# Patient Record
Sex: FEMALE | Race: WHITE | Employment: OTHER | URBAN - METROPOLITAN AREA
[De-identification: names, ages, dates, MRNs, and addresses within clinical notes are randomized per-mention and may not be internally consistent; named-entity substitution may affect disease eponyms.]

---

## 2017-05-10 ENCOUNTER — OFFICE VISIT (OUTPATIENT)
Dept: ORTHOPEDIC SURGERY | Age: 47
End: 2017-05-10

## 2017-05-10 VITALS
HEART RATE: 105 BPM | SYSTOLIC BLOOD PRESSURE: 136 MMHG | BODY MASS INDEX: 32.45 KG/M2 | WEIGHT: 201.94 LBS | HEIGHT: 66 IN | DIASTOLIC BLOOD PRESSURE: 107 MMHG

## 2017-05-10 DIAGNOSIS — M19.079 ARTHRITIS OF MIDFOOT: ICD-10-CM

## 2017-05-10 DIAGNOSIS — M79.672 FOOT PAIN, LEFT: Primary | ICD-10-CM

## 2017-05-10 PROCEDURE — 99243 OFF/OP CNSLTJ NEW/EST LOW 30: CPT | Performed by: ORTHOPAEDIC SURGERY

## 2017-05-10 PROCEDURE — G8417 CALC BMI ABV UP PARAM F/U: HCPCS | Performed by: ORTHOPAEDIC SURGERY

## 2017-05-10 PROCEDURE — G8427 DOCREV CUR MEDS BY ELIG CLIN: HCPCS | Performed by: ORTHOPAEDIC SURGERY

## 2017-05-10 PROCEDURE — 1036F TOBACCO NON-USER: CPT | Performed by: ORTHOPAEDIC SURGERY

## 2017-05-10 RX ORDER — DIPHENOXYLATE HYDROCHLORIDE AND ATROPINE SULFATE 2.5; .025 MG/1; MG/1
1 TABLET ORAL 2 TIMES DAILY PRN
COMMUNITY
Start: 2017-03-30 | End: 2019-06-03 | Stop reason: SDUPTHER

## 2017-05-10 RX ORDER — FENTANYL CITRATE 600 UG/1
800 LOZENGE ORAL; TRANSMUCOSAL 3 TIMES DAILY PRN
COMMUNITY
Start: 2017-05-08 | End: 2019-04-18 | Stop reason: ALTCHOICE

## 2017-05-10 RX ORDER — RIVAROXABAN 20 MG/1
1 TABLET, FILM COATED ORAL DAILY
COMMUNITY
Start: 2017-05-08 | End: 2019-02-25

## 2017-05-10 RX ORDER — LEVOTHYROXINE SODIUM 0.2 MG/1
1 TABLET ORAL DAILY
COMMUNITY
Start: 2017-05-04 | End: 2019-07-17

## 2017-06-08 ENCOUNTER — OFFICE VISIT (OUTPATIENT)
Dept: ORTHOPEDIC SURGERY | Age: 47
End: 2017-06-08

## 2017-06-08 VITALS
HEIGHT: 66 IN | DIASTOLIC BLOOD PRESSURE: 110 MMHG | BODY MASS INDEX: 32.45 KG/M2 | WEIGHT: 201.94 LBS | HEART RATE: 99 BPM | SYSTOLIC BLOOD PRESSURE: 164 MMHG

## 2017-06-08 DIAGNOSIS — M19.079 ARTHRITIS OF MIDFOOT: Primary | ICD-10-CM

## 2017-06-08 PROCEDURE — G8427 DOCREV CUR MEDS BY ELIG CLIN: HCPCS | Performed by: ORTHOPAEDIC SURGERY

## 2017-06-08 PROCEDURE — 73630 X-RAY EXAM OF FOOT: CPT | Performed by: ORTHOPAEDIC SURGERY

## 2017-06-08 PROCEDURE — G8417 CALC BMI ABV UP PARAM F/U: HCPCS | Performed by: ORTHOPAEDIC SURGERY

## 2017-06-08 PROCEDURE — 99212 OFFICE O/P EST SF 10 MIN: CPT | Performed by: ORTHOPAEDIC SURGERY

## 2017-06-08 PROCEDURE — 1036F TOBACCO NON-USER: CPT | Performed by: ORTHOPAEDIC SURGERY

## 2017-06-15 ENCOUNTER — HOSPITAL ENCOUNTER (OUTPATIENT)
Dept: CT IMAGING | Age: 47
Discharge: HOME | End: 2017-06-15
Attending: ORTHOPAEDIC SURGERY | Admitting: ORTHOPAEDIC SURGERY

## 2017-06-15 DIAGNOSIS — M19.90 OSTEOARTHRITIS: ICD-10-CM

## 2017-06-15 DIAGNOSIS — M19.079 ARTHRITIS OF MIDFOOT: ICD-10-CM

## 2017-06-21 ENCOUNTER — OFFICE VISIT (OUTPATIENT)
Dept: ORTHOPEDIC SURGERY | Age: 47
End: 2017-06-21

## 2017-06-21 VITALS
WEIGHT: 201.94 LBS | SYSTOLIC BLOOD PRESSURE: 158 MMHG | HEART RATE: 105 BPM | DIASTOLIC BLOOD PRESSURE: 111 MMHG | HEIGHT: 66 IN | BODY MASS INDEX: 32.45 KG/M2

## 2017-06-21 DIAGNOSIS — M19.079 ARTHRITIS OF MIDFOOT: Primary | ICD-10-CM

## 2017-06-21 PROCEDURE — G8417 CALC BMI ABV UP PARAM F/U: HCPCS | Performed by: ORTHOPAEDIC SURGERY

## 2017-06-21 PROCEDURE — 99213 OFFICE O/P EST LOW 20 MIN: CPT | Performed by: ORTHOPAEDIC SURGERY

## 2017-06-21 PROCEDURE — 1036F TOBACCO NON-USER: CPT | Performed by: ORTHOPAEDIC SURGERY

## 2017-06-21 PROCEDURE — G8427 DOCREV CUR MEDS BY ELIG CLIN: HCPCS | Performed by: ORTHOPAEDIC SURGERY

## 2017-06-23 ENCOUNTER — TELEPHONE (OUTPATIENT)
Dept: ORTHOPEDIC SURGERY | Age: 47
End: 2017-06-23

## 2017-07-03 DIAGNOSIS — Z45.2 ENCOUNTER FOR CENTRAL LINE PLACEMENT: Primary | ICD-10-CM

## 2017-07-10 ENCOUNTER — HOSPITAL ENCOUNTER (OUTPATIENT)
Dept: SURGERY | Age: 47
Discharge: HOME | End: 2017-07-10
Attending: ORTHOPAEDIC SURGERY | Admitting: ORTHOPAEDIC SURGERY

## 2017-07-10 ENCOUNTER — HOSPITAL ENCOUNTER (OUTPATIENT)
Dept: INTERVENTIONAL RADIOLOGY/VASCULAR | Age: 47
Discharge: HOME | End: 2017-07-10
Attending: ORTHOPAEDIC SURGERY | Admitting: ORTHOPAEDIC SURGERY

## 2017-07-10 ENCOUNTER — CLINICAL DOCUMENTATION (OUTPATIENT)
Dept: SPIRITUAL SERVICES | Age: 47
End: 2017-07-10

## 2017-07-10 VITALS
SYSTOLIC BLOOD PRESSURE: 113 MMHG | RESPIRATION RATE: 15 BRPM | OXYGEN SATURATION: 99 % | HEIGHT: 66 IN | TEMPERATURE: 97.2 F | WEIGHT: 187.6 LBS | BODY MASS INDEX: 30.15 KG/M2 | DIASTOLIC BLOOD PRESSURE: 83 MMHG | HEART RATE: 98 BPM

## 2017-07-10 DIAGNOSIS — M19.90 OSTEOARTHRITIS: ICD-10-CM

## 2017-07-10 DIAGNOSIS — M19.90 SENILE ARTHRITIS: ICD-10-CM

## 2017-07-10 RX ORDER — MEPERIDINE HYDROCHLORIDE 50 MG/ML
12.5 INJECTION INTRAMUSCULAR; INTRAVENOUS; SUBCUTANEOUS EVERY 5 MIN PRN
Status: DISCONTINUED | OUTPATIENT
Start: 2017-07-10 | End: 2017-07-11 | Stop reason: HOSPADM

## 2017-07-10 RX ORDER — CEPHALEXIN 500 MG/1
500 CAPSULE ORAL 4 TIMES DAILY
Qty: 8 CAPSULE | Refills: 0 | Status: SHIPPED | OUTPATIENT
Start: 2017-07-10 | End: 2019-02-25

## 2017-07-10 RX ORDER — SODIUM CHLORIDE 0.9 % (FLUSH) 0.9 %
SYRINGE (ML) INJECTION
Status: COMPLETED
Start: 2017-07-10 | End: 2017-07-10

## 2017-07-10 RX ORDER — OXYCODONE HYDROCHLORIDE AND ACETAMINOPHEN 5; 325 MG/1; MG/1
1 TABLET ORAL PRN
Status: ACTIVE | OUTPATIENT
Start: 2017-07-10 | End: 2017-07-10

## 2017-07-10 RX ORDER — HYDROCODONE BITARTRATE AND ACETAMINOPHEN 5; 325 MG/1; MG/1
2 TABLET ORAL ONCE
Status: COMPLETED | OUTPATIENT
Start: 2017-07-10 | End: 2017-07-10

## 2017-07-10 RX ORDER — HYDROCODONE BITARTRATE AND ACETAMINOPHEN 5; 325 MG/1; MG/1
1-2 TABLET ORAL EVERY 6 HOURS PRN
Qty: 50 TABLET | Refills: 0 | Status: SHIPPED | OUTPATIENT
Start: 2017-07-10 | End: 2017-07-17

## 2017-07-10 RX ORDER — KETAMINE HYDROCHLORIDE 100 MG/ML
INJECTION, SOLUTION INTRAMUSCULAR; INTRAVENOUS
Status: DISCONTINUED
Start: 2017-07-10 | End: 2017-07-11 | Stop reason: HOSPADM

## 2017-07-10 RX ORDER — OXYCODONE HYDROCHLORIDE AND ACETAMINOPHEN 5; 325 MG/1; MG/1
2 TABLET ORAL PRN
Status: ACTIVE | OUTPATIENT
Start: 2017-07-10 | End: 2017-07-10

## 2017-07-10 RX ORDER — HYDRALAZINE HYDROCHLORIDE 20 MG/ML
5 INJECTION INTRAMUSCULAR; INTRAVENOUS EVERY 10 MIN PRN
Status: DISCONTINUED | OUTPATIENT
Start: 2017-07-10 | End: 2017-07-11 | Stop reason: HOSPADM

## 2017-07-10 RX ORDER — ONDANSETRON 2 MG/ML
4 INJECTION INTRAMUSCULAR; INTRAVENOUS
Status: COMPLETED | OUTPATIENT
Start: 2017-07-10 | End: 2017-07-10

## 2017-07-10 RX ORDER — DIPHENHYDRAMINE HYDROCHLORIDE 50 MG/ML
12.5 INJECTION INTRAMUSCULAR; INTRAVENOUS
Status: ACTIVE | OUTPATIENT
Start: 2017-07-10 | End: 2017-07-10

## 2017-07-10 RX ORDER — PROMETHAZINE HYDROCHLORIDE 25 MG/ML
6.25 INJECTION, SOLUTION INTRAMUSCULAR; INTRAVENOUS
Status: ACTIVE | OUTPATIENT
Start: 2017-07-10 | End: 2017-07-10

## 2017-07-10 RX ORDER — SODIUM CHLORIDE, SODIUM LACTATE, POTASSIUM CHLORIDE, CALCIUM CHLORIDE 600; 310; 30; 20 MG/100ML; MG/100ML; MG/100ML; MG/100ML
INJECTION, SOLUTION INTRAVENOUS CONTINUOUS
Status: DISCONTINUED | OUTPATIENT
Start: 2017-07-10 | End: 2017-07-11 | Stop reason: HOSPADM

## 2017-07-10 RX ORDER — HYDROCODONE BITARTRATE AND ACETAMINOPHEN 5; 325 MG/1; MG/1
TABLET ORAL
Status: DISCONTINUED
Start: 2017-07-10 | End: 2017-07-11 | Stop reason: HOSPADM

## 2017-07-10 RX ADMIN — HYDROCODONE BITARTRATE AND ACETAMINOPHEN 2 TABLET: 5; 325 TABLET ORAL at 14:26

## 2017-07-10 RX ADMIN — SODIUM CHLORIDE, SODIUM LACTATE, POTASSIUM CHLORIDE, CALCIUM CHLORIDE: 600; 310; 30; 20 INJECTION, SOLUTION INTRAVENOUS at 10:20

## 2017-07-10 RX ADMIN — Medication 10 ML: at 10:20

## 2017-07-10 RX ADMIN — ONDANSETRON 4 MG: 2 INJECTION INTRAMUSCULAR; INTRAVENOUS at 13:46

## 2017-07-10 ASSESSMENT — PAIN SCALES - GENERAL: PAINLEVEL_OUTOF10: 7

## 2017-07-10 ASSESSMENT — PAIN DESCRIPTION - LOCATION: LOCATION: HEAD

## 2017-07-10 ASSESSMENT — PAIN DESCRIPTION - DESCRIPTORS: DESCRIPTORS: CONSTANT

## 2017-07-10 ASSESSMENT — PAIN - FUNCTIONAL ASSESSMENT: PAIN_FUNCTIONAL_ASSESSMENT: 0-10

## 2017-07-10 ASSESSMENT — PAIN DESCRIPTION - PAIN TYPE: TYPE: CHRONIC PAIN

## 2017-07-26 ENCOUNTER — OFFICE VISIT (OUTPATIENT)
Dept: ORTHOPEDIC SURGERY | Age: 47
End: 2017-07-26

## 2017-07-26 DIAGNOSIS — M79.672 FOOT PAIN, LEFT: Primary | ICD-10-CM

## 2017-07-26 PROCEDURE — 73630 X-RAY EXAM OF FOOT: CPT | Performed by: ORTHOPAEDIC SURGERY

## 2017-07-26 PROCEDURE — 99024 POSTOP FOLLOW-UP VISIT: CPT | Performed by: ORTHOPAEDIC SURGERY

## 2017-07-26 PROCEDURE — 29405 APPL SHORT LEG CAST: CPT | Performed by: ORTHOPAEDIC SURGERY

## 2017-08-10 ENCOUNTER — OFFICE VISIT (OUTPATIENT)
Dept: ORTHOPEDIC SURGERY | Age: 47
End: 2017-08-10

## 2017-08-10 VITALS
BODY MASS INDEX: 33.49 KG/M2 | DIASTOLIC BLOOD PRESSURE: 73 MMHG | HEIGHT: 65 IN | WEIGHT: 201 LBS | SYSTOLIC BLOOD PRESSURE: 127 MMHG | HEART RATE: 81 BPM

## 2017-08-10 DIAGNOSIS — M79.672 FOOT PAIN, LEFT: Primary | ICD-10-CM

## 2017-08-10 DIAGNOSIS — M19.079 ARTHRITIS OF MIDFOOT: ICD-10-CM

## 2017-08-10 PROCEDURE — 99024 POSTOP FOLLOW-UP VISIT: CPT | Performed by: ORTHOPAEDIC SURGERY

## 2017-08-10 PROCEDURE — 73630 X-RAY EXAM OF FOOT: CPT | Performed by: ORTHOPAEDIC SURGERY

## 2017-08-31 ENCOUNTER — OFFICE VISIT (OUTPATIENT)
Dept: ORTHOPEDIC SURGERY | Age: 47
End: 2017-08-31

## 2017-08-31 VITALS
HEART RATE: 88 BPM | WEIGHT: 176 LBS | SYSTOLIC BLOOD PRESSURE: 160 MMHG | HEIGHT: 65 IN | DIASTOLIC BLOOD PRESSURE: 103 MMHG | BODY MASS INDEX: 29.32 KG/M2

## 2017-08-31 DIAGNOSIS — M79.672 FOOT PAIN, LEFT: Primary | ICD-10-CM

## 2017-08-31 PROCEDURE — 99024 POSTOP FOLLOW-UP VISIT: CPT | Performed by: ORTHOPAEDIC SURGERY

## 2017-08-31 PROCEDURE — 73630 X-RAY EXAM OF FOOT: CPT | Performed by: ORTHOPAEDIC SURGERY

## 2017-09-06 ENCOUNTER — HOSPITAL ENCOUNTER (OUTPATIENT)
Dept: PHYSICAL THERAPY | Age: 47
Discharge: HOME | End: 2017-09-06
Admitting: ORTHOPAEDIC SURGERY

## 2017-09-06 ENCOUNTER — HOSPITAL ENCOUNTER (OUTPATIENT)
Dept: PHYSICAL THERAPY | Age: 47
Discharge: HOME | End: 2017-08-31
Admitting: ORTHOPAEDIC SURGERY

## 2017-09-15 ENCOUNTER — HOSPITAL ENCOUNTER (OUTPATIENT)
Dept: PHYSICAL THERAPY | Age: 47
Discharge: HOME | End: 2017-09-15
Admitting: ORTHOPAEDIC SURGERY

## 2017-09-20 ENCOUNTER — HOSPITAL ENCOUNTER (OUTPATIENT)
Dept: PHYSICAL THERAPY | Age: 47
Discharge: HOME | End: 2017-09-20
Admitting: ORTHOPAEDIC SURGERY

## 2017-09-29 ENCOUNTER — HOSPITAL ENCOUNTER (OUTPATIENT)
Dept: PHYSICAL THERAPY | Age: 47
Discharge: HOME | End: 2017-09-29
Admitting: ORTHOPAEDIC SURGERY

## 2017-09-29 NOTE — FLOWSHEET NOTE
improvements in LE, proximal hip, and core control with self care, mobility, lifting, ambulation.  [] (74240) Provided verbal/tactile cueing for activities related to improving balance, coordination, kinesthetic sense, posture, motor skill, proprioception  to assist with LE, proximal hip, and core control in self care, mobility, lifting, ambulation and eccentric single leg control. NMR and Therapeutic Activities:    [x] (38671 or 10407) Provided verbal/tactile cueing for activities related to improving balance, coordination, kinesthetic sense, posture, motor skill, proprioception and motor activation to allow for proper function of core, proximal hip and LE with self care and ADLs  [] (41680) Gait Re-education- Provided training and instruction to the patient for proper LE, core and proximal hip recruitment and positioning and eccentric body weight control with ambulation re-education including up and down stairs     Home Exercise Program:    [x] (18139) Reviewed/Progressed HEP activities related to strengthening, flexibility, endurance, ROM of core, proximal hip and LE for functional self-care, mobility, lifting and ambulation/stair navigation   [] (64633)Reviewed/Progressed HEP activities related to improving balance, coordination, kinesthetic sense, posture, motor skill, proprioception of core, proximal hip and LE for self care, mobility, lifting, and ambulation/stair navigation      Manual Treatments:  PROM / STM / Oscillations-Mobs:  G-I, II, III, IV (PA's, Inf., Post.)  [x] (62204) Provided manual therapy to mobilize LE, proximal hip and/or LS spine soft tissue/joints for the purpose of modulating pain, promoting relaxation,  increasing ROM, reducing/eliminating soft tissue swelling/inflammation/restriction, improving soft tissue extensibility and allowing for proper ROM for normal function with self care, mobility, lifting and ambulation.      Modalities:   10 min cryotherapy     Charges:  Timed Code Treatment Minutes: 38   Total Treatment Minutes: 48     [] EVAL (LOW) 16250 (typically 20 minutes face-to-face)    [] EVAL  [x] JE(98272) x  2   [] IONTO  [x] NMR (67220) x  1   [] VASO  [] Manual (59827) x       [] Other:  [] TA x       [] Mech Traction (81056)  [] ES(attended) (07776)      [] ES (un) (19370):     GOALS: Patient stated goal: Pt would like to return to pain free ambulation without AD.      Therapist goals for Patient:   Short Term Goals: To be achieved in: 2 weeks  1. Independent in HEP and progression per patient tolerance, in order to prevent re-injury. 2. Patient will have a decrease in pain to facilitate improvement in movement, function, and ADLs as indicated by Functional Deficits.     Long Term Goals: To be achieved in: 12 weeks  1. Disability index score of 35% or less for the LEFS to assist with reaching prior level of function. 2. Patient will demonstrate increased AROM to L ankle = to R ankle to allow for proper joint functioning as indicated by patients Functional Deficits. 3. Patient will demonstrate an increase in Strength to good proximal hip strength and control, within 5lb HHD in LE to allow for proper functional mobility as indicated by patients Functional Deficits. 4. Patient will return to all functional activities without increased symptoms or restriction. 5. Pt will demonstrate a normalized gait pattern without AD and 0/10 pain. (patient specific functional goal)     Progression Towards Functional goals:  [x] Patient is progressing as expected towards functional goals listed. [] Progression is slowed due to complexities listed. [] Progression has been slowed due to co-morbidities. [] Plan just implemented, too soon to assess goals progression  [] Other:     ASSESSMENT:  Patient has improved ankle range of motion and strength today as noted above.      Treatment/Activity Tolerance:  [x] Patient tolerated treatment well [] Patient limited by werner  [] Patient

## 2017-09-29 NOTE — OP NOTE
[facility]                        Date: 9/29/2017  Physician: Dr. Mayco Naranjo  Patient: Asif Caban Diagnosis: L 1st and 2nd TMT joint fusion DOS 7/10/17  Patient has received 4 sessions of Physical Therapy over a 4 week period. Pain reported has decreased from 3/10 to 0/10    ROM Initial (R) Initial (L) Current (R) Current (L)   DF    19   PF    50   Inv    45   Ev    35          Strength       DF  3+/5  4/5   PF  3+/5  3/5   Inv  3+/5  4-/5   Ev  3+/5  4-/5            Functional Activity Checklist: The patient continues to have moderate difficulty with the following:   [] Personal care  [] Reaching / overhead  [x] Standing    [x] Housework chores  [] Climbing  [x] Driving / riding in a vehicle    [x] Work  [x] Squatting  [] Bed / vehicle mobility    [x] Lifting  [x] Walking  [x] Sleeping    [] Pushing / pulling  [] Sitting  [] Concentrating / reading     Specific Functional Improvement and Impression:  Pt demonstrates improved L ankle AROM to within 5 deg of unaffected LE. She has improved ankle strength. She is able to tolerate weight shifts onto L LE for up to 15 seconds. Summary:   [x] Patient is progressing as expected for this condition   [] Patient is progressing, but slower than expected for this condition   [] Patient is not progressing  Clinician Recommendations:   [x] Continue rehabilitation due to objective improvement and continued functional deficits. [] Follow up periodically to advance home exercise program to match level of function. [] Continue rehabitation due to objective improvement and continued functional deficits with progression to work conditioning. [] Discharge to post-rehab program secondary to maximizing \"medical necessity\" of physical / occupational therapy.    [] Discharge independent in a home program as:  [] All goals achieved  [] Maximized \"medical necessity\" of physical / occupational therapy  [] No subjective or objective

## 2017-10-04 ENCOUNTER — OFFICE VISIT (OUTPATIENT)
Dept: ORTHOPEDIC SURGERY | Age: 47
End: 2017-10-04

## 2017-10-04 VITALS
HEIGHT: 65 IN | WEIGHT: 175.93 LBS | SYSTOLIC BLOOD PRESSURE: 135 MMHG | HEART RATE: 101 BPM | BODY MASS INDEX: 29.31 KG/M2 | DIASTOLIC BLOOD PRESSURE: 74 MMHG

## 2017-10-04 DIAGNOSIS — M79.672 FOOT PAIN, LEFT: Primary | ICD-10-CM

## 2017-10-04 PROCEDURE — 99024 POSTOP FOLLOW-UP VISIT: CPT | Performed by: ORTHOPAEDIC SURGERY

## 2017-10-04 PROCEDURE — 73630 X-RAY EXAM OF FOOT: CPT | Performed by: ORTHOPAEDIC SURGERY

## 2017-10-04 NOTE — PROGRESS NOTES
Subjective: Patient states that he is here for follow-up of her [id number] left 2nd and 3rd TMT fusions. States her pain continues to go down and she has a little sore after therapy. If she is walking on it for long period of time she also gets some discomfort. She does take pain medicine but that's for chronic headaches. Objective: Physical exam shows incisions are well-healed no evidence of infection sensations intact. She has minimal swelling mild deviation laterally of the TMT joints.   No pain with mobilization through the midfoot 30° of dorsiflexion and 30° of plantarflexion ambulates with a boot  Imaging: 3 views of the left foot show the fusion sites unchanged appears to be healing in  Assessment and plan: Overall she's doing well I gave her prescription for custom inserts she'll follow-up with me in 4 weeks repeat x-ray should be obtained she can wean into the inserts as tolerated

## 2017-10-16 ENCOUNTER — TELEPHONE (OUTPATIENT)
Dept: ORTHOPEDIC SURGERY | Age: 47
End: 2017-10-16

## 2017-10-16 NOTE — TELEPHONE ENCOUNTER
Tried on 2 separate times to call. Have to press a 2 digit number to prove not a robocall. Each time, got patched through and would ring, someone would  and hang back up. Orthotics not covered unless diabetic.

## 2017-10-31 ENCOUNTER — ORTHOTIC/BRACE ENCOUNTER (OUTPATIENT)
Dept: ORTHOPEDIC SURGERY | Age: 47
End: 2017-10-31

## 2017-11-14 ENCOUNTER — ORTHOTIC/BRACE ENCOUNTER (OUTPATIENT)
Dept: ORTHOPEDIC SURGERY | Age: 47
End: 2017-11-14

## 2017-11-14 DIAGNOSIS — M19.079 ARTHRITIS OF MIDFOOT: Primary | ICD-10-CM

## 2017-11-14 PROCEDURE — L3020 FOOT LONGITUD/METATARSAL SUP: HCPCS | Performed by: PEDORTHIST

## 2017-12-07 ENCOUNTER — OFFICE VISIT (OUTPATIENT)
Dept: ORTHOPEDIC SURGERY | Age: 47
End: 2017-12-07

## 2017-12-07 VITALS
HEART RATE: 113 BPM | HEIGHT: 65 IN | SYSTOLIC BLOOD PRESSURE: 145 MMHG | WEIGHT: 175.93 LBS | BODY MASS INDEX: 29.31 KG/M2 | DIASTOLIC BLOOD PRESSURE: 103 MMHG

## 2017-12-07 DIAGNOSIS — M19.079 ARTHRITIS OF MIDFOOT: ICD-10-CM

## 2017-12-07 DIAGNOSIS — M79.672 FOOT PAIN, LEFT: Primary | ICD-10-CM

## 2017-12-07 PROCEDURE — 99212 OFFICE O/P EST SF 10 MIN: CPT | Performed by: ORTHOPAEDIC SURGERY

## 2017-12-07 PROCEDURE — 1036F TOBACCO NON-USER: CPT | Performed by: ORTHOPAEDIC SURGERY

## 2017-12-07 PROCEDURE — G8427 DOCREV CUR MEDS BY ELIG CLIN: HCPCS | Performed by: ORTHOPAEDIC SURGERY

## 2017-12-07 PROCEDURE — G8484 FLU IMMUNIZE NO ADMIN: HCPCS | Performed by: ORTHOPAEDIC SURGERY

## 2017-12-07 PROCEDURE — G8417 CALC BMI ABV UP PARAM F/U: HCPCS | Performed by: ORTHOPAEDIC SURGERY

## 2017-12-07 PROCEDURE — 73630 X-RAY EXAM OF FOOT: CPT | Performed by: ORTHOPAEDIC SURGERY

## 2017-12-12 ENCOUNTER — TELEPHONE (OUTPATIENT)
Dept: ORTHOPEDIC SURGERY | Age: 47
End: 2017-12-12

## 2017-12-12 NOTE — TELEPHONE ENCOUNTER
Patient has a question for Emmett Bower about the orthotics she received. It seems they may be a little short and is wondering if she should return them and go to another place to get the right ones for her new balance shoes that she has gotten.

## 2018-04-11 ENCOUNTER — OFFICE VISIT (OUTPATIENT)
Dept: ORTHOPEDIC SURGERY | Age: 48
End: 2018-04-11

## 2018-04-11 VITALS — BODY MASS INDEX: 29.31 KG/M2 | WEIGHT: 175.93 LBS | HEIGHT: 65 IN

## 2018-04-11 DIAGNOSIS — M79.672 FOOT PAIN, LEFT: Primary | ICD-10-CM

## 2018-04-11 DIAGNOSIS — M19.079 ARTHRITIS OF MIDFOOT: ICD-10-CM

## 2018-04-11 PROCEDURE — G8427 DOCREV CUR MEDS BY ELIG CLIN: HCPCS | Performed by: ORTHOPAEDIC SURGERY

## 2018-04-11 PROCEDURE — 1036F TOBACCO NON-USER: CPT | Performed by: ORTHOPAEDIC SURGERY

## 2018-04-11 PROCEDURE — G8417 CALC BMI ABV UP PARAM F/U: HCPCS | Performed by: ORTHOPAEDIC SURGERY

## 2018-04-11 PROCEDURE — 99212 OFFICE O/P EST SF 10 MIN: CPT | Performed by: ORTHOPAEDIC SURGERY

## 2018-07-19 ENCOUNTER — OFFICE VISIT (OUTPATIENT)
Dept: ORTHOPEDIC SURGERY | Age: 48
End: 2018-07-19

## 2018-07-19 VITALS — HEIGHT: 64 IN | WEIGHT: 175 LBS | BODY MASS INDEX: 29.88 KG/M2

## 2018-07-19 DIAGNOSIS — M79.672 FOOT PAIN, LEFT: Primary | ICD-10-CM

## 2018-07-19 PROCEDURE — 1036F TOBACCO NON-USER: CPT | Performed by: ORTHOPAEDIC SURGERY

## 2018-07-19 PROCEDURE — G8428 CUR MEDS NOT DOCUMENT: HCPCS | Performed by: ORTHOPAEDIC SURGERY

## 2018-07-19 PROCEDURE — G8417 CALC BMI ABV UP PARAM F/U: HCPCS | Performed by: ORTHOPAEDIC SURGERY

## 2018-07-19 PROCEDURE — 99212 OFFICE O/P EST SF 10 MIN: CPT | Performed by: ORTHOPAEDIC SURGERY

## 2018-10-17 ENCOUNTER — OFFICE VISIT (OUTPATIENT)
Dept: ORTHOPEDIC SURGERY | Age: 48
End: 2018-10-17
Payer: COMMERCIAL

## 2018-10-17 VITALS — HEIGHT: 64 IN | BODY MASS INDEX: 29.88 KG/M2 | WEIGHT: 175.04 LBS

## 2018-10-17 DIAGNOSIS — M19.079 ARTHRITIS OF MIDFOOT: ICD-10-CM

## 2018-10-17 DIAGNOSIS — M79.672 FOOT PAIN, LEFT: Primary | ICD-10-CM

## 2018-10-17 PROCEDURE — 99212 OFFICE O/P EST SF 10 MIN: CPT | Performed by: ORTHOPAEDIC SURGERY

## 2018-10-17 PROCEDURE — G8427 DOCREV CUR MEDS BY ELIG CLIN: HCPCS | Performed by: ORTHOPAEDIC SURGERY

## 2018-10-17 PROCEDURE — G8417 CALC BMI ABV UP PARAM F/U: HCPCS | Performed by: ORTHOPAEDIC SURGERY

## 2018-10-17 PROCEDURE — G8484 FLU IMMUNIZE NO ADMIN: HCPCS | Performed by: ORTHOPAEDIC SURGERY

## 2018-10-17 PROCEDURE — 1036F TOBACCO NON-USER: CPT | Performed by: ORTHOPAEDIC SURGERY

## 2018-10-17 NOTE — PROGRESS NOTES
Subjective: Patient states that she is here for follow-up of her 7/10/17 left midfoot fusion 1st and 2nd TMT joints. Last time I saw her in June I put her on Bactrim and she's had problems with multiple areas of rash it's been treated by both general surgery and internal medicine. She has an appointment to see a dermatologist in November. She denies fever or chills. States that her left foot is mild to moderately tender but it's not in the surgical area it's distal through the forefoot and lateral through the midfoot. She has not been able to wear regular shoe with arch support because it rubs on the area of rash and her previous biopsy sites. He is currently not on antibiotics. She did have her shoulder surgery to remove the lipoma. Objective: Physical exam shows she has a purple petechial area on the anterior ankle. Her surgical site at the left midfoot shows mild swelling but no erythema no pain with mobilization through this area. She ambulates without assistive device using a shoe that has no support. Strength is 4/5 dorsiflexion plantarflexion. 20° of dorsiflexion and 40° of plantarflexion  Imaging: 3 views of the left foot show the 1st 2nd TMT fusion sites well-healed  Assessment and plan: This patient's fusion sites look good I think she is ambulating without support which is going to irritate her somewhat. Her main complaint is the rash. I talked her about other ways of treating the achiness through her midfoot like using a fit flop.   She'll follow up with me in 3 months repeat x-rays

## 2018-10-23 ENCOUNTER — TELEPHONE (OUTPATIENT)
Dept: ORTHOPEDIC SURGERY | Age: 48
End: 2018-10-23

## 2018-11-26 ENCOUNTER — HOSPITAL ENCOUNTER (OUTPATIENT)
Age: 48
Discharge: HOME | End: 2018-11-26
Payer: COMMERCIAL

## 2018-11-26 ENCOUNTER — HOSPITAL ENCOUNTER (OUTPATIENT)
Dept: GENERAL RADIOLOGY | Age: 48
Discharge: HOME | End: 2018-11-26
Payer: COMMERCIAL

## 2018-11-26 DIAGNOSIS — R07.9 CHEST PAIN, UNSPECIFIED TYPE: ICD-10-CM

## 2018-11-26 PROCEDURE — 71046 X-RAY EXAM CHEST 2 VIEWS: CPT

## 2018-12-14 ENCOUNTER — HOSPITAL ENCOUNTER (EMERGENCY)
Age: 48
Discharge: HOME | End: 2018-12-14
Attending: EMERGENCY MEDICINE
Payer: COMMERCIAL

## 2018-12-14 VITALS
RESPIRATION RATE: 12 BRPM | SYSTOLIC BLOOD PRESSURE: 147 MMHG | WEIGHT: 140 LBS | HEIGHT: 65 IN | OXYGEN SATURATION: 99 % | DIASTOLIC BLOOD PRESSURE: 99 MMHG | BODY MASS INDEX: 23.32 KG/M2 | TEMPERATURE: 98 F | HEART RATE: 114 BPM

## 2018-12-14 DIAGNOSIS — R21 RASH AND OTHER NONSPECIFIC SKIN ERUPTION: Primary | ICD-10-CM

## 2018-12-14 PROCEDURE — 99282 EMERGENCY DEPT VISIT SF MDM: CPT

## 2018-12-14 RX ORDER — CLOTRIMAZOLE AND BETAMETHASONE DIPROPIONATE 10; .64 MG/G; MG/G
CREAM TOPICAL
Qty: 1 TUBE | Refills: 0 | Status: SHIPPED | OUTPATIENT
Start: 2018-12-14 | End: 2019-02-25

## 2018-12-14 ASSESSMENT — PAIN DESCRIPTION - DESCRIPTORS: DESCRIPTORS: SORE

## 2018-12-14 ASSESSMENT — PAIN DESCRIPTION - PAIN TYPE: TYPE: ACUTE PAIN

## 2018-12-14 ASSESSMENT — PAIN DESCRIPTION - ORIENTATION: ORIENTATION: LEFT

## 2018-12-14 ASSESSMENT — PAIN SCALES - GENERAL: PAINLEVEL_OUTOF10: 10

## 2018-12-14 NOTE — ED PROVIDER NOTES
Multiple denuded lesions with raised red border and some granulation tissue in the center. No cellulitis noted. No induration or fluctuance to suggest abscess. Sparing the palms and soles and mucous membranes. Lesions are primarily in the upper torso and upper extremities. NEUROLOGIC: Normal mental status. Moving all extremities to command. MEDICAL DECISION MAKING       Lesions have an appearance of possible fungal infection and for this reason I gave that betamethasone with Lotrimin. I advised the patient to follow-up with her dermatologist and return to the ER for new or worsening symptoms. Patient states she will call her dermatologist today. Patient is currently prescribed fentanyl lozenges and I'm not prescribing any further pain medication. No results found for this visit on 12/14/18. I estimate there is LOW risk for CELLULITIS, COMPARTMENT SYNDROME, NECROTIZING FASCIITIS, TENDON OR NEUROVASCULAR INJURY, or FOREIGN BODY, thus I consider the discharge disposition reasonable. Also, there is no evidence or peritonitis, sepsis, or toxicity. Massiel Service and I have discussed the diagnosis and risks, and we agree with discharging home to follow-up with their primary doctor. We also discussed returning to the Emergency Department immediately if new or worsening symptoms occur. We have discussed the symptoms which are most concerning (e.g., changing or worsening pain, fever, numbness, weakness, cool or painful digits) that necessitate immediate return. Final Impression    1. Rash and other nonspecific skin eruption        Discharge Vital Signs:  Blood pressure (!) 147/99, pulse 114, temperature 98 °F (36.7 °C), temperature source Oral, resp. rate 12, height 5' 5\" (1.651 m), weight 140 lb (63.5 kg), last menstrual period 06/30/2015, SpO2 99 %, not currently breastfeeding. Patient was given scripts for the following medications.  I counseled patient how to take these

## 2019-02-05 ENCOUNTER — TELEPHONE (OUTPATIENT)
Dept: FAMILY MEDICINE CLINIC | Age: 49
End: 2019-02-05

## 2019-02-25 ENCOUNTER — OFFICE VISIT (OUTPATIENT)
Dept: FAMILY MEDICINE CLINIC | Age: 49
End: 2019-02-25
Payer: COMMERCIAL

## 2019-02-25 ENCOUNTER — TELEPHONE (OUTPATIENT)
Dept: FAMILY MEDICINE CLINIC | Age: 49
End: 2019-02-25

## 2019-02-25 ENCOUNTER — PATIENT MESSAGE (OUTPATIENT)
Dept: FAMILY MEDICINE CLINIC | Age: 49
End: 2019-02-25

## 2019-02-25 VITALS
WEIGHT: 140.8 LBS | DIASTOLIC BLOOD PRESSURE: 86 MMHG | BODY MASS INDEX: 23.46 KG/M2 | HEIGHT: 65 IN | HEART RATE: 99 BPM | SYSTOLIC BLOOD PRESSURE: 120 MMHG | OXYGEN SATURATION: 99 %

## 2019-02-25 DIAGNOSIS — F41.9 ANXIETY: Primary | ICD-10-CM

## 2019-02-25 DIAGNOSIS — E03.9 ACQUIRED HYPOTHYROIDISM: ICD-10-CM

## 2019-02-25 DIAGNOSIS — D68.59 HYPERCOAGULABLE STATE (HCC): ICD-10-CM

## 2019-02-25 DIAGNOSIS — Z85.850 HISTORY OF THYROID CANCER: ICD-10-CM

## 2019-02-25 DIAGNOSIS — Z76.89 ENCOUNTER TO ESTABLISH CARE: ICD-10-CM

## 2019-02-25 DIAGNOSIS — F41.9 ANXIETY: ICD-10-CM

## 2019-02-25 DIAGNOSIS — H53.9 VISUAL CHANGES: ICD-10-CM

## 2019-02-25 DIAGNOSIS — Z00.00 HEALTHCARE MAINTENANCE: ICD-10-CM

## 2019-02-25 DIAGNOSIS — G44.321 INTRACTABLE CHRONIC POST-TRAUMATIC HEADACHE: Primary | ICD-10-CM

## 2019-02-25 DIAGNOSIS — D68.59 PROTEIN S DEFICIENCY (HCC): ICD-10-CM

## 2019-02-25 DIAGNOSIS — Z86.73 HISTORY OF STROKE: ICD-10-CM

## 2019-02-25 PROBLEM — G40.909 EPILEPSY (HCC): Status: ACTIVE | Noted: 2019-02-25

## 2019-02-25 PROBLEM — L98.9 SKIN LESIONS, GENERALIZED: Status: ACTIVE | Noted: 2018-08-18

## 2019-02-25 PROBLEM — I63.9 STROKE (HCC): Status: ACTIVE | Noted: 2019-02-25

## 2019-02-25 PROBLEM — I26.99 PE (PULMONARY EMBOLISM): Status: ACTIVE | Noted: 2019-02-25

## 2019-02-25 PROBLEM — E78.2 MIXED HYPERLIPIDEMIA: Status: ACTIVE | Noted: 2017-09-18

## 2019-02-25 PROBLEM — R19.7 DIARRHEA: Status: ACTIVE | Noted: 2019-02-25

## 2019-02-25 PROBLEM — D17.22 LIPOMA OF LEFT SHOULDER: Status: ACTIVE | Noted: 2018-07-17

## 2019-02-25 PROBLEM — I82.409 DVT (DEEP VENOUS THROMBOSIS) (HCC): Status: ACTIVE | Noted: 2019-02-25

## 2019-02-25 PROBLEM — R51.9 HEADACHE: Status: ACTIVE | Noted: 2019-02-25

## 2019-02-25 PROCEDURE — G8484 FLU IMMUNIZE NO ADMIN: HCPCS | Performed by: FAMILY MEDICINE

## 2019-02-25 PROCEDURE — 1036F TOBACCO NON-USER: CPT | Performed by: FAMILY MEDICINE

## 2019-02-25 PROCEDURE — G8420 CALC BMI NORM PARAMETERS: HCPCS | Performed by: FAMILY MEDICINE

## 2019-02-25 PROCEDURE — G8428 CUR MEDS NOT DOCUMENT: HCPCS | Performed by: FAMILY MEDICINE

## 2019-02-25 PROCEDURE — 99204 OFFICE O/P NEW MOD 45 MIN: CPT | Performed by: FAMILY MEDICINE

## 2019-02-25 PROCEDURE — G8598 ASA/ANTIPLAT THER USED: HCPCS | Performed by: FAMILY MEDICINE

## 2019-02-25 RX ORDER — VENLAFAXINE HYDROCHLORIDE 37.5 MG/1
1 CAPSULE, EXTENDED RELEASE ORAL DAILY
COMMUNITY
Start: 2019-01-21 | End: 2019-02-25 | Stop reason: SDUPTHER

## 2019-02-25 RX ORDER — VENLAFAXINE HYDROCHLORIDE 75 MG/1
75 CAPSULE, EXTENDED RELEASE ORAL DAILY
Qty: 30 CAPSULE | Refills: 1 | Status: SHIPPED | OUTPATIENT
Start: 2019-02-25 | End: 2019-04-22 | Stop reason: SDUPTHER

## 2019-02-25 RX ORDER — PRIMIDONE 250 MG/1
250 TABLET ORAL EVERY 6 HOURS
COMMUNITY
End: 2019-02-25

## 2019-02-25 RX ORDER — RIVAROXABAN 20 MG/1
20 TABLET, FILM COATED ORAL DAILY
Qty: 30 TABLET | Refills: 1 | Status: SHIPPED | OUTPATIENT
Start: 2019-02-25 | End: 2019-04-23 | Stop reason: SDUPTHER

## 2019-02-25 NOTE — TELEPHONE ENCOUNTER
Pt. Wanted to make you aware that she did not start the Effexor until Feb. 7th. States she got the prescription on 1- but did not start it right away.

## 2019-02-25 NOTE — PATIENT INSTRUCTIONS
Patient Agreement for Treatment with a Controlled Substance     I, (insert patient's name), have discussed and understand this agreement for the controlled substance I will be using. This medication can help me a lot in achieving the goals I discussed with my physician. It can also be very dangerous. Because there are laws about how this kind of medicine (s) can be used. It is called a \"controlled medicine\". I know about these laws and rules and agree to follow them before I am given a prescription for this medication. I will follow this agreement for subsequent prescriptions for \"controlled medicines\" in the future. Agreement About How I Will Take My Medicine:     1. I agree to keep my medicine in a safe place. Children and others can die from taking too much of this kind of medicine. I will keep my medicine where children and others cannot get into it. Other people might want to steal this medicine. I will keep this medicine in a safe place so it will not be lost or stolen. It is up to me to keep my medicine safe and not to lose it. 2. I agree that my physician or his/her staff may contact my previous pharmacy and/or previous prescribing physician. 3. I will be the only person to use this medicine. I will not share it or give it to anyone else. I will not sell it. I know that it is against the law for anyone but me to use this medicine. 4. I will take the medication at the dose and frequency prescribed by my physician. I agree not to increase the dose of my own or at the advice of others. 5. I will not change the prescription in any way. I know that it is against the law to change a prescription. 6. I agree and understand that the use of any mood-modifying substance, such as alcohol, someone else's medication or illicit drugs (such as marijuana, cocaine, heroin, party drugs, hallucinogens, or other illicit drugs), is not permitted.  I agree not to use these while on a treatment program that includes controlled medicines. 7. I agree that if my prescription or medications (s) is lost or stolen, it will not be replaced or refilled early. 8. I agree not to request early refills of the medication (s). 9. I agree to get this medicine from only my physician or his/her partners. 10. I agree not to go to the emergency room or to another clinic or office to get this medication (s). 11. I agree that I cannot call the office/after hours phone line or on the weekends to get this medication. 12. If I need a refill of my medication before my next appointment with my doctor, I will call the office during the regular clinic hours at least 5 days before my medication runs out. 15. If my doctor is not around when I need a refill of my medicine, one of the other doctor's in my [de-identified] office may be able to refill my prescription. This doctor might ask that I come in to the clinic for an appointment to discuss my prescription. This doctor might only prescribe enough medicine to last until I have my next appointment with my regular doctor. 14. I will always buy my \"controlled\" medication at the same drug store or pharmacy. I agree to let my doctor talk to the pharmacist about how I am taking my medicine. 15. I agree to let my physician discuss and give a copy of this agreement to any other doctors or nurses that I see, including the emergency room doctors that is deemed medically necessary. 16. I agree and understand that my physician may need to contact my significant other or family members while I am under this agreement and treatment plan to assess how I am responding to the medication (s). 16. I agree that my physician and his/her staff will ask periodically for random urine toxicology screens and random pill counts as part of this responsible treatment plan. I agree to comply with the request at the time the request is made without delay.      18. If I am on regular Current research shows that a pesco-vegetarian diet (eating a plant based diet with fish) is the best for improving longevity and reducing cardiovascular disease. Limit saturated fats, sugar and red meat. Avoid trans-fats and processed meat (e.g. Salami). Avoid fried foods, potato dishes and white (or enriched, processed) flour. Foods to avoid! Trans-fats - increases risk of heart disease, stroke, and development of diabetes     Processed meats (lunch meat, goetta, sausage, pepperoni, etc.. ) - increases risk of cancers     High fructose corn syrup (fructose, corn syrup) - increases risk of high blood pressure, obesity and diabetes     More information on healthful diets and recipes is available at www. choosemyplate.gov, or [employer]mypyramid.gov     Enhance your foods with spices. They are full of nutritional benefit and antioxidants. New research shows that fasting helps lower risk of breast cancer, diabetes, inflammation, cardiovascular disease, Alzheimers, and increases longevity. We don't have a lot of information on how much to fast, but even overnight fasts of 13 hours makes a difference. Consider skipping snacks after dinner. Exercise 1 hour a day at least 5 days a week. If you do not currently exercise, start slow by maybe walking 5 minutes out, 5 minutes back. Increase the amount of time you exercise every day by 2 - 5 minutes, as tolerated. Your goal should be to get to 1/2 - 1 hour a day. Exercise will help you control metabolic diseases, maintain independence, and reduce your risk for dementia. Weight training and resistance exercises have been shown to help preserve muscle mass and strength. It is recommended that these be done twice a week. Balance is important to prevent falls. An easy way to improve this is to stand on one leg at a time while you brush your teeth. Gently stretch your joints to maintain flexibilty. And maintain good posture to protect your spine.

## 2019-02-25 NOTE — TELEPHONE ENCOUNTER
Pt. wanted to give you her Case number for PA for Gretchen. It is for [facility] and it expires 3-.

## 2019-02-25 NOTE — PROGRESS NOTES
Ugo Luo  YOB: 1970    Date of Service:  2/25/2019    Chief Complaint:   Ugo Luo is a 47 y.o. female who presents for complete physical examination.     HPI:    Here to establish care    Saw Dr. Carolina Santamaria  Retired,   Saw Ed[facility] for PCP care   Needs recs     Klonopin 0.5 mg x1 day as needed    Neuro said just to take effexor   No xyprexa   Wants to dec benzos     Levoxyl 175   Dr. Marlinda Simmonds at [facility]  0.1-0.4 TSH goals   Off of medication for a while, then Previous Endo reduced her dose   Scans after a dose     Coumadin for years  Inc scar tissue   Dehydrated today    Blood work next visit     Constant RAYMUNDO since stroke in 2000  Blocked 80%  SSS thrombosis  Needs MRA  Had MRI 2/4/19   Fall on guillermo day, hit head on coffee table  Black eye on R side  HINTs +  Balance problems when walking, dizziness  Gotten better since fall  Still with constant HA     Xarelto PA needed next month     Needs punch biopsy in active state  Will need to complete at next visit   S/p surgeries at [address] fresh punch biopsy     Dr. Faizan Steel, can't rule out celiac herpetiformis    Dx with Celiac from blood panel 2011, 2012       Wt Readings from Last 3 Encounters:   04/04/19 136 lb 9.6 oz (62 kg)   02/25/19 140 lb 12.8 oz (63.9 kg)   12/14/18 140 lb (63.5 kg)     BP Readings from Last 3 Encounters:   04/04/19 120/86   02/25/19 120/86   12/14/18 (!) 147/99       Patient Active Problem List   Diagnosis    Hereditary protein S deficiency ([address]    Chronic headache disorder    Arthritis of midfoot    Calculus of kidney    CD (celiac disease)    Chronic pain disorder    Diarrhea    DVT (deep venous thrombosis) ([address]    Epilepsy ([address]    Gastroesophageal reflux disease with esophagitis    Headache    Hypothyroid    Irritable bowel syndrome with constipation and diarrhea    Lipoma of left shoulder    Malignant neoplasm of thyroid gland ([address]    Mixed hyperlipidemia    PE (pulmonary embolism)  Post-surgical hypothyroidism    [facility]co[facility]able state [facility]    Skin lesions, generalized    Stroke [facility])       Health Maintenance   Topic Date Due    Cervical cancer screen  03/16/1990    Shingles Vaccine (1 of 2) 03/16/2019    Colon cancer screen colonoscopy  03/16/2019    Flu vaccine (Season Ended) 09/01/2019    TSH testing  04/04/2020    Breast cancer screen  04/05/2021    Lipid screen  04/04/2024    DTaP/Tdap/Td vaccine (2 - Td) 04/04/2029    HIV screen  Completed    Pneumococcal 0-64 years Vaccine  Aged [employer] History   Administered Date(s) Administered    Tdap (Boostrix, Adacel) 04/04/2019       Allergies   Allergen Reactions    Dilaudid [Hydromorphone Hcl] Shortness Of Breath and Itching    Duloxetine Hcl Other (See Comments)     Personality changes and depression    Gabapentin     Gluten Meal      Celiac disease    Hydromorphone Itching and Other (See Comments)    Morphine Itching    Pregabalin      Lyrica (personality changes)     Propranolol Hives    Topiramate Other (See Comments)     Current Outpatient Medications   Medication Sig Dispense Refill    venlafaxine (EFFEXOR XR) 75 MG extended release capsule Take 1 capsule by mouth daily 30 capsule 1    XARELTO 20 MG TABS tablet Take 1 tablet by mouth daily 30 tablet 1    Doxylamine Succinate, Sleep, (UNISOM PO) Take by mouth      carBAMazepine (TEGRETOL XR) 200 MG extended release tablet Take 400 mg by mouth every 8 hours       primidone (MYSOLINE) 250 MG tablet Take 250 mg by mouth 4 times daily       Loratadine (CLARITIN PO) Take by mouth as needed      Bismuth Subsalicylate (PEPTO-BISMOL PO) Take by mouth as needed      fentaNYL (ACTIQ) 600 MCG lollipop Take 800 mcg by mouth 3 times daily as needed.        levothyroxine (SYNTHROID) 200 MCG tablet Take 1 tablet by mouth daily      atorvastatin (LIPITOR) 40 MG tablet Take 1 tablet by mouth daily 30 tablet 5    clonazePAM (KLONOPIN) 0.5 MG tablet Take 1 tablet by mouth daily as needed for Anxiety for up to 30 days. 30 tablet 0    diphenhydrAMINE (BENADRYL) 25 MG tablet Take 25 mg by mouth as needed for Itching      diphenoxylate-atropine (LOMOTIL) 2.5-0.025 MG per tablet Take 1 tablet by mouth 2 times daily as needed       Multiple Vitamin (MULTI-VITAMIN PO) Take 1 tablet by mouth daily. No current facility-administered medications for this visit. Past Medical History:   Diagnosis Date    Anxiety and depression     Arthritis     Benign essential tremor     Cancer ([address] 2011    thyroid    Celiac disease     Degenerative disc disease     D[facility]deep [facility] thrombosis) [facility]     1989-leg    Fibromyalgia     GERD (gastroesophageal reflux disease)     Hx of blood clots     Irritable bowel disease     with diarrhea    Kidney stones     Migraines, neuralgic     since stroke 1999 Chronic    PONV (postoperative nausea and vomiting)     after colonoscopy    Poor venous access     Protein S deficiency ([address]     Pulmonary embolism [facility]     1989    Seizure disorder ([facility]     grand mal in 2005 ( childhood febrile seizure also-from a baby to age 14) and also X 1 ~2005 with headache treatment    Thrombosis, superior sagittal sinus 1999    Thyroid disease 2011    goiter, cancer, nodule, hypo    Trigeminal neuralgia     Unspecified cerebral artery occlusion with cerebral infarction 1999    Vertebral artery occlusion     1999 with stroke    Vertebral artery occlusion 1999    White coat syndrome with high blood pressure but without hypertension      Past Surgical History:   Procedure Laterality Date    BREAST SURGERY      benign lump X 3    CYSTOSCOPY  1/9/12    cystoscopy, left ureteroscopy with holmium laser.  stone manipulation and left stent insertion    EYE SURGERY      lasik    FOOT SURGERY Left 07/10/2017    FRACTURE SURGERY  2000    Left foot pins then removal    LITHOTRIPSY  12/11    OTHER SURGICAL HISTORY      vocal cord nodule    THYROIDECTOMY  5-18-11 TOTAL THYROIDECTOMY    due to cancer     Family History   Problem Relation Age of Onset    Cancer Mother         breast x2, ovarian x1, brain     High Cholesterol Mother     High Cholesterol Father     Other Father    [facility] Migraines Sister     Other Sister     Thyroid Disease Maternal Grandmother      Social History     Socioeconomic History    Marital status: Single     Spouse name: Not on file    Number of children: Not on file    Years of education: Not on file    Highest education level: Not on file   Occupational History    Not on file   Social Needs    Financial resource strain: Not on file    Food insecurity:     Worry: Not on file     Inability: Not on file    Transportation needs:     Medical: Not on file     Non-medical: Not on file   Tobacco Use    Smoking status: Never Smoker    Smokeless tobacco: Never Used   Substance and Sexual Activity    Alcohol use: No    Drug use: No    Sexual activity: Not on file   Lifestyle    Physical activity:     Days per week: Not on file     Minutes per session: Not on file    Stress: Not on file   Relationships    Social connections:     Talks on phone: Not on file     Gets together: Not on file     Attends Orthodoxy service: Not on file     Active member of club or organization: Not on file     Attends meetings of clubs or organizations: Not on file     Relationship status: Not on file    Intimate partner violence:     Fear of current or ex partner: Not on file     Emotionally abused: Not on file     Physically abused: Not on file     Forced sexual activity: Not on file   Other Topics Concern    Not on file   Social History Narrative    Not on file       Review of Systems:  Review of Systems   Constitutional: Negative for activity change, appetite change, chills, diaphoresis, fatigue, fever and unexpected weight change. HENT: Negative for ear pain, hearing loss and trouble swallowing.     Eyes: Negative for visual Musculoskeletal: Normal range of motion. She exhibits no edema. Lymphadenopathy:     She has no cervical adenopathy. Neurological: She is alert and oriented to person, place, and time. She displays normal reflexes. No cranial nerve deficit or sensory deficit. She exhibits normal muscle tone. Coordination normal.   Abnormal HINTS exam.    Skin: Skin is warm and dry. No rash noted. She is not diaphoretic. No erythema. No pallor. Psychiatric: She has a normal mood and affect. Her behavior is normal. Judgment and thought content normal.   Nursing note and vitals reviewed. Assessment/Plan:  1. Intractable chronic post-traumatic headache  - MRA HEAD W WO CONTRAST; Future  - MRA NECK W WO CONTRAST; Future  - MRA HEAD WO CONTRAST; Future    2. History of stroke  - MRA HEAD W WO CONTRAST; Future  - MRA NECK W WO CONTRAST; Future  - XARELTO 20 MG TABS tablet; Take 1 tablet by mouth daily  Dispense: 30 tablet; Refill: 1  - MRA HEAD WO CONTRAST; Future    3. Hypercoagulable state ([facility]  - [address]; Future  - MRA NECK W WO CONTRAST; Future  - XARELTO 20 MG TABS tablet; Take 1 tablet by mouth daily  Dispense: 30 tablet; Refill: 1  - MRA HEAD WO CONTRAST; Future    4. Protein S deficiency ([facility]  - MRA HEAD W WO CONTRAST; Future  - MRA NECK W WO CONTRAST; Future  - XARELTO 20 MG TABS tablet; Take 1 tablet by mouth daily  Dispense: 30 tablet; Refill: 1    5. History of thyroid cancer    6. Acquired hypothyroidism    7. Anxiety  - venlafaxine (EFFEXOR XR) 75 MG extended release capsule; Take 1 capsule by mouth daily  Dispense: 30 capsule; Refill: 1    8. Visual changes  - MRA HEAD W WO CONTRAST; Future  - MRA NECK W WO CONTRAST; Future  - MRA HEAD WO CONTRAST; Future    9. Encounter to establish care    10. Healthcare maintenance      Return in about 4 weeks (around 3/25/2019) for annual physical, blood work. Return for 30 min biopsy of new skin lesion in the interim .

## 2019-02-28 ENCOUNTER — TELEPHONE (OUTPATIENT)
Dept: FAMILY MEDICINE CLINIC | Age: 49
End: 2019-02-28

## 2019-02-28 DIAGNOSIS — Z00.00 HEALTHCARE MAINTENANCE: Primary | ICD-10-CM

## 2019-02-28 RX ORDER — CLONAZEPAM 0.5 MG/1
1 TABLET ORAL DAILY PRN
Qty: 30 TABLET | Refills: 0 | Status: SHIPPED | OUTPATIENT
Start: 2019-03-11 | End: 2019-04-04 | Stop reason: SDUPTHER

## 2019-02-28 NOTE — TELEPHONE ENCOUNTER
Please re-do PA for Xarelto as patient informed that it was denied. Patient has been on coumadin, she was not well controlled. Patient was also on Prardaxa for awhile.    Please use the DX code: D68.59 Protein S Deficiency   She also has had a history of DVT and PE's

## 2019-02-28 NOTE — TELEPHONE ENCOUNTER
[facility] told her PA that was done for xarelto was denied. Has been taking it for years because of blood clotting disorder. Has genetic disordered. Did not do well on the coumadin, said it destroyed her veins and was a nightmare to take. The xarelto works well for her. [facility] told her that acute coronary syndrome was used as the reason and they do not accept that as a reason for the medication. She has Protein S Deficiency, a genetic clotting disorder. She would like to speak with someone regarding this and find out what we can do and if there is anything she can do to get it approved. She can be reached at [phone].

## 2019-03-04 NOTE — TELEPHONE ENCOUNTER
It was submitted using the diagnosis codes that were listed on the prescription. So unless that information is incorrect those are what I used. Thank you.

## 2019-03-04 NOTE — TELEPHONE ENCOUNTER
IRVIN. Dr. Justina Nelson. [facility] allowed me to submit an appeal online. I submitted every detail that was given in this previous message as to why the patient is needing this medication. Hopefully based on that information they will be able to approve this and not require an actual appeal letter. I will keep you posted on the status. Thank you.

## 2019-03-04 NOTE — TELEPHONE ENCOUNTER
It looks like the Appeal Request via CM was DENIED. I put together an Appeal letter (in letter tab) if you would look over it and are ok with it and sign it. I can go ahead and fax over this appeal letter to hopefully get an answer sooner than later. Please advise. Thank you.

## 2019-03-04 NOTE — TELEPHONE ENCOUNTER
So It was originally submitted under an INCORRECT diagnosis code (ACS) that was NOT documented during my visit with the patient, and now I have to file additional paperwork/ an appeal letter in order to get a medication covered that she's been on for quite some time? ? Why was it submitted under that diagnosis code to begin with???    Please send me to completed appeal letter and I would be happy to sign. She obviously needs this medication to avoid another stroke. Thank you.

## 2019-03-04 NOTE — TELEPHONE ENCOUNTER
I tried resubmitting PA for Xarelto 20MG OR TABS, [place]: Lane Ass[address]. PA was already submitted for this patient and drug which was denied. ;GLUOHN:[id number];GRDYMO:QJYOUN; Appeal Information: Attention:ATTN: [address] [id number]. [place],[id number] KDN:[phone] [facility]:[phone]. Some plans will let us redo a PA before submitting an Appeal unfortunately not this one. Once Dr. Irma Vidal has completed an appeal letter I can go ahead and submit for the appeal hopefully via fax. Please advise. Thank you.

## 2019-03-05 ENCOUNTER — TELEPHONE (OUTPATIENT)
Dept: FAMILY MEDICINE CLINIC | Age: 49
End: 2019-03-05

## 2019-03-05 NOTE — TELEPHONE ENCOUNTER
Pt. States MRA of brain has been denied. [facility] is requesting that you contact [facility] Authorization team would like you to call them. [phone] #1 then #2    Pt. Is scheduled for MRA on 3-.

## 2019-03-26 ENCOUNTER — HOSPITAL ENCOUNTER (OUTPATIENT)
Dept: MRI IMAGING | Age: 49
Discharge: HOME | End: 2019-03-26
Payer: COMMERCIAL

## 2019-03-26 DIAGNOSIS — Z86.73 HISTORY OF STROKE: ICD-10-CM

## 2019-03-26 DIAGNOSIS — D68.59 PROTEIN S DEFICIENCY (HCC): ICD-10-CM

## 2019-03-26 DIAGNOSIS — H53.9 VISUAL CHANGES: ICD-10-CM

## 2019-03-26 DIAGNOSIS — G44.321 INTRACTABLE CHRONIC POST-TRAUMATIC HEADACHE: ICD-10-CM

## 2019-03-26 DIAGNOSIS — D68.59 HYPERCOAGULABLE STATE (HCC): ICD-10-CM

## 2019-03-26 PROCEDURE — A9579 GAD-BASE MR CONTRAST NOS,1ML: HCPCS | Performed by: FAMILY MEDICINE

## 2019-03-26 PROCEDURE — 70546 MR ANGIOGRAPH HEAD W/O&W/DYE: CPT

## 2019-03-26 PROCEDURE — 70549 MR ANGIOGRAPH NECK W/O&W/DYE: CPT

## 2019-03-26 PROCEDURE — 6360000004 HC RX CONTRAST MEDICATION: Performed by: FAMILY MEDICINE

## 2019-03-26 RX ADMIN — GADOTERIDOL 13 ML: 279.3 INJECTION, SOLUTION INTRAVENOUS at 08:53

## 2019-03-28 ENCOUNTER — TELEPHONE (OUTPATIENT)
Dept: FAMILY MEDICINE CLINIC | Age: 49
End: 2019-03-28

## 2019-03-28 DIAGNOSIS — I65.01 VERTEBRAL ARTERY NARROWING, RIGHT: Primary | ICD-10-CM

## 2019-04-01 ENCOUNTER — TELEPHONE (OUTPATIENT)
Dept: FAMILY MEDICINE CLINIC | Age: 49
End: 2019-04-01

## 2019-04-01 NOTE — TELEPHONE ENCOUNTER
CTA of neck was denied by [employer]. They sent paperwork stating why. It's in your in basket. Placed call to patient to see if she still wanted to proceed. Left her a message to return my call.

## 2019-04-01 NOTE — TELEPHONE ENCOUNTER
You can do a peer to peer by calling [phone] before filing an appeal.  Should she reschedule or keep appt for Wednesday?

## 2019-04-04 ENCOUNTER — OFFICE VISIT (OUTPATIENT)
Dept: FAMILY MEDICINE CLINIC | Age: 49
End: 2019-04-04
Payer: COMMERCIAL

## 2019-04-04 VITALS
WEIGHT: 136.6 LBS | HEART RATE: 107 BPM | TEMPERATURE: 98.2 F | OXYGEN SATURATION: 99 % | SYSTOLIC BLOOD PRESSURE: 120 MMHG | DIASTOLIC BLOOD PRESSURE: 86 MMHG | HEIGHT: 65 IN | BODY MASS INDEX: 22.76 KG/M2

## 2019-04-04 DIAGNOSIS — Z00.00 ANNUAL PHYSICAL EXAM: Primary | ICD-10-CM

## 2019-04-04 DIAGNOSIS — Z00.00 ANNUAL PHYSICAL EXAM: ICD-10-CM

## 2019-04-04 DIAGNOSIS — F41.9 ANXIETY: ICD-10-CM

## 2019-04-04 LAB
A/G RATIO: 1.8 (ref 1.1–2.2)
ALBUMIN SERPL-MCNC: 4.6 G/DL (ref 3.4–5)
ALP BLD-CCNC: 105 U/L (ref 40–129)
ALT SERPL-CCNC: 19 U/L (ref 10–40)
ANION GAP SERPL CALCULATED.3IONS-SCNC: 15 MMOL/L (ref 3–16)
AST SERPL-CCNC: 26 U/L (ref 15–37)
BASOPHILS ABSOLUTE: 0 K/UL (ref 0–0.2)
BASOPHILS RELATIVE PERCENT: 0.8 %
BILIRUB SERPL-MCNC: 0.3 MG/DL (ref 0–1)
BUN BLDV-MCNC: 12 MG/DL (ref 7–20)
CALCIUM SERPL-MCNC: 9.3 MG/DL (ref 8.3–10.6)
CHLORIDE BLD-SCNC: 101 MMOL/L (ref 99–110)
CHOLESTEROL, TOTAL: 230 MG/DL (ref 0–199)
CO2: 26 MMOL/L (ref 21–32)
CREAT SERPL-MCNC: 0.5 MG/DL (ref 0.6–1.1)
EOSINOPHILS ABSOLUTE: 0.1 K/UL (ref 0–0.6)
EOSINOPHILS RELATIVE PERCENT: 1.4 %
GFR AFRICAN AMERICAN: >60
GFR NON-AFRICAN AMERICAN: >60
GLOBULIN: 2.6 G/DL
GLUCOSE BLD-MCNC: 88 MG/DL (ref 70–99)
HCT VFR BLD CALC: 43.6 % (ref 36–48)
HDLC SERPL-MCNC: 87 MG/DL (ref 40–60)
HEMOGLOBIN: 14.7 G/DL (ref 12–16)
LDL CHOLESTEROL CALCULATED: 114 MG/DL
LYMPHOCYTES ABSOLUTE: 1 K/UL (ref 1–5.1)
LYMPHOCYTES RELATIVE PERCENT: 23.6 %
MCH RBC QN AUTO: 30.7 PG (ref 26–34)
MCHC RBC AUTO-ENTMCNC: 33.6 G/DL (ref 31–36)
MCV RBC AUTO: 91.3 FL (ref 80–100)
MONOCYTES ABSOLUTE: 0.3 K/UL (ref 0–1.3)
MONOCYTES RELATIVE PERCENT: 7.1 %
NEUTROPHILS ABSOLUTE: 2.7 K/UL (ref 1.7–7.7)
NEUTROPHILS RELATIVE PERCENT: 67.1 %
PDW BLD-RTO: 13.2 % (ref 12.4–15.4)
PLATELET # BLD: 235 K/UL (ref 135–450)
PMV BLD AUTO: 9.5 FL (ref 5–10.5)
POTASSIUM SERPL-SCNC: 4.2 MMOL/L (ref 3.5–5.1)
RBC # BLD: 4.78 M/UL (ref 4–5.2)
SODIUM BLD-SCNC: 142 MMOL/L (ref 136–145)
T3 TOTAL: 0.9 NG/ML (ref 0.8–2)
T4 FREE: 1.5 NG/DL (ref 0.9–1.8)
TOTAL PROTEIN: 7.2 G/DL (ref 6.4–8.2)
TRIGL SERPL-MCNC: 146 MG/DL (ref 0–150)
TSH SERPL DL<=0.05 MIU/L-ACNC: 0.03 UIU/ML (ref 0.27–4.2)
VITAMIN D 25-HYDROXY: 23.1 NG/ML
VLDLC SERPL CALC-MCNC: 29 MG/DL
WBC # BLD: 4.1 K/UL (ref 4–11)

## 2019-04-04 PROCEDURE — 90715 TDAP VACCINE 7 YRS/> IM: CPT | Performed by: FAMILY MEDICINE

## 2019-04-04 PROCEDURE — 99396 PREV VISIT EST AGE 40-64: CPT | Performed by: FAMILY MEDICINE

## 2019-04-04 PROCEDURE — 90471 IMMUNIZATION ADMIN: CPT | Performed by: FAMILY MEDICINE

## 2019-04-04 RX ORDER — CLONAZEPAM 0.5 MG/1
0.5 TABLET ORAL DAILY PRN
Qty: 30 TABLET | Refills: 0 | Status: SHIPPED | OUTPATIENT
Start: 2019-04-04 | End: 2019-05-09 | Stop reason: SDUPTHER

## 2019-04-04 ASSESSMENT — ENCOUNTER SYMPTOMS
BLOOD IN STOOL: 0
COUGH: 0
ABDOMINAL PAIN: 0
SHORTNESS OF BREATH: 0
COLOR CHANGE: 0
DIARRHEA: 0
TROUBLE SWALLOWING: 0
BACK PAIN: 0
CONSTIPATION: 0
NAUSEA: 0
VOMITING: 0

## 2019-04-04 NOTE — PATIENT INSTRUCTIONS
Eat 5 - 6 servings of fruit per day. Locally grown fresh fruit has the most antioxidants. If they are not available, frozen fruit is the next best.     Eat more fresh vegetables, olive oil, and a handful of nuts (unsalted) every day. Make all your grains (breads, pasta, rice) 'whole grain' only to increase natural fiber in your diet     Fish has healthy omega-3 oils. Eating fish twice a week has been shown to improve health. If you take fish oil, take at least 1,000mg EPA + DHA a day (you must look at the food label on the back of the bottle and add up these omega-3s to know how much of this beneficial nutrient is contained in the product). There is more evidence that eating fish improves health more than taking fish oi supplements. Eating eggs has benefit if you eat the high omega-3 eggs. In these eggs, the yolks are good for you. At [employer], go to the [employer] food section and get the 660mg omega-3 eggs. These are really good for you. The chickens are fed fish, and the benefits of the fish are imparted into the egg yolk. If you have diabetes, you should probably avoid eggs. Current research shows that a pesco-vegetarian diet (eating a plant based diet with fish) is the best for improving longevity and reducing cardiovascular disease. Limit saturated fats, sugar and red meat. Avoid trans-fats and processed meat (e.g. Salami). Avoid fried foods, potato dishes and white (or enriched, processed) flour. Foods to avoid! Trans-fats - increases risk of heart disease, stroke, and development of diabetes     Processed meats (lunch meat, goetta, sausage, pepperoni, etc.. ) - increases risk of cancers     High fructose corn syrup (fructose, corn syrup) - increases risk of high blood pressure, obesity and diabetes     More information on healthful diets and recipes is available at www. choosemyplate.gov, or ww.mypyramid.gov     Enhance your foods with spices.  They are full of nutritional benefit and antioxidants. New research shows that fasting helps lower risk of breast cancer, diabetes, inflammation, cardiovascular disease, Alzheimers, and increases longevity. We don't have a lot of information on how much to fast, but even overnight fasts of 13 hours makes a difference. Consider skipping snacks after dinner. Exercise 1 hour a day at least 5 days a week. If you do not currently exercise, start slow by maybe walking 5 minutes out, 5 minutes back. Increase the amount of time you exercise every day by 2 - 5 minutes, as tolerated. Your goal should be to get to 1/2 - 1 hour a day. Exercise will help you control metabolic diseases, maintain independence, and reduce your risk for dementia. Weight training and resistance exercises have been shown to help preserve muscle mass and strength. It is recommended that these be done twice a week. Balance is important to prevent falls. An easy way to improve this is to stand on one leg at a time while you brush your teeth. Gently stretch your joints to maintain flexibilty. And maintain good posture to protect your spine.

## 2019-04-04 NOTE — PROGRESS NOTES
Ugo Luo  YOB: 1970    Date of Service:  4/4/2019    Chief Complaint:   Ugo Luo is a 47 y.o. female who presents for complete physical examination. HPI:    Here for a physical     Working on CTA of the neck     Patient's last menstrual period was 06/30/2015. Sexual activity: none   AbnormalSxs: no  More sweaty     Last PAP: overdue    Hx abnormal PAP: no    Last Mammogram: yes - 2013  Family Hx of ovarian, breast, or uterine cancer: yes - updated.  Mom breast x2, ovarian x1, brain   First Dx at age 54   Self-breast exams: yes, no concerns   Had genetic testing, neg for BRCA gene   x6 years    Previous Colonoscopy yes - 3.5- years ago  1 polyp, removed, benign   Repeat in 1 year, was told this   BRBR, unexplained WL, bloating, abd pain Yes  IBS and Celiac so + bloating   Family Hx of Colon Ca  no    Taking effexor  Not taking zyprexa     Doesn't want to just add prescriptions      Wt Readings from Last 3 Encounters:   04/04/19 136 lb 9.6 oz (62 kg)   02/25/19 140 lb 12.8 oz (63.9 kg)   12/14/18 140 lb (63.5 kg)     BP Readings from Last 3 Encounters:   04/04/19 120/86   02/25/19 120/86   12/14/18 (!) 147/99       Patient Active Problem List   Diagnosis    Hereditary protein S deficiency ([address]    Chronic headache disorder    Arthritis of midfoot    Calculus of kidney    CD (celiac disease)    Chronic pain disorder    Diarrhea    DVT (deep venous thrombosis) (HCC)    Epilepsy ([address]    Gastroesophageal reflux disease with esophagitis    Headache    Hypothyroid    Irritable bowel syndrome with constipation and diarrhea    Lipoma of left shoulder    Malignant neoplasm of thyroid gland ([address]    Mixed hyperlipidemia    PE (pulmonary embolism)    Post-surgical hypothyroidism    Primary hypercoagulable state ([address]    Skin lesions, generalized    Stroke [facility])       Health Maintenance   Topic Date Due    HIV screen  03/16/1984    Cervical cancer screen  03/16/1990    daily      Multiple Vitamin (MULTI-VITAMIN PO) Take 1 tablet by mouth daily. No current facility-administered medications for this visit. Past Medical History:   Diagnosis Date    Anxiety and depression     Arthritis     Benign essential tremor     Cancer ([address] 2011    thyroid    Celiac disease     Degenerative disc disease     DVT (deep venous thrombosis) (HCC)     1989-leg    Fibromyalgia     GERD (gastroesophageal reflux disease)     Hx of blood clots     Irritable bowel disease     with diarrhea    Kidney stones     Migraines, neuralgic     since stroke 1999 Chronic    PONV (postoperative nausea and vomiting)     after colonoscopy    Poor venous access     Protein S deficiency ([address]     Pulmonary embolism ([address]     1989    [facility]     grand mal in 2005 ( childhood febrile seizure also-from a baby to age 14) and also X 1 ~2005 with headache treatment    Thrombosis, superior sagittal sinus 1999    Thyroid disease 2011    goiter, cancer, nodule, hypo    Trigeminal neuralgia     Unspecified cerebral artery occlusion with cerebral infarction 1999    Vertebral artery occlusion     1999 with stroke    Vertebral artery occlusion 1999    White coat syndrome with high blood pressure but without hypertension      Past Surgical History:   Procedure Laterality Date    BREAST SURGERY      benign lump X 3    CYSTOSCOPY  1/9/12    cystoscopy, left ureteroscopy with holmium laser.  stone manipulation and left stent insertion    EYE SURGERY      lasik    FOOT SURGERY Left 07/10/2017    FRACTURE SURGERY  2000    Left foot pins then removal    LITHOTRIPSY  12/11    OTHER SURGICAL HISTORY      vocal cord nodule    THYROIDECTOMY  5-18-11 TOTAL THYROIDECTOMY    due to cancer     Family History   Problem Relation Age of Onset    Cancer Mother         breast x2, ovarian x1, brain     High Cholesterol Mother     High Cholesterol Father     Other Father     Migraines Sister     Other Sister     Thyroid Disease Maternal Grandmother      Social History     Socioeconomic History    Marital status: Single     Spouse name: Not on file    Number of children: Not on file    Years of education: Not on file    Highest education level: Not on file   Occupational History    Not on file   Social Needs    Financial resource strain: Not on file    Food insecurity:     Worry: Not on file     Inability: Not on file    Transportation needs:     Medical: Not on file     Non-medical: Not on file   Tobacco Use    Smoking status: Never Smoker    Smokeless tobacco: Never Used   Substance and Sexual Activity    Alcohol use: No    Drug use: No    Sexual activity: Not on file   Lifestyle    Physical activity:     Days per week: Not on file     Minutes per session: Not on file    Stress: Not on file   Relationships    Social connections:     Talks on phone: Not on file     Gets together: Not on file     Attends Anglican service: Not on file     Active member of club or organization: Not on file     Attends meetings of clubs or organizations: Not on file     Relationship status: Not on file    Intimate partner violence:     Fear of current or ex partner: Not on file     Emotionally abused: Not on file     Physically abused: Not on file     Forced sexual activity: Not on file   Other Topics Concern    Not on file   Social History Narrative    Not on file       Review of Systems:  Review of Systems   Constitutional: Negative for activity change, appetite change, chills, diaphoresis, fatigue, fever and unexpected weight change. HENT: Negative for ear pain, hearing loss and trouble swallowing. Eyes: Negative for visual disturbance. Respiratory: Negative for cough and shortness of breath. Cardiovascular: Negative for chest pain, palpitations and leg swelling. Gastrointestinal: Negative for abdominal pain, blood in stool, constipation, diarrhea, nausea and vomiting. Genitourinary: Negative for decreased urine volume, difficulty urinating, dysuria, flank pain, hematuria and urgency. Musculoskeletal: Negative for arthralgias and back pain. Skin: Negative for color change and rash. Neurological: Negative for dizziness, weakness, light-headedness, numbness and headaches. Psychiatric/Behavioral: Negative for dysphoric mood and sleep disturbance. The patient is not nervous/anxious. Physical Exam:   Vitals:    04/04/19 0753   BP: 120/86   Site: Left Upper Arm   Position: Sitting   Cuff Size: Medium Adult   Pulse: 107   Temp: 98.2 °F (36.8 °C)   TempSrc: Oral   SpO2: 99%   Weight: 136 lb 9.6 oz (62 kg)   Height: 5' 4.75\" (1.645 m)     Body mass index is 22.91 kg/m². Physical Exam   Constitutional: She appears well-developed and well-nourished. No distress. HENT:   Head: Normocephalic and atraumatic. Right Ear: External ear normal.   Left Ear: External ear normal.   Mouth/Throat: Oropharynx is clear and moist. No oropharyngeal exudate. Eyes: Pupils are equal, round, and reactive to light. Conjunctivae and EOM are normal. Right eye exhibits no discharge. Left eye exhibits no discharge. Neck: Normal range of motion. Neck supple. No thyromegaly present. Cardiovascular: Normal rate, regular rhythm, normal heart sounds and intact distal pulses. Exam reveals no gallop and no friction rub. No murmur heard. Pulmonary/Chest: Effort normal and breath sounds normal. No respiratory distress. She has no wheezes. She has no rales. Abdominal: Soft. Bowel sounds are normal. She exhibits no distension. There is no tenderness. There is no rebound and no guarding. Musculoskeletal: Normal range of motion. She exhibits no edema. Lymphadenopathy:     She has no cervical adenopathy. Neurological: She is alert. No cranial nerve deficit. Coordination normal.   Skin: Skin is warm and dry. No rash noted. She is not diaphoretic. No erythema. No pallor.    Psychiatric: She has

## 2019-04-05 ENCOUNTER — HOSPITAL ENCOUNTER (OUTPATIENT)
Dept: WOMENS IMAGING | Age: 49
Discharge: HOME | End: 2019-04-05
Payer: COMMERCIAL

## 2019-04-05 ENCOUNTER — PATIENT MESSAGE (OUTPATIENT)
Dept: FAMILY MEDICINE CLINIC | Age: 49
End: 2019-04-05

## 2019-04-05 DIAGNOSIS — Z00.00 ANNUAL PHYSICAL EXAM: ICD-10-CM

## 2019-04-05 LAB
ESTIMATED AVERAGE GLUCOSE: 88.2 MG/DL
HBA1C MFR BLD: 4.7 %
HIV AG/AB: NORMAL
HIV ANTIGEN: NORMAL
HIV-1 ANTIBODY: NORMAL
HIV-2 AB: NORMAL

## 2019-04-05 PROCEDURE — 77067 SCR MAMMO BI INCL CAD: CPT

## 2019-04-08 RX ORDER — ATORVASTATIN CALCIUM 40 MG/1
40 TABLET, FILM COATED ORAL DAILY
Qty: 30 TABLET | Refills: 5 | Status: SHIPPED | OUTPATIENT
Start: 2019-04-08 | End: 2019-08-15 | Stop reason: SDUPTHER

## 2019-04-08 NOTE — TELEPHONE ENCOUNTER
know If you are agreeable with starting the statin and what pharmacy you would like the medication called into.        Have a great day,     Vlad Cates

## 2019-04-15 ASSESSMENT — ENCOUNTER SYMPTOMS
SHORTNESS OF BREATH: 0
DIARRHEA: 0
COLOR CHANGE: 0
BLOOD IN STOOL: 0
NAUSEA: 1
VOMITING: 0
CONSTIPATION: 0
BACK PAIN: 0
COUGH: 0
ABDOMINAL PAIN: 0
TROUBLE SWALLOWING: 0

## 2019-04-18 ENCOUNTER — PATIENT MESSAGE (OUTPATIENT)
Dept: FAMILY MEDICINE CLINIC | Age: 49
End: 2019-04-18

## 2019-04-18 ENCOUNTER — PROCEDURE VISIT (OUTPATIENT)
Dept: FAMILY MEDICINE CLINIC | Age: 49
End: 2019-04-18
Payer: COMMERCIAL

## 2019-04-18 VITALS
SYSTOLIC BLOOD PRESSURE: 150 MMHG | BODY MASS INDEX: 23.61 KG/M2 | DIASTOLIC BLOOD PRESSURE: 100 MMHG | HEART RATE: 101 BPM | WEIGHT: 140.8 LBS | OXYGEN SATURATION: 92 %

## 2019-04-18 DIAGNOSIS — G89.29 CHRONIC INTRACTABLE HEADACHE, UNSPECIFIED HEADACHE TYPE: ICD-10-CM

## 2019-04-18 DIAGNOSIS — Z12.4 ENCOUNTER FOR PAPANICOLAOU SMEAR OF CERVIX: Primary | ICD-10-CM

## 2019-04-18 DIAGNOSIS — L98.9 SKIN LESION OF BACK: ICD-10-CM

## 2019-04-18 DIAGNOSIS — R51.9 CHRONIC INTRACTABLE HEADACHE, UNSPECIFIED HEADACHE TYPE: ICD-10-CM

## 2019-04-18 DIAGNOSIS — I65.09 VERTEBRAL ARTERY STENOSIS, UNSPECIFIED LATERALITY: ICD-10-CM

## 2019-04-18 DIAGNOSIS — Z86.73 HISTORY OF CVA (CEREBROVASCULAR ACCIDENT): ICD-10-CM

## 2019-04-18 PROCEDURE — 11104 PUNCH BX SKIN SINGLE LESION: CPT | Performed by: FAMILY MEDICINE

## 2019-04-18 PROCEDURE — 99999 PR OFFICE/OUTPT VISIT,PROCEDURE ONLY: CPT | Performed by: FAMILY MEDICINE

## 2019-04-18 ASSESSMENT — ENCOUNTER SYMPTOMS
CONSTIPATION: 0
VOMITING: 0
SHORTNESS OF BREATH: 0
BACK PAIN: 0
DIARRHEA: 0
NAUSEA: 0
ABDOMINAL DISTENTION: 0
ABDOMINAL PAIN: 0

## 2019-04-18 NOTE — PATIENT INSTRUCTIONS
Patient Education        Pap Test: Care Instructions  Your Care Instructions    The Pap test (also called a Pap smear) is a screening test for cancer of the cervix, which is the lower part of the uterus that opens into the vagina. The test can help your doctor find early changes in the cells that could lead to cancer. The sample of cells taken during your test has been sent to a lab so that an expert can look at the cells. It usually takes a week or two to get the results back. Follow-up care is a key part of your treatment and safety. Be sure to make and go to all appointments, and call your doctor if you are having problems. It's also a good idea to know your test results and keep a list of the medicines you take. What do the results mean? · A normal result means that the test did not find any abnormal cells in the sample. · An abnormal result can mean many things. Most of these are not cancer. The results of your test may be abnormal because:  ? You have an infection of the vagina or cervix, such as a yeast infection. ? You have an IUD (intrauterine device for birth control). ? You have low estrogen levels after menopause that are causing the cells to change. ? You have cell changes that may be a sign of precancer or cancer. The results are ranked based on how serious the changes might be. There are many other reasons why you might not get a normal result. If the results were abnormal, you may need to get another test within a few weeks or months. If the results show changes that could be a sign of cancer, you may need a test called a colposcopy, which provides a more complete view of the cervix. Sometimes the lab cannot use the sample because it does not contain enough cells or was not preserved well. If so, you may need to have the test again. This is not common, but it does happen from time to time. When should you call for help?   Watch closely for changes in your health, and be sure to contact test.  When should you have a screening test?  These guidelines apply to women who are at average risk of cervical cancer. If you don't know your risk, talk with your doctor. Women 21 to 34  · You can start having a Pap test at age 24. It is done every 3 years. · You can start having the primary HPV test at age 22. It is done every 3 years. Women 30 to 59  · If you had both a Pap test and an HPV test last time and both were normal, you can have a Pap plus an HPV test every 5 years. · If you had only a Pap test last time, as long as your results are normal, you can have a Pap test every 3 years. · If you had only an HPV test last time, as long as your results are normal, you can have an HPV test every 3 years. Women 72 and older  · If you are age 72 or older, talk with your doctor about what's right for you. Women ages 72 and older may no longer need to be screened for cervical cancer. When to stop having screening tests depends on your medical history, your overall health, and your risk of cervical cell changes or cervical cancer. What happens after the test?  The sample of cells taken during your test will be sent to a lab so that an expert can look at the cells. It usually takes a week or two to get the results back. Pap tests  · A normal result means that the test didn't find any abnormal cells in the sample. · An abnormal result can mean many things. Most of these aren't cancer. The results of your test may be abnormal because:  ? You have an infection of the vagina or cervix, such as a yeast infection. ? You have an IUD (intrauterine device for birth control). ? You have low estrogen levels after menopause that are causing the cells to change. ? You have cell changes that may be a sign of precancer or cancer. The results are ranked based on how serious the changes might be. HPV tests  · A normal result means that the test didn't find any high-risk HPV in the sample.   · An abnormal HPV test doesn't mean that you have cervical cancer. It may mean that you are infected with one or more high-risk types of HPV. This increases your chance of having cell changes that may be a sign of precancer or cancer. Follow-up care is a key part of your treatment and safety. Be sure to make and go to all appointments, and call your doctor if you are having problems. It's also a good idea to know your test results and keep a list of the medicines you take. Where can you learn more? Go to https://[employer]. org and sign in to your [employer] account. Enter P919 in the Ezuza box to learn more about \"Learning About Cervical Cancer Screening. \"     If you do not have an account, please click on the \"Sign Up Now\" link. Current as of: March 27, 2018  Content Version: 11.9  © 8575-6144 [employer]. Care instructions adapted under license by [facility] ([facility]). If you have questions about a medical condition or this instruction, always ask your healthcare professional. [address] any warranty or liability for your use of this information.

## 2019-04-18 NOTE — PROGRESS NOTES
4/18/2019    This is a 47 y.o. female who presents for  Chief Complaint   Patient presents with    Gynecologic Exam    Procedure       HPI:     Due for pap  Years since last  No abnormal in the past  No discharge, bleeding, or pain    Had recent mammo    Skin lesions:  Growth  New ones on feet and butt  Did not use lotrison  Multiple biopsies in the past with no answers  Wants me to cc previous derm      Past Medical History:   Diagnosis Date    Anxiety and depression     Arthritis     Benign essential tremor     Cancer ([address] 2011    thyroid    Celiac disease     Degenerative disc disease     DVT (deep venous thrombosis) ([facility])     1989-leg    Fibromyalgia     GERD (gastroesophageal reflux disease)     Hx of blood clots     Irritable bowel disease     with diarrhea    Kidney stones     Migraines, neuralgic     since stroke 1999 Chronic    PONV (postoperative nausea and vomiting)     after colonoscopy    Poor venous access     Protein S deficiency ([address]     Pulmonary embolism ([address]     1989    Seizure disorder ([address]     grand mal in 2005 ( childhood febrile seizure also-from a baby to age 14) and also X 1 ~2005 with headache treatment    Thrombosis, superior sagittal sinus 1999    Thyroid disease 2011    goiter, cancer, nodule, hypo    Trigeminal neuralgia     Unspecified cerebral artery occlusion with cerebral infarction 1999    Vertebral artery occlusion     1999 with stroke    Vertebral artery occlusion 1999    White coat syndrome with high blood pressure but without hypertension        Past Surgical History:   Procedure Laterality Date    BREAST SURGERY      benign lump X 3    CYSTOSCOPY  1/9/12    cystoscopy, left ureteroscopy with holmium laser.  stone manipulation and left stent insertion    EYE SURGERY      lasik    FOOT SURGERY Left 07/10/2017    FRACTURE SURGERY  2000    Left foot pins then removal    LITHOTRIPSY  12/11    OTHER SURGICAL HISTORY      vocal cord nodule    THYROIDECTOMY  5-18-11 TOTAL THYROIDECTOMY    due to cancer       Social History     Socioeconomic History    Marital status: Single     Spouse name: Not on file    Number of children: Not on file    Years of education: Not on file    Highest education level: Not on file   Occupational History    Not on file   Social Needs    Financial resource strain: Not on file    Food insecurity:     Worry: Not on file     Inability: Not on file    Transportation needs:     Medical: Not on file     Non-medical: Not on file   Tobacco Use    Smoking status: Never Smoker    Smokeless tobacco: Never Used   Substance and Sexual Activity    Alcohol use: No    Drug use: No    Sexual activity: Not on file   Lifestyle    Physical activity:     Days per week: Not on file     Minutes per session: Not on file    Stress: Not on file   Relationships    Social connections:     Talks on phone: Not on file     Gets together: Not on file     Attends Jewish service: Not on file     Active member of club or organization: Not on file     Attends meetings of clubs or organizations: Not on file     Relationship status: Not on file    Intimate partner violence:     Fear of current or ex partner: Not on file     Emotionally abused: Not on file     Physically abused: Not on file     Forced sexual activity: Not on file   Other Topics Concern    Not on file   Social History Narrative    Not on file       Family History   Problem Relation Age of Onset    Cancer Mother         breast x2, ovarian x1, brain     High Cholesterol Mother     High Cholesterol Father     Other Father     Migraines Sister     Other Sister     Thyroid Disease Maternal Grandmother        Current Outpatient Medications   Medication Sig Dispense Refill    fentaNYL (ACTIQ) 800 MCG lollipop Place 800 mcg inside cheek 3 times daily.       atorvastatin (LIPITOR) 40 MG tablet Take 1 tablet by mouth daily 30 tablet 5    clonazePAM (KLONOPIN) 0.5 MG tablet Take 1 tablet by mouth daily as needed for Anxiety for up to 30 days. 30 tablet 0    venlafaxine (EFFEXOR XR) 75 MG extended release capsule Take 1 capsule by mouth daily 30 capsule 1    XARELTO 20 MG TABS tablet Take 1 tablet by mouth daily 30 tablet 1    Doxylamine Succinate, Sleep, (UNISOM PO) Take by mouth      carBAMazepine (TEGRETOL XR) 200 MG extended release tablet Take 400 mg by mouth every 8 hours       primidone (MYSOLINE) 250 MG tablet Take 250 mg by mouth 4 times daily       Loratadine (CLARITIN PO) Take by mouth as needed      diphenhydrAMINE (BENADRYL) 25 MG tablet Take 25 mg by mouth as needed for Itching      Bismuth Subsalicylate (PEPTO-BISMOL PO) Take by mouth as needed      diphenoxylate-atropine (LOMOTIL) 2.5-0.025 MG per tablet Take 1 tablet by mouth 2 times daily as needed       levothyroxine (SYNTHROID) 200 MCG tablet Take 1 tablet by mouth daily      Multiple Vitamin (MULTI-VITAMIN PO) Take 1 tablet by mouth daily. No current facility-administered medications for this visit. Immunization History   Administered Date(s) Administered    Tdap (Boostrix, Adacel) 04/04/2019       Allergies   Allergen Reactions    Dilaudid [Hydromorphone Hcl] Shortness Of Breath and Itching    Duloxetine Hcl Other (See Comments)     Personality changes and depression    Gabapentin     Gluten Meal      Celiac disease    Hydromorphone Itching and Other (See Comments)    Morphine Itching    Pregabalin      Lyrica (personality changes)     Propranolol Hives    Topiramate Other (See Comments)       Review of Systems   Constitutional: Negative for activity change, chills, fever and unexpected weight change. Respiratory: Negative for shortness of breath. Cardiovascular: Negative for chest pain. Gastrointestinal: Negative for abdominal distention, abdominal pain, constipation, diarrhea, nausea and vomiting.    Genitourinary: Negative for decreased urine volume, difficulty urinating, dyspareunia, dysuria, flank pain, frequency, genital sores, hematuria, menstrual problem, pelvic pain, urgency, vaginal bleeding, vaginal discharge and vaginal pain. Musculoskeletal: Negative for back pain. Skin: Positive for color change, rash and wound. Negative for pallor. Allergic/Immunologic: Negative for immunocompromised state. Hematological: Negative for adenopathy. BP (!) 150/100 (Site: Left Upper Arm, Position: Sitting, Cuff Size: Medium Adult)   Pulse 101   Wt 140 lb 12.8 oz (63.9 kg)   LMP 06/30/2015   SpO2 92%   BMI 23.61 kg/m²     Physical Exam   Constitutional: She is oriented to person, place, and time. She appears well-developed and well-nourished. No distress. HENT:   Head: Normocephalic and atraumatic. Eyes: Pupils are equal, round, and reactive to light. EOM are normal.   Neck: Normal range of motion. Neck supple. Cardiovascular: Normal rate and regular rhythm. Pulmonary/Chest: Effort normal and breath sounds normal. No respiratory distress. No breast tenderness, discharge or bleeding. Abdominal: Soft. She exhibits no distension and no mass. There is no tenderness. There is no rebound and no guarding. Genitourinary: Vagina normal and uterus normal. Rectal exam shows no external hemorrhoid. No breast tenderness, discharge or bleeding. There is no rash, tenderness, lesion or injury on the right labia. There is no rash, tenderness, lesion or injury on the left labia. Uterus is not deviated, not enlarged, not fixed and not tender. Cervix exhibits no motion tenderness, no discharge and no friability. Right adnexum displays no mass, no tenderness and no fullness. Left adnexum displays no mass, no tenderness and no fullness. No erythema, tenderness or bleeding in the vagina. No foreign body in the vagina. No signs of injury around the vagina. No vaginal discharge found. Musculoskeletal: Normal range of motion.    Lymphadenopathy:        Right: No inguinal adenopathy present. Left: No inguinal adenopathy present. Neurological: She is alert and oriented to person, place, and time. Skin: Skin is warm and dry. Capillary refill takes 2 to 3 seconds. Lesion and rash noted. She is not diaphoretic. There is erythema. No pallor. Psychiatric: She has a normal mood and affect. Her behavior is normal. Judgment and thought content normal.   Nursing note and vitals reviewed. Punch Biopsy Procedure Note    Pre-operative Diagnosis: Suspicious lesion, dermatitis herpetiformis? Post-operative Diagnosis: same    Locations:upper back    Indications: growth, change, bleeding, irritation    Anesthesia: Lidocaine 1% with epinephrine without added sodium bicarbonate    Procedure Details   History of allergy to iodine: no  Patient informed of the risks (including bleeding and infection) and benefits of the   procedure and Verbal informed consent obtained. The lesion and surrounding area was given a sterile prep using alcohol and draped in the usual sterile fashion. The skin was then stretched perpendicular to the skin tension lines and the lesion removed using the 5mm punch. The resulting ellipse was then closed. The wound was closed with 3-0 ethilon using simple interrupted stitches. Antibiotic ointment and a sterile dressing applied. The specimen was sent for pathologic examination. The patient tolerated the procedure well. EBL: 0.5 ml    Findings:  Per above    Condition:  Stable    Complications:  none. Plan:  1. Instructed to keep the wound dry and covered for 24-48h and clean thereafter. 2. Warning signs of infection were reviewed. 3. Recommended that the patient use OTC analgesics as needed for pain. 4. Return for suture removal in 10 days. Plan  1. Encounter for Papanicolaou smear of cervix2  - PAP SMEAR  - HUMAN PAPILLOMAVIRUS (HPV) DNA PROBE THIN PREP HIGH RISK    2.  Skin lesion of back  Has had indeterminate biopsies in the past by

## 2019-04-18 NOTE — TELEPHONE ENCOUNTER
From: Trey Peterson  To: Marcellus Holter, MD  Sent: 4/18/2019 6:26 PM EDT  Subject: Visit Follow-Up Question    Dr. Gold Situ-  On 4/15 I got a message here from you that I might receive a survey on my experiences with you & your office. I thought I'd get 1 after my 1st appt. with you, but haven't yet. I will keep a lookout & would love to fill it out & pass on some excellent feedback! Radha Osborn & other staff have been great!   Sincerely-  Collette Sanfilippo

## 2019-04-19 ASSESSMENT — ENCOUNTER SYMPTOMS: COLOR CHANGE: 1

## 2019-04-22 ENCOUNTER — TELEPHONE (OUTPATIENT)
Dept: FAMILY MEDICINE CLINIC | Age: 49
End: 2019-04-22

## 2019-04-22 DIAGNOSIS — R51.9 CHRONIC INTRACTABLE HEADACHE, UNSPECIFIED HEADACHE TYPE: Primary | ICD-10-CM

## 2019-04-22 DIAGNOSIS — Z86.73 HISTORY OF CVA (CEREBROVASCULAR ACCIDENT): ICD-10-CM

## 2019-04-22 DIAGNOSIS — I65.09 VERTEBRAL ARTERY STENOSIS, UNSPECIFIED LATERALITY: ICD-10-CM

## 2019-04-22 DIAGNOSIS — G89.29 CHRONIC INTRACTABLE HEADACHE, UNSPECIFIED HEADACHE TYPE: Primary | ICD-10-CM

## 2019-04-22 LAB
HPV COMMENT: NORMAL
HPV TYPE 16: NOT DETECTED
HPV TYPE 18: NOT DETECTED
HPVOH (OTHER TYPES): NOT DETECTED

## 2019-04-22 NOTE — TELEPHONE ENCOUNTER
Patient want to discuss if the appointment is enough time? States Doctor wanted to take them out in 8-10 days and think the appointment scheduled may be to see.  Best call back number [phone]

## 2019-04-22 NOTE — TELEPHONE ENCOUNTER
I put it in the comments section of the order. 5 mm punch upper right back half of raised scarred/affected tissue and half normal tissue in two pieces  5 mm punch upper left back with whole lesion in 1 piece. Please let them know.

## 2019-04-22 NOTE — TELEPHONE ENCOUNTER
I called patient and spoke with her we move her appointment out until 4/26/19 to give the wound more time to heal with the sutures.

## 2019-04-22 NOTE — TELEPHONE ENCOUNTER
[facility] informed, but all 3 pieces are in one container so they do not know which piece is which.   They are going to process

## 2019-04-22 NOTE — TELEPHONE ENCOUNTER
Patient states that the Neck CTA w/ contrast will have to be label as STAT instead of ASAP so that she could get it done this week and have results to doctor by Friday.  Best call back number [phone]

## 2019-04-23 ENCOUNTER — TELEPHONE (OUTPATIENT)
Dept: FAMILY MEDICINE CLINIC | Age: 49
End: 2019-04-23

## 2019-04-23 NOTE — TELEPHONE ENCOUNTER
Mehul Kelly, can you help this patient. She would like to fill out a survey on her experience with our office.

## 2019-04-25 ENCOUNTER — OFFICE VISIT (OUTPATIENT)
Dept: ORTHOPEDIC SURGERY | Age: 49
End: 2019-04-25
Payer: COMMERCIAL

## 2019-04-25 VITALS — WEIGHT: 140.87 LBS | BODY MASS INDEX: 23.47 KG/M2 | HEIGHT: 65 IN

## 2019-04-25 DIAGNOSIS — M79.672 FOOT PAIN, LEFT: Primary | ICD-10-CM

## 2019-04-25 PROCEDURE — G8420 CALC BMI NORM PARAMETERS: HCPCS | Performed by: ORTHOPAEDIC SURGERY

## 2019-04-25 PROCEDURE — 1036F TOBACCO NON-USER: CPT | Performed by: ORTHOPAEDIC SURGERY

## 2019-04-25 PROCEDURE — G8598 ASA/ANTIPLAT THER USED: HCPCS | Performed by: ORTHOPAEDIC SURGERY

## 2019-04-25 PROCEDURE — 99212 OFFICE O/P EST SF 10 MIN: CPT | Performed by: ORTHOPAEDIC SURGERY

## 2019-04-25 PROCEDURE — G8427 DOCREV CUR MEDS BY ELIG CLIN: HCPCS | Performed by: ORTHOPAEDIC SURGERY

## 2019-04-25 PROCEDURE — 3017F COLORECTAL CA SCREEN DOC REV: CPT | Performed by: ORTHOPAEDIC SURGERY

## 2019-04-25 NOTE — PROGRESS NOTES
Subjective: Patient is here for follow-up of her left midfoot fusion 7/10/17. She states her foot at times hurts her but other times it feels fine she is using custom inserts. They hopefully will have an idea what her dermatologic condition is after a biopsy which she should find out tomorrow. Objective: Physical exam shows incision is well-healed left foot. She has some prominence over the medial cuneiform but otherwise she has no pain with mobilization through the midfoot and is nice and stable at the 1st 2nd TMT joints has 20° of dorsiflexion 30° of plantarflexion anterior drawer and talar tilt showed no gross laxity she ambulates without assistive device  Imaging: 3 views of her left foot show the fusion sites well healed hardware is unchanged in position  Assessment and plan:  This patient is doing well she can follow-up with me as needed

## 2019-04-26 ENCOUNTER — OFFICE VISIT (OUTPATIENT)
Dept: FAMILY MEDICINE CLINIC | Age: 49
End: 2019-04-26
Payer: COMMERCIAL

## 2019-04-26 ENCOUNTER — PATIENT MESSAGE (OUTPATIENT)
Dept: ORTHOPEDIC SURGERY | Age: 49
End: 2019-04-26

## 2019-04-26 ENCOUNTER — TELEPHONE (OUTPATIENT)
Dept: FAMILY MEDICINE CLINIC | Age: 49
End: 2019-04-26

## 2019-04-26 VITALS
SYSTOLIC BLOOD PRESSURE: 140 MMHG | TEMPERATURE: 98.6 F | HEART RATE: 95 BPM | DIASTOLIC BLOOD PRESSURE: 100 MMHG | WEIGHT: 141 LBS | BODY MASS INDEX: 23.63 KG/M2 | OXYGEN SATURATION: 99 %

## 2019-04-26 DIAGNOSIS — L28.1 PRURIGO NODULARIS: ICD-10-CM

## 2019-04-26 DIAGNOSIS — L08.9 INFECTED WOUND: Primary | ICD-10-CM

## 2019-04-26 DIAGNOSIS — T14.8XXA INFECTED WOUND: Primary | ICD-10-CM

## 2019-04-26 PROCEDURE — 3017F COLORECTAL CA SCREEN DOC REV: CPT | Performed by: FAMILY MEDICINE

## 2019-04-26 PROCEDURE — G8427 DOCREV CUR MEDS BY ELIG CLIN: HCPCS | Performed by: FAMILY MEDICINE

## 2019-04-26 PROCEDURE — 99214 OFFICE O/P EST MOD 30 MIN: CPT | Performed by: FAMILY MEDICINE

## 2019-04-26 PROCEDURE — G8420 CALC BMI NORM PARAMETERS: HCPCS | Performed by: FAMILY MEDICINE

## 2019-04-26 PROCEDURE — G8598 ASA/ANTIPLAT THER USED: HCPCS | Performed by: FAMILY MEDICINE

## 2019-04-26 PROCEDURE — 1036F TOBACCO NON-USER: CPT | Performed by: FAMILY MEDICINE

## 2019-04-26 RX ORDER — HYDROXYZINE 50 MG/1
50 TABLET, FILM COATED ORAL NIGHTLY
Qty: 30 TABLET | Refills: 0 | Status: SHIPPED | OUTPATIENT
Start: 2019-04-26 | End: 2019-11-11 | Stop reason: SDUPTHER

## 2019-04-26 RX ORDER — CLOBETASOL PROPIONATE 0.5 MG/ML
1 LOTION TOPICAL 2 TIMES DAILY
Qty: 118 ML | Refills: 1 | Status: SHIPPED | OUTPATIENT
Start: 2019-04-26 | End: 2019-05-09 | Stop reason: ALTCHOICE

## 2019-04-26 RX ORDER — MUPIROCIN CALCIUM 20 MG/G
CREAM TOPICAL
Qty: 30 G | Refills: 1 | Status: SHIPPED | OUTPATIENT
Start: 2019-04-26 | End: 2019-06-12 | Stop reason: SDUPTHER

## 2019-04-26 RX ORDER — CLOBETASOL PROPIONATE 0.05 MG/G
1 GEL TOPICAL 2 TIMES DAILY
Qty: 60 G | Refills: 1 | Status: SHIPPED | OUTPATIENT
Start: 2019-04-26 | End: 2019-05-09 | Stop reason: ALTCHOICE

## 2019-04-26 RX ORDER — MONTELUKAST SODIUM 10 MG/1
10 TABLET ORAL NIGHTLY
Qty: 30 TABLET | Refills: 0 | Status: SHIPPED | OUTPATIENT
Start: 2019-04-26 | End: 2019-06-28 | Stop reason: ALTCHOICE

## 2019-04-26 ASSESSMENT — ENCOUNTER SYMPTOMS
SHORTNESS OF BREATH: 0
NAUSEA: 0
COLOR CHANGE: 1

## 2019-04-26 NOTE — PATIENT INSTRUCTIONS
Singulair in the morning with Clobetasol   Atarax in the evening with Clobetasol  Take pictures before starting the cream, and then on Day #7 and Day# 14       For the infected wound: Bactroban twice a day with cleanings (warm water and unfragranced soap)      Pruritis Nodularis         Patient Education        methotrexate (oral)  Pronunciation:  meth oh TREX ate  Brand:  Rheumatrex Dose Pack, Jordy Franklin  What is the most important information I should know about methotrexate? Methotrexate is usually not taken every day. You must use the correct dose of methotrexate for your condition. Some people have  after taking methotrexate every day by accident. Do not use methotrexate to treat psoriasis or rheumatoid arthritis if you have low blood cell counts, a bone marrow disorder, liver disease (especially if caused by alcoholism), or if you are pregnant or breast-feeding. Methotrexate can cause serious or life-threatening side effects. Tell your doctor if you have diarrhea, mouth sores, cough, shortness of breath, upper stomach pain, dark urine, numbness or tingling, muscle weakness, confusion, seizure, or skin rash that spreads and causes blistering and peeling. What is methotrexate? Methotrexate interferes with the growth of certain cells of the body, especially cells that reproduce quickly, such as cancer cells, bone marrow cells, and skin cells. Methotrexate is used to treat certain types of cancer of the breast, skin, head and neck, or lung. Methotrexate is also used to treat severe psoriasis and rheumatoid arthritis. Methotrexate is usually given after other medications have been tried without successful treatment of symptoms. Methotrexate may also be used for purposes not listed in this medication guide. What should I discuss with my healthcare provider before taking methotrexate? You should not use methotrexate if you are allergic to it.  Methotrexate should not be used to treat psoriasis or rheumatoid arthritis if you have:  · alcoholism, cirrhosis, or chronic liver disease;  · low blood cell counts;  · a weak immune system or bone marrow disorder; or  · if you are pregnant or breast-feeding. Methotrexate is sometimes used to treat cancer even when patients do have one of the conditions listed above. Your doctor will decide if this treatment is right for you. Tell your doctor if you have:  · kidney disease;  · lung disease;  · any type of infection; or  · radiation treatments. Methotrexate can harm an unborn baby or cause birth defects, whether the mother or father is taking this medicine. · If you are a woman, do not use methotrexate to treat psoriasis or rheumatoid arthritis if you are pregnant. You may need to have a negative pregnancy test before starting this treatment. Use an effective form of birth control while you are taking methotrexate, and for 6 months after your last dose. · If you are a man, use a condom to keep from causing a pregnancy while you are using methotrexate. Continue using condoms for at least 3 months after your last dose. · Tell your doctor right away if a pregnancy occurs while either the mother or the father is taking methotrexate. This medicine may affect fertility (ability to have children) in both men and women. However, it is important to use birth control to prevent pregnancy because methotrexate may harm the baby if a pregnancy does occur. You should not breast-feed while using this medicine. Do not give this medicine to a child without the advice of a doctor. How should I take methotrexate? Follow all directions on your prescription label and read all medication guides or instruction sheets. Use the medicine exactly as directed. Methotrexate is sometimes taken once or twice per week and not every day. You must use the correct dose. Some people have  after taking methotrexate every day by accident.  Ask your doctor or pharmacist if you have questions and Aleve). Many drugs can affect methotrexate. This includes prescription and over-the-counter medicines, vitamins, and herbal products. Not all possible interactions are listed here. Tell your doctor about all your current medicines and any medicine you start or stop using. Where can I get more information? Your pharmacist can provide more information about methotrexate. Remember, keep this and all other medicines out of the reach of children, never share your medicines with others, and use this medication only for the indication prescribed. Every effort has been made to ensure that the information provided by Katrina Burrows [address] is accurate, up-to-date, and complete, but no guarantee is made to that effect. Drug information contained herein may be time sensitive. [facility] information has been compiled for use by healthcare practitioners and consumers in the [place] and therefore [facility] does not warrant that uses outside of the [place] are appropriate, unless specifically indicated otherwise. [facility]'s drug information does not endorse drugs, diagnose patients or recommend therapy. [facility] drug information is an informational resource designed to assist licensed healthcare practitioners in caring for their patients and/or to serve consumers viewing this service as a supplement to, and not a substitute for, the expertise, skill, knowledge and judgment of healthcare practitioners. The absence of a warning for a given drug or drug combination in no way should be construed to indicate that the drug or drug combination is safe, effective or appropriate for any given patient. [facility] does not assume any responsibility for any aspect of healthcare administered with the aid of information [facility] provides. The information contained herein is not intended to cover all possible uses, directions, precautions, warnings, drug interactions, allergic reactions, or adverse effects.  If you have questions

## 2019-04-26 NOTE — PROGRESS NOTES
Social History     Socioeconomic History    Marital status: Single     Spouse name: Not on file    Number of children: Not on file    Years of education: Not on file    Highest education level: Not on file   Occupational History    Not on file   Social Needs    Financial resource strain: Not on file    Food insecurity:     Worry: Not on file     Inability: Not on file    Transportation needs:     Medical: Not on file     Non-medical: Not on file   Tobacco Use    Smoking status: Never Smoker    Smokeless tobacco: Never Used   Substance and Sexual Activity    Alcohol use: No    Drug use: No    Sexual activity: Not on file   Lifestyle    Physical activity:     Days per week: Not on file     Minutes per session: Not on file    Stress: Not on file   Relationships    Social connections:     Talks on phone: Not on file     Gets together: Not on file     Attends Restorationism service: Not on file     Active member of club or organization: Not on file     Attends meetings of clubs or organizations: Not on file     Relationship status: Not on file    Intimate partner violence:     Fear of current or ex partner: Not on file     Emotionally abused: Not on file     Physically abused: Not on file     Forced sexual activity: Not on file   Other Topics Concern    Not on file   Social History Narrative    Not on file       Family History   Problem Relation Age of Onset    Cancer Mother         breast x2, ovarian x1, brain     High Cholesterol Mother     High Cholesterol Father     Other Father     Migraines Sister     Other Sister     Thyroid Disease Maternal Grandmother        Current Outpatient Medications   Medication Sig Dispense Refill    clobetasol propionate 0.05 % LOTN lotion Apply 1 Applicatorful topically 2 times daily 118 mL 1    hydrOXYzine (ATARAX) 50 MG tablet Take 1 tablet by mouth nightly 30 tablet 0    montelukast (SINGULAIR) 10 MG tablet Take 1 tablet by mouth nightly 30 tablet 0    for shortness of breath. Cardiovascular: Negative for chest pain. Gastrointestinal: Negative for nausea. Skin: Positive for color change and wound. Negative for pallor and rash. Allergic/Immunologic: Negative for immunocompromised state. Hematological: Negative for adenopathy. BP (!) 140/100 (Site: Left Upper Arm, Position: Sitting, Cuff Size: Medium Adult)   Pulse 95   Temp 98.6 °F (37 °C) (Oral)   Wt 141 lb (64 kg)   LMP 06/30/2015   SpO2 99%   BMI 23.63 kg/m²     Physical Exam   Constitutional: She is oriented to person, place, and time. She appears well-developed and well-nourished. No distress. HENT:   Head: Normocephalic and atraumatic. Eyes: Pupils are equal, round, and reactive to light. EOM are normal.   Neck: Normal range of motion. Neck supple. Pulmonary/Chest: No respiratory distress. Neurological: She is alert and oriented to person, place, and time. Skin: Skin is warm and dry. Capillary refill takes 2 to 3 seconds. Lesion noted. No rash noted. She is not diaphoretic. There is erythema. No pallor. Upper left back biopsy site healing well, no s/s infection. Upper R biopsy site: see photo. Psychiatric: She has a normal mood and affect. Her behavior is normal. Judgment normal.   Nursing note and vitals reviewed. Plan  1. Infected wound  Wound care discussed. Wound cleaned well and dressed with polysporin and gauze today. Monitor for worsening Sxs. If worsens would give short course Doxycycline to cover for potential MRSA. - mupirocin (BACTROBAN) 2 % cream; Apply 3 times daily. Dispense: 30 g; Refill: 1    2. Prurigo nodularis  Discussed Dx from biopsy results, etiology, influencing factors Will trial hihg potent steroid cream twice daily for 2 weeks. Pt to takes pictures on Day#1,7, and 14. Add Singulair in AM and Atarax in PM.  If not helpful, given her extensive Hx of topical and oral treatments, would consider oral medications.  MXT discussed in detail, including risks, monitoring parameters, and inc risk for infection. Will discuss further in 2 weeks. - clobetasol propionate 0.05 % LOTN lotion; Apply 1 Applicatorful topically 2 times daily  Dispense: 118 mL; Refill: 1  - hydrOXYzine (ATARAX) 50 MG tablet; Take 1 tablet by mouth nightly  Dispense: 30 tablet; Refill: 0  - montelukast (SINGULAIR) 10 MG tablet; Take 1 tablet by mouth nightly  Dispense: 30 tablet; Refill: 0        While assessing care for this patient, I have reviewed all pertinent lab work/imaging/ specialist notes and care in reference to those problems addressed above in detail. Appropriate medical decision making was based on this. Please note that portions of this note may have been completed with a voice recognition program. Efforts were made to edit the dictations but occasionally words are mis-transcribed. Return in about 2 weeks (around 5/10/2019) for follow up skin treatment .

## 2019-04-26 NOTE — TELEPHONE ENCOUNTER
Patient called and stated that [facility] doesn't have the clobetasol propionate 0.05 % LOTN lotion in stock and would have to order it. Not avail until Monday afternoon. I verified with Pharmacist that all other forms in stock, Can New RX for Cream, Ointment or Solution be sent over so patient can get started with treatment tonight.

## 2019-04-29 ENCOUNTER — TELEPHONE (OUTPATIENT)
Dept: FAMILY MEDICINE CLINIC | Age: 49
End: 2019-04-29

## 2019-05-05 ENCOUNTER — PATIENT MESSAGE (OUTPATIENT)
Dept: FAMILY MEDICINE CLINIC | Age: 49
End: 2019-05-05

## 2019-05-06 ENCOUNTER — OFFICE VISIT (OUTPATIENT)
Dept: GASTROENTEROLOGY | Age: 49
End: 2019-05-06
Payer: COMMERCIAL

## 2019-05-06 VITALS
BODY MASS INDEX: 23.69 KG/M2 | WEIGHT: 142.2 LBS | SYSTOLIC BLOOD PRESSURE: 140 MMHG | DIASTOLIC BLOOD PRESSURE: 84 MMHG | HEIGHT: 65 IN

## 2019-05-06 DIAGNOSIS — K58.0 IRRITABLE BOWEL SYNDROME WITH DIARRHEA: ICD-10-CM

## 2019-05-06 DIAGNOSIS — Z86.010 HISTORY OF COLON POLYPS: ICD-10-CM

## 2019-05-06 DIAGNOSIS — K90.41 GLUTEN INTOLERANCE: ICD-10-CM

## 2019-05-06 DIAGNOSIS — R63.4 WEIGHT LOSS: ICD-10-CM

## 2019-05-06 DIAGNOSIS — K52.9 CHRONIC DIARRHEA: Primary | ICD-10-CM

## 2019-05-06 PROBLEM — Z86.0100 HISTORY OF COLON POLYPS: Status: ACTIVE | Noted: 2019-05-06

## 2019-05-06 PROCEDURE — G8427 DOCREV CUR MEDS BY ELIG CLIN: HCPCS | Performed by: INTERNAL MEDICINE

## 2019-05-06 PROCEDURE — 1036F TOBACCO NON-USER: CPT | Performed by: INTERNAL MEDICINE

## 2019-05-06 PROCEDURE — 3017F COLORECTAL CA SCREEN DOC REV: CPT | Performed by: INTERNAL MEDICINE

## 2019-05-06 PROCEDURE — G8420 CALC BMI NORM PARAMETERS: HCPCS | Performed by: INTERNAL MEDICINE

## 2019-05-06 PROCEDURE — G8598 ASA/ANTIPLAT THER USED: HCPCS | Performed by: INTERNAL MEDICINE

## 2019-05-06 PROCEDURE — 99204 OFFICE O/P NEW MOD 45 MIN: CPT | Performed by: INTERNAL MEDICINE

## 2019-05-06 RX ORDER — POLYETHYLENE GLYCOL 3350 17 G/17G
238 POWDER ORAL ONCE
Qty: 238 G | Refills: 0 | Status: SHIPPED | OUTPATIENT
Start: 2019-05-06 | End: 2019-05-06

## 2019-05-06 NOTE — LETTER
will reach the physician on call. - Hydration (body fluid) is the most important aspect of an effective and safe prep. If you are not drinking enough fluid you may experience cramping, nausea and dizziness. - Common side effects of the prep are nausea, bloating and shaking chills. If any of these occur, take a break from the prep (30 minutes to 1 hour) and then restart. If unable to complete after that notify the Physician as your procedure may need to be cancelled. Nausea medication or an alternative prep is sometimes called in.  - Do not leave home after starting the prep. Frequent, liquid stools may begin as soon as 15 minutes after the first bottle, although it could take up to 4 hours or longer for your first bowel movement. - Even if your stools are clear and watery in appearance, TAKE ALL OF THE PREP.  - Using baby wipes and nonprescription ointments, such as Desitin, will help with the irritation caused by frequent loose stools. - Due to unforeseen schedule changes, you may be asked to move your appointment time to an earlier/later time slot. To insure that your prep gives you the best results for your Colonoscopy, Please follow all directions closely. 3-5 days prior to your procedure:  1. Begin a low-fiber diet (no corn, dried beans, tomatoes, nuts, seeds, lettuce). 2. No bran or bulking agents. 3. Stop taking your multivitamin or iron supplements.   4. If you currently take Aggrenox, Dipyridamole, Persantine, Fondaparinux sodium (Arixtra), Dalteparin sodium Koleen Pilon), Warfarin (Coumadin), Clopidogrel (Plavix), Prasugrel (Effient), Enoxaparin, Lovenox, or Heparin, Cilostazol (Pletal), Rivoroxaban (Xarelto), Desirudin (Iprivask), Cyclopentyltrazolopyrimide (Ticagrelor or Brilinta), Cangrelor (Kenzie Dauer), Dabigatran (Pradaxa), Apixaban (Eliquis), Edoxaban (Savaysa)you should have received instructions regarding if and when to discontinue the medication. If you have not, or do not clearly understand the instructions, please call the office for clarification (number listed above). 5. Drink plenty of fluid. 1 day prior to your procedure:  1. Do not eat any SOLID FOOD, beginning with breakfast drink clear liquids only, which includes: Chicken or Beef Broth, Coffee/tea (without milk or creamer) Gatorade/PowerAde (no red or purple), JeIl-O (no red or purple), All Soda (even dark cola), Sorbet/Popsicles (no red or purple), Water If you take Diabetic medications (insulin/oral medication)-reduce the amount by one half on the morning of prep. You may resume the meds once you begin eating again. You must drink 8oz of liquids every hour to avoid dehydration. 2. If you take Diabetic medications (insulin/oral medication) - reduce the amount by one half on morning of prep. You may resume the meds once you begin eating again. 3. Bowel Cleansing Prep  ** 3:00pm Take 4 dulcolax (bisacodyl) tablets with a full glass of water  ** 5:00pm Mix one bottle of Miralax (polyethlene glycol) (238/255gm) in one bottle of Gatorade (64oz). Shake until dissolved. Drink 8 oz every 15 minutes until you have finished half of the solution. MORNING OF PROCEDURE  1. ____ (5 hours prior to the procedure) Drink the remaining portion of the solution 8 oz. every 15 minutes until completely finished, followed by 8 oz of any of the approved liquids. 2. Take your Blood pressure, Heart and Seizure medication the morning of the procedure with sips of water. 3. Bring inhalers with you. 4. Do not take your Diabetic medication the morning of procedure. 5. You must have a  stay with you during the entire procedure. Dear Patient,      Abbey Zaragoza will receive a call from the [facility] pre-registration department prior to your GI procedure. This will help streamline your check-in process on the day of service.  During this call a skilled associate will review your demographic and insurance benefits information. The associate will answer any question pertaining to your insurance contract, such as deductible/co-insurance/ co-pay or any other financial concerns. They may offer an amount that you could pay on the day of surgery but this is not a requirement. Thank you for allowing [facility] to be part of your [address], [address] [facility] Is located at the intersection of [address] [place] [address] and [facility], next to [facility]. From [address]: Exit at [address]. Travel on [address] past [address] [facility] and turn right onto [facility].  Then turn left into the main driveway facing the [facility], bare to the right of the driveway and look for the building to the right of the hospital.    If you need further directions, pleae call our main number at [phone]

## 2019-05-06 NOTE — PROGRESS NOTES
Reggie Presley    [address] [place] [address] ,  55 [facility]  Phone: [phone]    CHIEF COMPLAINT     Chief Complaint   Patient presents with   [facility]     NP - IBS - Ref Dr Halley MAI     Harris Dias is a 47 y.o. female who presents for irritable bowel syndrome. She has a complex medical history including prior stroke,DVT, hypercoagulability and is on Xarelto. She states she has celiac disease, chronic diarrhea and has had weight loss in 2017. She had an elevated tTG per Dr Samina Alan notes. Patient has seen Libia Nielson in the past - she had a EGD and a colonoscopy with him 3/25/15 and EGD was normal, biopsies were done from the small intestine and these did NOT show any clear evidence of celiac disease. She feels this was because she was already on a gluten free diet. In any case the duodenal biopsies were negative for celiac. She had a colonoscopy done 3/25/15 and this was normal to the terminal ileum, random colon biopsies were done for diarrhea and were normal, she had a diminutive ascending colon polyp that was removed and was a tubular adenoma. Patient states most days has 2-3 loose BMs. States she has been eating gluten free for sometime. Has tried fiber and probiotics in the past and these did not help the diarrhea. She reports that she intermittently has 'bad days' in which she may have 6-7 loose stools but most days has 2-3 stools. Denies GI bleeding. Mid and lower intermittent abdominal discomfort. She is here with her father who was present throughout the visit. Patient states she is a bad stick and no one can obtain IV lines easily and needs a PICC line placed before procedures. She states she lost 100 lbs back in ?2017 but feels some of this was due to her mothers death and her loss of appetite. She has been undergoing a Dermatology workup for skin lesions with PCP and Dermatology. Recent CBC, CMP were normal 4/4/19. TSH was low but T3 and T4 normal per PCP notes.       PAST MEDICAL HISTORY     Past Medical History:   Diagnosis Date    Anxiety and depression     Arthritis     Benign essential tremor     Cancer ([address] 2011    thyroid    Celiac disease     Degenerative disc disease     DVT (deep venous thrombosis) ([facility])     1989-leg    Fibromyalgia     GERD (gastroesophageal reflux disease)     Hx of blood clots     Irritable bowel disease     with diarrhea    Kidney stones     Migraines, neuralgic     since stroke 1999 Chronic    PONV (postoperative nausea and vomiting)     after colonoscopy    Poor venous access     Protein S deficiency ([address]     Pulmonary embolism ([address]     1989    Seizure disorder [facility]     grand mal in 2005 ( childhood febrile seizure also-from a baby to age 14) and also X 1 ~2005 with headache treatment    Thrombosis, superior sagittal sinus 1999    Thyroid disease 2011    goiter, cancer, nodule, hypo    Trigeminal neuralgia     Unspecified cerebral artery occlusion with cerebral infarction 1999    Vertebral artery occlusion     1999 with stroke    Vertebral artery occlusion 1999    White coat syndrome with high blood pressure but without hypertension      FAMILY HISTORY     Family History   Problem Relation Age of Onset    Cancer Mother         breast x2, ovarian x1, brain     High Cholesterol Mother     High Cholesterol Father     Other Father    Margaret Root Migraines Sister     Other Sister     Thyroid Disease Maternal Grandmother      SOCIAL HISTORY     Social History     Socioeconomic History    Marital status: Single     Spouse name: Not on file    Number of children: Not on file    Years of education: Not on file    Highest education level: Not on file   Occupational History    Not on file   Social Needs    Financial resource strain: Not on file    Food insecurity:     Worry: Not on file     Inability: Not on file    Transportation needs:     Medical: Not on file Non-medical: Not on file   Tobacco Use    Smoking status: Never Smoker    Smokeless tobacco: Never Used   Substance and Sexual Activity    Alcohol use: No    Drug use: No    Sexual activity: Not on file   Lifestyle    Physical activity:     Days per week: Not on file     Minutes per session: Not on file    Stress: Not on file   Relationships    Social connections:     Talks on phone: Not on file     Gets together: Not on file     Attends [employer] service: Not on file     Active member of club or organization: Not on file     Attends meetings of clubs or organizations: Not on file     Relationship status: Not on file    Intimate partner violence:     Fear of current or ex partner: Not on file     Emotionally abused: Not on file     Physically abused: Not on file     Forced sexual activity: Not on file   Other Topics Concern    Not on file   Social History Narrative    Not on file     SURGICAL HISTORY     Past Surgical History:   Procedure Laterality Date    BREAST SURGERY      benign lump X 3    CYSTOSCOPY  1/9/12    cystoscopy, left ureteroscopy with holmium laser. stone manipulation and left stent insertion    EYE SURGERY      lasik    FOOT SURGERY Left 07/10/2017    FRACTURE SURGERY  2000    Left foot pins then removal    LITHOTRIPSY  12/11    OTHER SURGICAL HISTORY      vocal cord nodule    THYROIDECTOMY  5-18-11 TOTAL THYROIDECTOMY    due to cancer     CURRENT MEDICATIONS   (This list may include medications prescribed during this encounter as epic can not insert only the list prior to this encounter.)  Current Outpatient Rx   Medication Sig Dispense Refill    clobetasol propionate 0.05 % LOTN lotion Apply 1 Applicatorful topically 2 times daily 118 mL 1    hydrOXYzine (ATARAX) 50 MG tablet Take 1 tablet by mouth nightly 30 tablet 0    montelukast (SINGULAIR) 10 MG tablet Take 1 tablet by mouth nightly 30 tablet 0    mupirocin (BACTROBAN) 2 % cream Apply 3 times daily.  30 g 1    nosebleeds. Eyes: Negative for pain and visual disturbance. Respiratory: Negative for cough, shortness of breath and wheezing. Cardiovascular: Negative for chest pain, palpitations and leg swelling. Gastrointestinal: see HPI for details. Endocrine: Negative for polydipsia, polyphagia and polyuria. Genitourinary: Negative for difficulty urinating, dysuria, hematuria and urgency. Musculoskeletal: Positive for arthralgias and back pain. Skin: Negative for pallor and rash. Allergic/Immunologic: Negative for environmental allergies and immunocompromised state. Neurological: Negative for seizures, syncope. Hematological: Negative for adenopathy. Does not bruise/bleed easily. Psychiatric/Behavioral: Negative for agitation, confusion, hallucinations. PHYSICAL EXAM   BP (!) 164/100   Ht 5' 4.76\" (1.645 m)   Wt 142 lb 3.2 oz (64.5 kg)   LMP 06/30/2015   BMI 23.84 kg/m²   Wt Readings from Last 3 Encounters:   05/06/19 142 lb 3.2 oz (64.5 kg)   04/26/19 141 lb (64 kg)   04/25/19 140 lb 14 oz (63.9 kg)     Constitutional: AAO3, No acute distress  HEENT: no pallor or icterus. Neck: supple, no adenopathy  Cardiovascular: Normal heart rate, Normal rhythm, No murmurs,. RS: Normal breath sounds, No wheezing,   Abdomen: soft, non tender, not distended, BS+. No hepatosplenomegaly. Extremities:  No edema. Neuro: AAO3, non focal.      FINAL IMPRESSION     Complex medical history with hypercoagulability on Xarelto. Patient says she is a very hard stick with getting peripheral IV lines and will need Radiology to place a peripheral IV with US guidance before any planned procedure (same day). Would prefer to avoid a PICC if a peripheral IV is possible. Chronic diarrhea likely irritable bowel syndrome. Discussed trial of a low FODMAP diet. Detailed handout given. Her duodenal biopsies were negative for celiac in 2015 so there is no confirmation of celiac disease.  She may have gluten intolerance (not celiac) however sometimes the disease can go into remission with a strict gluten free diet giving false negative duodenal biopsies. We discussed these possibilities. She can ingest small amounts of gluten for a few weeks before the EGD to see if there are any small intestine changes suggestive of celiac. She says she lost a lot of weight in the past and based on her prior colonoscopy with one small tubular adenoma 3/2015 she is not due at this time but due to the weight loss will plan a EGD to assess with repeat duodenal biopsies to assess for celiac and a colonoscopy to rule out colonic neoplasia. Procedures discussed with her in detail. Should hold Xarelto 3 days prior to any procedure, has been asked to discuss with PCP if she needs a Lovenox bridging and if she does get this last Lovenox injection should be 24 hours before the procedure.

## 2019-05-06 NOTE — TELEPHONE ENCOUNTER
From: Olivia Morris  To: Frank Stanley MD  Sent: 5/5/2019 4:25 AM EDT  Subject: Non-Urgent Medical Question    Hi Dr. Shirlene Hernandez I click on new Test Result for Pathology Tissue from 4/18, it's blank & says to contact ordering provider. Since you said you could do so, could you please do so when you get a spare moment? Thanks!   Sincerely-Jennifrt

## 2019-05-09 ENCOUNTER — OFFICE VISIT (OUTPATIENT)
Dept: FAMILY MEDICINE CLINIC | Age: 49
End: 2019-05-09
Payer: COMMERCIAL

## 2019-05-09 VITALS
HEART RATE: 105 BPM | SYSTOLIC BLOOD PRESSURE: 110 MMHG | WEIGHT: 139 LBS | DIASTOLIC BLOOD PRESSURE: 80 MMHG | OXYGEN SATURATION: 99 % | BODY MASS INDEX: 23.3 KG/M2

## 2019-05-09 DIAGNOSIS — F41.9 ANXIETY: ICD-10-CM

## 2019-05-09 DIAGNOSIS — L28.1 PRURIGO NODULARIS: Primary | ICD-10-CM

## 2019-05-09 PROCEDURE — G8420 CALC BMI NORM PARAMETERS: HCPCS | Performed by: FAMILY MEDICINE

## 2019-05-09 PROCEDURE — 1036F TOBACCO NON-USER: CPT | Performed by: FAMILY MEDICINE

## 2019-05-09 PROCEDURE — 3017F COLORECTAL CA SCREEN DOC REV: CPT | Performed by: FAMILY MEDICINE

## 2019-05-09 PROCEDURE — G8598 ASA/ANTIPLAT THER USED: HCPCS | Performed by: FAMILY MEDICINE

## 2019-05-09 PROCEDURE — 99213 OFFICE O/P EST LOW 20 MIN: CPT | Performed by: FAMILY MEDICINE

## 2019-05-09 PROCEDURE — G8427 DOCREV CUR MEDS BY ELIG CLIN: HCPCS | Performed by: FAMILY MEDICINE

## 2019-05-09 RX ORDER — CLOBETASOL PROPIONATE 0.5 MG/G
CREAM TOPICAL 2 TIMES DAILY
COMMUNITY
End: 2019-07-25 | Stop reason: SDUPTHER

## 2019-05-09 RX ORDER — CLONAZEPAM 0.5 MG/1
0.5 TABLET ORAL DAILY PRN
Qty: 30 TABLET | Refills: 0 | Status: SHIPPED | OUTPATIENT
Start: 2019-05-09 | End: 2019-06-05 | Stop reason: SDUPTHER

## 2019-05-09 RX ORDER — POLYETHYLENE GLYCOL 3350 17 G/17G
POWDER, FOR SOLUTION ORAL
COMMUNITY
Start: 2019-05-06 | End: 2019-05-09 | Stop reason: ALTCHOICE

## 2019-05-09 ASSESSMENT — ENCOUNTER SYMPTOMS
COLOR CHANGE: 1
NAUSEA: 0
SHORTNESS OF BREATH: 0

## 2019-05-09 NOTE — PATIENT INSTRUCTIONS
Patient Education        Dermatitis: Care Instructions  Your Care Instructions  Dermatitis is the general name used for any rash or inflammation of the skin. Different kinds of dermatitis cause different kinds of rashes. Common causes of a rash include new medicines, plants (such as poison oak or poison ivy), heat, and stress. Certain illnesses can also cause a rash. An allergic reaction to something that touches your skin, such as latex, nickel, or poison ivy, is called contact dermatitis. Contact dermatitis may also be caused by something that irritates the skin, such as bleach, a chemical, or soap. These types of rashes cannot be spread from person to person. How long your rash will last depends on what caused it. Rashes may last a few days or months. Follow-up care is a key part of your treatment and safety. Be sure to make and go to all appointments, and call your doctor if you are having problems. It's also a good idea to know your test results and keep a list of the medicines you take. How can you care for yourself at home? · Do not scratch the rash. Cut your nails short, and file them smooth. Or wear gloves if this helps keep you from scratching. · Wash the area with water only. Pat dry. · Put cold, wet cloths on the rash to reduce itching. · Keep cool, and stay out of the sun. · Leave the rash open to the air as much as possible. · If the rash itches, use hydrocortisone cream. Follow the directions on the label. Calamine lotion may help for plant rashes. · Take an over-the-counter antihistamine, such as diphenhydramine (Benadryl) or loratadine (Claritin), to help calm the itching. Read and follow all instructions on the label. · If your doctor prescribed a cream, use it as directed. If your doctor prescribed medicine, take it exactly as directed. When should you call for help?   Call your doctor now or seek immediate medical care if:    · You have symptoms of infection, such as:  ? Increased pain, swelling, warmth, or redness. ? Red streaks leading from the area. ? Pus draining from the area. ? A fever.     · You have joint pain along with the rash.    Watch closely for changes in your health, and be sure to contact your doctor if:    · Your rash is changing or getting worse.     · You are not getting better as expected. Where can you learn more? Go to https://Brandicted.[employer]. org and sign in to your [employer] account. Enter [phone] in the [facility] box to learn more about \"Dermatitis: Care Instructions. \"     If you do not have an account, please click on the \"Sign Up Now\" link. Current as of: April 17, 2018  Content Version: 12.0  © 5449-4342 [employer]lin[employer]. Care instructions adapted under license by [facility] ([facility]). If you have questions about a medical condition or this instruction, always ask your healthcare professional. [place] [address] any warranty or liability for your use of this information. Patient Education        Keloids: Care Instructions  Your Care Instructions  Keloids are the excess growth of scar tissue where the skin has healed. Keloids can form where the skin is damaged due to a surgery cut, burn, chickenpox, or acne. For some people, even a scratch can lead to keloids. Keloids are most commonly found on the upper chest and back. They are most likely to form in dark-skinned people, but anyone can get them. Keloids can rub against your clothes and become irritated, itchy, or painful. Keloids exposed to the sun may turn darker than the rest of your skin. The dark color may stay. Keloids do not become cancer. They do not need treatment unless they bother you. Your doctor may treat small keloids by freezing them or injecting them with medicine. Large keloids may need other treatments, such as surgery. Treatment for keloids can also cause keloids to form. Follow-up care is a key part of your treatment and safety.  Be sure to make and go to all appointments, and call your doctor if you are having problems. It's also a good idea to know your test results and keep a list of the medicines you take. How can you care for yourself at home? · Keep wounds clean and dry to prevent infection. · If you tend to get keloids, cover cuts and other damage to the skin with a silicone gel bandage. Cut the silicone gel slightly bigger than the skin wound. Cover the silicone gel with a bandage or wrap to keep pressure on the cut or other injury. · If you get keloids, you may want to avoid body piercings, tattoos, or any surgery you do not need. Keloid scarring can happen after these procedures. When should you call for help? Call your doctor now or seek immediate medical care if:    · You have signs of infection, such as:  ? Increased pain, swelling, warmth, or redness. ? Red streaks leading from the wound. ? Pus draining from the wound. ? A fever.    Watch closely for changes in your health, and be sure to contact your doctor if:    · You do not get better as expected. Where can you learn more? Go to https://WheresTheBuspeRocket Lawyer.[employer]. org and sign in to your [employer] account. Enter E474 in the [facility] box to learn more about \"Keloids: Care Instructions. \"     If you do not have an account, please click on the \"Sign Up Now\" link. Current as of: April 17, 2018  Content Version: 12.0  © 2144-9558 Healthwise, [facility]. Care instructions adapted under license by [facility] ([facility]). If you have questions about a medical condition or this instruction, always ask your healthcare professional. [place] [address] any warranty or liability for your use of this information.

## 2019-05-09 NOTE — PROGRESS NOTES
5/9/2019    This is a 47 y.o. female who presents for  Chief Complaint   Patient presents with    2 Week Follow-Up    Rash       HPI:     Skin lesions are worse  Not itchy at all  New spots have popped up  The area on her L anterior distal shin that she retreated for 2 weeks with BID Clobetasol looks worse   New spots everywhere  Chin: plucks hair there  Read about MXT and does not like the frequent lab draws and side effects    Doesn't like driving places      Past Medical History:   Diagnosis Date    Anxiety and depression     Arthritis     Benign essential tremor     Cancer ([address] 2011    thyroid    Celiac disease     Degenerative disc disease     [facility]) ([facility]     1989-leg    Fibromyalgia     GERD (gastroesophageal reflux disease)     Hx of blood clots     Irritable bowel disease     with diarrhea    Kidney stones     Migraines, neuralgic     since stroke 1999 Chronic    PONV (postoperative nausea and vomiting)     after colonoscopy    Poor venous access     Protein S deficiency ([address]     Pulmonary embolism ([address]     1989    Seizure disorder ([address]     grand mal in 2005 ( childhood febrile seizure also-from a baby to age 14) and also X 1 ~2005 with headache treatment    Thrombosis, superior sagittal sinus 1999    Thyroid disease 2011    goiter, cancer, nodule, hypo    Trigeminal neuralgia     Unspecified cerebral artery occlusion with cerebral infarction 1999    Vertebral artery occlusion     1999 with stroke    Vertebral artery occlusion 1999    White coat syndrome with high blood pressure but without hypertension        Past Surgical History:   Procedure Laterality Date    BREAST SURGERY      benign lump X 3    CYSTOSCOPY  1/9/12    cystoscopy, left ureteroscopy with holmium laser.  stone manipulation and left stent insertion    EYE SURGERY      lasik    FOOT SURGERY Left 07/10/2017    FRACTURE SURGERY  2000    Left foot pins then removal    LITHOTRIPSY  12/11  OTHER SURGICAL HISTORY      vocal cord nodule    THYROIDECTOMY  5-18-11 TOTAL THYROIDECTOMY    due to cancer       Social History     Socioeconomic History    Marital status: Single     Spouse name: Not on file    Number of children: Not on file    Years of education: Not on file    Highest education level: Not on file   Occupational History    Not on file   Social Needs    Financial resource strain: Not on file    Food insecurity:     Worry: Not on file     Inability: Not on file    Transportation needs:     Medical: Not on file     Non-medical: Not on file   Tobacco Use    Smoking status: Never Smoker    Smokeless tobacco: Never Used   Substance and Sexual Activity    Alcohol use: No    Drug use: No    Sexual activity: Not on file   Lifestyle    Physical activity:     Days per week: Not on file     Minutes per session: Not on file    Stress: Not on file   Relationships    Social connections:     Talks on phone: Not on file     Gets together: Not on file     Attends [employer] service: Not on file     Active member of club or organization: Not on file     Attends meetings of clubs or organizations: Not on file     Relationship status: Not on file    Intimate partner violence:     Fear of current or ex partner: Not on file     Emotionally abused: Not on file     Physically abused: Not on file     Forced sexual activity: Not on file   Other Topics Concern    Not on file   Social History Narrative    Not on file       Family History   Problem Relation Age of Onset    Cancer Mother         breast x2, ovarian x1, brain     High Cholesterol Mother     High Cholesterol Father     Other Father     Migraines Sister     Other Sister     Thyroid Disease Maternal Grandmother        Current Outpatient Medications   Medication Sig Dispense Refill    clobetasol (TEMOVATE) 0.05 % cream Apply topically 2 times daily Apply topically 2 times daily.       hydrOXYzine (ATARAX) 50 MG tablet Take 1 tablet by mouth nightly 30 tablet 0    montelukast (SINGULAIR) 10 MG tablet Take 1 tablet by mouth nightly (Patient taking differently: Take 10 mg by mouth every morning ) 30 tablet 0    mupirocin (BACTROBAN) 2 % cream Apply 3 times daily. 30 g 1    venlafaxine (EFFEXOR XR) 75 MG extended release capsule TAKE ONE CAPSULE BY MOUTH DAILY 90 capsule 1    XARELTO 20 MG TABS tablet TAKE ONE TABLET BY MOUTH DAILY 30 tablet 5    fentaNYL (ACTIQ) 800 MCG lollipop Place 800 mcg inside cheek 3 times daily.  atorvastatin (LIPITOR) 40 MG tablet Take 1 tablet by mouth daily 30 tablet 5    clonazePAM (KLONOPIN) 0.5 MG tablet Take 1 tablet by mouth daily as needed for Anxiety for up to 30 days. 30 tablet 0    Doxylamine Succinate, Sleep, (UNISOM PO) Take by mouth      carBAMazepine (TEGRETOL XR) 200 MG extended release tablet Take 400 mg by mouth every 8 hours       primidone (MYSOLINE) 250 MG tablet Take 250 mg by mouth 4 times daily       Loratadine (CLARITIN PO) Take by mouth as needed      diphenhydrAMINE (BENADRYL) 25 MG tablet Take 25 mg by mouth as needed for Itching      Bismuth Subsalicylate (PEPTO-BISMOL PO) Take by mouth as needed      diphenoxylate-atropine (LOMOTIL) 2.5-0.025 MG per tablet Take 1 tablet by mouth 2 times daily as needed       levothyroxine (SYNTHROID) 200 MCG tablet Take 1 tablet by mouth daily      Multiple Vitamin (MULTI-VITAMIN PO) Take 1 tablet by mouth daily. No current facility-administered medications for this visit.         Immunization History   Administered Date(s) Administered    Tdap (Boostrix, Adacel) 04/04/2019       Allergies   Allergen Reactions    Dilaudid [Hydromorphone Hcl] Shortness Of Breath and Itching    Duloxetine Hcl Other (See Comments)     Personality changes and depression    Gabapentin     Gluten Meal      Celiac disease    Hydromorphone Itching and Other (See Comments)    Morphine Itching    Pregabalin      Lyrica (personality changes)     Propranolol Hives    Topiramate Other (See Comments)       Review of Systems   Constitutional: Negative for activity change, fever and unexpected weight change. Respiratory: Negative for shortness of breath. Cardiovascular: Negative for chest pain. Gastrointestinal: Negative for nausea. Skin: Positive for color change, rash and wound. Negative for pallor. Allergic/Immunologic: Negative for immunocompromised state. Hematological: Negative for adenopathy. /80 (Site: Left Upper Arm, Position: Sitting, Cuff Size: Medium Adult)   Pulse 105   Wt 139 lb (63 kg)   LMP 06/30/2015   SpO2 99%   BMI 23.30 kg/m²     Physical Exam   Constitutional: She is oriented to person, place, and time. She appears well-developed and well-nourished. No distress. HENT:   Head: Normocephalic and atraumatic. Eyes: Pupils are equal, round, and reactive to light. EOM are normal.   Neck: Normal range of motion. Neck supple. Pulmonary/Chest: No respiratory distress. Neurological: She is alert and oriented to person, place, and time. Skin: Skin is warm and dry. Capillary refill takes 2 to 3 seconds. Lesion noted. No rash noted. She is not diaphoretic. There is erythema. No pallor. Psychiatric: She has a normal mood and affect. Her behavior is normal. Judgment normal.   Nursing note and vitals reviewed. Plan  1. Prurigo nodularis  S/p multiple biopsies by primary care and dermatology in the past.  Last showed likely this DxEstelita Burrell just finished her (at minimum) full second course of Clobetasol BID x 2 weeks on a lesion on her L anterior distal shin and there was no improvement (photos above). We have discussed trialing MXT in the past, but will defer for now given frequency of lab draws and AEs. We discussed phototherapy and pt would like to try this. Will look into options and distance, as Indra does not like to drive long distances.  Will send to Derm again/ Rheum for phototherapy trial.         While assessing care for this patient, I have reviewed all pertinent lab work/imaging/ specialist notes and care in reference to those problems addressed above in detail. Appropriate medical decision making was based on this. Please note that portions of this note may have been completed with a voice recognition program. Efforts were made to edit the dictations but occasionally words are mis-transcribed. Return if symptoms worsen or fail to improve.

## 2019-05-22 ENCOUNTER — TELEPHONE (OUTPATIENT)
Dept: FAMILY MEDICINE CLINIC | Age: 49
End: 2019-05-22

## 2019-05-22 DIAGNOSIS — G89.4 CHRONIC PAIN SYNDROME: Primary | ICD-10-CM

## 2019-05-22 NOTE — TELEPHONE ENCOUNTER
I called patient and she states that she is on chronic pain medication. DR. Alex Verdugo (The pain management doctor) that she is seeing is not working out. He comes in two hours or more late. He started making his own appointments and he gives the patients cards, then has them take it to the  to put it in the computer. He had only two staff members and the last time she was there one of the staff member walked out. She called to reschedule her appointment and rescheduled it for the 5/29, then he called her and said that she no showed and that she needs to come in on 5/26, which she is not able to do because she he nephews graduation. Dr. Alex Verdugo only works three days a week and he told her that he does not answer the phone at his office because of all the calls he gets about prior authorization and the [place] city is on him to mow his grass out side of his building. It is just a little frustrating to her and she stated that she is not she if he will dismiss when she goes back because of she rescheduled that appointment. She was wondering if you can prescribe her chronic pain medications or if you cannot can you put in a referral to Dr. Nhung Lamas at [address] [place] [address] and Pain Management. She has seen him in the past.   It told her that you don't usually prescribe chronic pain medication.

## 2019-05-22 NOTE — TELEPHONE ENCOUNTER
Pt called and requested to speak wit Dr. Rebekah Bolden or her MA. Did not give any further details.  Please call back at [phone]

## 2019-05-28 ENCOUNTER — TELEPHONE (OUTPATIENT)
Dept: FAMILY MEDICINE CLINIC | Age: 49
End: 2019-05-28

## 2019-05-28 NOTE — TELEPHONE ENCOUNTER
Patient was seen 4/18/19, for biopsies. They were sent to [facility]. The lab coded the testing L905, which is possible cosmetic.  used Z203 for the office procedure. Vicki Covington is wondering if we are able to get the [facility] to change their coding so that it is the same as Dr. Pastor Mari coding so that the insurance will pay for it.    Jad Hendrix  Inquiry # [id number]    Vicki Covington has already talked to the billing department at [facility] to have a 30 day hold put on the bill. Once the code is changed the [facility] will need to correct the claim with the updated diagnosis code.

## 2019-06-01 ENCOUNTER — PATIENT MESSAGE (OUTPATIENT)
Dept: FAMILY MEDICINE CLINIC | Age: 49
End: 2019-06-01

## 2019-06-01 DIAGNOSIS — K58.0 IRRITABLE BOWEL SYNDROME WITH DIARRHEA: Primary | ICD-10-CM

## 2019-06-03 RX ORDER — DIPHENOXYLATE HYDROCHLORIDE AND ATROPINE SULFATE 2.5; .025 MG/1; MG/1
1 TABLET ORAL 2 TIMES DAILY PRN
Qty: 60 TABLET | Refills: 0 | Status: SHIPPED | OUTPATIENT
Start: 2019-06-03 | End: 2019-09-11 | Stop reason: SDUPTHER

## 2019-06-05 DIAGNOSIS — F41.9 ANXIETY: ICD-10-CM

## 2019-06-05 RX ORDER — CLONAZEPAM 0.5 MG/1
TABLET ORAL
Qty: 30 TABLET | Refills: 0 | Status: SHIPPED | OUTPATIENT
Start: 2019-06-05 | End: 2019-07-12 | Stop reason: SDUPTHER

## 2019-06-05 NOTE — TELEPHONE ENCOUNTER
.  Last office visit 5/9/2019     Last written 5-9-19 30 with 0      Next office visit scheduled no future ov     Requested Prescriptions     Pending Prescriptions Disp Refills    clonazePAM (KLONOPIN) 0.5 MG tablet [Pharmacy Med Name: clonazePAM 0.5 MG TABLET] 30 tablet 0     Sig: TAKE ONE TABLET BY MOUTH DAILY AS NEEDED FOR ANXIETY

## 2019-06-06 DIAGNOSIS — F41.9 ANXIETY: ICD-10-CM

## 2019-06-06 RX ORDER — CLONAZEPAM 0.5 MG/1
TABLET ORAL
Qty: 30 TABLET | Refills: 0 | OUTPATIENT
Start: 2019-06-06

## 2019-06-06 NOTE — TELEPHONE ENCOUNTER
Pt states per pharmacy,Dr. Justina Nelson can put do not fill on rx for clonazePAM (KLONOPIN) 0.5 MG tablet, until the 06/08/06/09.  Please advise

## 2019-06-10 DIAGNOSIS — T14.8XXA INFECTED WOUND: ICD-10-CM

## 2019-06-10 DIAGNOSIS — L08.9 INFECTED WOUND: ICD-10-CM

## 2019-06-10 DIAGNOSIS — L98.9 SKIN LESION: Primary | ICD-10-CM

## 2019-06-11 ENCOUNTER — TELEPHONE (OUTPATIENT)
Dept: FAMILY MEDICINE CLINIC | Age: 49
End: 2019-06-11

## 2019-06-11 DIAGNOSIS — F43.21 GRIEF: Primary | ICD-10-CM

## 2019-06-11 NOTE — TELEPHONE ENCOUNTER
Pt called today to relate some concerns and outstanding items related to care:  · Rx for Lomotil was printed when ordered by PCP - there was not paper Rx available - This RN called rx into pharmacy as PCP ordered. · Pt has been denied for PA related to [id number] and third party provider called for resubmission of clinical info to have approval for ordered CTA  - expected response or move to Peer to Peer consult is two days  · Billing issue with pathology - investigated and found that Path provider has different ICD code from one submitted by PCP and this was reason for denial of payment - call and LM to have this corrected. · Outstanding referral for Phototherapy - pt is awaiting referral to phototherapy and instructions on where to pursue    Follow up to wound care by PCP - Right shoulder/back has lesion that was biopsy site. Pt reports it is reddened and has smaller than dime sized yellow drainage when she changes dressing twice a day. We discuss the MyChart message and advised this RN would clarify if PCP meant for OTC antibx cream/ointment v rx strength. Multiple calls to payor, third party provider for PA, path lab provider billing, Pharmacy and patient for resolution. Pt also reports grief from recent loss of her mother. We discussed referral to  with Spiritual Care and she is agreeable for referral - order is placed.      Plan:  Follow up re PA for CTA with insurance  Follow up re outstanding referral for Phototherapy  Follow up re billing with Path provider  Discuss with PCP re wound treatment plan for patient

## 2019-06-12 RX ORDER — MUPIROCIN CALCIUM 20 MG/G
CREAM TOPICAL
Qty: 30 G | Refills: 5 | Status: SHIPPED | OUTPATIENT
Start: 2019-06-12 | End: 2019-07-10

## 2019-06-14 ENCOUNTER — CLINICAL DOCUMENTATION (OUTPATIENT)
Dept: SPIRITUAL SERVICES | Age: 49
End: 2019-06-14

## 2019-06-14 DIAGNOSIS — R51.9 CHRONIC INTRACTABLE HEADACHE, UNSPECIFIED HEADACHE TYPE: ICD-10-CM

## 2019-06-14 DIAGNOSIS — Z86.73 HISTORY OF CVA (CEREBROVASCULAR ACCIDENT): Primary | ICD-10-CM

## 2019-06-14 DIAGNOSIS — G89.29 CHRONIC INTRACTABLE HEADACHE, UNSPECIFIED HEADACHE TYPE: ICD-10-CM

## 2019-06-14 DIAGNOSIS — I65.09 VERTEBRAL ARTERY STENOSIS, UNSPECIFIED LATERALITY: ICD-10-CM

## 2019-06-14 NOTE — FLOWSHEET NOTE
Attempted to contact Pt for Outpatient Spiritual Care support. No answer. Left voicemail for Pt. Will attempt to follow up at a later time.     [address] [facility]       06/14/19 1131   Encounter Summary   Services provided to: Patient not available   Referral/Consult From: Multi-disciplinary team   Continue Visiting Yes  (6/14 attempted call for [facility], left VM)

## 2019-06-14 NOTE — TELEPHONE ENCOUNTER
Informed patient that CTA was approved. Received letter today. Dr. Irma Vidal did an new order and the Dx Codes are the same.

## 2019-06-18 ENCOUNTER — TELEPHONE (OUTPATIENT)
Dept: FAMILY MEDICINE CLINIC | Age: 49
End: 2019-06-18

## 2019-06-18 ENCOUNTER — CLINICAL DOCUMENTATION (OUTPATIENT)
Dept: SPIRITUAL SERVICES | Age: 49
End: 2019-06-18

## 2019-06-18 DIAGNOSIS — L98.9 SKIN LESION OF BACK: ICD-10-CM

## 2019-06-18 DIAGNOSIS — L28.1 PRURIGO NODULARIS: Primary | ICD-10-CM

## 2019-06-18 NOTE — FLOWSHEET NOTE
Attempted to contact Pt via phone for Outpatient Spiritual Care support. No answer. Will attempt to call again at a later time.     [address]       06/18/19 7238   Encounter Summary   Services provided to: Patient not available   Referral/Consult From: Kai   Continue Visiting Yes  (6/18 attempted call [facility], no answer)

## 2019-06-18 NOTE — TELEPHONE ENCOUNTER
Dr. Mortimer Nails office states they do not have the UVA or UVB phototherapy. They only do photodynamic light for precancerous reason. But there is a Dr. Luis Carlos Hylton at [facility] and his number is [phone]. I called Dr. Luis Carlos Hylton office and they do the phototherapy for non precancerous dx. Also [facility] will do the photo Therapy for all Dx. I have pended both referrals for you to sign off on.

## 2019-06-19 ENCOUNTER — HOSPITAL ENCOUNTER (OUTPATIENT)
Dept: CT IMAGING | Age: 49
Discharge: HOME | End: 2019-06-19
Payer: COMMERCIAL

## 2019-06-19 ENCOUNTER — TELEPHONE (OUTPATIENT)
Dept: FAMILY MEDICINE CLINIC | Age: 49
End: 2019-06-19

## 2019-06-19 DIAGNOSIS — R51.9 CHRONIC INTRACTABLE HEADACHE, UNSPECIFIED HEADACHE TYPE: ICD-10-CM

## 2019-06-19 DIAGNOSIS — G89.29 CHRONIC INTRACTABLE HEADACHE, UNSPECIFIED HEADACHE TYPE: ICD-10-CM

## 2019-06-19 DIAGNOSIS — I65.09 VERTEBRAL ARTERY STENOSIS, UNSPECIFIED LATERALITY: ICD-10-CM

## 2019-06-19 DIAGNOSIS — Z86.73 HISTORY OF CVA (CEREBROVASCULAR ACCIDENT): ICD-10-CM

## 2019-06-19 PROCEDURE — 6360000004 HC RX CONTRAST MEDICATION: Performed by: FAMILY MEDICINE

## 2019-06-19 PROCEDURE — 70498 CT ANGIOGRAPHY NECK: CPT

## 2019-06-19 RX ADMIN — IOPAMIDOL 75 ML: 755 INJECTION, SOLUTION INTRAVENOUS at 09:43

## 2019-06-20 ENCOUNTER — TELEPHONE (OUTPATIENT)
Dept: FAMILY MEDICINE CLINIC | Age: 49
End: 2019-06-20

## 2019-06-20 DIAGNOSIS — I66.9: Primary | ICD-10-CM

## 2019-06-20 NOTE — TELEPHONE ENCOUNTER
Called pt and discussed CTA findings. Referral placed for [facility]. Pt has seen a different MD there in the past who is not longer with their practice. 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 Pt agreeable to plan. No new concerning Sxs at this time.

## 2019-06-26 ENCOUNTER — TELEPHONE (OUTPATIENT)
Dept: FAMILY MEDICINE CLINIC | Age: 49
End: 2019-06-26

## 2019-06-27 ENCOUNTER — NURSE TRIAGE (OUTPATIENT)
Dept: OTHER | Facility: CLINIC | Age: 49
End: 2019-06-27

## 2019-06-27 NOTE — TELEPHONE ENCOUNTER
Call transferred from Triage Nurse   LFT finger pain and swelling and top of thumb nail to middle thumb feels like something is disconnected when trying to bend finger ? 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 Please callback today .  Advise

## 2019-06-27 NOTE — TELEPHONE ENCOUNTER
Reason for Disposition   MODERATE pain (e.g., interferes with normal activities) and present > 3 days    Protocols used: FINGER PAIN-ADULT-[place]    Received call from [facility] in [address]. Patient has swelling of left thumb at base and tip. It is very painful. No redness, no hx injury. Denies numbness or tingling. Pain is moderate to severe, unable to even let it bump up against the next finger. Call soft transferred to [facility] in [address] to schedule appointment.

## 2019-06-28 ENCOUNTER — HOSPITAL ENCOUNTER (OUTPATIENT)
Dept: GENERAL RADIOLOGY | Age: 49
Discharge: HOME | End: 2019-06-28
Payer: COMMERCIAL

## 2019-06-28 ENCOUNTER — OFFICE VISIT (OUTPATIENT)
Dept: FAMILY MEDICINE CLINIC | Age: 49
End: 2019-06-28
Payer: COMMERCIAL

## 2019-06-28 ENCOUNTER — HOSPITAL ENCOUNTER (OUTPATIENT)
Age: 49
Discharge: HOME | End: 2019-06-28
Payer: COMMERCIAL

## 2019-06-28 VITALS — SYSTOLIC BLOOD PRESSURE: 133 MMHG | HEART RATE: 95 BPM | OXYGEN SATURATION: 97 % | DIASTOLIC BLOOD PRESSURE: 97 MMHG

## 2019-06-28 DIAGNOSIS — F41.9 ANXIETY: ICD-10-CM

## 2019-06-28 DIAGNOSIS — M79.89 SWELLING OF THUMB, LEFT: ICD-10-CM

## 2019-06-28 DIAGNOSIS — M79.645 PAIN OF LEFT THUMB: ICD-10-CM

## 2019-06-28 DIAGNOSIS — M79.645 PAIN OF LEFT THUMB: Primary | ICD-10-CM

## 2019-06-28 PROCEDURE — 73130 X-RAY EXAM OF HAND: CPT

## 2019-06-28 PROCEDURE — 99214 OFFICE O/P EST MOD 30 MIN: CPT | Performed by: FAMILY MEDICINE

## 2019-06-28 PROCEDURE — G8420 CALC BMI NORM PARAMETERS: HCPCS | Performed by: FAMILY MEDICINE

## 2019-06-28 PROCEDURE — G8598 ASA/ANTIPLAT THER USED: HCPCS | Performed by: FAMILY MEDICINE

## 2019-06-28 PROCEDURE — 3017F COLORECTAL CA SCREEN DOC REV: CPT | Performed by: FAMILY MEDICINE

## 2019-06-28 PROCEDURE — G8427 DOCREV CUR MEDS BY ELIG CLIN: HCPCS | Performed by: FAMILY MEDICINE

## 2019-06-28 PROCEDURE — 1036F TOBACCO NON-USER: CPT | Performed by: FAMILY MEDICINE

## 2019-06-28 RX ORDER — METHYLPREDNISOLONE 4 MG/1
TABLET ORAL
Qty: 1 KIT | Refills: 0 | Status: SHIPPED | OUTPATIENT
Start: 2019-06-28 | End: 2019-07-04

## 2019-06-28 RX ORDER — ACETAMINOPHEN 160 MG
1 TABLET,DISINTEGRATING ORAL DAILY
COMMUNITY

## 2019-06-28 RX ORDER — VENLAFAXINE HYDROCHLORIDE 150 MG/1
150 CAPSULE, EXTENDED RELEASE ORAL DAILY
Qty: 30 CAPSULE | Refills: 2 | Status: SHIPPED | OUTPATIENT
Start: 2019-06-28 | End: 2019-09-10

## 2019-06-28 ASSESSMENT — PATIENT HEALTH QUESTIONNAIRE - PHQ9
2. FEELING DOWN, DEPRESSED OR HOPELESS: 1
5. POOR APPETITE OR OVEREATING: 3
6. FEELING BAD ABOUT YOURSELF - OR THAT YOU ARE A FAILURE OR HAVE LET YOURSELF OR YOUR FAMILY DOWN: 1
8. MOVING OR SPEAKING SO SLOWLY THAT OTHER PEOPLE COULD HAVE NOTICED. OR THE OPPOSITE, BEING SO FIGETY OR RESTLESS THAT YOU HAVE BEEN MOVING AROUND A LOT MORE THAN USUAL: 0
9. THOUGHTS THAT YOU WOULD BE BETTER OFF DEAD, OR OF HURTING YOURSELF: 0
7. TROUBLE CONCENTRATING ON THINGS, SUCH AS READING THE NEWSPAPER OR WATCHING TELEVISION: 1
SUM OF ALL RESPONSES TO PHQ QUESTIONS 1-9: 13
3. TROUBLE FALLING OR STAYING ASLEEP: 3
4. FEELING TIRED OR HAVING LITTLE ENERGY: 3
SUM OF ALL RESPONSES TO PHQ QUESTIONS 1-9: 13
10. IF YOU CHECKED OFF ANY PROBLEMS, HOW DIFFICULT HAVE THESE PROBLEMS MADE IT FOR YOU TO DO YOUR WORK, TAKE CARE OF THINGS AT HOME, OR GET ALONG WITH OTHER PEOPLE: 1
1. LITTLE INTEREST OR PLEASURE IN DOING THINGS: 1
SUM OF ALL RESPONSES TO PHQ9 QUESTIONS 1 & 2: 2

## 2019-06-28 ASSESSMENT — ENCOUNTER SYMPTOMS
SHORTNESS OF BREATH: 0
COLOR CHANGE: 1
VOMITING: 0
NAUSEA: 0
BACK PAIN: 0
ABDOMINAL PAIN: 0

## 2019-06-28 NOTE — PROGRESS NOTES
cream Apply 3 times daily. 30 g 5    clonazePAM (KLONOPIN) 0.5 MG tablet TAKE ONE TABLET BY MOUTH DAILY AS NEEDED FOR ANXIETY 30 tablet 0    diphenoxylate-atropine (LOMOTIL) 2.5-0.025 MG per tablet Take 1 tablet by mouth 2 times daily as needed for Diarrhea for up to 30 days. 60 tablet 0    clobetasol (TEMOVATE) 0.05 % cream Apply topically 2 times daily Apply topically 2 times daily.  XARELTO 20 MG TABS tablet TAKE ONE TABLET BY MOUTH DAILY 30 tablet 5    fentaNYL (ACTIQ) 800 MCG lollipop Place 800 mcg inside cheek 3 times daily.  atorvastatin (LIPITOR) 40 MG tablet Take 1 tablet by mouth daily 30 tablet 5    Doxylamine Succinate, Sleep, (UNISOM PO) Take by mouth      carBAMazepine (TEGRETOL XR) 200 MG extended release tablet Take 400 mg by mouth every 8 hours       primidone (MYSOLINE) 250 MG tablet Take 250 mg by mouth 4 times daily       diphenhydrAMINE (BENADRYL) 25 MG tablet Take 25 mg by mouth as needed for Itching      Bismuth Subsalicylate (PEPTO-BISMOL PO) Take by mouth as needed      levothyroxine (SYNTHROID) 200 MCG tablet Take 1 tablet by mouth daily      clobetasol (TEMOVATE) 0.05 % cream APPLY TOPICALLY TWO TIMES A DAY 60 g 1     No current facility-administered medications for this visit. Immunization History   Administered Date(s) Administered    Tdap (Boostrix, Adacel) 04/04/2019       Allergies   Allergen Reactions    Dilaudid [Hydromorphone Hcl] Shortness Of Breath and Itching    Duloxetine Hcl Other (See Comments)     Personality changes and depression    Gabapentin     Gluten Meal      Celiac disease    Hydromorphone Itching and Other (See Comments)    Morphine Itching    Pregabalin      Lyrica (personality changes)     Propranolol Hives    Topiramate Other (See Comments)       Review of Systems   Constitutional: Negative for activity change, chills, diaphoresis, fatigue, fever and unexpected weight change. Respiratory: Negative for shortness of breath.

## 2019-07-01 ENCOUNTER — TELEPHONE (OUTPATIENT)
Dept: FAMILY MEDICINE CLINIC | Age: 49
End: 2019-07-01

## 2019-07-01 RX ORDER — CLOBETASOL PROPIONATE 0.5 MG/G
CREAM TOPICAL
Qty: 60 G | Refills: 1 | Status: SHIPPED | OUTPATIENT
Start: 2019-07-01 | End: 2019-07-10 | Stop reason: SDUPTHER

## 2019-07-01 NOTE — TELEPHONE ENCOUNTER
.  Last office visit 6/28/2019     Last written     Next office visit scheduled none    Requested Prescriptions     Pending Prescriptions Disp Refills    clobetasol (TEMOVATE) 0.05 % cream [Pharmacy Med Name: CLOBETASOL 0.05% CREAM]  0     Sig: APPLY TOPICALLY TWO TIMES A DAY

## 2019-07-07 ENCOUNTER — PATIENT MESSAGE (OUTPATIENT)
Dept: FAMILY MEDICINE CLINIC | Age: 49
End: 2019-07-07

## 2019-07-10 RX ORDER — CLOBETASOL PROPIONATE 0.5 MG/G
CREAM TOPICAL 2 TIMES DAILY
Qty: 4 TUBE | Refills: 5 | Status: SHIPPED | OUTPATIENT
Start: 2019-07-10 | End: 2019-09-19 | Stop reason: ALTCHOICE

## 2019-07-12 ENCOUNTER — TELEPHONE (OUTPATIENT)
Dept: FAMILY MEDICINE CLINIC | Age: 49
End: 2019-07-12

## 2019-07-12 ENCOUNTER — CLINICAL DOCUMENTATION (OUTPATIENT)
Dept: SPIRITUAL SERVICES | Age: 49
End: 2019-07-12

## 2019-07-12 DIAGNOSIS — F41.9 ANXIETY: ICD-10-CM

## 2019-07-15 ENCOUNTER — TELEPHONE (OUTPATIENT)
Dept: FAMILY MEDICINE CLINIC | Age: 49
End: 2019-07-15

## 2019-07-15 RX ORDER — CLONAZEPAM 0.5 MG/1
TABLET ORAL
Qty: 30 TABLET | Refills: 0 | Status: SHIPPED | OUTPATIENT
Start: 2019-07-15 | End: 2019-08-12 | Stop reason: SDUPTHER

## 2019-07-15 NOTE — TELEPHONE ENCOUNTER
Pt has another sore on her leg that \"slid off. \"  Used the cream and covered. States both creams need a PA. Could only get 30 g of the mupirocin and 60 g of the clobetasol unless we complete PA. Can we start this?

## 2019-07-17 ENCOUNTER — PATIENT MESSAGE (OUTPATIENT)
Dept: FAMILY MEDICINE CLINIC | Age: 49
End: 2019-07-17

## 2019-07-17 ENCOUNTER — TELEPHONE (OUTPATIENT)
Dept: FAMILY MEDICINE CLINIC | Age: 49
End: 2019-07-17

## 2019-07-17 ENCOUNTER — OFFICE VISIT (OUTPATIENT)
Dept: FAMILY MEDICINE CLINIC | Age: 49
End: 2019-07-17
Payer: COMMERCIAL

## 2019-07-17 VITALS
OXYGEN SATURATION: 98 % | HEIGHT: 65 IN | BODY MASS INDEX: 24.36 KG/M2 | SYSTOLIC BLOOD PRESSURE: 122 MMHG | DIASTOLIC BLOOD PRESSURE: 80 MMHG | HEART RATE: 99 BPM | WEIGHT: 146.2 LBS

## 2019-07-17 DIAGNOSIS — L28.1 PRURIGO NODULARIS: ICD-10-CM

## 2019-07-17 DIAGNOSIS — L03.116 CELLULITIS OF LEFT LOWER EXTREMITY: ICD-10-CM

## 2019-07-17 DIAGNOSIS — L98.491 SKIN ULCERATION, LIMITED TO BREAKDOWN OF SKIN (HCC): Primary | ICD-10-CM

## 2019-07-17 PROCEDURE — 11105 PUNCH BX SKIN EA SEP/ADDL: CPT | Performed by: FAMILY MEDICINE

## 2019-07-17 PROCEDURE — 11104 PUNCH BX SKIN SINGLE LESION: CPT | Performed by: FAMILY MEDICINE

## 2019-07-17 RX ORDER — LEVOTHYROXINE SODIUM 0.07 MG/1
175 TABLET ORAL DAILY
Qty: 225 TABLET | Refills: 1 | Status: SHIPPED | OUTPATIENT
Start: 2019-07-17 | End: 2019-07-18

## 2019-07-17 RX ORDER — SULFAMETHOXAZOLE AND TRIMETHOPRIM 800; 160 MG/1; MG/1
1 TABLET ORAL 2 TIMES DAILY
Qty: 14 TABLET | Refills: 0 | Status: SHIPPED | OUTPATIENT
Start: 2019-07-17 | End: 2019-07-24

## 2019-07-17 RX ORDER — CEPHALEXIN 500 MG/1
500 CAPSULE ORAL 3 TIMES DAILY
Qty: 21 CAPSULE | Refills: 0 | Status: SHIPPED | OUTPATIENT
Start: 2019-07-17 | End: 2019-07-24

## 2019-07-17 RX ORDER — MUPIROCIN CALCIUM 20 MG/G
CREAM TOPICAL
Qty: 1 TUBE | Refills: 1 | Status: SHIPPED | OUTPATIENT
Start: 2019-07-17 | End: 2019-08-08 | Stop reason: SDUPTHER

## 2019-07-17 ASSESSMENT — ENCOUNTER SYMPTOMS
NAUSEA: 0
SHORTNESS OF BREATH: 0
COLOR CHANGE: 1

## 2019-07-17 NOTE — PROGRESS NOTES
 cephALEXin (KEFLEX) 500 MG capsule Take 1 capsule by mouth 3 times daily for 7 days 21 capsule 0    mupirocin (BACTROBAN) 2 % cream Apply 3 times daily. 1 Tube 1    clonazePAM (KLONOPIN) 0.5 MG tablet TAKE ONE TABLET BY MOUTH DAILY AS NEEDED FOR ANXIETY 30 tablet 0    clobetasol (TEMOVATE) 0.05 % cream Apply topically 2 times daily Apply topically 2 times daily. 4 Tube 5    Cholecalciferol (VITAMIN D3) 2000 units CAPS Take 1 capsule by mouth daily      venlafaxine (EFFEXOR XR) 150 MG extended release capsule Take 1 capsule by mouth daily 30 capsule 2    clobetasol (TEMOVATE) 0.05 % cream Apply topically 2 times daily Apply topically 2 times daily.  XARELTO 20 MG TABS tablet TAKE ONE TABLET BY MOUTH DAILY 30 tablet 5    fentaNYL (ACTIQ) 800 MCG lollipop Place 800 mcg inside cheek 3 times daily.  atorvastatin (LIPITOR) 40 MG tablet Take 1 tablet by mouth daily 30 tablet 5    Doxylamine Succinate, Sleep, (UNISOM PO) Take by mouth      carBAMazepine (TEGRETOL XR) 200 MG extended release tablet Take 400 mg by mouth every 8 hours       primidone (MYSOLINE) 250 MG tablet Take 250 mg by mouth 4 times daily       diphenhydrAMINE (BENADRYL) 25 MG tablet Take 25 mg by mouth as needed for Itching      Bismuth Subsalicylate (PEPTO-BISMOL PO) Take by mouth as needed      levothyroxine (LEVOXYL) 75 MCG tablet Take 2.5 tablets by mouth daily 225 tablet 1     No current facility-administered medications for this visit.         Immunization History   Administered Date(s) Administered    Tdap (Boostrix, Adacel) 04/04/2019       Allergies   Allergen Reactions    Dilaudid [Hydromorphone Hcl] Shortness Of Breath and Itching    Duloxetine Hcl Other (See Comments)     Personality changes and depression    Gabapentin     Gluten Meal      Celiac disease    Hydromorphone Itching and Other (See Comments)    Morphine Itching    Pregabalin      Lyrica (personality changes)     Propranolol Hives    Topiramate

## 2019-07-18 RX ORDER — LEVOTHYROXINE SODIUM 0.2 MG/1
200 TABLET ORAL DAILY
Qty: 90 TABLET | Refills: 1 | Status: SHIPPED | OUTPATIENT
Start: 2019-07-18 | End: 2019-11-26 | Stop reason: SDUPTHER

## 2019-07-18 NOTE — TELEPHONE ENCOUNTER
Called patient and informed her of the information. She states that her neck hurts today she cannot turn it. She called her pain Doctor and he stated she could take tradolol. He told her she could take that and it would help with the inflammation and the pain.

## 2019-07-18 NOTE — TELEPHONE ENCOUNTER
From: Daniel Olmstead  To: Stas Schroeder MD  Sent: 7/17/2019 10:08 PM EDT  Subject: Prescription Question    Hi, Dr. Elizabeth Robles-  On 4/5 here on MyChart you said you weren't going to change my Thyroid Med., however what [employer] has ready for p/up is slightly different. Just wanted to clarify that you want me to take 75mcg-2 & 1/2 tablets daily instead of one 200mcg tablet daily which I've been taking for many years? Thank you!   See you next week-  Cheo Loco

## 2019-07-19 ENCOUNTER — CLINICAL DOCUMENTATION (OUTPATIENT)
Dept: SPIRITUAL SERVICES | Age: 49
End: 2019-07-19

## 2019-07-19 LAB
GRAM STAIN RESULT: ABNORMAL
ORGANISM: ABNORMAL
WOUND/ABSCESS: ABNORMAL
WOUND/ABSCESS: ABNORMAL

## 2019-07-19 NOTE — FLOWSHEET NOTE
Attempted to follow up with Pt for Outpatient Spiritual Care support. Pt stated she was unable to talk, as she was waiting on phone call from her doctor, but appreciated following up with her and asked that we attempt to follow up again at a later time. Assured her that we would attempt to do so. [facility]       07/19/19 7037   Encounter Summary   Services provided to: Patient   Referral/Consult From: Rounding   Continue Visiting Yes  (7/19 follow up OP, unable to talk, call back)   Complexity of Encounter Low   Length of Encounter 15 minutes   Routine   Type Follow up   Assessment Calm; Approachable   Intervention Active listening   Outcome Expressed gratitude;Receptive

## 2019-07-23 ENCOUNTER — TELEPHONE (OUTPATIENT)
Dept: FAMILY MEDICINE CLINIC | Age: 49
End: 2019-07-23

## 2019-07-24 ENCOUNTER — OFFICE VISIT (OUTPATIENT)
Dept: ORTHOPEDIC SURGERY | Age: 49
End: 2019-07-24
Payer: COMMERCIAL

## 2019-07-24 VITALS — WEIGHT: 146.16 LBS | BODY MASS INDEX: 24.35 KG/M2 | HEIGHT: 65 IN

## 2019-07-24 DIAGNOSIS — M65.312 TRIGGER FINGER OF LEFT THUMB: Primary | ICD-10-CM

## 2019-07-24 PROCEDURE — G8420 CALC BMI NORM PARAMETERS: HCPCS | Performed by: ORTHOPAEDIC SURGERY

## 2019-07-24 PROCEDURE — 99213 OFFICE O/P EST LOW 20 MIN: CPT | Performed by: ORTHOPAEDIC SURGERY

## 2019-07-24 PROCEDURE — G8427 DOCREV CUR MEDS BY ELIG CLIN: HCPCS | Performed by: ORTHOPAEDIC SURGERY

## 2019-07-24 PROCEDURE — G8598 ASA/ANTIPLAT THER USED: HCPCS | Performed by: ORTHOPAEDIC SURGERY

## 2019-07-24 PROCEDURE — 1036F TOBACCO NON-USER: CPT | Performed by: ORTHOPAEDIC SURGERY

## 2019-07-24 PROCEDURE — 3017F COLORECTAL CA SCREEN DOC REV: CPT | Performed by: ORTHOPAEDIC SURGERY

## 2019-07-24 PROCEDURE — 20550 NJX 1 TENDON SHEATH/LIGAMENT: CPT | Performed by: ORTHOPAEDIC SURGERY

## 2019-07-24 NOTE — PROGRESS NOTES
appropriate. Lymph: The lymphatic examination bilaterally reveals all areas to be without enlargement or induration. Skin intact without lymphadenopathy, discoloration, or abnormal temperature. Vascular: There is intact, symmetric circulation in both upper extremities. Musculoskeletal       Cervical spine, shoulders, elbows, wrist:  satisfactory baseline range of motion, strength, and stability bilaterally        left thumb:                               moderate  tenderness at the A1 pulley                            moderate triggering with flexion and     extension. All other fingers/thumbs:   No tenderness at A1 pulley, no triggering with                                                   flexion/extension, and no locking    Neurological:  Sensation is subjectively normal in hands bilaterally    Radiology:     X-rays reviewed in office:  Views 3  Location left hand  Impression :  No fracture or dislocation. 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 I fully outlined the mechanics behind the triggering and locking and appropriate conservative and surgical options. All their questions were answered fully. At this point the patient is symptomatic, and injection was recommended. Patient was in agreement. The risks and benefits of cortisone injection were discussed thoroughly. Risks discussed included infection, adverse drug reaction, increased pain post-injection, skin de-pigmentation, fatty atrophy, and persistent clinical symptoms despite injection. Use of ethyl chloride spray during injection to decrease injection site pain was discussed. The injection was given after cleansing the skin with alcohol.   Left thumb trigger digit and flexor tendon sheath was injected with 1 cc of 1% lidocaine without epinephrine 1 cc of Celestone without difficulty. The patient tolerated the injection well and a Band-aid was placed. Follow-up if symptoms not resolved. She has a thumb brace at home, we discussed appropriate use. All questions and concerns were addressed today. Patient is in agreement with the plan. Adina Childress MD  Hand & Upper Extremity Surgery  [address] [employer]  A partner of [facility] ([facility])        Please note that this transcription was created using voice recognition software. Any errors are unintentional and may be due to voice recognition transcription.

## 2019-07-25 ENCOUNTER — OFFICE VISIT (OUTPATIENT)
Dept: FAMILY MEDICINE CLINIC | Age: 49
End: 2019-07-25
Payer: COMMERCIAL

## 2019-07-25 VITALS
HEIGHT: 65 IN | OXYGEN SATURATION: 97 % | HEART RATE: 105 BPM | BODY MASS INDEX: 24.79 KG/M2 | DIASTOLIC BLOOD PRESSURE: 98 MMHG | SYSTOLIC BLOOD PRESSURE: 130 MMHG | WEIGHT: 148.8 LBS

## 2019-07-25 DIAGNOSIS — L03.116 CELLULITIS OF LEFT LOWER EXTREMITY: Primary | ICD-10-CM

## 2019-07-25 PROCEDURE — G8598 ASA/ANTIPLAT THER USED: HCPCS | Performed by: FAMILY MEDICINE

## 2019-07-25 PROCEDURE — 99213 OFFICE O/P EST LOW 20 MIN: CPT | Performed by: FAMILY MEDICINE

## 2019-07-25 PROCEDURE — 3017F COLORECTAL CA SCREEN DOC REV: CPT | Performed by: FAMILY MEDICINE

## 2019-07-25 PROCEDURE — 1036F TOBACCO NON-USER: CPT | Performed by: FAMILY MEDICINE

## 2019-07-25 PROCEDURE — G8420 CALC BMI NORM PARAMETERS: HCPCS | Performed by: FAMILY MEDICINE

## 2019-07-25 PROCEDURE — G8427 DOCREV CUR MEDS BY ELIG CLIN: HCPCS | Performed by: FAMILY MEDICINE

## 2019-07-25 ASSESSMENT — ENCOUNTER SYMPTOMS
COLOR CHANGE: 1
NAUSEA: 0
SHORTNESS OF BREATH: 0

## 2019-07-25 NOTE — PROGRESS NOTES
7/25/2019    This is a 47 y.o. female who presents for  Chief Complaint   Patient presents with    Lesion(s)       HPI:     Still having drainage from her open wound on her left leg  Finished the course of abx  No new redness or pain  Has been cleaning BID consistently      Past Medical History:   Diagnosis Date    Anxiety and depression     Arthritis     Benign essential tremor     Cancer ([address] 2011    thyroid    Celiac disease     Degenerative disc disease     DVT (deep venous thrombosis) (HCC)     1989-leg    Fibromyalgia     GERD (gastroesophageal reflux disease)     Hx of blood clots     Irritable bowel disease     with diarrhea    Kidney stones     Migraines, neuralgic     since stroke 1999 Chronic    PONV (postoperative nausea and vomiting)     after colonoscopy    Poor venous access     Protein S deficiency ([address]     Pulmonary embolism ([address]     1989    Seizure disorder ([address]     grand mal in 2005 ( childhood febrile seizure also-from a baby to age 14) and also X 1 ~2005 with headache treatment    Thrombosis, superior sagittal sinus 1999    Thyroid disease 2011    goiter, cancer, nodule, hypo    Trigeminal neuralgia     Unspecified cerebral artery occlusion with cerebral infarction 1999    Vertebral artery occlusion     1999 with stroke    Vertebral artery occlusion 1999    White coat syndrome with high blood pressure but without hypertension        Past Surgical History:   Procedure Laterality Date    BREAST SURGERY      benign lump X 3    CYSTOSCOPY  1/9/12    cystoscopy, left ureteroscopy with holmium laser.  stone manipulation and left stent insertion    EYE SURGERY      lasik    FOOT SURGERY Left 07/10/2017    FRACTURE SURGERY  2000    Left foot pins then removal    LITHOTRIPSY  12/11    OTHER SURGICAL HISTORY      vocal cord nodule    THYROIDECTOMY  5-18-11 TOTAL THYROIDECTOMY    due to cancer       Social History     Socioeconomic History    Marital status: Single

## 2019-07-26 ENCOUNTER — CLINICAL DOCUMENTATION (OUTPATIENT)
Dept: SPIRITUAL SERVICES | Age: 49
End: 2019-07-26

## 2019-07-26 NOTE — FLOWSHEET NOTE
Attempted to follow up with Pt for Outpatient Spiritual Care support. No answer on phone. Will attempt to follow up at a later time.     [address] [facility]       07/26/19 8002   Encounter Summary   Services provided to: Patient not available   Referral/Consult From: Kai   Continue Visiting Yes  (7/26 attempted follow up OP, no answer)

## 2019-07-30 ENCOUNTER — TELEPHONE (OUTPATIENT)
Dept: FAMILY MEDICINE CLINIC | Age: 49
End: 2019-07-30

## 2019-07-31 ENCOUNTER — CLINICAL DOCUMENTATION (OUTPATIENT)
Dept: SPIRITUAL SERVICES | Age: 49
End: 2019-07-31

## 2019-07-31 NOTE — TELEPHONE ENCOUNTER
Pt. ci re confusion on left shoulder & leg Biopsy results on 7/17/19, she would like a call today as she is seeing another provider (Dr. Ulloa Mediate

## 2019-08-08 RX ORDER — MUPIROCIN CALCIUM 20 MG/G
CREAM TOPICAL
Qty: 90 G | Refills: 1 | Status: SHIPPED | OUTPATIENT
Start: 2019-08-08 | End: 2019-10-09 | Stop reason: SDUPTHER

## 2019-08-08 NOTE — TELEPHONE ENCOUNTER
Requested Prescriptions     Pending Prescriptions Disp Refills    mupirocin (BACTROBAN) 2 % cream [Pharmacy Med Name: MUPIROCIN 2% CREAM]  0     Sig: APPLY TO AFFECTED AREA(S) THREE TIMES A DAY     Last seen: 7-25-19  Last Filled: 7-17-19  Upcoming Appointment: No future appointment scheduled at this time.

## 2019-08-12 ENCOUNTER — PATIENT MESSAGE (OUTPATIENT)
Dept: FAMILY MEDICINE CLINIC | Age: 49
End: 2019-08-12

## 2019-08-12 DIAGNOSIS — L03.818 CELLULITIS OF OTHER SPECIFIED SITE: Primary | ICD-10-CM

## 2019-08-12 DIAGNOSIS — F41.9 ANXIETY: ICD-10-CM

## 2019-08-12 RX ORDER — CLONAZEPAM 0.5 MG/1
TABLET ORAL
Qty: 30 TABLET | Refills: 0 | Status: SHIPPED | OUTPATIENT
Start: 2019-08-12 | End: 2019-09-09 | Stop reason: SDUPTHER

## 2019-08-13 ENCOUNTER — CLINICAL DOCUMENTATION (OUTPATIENT)
Dept: SPIRITUAL SERVICES | Age: 49
End: 2019-08-13

## 2019-08-13 ENCOUNTER — TELEPHONE (OUTPATIENT)
Dept: FAMILY MEDICINE CLINIC | Age: 49
End: 2019-08-13

## 2019-08-14 ENCOUNTER — TELEPHONE (OUTPATIENT)
Dept: FAMILY MEDICINE CLINIC | Age: 49
End: 2019-08-14

## 2019-08-14 DIAGNOSIS — R20.8 PAIN OF SKIN: Primary | ICD-10-CM

## 2019-08-14 RX ORDER — KETOROLAC TROMETHAMINE 30 MG/ML
30 INJECTION, SOLUTION INTRAMUSCULAR; INTRAVENOUS EVERY 12 HOURS PRN
Qty: 6 VIAL | Refills: 1 | Status: SHIPPED | OUTPATIENT
Start: 2019-08-14 | End: 2019-09-06 | Stop reason: SDUPTHER

## 2019-08-14 RX ORDER — VENLAFAXINE HYDROCHLORIDE 37.5 MG/1
37.5 CAPSULE, EXTENDED RELEASE ORAL DAILY
Qty: 10 CAPSULE | Refills: 0 | Status: SHIPPED | OUTPATIENT
Start: 2019-08-14 | End: 2019-09-10 | Stop reason: DRUGHIGH

## 2019-08-14 RX ORDER — MINOCYCLINE HYDROCHLORIDE 100 MG/1
CAPSULE ORAL
Qty: 28 CAPSULE | Refills: 0 | Status: SHIPPED | OUTPATIENT
Start: 2019-08-14 | End: 2019-09-29 | Stop reason: ALTCHOICE

## 2019-08-14 RX ORDER — SYRINGE W-NEEDLE,DISPOSAB,3 ML 25GX5/8"
1 SYRINGE, EMPTY DISPOSABLE MISCELLANEOUS EVERY 12 HOURS PRN
Qty: 6 EACH | Refills: 1 | Status: SHIPPED | OUTPATIENT
Start: 2019-08-14 | End: 2019-09-06 | Stop reason: SDUPTHER

## 2019-08-14 NOTE — TELEPHONE ENCOUNTER
Pt is requesting a call back from [facility] only.      Pls call pt
another 1 ml on day 4, then on she would skip the 5 th day, then the 6th and 7th day she do another 1 ml. He gave her 1 refill at that time.

## 2019-08-19 ENCOUNTER — TELEPHONE (OUTPATIENT)
Dept: FAMILY MEDICINE CLINIC | Age: 49
End: 2019-08-19

## 2019-08-21 NOTE — TELEPHONE ENCOUNTER
Pt calls to discuss her care with practice mgr.   She is very happy with the care she is receiving from her PCP and PCP MA

## 2019-08-23 ENCOUNTER — TELEPHONE (OUTPATIENT)
Dept: FAMILY MEDICINE CLINIC | Age: 49
End: 2019-08-23

## 2019-08-23 DIAGNOSIS — R20.8 PAIN OF SKIN: ICD-10-CM

## 2019-08-23 NOTE — TELEPHONE ENCOUNTER
Urgent, pt. Appt. On Tuesday, Dr. DEJAH BLOOM [place] requested Derm notes:    I read thru all the notes on patient and her request.    Pt. Asks if a Medical Ass. From [facility] office will call [facility] [address] to get her biopsy results. She tried to get them to send to [facility], but they never answer her. Owen REQUSTED THESE results and said no more biopsies are needed. She has an appt on Tuesday with Peeewe Vallejo at the Derm's office and she wants [facility] to have told the Derm that she doesn't need to do another skin biopsy. So please call for results and please advise Derm not to take more skin biopsies.

## 2019-08-24 ENCOUNTER — CLINICAL DOCUMENTATION (OUTPATIENT)
Dept: SPIRITUAL SERVICES | Age: 49
End: 2019-08-24

## 2019-08-26 RX ORDER — SYRINGE WITH NEEDLE, 1 ML 25GX5/8"
SYRINGE, EMPTY DISPOSABLE MISCELLANEOUS
Refills: 0 | OUTPATIENT
Start: 2019-08-26

## 2019-08-26 RX ORDER — KETOROLAC TROMETHAMINE 30 MG/ML
INJECTION, SOLUTION INTRAMUSCULAR; INTRAVENOUS
Refills: 0 | OUTPATIENT
Start: 2019-08-26

## 2019-08-27 NOTE — TELEPHONE ENCOUNTER
Spoke with Gloriann Heimlich at [facility]. She states that patient was seen yesterday and no other biopsy were done. She will send over office notes and pathology report from her visits.

## 2019-08-29 DIAGNOSIS — D68.59 PROTEIN S DEFICIENCY (HCC): ICD-10-CM

## 2019-08-29 DIAGNOSIS — Z86.73 HISTORY OF STROKE: ICD-10-CM

## 2019-08-29 DIAGNOSIS — D68.59 HYPERCOAGULABLE STATE (HCC): ICD-10-CM

## 2019-08-29 RX ORDER — RIVAROXABAN 20 MG/1
TABLET, FILM COATED ORAL
Qty: 30 TABLET | Refills: 4 | OUTPATIENT
Start: 2019-08-29

## 2019-08-29 RX ORDER — ATORVASTATIN CALCIUM 40 MG/1
TABLET, FILM COATED ORAL
Qty: 30 TABLET | Refills: 4 | OUTPATIENT
Start: 2019-08-29

## 2019-09-03 DIAGNOSIS — R20.8 PAIN OF SKIN: ICD-10-CM

## 2019-09-05 ENCOUNTER — TELEPHONE (OUTPATIENT)
Dept: FAMILY MEDICINE CLINIC | Age: 49
End: 2019-09-05

## 2019-09-05 DIAGNOSIS — R20.8 PAIN OF SKIN: ICD-10-CM

## 2019-09-05 RX ORDER — CLOBETASOL PROPIONATE 0.5 MG/G
CREAM TOPICAL
Qty: 1 TUBE | Refills: 2 | Status: SHIPPED | OUTPATIENT
Start: 2019-09-05 | End: 2019-10-09 | Stop reason: SDUPTHER

## 2019-09-06 DIAGNOSIS — R20.8 PAIN OF SKIN: ICD-10-CM

## 2019-09-06 RX ORDER — KETOROLAC TROMETHAMINE 30 MG/ML
INJECTION, SOLUTION INTRAMUSCULAR; INTRAVENOUS
Refills: 0 | OUTPATIENT
Start: 2019-09-06

## 2019-09-06 RX ORDER — SYRINGE WITH NEEDLE, 1 ML 25GX5/8"
SYRINGE, EMPTY DISPOSABLE MISCELLANEOUS
Refills: 0 | OUTPATIENT
Start: 2019-09-06

## 2019-09-06 RX ORDER — SYRINGE W-NEEDLE,DISPOSAB,3 ML 25GX5/8"
1 SYRINGE, EMPTY DISPOSABLE MISCELLANEOUS EVERY 12 HOURS PRN
Qty: 6 EACH | Refills: 1 | Status: SHIPPED | OUTPATIENT
Start: 2019-09-06 | End: 2019-11-20 | Stop reason: SDUPTHER

## 2019-09-06 RX ORDER — KETOROLAC TROMETHAMINE 30 MG/ML
30 INJECTION, SOLUTION INTRAMUSCULAR; INTRAVENOUS EVERY 12 HOURS PRN
Qty: 6 VIAL | Refills: 1 | Status: SHIPPED | OUTPATIENT
Start: 2019-09-06 | End: 2019-10-08 | Stop reason: SDUPTHER

## 2019-09-06 NOTE — TELEPHONE ENCOUNTER
Pt picked up the refill on 08/16/2019 because she was back at the pharmacy picking up another RX. Still had some left from the  on 08/14/2019, but picked up the refill. She is now out of all the vials. Pt states she is in a lot of pain with these lesions. States it feels like she has a [place]. \"  States she cannot function very well for the pasts 2 days due to being in so much pain. Pt states its okay to leave a detailed message on her VM if she doesn't answer.

## 2019-09-08 ENCOUNTER — PATIENT MESSAGE (OUTPATIENT)
Dept: FAMILY MEDICINE CLINIC | Age: 49
End: 2019-09-08

## 2019-09-08 DIAGNOSIS — F41.9 ANXIETY: ICD-10-CM

## 2019-09-09 ENCOUNTER — TELEPHONE (OUTPATIENT)
Dept: FAMILY MEDICINE CLINIC | Age: 49
End: 2019-09-09

## 2019-09-09 DIAGNOSIS — Z86.73 HISTORY OF STROKE: ICD-10-CM

## 2019-09-09 DIAGNOSIS — D68.59 HYPERCOAGULABLE STATE (HCC): ICD-10-CM

## 2019-09-09 DIAGNOSIS — D68.59 PROTEIN S DEFICIENCY (HCC): ICD-10-CM

## 2019-09-09 RX ORDER — CLONAZEPAM 0.5 MG/1
TABLET ORAL
Qty: 30 TABLET | Refills: 0 | Status: SHIPPED | OUTPATIENT
Start: 2019-09-09 | End: 2019-10-08 | Stop reason: SDUPTHER

## 2019-09-09 NOTE — TELEPHONE ENCOUNTER
Last Seen: 7-25-19  Last Filled: 8-12-19  Last UDS: No UDS on File at this time  OARRS Run On: 8-12-19  Med Agreement Signed On: 2-25-19  Next Appointment: No future appointment scheduled at this time.

## 2019-09-09 NOTE — TELEPHONE ENCOUNTER
Pt would like to receive a call back from pcp to possibly start depression medication or switch. Please advise.

## 2019-09-11 ENCOUNTER — PATIENT MESSAGE (OUTPATIENT)
Dept: FAMILY MEDICINE CLINIC | Age: 49
End: 2019-09-11

## 2019-09-11 DIAGNOSIS — K58.0 IRRITABLE BOWEL SYNDROME WITH DIARRHEA: ICD-10-CM

## 2019-09-11 RX ORDER — RIVAROXABAN 20 MG/1
TABLET, FILM COATED ORAL
Qty: 90 TABLET | Refills: 3 | Status: SHIPPED | OUTPATIENT
Start: 2019-09-11 | End: 2020-04-06 | Stop reason: SDUPTHER

## 2019-09-11 RX ORDER — DIPHENOXYLATE HYDROCHLORIDE AND ATROPINE SULFATE 2.5; .025 MG/1; MG/1
1 TABLET ORAL 2 TIMES DAILY PRN
Qty: 180 TABLET | Refills: 0 | Status: SHIPPED | OUTPATIENT
Start: 2019-09-11 | End: 2019-12-13 | Stop reason: SDUPTHER

## 2019-09-12 ENCOUNTER — TELEPHONE (OUTPATIENT)
Dept: FAMILY MEDICINE CLINIC | Age: 49
End: 2019-09-12

## 2019-09-12 RX ORDER — SERTRALINE HYDROCHLORIDE 25 MG/1
25 TABLET, FILM COATED ORAL DAILY
Qty: 30 TABLET | Refills: 1 | Status: SHIPPED | OUTPATIENT
Start: 2019-09-12 | End: 2019-09-19 | Stop reason: SDUPTHER

## 2019-09-12 NOTE — TELEPHONE ENCOUNTER
[facility] from Corey Epperson CNP ( [facility]  Is asking permission to use either methotrexate or Cellcept for her skin hypersensitivity and Prurido.       [facility] [phone]

## 2019-09-12 NOTE — TELEPHONE ENCOUNTER
I am consolidating all notes into one to avoid confusion. Copied from another note: [facility] from Butch Rao CNP ([facility])  Is asking permission to use either methotrexate or Cellcept for her skin hypersensitivity and Prurido. [facility] [phone]    My response: If a provider wants to start a medication, that is up to their discretion, since they will be monitoring/prescribing it. I've had a conversation with Samantha Kelsea during a visit in the past and she stated she did not want to start MXT at that time. Xarelto was sent into the pharmacy as requested     We can restart Zoloft for her, since this was presumably tolerated in the past. I will start with a low dose of 25 mg per day. I would like to see her 1 mo after starting the medicine to assess response. I will send this to her pharmacy on file    Wound care is fine at this point. My hope was for Dermatology to see her and help first. Barbie Amass repeated biopsies and are requesting permission to start medications that Samantha Enamorado has previously told me she didnt want to start. I will follow their recs as well. The specialists wanted the labs. . did they order these? We just did a basic comprehensive panel in April. Did they want a repeat of these after 5 months? The exceptions were the Tegretol level, B12/folate, and hepatitis panel. I do not need to add anything at this time. If we can fit her in sooner, this would be helpful in coordinating care. Please let her know.  Thanks

## 2019-09-17 ENCOUNTER — TELEPHONE (OUTPATIENT)
Dept: ORTHOPEDIC SURGERY | Age: 49
End: 2019-09-17

## 2019-09-17 ENCOUNTER — CLINICAL DOCUMENTATION (OUTPATIENT)
Dept: SPIRITUAL SERVICES | Age: 49
End: 2019-09-17

## 2019-09-19 ENCOUNTER — OFFICE VISIT (OUTPATIENT)
Dept: FAMILY MEDICINE CLINIC | Age: 49
End: 2019-09-19
Payer: COMMERCIAL

## 2019-09-19 VITALS
WEIGHT: 145 LBS | SYSTOLIC BLOOD PRESSURE: 138 MMHG | DIASTOLIC BLOOD PRESSURE: 90 MMHG | OXYGEN SATURATION: 98 % | HEART RATE: 101 BPM | BODY MASS INDEX: 24.13 KG/M2

## 2019-09-19 DIAGNOSIS — F41.9 ANXIETY: ICD-10-CM

## 2019-09-19 DIAGNOSIS — G89.4 CHRONIC PAIN DISORDER: Primary | ICD-10-CM

## 2019-09-19 DIAGNOSIS — L28.1 PRURIGO NODULARIS: ICD-10-CM

## 2019-09-19 DIAGNOSIS — F51.04 PSYCHOPHYSIOLOGICAL INSOMNIA: ICD-10-CM

## 2019-09-19 DIAGNOSIS — Z23 NEED FOR INFLUENZA VACCINATION: ICD-10-CM

## 2019-09-19 DIAGNOSIS — L98.9 SKIN LESIONS: ICD-10-CM

## 2019-09-19 DIAGNOSIS — F33.1 MODERATE EPISODE OF RECURRENT MAJOR DEPRESSIVE DISORDER (HCC): ICD-10-CM

## 2019-09-19 PROCEDURE — G8420 CALC BMI NORM PARAMETERS: HCPCS | Performed by: FAMILY MEDICINE

## 2019-09-19 PROCEDURE — 90686 IIV4 VACC NO PRSV 0.5 ML IM: CPT | Performed by: FAMILY MEDICINE

## 2019-09-19 PROCEDURE — 99215 OFFICE O/P EST HI 40 MIN: CPT | Performed by: FAMILY MEDICINE

## 2019-09-19 PROCEDURE — 90471 IMMUNIZATION ADMIN: CPT | Performed by: FAMILY MEDICINE

## 2019-09-19 PROCEDURE — G8427 DOCREV CUR MEDS BY ELIG CLIN: HCPCS | Performed by: FAMILY MEDICINE

## 2019-09-19 PROCEDURE — 3017F COLORECTAL CA SCREEN DOC REV: CPT | Performed by: FAMILY MEDICINE

## 2019-09-19 PROCEDURE — G8598 ASA/ANTIPLAT THER USED: HCPCS | Performed by: FAMILY MEDICINE

## 2019-09-19 PROCEDURE — 1036F TOBACCO NON-USER: CPT | Performed by: FAMILY MEDICINE

## 2019-09-19 RX ORDER — HYDROXYZINE HYDROCHLORIDE 25 MG/1
25-50 TABLET, FILM COATED ORAL NIGHTLY
Qty: 60 TABLET | Refills: 0 | Status: SHIPPED | OUTPATIENT
Start: 2019-09-19 | End: 2019-10-17 | Stop reason: SDUPTHER

## 2019-09-19 RX ORDER — OXYCODONE HYDROCHLORIDE 10 MG/1
.5-1 TABLET ORAL
COMMUNITY
Start: 2019-09-09 | End: 2019-10-17 | Stop reason: ALTCHOICE

## 2019-09-19 ASSESSMENT — ENCOUNTER SYMPTOMS
COLOR CHANGE: 1
ABDOMINAL PAIN: 0
SHORTNESS OF BREATH: 0
CHEST TIGHTNESS: 0
NAUSEA: 1
VOMITING: 0
BACK PAIN: 0

## 2019-09-19 NOTE — Clinical Note
Good afternoon, My reyes patient is seeing you tomorrow! I wanted to forward today's note to let you know what we've attempted. I'm honestly out of ideas at this point. Any input would be greatly appreciated! Thanks, [employer]

## 2019-09-19 NOTE — PROGRESS NOTES
activity: Not on file   Other Topics Concern    Not on file   Social History Narrative    Not on file       Family History   Problem Relation Age of Onset    Cancer Mother         breast x2, ovarian x1, brain     High Cholesterol Mother     High Cholesterol Father     Other Father     Migraines Sister     Other Sister     Thyroid Disease Maternal Grandmother        Current Outpatient Medications   Medication Sig Dispense Refill    oxyCODONE HCl (OXY-IR) 10 MG immediate release tablet Take 0.5-1 tablets by mouth every 4-6 hours as needed.  hydrOXYzine (ATARAX) 25 MG tablet Take 1-2 tablets by mouth nightly 60 tablet 0    sertraline (ZOLOFT) 50 MG tablet Take 1 tablet by mouth daily 30 tablet 1    XARELTO 20 MG TABS tablet TAKE ONE TABLET BY MOUTH DAILY 90 tablet 3    diphenoxylate-atropine (LOMOTIL) 2.5-0.025 MG per tablet Take 1 tablet by mouth 2 times daily as needed for Diarrhea for up to 90 days. 180 tablet 0    clonazePAM (KLONOPIN) 0.5 MG tablet TAKE ONE TABLET BY MOUTH DAILY AS NEEDED FOR ANXIETY 30 tablet 0    ketorolac (TORADOL) 30 MG/ML injection Inject 1 mL into the muscle every 12 hours as needed for Pain 6 vial 1    Syringe/Needle, Disp, (SYRINGE 3CC/25GX1\") 25G X 1\" 3 ML MISC 1 Syringe by Does not apply route every 12 hours as needed (pain) 6 each 1    clobetasol (TEMOVATE) 0.05 % cream APPLY TO AFFECTED AREA(S) TWO TIMES A DAY 1 Tube 2    atorvastatin (LIPITOR) 40 MG tablet TAKE ONE TABLET BY MOUTH DAILY 90 tablet 1    minocycline (MINOCIN;DYNACIN) 100 MG capsule Take 200 mg (2 tabs on the first day), then take 1 tab (100 mg) twice daily for 13 days.  28 capsule 0    mupirocin (BACTROBAN) 2 % cream APPLY TO AFFECTED AREA(S) THREE TIMES A DAY 90 g 1    levothyroxine (SYNTHROID) 200 MCG tablet Take 1 tablet by mouth daily 90 tablet 1    diclofenac sodium 1 % GEL Apply 2 g topically 2 times daily      Cholecalciferol (VITAMIN D3) 2000 units CAPS Take 1 capsule by mouth daily for decreased concentration, dysphoric mood and sleep disturbance. Negative for agitation, behavioral problems, confusion, hallucinations, self-injury and suicidal ideas. The patient is nervous/anxious. The patient is not hyperactive. BP (!) 138/90 (Site: Left Upper Arm, Position: Sitting, Cuff Size: Medium Adult)   Pulse 101   Wt 145 lb (65.8 kg)   LMP 06/30/2015   SpO2 98%   BMI 24.13 kg/m²     Physical Exam   Constitutional: She is oriented to person, place, and time. She appears well-developed and well-nourished. No distress. HENT:   Head: Normocephalic and atraumatic. Eyes: Pupils are equal, round, and reactive to light. Conjunctivae and EOM are normal.   Neck: Normal range of motion. Neck supple. Cardiovascular: Normal rate and regular rhythm. Pulmonary/Chest: Effort normal. No respiratory distress. Musculoskeletal: Normal range of motion. Neurological: She is alert and oriented to person, place, and time. Skin: Skin is warm and dry. Capillary refill takes 2 to 3 seconds. Lesion noted. No rash noted. She is not diaphoretic. There is erythema. No pallor. Psychiatric: She has a normal mood and affect. Her speech is normal and behavior is normal. Judgment and thought content normal. She is not actively hallucinating. Cognition and memory are normal.   Tearful   She is attentive. Nursing note and vitals reviewed. Plan  1. Skin lesions  2. Prurigo nodularis  Prior to establishing care with me, Tello Nunn had seen multiple dermatologists without a specific answer for her evolving and poorly healing skin lesions. She's had two different visits for skin biopsies with me in the past. The first biopsies in 4/19 were taken from her right posterior shoulder and upper back just laterally right to midline. Biopsy results returned with likely prurigo nodularis.  A second visit with biopsies occurred later in 7/19, with biopsies from her posterior left shoulder and L lateral shin (which

## 2019-09-19 NOTE — PROGRESS NOTES
Vaccine Information Sheet, \"Influenza - Inactivated\"  given to Chilo Leslie, or parent/legal guardian of  Chilo Leslie and verbalized understanding. Patient responses:    Have you ever had a reaction to a flu vaccine? No  Are you able to eat eggs without adverse effects? Yes   Do you have any current illness? No  Have you ever had Guillian Richmond Syndrome? No    Flu vaccine given per order. Please see immunization tab.

## 2019-09-20 ENCOUNTER — HOSPITAL ENCOUNTER (OUTPATIENT)
Dept: WOUND CARE | Age: 49
Discharge: HOME | End: 2019-09-20
Payer: COMMERCIAL

## 2019-09-20 VITALS
BODY MASS INDEX: 25.16 KG/M2 | RESPIRATION RATE: 18 BRPM | WEIGHT: 151 LBS | DIASTOLIC BLOOD PRESSURE: 78 MMHG | HEART RATE: 86 BPM | SYSTOLIC BLOOD PRESSURE: 146 MMHG | HEIGHT: 65 IN | TEMPERATURE: 98.7 F

## 2019-09-20 DIAGNOSIS — L28.0 LICHEN SIMPLEX CHRONICUS: ICD-10-CM

## 2019-09-20 DIAGNOSIS — L73.8 BACTERIAL FOLLICULITIS: ICD-10-CM

## 2019-09-20 DIAGNOSIS — L91.0 HYPERTROPHIC SCAR: ICD-10-CM

## 2019-09-20 PROCEDURE — 99213 OFFICE O/P EST LOW 20 MIN: CPT

## 2019-09-20 PROCEDURE — 99244 OFF/OP CNSLTJ NEW/EST MOD 40: CPT | Performed by: INTERNAL MEDICINE

## 2019-09-20 NOTE — PLAN OF CARE
Patient here for initial wound care visit. She does not currently have any open wounds. She has another MD who has been seeing her for her skin concerns. Reference photos taken (in media tab). She has an appointment with Psychologist I near future to discuss stress, anxiety, insomnia issues. She began Klonopin and Melatonin yesterday. She will begin the use of Hibiclens daily, use skin moisturizer daily, and apply silicone sheet to hypertrophic scar of right upper arm.  F/u x 3 weeks, MD orders/D/C instructions reviewed with patient, all questions answered; copy of instructions given to patient

## 2019-09-24 ENCOUNTER — TELEPHONE (OUTPATIENT)
Dept: FAMILY MEDICINE CLINIC | Age: 49
End: 2019-09-24

## 2019-09-27 ENCOUNTER — OFFICE VISIT (OUTPATIENT)
Dept: ORTHOPEDIC SURGERY | Age: 49
End: 2019-09-27
Payer: COMMERCIAL

## 2019-09-27 ENCOUNTER — TELEPHONE (OUTPATIENT)
Dept: WOUND CARE | Age: 49
End: 2019-09-27

## 2019-09-27 VITALS — WEIGHT: 151.01 LBS | HEIGHT: 65 IN | BODY MASS INDEX: 25.16 KG/M2

## 2019-09-27 DIAGNOSIS — M65.312 TRIGGER FINGER OF LEFT THUMB: ICD-10-CM

## 2019-09-27 DIAGNOSIS — M79.645 PAIN OF FINGER OF LEFT HAND: Primary | ICD-10-CM

## 2019-09-27 PROCEDURE — 20550 NJX 1 TENDON SHEATH/LIGAMENT: CPT | Performed by: ORTHOPAEDIC SURGERY

## 2019-09-27 PROCEDURE — 1036F TOBACCO NON-USER: CPT | Performed by: ORTHOPAEDIC SURGERY

## 2019-09-27 PROCEDURE — G8427 DOCREV CUR MEDS BY ELIG CLIN: HCPCS | Performed by: ORTHOPAEDIC SURGERY

## 2019-09-27 PROCEDURE — G8598 ASA/ANTIPLAT THER USED: HCPCS | Performed by: ORTHOPAEDIC SURGERY

## 2019-09-27 PROCEDURE — 3017F COLORECTAL CA SCREEN DOC REV: CPT | Performed by: ORTHOPAEDIC SURGERY

## 2019-09-27 PROCEDURE — 99213 OFFICE O/P EST LOW 20 MIN: CPT | Performed by: ORTHOPAEDIC SURGERY

## 2019-09-27 PROCEDURE — G8417 CALC BMI ABV UP PARAM F/U: HCPCS | Performed by: ORTHOPAEDIC SURGERY

## 2019-09-27 RX ORDER — BETAMETHASONE SODIUM PHOSPHATE AND BETAMETHASONE ACETATE 3; 3 MG/ML; MG/ML
12 INJECTION, SUSPENSION INTRA-ARTICULAR; INTRALESIONAL; INTRAMUSCULAR; SOFT TISSUE ONCE
Status: COMPLETED | OUTPATIENT
Start: 2019-09-27 | End: 2019-09-27

## 2019-09-27 RX ADMIN — BETAMETHASONE SODIUM PHOSPHATE AND BETAMETHASONE ACETATE 12 MG: 3; 3 INJECTION, SUSPENSION INTRA-ARTICULAR; INTRALESIONAL; INTRAMUSCULAR; SOFT TISSUE at 08:52

## 2019-09-29 PROBLEM — I63.9 STROKE (HCC): Status: RESOLVED | Noted: 2019-02-25 | Resolved: 2019-09-29

## 2019-09-29 PROBLEM — L28.0 LICHEN SIMPLEX CHRONICUS: Status: ACTIVE | Noted: 2019-09-29

## 2019-09-29 PROBLEM — L73.8 BACTERIAL FOLLICULITIS: Status: ACTIVE | Noted: 2019-09-29

## 2019-09-29 PROBLEM — R19.7 DIARRHEA: Status: RESOLVED | Noted: 2019-02-25 | Resolved: 2019-09-29

## 2019-09-29 PROBLEM — G08 THROMBOSIS OF SUPERIOR SAGITTAL SINUS: Status: RESOLVED | Noted: 2019-09-29 | Resolved: 2019-09-29

## 2019-09-29 PROBLEM — G50.0 LEFT-SIDED TRIGEMINAL NEURALGIA: Status: RESOLVED | Noted: 2019-09-29 | Resolved: 2019-09-29

## 2019-09-29 PROBLEM — I26.99 PULMONARY EMBOLISM (HCC): Status: RESOLVED | Noted: 2019-02-25 | Resolved: 2019-09-29

## 2019-09-29 PROBLEM — G08 THROMBOSIS OF SUPERIOR SAGITTAL SINUS: Status: ACTIVE | Noted: 2019-09-29

## 2019-09-29 PROBLEM — I82.409 DVT (DEEP VENOUS THROMBOSIS) (HCC): Status: RESOLVED | Noted: 2019-02-25 | Resolved: 2019-09-29

## 2019-09-29 PROBLEM — K90.41 GLUTEN INTOLERANCE: Status: RESOLVED | Noted: 2019-05-06 | Resolved: 2019-09-29

## 2019-09-29 PROBLEM — K52.9 CHRONIC DIARRHEA: Status: RESOLVED | Noted: 2019-05-06 | Resolved: 2019-09-29

## 2019-09-29 PROBLEM — Z86.010 HISTORY OF COLON POLYPS: Status: RESOLVED | Noted: 2019-05-06 | Resolved: 2019-09-29

## 2019-09-29 PROBLEM — L91.0 HYPERTROPHIC SCAR: Status: ACTIVE | Noted: 2019-09-29

## 2019-09-29 PROBLEM — G50.0 LEFT-SIDED TRIGEMINAL NEURALGIA: Status: ACTIVE | Noted: 2019-09-29

## 2019-09-29 PROBLEM — Z86.0100 HISTORY OF COLON POLYPS: Status: RESOLVED | Noted: 2019-05-06 | Resolved: 2019-09-29

## 2019-09-29 PROBLEM — R51.9 HEADACHE: Status: RESOLVED | Noted: 2019-02-25 | Resolved: 2019-09-29

## 2019-09-29 NOTE — CONSULTS
residual intra-epidermal pustule. - Both lower extremities with a number of pinpoint lesions, slightly red, flat to slightly raised, not really actively inflamed, not draining, appearance seems most consistent with a very mild, chronic folliculitis; she does have just a few small lesions that looks excoriated there, but nothing too dramatic.   - Upper extremities, shoulders and upper back and posterior neck with several somewhat linear hypertrophic scars; no real keloid formation; no open lesions in these areas, and no associated inflammation; interestingly, her surgical scars healed very nicely, flat, barely noticeable (thyroid and left foot). ______________________________    Lab Results   Component Value Date    LABALBU 4.5 09/19/2019     Lab Results   Component Value Date    CREATININE 0.6 09/19/2019     Lab Results   Component Value Date    HGB 13.0 09/19/2019     Lab Results   Component Value Date    LABA1C 4.7 04/04/2019     Routine labs (CBC, CMP, etc) are all essentially normal.    July left leg WCx only with a coag-negative Staph. April biopsy of back lesion and July biopsy of shoulder lesion both with features most c/w prurigo nodularis and lichen simplex chronicus.     Assessment:     Patient Active Problem List   Diagnosis Code    Hereditary protein S deficiency ([address] D68.59    Chronic headache disorder R51    Arthritis of midfoot M19.079    Calculus of kidney N20.0    CD (celiac disease) K90.0    Chronic pain disorder G89.4    Epilepsy ([facility] [address] G40.909    Gastroesophageal reflux disease with esophagitis K21.0    Hypothyroid E03.9    Lipoma of left shoulder D17.22    Mixed hyperlipidemia E78.2    Post-surgical hypothyroidism E89.0    Prurigo nodularis L28.1    Irritable bowel syndrome with diarrhea K58.0    Weight loss R63.4    Moderate episode of recurrent major depressive disorder (HCC) F33.1    Anxiety F41.9    Bacterial folliculitis Z44.0    Hypertrophic scar K24.3    Lichen

## 2019-10-07 ENCOUNTER — TELEPHONE (OUTPATIENT)
Dept: FAMILY MEDICINE CLINIC | Age: 49
End: 2019-10-07

## 2019-10-07 ENCOUNTER — PATIENT MESSAGE (OUTPATIENT)
Dept: FAMILY MEDICINE CLINIC | Age: 49
End: 2019-10-07

## 2019-10-07 DIAGNOSIS — F41.9 ANXIETY: Primary | ICD-10-CM

## 2019-10-08 ENCOUNTER — CLINICAL DOCUMENTATION (OUTPATIENT)
Dept: SPIRITUAL SERVICES | Age: 49
End: 2019-10-08

## 2019-10-08 DIAGNOSIS — R20.8 PAIN OF SKIN: ICD-10-CM

## 2019-10-08 DIAGNOSIS — F41.9 ANXIETY: ICD-10-CM

## 2019-10-09 DIAGNOSIS — F41.9 ANXIETY: ICD-10-CM

## 2019-10-09 RX ORDER — CLONAZEPAM 0.5 MG/1
TABLET ORAL
Qty: 30 TABLET | Refills: 0 | Status: SHIPPED | OUTPATIENT
Start: 2019-10-09 | End: 2019-10-11 | Stop reason: SDUPTHER

## 2019-10-09 RX ORDER — KETOROLAC TROMETHAMINE 30 MG/ML
30 INJECTION, SOLUTION INTRAMUSCULAR; INTRAVENOUS EVERY 12 HOURS PRN
Qty: 6 VIAL | Refills: 1 | Status: SHIPPED | OUTPATIENT
Start: 2019-10-09 | End: 2019-10-21 | Stop reason: SDUPTHER

## 2019-10-10 RX ORDER — CLOBETASOL PROPIONATE 0.5 MG/G
CREAM TOPICAL
Qty: 30 G | Refills: 2 | Status: SHIPPED | OUTPATIENT
Start: 2019-10-10 | End: 2019-11-30 | Stop reason: SDUPTHER

## 2019-10-10 RX ORDER — MUPIROCIN CALCIUM 20 MG/G
CREAM TOPICAL
Qty: 30 G | Refills: 1 | Status: SHIPPED | OUTPATIENT
Start: 2019-10-10 | End: 2019-11-11 | Stop reason: SDUPTHER

## 2019-10-11 RX ORDER — CLONAZEPAM 0.5 MG/1
0.5 TABLET ORAL DAILY PRN
Qty: 15 TABLET | Refills: 0 | Status: SHIPPED | OUTPATIENT
Start: 2019-10-11 | End: 2019-10-17 | Stop reason: DRUGHIGH

## 2019-10-11 RX ORDER — CLONAZEPAM 0.5 MG/1
TABLET ORAL
Qty: 45 TABLET | Refills: 0 | Status: SHIPPED | OUTPATIENT
Start: 2019-10-11 | End: 2019-11-11 | Stop reason: SDUPTHER

## 2019-10-17 ENCOUNTER — OFFICE VISIT (OUTPATIENT)
Dept: FAMILY MEDICINE CLINIC | Age: 49
End: 2019-10-17
Payer: COMMERCIAL

## 2019-10-17 VITALS
BODY MASS INDEX: 25.63 KG/M2 | OXYGEN SATURATION: 98 % | HEART RATE: 98 BPM | WEIGHT: 154 LBS | SYSTOLIC BLOOD PRESSURE: 150 MMHG | DIASTOLIC BLOOD PRESSURE: 90 MMHG

## 2019-10-17 DIAGNOSIS — Z86.718 HISTORY OF DVT (DEEP VEIN THROMBOSIS): ICD-10-CM

## 2019-10-17 DIAGNOSIS — M65.312 TRIGGER FINGER OF LEFT THUMB: ICD-10-CM

## 2019-10-17 DIAGNOSIS — Z87.898 HISTORY OF SEIZURES: ICD-10-CM

## 2019-10-17 DIAGNOSIS — E89.0 POST-SURGICAL HYPOTHYROIDISM: ICD-10-CM

## 2019-10-17 DIAGNOSIS — F51.04 PSYCHOPHYSIOLOGICAL INSOMNIA: ICD-10-CM

## 2019-10-17 DIAGNOSIS — G08 THROMBOSIS OF SUPERIOR SAGITTAL SINUS: ICD-10-CM

## 2019-10-17 DIAGNOSIS — D68.59 HEREDITARY PROTEIN S DEFICIENCY (HCC): Chronic | ICD-10-CM

## 2019-10-17 DIAGNOSIS — G89.4 CHRONIC PAIN DISORDER: ICD-10-CM

## 2019-10-17 DIAGNOSIS — F33.1 MODERATE EPISODE OF RECURRENT MAJOR DEPRESSIVE DISORDER (HCC): ICD-10-CM

## 2019-10-17 DIAGNOSIS — Z86.73 H/O RECURRENT TRANSIENT ISCHEMIC ATTACKS: ICD-10-CM

## 2019-10-17 DIAGNOSIS — F41.9 ANXIETY: ICD-10-CM

## 2019-10-17 DIAGNOSIS — Z01.818 PREOP EXAMINATION: Primary | ICD-10-CM

## 2019-10-17 DIAGNOSIS — Z86.711 HISTORY OF PULMONARY EMBOLUS (PE): ICD-10-CM

## 2019-10-17 DIAGNOSIS — Z86.73 HISTORY OF CVA (CEREBROVASCULAR ACCIDENT): ICD-10-CM

## 2019-10-17 DIAGNOSIS — Z86.61 H/O VIRAL MENINGITIS: ICD-10-CM

## 2019-10-17 DIAGNOSIS — I65.02 STENOSIS OF LEFT VERTEBRAL ARTERY: ICD-10-CM

## 2019-10-17 PROBLEM — E03.9 HYPOTHYROID: Status: RESOLVED | Noted: 2019-02-25 | Resolved: 2019-10-17

## 2019-10-17 PROCEDURE — G8427 DOCREV CUR MEDS BY ELIG CLIN: HCPCS | Performed by: FAMILY MEDICINE

## 2019-10-17 PROCEDURE — G8417 CALC BMI ABV UP PARAM F/U: HCPCS | Performed by: FAMILY MEDICINE

## 2019-10-17 PROCEDURE — 99243 OFF/OP CNSLTJ NEW/EST LOW 30: CPT | Performed by: FAMILY MEDICINE

## 2019-10-17 PROCEDURE — G8482 FLU IMMUNIZE ORDER/ADMIN: HCPCS | Performed by: FAMILY MEDICINE

## 2019-10-17 RX ORDER — ONDANSETRON 4 MG/1
4 TABLET, FILM COATED ORAL EVERY 8 HOURS PRN
Qty: 30 TABLET | Refills: 1 | Status: SHIPPED | OUTPATIENT
Start: 2019-10-17 | End: 2019-10-18

## 2019-10-17 RX ORDER — NALOXONE HYDROCHLORIDE 4 MG/.1ML
1 SPRAY NASAL PRN
COMMUNITY

## 2019-10-17 RX ORDER — HYDROXYZINE HYDROCHLORIDE 25 MG/1
25-50 TABLET, FILM COATED ORAL NIGHTLY
Qty: 60 TABLET | Refills: 0 | Status: SHIPPED | OUTPATIENT
Start: 2019-10-17 | End: 2019-11-16

## 2019-10-18 ENCOUNTER — TELEPHONE (OUTPATIENT)
Dept: FAMILY MEDICINE CLINIC | Age: 49
End: 2019-10-18

## 2019-10-18 ENCOUNTER — ANESTHESIA EVENT (OUTPATIENT)
Dept: OPERATING ROOM | Age: 49
End: 2019-10-18
Payer: COMMERCIAL

## 2019-10-18 DIAGNOSIS — R20.8 PAIN OF SKIN: ICD-10-CM

## 2019-10-18 RX ORDER — ONDANSETRON 4 MG/1
4 TABLET, FILM COATED ORAL 3 TIMES DAILY PRN
Qty: 45 TABLET | Refills: 0 | Status: SHIPPED | OUTPATIENT
Start: 2019-10-18 | End: 2019-11-02

## 2019-10-18 RX ORDER — PROMETHAZINE HYDROCHLORIDE 12.5 MG/1
12.5 TABLET ORAL 3 TIMES DAILY PRN
Qty: 30 TABLET | Refills: 0 | Status: SHIPPED | OUTPATIENT
Start: 2019-10-18 | End: 2019-10-28

## 2019-10-21 RX ORDER — KETOROLAC TROMETHAMINE 30 MG/ML
30 INJECTION, SOLUTION INTRAMUSCULAR; INTRAVENOUS EVERY 12 HOURS PRN
Qty: 6 VIAL | Refills: 1 | Status: SHIPPED | OUTPATIENT
Start: 2019-10-21 | End: 2019-11-07 | Stop reason: SDUPTHER

## 2019-10-22 ENCOUNTER — TELEPHONE (OUTPATIENT)
Dept: ORTHOPEDIC SURGERY | Age: 49
End: 2019-10-22

## 2019-10-24 ENCOUNTER — OFFICE VISIT (OUTPATIENT)
Dept: PSYCHOLOGY | Age: 49
End: 2019-10-24
Payer: COMMERCIAL

## 2019-10-24 DIAGNOSIS — F41.9 ANXIETY DISORDER, UNSPECIFIED TYPE: ICD-10-CM

## 2019-10-24 DIAGNOSIS — F33.2 SEVERE EPISODE OF RECURRENT MAJOR DEPRESSIVE DISORDER, WITHOUT PSYCHOTIC FEATURES (HCC): Primary | ICD-10-CM

## 2019-10-24 PROCEDURE — 90791 PSYCH DIAGNOSTIC EVALUATION: CPT | Performed by: PSYCHOLOGIST

## 2019-10-24 ASSESSMENT — PATIENT HEALTH QUESTIONNAIRE - PHQ9
7. TROUBLE CONCENTRATING ON THINGS, SUCH AS READING THE NEWSPAPER OR WATCHING TELEVISION: 2
SUM OF ALL RESPONSES TO PHQ9 QUESTIONS 1 & 2: 6
2. FEELING DOWN, DEPRESSED OR HOPELESS: 3
SUM OF ALL RESPONSES TO PHQ QUESTIONS 1-9: 21
SUM OF ALL RESPONSES TO PHQ QUESTIONS 1-9: 21
6. FEELING BAD ABOUT YOURSELF - OR THAT YOU ARE A FAILURE OR HAVE LET YOURSELF OR YOUR FAMILY DOWN: 3
5. POOR APPETITE OR OVEREATING: 3
3. TROUBLE FALLING OR STAYING ASLEEP: 3
10. IF YOU CHECKED OFF ANY PROBLEMS, HOW DIFFICULT HAVE THESE PROBLEMS MADE IT FOR YOU TO DO YOUR WORK, TAKE CARE OF THINGS AT HOME, OR GET ALONG WITH OTHER PEOPLE: 3
9. THOUGHTS THAT YOU WOULD BE BETTER OFF DEAD, OR OF HURTING YOURSELF: 0
8. MOVING OR SPEAKING SO SLOWLY THAT OTHER PEOPLE COULD HAVE NOTICED. OR THE OPPOSITE, BEING SO FIGETY OR RESTLESS THAT YOU HAVE BEEN MOVING AROUND A LOT MORE THAN USUAL: 1
4. FEELING TIRED OR HAVING LITTLE ENERGY: 3
1. LITTLE INTEREST OR PLEASURE IN DOING THINGS: 3

## 2019-10-24 ASSESSMENT — ANXIETY QUESTIONNAIRES
6. BECOMING EASILY ANNOYED OR IRRITABLE: 1-SEVERAL DAYS
GAD7 TOTAL SCORE: 12
5. BEING SO RESTLESS THAT IT IS HARD TO SIT STILL: 1-SEVERAL DAYS
2. NOT BEING ABLE TO STOP OR CONTROL WORRYING: 2-OVER HALF THE DAYS
7. FEELING AFRAID AS IF SOMETHING AWFUL MIGHT HAPPEN: 1-SEVERAL DAYS
4. TROUBLE RELAXING: 3-NEARLY EVERY DAY
3. WORRYING TOO MUCH ABOUT DIFFERENT THINGS: 1-SEVERAL DAYS
1. FEELING NERVOUS, ANXIOUS, OR ON EDGE: 3-NEARLY EVERY DAY

## 2019-10-25 ENCOUNTER — CLINICAL DOCUMENTATION (OUTPATIENT)
Dept: SPIRITUAL SERVICES | Age: 49
End: 2019-10-25

## 2019-10-31 ENCOUNTER — ANESTHESIA (OUTPATIENT)
Dept: OPERATING ROOM | Age: 49
End: 2019-10-31
Payer: COMMERCIAL

## 2019-10-31 ENCOUNTER — HOSPITAL ENCOUNTER (OUTPATIENT)
Age: 49
Setting detail: OUTPATIENT SURGERY
Discharge: HOME | End: 2019-10-31
Attending: ORTHOPAEDIC SURGERY | Admitting: ORTHOPAEDIC SURGERY
Payer: COMMERCIAL

## 2019-10-31 VITALS
OXYGEN SATURATION: 98 % | TEMPERATURE: 97.6 F | SYSTOLIC BLOOD PRESSURE: 168 MMHG | WEIGHT: 154 LBS | DIASTOLIC BLOOD PRESSURE: 98 MMHG | HEIGHT: 66 IN | BODY MASS INDEX: 24.75 KG/M2 | RESPIRATION RATE: 16 BRPM | HEART RATE: 78 BPM

## 2019-10-31 VITALS
SYSTOLIC BLOOD PRESSURE: 107 MMHG | DIASTOLIC BLOOD PRESSURE: 66 MMHG | RESPIRATION RATE: 1 BRPM | OXYGEN SATURATION: 98 %

## 2019-10-31 PROCEDURE — 3700000000 HC ANESTHESIA ATTENDED CARE: Performed by: ORTHOPAEDIC SURGERY

## 2019-10-31 PROCEDURE — 2500000003 HC RX 250 WO HCPCS: Performed by: ANESTHESIOLOGY

## 2019-10-31 PROCEDURE — 2500000003 HC RX 250 WO HCPCS: Performed by: NURSE ANESTHETIST, CERTIFIED REGISTERED

## 2019-10-31 PROCEDURE — 6360000002 HC RX W HCPCS: Performed by: NURSE ANESTHETIST, CERTIFIED REGISTERED

## 2019-10-31 PROCEDURE — 7100000010 HC PHASE II RECOVERY - FIRST 15 MIN: Performed by: ORTHOPAEDIC SURGERY

## 2019-10-31 PROCEDURE — 3700000001 HC ADD 15 MINUTES (ANESTHESIA): Performed by: ORTHOPAEDIC SURGERY

## 2019-10-31 PROCEDURE — 2580000003 HC RX 258: Performed by: ANESTHESIOLOGY

## 2019-10-31 PROCEDURE — 6370000000 HC RX 637 (ALT 250 FOR IP): Performed by: ANESTHESIOLOGY

## 2019-10-31 PROCEDURE — 3600000012 HC SURGERY LEVEL 2 ADDTL 15MIN: Performed by: ORTHOPAEDIC SURGERY

## 2019-10-31 PROCEDURE — 6360000002 HC RX W HCPCS: Performed by: ANESTHESIOLOGY

## 2019-10-31 PROCEDURE — 7100000011 HC PHASE II RECOVERY - ADDTL 15 MIN: Performed by: ORTHOPAEDIC SURGERY

## 2019-10-31 PROCEDURE — 2709999900 HC NON-CHARGEABLE SUPPLY: Performed by: ORTHOPAEDIC SURGERY

## 2019-10-31 PROCEDURE — 6360000002 HC RX W HCPCS: Performed by: ORTHOPAEDIC SURGERY

## 2019-10-31 PROCEDURE — 3600000002 HC SURGERY LEVEL 2 BASE: Performed by: ORTHOPAEDIC SURGERY

## 2019-10-31 PROCEDURE — 2500000003 HC RX 250 WO HCPCS: Performed by: ORTHOPAEDIC SURGERY

## 2019-10-31 RX ORDER — MIDAZOLAM HYDROCHLORIDE 1 MG/ML
1 INJECTION INTRAMUSCULAR; INTRAVENOUS ONCE
Status: COMPLETED | OUTPATIENT
Start: 2019-10-31 | End: 2019-10-31

## 2019-10-31 RX ORDER — DIPHENHYDRAMINE HYDROCHLORIDE 50 MG/ML
6.25 INJECTION INTRAMUSCULAR; INTRAVENOUS
Status: DISCONTINUED | OUTPATIENT
Start: 2019-10-31 | End: 2019-10-31 | Stop reason: HOSPADM

## 2019-10-31 RX ORDER — PROPOFOL 10 MG/ML
INJECTION, EMULSION INTRAVENOUS PRN
Status: DISCONTINUED | OUTPATIENT
Start: 2019-10-31 | End: 2019-10-31 | Stop reason: SDUPTHER

## 2019-10-31 RX ORDER — OXYCODONE HYDROCHLORIDE AND ACETAMINOPHEN 5; 325 MG/1; MG/1
2 TABLET ORAL PRN
Status: COMPLETED | OUTPATIENT
Start: 2019-10-31 | End: 2019-10-31

## 2019-10-31 RX ORDER — IBUPROFEN 800 MG/1
800 TABLET ORAL EVERY 8 HOURS PRN
Qty: 9 TABLET | Refills: 1 | Status: SHIPPED | OUTPATIENT
Start: 2019-10-31 | End: 2020-01-08 | Stop reason: ALTCHOICE

## 2019-10-31 RX ORDER — HYDRALAZINE HYDROCHLORIDE 20 MG/ML
5 INJECTION INTRAMUSCULAR; INTRAVENOUS EVERY 30 MIN PRN
Status: DISCONTINUED | OUTPATIENT
Start: 2019-10-31 | End: 2019-10-31 | Stop reason: HOSPADM

## 2019-10-31 RX ORDER — LIDOCAINE HYDROCHLORIDE 20 MG/ML
INJECTION, SOLUTION INFILTRATION; PERINEURAL PRN
Status: DISCONTINUED | OUTPATIENT
Start: 2019-10-31 | End: 2019-10-31 | Stop reason: SDUPTHER

## 2019-10-31 RX ORDER — OXYCODONE HYDROCHLORIDE AND ACETAMINOPHEN 5; 325 MG/1; MG/1
1 TABLET ORAL PRN
Status: COMPLETED | OUTPATIENT
Start: 2019-10-31 | End: 2019-10-31

## 2019-10-31 RX ORDER — SODIUM CHLORIDE 0.9 % (FLUSH) 0.9 %
10 SYRINGE (ML) INJECTION EVERY 12 HOURS SCHEDULED
Status: DISCONTINUED | OUTPATIENT
Start: 2019-10-31 | End: 2019-10-31 | Stop reason: HOSPADM

## 2019-10-31 RX ORDER — SODIUM CHLORIDE, SODIUM LACTATE, POTASSIUM CHLORIDE, CALCIUM CHLORIDE 600; 310; 30; 20 MG/100ML; MG/100ML; MG/100ML; MG/100ML
INJECTION, SOLUTION INTRAVENOUS CONTINUOUS
Status: DISCONTINUED | OUTPATIENT
Start: 2019-10-31 | End: 2019-10-31 | Stop reason: HOSPADM

## 2019-10-31 RX ORDER — ONDANSETRON 2 MG/ML
4 INJECTION INTRAMUSCULAR; INTRAVENOUS EVERY 30 MIN PRN
Status: DISCONTINUED | OUTPATIENT
Start: 2019-10-31 | End: 2019-10-31 | Stop reason: HOSPADM

## 2019-10-31 RX ORDER — LABETALOL HYDROCHLORIDE 5 MG/ML
5 INJECTION, SOLUTION INTRAVENOUS
Status: DISCONTINUED | OUTPATIENT
Start: 2019-10-31 | End: 2019-10-31 | Stop reason: HOSPADM

## 2019-10-31 RX ORDER — SODIUM CHLORIDE 0.9 % (FLUSH) 0.9 %
10 SYRINGE (ML) INJECTION PRN
Status: DISCONTINUED | OUTPATIENT
Start: 2019-10-31 | End: 2019-10-31 | Stop reason: HOSPADM

## 2019-10-31 RX ADMIN — MIDAZOLAM 1 MG: 1 INJECTION INTRAMUSCULAR; INTRAVENOUS at 10:12

## 2019-10-31 RX ADMIN — LIDOCAINE HYDROCHLORIDE 100 MG: 20 INJECTION, SOLUTION INFILTRATION; PERINEURAL at 11:15

## 2019-10-31 RX ADMIN — PROPOFOL 100 MG: 10 INJECTION, EMULSION INTRAVENOUS at 11:15

## 2019-10-31 RX ADMIN — Medication 2 G: at 11:15

## 2019-10-31 RX ADMIN — MIDAZOLAM 1 MG: 1 INJECTION INTRAMUSCULAR; INTRAVENOUS at 10:06

## 2019-10-31 RX ADMIN — SODIUM CHLORIDE, POTASSIUM CHLORIDE, SODIUM LACTATE AND CALCIUM CHLORIDE: 600; 310; 30; 20 INJECTION, SOLUTION INTRAVENOUS at 11:09

## 2019-10-31 RX ADMIN — SODIUM CHLORIDE, POTASSIUM CHLORIDE, SODIUM LACTATE AND CALCIUM CHLORIDE: 600; 310; 30; 20 INJECTION, SOLUTION INTRAVENOUS at 09:39

## 2019-10-31 RX ADMIN — LIDOCAINE HYDROCHLORIDE 0.1 ML: 10 INJECTION, SOLUTION EPIDURAL; INFILTRATION; INTRACAUDAL; PERINEURAL at 09:39

## 2019-10-31 RX ADMIN — FAMOTIDINE 20 MG: 10 INJECTION, SOLUTION INTRAVENOUS at 09:39

## 2019-10-31 RX ADMIN — OXYCODONE HYDROCHLORIDE AND ACETAMINOPHEN 2 TABLET: 5; 325 TABLET ORAL at 12:10

## 2019-10-31 ASSESSMENT — PAIN DESCRIPTION - PAIN TYPE
TYPE: SURGICAL PAIN
TYPE: SURGICAL PAIN

## 2019-10-31 ASSESSMENT — PULMONARY FUNCTION TESTS
PIF_VALUE: 0
PIF_VALUE: 1
PIF_VALUE: 0
PIF_VALUE: 1
PIF_VALUE: 0

## 2019-10-31 ASSESSMENT — PAIN DESCRIPTION - LOCATION
LOCATION: HAND
LOCATION: HAND

## 2019-10-31 ASSESSMENT — PAIN DESCRIPTION - DESCRIPTORS
DESCRIPTORS: ACHING
DESCRIPTORS: CONSTANT;SHARP;THROBBING
DESCRIPTORS: ACHING

## 2019-10-31 ASSESSMENT — PAIN DESCRIPTION - ORIENTATION
ORIENTATION: LEFT
ORIENTATION: LEFT

## 2019-10-31 ASSESSMENT — PAIN - FUNCTIONAL ASSESSMENT
PAIN_FUNCTIONAL_ASSESSMENT: 0-10
PAIN_FUNCTIONAL_ASSESSMENT: PREVENTS OR INTERFERES SOME ACTIVE ACTIVITIES AND ADLS

## 2019-10-31 ASSESSMENT — PAIN DESCRIPTION - ONSET: ONSET: ON-GOING

## 2019-10-31 ASSESSMENT — PAIN SCALES - GENERAL
PAINLEVEL_OUTOF10: 5
PAINLEVEL_OUTOF10: 8

## 2019-10-31 ASSESSMENT — PAIN DESCRIPTION - FREQUENCY
FREQUENCY: CONTINUOUS
FREQUENCY: CONTINUOUS

## 2019-11-05 ENCOUNTER — CLINICAL DOCUMENTATION (OUTPATIENT)
Dept: SPIRITUAL SERVICES | Age: 49
End: 2019-11-05

## 2019-11-06 DIAGNOSIS — R20.8 PAIN OF SKIN: ICD-10-CM

## 2019-11-07 DIAGNOSIS — R20.8 PAIN OF SKIN: ICD-10-CM

## 2019-11-07 RX ORDER — KETOROLAC TROMETHAMINE 30 MG/ML
30 INJECTION, SOLUTION INTRAMUSCULAR; INTRAVENOUS EVERY 12 HOURS PRN
Qty: 6 VIAL | Refills: 1 | OUTPATIENT
Start: 2019-11-07

## 2019-11-07 RX ORDER — KETOROLAC TROMETHAMINE 30 MG/ML
30 INJECTION, SOLUTION INTRAMUSCULAR; INTRAVENOUS EVERY 12 HOURS PRN
Qty: 6 VIAL | Refills: 1 | Status: SHIPPED | OUTPATIENT
Start: 2019-11-07 | End: 2019-11-20 | Stop reason: SDUPTHER

## 2019-11-11 DIAGNOSIS — L28.1 PRURIGO NODULARIS: ICD-10-CM

## 2019-11-11 DIAGNOSIS — F41.9 ANXIETY: ICD-10-CM

## 2019-11-13 ENCOUNTER — OFFICE VISIT (OUTPATIENT)
Dept: ORTHOPEDIC SURGERY | Age: 49
End: 2019-11-13

## 2019-11-13 VITALS — BODY MASS INDEX: 24.77 KG/M2 | HEIGHT: 66 IN | WEIGHT: 154.1 LBS

## 2019-11-13 DIAGNOSIS — M65.312 TRIGGER FINGER OF LEFT THUMB: Primary | ICD-10-CM

## 2019-11-13 PROCEDURE — 99024 POSTOP FOLLOW-UP VISIT: CPT | Performed by: ORTHOPAEDIC SURGERY

## 2019-11-13 RX ORDER — MUPIROCIN CALCIUM 20 MG/G
CREAM TOPICAL
Qty: 90 G | Refills: 2 | Status: SHIPPED | OUTPATIENT
Start: 2019-11-13 | End: 2020-02-21 | Stop reason: SDUPTHER

## 2019-11-13 RX ORDER — HYDROXYZINE 50 MG/1
TABLET, FILM COATED ORAL
Qty: 30 TABLET | Refills: 0 | Status: SHIPPED | OUTPATIENT
Start: 2019-11-13 | End: 2019-11-20 | Stop reason: SDUPTHER

## 2019-11-13 RX ORDER — CLONAZEPAM 0.5 MG/1
TABLET ORAL
Qty: 45 TABLET | Refills: 0 | Status: SHIPPED | OUTPATIENT
Start: 2019-11-13 | End: 2019-12-10 | Stop reason: SDUPTHER

## 2019-11-14 ENCOUNTER — TELEPHONE (OUTPATIENT)
Dept: FAMILY MEDICINE CLINIC | Age: 49
End: 2019-11-14

## 2019-11-19 ENCOUNTER — CLINICAL DOCUMENTATION (OUTPATIENT)
Dept: SPIRITUAL SERVICES | Age: 49
End: 2019-11-19

## 2019-11-20 ENCOUNTER — PATIENT MESSAGE (OUTPATIENT)
Dept: FAMILY MEDICINE CLINIC | Age: 49
End: 2019-11-20

## 2019-11-20 ENCOUNTER — TELEPHONE (OUTPATIENT)
Dept: FAMILY MEDICINE CLINIC | Age: 49
End: 2019-11-20

## 2019-11-20 DIAGNOSIS — R20.8 PAIN OF SKIN: ICD-10-CM

## 2019-11-20 DIAGNOSIS — L28.1 PRURIGO NODULARIS: ICD-10-CM

## 2019-11-20 RX ORDER — KETOROLAC TROMETHAMINE 30 MG/ML
INJECTION, SOLUTION INTRAMUSCULAR; INTRAVENOUS
Refills: 0 | OUTPATIENT
Start: 2019-11-20

## 2019-11-20 RX ORDER — KETOROLAC TROMETHAMINE 30 MG/ML
30 INJECTION, SOLUTION INTRAMUSCULAR; INTRAVENOUS EVERY 12 HOURS PRN
Qty: 6 VIAL | Refills: 1 | Status: SHIPPED | OUTPATIENT
Start: 2019-11-20 | End: 2019-12-09 | Stop reason: SDUPTHER

## 2019-11-20 RX ORDER — SYRINGE W-NEEDLE,DISPOSAB,3 ML 25GX5/8"
1 SYRINGE, EMPTY DISPOSABLE MISCELLANEOUS EVERY 12 HOURS PRN
Qty: 6 EACH | Refills: 1 | Status: SHIPPED | OUTPATIENT
Start: 2019-11-20 | End: 2019-12-18 | Stop reason: SDUPTHER

## 2019-11-21 RX ORDER — HYDROXYZINE 50 MG/1
25-50 TABLET, FILM COATED ORAL NIGHTLY PRN
Qty: 90 TABLET | Refills: 1 | Status: SHIPPED | OUTPATIENT
Start: 2019-11-21 | End: 2019-12-11 | Stop reason: SDUPTHER

## 2019-11-26 DIAGNOSIS — R20.8 PAIN OF SKIN: ICD-10-CM

## 2019-11-26 RX ORDER — KETOROLAC TROMETHAMINE 30 MG/ML
INJECTION, SOLUTION INTRAMUSCULAR; INTRAVENOUS
Refills: 0 | OUTPATIENT
Start: 2019-11-26

## 2019-11-26 RX ORDER — LEVOTHYROXINE SODIUM 0.2 MG/1
TABLET ORAL
Qty: 56 TABLET | Refills: 0 | Status: SHIPPED | OUTPATIENT
Start: 2019-11-26 | End: 2019-12-02 | Stop reason: SDUPTHER

## 2019-12-02 RX ORDER — LEVOTHYROXINE SODIUM 0.2 MG/1
TABLET ORAL
Qty: 30 TABLET | Refills: 2 | Status: SHIPPED | OUTPATIENT
Start: 2019-12-02 | End: 2020-01-13

## 2019-12-02 RX ORDER — CLOBETASOL PROPIONATE 0.5 MG/G
CREAM TOPICAL
Qty: 30 G | Refills: 1 | Status: SHIPPED | OUTPATIENT
Start: 2019-12-02 | End: 2020-02-21 | Stop reason: SDUPTHER

## 2019-12-09 DIAGNOSIS — R20.8 PAIN OF SKIN: ICD-10-CM

## 2019-12-10 ENCOUNTER — CLINICAL DOCUMENTATION (OUTPATIENT)
Dept: SPIRITUAL SERVICES | Age: 49
End: 2019-12-10

## 2019-12-10 ENCOUNTER — OFFICE VISIT (OUTPATIENT)
Dept: PSYCHOLOGY | Age: 49
End: 2019-12-10
Payer: COMMERCIAL

## 2019-12-10 DIAGNOSIS — F33.2 SEVERE EPISODE OF RECURRENT MAJOR DEPRESSIVE DISORDER, WITHOUT PSYCHOTIC FEATURES (HCC): Primary | ICD-10-CM

## 2019-12-10 DIAGNOSIS — F41.9 ANXIETY DISORDER, UNSPECIFIED TYPE: ICD-10-CM

## 2019-12-10 DIAGNOSIS — F41.9 ANXIETY: ICD-10-CM

## 2019-12-10 PROCEDURE — 90832 PSYTX W PT 30 MINUTES: CPT | Performed by: PSYCHOLOGIST

## 2019-12-10 ASSESSMENT — ANXIETY QUESTIONNAIRES
6. BECOMING EASILY ANNOYED OR IRRITABLE: 1-SEVERAL DAYS
5. BEING SO RESTLESS THAT IT IS HARD TO SIT STILL: 1-SEVERAL DAYS
1. FEELING NERVOUS, ANXIOUS, OR ON EDGE: 3-NEARLY EVERY DAY
7. FEELING AFRAID AS IF SOMETHING AWFUL MIGHT HAPPEN: 2-OVER HALF THE DAYS
GAD7 TOTAL SCORE: 16
4. TROUBLE RELAXING: 3-NEARLY EVERY DAY
2. NOT BEING ABLE TO STOP OR CONTROL WORRYING: 3-NEARLY EVERY DAY
3. WORRYING TOO MUCH ABOUT DIFFERENT THINGS: 3-NEARLY EVERY DAY

## 2019-12-10 ASSESSMENT — PATIENT HEALTH QUESTIONNAIRE - PHQ9
SUM OF ALL RESPONSES TO PHQ QUESTIONS 1-9: 16
SUM OF ALL RESPONSES TO PHQ9 QUESTIONS 1 & 2: 5
1. LITTLE INTEREST OR PLEASURE IN DOING THINGS: 2
9. THOUGHTS THAT YOU WOULD BE BETTER OFF DEAD, OR OF HURTING YOURSELF: 0
8. MOVING OR SPEAKING SO SLOWLY THAT OTHER PEOPLE COULD HAVE NOTICED. OR THE OPPOSITE, BEING SO FIGETY OR RESTLESS THAT YOU HAVE BEEN MOVING AROUND A LOT MORE THAN USUAL: 0
4. FEELING TIRED OR HAVING LITTLE ENERGY: 3
5. POOR APPETITE OR OVEREATING: 3
2. FEELING DOWN, DEPRESSED OR HOPELESS: 3
6. FEELING BAD ABOUT YOURSELF - OR THAT YOU ARE A FAILURE OR HAVE LET YOURSELF OR YOUR FAMILY DOWN: 1
SUM OF ALL RESPONSES TO PHQ QUESTIONS 1-9: 16
3. TROUBLE FALLING OR STAYING ASLEEP: 3
7. TROUBLE CONCENTRATING ON THINGS, SUCH AS READING THE NEWSPAPER OR WATCHING TELEVISION: 1
10. IF YOU CHECKED OFF ANY PROBLEMS, HOW DIFFICULT HAVE THESE PROBLEMS MADE IT FOR YOU TO DO YOUR WORK, TAKE CARE OF THINGS AT HOME, OR GET ALONG WITH OTHER PEOPLE: 2

## 2019-12-11 DIAGNOSIS — L28.1 PRURIGO NODULARIS: ICD-10-CM

## 2019-12-11 RX ORDER — HYDROXYZINE 50 MG/1
25-50 TABLET, FILM COATED ORAL NIGHTLY PRN
Qty: 30 TABLET | Refills: 5 | Status: SHIPPED | OUTPATIENT
Start: 2019-12-11 | End: 2021-07-30

## 2019-12-11 RX ORDER — CLONAZEPAM 0.5 MG/1
TABLET ORAL
Qty: 45 TABLET | Refills: 0 | Status: SHIPPED | OUTPATIENT
Start: 2019-12-11 | End: 2020-01-08

## 2019-12-11 RX ORDER — KETOROLAC TROMETHAMINE 30 MG/ML
30 INJECTION, SOLUTION INTRAMUSCULAR; INTRAVENOUS EVERY 12 HOURS PRN
Qty: 6 VIAL | Refills: 1 | Status: SHIPPED | OUTPATIENT
Start: 2019-12-11 | End: 2019-12-17 | Stop reason: SDUPTHER

## 2019-12-12 ENCOUNTER — TELEPHONE (OUTPATIENT)
Dept: FAMILY MEDICINE CLINIC | Age: 49
End: 2019-12-12

## 2019-12-12 DIAGNOSIS — M65.312 TRIGGER FINGER OF LEFT THUMB: Primary | ICD-10-CM

## 2019-12-12 DIAGNOSIS — F41.9 ANXIETY: ICD-10-CM

## 2019-12-12 DIAGNOSIS — F33.1 MODERATE EPISODE OF RECURRENT MAJOR DEPRESSIVE DISORDER (HCC): ICD-10-CM

## 2019-12-12 RX ORDER — METHYLPREDNISOLONE 4 MG/1
TABLET ORAL
Qty: 21 KIT | Refills: 0 | Status: SHIPPED | OUTPATIENT
Start: 2019-12-12 | End: 2020-01-08 | Stop reason: ALTCHOICE

## 2019-12-12 RX ORDER — SERTRALINE HYDROCHLORIDE 100 MG/1
100 TABLET, FILM COATED ORAL DAILY
Qty: 30 TABLET | Refills: 1 | Status: SHIPPED | OUTPATIENT
Start: 2019-12-12 | End: 2020-02-08 | Stop reason: SDUPTHER

## 2019-12-13 ENCOUNTER — PATIENT MESSAGE (OUTPATIENT)
Dept: FAMILY MEDICINE CLINIC | Age: 49
End: 2019-12-13

## 2019-12-13 DIAGNOSIS — K58.0 IRRITABLE BOWEL SYNDROME WITH DIARRHEA: ICD-10-CM

## 2019-12-13 RX ORDER — DIPHENOXYLATE HYDROCHLORIDE AND ATROPINE SULFATE 2.5; .025 MG/1; MG/1
1 TABLET ORAL 2 TIMES DAILY PRN
Qty: 180 TABLET | Refills: 0 | Status: SHIPPED | OUTPATIENT
Start: 2019-12-13 | End: 2020-03-12

## 2019-12-17 ENCOUNTER — TELEPHONE (OUTPATIENT)
Dept: FAMILY MEDICINE CLINIC | Age: 49
End: 2019-12-17

## 2019-12-17 ENCOUNTER — CLINICAL DOCUMENTATION (OUTPATIENT)
Dept: SPIRITUAL SERVICES | Age: 49
End: 2019-12-17

## 2019-12-17 DIAGNOSIS — R20.8 PAIN OF SKIN: ICD-10-CM

## 2019-12-18 RX ORDER — KETOROLAC TROMETHAMINE 30 MG/ML
30 INJECTION, SOLUTION INTRAMUSCULAR; INTRAVENOUS EVERY 12 HOURS PRN
Qty: 6 VIAL | Refills: 3 | Status: SHIPPED | OUTPATIENT
Start: 2019-12-18 | End: 2020-01-13

## 2019-12-18 RX ORDER — SYRINGE W-NEEDLE,DISPOSAB,3 ML 25GX5/8"
1 SYRINGE, EMPTY DISPOSABLE MISCELLANEOUS EVERY 12 HOURS PRN
Qty: 6 EACH | Refills: 1 | Status: SHIPPED | OUTPATIENT
Start: 2019-12-18 | End: 2020-08-10 | Stop reason: SDUPTHER

## 2020-01-07 ENCOUNTER — TELEPHONE (OUTPATIENT)
Dept: FAMILY MEDICINE CLINIC | Age: 50
End: 2020-01-07

## 2020-01-07 NOTE — TELEPHONE ENCOUNTER
Patient called in the office asking to speak with me. Keaton Cabello has an upcoming appointment with you tomorrow and Dr. Bobo Stoll on 1-23-19. She would like to update you both on what has been going on since her last message and visit. Patient states that her Dad will be undergoing Radiation every single day for 6 weeks. She states one of those days he will be administered a low dose of Chemo. She states that she is may not make it to the appointment with Dr. Bobo Stoll. She informed me that she got the worst or the worst news last week at her dads appointment. She states she is not sure what she is in for with her dads health and if he will even be able to tolerate the radiation or Chemo. She is wondering if Dr. Bobo Stoll did telephone visits? I told her I did not think so but I would ask.     2: Her Pain management doctor has been out of the country and she has been unable to get her pain medication through his office. She states that she had to wean herself off her pain medication and the last dose was on 12-27-19. She received a call from his office stating that her was going to come in on [place] to see her and asked if she could be at his office to see him. She states that when she got there, she was informed that he was not coming into the office. He did end up talking to her on the phone and he sent in a new medication to the pharmacy for her. He informed her to go to the pharmacy,  the medication, come back to the office and take a quarter of it. He wanted to make sure she did not have reaction. She took the quarter and waited around at his office with his staff for a while. She didn't have a reaction, so her told her to go home and take the other quarter. She said she went home and took the quarter. She is got really sick, Vomiting , Fever for 2 and 1/2 days. She said she cannot be sick with taking care of her father.  Keaton Cabello was told that her pain Doctor would be back in the office on 1-3-20, but then was

## 2020-01-08 ENCOUNTER — OFFICE VISIT (OUTPATIENT)
Dept: FAMILY MEDICINE CLINIC | Age: 50
End: 2020-01-08
Payer: COMMERCIAL

## 2020-01-08 VITALS
DIASTOLIC BLOOD PRESSURE: 118 MMHG | SYSTOLIC BLOOD PRESSURE: 170 MMHG | WEIGHT: 161 LBS | BODY MASS INDEX: 26.37 KG/M2 | OXYGEN SATURATION: 97 % | HEART RATE: 95 BPM

## 2020-01-08 DIAGNOSIS — R07.9 CHEST PAIN, UNSPECIFIED TYPE: ICD-10-CM

## 2020-01-08 LAB
A/G RATIO: 2 (ref 1.1–2.2)
ALBUMIN SERPL-MCNC: 5.1 G/DL (ref 3.4–5)
ALP BLD-CCNC: 138 U/L (ref 40–129)
ALT SERPL-CCNC: 45 U/L (ref 10–40)
ANION GAP SERPL CALCULATED.3IONS-SCNC: 19 MMOL/L (ref 3–16)
AST SERPL-CCNC: 45 U/L (ref 15–37)
BILIRUB SERPL-MCNC: 0.4 MG/DL (ref 0–1)
BUN BLDV-MCNC: 18 MG/DL (ref 7–20)
CALCIUM SERPL-MCNC: 10.2 MG/DL (ref 8.3–10.6)
CHLORIDE BLD-SCNC: 99 MMOL/L (ref 99–110)
CO2: 21 MMOL/L (ref 21–32)
CREAT SERPL-MCNC: 0.5 MG/DL (ref 0.6–1.1)
D DIMER: 256 NG/ML DDU (ref 0–229)
GFR AFRICAN AMERICAN: >60
GFR NON-AFRICAN AMERICAN: >60
GLOBULIN: 2.5 G/DL
GLUCOSE BLD-MCNC: 93 MG/DL (ref 70–99)
POTASSIUM SERPL-SCNC: 4 MMOL/L (ref 3.5–5.1)
SODIUM BLD-SCNC: 139 MMOL/L (ref 136–145)
TOTAL PROTEIN: 7.6 G/DL (ref 6.4–8.2)
TROPONIN: <0.01 NG/ML
TSH REFLEX: 0.47 UIU/ML (ref 0.27–4.2)

## 2020-01-08 PROCEDURE — 3017F COLORECTAL CA SCREEN DOC REV: CPT | Performed by: FAMILY MEDICINE

## 2020-01-08 PROCEDURE — 99215 OFFICE O/P EST HI 40 MIN: CPT | Performed by: FAMILY MEDICINE

## 2020-01-08 PROCEDURE — G8427 DOCREV CUR MEDS BY ELIG CLIN: HCPCS | Performed by: FAMILY MEDICINE

## 2020-01-08 PROCEDURE — G8417 CALC BMI ABV UP PARAM F/U: HCPCS | Performed by: FAMILY MEDICINE

## 2020-01-08 PROCEDURE — 93000 ELECTROCARDIOGRAM COMPLETE: CPT | Performed by: FAMILY MEDICINE

## 2020-01-08 PROCEDURE — 1036F TOBACCO NON-USER: CPT | Performed by: FAMILY MEDICINE

## 2020-01-08 PROCEDURE — G8482 FLU IMMUNIZE ORDER/ADMIN: HCPCS | Performed by: FAMILY MEDICINE

## 2020-01-08 NOTE — PROGRESS NOTES
1/8/2020    This is a 47 y.o. female who presents for  Chief Complaint   Patient presents with    Discuss Medications    Skin Lesion       HPI:     She has had a horrible experience with her pain management doctor recently: miscommunication, missed prescriptions, etc.   He is Kind, knowledgeable, figured out other problems  She's been with him for a while, and has tried multiple medications  Oxycodone, MS Contin: had reactions (itching, wheezing)  Dilaudid: (wheezing, SOB)  Called his assistant  Was supposed to have appointments these past few days,   He stated to her: \"I cringe every time I see your number on the phone\"   Doesn't even know when her dr will be back in town.      Pain:  Top of her head, behind her eyes, back of her head  Chronic but persistent  The opiates helped make it manageable  Her Thumb  Low back pain, chronic     Has not seen Dr. Rodri Bustos (neurosurgery)  [facility], can't drive there   She is aware of her last MRI/A findings    + chest pressure  Central  Klonopin will help for a short period of time  Constant for weeks  Heating pad and pushing will help   Same with activity or no activity     Her anxiety is exponentially worse  Dr. Yovana Hartman provided information about guided imagry, progressive relaxation  Her father is undergoing more chemotherapy  She has appointments for him 3/5 days of the week next week so she cannot accommodate her own illnesses       Past Medical History:   Diagnosis Date    Benign essential tremor     Colon polyp 05/06/2019    Degenerative disc disease     DVT, lower extremity ([address] 1989    Fibromyalgia     Kidney stones     Left-sided trigeminal neuralgia     Malignant neoplasm of thyroid gland ([address] 8/22/2013    Migraines, neuralgic     since stroke 1999 Chronic    PONV (postoperative nausea and vomiting)     Poor venous access     Protein S deficiency ([address]     Pulmonary embolism ([address] 1989    Seizure disorder ([address]     grand mal in 2005 ( childhood febrile status: Not on file    Intimate partner violence:     Fear of current or ex partner: Not on file     Emotionally abused: Not on file     Physically abused: Not on file     Forced sexual activity: Not on file   Other Topics Concern    Not on file   Social History Narrative    Not on file       Family History   Problem Relation Age of Onset    Cancer Mother         breast x2, ovarian x1, brain     High Cholesterol Mother     High Cholesterol Father     Other Father     Migraines Sister     Other Sister     Thyroid Disease Maternal Grandmother        Current Outpatient Medications   Medication Sig Dispense Refill    clonazePAM (KLONOPIN) 0.5 MG tablet Take 1 tablet by mouth 2 times daily as needed for Anxiety for up to 30 days. 60 tablet 0    lisinopril (PRINIVIL;ZESTRIL) 10 MG tablet Take 1 tablet by mouth daily 30 tablet 0    zoster recombinant adjuvanted vaccine (SHINGRIX) 50 MCG/0.5ML SUSR injection Inject 0.5 mLs into the muscle See Admin Instructions 1 dose now and repeat in 2-6 months 0.5 mL 0    ketorolac (TORADOL) 30 MG/ML injection Inject 1 mL into the muscle every 12 hours as needed for Pain 6 vial 3    Syringe/Needle, Disp, (SYRINGE 3CC/25GX1\") 25G X 1\" 3 ML MISC 1 Syringe by Does not apply route every 12 hours as needed (pain) 6 each 1    diphenoxylate-atropine (LOMOTIL) 2.5-0.025 MG per tablet Take 1 tablet by mouth 2 times daily as needed for Diarrhea for up to 90 days.  180 tablet 0    sertraline (ZOLOFT) 100 MG tablet Take 1 tablet by mouth daily 30 tablet 1    hydrOXYzine (ATARAX) 50 MG tablet Take 0.5-1 tablets by mouth nightly as needed for Anxiety (insomnia) 30 tablet 5    levothyroxine (SYNTHROID) 200 MCG tablet TAKE ONE TABLET BY MOUTH DAILY 30 tablet 2    clobetasol (TEMOVATE) 0.05 % cream APPLY TO AFFECTED AREA(S) TWO TIMES A DAY 30 g 1    mupirocin (BACTROBAN) 2 % cream APPLY TO AFFECTED AREA(S) THREE TIMES A DAY 90 g 2    naloxone (NARCAN) 4 MG/0.1ML LIQD nasal spray 1 disturbance. Respiratory: Positive for chest tightness. Negative for shortness of breath. Cardiovascular: Positive for chest pain and palpitations. Negative for leg swelling. Gastrointestinal: Negative for abdominal pain, nausea and vomiting. Genitourinary: Negative for decreased urine volume. Musculoskeletal: Positive for arthralgias and back pain. Skin: Negative for color change. Neurological: Positive for tremors and headaches. Negative for dizziness, seizures, syncope, speech difficulty, weakness, light-headedness and numbness. Psychiatric/Behavioral: Positive for dysphoric mood and sleep disturbance. The patient is nervous/anxious. BP (!) 170/118   Pulse 95   Wt 161 lb (73 kg)   LMP 06/30/2015   SpO2 97%   BMI 26.37 kg/m²     Physical Exam  Vitals signs and nursing note reviewed. Constitutional:       General: She is not in acute distress. Appearance: She is well-developed. She is not diaphoretic. HENT:      Head: Normocephalic and atraumatic. Eyes:      Conjunctiva/sclera: Conjunctivae normal.      Pupils: Pupils are equal, round, and reactive to light. Neck:      Musculoskeletal: Normal range of motion and neck supple. Vascular: No JVD. Cardiovascular:      Rate and Rhythm: Normal rate and regular rhythm. Heart sounds: Normal heart sounds. No murmur. No friction rub. No gallop. Pulmonary:      Effort: Pulmonary effort is normal. No respiratory distress. Breath sounds: Normal breath sounds. No stridor. No wheezing, rhonchi or rales. Chest:      Chest wall: No tenderness. Abdominal:      Palpations: Abdomen is soft. Tenderness: There is no tenderness. Musculoskeletal: Normal range of motion. Skin:     General: Skin is warm and dry. Capillary Refill: Capillary refill takes 2 to 3 seconds. Findings: No erythema. Neurological:      General: No focal deficit present.       Mental Status: She is alert and oriented to person, place, occlusion, left  - MRA HEAD W WO CONTRAST; Future  - MRA NECK W WO CONTRAST; Future  - External Referral To Neurosurgery    8. Anxiety  This plays a significant role in her overall health. She care for her elderly father who is undergoing chemotherapy alone. C/w visits with Dr. Brannon Astorga. - clonazePAM (KLONOPIN) 0.5 MG tablet; Take 1 tablet by mouth 2 times daily as needed for Anxiety for up to 30 days. Dispense: 60 tablet; Refill: 0        While assessing care for this patient, I have reviewed all pertinent lab work/imaging/ specialist notes and care in reference to those problems addressed above in detail. Appropriate medical decision making was based on this. Please note that portions of this note may have been completed with a voice recognition program. Efforts were made to edit the dictations but occasionally words are mis-transcribed. Return in about 2 weeks (around 1/22/2020) for 45 min visit, medication follow up.

## 2020-01-09 ENCOUNTER — APPOINTMENT (OUTPATIENT)
Dept: CT IMAGING | Age: 50
End: 2020-01-09
Payer: COMMERCIAL

## 2020-01-09 ENCOUNTER — TELEPHONE (OUTPATIENT)
Dept: FAMILY MEDICINE CLINIC | Age: 50
End: 2020-01-09

## 2020-01-09 ENCOUNTER — HOSPITAL ENCOUNTER (EMERGENCY)
Age: 50
Discharge: HOME | End: 2020-01-09
Attending: EMERGENCY MEDICINE
Payer: COMMERCIAL

## 2020-01-09 ENCOUNTER — APPOINTMENT (OUTPATIENT)
Dept: GENERAL RADIOLOGY | Age: 50
End: 2020-01-09
Payer: COMMERCIAL

## 2020-01-09 VITALS
OXYGEN SATURATION: 98 % | TEMPERATURE: 98 F | HEART RATE: 86 BPM | BODY MASS INDEX: 25.88 KG/M2 | RESPIRATION RATE: 16 BRPM | WEIGHT: 161 LBS | DIASTOLIC BLOOD PRESSURE: 86 MMHG | HEIGHT: 66 IN | SYSTOLIC BLOOD PRESSURE: 129 MMHG

## 2020-01-09 PROBLEM — I65.02 VERTEBRAL ARTERY OCCLUSION, LEFT: Status: ACTIVE | Noted: 2020-01-09

## 2020-01-09 PROBLEM — I51.7 LEFT ATRIAL ENLARGEMENT: Status: ACTIVE | Noted: 2020-01-09

## 2020-01-09 LAB
ANION GAP SERPL CALCULATED.3IONS-SCNC: 13 MMOL/L (ref 3–16)
BASOPHILS ABSOLUTE: 0 K/UL (ref 0–0.2)
BASOPHILS RELATIVE PERCENT: 0.8 %
BUN BLDV-MCNC: 11 MG/DL (ref 7–20)
CALCIUM SERPL-MCNC: 8.6 MG/DL (ref 8.3–10.6)
CHLORIDE BLD-SCNC: 104 MMOL/L (ref 99–110)
CO2: 25 MMOL/L (ref 21–32)
CREAT SERPL-MCNC: <0.5 MG/DL (ref 0.6–1.1)
EKG ATRIAL RATE: 83 BPM
EKG DIAGNOSIS: NORMAL
EKG P AXIS: 56 DEGREES
EKG P-R INTERVAL: 150 MS
EKG Q-T INTERVAL: 400 MS
EKG QRS DURATION: 78 MS
EKG QTC CALCULATION (BAZETT): 470 MS
EKG R AXIS: 15 DEGREES
EKG T AXIS: 53 DEGREES
EKG VENTRICULAR RATE: 83 BPM
EOSINOPHILS ABSOLUTE: 0.2 K/UL (ref 0–0.6)
EOSINOPHILS RELATIVE PERCENT: 3.3 %
GFR AFRICAN AMERICAN: >60
GFR NON-AFRICAN AMERICAN: >60
GLUCOSE BLD-MCNC: 84 MG/DL (ref 70–99)
HCT VFR BLD CALC: 39.6 % (ref 36–48)
HEMOGLOBIN: 13.4 G/DL (ref 12–16)
LYMPHOCYTES ABSOLUTE: 1.7 K/UL (ref 1–5.1)
LYMPHOCYTES RELATIVE PERCENT: 31.3 %
MCH RBC QN AUTO: 30.8 PG (ref 26–34)
MCHC RBC AUTO-ENTMCNC: 33.7 G/DL (ref 31–36)
MCV RBC AUTO: 91.4 FL (ref 80–100)
MONOCYTES ABSOLUTE: 0.5 K/UL (ref 0–1.3)
MONOCYTES RELATIVE PERCENT: 9.3 %
NEUTROPHILS ABSOLUTE: 3 K/UL (ref 1.7–7.7)
NEUTROPHILS RELATIVE PERCENT: 55.3 %
PDW BLD-RTO: 15.7 % (ref 12.4–15.4)
PLATELET # BLD: 204 K/UL (ref 135–450)
PMV BLD AUTO: 8.3 FL (ref 5–10.5)
POTASSIUM SERPL-SCNC: 4.2 MMOL/L (ref 3.5–5.1)
RBC # BLD: 4.33 M/UL (ref 4–5.2)
SODIUM BLD-SCNC: 142 MMOL/L (ref 136–145)
TROPONIN: <0.01 NG/ML
WBC # BLD: 5.4 K/UL (ref 4–11)

## 2020-01-09 PROCEDURE — 71260 CT THORAX DX C+: CPT

## 2020-01-09 PROCEDURE — 85025 COMPLETE CBC W/AUTO DIFF WBC: CPT

## 2020-01-09 PROCEDURE — 84484 ASSAY OF TROPONIN QUANT: CPT

## 2020-01-09 PROCEDURE — 99285 EMERGENCY DEPT VISIT HI MDM: CPT

## 2020-01-09 PROCEDURE — 96374 THER/PROPH/DIAG INJ IV PUSH: CPT

## 2020-01-09 PROCEDURE — 93005 ELECTROCARDIOGRAM TRACING: CPT | Performed by: EMERGENCY MEDICINE

## 2020-01-09 PROCEDURE — 6360000004 HC RX CONTRAST MEDICATION: Performed by: EMERGENCY MEDICINE

## 2020-01-09 PROCEDURE — 96375 TX/PRO/DX INJ NEW DRUG ADDON: CPT

## 2020-01-09 PROCEDURE — 6360000002 HC RX W HCPCS: Performed by: EMERGENCY MEDICINE

## 2020-01-09 PROCEDURE — 80048 BASIC METABOLIC PNL TOTAL CA: CPT

## 2020-01-09 PROCEDURE — 71046 X-RAY EXAM CHEST 2 VIEWS: CPT

## 2020-01-09 RX ORDER — PROMETHAZINE HYDROCHLORIDE 25 MG/ML
12.5 INJECTION, SOLUTION INTRAMUSCULAR; INTRAVENOUS EVERY 30 MIN PRN
Status: DISCONTINUED | OUTPATIENT
Start: 2020-01-09 | End: 2020-01-09

## 2020-01-09 RX ORDER — CLONAZEPAM 0.5 MG/1
0.5 TABLET ORAL 2 TIMES DAILY PRN
Qty: 60 TABLET | Refills: 0 | Status: SHIPPED | OUTPATIENT
Start: 2020-01-09 | End: 2020-02-12

## 2020-01-09 RX ORDER — LISINOPRIL 10 MG/1
10 TABLET ORAL DAILY
Qty: 30 TABLET | Refills: 0 | Status: SHIPPED | OUTPATIENT
Start: 2020-01-09 | End: 2020-02-04 | Stop reason: SDUPTHER

## 2020-01-09 RX ORDER — FENTANYL CITRATE 50 UG/ML
25 INJECTION, SOLUTION INTRAMUSCULAR; INTRAVENOUS ONCE
Status: COMPLETED | OUTPATIENT
Start: 2020-01-09 | End: 2020-01-09

## 2020-01-09 RX ADMIN — PROMETHAZINE HYDROCHLORIDE 12.5 MG: 25 INJECTION INTRAMUSCULAR; INTRAVENOUS at 04:53

## 2020-01-09 RX ADMIN — IOPAMIDOL 75 ML: 755 INJECTION, SOLUTION INTRAVENOUS at 04:13

## 2020-01-09 RX ADMIN — FENTANYL CITRATE 25 MCG: 50 INJECTION, SOLUTION INTRAMUSCULAR; INTRAVENOUS at 03:58

## 2020-01-09 ASSESSMENT — PAIN SCALES - GENERAL
PAINLEVEL_OUTOF10: 10
PAINLEVEL_OUTOF10: 10

## 2020-01-09 ASSESSMENT — PAIN DESCRIPTION - PROGRESSION: CLINICAL_PROGRESSION: NOT CHANGED

## 2020-01-09 ASSESSMENT — ENCOUNTER SYMPTOMS
ABDOMINAL PAIN: 0
SHORTNESS OF BREATH: 0
NAUSEA: 0
COLOR CHANGE: 0
VOMITING: 0
BACK PAIN: 1
CHEST TIGHTNESS: 1

## 2020-01-09 ASSESSMENT — PAIN DESCRIPTION - PAIN TYPE: TYPE: CHRONIC PAIN

## 2020-01-09 ASSESSMENT — PAIN DESCRIPTION - ONSET: ONSET: ON-GOING

## 2020-01-09 ASSESSMENT — PAIN DESCRIPTION - LOCATION: LOCATION: HEAD

## 2020-01-09 ASSESSMENT — PAIN DESCRIPTION - ORIENTATION: ORIENTATION: RIGHT;LEFT

## 2020-01-09 ASSESSMENT — PAIN DESCRIPTION - DESCRIPTORS: DESCRIPTORS: CONSTANT

## 2020-01-09 ASSESSMENT — PAIN DESCRIPTION - FREQUENCY: FREQUENCY: CONTINUOUS

## 2020-01-09 NOTE — TELEPHONE ENCOUNTER
Apparently there was an issue w/ [employer] last pm after talking to [employer]. Only rec'd 1 notification, no f/u notifications, no sounds. Hopefully not an ongoing issue.

## 2020-01-09 NOTE — ED NOTES
Patient alert and oriented. Father is at bedside. Patient is calm and cooperative with out pain or nausea at this time.       Miracle Gant RN  01/09/20 0500

## 2020-01-10 ASSESSMENT — ENCOUNTER SYMPTOMS
SHORTNESS OF BREATH: 0
VOMITING: 0
WHEEZING: 0
PHOTOPHOBIA: 0
COUGH: 0
TROUBLE SWALLOWING: 0
COLOR CHANGE: 0
CHEST TIGHTNESS: 1
ABDOMINAL PAIN: 0

## 2020-01-10 NOTE — ED PROVIDER NOTES
[facility]  ED  eMERGENCY dEPARTMENTeNCOUnter      Pt Name: Jania Saucedo  MRN: [id number]  [place] 1970  Date ofevaluation: 1/9/2020  Provider: Anant Gann MD    CHIEF COMPLAINT       Chief Complaint   Patient presents with    Hypertension     patient reports seen at 55 yesterday for HTN and worked up for testing, paitent has a lot of anxiety and father recently dex with lung cancer         HISTORY OF PRESENT ILLNESS   (Location/Symptom, Timing/Onset,Context/Setting, Quality, Duration, Modifying Factors, Severity)  Note limiting factors. Jania Saucedo is a 47 y.o. female who presents to the emergency department for evaluation of anxiety and chest discomfort. Patient states that she has a history of anxiety has been under a lot of stress recently. Patient's mother recently passed away and her father was recently diagnosed with lung cancer. Patient states that she has felt increasingly stressed. Patient states that she also is in chronic pain management but has been out of her pain medications because her physician is out of town. Patient takes Toradol and fentanyl at home, which she has not been able to receive. Patient states that over the course the day she is had a discomfort in the center of her chest.  She states that she has had this before when she is had anxiety but is concerned because she has a protein S deficiency has had had pulmonary embolisms in the past.  Patient currently is not on anticoagulation. Patient denies shortness of breath fevers changes in vision weakness or paresthesias. Patient was recently seen by her PCP who ordered an outpatient echocardiogram as well as CTAs of the head and neck. Currently the patient denies any neurological deficits or headache. Is also concerned because she was noted to be hypertensive at home. HPI    NursingNotes were reviewed.     REVIEW OF SYSTEMS    (2-9 systems for level 4, 10 or more for level 5)     Review of Systems   Constitutional: Negative for activity change, fatigue and fever. HENT: Negative for congestion, mouth sores and trouble swallowing. Eyes: Negative for photophobia and visual disturbance. Respiratory: Positive for chest tightness. Negative for cough, shortness of breath and wheezing. Cardiovascular: Positive for chest pain. Negative for palpitations and leg swelling. Gastrointestinal: Negative for abdominal pain and vomiting. Genitourinary: Negative for difficulty urinating and frequency. Musculoskeletal: Negative for gait problem and neck pain. Skin: Negative for color change and rash. Neurological: Negative for dizziness, syncope, facial asymmetry, speech difficulty, weakness, light-headedness, numbness and headaches. Psychiatric/Behavioral: Negative for confusion. The patient is not nervous/anxious. All other systems reviewed and are negative. Except as noted above the remainder of the review of systems was reviewed and negative.        PAST MEDICAL HISTORY     Past Medical History:   Diagnosis Date    Benign essential tremor     Colon polyp 05/06/2019    Degenerative disc disease     DVT, lower extremity (HCC) 1989    Fibromyalgia     Kidney stones     Left-sided trigeminal neuralgia     Malignant neoplasm of thyroid gland ([address] 8/22/2013    Migraines, neuralgic     since stroke 1999 Chronic    PONV (postoperative nausea and vomiting)     Poor venous access     Protein S deficiency ([address]     Pulmonary embolism ([address] 1989    Seizure disorder ([address]     grand mal in 2005 ( childhood febrile seizure also-from a baby to age 14) and also X 1 ~2005 with headache treatment    Stroke ([address] 2/25/2019    Thrombosis of superior sagittal sinus 1999    Unspecified cerebral artery occlusion with cerebral infarction 1999    Vertebral artery occlusion 1999    White coat syndrome with high blood pressure but without hypertension          SURGICALHISTORY       Past Surgical History:   Procedure Laterality Date    BREAST LUMPECTOMY      x 3    CYSTOSCOPY  01/09/2012    w/ left ureteroscopy, holmium laser. stone manipulation and left stent insertion    FINGER TRIGGER RELEASE Left 10/31/2019    LEFT THUMB TRIGGER DIGIT RELEASE performed by Jose Juan David MD at [address] Left 2000    pins later removed    FOOT SURGERY Left 07/10/2017    LARYNX SURGERY      vocal cord nodule    LITHOTRIPSY  12/11    REFRACTIVE SURGERY      THYROIDECTOMY  05/18/2011              CURRENT MEDICATIONS       Discharge Medication List as of 1/9/2020  5:15 AM      CONTINUE these medications which have NOT CHANGED    Details   zoster recombinant adjuvanted vaccine (SHINGRIX) 50 MCG/0.5ML SUSR injection Inject 0.5 mLs into the muscle See Admin Instructions 1 dose now and repeat in 2-6 months, Disp-0.5 mL, R-0Normal      ketorolac (TORADOL) 30 MG/ML injection Inject 1 mL into the muscle every 12 hours as needed for Pain, Disp-6 vial, R-3Normal      Syringe/Needle, Disp, (SYRINGE 3CC/25GX1\") 25G X 1\" 3 ML MISC EVERY 12 HOURS PRN Starting Wed 12/18/2019, Disp-6 each, R-1, Normal      diphenoxylate-atropine (LOMOTIL) 2.5-0.025 MG per tablet Take 1 tablet by mouth 2 times daily as needed for Diarrhea for up to 90 days. , Disp-180 tablet, R-0Normal      sertraline (ZOLOFT) 100 MG tablet Take 1 tablet by mouth daily, Disp-30 tablet, R-1Normal      hydrOXYzine (ATARAX) 50 MG tablet Take 0.5-1 tablets by mouth nightly as needed for Anxiety (insomnia), Disp-30 tablet, R-5Normal      clonazePAM (KLONOPIN) 0.5 MG tablet TAKE ONE TABLET BY MOUTH DAILY AS NEEDED AND TAKE ONE TABLET BY MOUTH ONCE NIGHTLY AS NEEDED, Disp-45 tablet, R-0Normal      levothyroxine (SYNTHROID) 200 MCG tablet TAKE ONE TABLET BY MOUTH DAILY, Disp-30 tablet, R-2Normal      clobetasol (TEMOVATE) 0.05 % cream APPLY TO AFFECTED AREA(S) TWO TIMES A DAY, Disp-30 g, R-1, Normal      mupirocin (BACTROBAN) 2 % cream APPLY TO AFFECTED AREA(S) THREE TIMES A DAY, Disp-90 g, R-2, Normal      fentaNYL (ACTIQ) 800 MCG lollipop Take 1 each by mouth 3 times daily as needed. Historical Med      naloxone (NARCAN) 4 MG/0.1ML LIQD nasal spray 1 spray by Nasal route as needed for Opioid ReversalHistorical Med      Cyanocobalamin (VITAMIN B-12) 5000 MCG SUBL Take 1 each by mouth every other dayHistorical Med      XARELTO 20 MG TABS tablet TAKE ONE TABLET BY MOUTH DAILY, Disp-90 tablet, R-3, DAWNormal      atorvastatin (LIPITOR) 40 MG tablet TAKE ONE TABLET BY MOUTH DAILY, Disp-90 tablet, R-1Normal      diclofenac sodium 1 % GEL Apply 2 g topically 2 times daily, Topical, 2 TIMES DAILY, Historical Med      Cholecalciferol (VITAMIN D3) 2000 units CAPS Take 1 capsule by mouth dailyHistorical Med      Doxylamine Succinate, Sleep, (UNISOM PO) Take by mouthHistorical Med      carBAMazepine (TEGRETOL XR) 200 MG extended release tablet Take 400 mg by mouth every 8 hours Historical Med      primidone (MYSOLINE) 250 MG tablet Take 250 mg by mouth 4 times daily Historical Med      diphenhydrAMINE (BENADRYL) 25 MG tablet Take 25 mg by mouth as needed for ItchingHistorical Med      Bismuth Subsalicylate (PEPTO-BISMOL PO) Take by mouth as neededHistorical Med             ALLERGIES     Dilaudid [hydromorphone hcl];  Buprenorphine; Duloxetine hcl; Gabapentin; Gluten meal; Hydromorphone; Morphine; Pregabalin; Propranolol; and Topiramate    FAMILY HISTORY       Family History   Problem Relation Age of Onset    Cancer Mother         breast x2, ovarian x1, brain     High Cholesterol Mother     High Cholesterol Father     Other Father    Dutta Migraines Sister     Other Sister     Thyroid Disease Maternal Grandmother           SOCIAL HISTORY       Social History     Socioeconomic History    Marital status: Single     Spouse name: None    Number of children: None    Years of education: None    Highest education level: None   Occupational History    15.7 (*)     All other components within normal limits    Narrative:     Performed at:  [facility]  [address],  [place], [place]   Phone [phone]   TROPONIN    Narrative:     Performed at:  [facility]) [facility]  [address],  [place], [address] [place]   Phone [phone]       All other labs were within normal range or not returned as of this dictation. EMERGENCY DEPARTMENT COURSE and DIFFERENTIAL DIAGNOSIS/MDM:   Vitals:    Vitals:    01/09/20 0256 01/09/20 0400 01/09/20 0449 01/09/20 0451   BP: (!) 155/108 (!) 129/94 (!) 133/91 129/86   Pulse: 85   86   Resp: 16   16   Temp: 98 °F (36.7 °C)   98 °F (36.7 °C)   TempSrc: Oral   Oral   SpO2: 98% 98% 96% 98%   Weight: 161 lb (73 kg)      Height: 5' 5.5\" (1.664 m)              MDM  Number of Diagnoses or Management Options  Hypertension, unspecified type:   Diagnosis management comments: 50-year-old female presents the ED for evaluation of chest discomfort. Patient does endorse being very anxious and states that she thinks her symptoms could be secondary to the anxiety. Patient also takes pain medications which she has been out of recently. Patient was seen by her PCP the previous day and was noted to have an elevated d-dimer. We will obtain basic laboratory work-up including troponin EKG and will obtain a CT of the patient's chest since he had an outpatient d-dimer that was elevated. Patient denies any pain or swelling to the lower extremities. Antral diagnosis includes ACS, pneumonia , GERD, esophageal spasm, pulmonary embolism, pneumothorax, and aortic dissection. REASSESSMENT      Evaluation patient is resting comfortably. Patient was given a dose of fentanyl in the emergency department states she does feel better. Patient's father did present and was at bedside. There were no emergent laboratory abnormalities and patient's work-up. EKG was within normal limits.   Troponin

## 2020-01-11 ENCOUNTER — PATIENT MESSAGE (OUTPATIENT)
Dept: FAMILY MEDICINE CLINIC | Age: 50
End: 2020-01-11

## 2020-01-12 ENCOUNTER — CLINICAL DOCUMENTATION (OUTPATIENT)
Dept: SPIRITUAL SERVICES | Age: 50
End: 2020-01-12

## 2020-01-12 NOTE — FLOWSHEET NOTE
Followed up with Pt for Outpatient Spiritual Care support. Pt shared about struggles with her dad's health and her own health, feeling overwhelmed. Expressed feeling alone and angry at God.  acknowledged and affirmed her emotions. Prayed with Pt for sense of peace and God's presence. [facility]       01/12/20 1429   Encounter Summary   Services provided to: Patient   Referral/Consult From: Rounding   Continue Visiting   (1/12 follow up OP, sppt and prayer)   Complexity of Encounter High   Length of Encounter 45 minutes   Spiritual/Taoist   Type Spiritual struggle   Assessment Approachable;Tearful; Anxious; Loneliness; Helplessness; Anticipatory grief;Questioning meaning/purpose; Concerns with suffering   Intervention Active listening;Explored feelings, thoughts, concerns;Prayer;Sustaining presence/ Ministry of presence; Discussed relationship with God;Discussed illness/injury and it's impact   Outcome Comfort;Expressed gratitude;Engaged in conversation;Expressed feelings/needs/concerns;Receptive;Venting emotion

## 2020-01-13 ENCOUNTER — TELEPHONE (OUTPATIENT)
Dept: FAMILY MEDICINE CLINIC | Age: 50
End: 2020-01-13

## 2020-01-13 RX ORDER — KETOROLAC TROMETHAMINE 30 MG/ML
60 INJECTION, SOLUTION INTRAMUSCULAR; INTRAVENOUS EVERY 12 HOURS PRN
Qty: 6 VIAL | Refills: 2 | Status: SHIPPED | OUTPATIENT
Start: 2020-01-13 | End: 2020-01-22

## 2020-01-14 ENCOUNTER — PATIENT MESSAGE (OUTPATIENT)
Dept: FAMILY MEDICINE CLINIC | Age: 50
End: 2020-01-14

## 2020-01-14 NOTE — TELEPHONE ENCOUNTER
Please asdvise. There is a synthroid sent in on 1/13/20 from Dr. Laney Johnston for 90 tablets. Is this what the pt is asking for?

## 2020-01-15 ENCOUNTER — TELEPHONE (OUTPATIENT)
Dept: FAMILY MEDICINE CLINIC | Age: 50
End: 2020-01-15

## 2020-01-16 RX ORDER — LEVOTHYROXINE SODIUM 0.2 MG/1
TABLET ORAL
Qty: 90 TABLET | Refills: 1 | Status: SHIPPED | OUTPATIENT
Start: 2020-01-16 | End: 2020-02-21 | Stop reason: SDUPTHER

## 2020-01-21 DIAGNOSIS — R74.8 ELEVATED LIVER ENZYMES: ICD-10-CM

## 2020-01-21 LAB
ALBUMIN SERPL-MCNC: 4.2 G/DL (ref 3.4–5)
ALP BLD-CCNC: 107 U/L (ref 40–129)
ALT SERPL-CCNC: 22 U/L (ref 10–40)
AST SERPL-CCNC: 30 U/L (ref 15–37)
BILIRUB SERPL-MCNC: <0.2 MG/DL (ref 0–1)
BILIRUBIN DIRECT: <0.2 MG/DL (ref 0–0.3)
BILIRUBIN, INDIRECT: NORMAL MG/DL (ref 0–1)
TOTAL PROTEIN: 6.7 G/DL (ref 6.4–8.2)

## 2020-01-22 ENCOUNTER — OFFICE VISIT (OUTPATIENT)
Dept: FAMILY MEDICINE CLINIC | Age: 50
End: 2020-01-22
Payer: COMMERCIAL

## 2020-01-22 VITALS
HEART RATE: 126 BPM | OXYGEN SATURATION: 98 % | WEIGHT: 157.8 LBS | DIASTOLIC BLOOD PRESSURE: 84 MMHG | SYSTOLIC BLOOD PRESSURE: 130 MMHG | HEIGHT: 66 IN | BODY MASS INDEX: 25.36 KG/M2

## 2020-01-22 PROCEDURE — 99214 OFFICE O/P EST MOD 30 MIN: CPT | Performed by: FAMILY MEDICINE

## 2020-01-22 PROCEDURE — G8427 DOCREV CUR MEDS BY ELIG CLIN: HCPCS | Performed by: FAMILY MEDICINE

## 2020-01-22 PROCEDURE — 3017F COLORECTAL CA SCREEN DOC REV: CPT | Performed by: FAMILY MEDICINE

## 2020-01-22 PROCEDURE — G8482 FLU IMMUNIZE ORDER/ADMIN: HCPCS | Performed by: FAMILY MEDICINE

## 2020-01-22 PROCEDURE — G8417 CALC BMI ABV UP PARAM F/U: HCPCS | Performed by: FAMILY MEDICINE

## 2020-01-22 PROCEDURE — 1036F TOBACCO NON-USER: CPT | Performed by: FAMILY MEDICINE

## 2020-01-22 RX ORDER — KETOROLAC TROMETHAMINE 30 MG/ML
30 INJECTION, SOLUTION INTRAMUSCULAR; INTRAVENOUS EVERY 12 HOURS PRN
Qty: 6 VIAL | Refills: 2
Start: 2020-01-22 | End: 2020-02-27 | Stop reason: SDUPTHER

## 2020-01-22 ASSESSMENT — ENCOUNTER SYMPTOMS
CHEST TIGHTNESS: 0
ABDOMINAL PAIN: 0
COLOR CHANGE: 0
NAUSEA: 0
VOMITING: 0
SHORTNESS OF BREATH: 0

## 2020-01-22 NOTE — PATIENT INSTRUCTIONS
Patient Education        Supraventricular Tachycardia: Care Instructions  Your Care Instructions    Having supraventricular tachycardia (SVT) means that from time to time your heart beats abnormally fast. This fast rhythm is caused by changes in the electrical system of your heart. You may feel a fluttering in your chest (palpitations) and have a fast pulse. When your heart is beating fast, you may feel anxious and lightheaded, be short of breath, and feel discomfort in the chest.  Your doctor may prescribe medicines to help slow down your heartbeat. Your doctor may also suggest you try vagal maneuvers when having an episode of SVT. These are things, like bearing down, that might help slow your heart rate. Bearing down means that you try to breathe out with your stomach muscles but you don't let air out of your nose or mouth. Your doctor can show you how to do vagal maneuvers. He or she may suggest you lie down on your back to do them. In some cases, either cardioversion treatment or a procedure called catheter ablation is done to correct SVT. Your doctor may ask you to wear a small electronic device for 1 or 2 days to monitor your heart. It is called a Holter monitor. Follow-up care is a key part of your treatment and safety. Be sure to make and go to all appointments, and call your doctor if you are having problems. It's also a good idea to know your test results and keep a list of the medicines you take. How can you care for yourself at home? · Be safe with medicines. Take your medicines exactly as prescribed. Call your doctor if you think you are having a problem with your medicine. You will get more details on the specific medicines your doctor prescribes. · If your doctor showed you how to do vagal maneuvers, try them when you have an episode. These maneuvers include bearing down or putting an ice-cold, wet towel on your face. · Monitor your condition by keeping a diary of your SVT episodes.  Bring box to learn more about \"Supraventricular Tachycardia: Care Instructions. \"     If you do not have an account, please click on the \"Sign Up Now\" link. Current as of: April 9, 2019  Content Version: 12.3  © 3639-8183 [employer], [employer]. Care instructions adapted under license by [facility] ([facility] [place] If you have questions about a medical condition or this instruction, always ask your healthcare professional. [address] any warranty or liability for your use of this information. Patient Education        Chest Pain: Care Instructions  Your Care Instructions    There are many things that can cause chest pain. Some are not serious and will get better on their own in a few days. But some kinds of chest pain need more testing and treatment. Your doctor may have recommended a follow-up visit in the next 8 to 12 hours. If you are not getting better, you may need more tests or treatment. Even though your doctor has released you, you still need to watch for any problems. The doctor carefully checked you, but sometimes problems can develop later. If you have new symptoms or if your symptoms do not get better, get medical care right away. If you have worse or different chest pain or pressure that lasts more than 5 minutes or you passed out (lost consciousness), call 911 or seek other emergency help right away. A medical visit is only one step in your treatment. Even if you feel better, you still need to do what your doctor recommends, such as going to all suggested follow-up appointments and taking medicines exactly as directed. This will help you recover and help prevent future problems. How can you care for yourself at home? · Rest until you feel better. · Take your medicine exactly as prescribed. Call your doctor if you think you are having a problem with your medicine. · Do not drive after taking a prescription pain medicine. When should you call for help?   Call 911 if:    · You passed

## 2020-01-22 NOTE — PROGRESS NOTES
[facility]    FOOT FRACTURE SURGERY Left 2000    pins later removed    FOOT SURGERY Left 07/10/2017    LARYNX SURGERY      vocal cord nodule    LITHOTRIPSY  12/11    REFRACTIVE SURGERY      THYROIDECTOMY  05/18/2011            Social History     Socioeconomic History    Marital status: Single     Spouse name: Not on file    Number of children: Not on file    Years of education: Not on file    Highest education level: Not on file   Occupational History    Not on file   Social Needs    Financial resource strain: Not on file    Food insecurity:     Worry: Not on file     Inability: Not on file    Transportation needs:     Medical: Not on file     Non-medical: Not on file   Tobacco Use    Smoking status: Never Smoker    Smokeless tobacco: Never Used   Substance and Sexual Activity    Alcohol use: No    Drug use: No    Sexual activity: Not on file   Lifestyle    Physical activity:     Days per week: Not on file     Minutes per session: Not on file    Stress: Not on file   Relationships    Social connections:     Talks on phone: Not on file     Gets together: Not on file     Attends Confucianism service: Not on file     Active member of club or organization: Not on file     Attends meetings of clubs or organizations: Not on file     Relationship status: Not on file    Intimate partner violence:     Fear of current or ex partner: Not on file     Emotionally abused: Not on file     Physically abused: Not on file     Forced sexual activity: Not on file   Other Topics Concern    Not on file   Social History Narrative    Not on file       Family History   Problem Relation Age of Onset    Cancer Mother         breast x2, ovarian x1, brain     High Cholesterol Mother     High Cholesterol Father     Other Father     Migraines Sister     Other Sister     Thyroid Disease Maternal Grandmother        Current Outpatient Medications   Medication Sig Dispense Refill    ketorolac (TORADOL) 30 MG/ML injection Inject 1 mL into the muscle every 12 hours as needed for Pain 6 vial 2    zoster recombinant adjuvanted vaccine (SHINGRIX) 50 MCG/0.5ML SUSR injection Inject 0.5 mLs into the muscle See Admin Instructions 1 dose now and repeat in 2-6 months 0.5 mL 0    levothyroxine (SYNTHROID) 200 MCG tablet TAKE ONE TABLET BY MOUTH DAILY 90 tablet 1    clonazePAM (KLONOPIN) 0.5 MG tablet Take 1 tablet by mouth 2 times daily as needed for Anxiety for up to 30 days. 60 tablet 0    lisinopril (PRINIVIL;ZESTRIL) 10 MG tablet Take 1 tablet by mouth daily 30 tablet 0    Syringe/Needle, Disp, (SYRINGE 3CC/25GX1\") 25G X 1\" 3 ML MISC 1 Syringe by Does not apply route every 12 hours as needed (pain) 6 each 1    diphenoxylate-atropine (LOMOTIL) 2.5-0.025 MG per tablet Take 1 tablet by mouth 2 times daily as needed for Diarrhea for up to 90 days. 180 tablet 0    sertraline (ZOLOFT) 100 MG tablet Take 1 tablet by mouth daily 30 tablet 1    hydrOXYzine (ATARAX) 50 MG tablet Take 0.5-1 tablets by mouth nightly as needed for Anxiety (insomnia) 30 tablet 5    clobetasol (TEMOVATE) 0.05 % cream APPLY TO AFFECTED AREA(S) TWO TIMES A DAY 30 g 1    mupirocin (BACTROBAN) 2 % cream APPLY TO AFFECTED AREA(S) THREE TIMES A DAY 90 g 2    fentaNYL (ACTIQ) 800 MCG lollipop Take 1 each by mouth 3 times daily as needed.       naloxone (NARCAN) 4 MG/0.1ML LIQD nasal spray 1 spray by Nasal route as needed for Opioid Reversal      Cyanocobalamin (VITAMIN B-12) 5000 MCG SUBL Take 1 each by mouth every other day      XARELTO 20 MG TABS tablet TAKE ONE TABLET BY MOUTH DAILY 90 tablet 3    atorvastatin (LIPITOR) 40 MG tablet TAKE ONE TABLET BY MOUTH DAILY 90 tablet 1    diclofenac sodium 1 % GEL Apply 2 g topically 2 times daily      Cholecalciferol (VITAMIN D3) 2000 units CAPS Take 1 capsule by mouth daily      Doxylamine Succinate, Sleep, (UNISOM PO) Take by mouth      carBAMazepine (TEGRETOL XR) 200 MG extended release tablet Take 400 to a different office on a more permanent basis and [place] does not like to drive often and far. While assessing care for this patient, I have reviewed all pertinent lab work/imaging/ specialist notes and care in reference to those problems addressed above in detail. Appropriate medical decision making was based on this. Please note that portions of this note may have been completed with a voice recognition program. Efforts were made to edit the dictations but occasionally words are mis-transcribed. Return in about 4 weeks (around 2/19/2020) for 45 min visit .

## 2020-01-27 ENCOUNTER — TELEPHONE (OUTPATIENT)
Dept: FAMILY MEDICINE CLINIC | Age: 50
End: 2020-01-27

## 2020-01-27 NOTE — PROGRESS NOTES
Still in Clinical review with [facility] Per [facility] [place] [facility] [employer].

## 2020-01-27 NOTE — TELEPHONE ENCOUNTER
I have pended the order for Cardiac event Monitor. Please sign off. MRA Head and Neck have been approved. Patient received letter.

## 2020-01-28 ENCOUNTER — CLINICAL DOCUMENTATION (OUTPATIENT)
Dept: SPIRITUAL SERVICES | Age: 50
End: 2020-01-28

## 2020-01-28 NOTE — FLOWSHEET NOTE
Attempted to follow up with Pt by phone for Outpatient Spiritual Care support. No answer. Left voicemail for Pt and will attempt to follow up again at a later time.     Elijah Musa   Associate       01/28/20 4290   Encounter Summary   Services provided to: Patient not available   Referral/Consult From: [facility]   (1/28 attempted follow up OP, left VM)

## 2020-01-29 ENCOUNTER — TELEPHONE (OUTPATIENT)
Dept: FAMILY MEDICINE CLINIC | Age: 50
End: 2020-01-29

## 2020-01-29 NOTE — TELEPHONE ENCOUNTER
Has spent hours trying to get echo and monitor scheduled. Thought she had it all straightened out. Then she saw on my chart that she is scheduled for labs at 11 and an echo at 3:30. She is in a panic and wants to speak with someone. Sanam López it was not supposed to be scheduled that way. She has difficulty with appts in the morning due to being up with her dad a lot through the night.

## 2020-01-29 NOTE — TELEPHONE ENCOUNTER
Patient is asking for the [facility] to call her back in regards to the ECHO, heart monitor, please call at [phone]

## 2020-01-29 NOTE — TELEPHONE ENCOUNTER
I called patient and discussed the heart Monitor. She states that she was told that only [employer] does the heart monitors and she cannot travel that far. I gave her [id number] [facility] number to call, since they place them.

## 2020-01-30 ENCOUNTER — NURSE ONLY (OUTPATIENT)
Dept: CARDIOLOGY CLINIC | Age: 50
End: 2020-01-30

## 2020-01-30 ENCOUNTER — HOSPITAL ENCOUNTER (OUTPATIENT)
Dept: CARDIOLOGY | Age: 50
Discharge: HOME | End: 2020-01-30
Payer: COMMERCIAL

## 2020-01-30 LAB
LV EF: 55 %
LVEF MODALITY: NORMAL

## 2020-01-30 PROCEDURE — 93306 TTE W/DOPPLER COMPLETE: CPT

## 2020-01-30 PROCEDURE — 0296T PR EXT ECG > 48HR TO 21 DAY RCRD W/CONECT INTL RCRD: CPT | Performed by: INTERNAL MEDICINE

## 2020-02-03 ENCOUNTER — OFFICE VISIT (OUTPATIENT)
Dept: PSYCHOLOGY | Age: 50
End: 2020-02-03
Payer: COMMERCIAL

## 2020-02-03 PROCEDURE — 90791 PSYCH DIAGNOSTIC EVALUATION: CPT | Performed by: PSYCHOLOGIST

## 2020-02-03 ASSESSMENT — PATIENT HEALTH QUESTIONNAIRE - PHQ9
10. IF YOU CHECKED OFF ANY PROBLEMS, HOW DIFFICULT HAVE THESE PROBLEMS MADE IT FOR YOU TO DO YOUR WORK, TAKE CARE OF THINGS AT HOME, OR GET ALONG WITH OTHER PEOPLE: 2
5. POOR APPETITE OR OVEREATING: 3
4. FEELING TIRED OR HAVING LITTLE ENERGY: 3
SUM OF ALL RESPONSES TO PHQ QUESTIONS 1-9: 18
SUM OF ALL RESPONSES TO PHQ QUESTIONS 1-9: 18
2. FEELING DOWN, DEPRESSED OR HOPELESS: 3
SUM OF ALL RESPONSES TO PHQ9 QUESTIONS 1 & 2: 5
1. LITTLE INTEREST OR PLEASURE IN DOING THINGS: 2
9. THOUGHTS THAT YOU WOULD BE BETTER OFF DEAD, OR OF HURTING YOURSELF: 0
6. FEELING BAD ABOUT YOURSELF - OR THAT YOU ARE A FAILURE OR HAVE LET YOURSELF OR YOUR FAMILY DOWN: 2
8. MOVING OR SPEAKING SO SLOWLY THAT OTHER PEOPLE COULD HAVE NOTICED. OR THE OPPOSITE, BEING SO FIGETY OR RESTLESS THAT YOU HAVE BEEN MOVING AROUND A LOT MORE THAN USUAL: 0
3. TROUBLE FALLING OR STAYING ASLEEP: 3
7. TROUBLE CONCENTRATING ON THINGS, SUCH AS READING THE NEWSPAPER OR WATCHING TELEVISION: 2

## 2020-02-03 ASSESSMENT — ANXIETY QUESTIONNAIRES
7. FEELING AFRAID AS IF SOMETHING AWFUL MIGHT HAPPEN: 3-NEARLY EVERY DAY
1. FEELING NERVOUS, ANXIOUS, OR ON EDGE: 3-NEARLY EVERY DAY
5. BEING SO RESTLESS THAT IT IS HARD TO SIT STILL: 1-SEVERAL DAYS
2. NOT BEING ABLE TO STOP OR CONTROL WORRYING: 3-NEARLY EVERY DAY
6. BECOMING EASILY ANNOYED OR IRRITABLE: 1-SEVERAL DAYS
4. TROUBLE RELAXING: 2-OVER HALF THE DAYS
GAD7 TOTAL SCORE: 14
3. WORRYING TOO MUCH ABOUT DIFFERENT THINGS: 1-SEVERAL DAYS

## 2020-02-03 NOTE — PROGRESS NOTES
Behavioral Health Consultation  [address] Bridgett Rey PsyD  Psychologist  2/3/2020   11:05 AM      Time spent with Patient: 35 minutes  [facility]. Pt previously seen by Dr. Juliane Feldman. Reason for Consult: anxiety, depression  Referring Provider: Sebastian Matthews MD     Pt provided informed consent for the behavioral health program. Discussed with patient model of service to include the limits of confidentiality (i.e. abuse reporting, suicide intervention, etc.) and short-term intervention focused approach. Pt indicated understanding. Feedback given to PCP. S:  Pt reports a lot of stress. Mother had brain cancer- passed away June 20, 2016. Moved in to help mom with care. After she passed she stayed and continued to live with dad - dad's health then declined. He now has lung cancer and is in treatment. Feels overwhelmed and frazzled. Wants to not cry in public. Primary caregiver for dad -sister only helps sometimes. A lot of conflict with relationship with sister. Doesn't sleep well. Has never slept well but not helping. Has tried many sleep medications. Medications haven't helped with insomnia. Gets some financial assistance as retiree with [facility] as she used to work for [employer]. Used to be a     Has been in contact with [employer] on [address] - been told dad not eligible for services. Has constant chest tightness. Is taking Klonopin regularly. Doesn't feel it's too helpful. Also taking Zoloft and doesn't feel it is helpful.     O:  MSE:    Appearance: good hygiene   Attitude: cooperative and friendly  Consciousness: alert  Orientation: oriented to person, place, time, general circumstance  Memory: recent and remote memory intact  Attention/Concentration: intact during session  Psychomotor Activity:normal  Eye Contact: normal  Speech: normal rate and volume, well-articulated  Mood: \"frazzled\"  Affect: euthymic and abused: Not on file     Forced sexual activity: Not on file   Other Topics Concern    Not on file   Social History Narrative    Not on file       TOBACCO:   reports that she has never smoked. She has never used smokeless tobacco.  ETOH:   reports no history of alcohol use. Family History:   Family History   Problem Relation Age of Onset    Cancer Mother         breast x2, ovarian x1, brain     High Cholesterol Mother     High Cholesterol Father     Other Father    [facility] Migraines Sister     Other Sister     Thyroid Disease Maternal Grandmother          A:  Ms. Kezia Soria is struggling with anxious and depressed mood secondary to many life stressors including caring for her ill father. She was talkative and engaged. She responded positively to behavioral interventions. MERRY 7 SCORE 2/3/2020 12/10/2019 10/24/2019   MERRY-7 Total Score 14 16 12     Interpretation of MERRY-7 score: 5-9 = mild anxiety, 10-14 = moderate anxiety, 15+ = severe anxiety. Recommend referral to behavioral health for scores 10 or greater. PHQ Scores 2/3/2020 12/10/2019 10/24/2019 6/28/2019   PHQ2 Score 5 5 6 2   PHQ9 Score 18 16 21 13     Interpretation of Total Score Depression Severity: 1-4 = Minimal depression, 5-9 = Mild depression, 10-14 = Moderate depression, 15-19 = Moderately severe depression, 20-27 = Severe depression      Diagnosis:    1. Anxiety    2.  Depression, unspecified depression type           Plan:  Pt interventions:    Established rapport, Discussed [facility] model of care vs specialty mental health, Conducted functional assessment, [place]-setting to identify pt's primary goals for [facility] visit / overall health, Supportive techniques, reinforced pt's skill use, provided info for resources to [address] [place] [address], info for [facility] and treatment planning    Pt Behavioral Change Plan:  Pt set goals to 1) call [facility] to ask about financial assistance 2) call [facility] and [address] about

## 2020-02-03 NOTE — PATIENT INSTRUCTIONS
SURINDER Mooney  - ask for financial assistance with [address] [place] [address] [facility]    O: [phone]    ---------------------    [address] [place] [address]    Phone: [phone]    ---------------    [facility]    Phone: [phone]

## 2020-02-04 ENCOUNTER — COMMUNITY OUTREACH (OUTPATIENT)
Dept: OTHER | Age: 50
End: 2020-02-04

## 2020-02-04 RX ORDER — ATORVASTATIN CALCIUM 40 MG/1
TABLET, FILM COATED ORAL
Qty: 90 TABLET | Refills: 0 | Status: SHIPPED | OUTPATIENT
Start: 2020-02-04 | End: 2020-02-21 | Stop reason: SDUPTHER

## 2020-02-04 RX ORDER — LISINOPRIL 10 MG/1
TABLET ORAL
Qty: 30 TABLET | Refills: 0 | Status: SHIPPED | OUTPATIENT
Start: 2020-02-04 | End: 2020-04-06

## 2020-02-10 ENCOUNTER — OFFICE VISIT (OUTPATIENT)
Dept: PSYCHOLOGY | Age: 50
End: 2020-02-10
Payer: COMMERCIAL

## 2020-02-10 PROCEDURE — 90832 PSYTX W PT 30 MINUTES: CPT | Performed by: PSYCHOLOGIST

## 2020-02-10 RX ORDER — SERTRALINE HYDROCHLORIDE 100 MG/1
100 TABLET, FILM COATED ORAL DAILY
Qty: 30 TABLET | Refills: 5 | Status: SHIPPED | OUTPATIENT
Start: 2020-02-10 | End: 2020-04-06

## 2020-02-10 ASSESSMENT — PATIENT HEALTH QUESTIONNAIRE - PHQ9
SUM OF ALL RESPONSES TO PHQ9 QUESTIONS 1 & 2: 6
10. IF YOU CHECKED OFF ANY PROBLEMS, HOW DIFFICULT HAVE THESE PROBLEMS MADE IT FOR YOU TO DO YOUR WORK, TAKE CARE OF THINGS AT HOME, OR GET ALONG WITH OTHER PEOPLE: 3
5. POOR APPETITE OR OVEREATING: 2
SUM OF ALL RESPONSES TO PHQ QUESTIONS 1-9: 18
7. TROUBLE CONCENTRATING ON THINGS, SUCH AS READING THE NEWSPAPER OR WATCHING TELEVISION: 3
8. MOVING OR SPEAKING SO SLOWLY THAT OTHER PEOPLE COULD HAVE NOTICED. OR THE OPPOSITE, BEING SO FIGETY OR RESTLESS THAT YOU HAVE BEEN MOVING AROUND A LOT MORE THAN USUAL: 0
9. THOUGHTS THAT YOU WOULD BE BETTER OFF DEAD, OR OF HURTING YOURSELF: 0
2. FEELING DOWN, DEPRESSED OR HOPELESS: 3
6. FEELING BAD ABOUT YOURSELF - OR THAT YOU ARE A FAILURE OR HAVE LET YOURSELF OR YOUR FAMILY DOWN: 1
4. FEELING TIRED OR HAVING LITTLE ENERGY: 3
3. TROUBLE FALLING OR STAYING ASLEEP: 3
SUM OF ALL RESPONSES TO PHQ QUESTIONS 1-9: 18
1. LITTLE INTEREST OR PLEASURE IN DOING THINGS: 3

## 2020-02-10 ASSESSMENT — ANXIETY QUESTIONNAIRES
7. FEELING AFRAID AS IF SOMETHING AWFUL MIGHT HAPPEN: 3-NEARLY EVERY DAY
1. FEELING NERVOUS, ANXIOUS, OR ON EDGE: 3-NEARLY EVERY DAY
6. BECOMING EASILY ANNOYED OR IRRITABLE: 2-OVER HALF THE DAYS
5. BEING SO RESTLESS THAT IT IS HARD TO SIT STILL: 2-OVER HALF THE DAYS
2. NOT BEING ABLE TO STOP OR CONTROL WORRYING: 3-NEARLY EVERY DAY
4. TROUBLE RELAXING: 2-OVER HALF THE DAYS
GAD7 TOTAL SCORE: 18
3. WORRYING TOO MUCH ABOUT DIFFERENT THINGS: 3-NEARLY EVERY DAY

## 2020-02-10 NOTE — TELEPHONE ENCOUNTER
Refill Request     Last Seen: 1/22/2020    Last Written: 12/12/2019    Next Appointment:   Future Appointments   Date Time Provider Rula Lien   2/10/2020 10:45 AM [address] [place], [place] TALYA SALVADOR   2/24/2020  1:15 PM Jason Canavan, MD [facility]           Requested Prescriptions     Pending Prescriptions Disp Refills    sertraline (ZOLOFT) 100 MG tablet 30 tablet 1     Sig: Take 1 tablet by mouth daily

## 2020-02-10 NOTE — PROGRESS NOTES
Behavioral Health Consultation  900 Bridgett Rey PsyD  Psychologist  2/10/2020   10:37 AM      Time spent with Patient: 35 minutes  This is patient's second [facility] appointment. Pt previously seen by Dr. Angelica Medina. Reason for Consult: anxiety, depression  Referring Provider: Pura Ackerman MD       S:  During the last visit pt set goals to 1) call [facility] to ask about financial assistance 2) call [address] about resources available     Pt reports she is stressed. Ongoing issues with father's health. Has to have another transfusion this week like last week. Did call [facility] - is unclear about what they can offer her. Will be getting a calendar from [employer]. Is also going to apply for financial assistance with [facility] - feels hopeful. Sleep continues to a problem. Thinks if she could sleep better her mood would improve. Saw a sleep psychiatrist for 9 years. Bed time varies 9pm-12am - depends on dad's condition. Ideally if dad doesn't need help, goes to bed at 10pm. Lays there for an 1.5 hours tossing and turning. Listens to talk radio at night  In bed. Feels it helps distract her from racing thoughts. Wakes about 2 hours later and is wide awake. Gets up to check on dad or use the restroom. Gets up for the day at 8am. Only naps when super desperate - once or twice a week. Prays during wind down. [facility]. Has white noise on at night. Sets radio on 90 min timer.       O:  MSE:    Appearance: good hygiene   Attitude: cooperative and friendly  Consciousness: alert  Orientation: oriented to person, place, time, general circumstance  Memory: recent and remote memory intact  Attention/Concentration: intact during session  Psychomotor Activity:normal  Eye Contact: normal  Speech: normal rate and volume, well-articulated  Mood: \"stressed\"  Affect: euthymic and congruent   Perception: within normal limits  Thought Content: within normal limits  Thought Process: logical, coherent and goal-directed  Insight: good  Judgment: intact  Ability to understand instructions: Yes  Ability to respond meaningfully: Yes  Morbid Ideation: no   Suicide Assessment: no suicidal ideation, plan, or intent  Homicidal Ideation: no      History:    Medications:   Current Outpatient Medications   Medication Sig Dispense Refill    sertraline (ZOLOFT) 100 MG tablet Take 1 tablet by mouth daily 30 tablet 5    lisinopril (PRINIVIL;ZESTRIL) 10 MG tablet TAKE ONE TABLET BY MOUTH DAILY 30 tablet 0    atorvastatin (LIPITOR) 40 MG tablet TAKE ONE TABLET BY MOUTH DAILY 90 tablet 0    ketorolac (TORADOL) 30 MG/ML injection Inject 1 mL into the muscle every 12 hours as needed for Pain 6 vial 2    zoster recombinant adjuvanted vaccine (SHINGRIX) 50 MCG/0.5ML SUSR injection Inject 0.5 mLs into the muscle See Admin Instructions 1 dose now and repeat in 2-6 months 0.5 mL 0    levothyroxine (SYNTHROID) 200 MCG tablet TAKE ONE TABLET BY MOUTH DAILY 90 tablet 1    clonazePAM (KLONOPIN) 0.5 MG tablet Take 1 tablet by mouth 2 times daily as needed for Anxiety for up to 30 days. 60 tablet 0    Syringe/Needle, Disp, (SYRINGE 3CC/25GX1\") 25G X 1\" 3 ML MISC 1 Syringe by Does not apply route every 12 hours as needed (pain) 6 each 1    diphenoxylate-atropine (LOMOTIL) 2.5-0.025 MG per tablet Take 1 tablet by mouth 2 times daily as needed for Diarrhea for up to 90 days. 180 tablet 0    hydrOXYzine (ATARAX) 50 MG tablet Take 0.5-1 tablets by mouth nightly as needed for Anxiety (insomnia) 30 tablet 5    clobetasol (TEMOVATE) 0.05 % cream APPLY TO AFFECTED AREA(S) TWO TIMES A DAY 30 g 1    mupirocin (BACTROBAN) 2 % cream APPLY TO AFFECTED AREA(S) THREE TIMES A DAY 90 g 2    fentaNYL (ACTIQ) 800 MCG lollipop Take 1 each by mouth 3 times daily as needed.       naloxone (NARCAN) 4 MG/0.1ML LIQD nasal spray 1 spray by Nasal route as needed for Opioid Reversal      Cyanocobalamin (VITAMIN B-12) 5000 MCG SUBL Take 1 each by mouth every other day      XARELTO 20 MG TABS tablet TAKE ONE TABLET BY MOUTH DAILY 90 tablet 3    diclofenac sodium 1 % GEL Apply 2 g topically 2 times daily      Cholecalciferol (VITAMIN D3) 2000 units CAPS Take 1 capsule by mouth daily      Doxylamine Succinate, Sleep, (UNISOM PO) Take by mouth      carBAMazepine (TEGRETOL XR) 200 MG extended release tablet Take 400 mg by mouth every 8 hours       primidone (MYSOLINE) 250 MG tablet Take 250 mg by mouth 4 times daily       Bismuth Subsalicylate (PEPTO-BISMOL PO) Take by mouth as needed       No current facility-administered medications for this visit.         Social History:   Social History     Socioeconomic History    Marital status: Single     Spouse name: Not on file    Number of children: Not on file    Years of education: Not on file    Highest education level: Not on file   Occupational History    Not on file   Social Needs    Financial resource strain: Not on file    Food insecurity:     Worry: Not on file     Inability: Not on file    Transportation needs:     Medical: Not on file     Non-medical: Not on file   Tobacco Use    Smoking status: Never Smoker    Smokeless tobacco: Never Used   Substance and Sexual Activity    Alcohol use: No    Drug use: No    Sexual activity: Not on file   Lifestyle    Physical activity:     Days per week: Not on file     Minutes per session: Not on file    Stress: Not on file   Relationships    Social connections:     Talks on phone: Not on file     Gets together: Not on file     Attends Evangelical service: Not on file     Active member of club or organization: Not on file     Attends meetings of clubs or organizations: Not on file     Relationship status: Not on file    Intimate partner violence:     Fear of current or ex partner: Not on file     Emotionally abused: Not on file     Physically abused: Not on file     Forced sexual activity: Not on file   Other Topics Concern    Not on file   Social History Narrative    Not on file       TOBACCO:   reports that she has never smoked. She has never used smokeless tobacco.  ETOH:   reports no history of alcohol use. Family History:   Family History   Problem Relation Age of Onset    Cancer Mother         breast x2, ovarian x1, brain     High Cholesterol Mother     High Cholesterol Father     Other Father    Otvandana Julio Migraines Sister     Other Sister     Thyroid Disease Maternal Grandmother          A:  Ms. Jaime Lepe is struggling with anxious and depressed mood secondary to many life stressors including caring for her ill father. She was talkative and engaged. She responded positively to behavioral interventions. MERRY 7 SCORE 2/10/2020 2/3/2020 12/10/2019 10/24/2019   MERRY-7 Total Score 18 14 16 12     Interpretation of MERRY-7 score: 5-9 = mild anxiety, 10-14 = moderate anxiety, 15+ = severe anxiety. Recommend referral to behavioral health for scores 10 or greater. PHQ Scores 2/10/2020 2/3/2020 12/10/2019 10/24/2019 6/28/2019   PHQ2 Score 6 5 5 6 2   PHQ9 Score 18 18 16 21 13     Interpretation of Total Score Depression Severity: 1-4 = Minimal depression, 5-9 = Mild depression, 10-14 = Moderate depression, 15-19 = Moderately severe depression, 20-27 = Severe depression      Diagnosis:    1. Anxiety    2.  Psychophysiological insomnia           Plan:  Pt interventions:    [place]-setting to identify pt's primary goals for [facility] visit / overall health, Supportive techniques, provided handout and discussed ways to improve sleep through behavior change, discussed the role of stimulus control procedures to improve sleep, CBT-i interventions focusing on sleep education, stimulus control, sleep restriction, reinforced pt's skill use, taught counting one exercise and treatment planning    Pt Behavioral Change Plan:  Pt set goals to 1) implement identified behavior changes to improve sleep like implement wind down, put chair in room to do

## 2020-02-11 PROCEDURE — 0298T PR EXT ECG > 48HR TO 21 DAY REVIEW AND INTERPRETATN: CPT | Performed by: INTERNAL MEDICINE

## 2020-02-12 ENCOUNTER — TELEPHONE (OUTPATIENT)
Dept: FAMILY MEDICINE CLINIC | Age: 50
End: 2020-02-12

## 2020-02-12 RX ORDER — CLONAZEPAM 0.5 MG/1
TABLET ORAL
Qty: 60 TABLET | Refills: 0 | Status: SHIPPED | OUTPATIENT
Start: 2020-02-12 | End: 2020-03-23

## 2020-02-18 ENCOUNTER — TELEPHONE (OUTPATIENT)
Dept: CARDIOLOGY CLINIC | Age: 50
End: 2020-02-18

## 2020-02-18 NOTE — TELEPHONE ENCOUNTER
Rerouted to Angela Ponec    Any changes or new orders per her   monitor results Sinus tachycardia and short runs of PAT noted

## 2020-02-20 ENCOUNTER — PATIENT MESSAGE (OUTPATIENT)
Dept: FAMILY MEDICINE CLINIC | Age: 50
End: 2020-02-20

## 2020-02-21 RX ORDER — LEVOTHYROXINE SODIUM 0.2 MG/1
TABLET ORAL
Qty: 90 TABLET | Refills: 1 | Status: SHIPPED | OUTPATIENT
Start: 2020-02-21 | End: 2020-09-08

## 2020-02-21 RX ORDER — MUPIROCIN CALCIUM 20 MG/G
CREAM TOPICAL
Qty: 90 G | Refills: 2 | Status: SHIPPED | OUTPATIENT
Start: 2020-02-21 | End: 2021-02-09 | Stop reason: SDUPTHER

## 2020-02-21 RX ORDER — CLOBETASOL PROPIONATE 0.5 MG/G
CREAM TOPICAL
Qty: 90 G | Refills: 2 | Status: SHIPPED | OUTPATIENT
Start: 2020-02-21 | End: 2021-02-09 | Stop reason: SDUPTHER

## 2020-02-21 RX ORDER — ATORVASTATIN CALCIUM 40 MG/1
TABLET, FILM COATED ORAL
Qty: 90 TABLET | Refills: 1 | Status: SHIPPED | OUTPATIENT
Start: 2020-02-21 | End: 2021-02-01

## 2020-02-21 NOTE — TELEPHONE ENCOUNTER
From: Lizzy Park  To: Irene Keyes MD  Sent: 2/20/2020 11:22 PM EST  Subject: Prescription Question    Hi, Dr. Sirena Carias had said you'd be willing to write 90 day scripts on certain medications so I can cut down on so many of my [address] [facility] trips. Soon, I'll be needing the Atorvastatin & Levothyroxine. I also would like at least 30 day supplies for the Clobetasol & Mupirocin. Those are \"tricky\" at [facility] because they always tell me if the gram size of the tube is not specified, they're required to give me the smallest tube they have. If you prescribed two-30gram tubes of Clobetasol, at least that would=30 day supply instead of the current 15 day supply. And three 30gram tubes of Mupirocin would=30 day suply. Currently, one 30gram tube of that is only equal to a 10 day supply. I'd appreciate anything you can do to help with these.

## 2020-02-26 ENCOUNTER — COMMUNITY OUTREACH (OUTPATIENT)
Dept: OTHER | Age: 50
End: 2020-02-26

## 2020-02-27 ENCOUNTER — TELEPHONE (OUTPATIENT)
Dept: FAMILY MEDICINE CLINIC | Age: 50
End: 2020-02-27

## 2020-02-27 RX ORDER — KETOROLAC TROMETHAMINE 30 MG/ML
30 INJECTION, SOLUTION INTRAMUSCULAR; INTRAVENOUS EVERY 12 HOURS PRN
Qty: 6 VIAL | Refills: 2 | Status: SHIPPED | OUTPATIENT
Start: 2020-02-27 | End: 2020-04-13

## 2020-02-27 NOTE — TELEPHONE ENCOUNTER
Pt called in requesting a call back from [facility]. Does not want to speak with any one other than her. Regarding instructions on pts blood pressure that she was given by Dr. Jaqueline Toussaint. Please call back pt.

## 2020-03-02 ENCOUNTER — OFFICE VISIT (OUTPATIENT)
Dept: FAMILY MEDICINE CLINIC | Age: 50
End: 2020-03-02
Payer: COMMERCIAL

## 2020-03-02 VITALS
BODY MASS INDEX: 26.22 KG/M2 | SYSTOLIC BLOOD PRESSURE: 152 MMHG | HEART RATE: 86 BPM | WEIGHT: 160 LBS | OXYGEN SATURATION: 96 % | DIASTOLIC BLOOD PRESSURE: 110 MMHG

## 2020-03-02 PROCEDURE — 3017F COLORECTAL CA SCREEN DOC REV: CPT | Performed by: FAMILY MEDICINE

## 2020-03-02 PROCEDURE — 1036F TOBACCO NON-USER: CPT | Performed by: FAMILY MEDICINE

## 2020-03-02 PROCEDURE — G8482 FLU IMMUNIZE ORDER/ADMIN: HCPCS | Performed by: FAMILY MEDICINE

## 2020-03-02 PROCEDURE — G8427 DOCREV CUR MEDS BY ELIG CLIN: HCPCS | Performed by: FAMILY MEDICINE

## 2020-03-02 PROCEDURE — G8417 CALC BMI ABV UP PARAM F/U: HCPCS | Performed by: FAMILY MEDICINE

## 2020-03-02 PROCEDURE — 99215 OFFICE O/P EST HI 40 MIN: CPT | Performed by: FAMILY MEDICINE

## 2020-03-02 RX ORDER — LOSARTAN POTASSIUM 25 MG/1
25 TABLET ORAL DAILY
Qty: 30 TABLET | Refills: 5 | Status: SHIPPED | OUTPATIENT
Start: 2020-03-02 | End: 2020-04-01 | Stop reason: SDUPTHER

## 2020-03-02 NOTE — PATIENT INSTRUCTIONS
the starting point. Now, repeat and turn left. · Walk up and down a slope. Now try stairs. · While holding on to someone's arm, try these exercises with your eyes closed. When should you call for help? Watch closely for changes in your health, and be sure to contact your doctor if:    · You do not get better as expected. Where can you learn more? Go to [email or web address] Dynamicpepiceweb.[email or web address] org and sign in to your [employer] account. Enter O453 in the ERN box to learn more about \"[place] Exercises for Vertigo: Care Instructions. \"     If you do not have an account, please click on the \"Sign Up Now\" link. Current as of: July 28, 2019  Content Version: 12.3  © 4143-1381 Healthwise, Incorporated. Care instructions adapted under license by [facility] ([facility]). If you have questions about a medical condition or this instruction, always ask your healthcare professional. [address] any warranty or liability for your use of this information.

## 2020-03-02 NOTE — PROGRESS NOTES
3/2/2020    This is a 47 y.o. female who presents for  Chief Complaint   Patient presents with    Hypertension       HPI:     HTN:  Taking medication daily  Checking Bps at home, scanned into media  Waiting on a new arm cuff, should be arriving tomorrow  No new AEs to the medication(s)  No acute concerning Sxs: No CP, SOB, palpitations, dizziness, HA, visual changes, diaphoresis, nausea, etc.   Working on eating less salt  Anxiety and stress is a big component      + cough when laying down, sometimes when laying down  No fever, production, sick contacts, URI Sxs    Dr. Toñito Bernal, was supposed to see her a week or two ago   Switch from sleep techniques to stress as a focus     Dad is calling her his mother's name, sometimes realizes it   More confusion  She is at the hospital or chemo with him 3-4/5 days of the week  She argues with her sister about goals of care    + dizziness  No tinnitus, ear pain, discharge, HL  + whooshing sound on the R side   With changes in head position only        Past Medical History:   Diagnosis Date    Benign essential tremor     Colon polyp 05/06/2019    Degenerative disc disease     DVT, lower extremity ([address] 1989    Fibromyalgia     Kidney stones     Left-sided trigeminal neuralgia     Malignant neoplasm of thyroid gland ([address] 8/22/2013    Migraines, neuralgic     since stroke 1999 Chronic    PONV (postoperative nausea and vomiting)     Poor venous access     Protein S deficiency ([address]     Pulmonary embolism ([address] 1989    Seizure disorder ([address]     grand mal in 2005 ( childhood febrile seizure also-from a baby to age 14) and also X 1 ~2005 with headache treatment    Stroke ([address] 2/25/2019    Thrombosis of superior sagittal sinus 1999    Unspecified cerebral artery occlusion with cerebral infarction 1999    Vertebral artery occlusion 1999    White coat syndrome with high blood pressure but without hypertension        Past Surgical History:   Procedure Laterality Date  BREAST LUMPECTOMY      x 3    CYSTOSCOPY  01/09/2012    w/ left ureteroscopy, holmium laser.  stone manipulation and left stent insertion    FINGER TRIGGER RELEASE Left 10/31/2019    LEFT THUMB TRIGGER DIGIT RELEASE performed by Santana Light MD at [address] Left 2000    pins later removed    FOOT SURGERY Left 07/10/2017    LARYNX SURGERY      vocal cord nodule    LITHOTRIPSY  12/11    REFRACTIVE SURGERY      THYROIDECTOMY  05/18/2011            Social History     Socioeconomic History    Marital status: Single     Spouse name: Not on file    Number of children: Not on file    Years of education: Not on file    Highest education level: Not on file   Occupational History    Not on file   Social Needs    Financial resource strain: Not on file    Food insecurity     Worry: Not on file     Inability: Not on file   [employer] needs     Medical: Not on file     Non-medical: Not on file   Tobacco Use    Smoking status: Never Smoker    Smokeless tobacco: Never Used   Substance and Sexual Activity    Alcohol use: No    Drug use: No    Sexual activity: Not on file   Lifestyle    Physical activity     Days per week: Not on file     Minutes per session: Not on file    Stress: Not on file   Relationships    Social connections     Talks on phone: Not on file     Gets together: Not on file     Attends Scientologist service: Not on file     Active member of club or organization: Not on file     Attends meetings of clubs or organizations: Not on file     Relationship status: Not on file    Intimate partner violence     Fear of current or ex partner: Not on file     Emotionally abused: Not on file     Physically abused: Not on file     Forced sexual activity: Not on file   Other Topics Concern    Not on file   Social History Narrative    Not on file       Family History   Problem Relation Age of Onset    Cancer Mother         breast x2, ovarian x1, brain Succinate, Sleep, (UNISOM PO) Take by mouth      carBAMazepine (TEGRETOL XR) 200 MG extended release tablet Take 400 mg by mouth every 8 hours       primidone (MYSOLINE) 250 MG tablet Take 250 mg by mouth 4 times daily       Bismuth Subsalicylate (PEPTO-BISMOL PO) Take by mouth as needed       No current facility-administered medications for this visit. Immunization History   Administered Date(s) Administered    Influenza, Quadv, IM, PF (6 mo and older Fluzone, Flulaval, Fluarix, and 3 yrs and older Afluria) 09/19/2019    Tdap (Boostrix, Adacel) 04/04/2019    Zoster Recombinant (Shingrix) 02/05/2020       Allergies   Allergen Reactions    Dilaudid [Hydromorphone Hcl] Shortness Of Breath, Itching and Other (See Comments)     Wheezing    Buprenorphine Itching, Nausea And Vomiting and Other (See Comments)     Tingling in lips    Duloxetine Hcl Other (See Comments)     Personality changes and depression    Gabapentin     Gluten Meal      Celiac disease    Hydromorphone Itching and Other (See Comments)    Morphine Itching    Pregabalin      Lyrica (personality changes)     Propranolol Hives    Topiramate Other (See Comments)       Review of Systems   Constitutional: Negative for activity change, appetite change, chills, diaphoresis, fatigue, fever and unexpected weight change. HENT: Negative for ear pain, hearing loss and trouble swallowing. Eyes: Negative for visual disturbance. Respiratory: Negative for cough, chest tightness and shortness of breath. Cardiovascular: Negative for chest pain, palpitations and leg swelling. Gastrointestinal: Positive for nausea. Negative for abdominal pain, blood in stool, constipation, diarrhea and vomiting. Genitourinary: Negative for decreased urine volume, difficulty urinating, dysuria, flank pain, hematuria and urgency. Musculoskeletal: Positive for arthralgias. Negative for back pain. Skin: Negative for color change and rash.    Neurological: Positive for dizziness, tremors, light-headedness and headaches. Negative for seizures, syncope, speech difficulty, weakness and numbness. Psychiatric/Behavioral: Positive for dysphoric mood and sleep disturbance. The patient is nervous/anxious. BP (!) 152/110 (Site: Left Upper Arm, Position: Standing, Cuff Size: Medium Adult)   Pulse 86   Wt 160 lb (72.6 kg)   LMP 06/30/2015   SpO2 96%   BMI 26.22 kg/m²     Physical Exam  Vitals signs and nursing note reviewed. Constitutional:       General: She is not in acute distress. Appearance: She is well-developed. She is not diaphoretic. HENT:      Head: Normocephalic and atraumatic. Right Ear: External ear normal.      Left Ear: External ear normal.      Mouth/Throat:      Pharynx: No oropharyngeal exudate. Eyes:      General:         Right eye: No discharge. Left eye: No discharge. Conjunctiva/sclera: Conjunctivae normal.      Pupils: Pupils are equal, round, and reactive to light. Neck:      Musculoskeletal: Normal range of motion and neck supple. Thyroid: No thyromegaly. Vascular: No JVD. Cardiovascular:      Rate and Rhythm: Normal rate and regular rhythm. Heart sounds: Normal heart sounds. No murmur. No friction rub. No gallop. Pulmonary:      Effort: Pulmonary effort is normal. No respiratory distress. Breath sounds: Normal breath sounds. No wheezing or rales. Chest:      Chest wall: No tenderness. Abdominal:      General: Bowel sounds are normal. There is no distension. Palpations: Abdomen is soft. Tenderness: There is no abdominal tenderness. There is no guarding or rebound. Musculoskeletal: Normal range of motion. Lymphadenopathy:      Cervical: No cervical adenopathy. Skin:     General: Skin is warm and dry. Capillary Refill: Capillary refill takes 2 to 3 seconds. Coloration: Skin is not pale. Findings: No erythema or rash.    Neurological:      General: No

## 2020-03-03 ENCOUNTER — TELEPHONE (OUTPATIENT)
Dept: FAMILY MEDICINE CLINIC | Age: 50
End: 2020-03-03

## 2020-03-03 ENCOUNTER — CLINICAL DOCUMENTATION (OUTPATIENT)
Dept: SPIRITUAL SERVICES | Age: 50
End: 2020-03-03

## 2020-03-03 NOTE — FLOWSHEET NOTE
Attempted to follow up with Pt for Outpatient Spiritual Care support. No answer on phone. Left voicemail for Pt and will attempt to follow up again at a later time.     [address] [facility]       03/03/20 [id number]   Encounter Summary   Services provided to: Patient not available   Referral/Consult From: Kai   Continue Visiting   (3/3 attempted follow up OP, left VM)

## 2020-03-04 ENCOUNTER — TELEPHONE (OUTPATIENT)
Dept: FAMILY MEDICINE CLINIC | Age: 50
End: 2020-03-04

## 2020-03-04 LAB
ANION GAP SERPL CALCULATED.3IONS-SCNC: 16 MMOL/L (ref 3–16)
BUN BLDV-MCNC: 15 MG/DL (ref 7–20)
CALCIUM SERPL-MCNC: 9.2 MG/DL (ref 8.3–10.6)
CHLORIDE BLD-SCNC: 102 MMOL/L (ref 99–110)
CO2: 22 MMOL/L (ref 21–32)
CREAT SERPL-MCNC: 0.5 MG/DL (ref 0.6–1.1)
GFR AFRICAN AMERICAN: >60
GFR NON-AFRICAN AMERICAN: >60
GLUCOSE BLD-MCNC: 132 MG/DL (ref 70–99)
POTASSIUM SERPL-SCNC: 4.1 MMOL/L (ref 3.5–5.1)
SODIUM BLD-SCNC: 140 MMOL/L (ref 136–145)

## 2020-03-04 NOTE — TELEPHONE ENCOUNTER
Usually wheezing is not one of the side effects of Lisinopril. Wheezing can be due to many things including infections and inflammation in the airway. If the wheezing returns, she needs to be seen. Please let her know. Thanks.

## 2020-03-04 NOTE — TELEPHONE ENCOUNTER
Patient states when she inhales there is some wheezing, but it is every now and then. She is wonder if it can be from the Lisinopril? She is not taking Lisinopril and she has not had wheezing or whistling.

## 2020-03-05 ENCOUNTER — TELEPHONE (OUTPATIENT)
Dept: FAMILY MEDICINE CLINIC | Age: 50
End: 2020-03-05

## 2020-03-06 ENCOUNTER — HOSPITAL ENCOUNTER (OUTPATIENT)
Dept: MRI IMAGING | Age: 50
Discharge: HOME | End: 2020-03-06
Payer: COMMERCIAL

## 2020-03-06 PROCEDURE — A9579 GAD-BASE MR CONTRAST NOS,1ML: HCPCS | Performed by: FAMILY MEDICINE

## 2020-03-06 PROCEDURE — 6360000004 HC RX CONTRAST MEDICATION: Performed by: FAMILY MEDICINE

## 2020-03-06 PROCEDURE — 70549 MR ANGIOGRAPH NECK W/O&W/DYE: CPT

## 2020-03-06 PROCEDURE — 70544 MR ANGIOGRAPHY HEAD W/O DYE: CPT

## 2020-03-06 RX ADMIN — GADOTERIDOL 15 ML: 279.3 INJECTION, SOLUTION INTRAVENOUS at 12:39

## 2020-03-09 ENCOUNTER — TELEPHONE (OUTPATIENT)
Dept: FAMILY MEDICINE CLINIC | Age: 50
End: 2020-03-09

## 2020-03-12 ASSESSMENT — ENCOUNTER SYMPTOMS
NAUSEA: 1
CONSTIPATION: 0
BACK PAIN: 0
ABDOMINAL PAIN: 0
COLOR CHANGE: 0
COUGH: 0
TROUBLE SWALLOWING: 0
BLOOD IN STOOL: 0
SHORTNESS OF BREATH: 0
VOMITING: 0
DIARRHEA: 0
CHEST TIGHTNESS: 0

## 2020-03-14 ENCOUNTER — TELEPHONE (OUTPATIENT)
Dept: FAMILY MEDICINE CLINIC | Age: 50
End: 2020-03-14

## 2020-03-14 NOTE — TELEPHONE ENCOUNTER
On Call Communication:  Juan Daniel Mccallum has had problems with anxiety and is taking zoloft 100mg and klonopin 0.5mg BID. Father very ill, admitted to the hospital. Juan Daniel Mccallum and her sister have been taking turns staying with him at the hospital. Because of the [facility], visiting hours are not limited and they are no longer allowed to stay. Having increased anxiety. Had appt with Dr. Alfonso Stewart Past Monday but cancelled because of the fathers health. Asking if could take extra klonopin. Advised only ordered to take 2 times daily and should not increase dose. Agrees that she has hydroxyzine available. Advised to take 1/2 tablet and try to sleep if possible. Should contact office Monday to reschedule her appt with Dr. Alfonso Stewart as well.

## 2020-03-23 RX ORDER — CLONAZEPAM 0.5 MG/1
TABLET ORAL
Qty: 60 TABLET | Refills: 0 | Status: SHIPPED | OUTPATIENT
Start: 2020-03-23 | End: 2020-05-01

## 2020-03-24 ENCOUNTER — TELEPHONE (OUTPATIENT)
Dept: FAMILY MEDICINE CLINIC | Age: 50
End: 2020-03-24

## 2020-03-24 NOTE — TELEPHONE ENCOUNTER
Patient scheduled appt with Dr. Agapito Puentes for 3/27 at 2:45 pm. Listed as telephone visit in appt notes. Would like an earlier appt if possible. Pt said she is available this afternoon.

## 2020-03-24 NOTE — TELEPHONE ENCOUNTER
Hello. I can do tomorrow at 3:15 if she prefers? Can you call her please and offer her that time?  Thanks

## 2020-03-26 ENCOUNTER — TELEPHONE (OUTPATIENT)
Dept: FAMILY MEDICINE CLINIC | Age: 50
End: 2020-03-26

## 2020-03-26 ENCOUNTER — TELEPHONE (OUTPATIENT)
Dept: PSYCHOLOGY | Age: 50
End: 2020-03-26

## 2020-03-30 ENCOUNTER — TELEPHONE (OUTPATIENT)
Dept: FAMILY MEDICINE CLINIC | Age: 50
End: 2020-03-30

## 2020-03-30 ENCOUNTER — PATIENT MESSAGE (OUTPATIENT)
Dept: FAMILY MEDICINE CLINIC | Age: 50
End: 2020-03-30

## 2020-03-31 ENCOUNTER — CLINICAL DOCUMENTATION (OUTPATIENT)
Dept: SPIRITUAL SERVICES | Age: 50
End: 2020-03-31

## 2020-03-31 NOTE — FLOWSHEET NOTE
Followed up with Pt for Outpatient Spiritual Care support. Pt very tearful and anxious. Expressed fear of coronavirus, especially with her health issues and those of her dad. Shared about her dad's recent health struggles.  acknowledged and affirmed Pt's emotions. Prayed with Pt for renewed sense of peace and comfort. Will continue to follow. [address] [facility]       03/31/20 1337   Encounter Summary   Services provided to: Patient   Referral/Consult From: Kai   Continue Visiting   (3/31 follow up OP, sppt and prayer)   Complexity of Encounter High   Length of Encounter 30 minutes   Crisis   Type Emotional distress   Assessment Approachable;Tearful; Anxious; Fearful;Loneliness; Angry; Helplessness   Intervention Active listening;Prayer;Explored feelings, thoughts, concerns;Scripture;Sustaining presence/ Ministry of presence   Outcome Comfort;Expressed gratitude;Engaged in conversation; Less anxious, less agitated;Receptive;Venting emotion

## 2020-03-31 NOTE — TELEPHONE ENCOUNTER
From: Anders Villaseñor  To: Ephraim Milton MD  Sent: 3/30/2020 10:30 PM EDT  Subject: Prescription Question    Dr. Jannette Mary! :-) :-)  I have 4 tablets left of the Losartan B/P medicine. I'm still having a dry, unproductive cough & scratchy throat (mainly when I lay down) like I did with the 1st B/P med. Since my appt w/you about it is not til 4/6, should I refill it now or might you change it again? And do you want me to keep that office visit? I'm very anxious & nervous about possibly being exposed to the [address]. Dad has only been home a short time after his 11 day hospital stay & my immune system is not good. I'll do whatever you recommend, though.  Thank Bree Doty

## 2020-04-01 ENCOUNTER — TELEPHONE (OUTPATIENT)
Dept: FAMILY MEDICINE CLINIC | Age: 50
End: 2020-04-01

## 2020-04-01 RX ORDER — LOSARTAN POTASSIUM 25 MG/1
25 TABLET ORAL DAILY
Qty: 30 TABLET | Refills: 5 | Status: SHIPPED | OUTPATIENT
Start: 2020-04-01 | End: 2020-04-06

## 2020-04-01 NOTE — TELEPHONE ENCOUNTER
There is a duplicate message regarding patients anxiety that has been sent to DR. Edel Figueroa today regarding something like this

## 2020-04-01 NOTE — TELEPHONE ENCOUNTER
[employer] message sent. Will change next visit to VV and follow up. Thanks.

## 2020-04-02 ENCOUNTER — VIRTUAL VISIT (OUTPATIENT)
Dept: PSYCHOLOGY | Age: 50
End: 2020-04-02
Payer: COMMERCIAL

## 2020-04-02 PROBLEM — F41.1 GAD (GENERALIZED ANXIETY DISORDER): Status: ACTIVE | Noted: 2020-04-02

## 2020-04-02 PROCEDURE — 98968 PH1 ASSMT&MGMT NQHP 21-30: CPT | Performed by: PSYCHOLOGIST

## 2020-04-02 NOTE — PROGRESS NOTES
TOBACCO:   reports that she has never smoked. She has never used smokeless tobacco.  ETOH:   reports no history of alcohol use. Family History:   Family History   Problem Relation Age of Onset   Dutta Cancer Mother         breast x2, ovarian x1, brain     High Cholesterol Mother     High Cholesterol Father     Other Father    Dutta Migraines Sister     Other Sister     Thyroid Disease Maternal Grandmother          A:  Ms. Levar Masterson continues to struggle with anxious and depressed mood which have been exacerbated recently by Covid crisis. She reports minimal positive impact from current medications due to increase in stress. She was talkative and engaged and continues to respond positively to behavioral interventions. Diagnosis:    1. Moderate episode of recurrent major depressive disorder ([facility] [address]    2. MERRY (generalized anxiety disorder)           Plan:  Pt interventions:    [place]-setting to identify pt's primary goals for [facility] visit / overall health, Supportive techniques, Provided psychoeducation re: assertive communication, boundary setting, taught interpersonal effectiveness skills, reinforced pt's skill use, ACT interventions, assisted pt with problem solving strategies and treatment planning    Pt Behavioral Change Plan:  Pt set goals to 1) set boundaries with sister when she cusses and yells at your on the phone 2) practice assertive communication to make requests for help 3) focus on self-care and think of boundary setting as self-care activities 4)  No follow-ups on file.

## 2020-04-06 ENCOUNTER — VIRTUAL VISIT (OUTPATIENT)
Dept: FAMILY MEDICINE CLINIC | Age: 50
End: 2020-04-06
Payer: COMMERCIAL

## 2020-04-06 PROCEDURE — 99443 PR PHYS/QHP TELEPHONE EVALUATION 21-30 MIN: CPT | Performed by: FAMILY MEDICINE

## 2020-04-06 RX ORDER — SERTRALINE HYDROCHLORIDE 100 MG/1
200 TABLET, FILM COATED ORAL DAILY
Qty: 60 TABLET | Refills: 1 | Status: SHIPPED | OUTPATIENT
Start: 2020-04-06 | End: 2020-10-14

## 2020-04-06 RX ORDER — DIPHENOXYLATE HYDROCHLORIDE AND ATROPINE SULFATE 2.5; .025 MG/1; MG/1
1 TABLET ORAL 2 TIMES DAILY
COMMUNITY
Start: 2020-03-24 | End: 2020-07-09

## 2020-04-06 RX ORDER — LOSARTAN POTASSIUM 50 MG/1
50 TABLET ORAL DAILY
Qty: 30 TABLET | Refills: 1 | Status: SHIPPED | OUTPATIENT
Start: 2020-04-06 | End: 2020-06-03

## 2020-04-06 RX ORDER — BISMUTH SUBSALICYLATE 262 MG/1
TABLET, CHEWABLE ORAL
COMMUNITY
End: 2021-08-30 | Stop reason: ALTCHOICE

## 2020-04-06 RX ORDER — RIVAROXABAN 20 MG/1
TABLET, FILM COATED ORAL
Qty: 90 TABLET | Refills: 3 | Status: SHIPPED | OUTPATIENT
Start: 2020-04-06 | End: 2021-04-27 | Stop reason: SDUPTHER

## 2020-04-08 ENCOUNTER — CLINICAL DOCUMENTATION (OUTPATIENT)
Dept: SPIRITUAL SERVICES | Age: 50
End: 2020-04-08

## 2020-04-09 ENCOUNTER — TELEPHONE (OUTPATIENT)
Dept: ADMINISTRATIVE | Age: 50
End: 2020-04-09

## 2020-04-13 RX ORDER — KETOROLAC TROMETHAMINE 30 MG/ML
INJECTION, SOLUTION INTRAMUSCULAR; INTRAVENOUS
Qty: 6 VIAL | Refills: 1 | Status: SHIPPED | OUTPATIENT
Start: 2020-04-13 | End: 2020-05-20 | Stop reason: SDUPTHER

## 2020-04-14 ENCOUNTER — CLINICAL DOCUMENTATION (OUTPATIENT)
Dept: SPIRITUAL SERVICES | Age: 50
End: 2020-04-14

## 2020-04-14 NOTE — FLOWSHEET NOTE
Attempted to follow up with Pt for Outpatient Spiritual Care support. No answer on phone. Will attempt to follow up at a later time.     [address] [facility]       04/14/20 3593   Encounter Summary   Services provided to: Patient not available   Referral/Consult From: Kai   Continue Visiting   (4/14 attempted OP, no answer)

## 2020-04-15 ENCOUNTER — TELEPHONE (OUTPATIENT)
Dept: PSYCHOLOGY | Age: 50
End: 2020-04-15

## 2020-04-21 ENCOUNTER — CLINICAL DOCUMENTATION (OUTPATIENT)
Dept: SPIRITUAL SERVICES | Age: 50
End: 2020-04-21

## 2020-04-21 NOTE — FLOWSHEET NOTE
Attempted to call Pt for Outpatient Spiritual Care support. No answer on phone. Left voicemail for Pt and will attempt to follow up again at a later time.     [facility]   Associate       04/21/20 5490   Encounter Summary   Services provided to: Patient not available   Continue Visiting Yes  (4/21 attempted follow up OP, left VM)

## 2020-04-28 ENCOUNTER — CLINICAL DOCUMENTATION (OUTPATIENT)
Dept: SPIRITUAL SERVICES | Age: 50
End: 2020-04-28

## 2020-05-01 RX ORDER — CLONAZEPAM 0.5 MG/1
TABLET ORAL
Qty: 60 TABLET | Refills: 0 | Status: SHIPPED | OUTPATIENT
Start: 2020-05-01 | End: 2020-06-03

## 2020-05-01 NOTE — TELEPHONE ENCOUNTER
.  Last office visit 3/2/2020     Last written 3/23/20 #60 no refills    Next office visit scheduled none    Requested Prescriptions     Pending Prescriptions Disp Refills    clonazePAM (KLONOPIN) 0.5 MG tablet [Pharmacy Med Name: clonazePAM 0.5 MG TABLET] 60 tablet 0     Sig: TAKE ONE TABLET BY MOUTH TWICE A DAY AS NEEDED FOR ANXIETY     uds- none  Med contract- 2-26/19

## 2020-05-11 ENCOUNTER — CLINICAL DOCUMENTATION (OUTPATIENT)
Dept: SPIRITUAL SERVICES | Age: 50
End: 2020-05-11

## 2020-05-11 NOTE — FLOWSHEET NOTE
Followed up with Pt for Outpatient Spiritual Care support. Pt shared about having to take her father to the hospital. Pt expressed feeling guilty and helpless. Scared about father's health and losing him.  acknowledged and affirmed Pt's emotions. Prayed with Pt for her father's care and her support. Will continue to follow up for support. [facility]       05/11/20 1401   Encounter Summary   Services provided to: Patient   Referral/Consult From: [facility] Family members   Continue Visiting   (5/11 follow up OP, sppt and prayer)   Complexity of Encounter High   Length of Encounter 30 minutes   Spiritual/Cheondoism   Type Spiritual struggle   Assessment Approachable;Tearful; Anxious; Fearful;Helplessness;Guilt   Intervention Active listening;Explored feelings, thoughts, concerns;Prayer;Sustaining presence/ Ministry of presence   Outcome Comfort;Expressed gratitude;Engaged in conversation;Venting emotion

## 2020-05-19 ENCOUNTER — CLINICAL DOCUMENTATION (OUTPATIENT)
Dept: SPIRITUAL SERVICES | Age: 50
End: 2020-05-19

## 2020-05-19 NOTE — FLOWSHEET NOTE
Attempted to follow up with Pt for Outpatient Spiritual Care support. No answer on phone. Left voicemail for Pt and will attempt to follow up again at a later time.     [address] [facility]       05/19/20 1018   Encounter Summary   Services provided to: Patient not available   Referral/Consult From: Kai   Continue Visiting   (5/19 attempted follow up OP, left VM)

## 2020-05-20 RX ORDER — KETOROLAC TROMETHAMINE 30 MG/ML
30 INJECTION, SOLUTION INTRAMUSCULAR; INTRAVENOUS 2 TIMES DAILY PRN
Qty: 6 VIAL | Refills: 1 | Status: SHIPPED | OUTPATIENT
Start: 2020-05-20 | End: 2020-06-15

## 2020-05-28 ENCOUNTER — CLINICAL DOCUMENTATION (OUTPATIENT)
Dept: SPIRITUAL SERVICES | Age: 50
End: 2020-05-28

## 2020-05-28 NOTE — FLOWSHEET NOTE
Attempted to follow up with Pt for Outpatient Spiritual Care support. No answer on phone. Left voicemail for Pt and will attempt to follow up again at a later time.     [facility]       05/28/20 1012   Encounter Summary   Services provided to: Patient not available   Referral/Consult From: [facility]   (5/28 attempted call OP, no answer)

## 2020-06-03 RX ORDER — CLONAZEPAM 0.5 MG/1
TABLET ORAL
Qty: 60 TABLET | Refills: 0 | Status: SHIPPED | OUTPATIENT
Start: 2020-06-03 | End: 2020-07-20

## 2020-06-03 NOTE — TELEPHONE ENCOUNTER
Refill Request     Last Seen: 3/2/2020    Last Written:  5/1/2020    Next Appointment:   No future appointments.           Requested Prescriptions     Pending Prescriptions Disp Refills    clonazePAM (KLONOPIN) 0.5 MG tablet [Pharmacy Med Name: clonazePAM 0.5 MG TABLET] 60 tablet 0     Sig: TAKE ONE TABLET BY MOUTH TWICE A DAY AS NEEDED FOR ANXIETY

## 2020-06-12 ENCOUNTER — CLINICAL DOCUMENTATION (OUTPATIENT)
Dept: SPIRITUAL SERVICES | Age: 50
End: 2020-06-12

## 2020-06-12 NOTE — FLOWSHEET NOTE
Attempted to follow up with Pt for Outpatient Spiritual Care support. Pt unavailable at this time. Will attempt to follow up at a later time.     [facility]       06/12/20 5905   Encounter Summary   Services provided to: Patient not available   Referral/Consult From: Kai   Continue Visiting Yes  (6/12 attempted call OP, Pt unavailable)

## 2020-06-15 RX ORDER — KETOROLAC TROMETHAMINE 30 MG/ML
INJECTION, SOLUTION INTRAMUSCULAR; INTRAVENOUS
Qty: 6 VIAL | Refills: 3 | Status: SHIPPED | OUTPATIENT
Start: 2020-06-15 | End: 2020-08-12

## 2020-06-27 ENCOUNTER — CLINICAL DOCUMENTATION (OUTPATIENT)
Dept: SPIRITUAL SERVICES | Age: 50
End: 2020-06-27

## 2020-07-09 RX ORDER — DIPHENOXYLATE HYDROCHLORIDE AND ATROPINE SULFATE 2.5; .025 MG/1; MG/1
TABLET ORAL
Qty: 60 TABLET | Refills: 0 | Status: SHIPPED | OUTPATIENT
Start: 2020-07-09 | End: 2020-11-30 | Stop reason: SDUPTHER

## 2020-07-09 NOTE — TELEPHONE ENCOUNTER
.  Last office visit 4/6/2020     Last written don't see     Next office visit scheduled none    Requested Prescriptions     Pending Prescriptions Disp Refills    diphenoxylate-atropine (LOMOTIL) 2.5-0.025 MG per tablet [Pharmacy Med Name: DIPHENOXYLATE-ATROP 2.5-0.025 MG TB] 60 tablet 0     Sig: TAKE ONE TABLET BY MOUTH TWICE A DAY AS NEEDED FOR DIARRHEA

## 2020-07-16 ENCOUNTER — CLINICAL DOCUMENTATION (OUTPATIENT)
Dept: SPIRITUAL SERVICES | Age: 50
End: 2020-07-16

## 2020-07-16 NOTE — FLOWSHEET NOTE
Attempted to follow up with Pt for Outpatient Spiritual Care support. No answer on phone. Left voicemail and will attempt to follow up at a later time.     [facility]       07/16/20 1243   Encounter Summary   Services provided to: Patient not available   Referral/Consult From: Kai   Continue Visiting   (7/16 attempted follow up OP, left VM)

## 2020-07-20 RX ORDER — CLONAZEPAM 0.5 MG/1
TABLET ORAL
Qty: 60 TABLET | Refills: 0 | Status: SHIPPED | OUTPATIENT
Start: 2020-07-20 | End: 2020-08-17 | Stop reason: SDUPTHER

## 2020-07-20 NOTE — TELEPHONE ENCOUNTER
Refill Request - Controlled Substance    Last Seen: 3/2/2020       Last Written: 6/03/2020    Last UDS: No UDS on File at this time.      OAR[place]: 6/03/2020    Med Agreement Signed On: 2/25/2019    Next Appointment: Visit date not found        Requested Prescriptions     Pending Prescriptions Disp Refills    clonazePAM (KLONOPIN) 0.5 MG tablet [Pharmacy Med Name: clonazePAM 0.5 MG TABLET] 60 tablet 0     Sig: TAKE ONE TABLET BY MOUTH TWICE A DAY AS NEEDED FOR ANXIETY

## 2020-07-31 ENCOUNTER — CLINICAL DOCUMENTATION (OUTPATIENT)
Dept: SPIRITUAL SERVICES | Age: 50
End: 2020-07-31

## 2020-08-12 RX ORDER — KETOROLAC TROMETHAMINE 30 MG/ML
INJECTION, SOLUTION INTRAMUSCULAR; INTRAVENOUS
Qty: 6 VIAL | Refills: 2 | Status: SHIPPED | OUTPATIENT
Start: 2020-08-12 | End: 2020-10-06 | Stop reason: SDUPTHER

## 2020-08-16 ENCOUNTER — PATIENT MESSAGE (OUTPATIENT)
Dept: FAMILY MEDICINE CLINIC | Age: 50
End: 2020-08-16

## 2020-08-17 RX ORDER — CLONAZEPAM 0.5 MG/1
TABLET ORAL
Qty: 60 TABLET | Refills: 0 | Status: SHIPPED | OUTPATIENT
Start: 2020-08-17 | End: 2020-09-22 | Stop reason: SDUPTHER

## 2020-08-17 NOTE — TELEPHONE ENCOUNTER
From: Sigrid Arellano  To: Damien Cruz MD  Sent: 8/16/2020 2:52 PM EDT  Subject: Prescription Question    Dear Dr. Ryan Mcnamara-   I wanted to go ahead & request-a new script for Klonopin to be sent to [address] [place] [address] [phone]. I don't need it immediately. I wondered if I could try something besides the Lomotil for my diarrhea & stomach pain? It still works well for me & I'm not having any side effects, but I just hate that it's a Controlled Substance. Many years ago, I tried Bentyl but it didn't help as much as Lomotil. I'd be willing to retry Bentyl; try something else; sparingly take PeptoBismol (or stay with the Lomotil). I hope you, your family, Graciela Ritter, & everyone at the office is well!   Lily Cole

## 2020-08-19 ENCOUNTER — CLINICAL DOCUMENTATION (OUTPATIENT)
Dept: SPIRITUAL SERVICES | Age: 50
End: 2020-08-19

## 2020-08-19 NOTE — FLOWSHEET NOTE
Attempted to follow up with Pt for Outpatient Spiritual Care support. No answer. Left voicemail for Pt and will attempt to follow up again at a later time.     [facility]       08/19/20 1791   Encounter Summary   Services provided to: Patient not available   Referral/Consult From: Kai   Continue Visiting   (8/19 attempted call OP, left VM)

## 2020-09-02 ENCOUNTER — PATIENT MESSAGE (OUTPATIENT)
Dept: FAMILY MEDICINE CLINIC | Age: 50
End: 2020-09-02

## 2020-09-02 NOTE — TELEPHONE ENCOUNTER
From: Hannah Ahmadi  To: Darwin Pollock MD  Sent: 9/2/2020 5:08 AM EDT  Subject: Prescription Question    Dear Dr. Wess Osler-  here are my recent B/P & Pulse readings: 141/101 p 91, 139/79 p79, 131/101 p 102, 152/104 p76, 155/106 p74,152/107 p79, 168/125 p91, 136/83 p 100. Not very good? !I'm sill taking Losartan Pot. 50mg you prescribe. 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 Any changes you want to make in the BP medicine?   Thank you- Elyse Lam

## 2020-09-08 NOTE — TELEPHONE ENCOUNTER
Refill Request     Last Seen: 4/6/2020    Last Written: 2/21/2020    Next Appointment:   No future appointments.           Requested Prescriptions     Pending Prescriptions Disp Refills    levothyroxine (SYNTHROID) 200 MCG tablet [Pharmacy Med Name: LEVOTHYROXINE 200 MCG TABLET] 16 tablet 1     Sig: TAKE ONE TABLET BY MOUTH DAILY

## 2020-09-09 ENCOUNTER — CLINICAL DOCUMENTATION (OUTPATIENT)
Dept: SPIRITUAL SERVICES | Age: 50
End: 2020-09-09

## 2020-09-09 RX ORDER — LEVOTHYROXINE SODIUM 0.2 MG/1
TABLET ORAL
Qty: 90 TABLET | Refills: 1 | Status: SHIPPED | OUTPATIENT
Start: 2020-09-09 | End: 2021-07-12

## 2020-09-09 RX ORDER — LOSARTAN POTASSIUM 100 MG/1
TABLET ORAL
Qty: 30 TABLET | Refills: 1 | Status: SHIPPED | OUTPATIENT
Start: 2020-09-09 | End: 2020-12-06 | Stop reason: SDUPTHER

## 2020-09-22 ENCOUNTER — CLINICAL DOCUMENTATION (OUTPATIENT)
Dept: SPIRITUAL SERVICES | Age: 50
End: 2020-09-22

## 2020-09-22 NOTE — FLOWSHEET NOTE
Attempted to follow up with Pt for Outpatient Spiritual Care support. No answer on phone. Left voicemail for Pt and will attempt to follow up again at a later time.     [facility]       09/22/20 1313   Encounter Summary   Services provided to: Patient not available   Referral/Consult From: [facility]   (9/22 attempted call OP, left VM)

## 2020-09-23 RX ORDER — CLONAZEPAM 0.5 MG/1
TABLET ORAL
Qty: 60 TABLET | Refills: 0 | Status: SHIPPED | OUTPATIENT
Start: 2020-09-23 | End: 2020-10-28

## 2020-09-23 NOTE — TELEPHONE ENCOUNTER
Refill Request - Controlled Substance    Last Seen: 4/6/2020       Last Written: #60  0rf  8/17/2020    Last UDS: no    Med Agreement Signed On: 2/26/2019    Next Appointment: Visit date not found        Requested Prescriptions     Pending Prescriptions Disp Refills    clonazePAM (KLONOPIN) 0.5 MG tablet 60 tablet 0     Sig: TAKE ONE TABLET BY MOUTH TWICE A DAY AS NEEDED FOR ANXIETY

## 2020-09-25 ENCOUNTER — TELEPHONE (OUTPATIENT)
Dept: FAMILY MEDICINE CLINIC | Age: 50
End: 2020-09-25

## 2020-09-25 NOTE — TELEPHONE ENCOUNTER
----- Message from Damian Byron sent at 9/25/2020  3:15 PM EDT -----  Subject: Appointment Request    Reason for Call: Routine Existing Condition Follow Up    QUESTIONS  Type of Appointment? Established Patient  Reason for appointment request? Available appointments did not meet   patient need  Additional Information for Provider? pt is wanting to receive a call if an   earlier time on upcoming appt date 10/19   becomes available or if any slots the previous week of 10/12 become   available; pt also wants to update Dr Shilpa Carrera that she has received her   2nd shingles vaccine to complete the cycle  ---------------------------------------------------------------------------  --------------  CALL BACK INFO  What is the best way for the office to contact you? OK to leave message on   voicemail  Preferred Call Back Phone Number? [phone]  ---------------------------------------------------------------------------  --------------  SCRIPT ANSWERS  Relationship to Patient? Self  Have your symptoms changed? No  Appointment reason? Well Care/Follow Ups  Select a Well Care/Follow Ups appointment reason? Adult Existing Condition   Follow Up [Diabetes   CHF   COPD   Hypertension/Blood Pressure Check]  (Is the patient requesting to be seen urgently for their symptoms?)? No  Is this follow up request related to routine Diabetes Management? No  Are you having any new concerns about your existing condition? No  Have you been diagnosed with   tested for   or told that you are suspected of having COVID-19 (Coronavirus)? No  Have you had a fever or taken medication to treat a fever within the past   3 days? No  Have you had a cough   shortness of breath or flu-like symptoms within the past 3 days? No  Do you currently have flu-like symptoms including fever or chills   cough   shortness of breath   or difficulty breathing   or new loss of taste or smell?  No  (Service Expert  click yes below to proceed with [employer] As Usual Scheduling)?  Yes

## 2020-10-06 NOTE — TELEPHONE ENCOUNTER
Refill Request     Last Seen: 2020    Last Written: 2020    Next Appointment:   Future Appointments   Date Time Provider Rula Emmanuel   10/14/2020  1:15 PM MD TALYA [facility]             Requested Prescriptions     Pending Prescriptions Disp Refills    ketorolac (TORADOL) 30 MG/ML injection 6 vial 2    Syringe/Needle, Disp, (SYRINGE 3CC/25GX1\") 25G X 1\" 3 ML MISC 6 each 1     Si Syringe by Does not apply route every 12 hours as needed (pain)

## 2020-10-07 RX ORDER — KETOROLAC TROMETHAMINE 30 MG/ML
30 INJECTION, SOLUTION INTRAMUSCULAR; INTRAVENOUS 2 TIMES DAILY PRN
Qty: 6 VIAL | Refills: 2 | Status: SHIPPED | OUTPATIENT
Start: 2020-10-07 | End: 2020-11-18 | Stop reason: SDUPTHER

## 2020-10-07 RX ORDER — SYRINGE W-NEEDLE,DISPOSAB,3 ML 25GX5/8"
1 SYRINGE, EMPTY DISPOSABLE MISCELLANEOUS EVERY 12 HOURS PRN
Qty: 6 EACH | Refills: 1 | Status: SHIPPED | OUTPATIENT
Start: 2020-10-07 | End: 2020-11-09

## 2020-10-09 ENCOUNTER — CLINICAL DOCUMENTATION (OUTPATIENT)
Dept: SPIRITUAL SERVICES | Age: 50
End: 2020-10-09

## 2020-10-09 NOTE — FLOWSHEET NOTE
Attempted to call Pt for Outpatient Spiritual Care support. No answer. Left voicemail for Pt and will attempt to follow up again at a later time.     [facility]       10/09/20 6257   Encounter Summary   Services provided to: Patient not available   Referral/Consult From: Kai   Continue Visiting   (10/9 attempted call OP)

## 2020-10-14 ENCOUNTER — OFFICE VISIT (OUTPATIENT)
Dept: FAMILY MEDICINE CLINIC | Age: 50
End: 2020-10-14
Payer: COMMERCIAL

## 2020-10-14 VITALS
SYSTOLIC BLOOD PRESSURE: 130 MMHG | WEIGHT: 187.4 LBS | BODY MASS INDEX: 30.71 KG/M2 | OXYGEN SATURATION: 99 % | TEMPERATURE: 97 F | HEART RATE: 91 BPM | DIASTOLIC BLOOD PRESSURE: 84 MMHG

## 2020-10-14 PROCEDURE — 1036F TOBACCO NON-USER: CPT | Performed by: FAMILY MEDICINE

## 2020-10-14 PROCEDURE — 3017F COLORECTAL CA SCREEN DOC REV: CPT | Performed by: FAMILY MEDICINE

## 2020-10-14 PROCEDURE — 90686 IIV4 VACC NO PRSV 0.5 ML IM: CPT | Performed by: FAMILY MEDICINE

## 2020-10-14 PROCEDURE — G8427 DOCREV CUR MEDS BY ELIG CLIN: HCPCS | Performed by: FAMILY MEDICINE

## 2020-10-14 PROCEDURE — 99214 OFFICE O/P EST MOD 30 MIN: CPT | Performed by: FAMILY MEDICINE

## 2020-10-14 PROCEDURE — G8482 FLU IMMUNIZE ORDER/ADMIN: HCPCS | Performed by: FAMILY MEDICINE

## 2020-10-14 PROCEDURE — 90471 IMMUNIZATION ADMIN: CPT | Performed by: FAMILY MEDICINE

## 2020-10-14 PROCEDURE — G8417 CALC BMI ABV UP PARAM F/U: HCPCS | Performed by: FAMILY MEDICINE

## 2020-10-14 RX ORDER — AMLODIPINE BESYLATE 5 MG/1
5 TABLET ORAL DAILY
Qty: 30 TABLET | Refills: 0 | Status: SHIPPED | OUTPATIENT
Start: 2020-10-14 | End: 2020-11-18

## 2020-10-14 ASSESSMENT — ENCOUNTER SYMPTOMS
BACK PAIN: 0
ABDOMINAL PAIN: 0
NAUSEA: 0
VOMITING: 0
COLOR CHANGE: 0
SHORTNESS OF BREATH: 0
CHEST TIGHTNESS: 0

## 2020-10-14 NOTE — PROGRESS NOTES
Vaccine Information Sheet, \"Influenza - Inactivated\"  given to Karina Galan, or parent/legal guardian of  Karina Galan and verbalized understanding. Patient responses:    Have you ever had a reaction to a flu vaccine? No  Do you have any current illness? No  Have you ever had Guillian Athena Syndrome? No  Do you have a serious allergy to any of the follow: Neomycin, Polymyxin, Thimerosal, eggs or egg products? No    Flu vaccine given per order. Please see immunization tab. Risks and benefits explained. Current VIS given.

## 2020-10-14 NOTE — PROGRESS NOTES
10/14/2020    This is a 45 y.o. female who presents for  Chief Complaint   Patient presents with    Anxiety       HPI:     HTN:  Taking medication daily  Checking Bps at home, usually 108W systolic  No new AEs to the medication(s)  No acute concerning Sxs: No CP, SOB, palpitations, dizziness, HA, visual changes, diaphoresis, nausea, etc.      anxiety is baseline for her   Klonopin helps   No new AEs, falls, drowsiness     Past Medical History:   Diagnosis Date    Benign essential tremor     Colon polyp 05/06/2019    Degenerative disc disease     DVT, lower extremity ([address] 1989    Fibromyalgia     Kidney stones     Left-sided trigeminal neuralgia     Malignant neoplasm of thyroid gland ([address] 8/22/2013    Migraines, neuralgic     since stroke 1999 Chronic    PONV (postoperative nausea and vomiting)     Poor venous access     Protein S deficiency ([address]     Pulmonary embolism ([address] 1989    Seizure disorder ([address]     grand mal in 2005 ( childhood febrile seizure also-from a baby to age 14) and also X 1 ~2005 with headache treatment    Stroke ([address] 2/25/2019    Thrombosis of superior sagittal sinus 1999    Unspecified cerebral artery occlusion with cerebral infarction 1999    Vertebral artery occlusion 1999    White coat syndrome with high blood pressure but without hypertension        Past Surgical History:   Procedure Laterality Date    BREAST LUMPECTOMY      x 3    CYSTOSCOPY  01/09/2012    w/ left ureteroscopy, holmium laser.  stone manipulation and left stent insertion    FINGER TRIGGER RELEASE Left 10/31/2019    LEFT THUMB TRIGGER DIGIT RELEASE performed by Amy Reed MD at [address] Left 2000    pins later removed    FOOT SURGERY Left 07/10/2017    LARYNX SURGERY      vocal cord nodule    LITHOTRIPSY  12/11    REFRACTIVE SURGERY      THYROIDECTOMY  05/18/2011            Social History     Socioeconomic History    Marital status: Single Spouse name: Not on file    Number of children: Not on file    Years of education: Not on file    Highest education level: Not on file   Occupational History    Not on file   Social Needs    Financial resource strain: Not on file    Food insecurity     Worry: Not on file     Inability: Not on file    Transportation needs     Medical: Not on file     Non-medical: Not on file   Tobacco Use    Smoking status: Never Smoker    Smokeless tobacco: Never Used   Substance and Sexual Activity    Alcohol use: No    Drug use: No    Sexual activity: Not on file   Lifestyle    Physical activity     Days per week: Not on file     Minutes per session: Not on file    Stress: Not on file   Relationships    Social connections     Talks on phone: Not on file     Gets together: Not on file     Attends Mandaeism service: Not on file     Active member of club or organization: Not on file     Attends meetings of clubs or organizations: Not on file     Relationship status: Not on file    Intimate partner violence     Fear of current or ex partner: Not on file     Emotionally abused: Not on file     Physically abused: Not on file     Forced sexual activity: Not on file   Other Topics Concern    Not on file   Social History Narrative    Not on file       Family History   Problem Relation Age of Onset    Cancer Mother         breast x2, ovarian x1, brain     High Cholesterol Mother     High Cholesterol Father     Other Father     Migraines Sister     Other Sister     Thyroid Disease Maternal Grandmother        Current Outpatient Medications   Medication Sig Dispense Refill    amLODIPine (NORVASC) 5 MG tablet Take 1 tablet by mouth daily 30 tablet 0    ketorolac (TORADOL) 30 MG/ML injection Inject 1 mL into the muscle 2 times daily as needed for Pain 6 vial 2    Syringe/Needle, Disp, (SYRINGE 3CC/25GX1\") 25G X 1\" 3 ML MISC 1 Syringe by Does not apply route every 12 hours as needed (pain) 6 each 1    clonazePAM (KLONOPIN) 0.5 MG tablet TAKE ONE TABLET BY MOUTH TWICE A DAY AS NEEDED FOR ANXIETY 60 tablet 0    levothyroxine (SYNTHROID) 200 MCG tablet TAKE ONE TABLET BY MOUTH DAILY 90 tablet 1    losartan (COZAAR) 100 MG tablet TAKE ONE TABLET BY MOUTH DAILY 30 tablet 1    sertraline (ZOLOFT) 100 MG tablet Take 2 tablets by mouth daily 180 tablet 1    bismuth subsalicylate (PEPTO BISMOL) 262 MG chewable tablet Take by mouth      XARELTO 20 MG TABS tablet TAKE ONE TABLET BY MOUTH DAILY 90 tablet 3    atorvastatin (LIPITOR) 40 MG tablet TAKE ONE TABLET BY MOUTH DAILY 90 tablet 1    mupirocin (BACTROBAN) 2 % cream Apply topically 3 times daily. 90 g 2    clobetasol (TEMOVATE) 0.05 % cream Apply topically 2 times daily. 90 g 2    hydrOXYzine (ATARAX) 50 MG tablet Take 0.5-1 tablets by mouth nightly as needed for Anxiety (insomnia) 30 tablet 5    fentaNYL (ACTIQ) 800 MCG lollipop Take 1 each by mouth 3 times daily as needed.  naloxone (NARCAN) 4 MG/0.1ML LIQD nasal spray 1 spray by Nasal route as needed for Opioid Reversal      Cyanocobalamin (VITAMIN B-12) 5000 MCG SUBL Take 1 each by mouth every other day      diclofenac sodium 1 % GEL Apply 2 g topically 2 times daily      Cholecalciferol (VITAMIN D3) 2000 units CAPS Take 1 capsule by mouth daily      Doxylamine Succinate, Sleep, (UNISOM PO) Take by mouth      carBAMazepine (TEGRETOL XR) 200 MG extended release tablet Take 400 mg by mouth every 8 hours       primidone (MYSOLINE) 250 MG tablet Take 250 mg by mouth 4 times daily        No current facility-administered medications for this visit.         Immunization History   Administered Date(s) Administered    Influenza, Quadv, IM, PF (6 mo and older Fluzone, Flulaval, Fluarix, and 3 yrs and older Afluria) 09/19/2019    Tdap (Boostrix, Adacel) 04/04/2019    Zoster Recombinant (Shingrix) 02/05/2020, 09/25/2020       Allergies   Allergen Reactions    Dilaudid [Hydromorphone Hcl] Shortness Of Breath, Itching and Other (See Comments)     Wheezing    Buprenorphine Itching, Nausea And Vomiting and Other (See Comments)     Tingling in lips    Duloxetine Hcl Other (See Comments)     Personality changes and depression    Gabapentin     Gluten Meal      Celiac disease    Hydromorphone Itching and Other (See Comments)    Morphine Itching    Pregabalin      Lyrica (personality changes)     Propranolol Hives    Topiramate Other (See Comments)       Review of Systems   Constitutional: Negative for activity change, appetite change, chills, diaphoresis, fatigue, fever and unexpected weight change. Eyes: Negative for visual disturbance. Respiratory: Negative for chest tightness and shortness of breath. Cardiovascular: Negative for chest pain, palpitations and leg swelling. Gastrointestinal: Negative for abdominal pain, nausea and vomiting. Genitourinary: Negative for decreased urine volume. Musculoskeletal: Negative for arthralgias and back pain. Skin: Negative for color change. Neurological: Negative for dizziness, tremors, seizures, weakness, light-headedness, numbness and headaches. Psychiatric/Behavioral: Positive for sleep disturbance. Negative for agitation, behavioral problems, confusion, decreased concentration, dysphoric mood, hallucinations, self-injury and suicidal ideas. The patient is nervous/anxious. The patient is not hyperactive. /84   Pulse 91   Temp 97 °F (36.1 °C) (Temporal)   Wt 187 lb 6.4 oz (85 kg)   LMP 06/30/2015   SpO2 99%   BMI 30.71 kg/m²     Physical Exam  Vitals signs and nursing note reviewed. Constitutional:       General: She is not in acute distress. Appearance: She is well-developed. She is not diaphoretic. HENT:      Head: Normocephalic and atraumatic. Eyes:      Conjunctiva/sclera: Conjunctivae normal.      Pupils: Pupils are equal, round, and reactive to light. Neck:      Musculoskeletal: Normal range of motion and neck supple. Vascular: No JVD. Cardiovascular:      Rate and Rhythm: Normal rate and regular rhythm. Heart sounds: Normal heart sounds. No murmur. No friction rub. No gallop. Pulmonary:      Effort: Pulmonary effort is normal. No respiratory distress. Breath sounds: Normal breath sounds. No wheezing or rales. Chest:      Chest wall: No tenderness. Abdominal:      Palpations: Abdomen is soft. Tenderness: There is no abdominal tenderness. Musculoskeletal: Normal range of motion. Skin:     General: Skin is warm and dry. Capillary Refill: Capillary refill takes 2 to 3 seconds. Findings: No erythema. Neurological:      Mental Status: She is alert and oriented to person, place, and time. Psychiatric:         Mood and Affect: Mood normal.         Behavior: Behavior normal.         Thought Content: Thought content normal.         Judgment: Judgment normal.         Plan  1. Essential hypertension  C/w Losartan 100 mg  Add CCB  See back in 1 mo, monitor Bps daily   - amLODIPine (NORVASC) 5 MG tablet; Take 1 tablet by mouth daily  Dispense: 30 tablet; Refill: 0  - Comprehensive Metabolic Panel; Future  - CBC Auto Differential; Future  - TSH with Reflex; Future    2. MERRY (generalized anxiety disorder)  Stable on Klonopin, Zoloft, Hydroxyzine    3. Health care maintenance  - Comprehensive Metabolic Panel; Future  - CBC Auto Differential; Future  - TSH with Reflex; Future  - Lipid Panel; Future  - Hemoglobin A1C; Future    4. Encounter for screening mammogram for malignant neoplasm of breast  - ROBEL DIGITAL SCREEN W OR WO CAD BILATERAL; Future    5. Need for influenza vaccination  - INFLUENZA, QUADV, 3 YRS AND OLDER, IM PF, PREFILL SYR OR SDV, 0.5ML (AFLURIA QUADV, PF)        While assessing care for this patient, I have reviewed all pertinent lab work/imaging/ specialist notes and care in reference to those problems addressed above in detail. Appropriate medical decision making was based on this.

## 2020-10-28 ENCOUNTER — CLINICAL DOCUMENTATION (OUTPATIENT)
Dept: SPIRITUAL SERVICES | Age: 50
End: 2020-10-28

## 2020-10-28 RX ORDER — CLONAZEPAM 0.5 MG/1
TABLET ORAL
Qty: 60 TABLET | Refills: 0 | Status: SHIPPED | OUTPATIENT
Start: 2020-10-28 | End: 2020-11-30 | Stop reason: SDUPTHER

## 2020-10-28 NOTE — TELEPHONE ENCOUNTER
Refill Request     Last Seen: 10/14/2020    Last Written: #60  0rf  9/23/2020    Next Appointment:   Future Appointments   Date Time Provider Rula Emmanuel   11/18/2020 11:30 AM MD TALYA Nickerson [facility]       Appointment scheduled      Requested Prescriptions     Pending Prescriptions Disp Refills    clonazePAM (KLONOPIN) 0.5 MG tablet [Pharmacy Med Name: clonazePAM 0.5 MG TABLET] 60 tablet 0     Sig: TAKE ONE TABLET BY MOUTH TWICE A DAY AS NEEDED FOR ANXIETY

## 2020-10-28 NOTE — FLOWSHEET NOTE
Attempted to follow up with Pt for Outpatient Spiritual Care support. No answer on phone. Left voicemail for Pt and will attempt to follow up at a later time.     [facility]       10/28/20 2721   Encounter Summary   Services provided to: Patient not available   Referral/Consult From: Kai   Continue Visiting   (10/28 attempted call OP, left VM)

## 2020-11-05 DIAGNOSIS — Z00.00 HEALTH CARE MAINTENANCE: ICD-10-CM

## 2020-11-05 DIAGNOSIS — I10 ESSENTIAL HYPERTENSION: ICD-10-CM

## 2020-11-05 LAB
A/G RATIO: 2.1 (ref 1.1–2.2)
ALBUMIN SERPL-MCNC: 4.8 G/DL (ref 3.4–5)
ALP BLD-CCNC: 139 U/L (ref 40–129)
ALT SERPL-CCNC: 21 U/L (ref 10–40)
ANION GAP SERPL CALCULATED.3IONS-SCNC: 12 MMOL/L (ref 3–16)
AST SERPL-CCNC: 20 U/L (ref 15–37)
BASOPHILS ABSOLUTE: 0 K/UL (ref 0–0.2)
BASOPHILS RELATIVE PERCENT: 0.8 %
BILIRUB SERPL-MCNC: 0.3 MG/DL (ref 0–1)
BUN BLDV-MCNC: 16 MG/DL (ref 7–20)
CALCIUM SERPL-MCNC: 9.4 MG/DL (ref 8.3–10.6)
CHLORIDE BLD-SCNC: 105 MMOL/L (ref 99–110)
CHOLESTEROL, TOTAL: 238 MG/DL (ref 0–199)
CO2: 24 MMOL/L (ref 21–32)
CREAT SERPL-MCNC: 0.6 MG/DL (ref 0.6–1.1)
EOSINOPHILS ABSOLUTE: 0.2 K/UL (ref 0–0.6)
EOSINOPHILS RELATIVE PERCENT: 2.9 %
GFR AFRICAN AMERICAN: >60
GFR NON-AFRICAN AMERICAN: >60
GLOBULIN: 2.3 G/DL
GLUCOSE BLD-MCNC: 99 MG/DL (ref 70–99)
HCT VFR BLD CALC: 44.8 % (ref 36–48)
HDLC SERPL-MCNC: 114 MG/DL (ref 40–60)
HEMOGLOBIN: 15 G/DL (ref 12–16)
LDL CHOLESTEROL CALCULATED: 88 MG/DL
LYMPHOCYTES ABSOLUTE: 1.2 K/UL (ref 1–5.1)
LYMPHOCYTES RELATIVE PERCENT: 23.1 %
MCH RBC QN AUTO: 30.5 PG (ref 26–34)
MCHC RBC AUTO-ENTMCNC: 33.5 G/DL (ref 31–36)
MCV RBC AUTO: 91.2 FL (ref 80–100)
MONOCYTES ABSOLUTE: 0.4 K/UL (ref 0–1.3)
MONOCYTES RELATIVE PERCENT: 7.4 %
NEUTROPHILS ABSOLUTE: 3.4 K/UL (ref 1.7–7.7)
NEUTROPHILS RELATIVE PERCENT: 65.8 %
PDW BLD-RTO: 14 % (ref 12.4–15.4)
PLATELET # BLD: 213 K/UL (ref 135–450)
PMV BLD AUTO: 8.9 FL (ref 5–10.5)
POTASSIUM SERPL-SCNC: 4.5 MMOL/L (ref 3.5–5.1)
RBC # BLD: 4.92 M/UL (ref 4–5.2)
SODIUM BLD-SCNC: 141 MMOL/L (ref 136–145)
TOTAL PROTEIN: 7.1 G/DL (ref 6.4–8.2)
TRIGL SERPL-MCNC: 179 MG/DL (ref 0–150)
TSH REFLEX: 0.85 UIU/ML (ref 0.27–4.2)
VLDLC SERPL CALC-MCNC: 36 MG/DL
WBC # BLD: 5.1 K/UL (ref 4–11)

## 2020-11-06 LAB
ESTIMATED AVERAGE GLUCOSE: 85.3 MG/DL
HBA1C MFR BLD: 4.6 %

## 2020-11-09 RX ORDER — SYRINGE WITH NEEDLE, 1 ML 25GX5/8"
SYRINGE, EMPTY DISPOSABLE MISCELLANEOUS
Qty: 6 EACH | Refills: 0 | Status: SHIPPED | OUTPATIENT
Start: 2020-11-09 | End: 2020-11-18 | Stop reason: SDUPTHER

## 2020-11-18 ENCOUNTER — VIRTUAL VISIT (OUTPATIENT)
Dept: FAMILY MEDICINE CLINIC | Age: 50
End: 2020-11-18
Payer: COMMERCIAL

## 2020-11-18 PROCEDURE — 99443 PR PHYS/QHP TELEPHONE EVALUATION 21-30 MIN: CPT | Performed by: FAMILY MEDICINE

## 2020-11-18 RX ORDER — SYRINGE WITH NEEDLE, 1 ML 25GX5/8"
1 SYRINGE, EMPTY DISPOSABLE MISCELLANEOUS 2 TIMES DAILY PRN
Qty: 6 EACH | Refills: 0 | Status: SHIPPED | OUTPATIENT
Start: 2020-11-18 | End: 2020-11-30

## 2020-11-18 RX ORDER — AMLODIPINE BESYLATE 10 MG/1
10 TABLET ORAL DAILY
Qty: 90 TABLET | Refills: 1 | Status: SHIPPED | OUTPATIENT
Start: 2020-11-18 | End: 2021-06-01

## 2020-11-18 RX ORDER — KETOROLAC TROMETHAMINE 30 MG/ML
30 INJECTION, SOLUTION INTRAMUSCULAR; INTRAVENOUS 2 TIMES DAILY PRN
Qty: 6 VIAL | Refills: 2 | Status: SHIPPED | OUTPATIENT
Start: 2020-11-18 | End: 2020-12-17 | Stop reason: SDUPTHER

## 2020-11-18 RX ORDER — CEPHALEXIN 500 MG/1
500 CAPSULE ORAL 3 TIMES DAILY
Qty: 21 CAPSULE | Refills: 0 | Status: SHIPPED | OUTPATIENT
Start: 2020-11-18 | End: 2020-11-25

## 2020-11-18 RX ORDER — SULFAMETHOXAZOLE AND TRIMETHOPRIM 800; 160 MG/1; MG/1
1 TABLET ORAL 2 TIMES DAILY
Qty: 14 TABLET | Refills: 0 | Status: SHIPPED | OUTPATIENT
Start: 2020-11-18 | End: 2020-11-25

## 2020-11-18 NOTE — PATIENT INSTRUCTIONS
Patient Education        Learning About High Cholesterol  What is high cholesterol? High cholesterol means that you have too much cholesterol in your blood. Cholesterol is a type of fat. It's needed for many body functions, such as making new cells. Cholesterol is made by your body. It also comes from food you eat. Having high cholesterol can lead to the buildup of plaque in artery walls. This can increase your risk of heart disease and stroke. When your doctor talks about high cholesterol levels, he or she is talking about your total cholesterol and LDL cholesterol (the \"bad\" cholesterol) levels. Your doctor may also speak about HDL (the \"good\" cholesterol) levels. High HDL is linked with a lower risk for heart disease, heart attack, and stroke. Your cholesterol levels help your doctor find out your risk for having a heart attack or stroke. How can you prevent high cholesterol? A heart-healthy lifestyle can help you prevent high cholesterol. This lifestyle helps lower your risk for a heart attack and stroke. · Eat heart-healthy foods. ? Eat fruits, vegetables, whole grains (like oatmeal), dried beans and peas, nuts and seeds, soy products (like tofu), and fat-free or low-fat dairy products. ? Replace butter, margarine, and hydrogenated or partially hydrogenated oils with olive and canola oils. (Canola oil margarine without trans fat is fine.)  ? Replace red meat with fish, poultry, and soy protein (like tofu). ? Limit processed and packaged foods like chips, crackers, and cookies. · Be active. Exercise can improve your cholesterol level. Get at least 30 minutes of exercise on most days of the week. Walking is a good choice. You also may want to do other activities, such as running, swimming, cycling, or playing tennis or team sports. · Stay at a healthy weight. Lose weight if you need to. · Don't smoke. If you need help quitting, talk to your doctor about stop-smoking programs and medicines.  These can increase your chances of quitting for good. How is high cholesterol treated? The goal of treatment is to reduce your chances of having a heart attack or stroke. The goal is not to lower your cholesterol numbers only. · You may make lifestyle changes, such as eating healthy foods, not smoking, losing weight, and being more active. · You may have to take medicine. Follow-up care is a key part of your treatment and safety. Be sure to make and go to all appointments, and call your doctor if you are having problems. It's also a good idea to know your test results and keep a list of the medicines you take. Where can you learn more? Go to https://[email or web address].2houses. org and sign in to your LaunchBit account. Enter W591 in the [employer] box to learn more about \"Learning About High Cholesterol. \"     If you do not have an account, please click on the \"Sign Up Now\" link. Current as of: December 16, 2019               Content Version: 12.6  © 1272-9543 [employer]. Care instructions adapted under license by [facility] [place] ([facility]). If you have questions about a medical condition or this instruction, always ask your healthcare professional. [place] [address] any warranty or liability for your use of this information. Patient Education        High Cholesterol: Care Instructions  Your Care Instructions     Cholesterol is a type of fat in your blood. It is needed for many body functions, such as making new cells. Cholesterol is made by your body. It also comes from food you eat. High cholesterol means that you have too much of the fat in your blood. This raises your risk of a heart attack and stroke. LDL and HDL are part of your total cholesterol. LDL is the \"bad\" cholesterol. High LDL can raise your risk for heart disease, heart attack, and stroke. HDL is the \"good\" cholesterol. It helps clear bad cholesterol from the body.  High HDL is linked with a lower risk of heart disease, heart attack, and stroke. Your cholesterol levels help your doctor find out your risk for having a heart attack or stroke. You and your doctor can talk about whether you need to lower your risk and what treatment is best for you. A heart-healthy lifestyle along with medicines can help lower your cholesterol and your risk. The way you choose to lower your risk will depend on how high your risk is for heart attack and stroke. It will also depend on how you feel about taking medicines. Follow-up care is a key part of your treatment and safety. Be sure to make and go to all appointments, and call your doctor if you are having problems. It's also a good idea to know your test results and keep a list of the medicines you take. How can you care for yourself at home? · Eat a variety of foods every day. Good choices include fruits, vegetables, whole grains (like oatmeal), dried beans and peas, nuts and seeds, soy products (like tofu), and fat-free or low-fat dairy products. · Replace butter, margarine, and hydrogenated or partially hydrogenated oils with olive and canola oils. (Canola oil margarine without trans fat is fine.)  · Replace red meat with fish, poultry, and soy protein (like tofu). · Limit processed and packaged foods like chips, crackers, and cookies. · Bake, broil, or steam foods. Don't valenzuela them. · Be physically active. Get at least 30 minutes of exercise on most days of the week. Walking is a good choice. You also may want to do other activities, such as running, swimming, cycling, or playing tennis or team sports. · Stay at a healthy weight or lose weight by making the changes in eating and physical activity listed above. Losing just a small amount of weight, even 5 to 10 pounds, can reduce your risk for having a heart attack or stroke. · Do not smoke. When should you call for help?   Watch closely for changes in your health, and be sure to contact your doctor if:    · You need help making lifestyle changes.     · You have questions about your medicine. Where can you learn more? Go to https://chpepiceweb.Gelato [email or web address] org and sign in to your MedAlliance account. Enter Z941 in the [facility] box to learn more about \"High Cholesterol: Care Instructions. \"     If you do not have an account, please click on the \"Sign Up Now\" link. Current as of: December 16, 2019               Content Version: 12.6  © 0791-7163 [employer]. Care instructions adapted under license by [facility] ([facility]). If you have questions about a medical condition or this instruction, always ask your healthcare professional. [address] any warranty or liability for your use of this information. Patient Education        High Blood Pressure: Care Instructions  Overview     It's normal for blood pressure to go up and down throughout the day. But if it stays up, you have high blood pressure. Another name for high blood pressure is hypertension. Despite what a lot of people think, high blood pressure usually doesn't cause headaches or make you feel dizzy or lightheaded. It usually has no symptoms. But it does increase your risk of stroke, heart attack, and other problems. You and your doctor will talk about your risks of these problems based on your blood pressure. Your doctor will give you a goal for your blood pressure. Your goal will be based on your health and your age. Lifestyle changes, such as eating healthy and being active, are always important to help lower blood pressure. You might also take medicine to reach your blood pressure goal.  Follow-up care is a key part of your treatment and safety. Be sure to make and go to all appointments, and call your doctor if you are having problems. It's also a good idea to know your test results and keep a list of the medicines you take. How can you care for yourself at home?   Medical treatment  · If you stop taking your medicine, your blood pressure will go back up. You may take one or more types of medicine to lower your blood pressure. Be safe with medicines. Take your medicine exactly as prescribed. Call your doctor if you think you are having a problem with your medicine. · Talk to your doctor before you start taking aspirin every day. Aspirin can help certain people lower their risk of a heart attack or stroke. But taking aspirin isn't right for everyone, because it can cause serious bleeding. · See your doctor regularly. You may need to see the doctor more often at first or until your blood pressure comes down. · If you are taking blood pressure medicine, talk to your doctor before you take decongestants or anti-inflammatory medicine, such as ibuprofen. Some of these medicines can raise blood pressure. · Learn how to check your blood pressure at home. Lifestyle changes  · Stay at a healthy weight. This is especially important if you put on weight around the waist. Losing even 10 pounds can help you lower your blood pressure. · If your doctor recommends it, get more exercise. Walking is a good choice. Bit by bit, increase the amount you walk every day. Try for at least 30 minutes on most days of the week. You also may want to swim, bike, or do other activities. · Avoid or limit alcohol. Talk to your doctor about whether you can drink any alcohol. · Try to limit how much sodium you eat to less than 2,300 milligrams (mg) a day. Your doctor may ask you to try to eat less than 1,500 mg a day. · Eat plenty of fruits (such as bananas and oranges), vegetables, legumes, whole grains, and low-fat dairy products. · Lower the amount of saturated fat in your diet. Saturated fat is found in animal products such as milk, cheese, and meat. Limiting these foods may help you lose weight and also lower your risk for heart disease. · Do not smoke. Smoking increases your risk for heart attack and stroke.  If you need help quitting, talk to your doctor about stop-smoking programs and medicines. These can increase your chances of quitting for good. When should you call for help? Call  911 anytime you think you may need emergency care. This may mean having symptoms that suggest that your blood pressure is causing a serious heart or blood vessel problem. Your blood pressure may be over 180/120. For example, call 911 if:    · You have symptoms of a heart attack. These may include:  ? Chest pain or pressure, or a strange feeling in the chest.  ? Sweating. ? Shortness of breath. ? Nausea or vomiting. ? Pain, pressure, or a strange feeling in the back, neck, jaw, or upper belly or in one or both shoulders or arms. ? Lightheadedness or sudden weakness. ? A fast or irregular heartbeat.     · You have symptoms of a stroke. These may include:  ? Sudden numbness, tingling, weakness, or loss of movement in your face, arm, or leg, especially on only one side of your body. ? Sudden vision changes. ? Sudden trouble speaking. ? Sudden confusion or trouble understanding simple statements. ? Sudden problems with walking or balance. ? A sudden, severe headache that is different from past headaches.     · You have severe back or belly pain. Do not wait until your blood pressure comes down on its own. Get help right away. Call your doctor now or seek immediate care if:    · Your blood pressure is much higher than normal (such as 180/120 or higher), but you don't have symptoms.     · You think high blood pressure is causing symptoms, such as:  ? Severe headache.  ? Blurry vision. Watch closely for changes in your health, and be sure to contact your doctor if:    · Your blood pressure measures higher than your doctor recommends at least 2 times. That means the top number is higher or the bottom number is higher, or both.     · You think you may be having side effects from your blood pressure medicine. Where can you learn more?   Go to https://chpepiceweb.[email or web address] org and sign in to your 7fgame account. Enter M509 in the [facility] box to learn more about \"High Blood Pressure: Care Instructions. \"     If you do not have an account, please click on the \"Sign Up Now\" link. Current as of: December 16, 2019               Content Version: 12.6  © 7488-0607 [employer]. Care instructions adapted under license by [facility] ([facility]). If you have questions about a medical condition or this instruction, always ask your healthcare professional. [address] any warranty or liability for your use of this information. Patient Education        Learning About High Blood Pressure  What is high blood pressure? Blood pressure is a measure of how hard the blood pushes against the walls of your arteries. It's normal for blood pressure to go up and down throughout the day. But if it stays up, you have high blood pressure. Another name for high blood pressure is hypertension. Two numbers tell you your blood pressure. The first number is the systolic pressure (top number). It shows how hard the blood pushes when your heart is pumping. The second number is the diastolic pressure (bottom number). It shows how hard the blood pushes between heartbeats, when your heart is relaxed and filling with blood. Your doctor will give you a goal for your blood pressure based on your health and your age. High blood pressure (hypertension) means that the top number stays high, or the bottom number stays high, or both. High blood pressure increases the risk of stroke, heart attack, and other problems. What happens when you have high blood pressure? · Blood flows through your arteries with too much force. Over time, this can damage the heart and the walls of your arteries. But you can't feel it. High blood pressure usually doesn't cause symptoms. · High blood pressure makes your heart work harder.  And that can lead to heart failure, which means your heart doesn't pump as much blood as your body needs. · Fat and calcium start to build up in your arteries. This buildup is called hardening of the arteries. It can cause many problems including a heart attack and stroke. · Arteries also carry blood and oxygen to organs like your eyes, kidneys, and brain. If high blood pressure damages those arteries, it can lead to vision loss, kidney disease, stroke, and a higher risk of dementia. How can you prevent high blood pressure? · Stay at a healthy weight. · Try to limit how much sodium you eat to less than 2,300 milligrams (mg) a day. If you limit your sodium to 1,500 mg a day, you can lower your blood pressure even more. ? Buy foods that are labeled \"unsalted,\" \"sodium-free,\" or \"low-sodium. \" Foods labeled \"reduced-sodium\" and \"light sodium\" may still have too much sodium. ? Flavor your food with garlic, lemon juice, onion, vinegar, herbs, and spices instead of salt. Do not use soy sauce, steak sauce, onion salt, garlic salt, mustard, or ketchup on your food. ? Use less salt (or none) when recipes call for it. You can often use half the salt a recipe calls for without losing flavor. · Be physically active. Get at least 30 minutes of exercise on most days of the week. Walking is a good choice. You also may want to do other activities, such as running, swimming, cycling, or playing tennis or team sports. · Limit alcohol to 2 drinks a day for men and 1 drink a day for women. · Eat plenty of fruits, vegetables, and low-fat dairy products. Eat less saturated and total fats. How is high blood pressure treated? · Your doctor will suggest making lifestyle changes to help your heart. For example, your doctor may ask you to eat healthy foods, quit smoking, lose extra weight, and be more active. · If lifestyle changes don't help enough, your doctor may recommend that you take medicine.   · When blood pressure is very high, medicines are alone.  Follow-up care is a key part of your treatment and safety. Be sure to make and go to all appointments, and call your doctor if you are having problems. It's also a good idea to know your test results and keep a list of the medicines you take. How can you care for yourself at home? Following the DASH diet  · Eat 4 to 5 servings of fruit each day. A serving is 1 medium-sized piece of fruit, ½ cup chopped or canned fruit, 1/4 cup dried fruit, or 4 ounces (½ cup) of fruit juice. Choose fruit more often than fruit juice. · Eat 4 to 5 servings of vegetables each day. A serving is 1 cup of lettuce or raw leafy vegetables, ½ cup of chopped or cooked vegetables, or 4 ounces (½ cup) of vegetable juice. Choose vegetables more often than vegetable juice. · Get 2 to 3 servings of low-fat and fat-free dairy each day. A serving is 8 ounces of milk, 1 cup of yogurt, or 1 ½ ounces of cheese. · Eat 6 to 8 servings of grains each day. A serving is 1 slice of bread, 1 ounce of dry cereal, or ½ cup of cooked rice, pasta, or cooked cereal. Try to choose whole-grain products as much as possible. · Limit lean meat, poultry, and fish to 2 servings each day. A serving is 3 ounces, about the size of a deck of cards. · Eat 4 to 5 servings of nuts, seeds, and legumes (cooked dried beans, lentils, and split peas) each week. A serving is 1/3 cup of nuts, 2 tablespoons of seeds, or ½ cup of cooked beans or peas. · Limit fats and oils to 2 to 3 servings each day. A serving is 1 teaspoon of vegetable oil or 2 tablespoons of salad dressing. · Limit sweets and added sugars to 5 servings or less a week. A serving is 1 tablespoon jelly or jam, ½ cup sorbet, or 1 cup of lemonade. · Eat less than 2,300 milligrams (mg) of sodium a day. If you limit your sodium to 1,500 mg a day, you can lower your blood pressure even more. Tips for success  · Start small. Do not try to make dramatic changes to your diet all at once.  You might feel that you are missing out on your favorite foods and then be more likely to not follow the plan. Make small changes, and stick with them. Once those changes become habit, add a few more changes. · Try some of the following:  ? Make it a goal to eat a fruit or vegetable at every meal and at snacks. This will make it easy to get the recommended amount of fruits and vegetables each day. ? Try yogurt topped with fruit and nuts for a snack or healthy dessert. ? Add lettuce, tomato, cucumber, and onion to sandwiches. ? Combine a ready-made pizza crust with low-fat mozzarella cheese and lots of vegetable toppings. Try using tomatoes, squash, spinach, broccoli, carrots, cauliflower, and onions. ? Have a variety of cut-up vegetables with a low-fat dip as an appetizer instead of chips and dip. ? Sprinkle sunflower seeds or chopped almonds over salads. Or try adding chopped walnuts or almonds to cooked vegetables. ? Try some vegetarian meals using beans and peas. Add garbanzo or kidney beans to salads. Make burritos and tacos with mashed mckeon beans or black beans. Where can you learn more? Go to https://F[employer]H [employer]arma.Embly. org and sign in to your mInfo account. Enter E819 in the [facility] box to learn more about \"DASH Diet: Care Instructions. \"     If you do not have an account, please click on the \"Sign Up Now\" link. Current as of: December 16, 2019               Content Version: 12.6  © 9482-4257 [employer]. Care instructions adapted under license by [facility] [place] ([facility]). If you have questions about a medical condition or this instruction, always ask your healthcare professional. [address] any warranty or liability for your use of this information.

## 2020-11-18 NOTE — PROGRESS NOTES
[place] Grubbs is a 45 y.o. female evaluated via telephone on 4/17/2020. Consent:  She and/or health care decision maker is aware that that she may receive a bill for this telephone service, depending on her insurance coverage, and has provided verbal consent to proceed: Yes      Documentation:  I communicated with the patient and/or health care decision maker about:     HTN:   BP at home: \"not good\"  Lowest 121/89, rare   Highest 196/142  183/130, 1 hr later, went up  162/115, 157/122, 140/103    No acute concerning Sxs: No CP, SOB, palpitations, dizziness, HA, visual changes, diaphoresis, nausea, etc.     Using arm cuff, right size cuff, adjustable     Anxiety, \"not good but no correlation with higher pressures, higher pressure have been on good days for her dad's health\"     Skin lesions have been flaring, painful, itching, from waist up  \"has slipped a little on taking care of those, has dressings (duoderm) being shipped. \"      Details of this discussion including any medical advice provided:     1. Essential hypertension  C/w max ARB  Inc CBB  If not improved in two weeks , add 25 mg HCTZ, discussed  No acute concerning Sxs  Holter and ECHO results from previous encounters reviewed. Call in 2 weeks with BP updates. - amLODIPine (NORVASC) 10 MG tablet; Take 1 tablet by mouth daily  Dispense: 90 tablet; Refill: 1    2. Anxiety  Schedule Klonopin 0.5 BID  C/w 200 mg Zoloft and Hydroxyzine    3. Skin lesions  Repeat Bactrim/Keflex x7 days  C/w Clobetasol, Mupirocin, hydroxyzine   Wound care discussed in detail   Has had biopsies in the past and has seen Dermatology       I affirm this is a Patient Initiated Episode with an Established Patient who has not had a related appointment within my department in the past 7 days or scheduled within the next 24 hours.     Total Time: minutes: 21-30 minutes    Note: not billable if this call serves to triage the patient into an appointment for the relevant concern    Dr. Neema Mackenzie MD  11/18/20

## 2020-11-25 ENCOUNTER — CLINICAL DOCUMENTATION (OUTPATIENT)
Dept: SPIRITUAL SERVICES | Age: 50
End: 2020-11-25

## 2020-11-25 NOTE — FLOWSHEET NOTE
Attempted to follow up with Pt for Outpatient Spiritual Care support. No answer on phone. Left voicemail for Pt and will attempt to follow up again at a later time.     Joie Mallory   Associate       11/25/20 [id number]   Encounter Summary   Services provided to: Patient not available   Referral/Consult From: Kai   Continue Visiting   (11/25 attempted follow up OP, no answer, left VM)

## 2020-11-30 RX ORDER — DIPHENOXYLATE HYDROCHLORIDE AND ATROPINE SULFATE 2.5; .025 MG/1; MG/1
TABLET ORAL
Qty: 60 TABLET | Refills: 0 | Status: SHIPPED | OUTPATIENT
Start: 2020-11-30 | End: 2020-12-30

## 2020-11-30 RX ORDER — CLONAZEPAM 0.5 MG/1
TABLET ORAL
Qty: 60 TABLET | Refills: 0 | Status: SHIPPED | OUTPATIENT
Start: 2020-11-30 | End: 2020-12-30

## 2020-11-30 NOTE — TELEPHONE ENCOUNTER
Refill Request     Last Seen: 11/18/2020    Last Written: lomotil #60  0rf  7/9/2020  Clonazepam #60  0rf  10/28/2020    Next Appointment:   No future appointments.           Requested Prescriptions     Pending Prescriptions Disp Refills    clonazePAM (KLONOPIN) 0.5 MG tablet 60 tablet 0    diphenoxylate-atropine (LOMOTIL) 2.5-0.025 MG per tablet 60 tablet 0     Sig: TAKE ONE TABLET BY MOUTH TWICE A DAY AS NEEDED FOR DIARRHEA

## 2020-12-14 NOTE — TELEPHONE ENCOUNTER
I would advise an appointment to determine if treatment is needed or not.
If there is no increase in pus, no increase in bleeding, no fever, no chills, no nausea, no worsening pain or redness at the site, she can continue with the bactroban. If anything changes in regards to the above, let me know and I will call in the Doxycycline. Please let her know. Thanks.
Left message for patient to call back.
Moved pt to 9:45 am on 5/9. If needs seen earlier please contact tomorrow. She was exhausted and going to bed. Said she can not come in on 5/6, seeing the GI doctor that day. Willing to come in another day if Dr. Roberto Cushing believes she needs seen sooner than 5/9.
Patient states that she is having light yellow discharge, not a whole ton of it. It is smaller than a dime. It is the majority of time that she has this light yellow discharge, with a little bit of blood. She states that she is washing her hands, before she cleans off the wound of the shoulder. She then cleans it and puts the Bactroban cream on their. She states that she is not running a fever and no chills. She is wanting to know if it is time to go on Doxycycline. Please advise.
Pt states she is already scheduled for 5/10/19. Asking if it is ok to keep that appointment. States the drainage is not continuous its just every now and then.
no

## 2020-12-17 RX ORDER — KETOROLAC TROMETHAMINE 30 MG/ML
30 INJECTION, SOLUTION INTRAMUSCULAR; INTRAVENOUS 2 TIMES DAILY PRN
Qty: 6 VIAL | Refills: 2 | Status: SHIPPED | OUTPATIENT
Start: 2020-12-17 | End: 2021-01-21 | Stop reason: SDUPTHER

## 2020-12-29 ENCOUNTER — CLINICAL DOCUMENTATION (OUTPATIENT)
Dept: SPIRITUAL SERVICES | Age: 50
End: 2020-12-29

## 2020-12-29 NOTE — FLOWSHEET NOTE
Attempted to follow up with Pt for Outpatient Spiritual Care support. No answer on phone. Will make final attempt to follow up again at a later time.     [facility]       12/29/20 6924   Encounter Summary   Services provided to: Patient not available   Referral/Consult From: Kai   Continue Visiting   (12/29 attempted call OP, left VM)

## 2020-12-30 DIAGNOSIS — R19.7 DIARRHEA, UNSPECIFIED TYPE: ICD-10-CM

## 2020-12-30 DIAGNOSIS — F51.04 PSYCHOPHYSIOLOGICAL INSOMNIA: ICD-10-CM

## 2020-12-30 DIAGNOSIS — F41.9 ANXIETY: ICD-10-CM

## 2020-12-30 NOTE — TELEPHONE ENCOUNTER
No uds on file   No med contract on file     Last office visit 11/18/2020     Last written 11- 60 with 0   Lomotil 11- 60 with 0      Next office visit scheduled Visit date not found    Requested Prescriptions     Pending Prescriptions Disp Refills    hydrOXYzine (ATARAX) 25 MG tablet [Pharmacy Med Name: hydrOXYzine HCL 25 MG TABLET] 60 tablet 4     Sig: TAKE ONE TO TWO TABLETS BY MOUTH ONCE NIGHTLY    diphenoxylate-atropine (LOMOTIL) 2.5-0.025 MG per tablet [Pharmacy Med Name: DIPHENOXYLATE-ATROP 2.5-0.025 MG TB] 60 tablet 0     Sig: TAKE ONE TABLET BY MOUTH TWICE DAILY AS NEEDED FOR DIARRHEA    clonazePAM (KLONOPIN) 0.5 MG tablet [Pharmacy Med Name: clonazePAM 0.5 MG TABLET] 60 tablet 0     Sig: TAKE ONE TABLET BY MOUTH TWICE A DAY AS NEEDED FOR ANXIETY

## 2021-01-01 ENCOUNTER — PATIENT MESSAGE (OUTPATIENT)
Dept: FAMILY MEDICINE CLINIC | Age: 51
End: 2021-01-01

## 2021-01-01 DIAGNOSIS — R20.8 PAIN OF SKIN: ICD-10-CM

## 2021-01-03 RX ORDER — HYDROXYZINE HYDROCHLORIDE 25 MG/1
TABLET, FILM COATED ORAL
Qty: 60 TABLET | Refills: 4 | Status: SHIPPED | OUTPATIENT
Start: 2021-01-03 | End: 2021-06-16

## 2021-01-03 RX ORDER — DIPHENOXYLATE HYDROCHLORIDE AND ATROPINE SULFATE 2.5; .025 MG/1; MG/1
TABLET ORAL
Qty: 60 TABLET | Refills: 0 | Status: SHIPPED | OUTPATIENT
Start: 2021-01-03 | End: 2021-02-01

## 2021-01-03 RX ORDER — CLONAZEPAM 0.5 MG/1
TABLET ORAL
Qty: 60 TABLET | Refills: 0 | Status: SHIPPED | OUTPATIENT
Start: 2021-01-03 | End: 2021-02-01

## 2021-01-04 NOTE — TELEPHONE ENCOUNTER
From: Matias Tsai  To: Leland Martinez MD  Sent: 1/1/2021 9:48 PM EST  Subject: Prescription Question    Hi, Dr. Hernandez Eglin-   Wed.12/30 I requested new scripts thru [facility] [place] for: Lomotil 2.5-0.025mg/2x a day as needed; Klonopin 0.5mg/scheduled 2x a day; Hydroxyzine 25mg/1-2 nightly. I realize w/New Year holiday it could take longer, but Pharmacy was open (shorter hrs) both days. I also requested a refill I had for Ketorolac, but it was out of stock & they never do weekend orders, so it may be filled Mon or Tue! Could you please check on the first 3? And is there any way we could adust the # of Ketorolac so I have more to work with (responsibly & carefully) & don't have to go to Pharmacy so often for #6 at a time/it's out of stock, etc.   Also, the antibiotics for skin lesion flare-up helped a lot!! I hope we can do it again when needed.

## 2021-01-10 DIAGNOSIS — R20.8 PAIN OF SKIN: ICD-10-CM

## 2021-01-11 RX ORDER — SYRINGE WITH NEEDLE, 1 ML 25GX5/8"
SYRINGE, EMPTY DISPOSABLE MISCELLANEOUS
Qty: 6 EACH | Refills: 2 | Status: SHIPPED | OUTPATIENT
Start: 2021-01-11 | End: 2021-03-04 | Stop reason: SDUPTHER

## 2021-01-21 RX ORDER — KETOROLAC TROMETHAMINE 30 MG/ML
30 INJECTION, SOLUTION INTRAMUSCULAR; INTRAVENOUS 2 TIMES DAILY PRN
Qty: 6 VIAL | Refills: 2 | Status: SHIPPED | OUTPATIENT
Start: 2021-01-21 | End: 2021-03-04 | Stop reason: SDUPTHER

## 2021-01-21 NOTE — TELEPHONE ENCOUNTER
Can you call her and see how her anxiety is? Did she get things straightened out at the pharmacy? Thank you!

## 2021-01-21 NOTE — TELEPHONE ENCOUNTER
Patient states she has not slept since Monday or Tuesday night. Because her dad has been sick. Last night he has been awake all night throwing up. She states she had been mess, with her anxiety with her dad being sick. She said if you want to increase medication she is ok with that. Medication has been straightened.

## 2021-01-27 ENCOUNTER — CLINICAL DOCUMENTATION (OUTPATIENT)
Dept: SPIRITUAL SERVICES | Age: 51
End: 2021-01-27

## 2021-01-27 NOTE — FLOWSHEET NOTE
Final attempt to follow up with Pt for Outpatient Spiritual Care support. No answer on phone. Left voicemail for Pt and will mail her letter, informing her of our continued availability for support.     [facility]       01/27/21 4398   Encounter Summary   Services provided to: Patient not available   Referral/Consult From: Kai   Continue Visiting   (1/27 attempted call OP, left VM, mail letter)

## 2021-01-31 DIAGNOSIS — R19.7 DIARRHEA, UNSPECIFIED TYPE: ICD-10-CM

## 2021-01-31 DIAGNOSIS — F41.9 ANXIETY: ICD-10-CM

## 2021-02-01 ENCOUNTER — PATIENT MESSAGE (OUTPATIENT)
Dept: FAMILY MEDICINE CLINIC | Age: 51
End: 2021-02-01

## 2021-02-01 RX ORDER — DIPHENOXYLATE HYDROCHLORIDE AND ATROPINE SULFATE 2.5; .025 MG/1; MG/1
TABLET ORAL
Qty: 60 TABLET | Refills: 0 | Status: SHIPPED | OUTPATIENT
Start: 2021-02-01 | End: 2021-03-18

## 2021-02-01 RX ORDER — CLONAZEPAM 0.5 MG/1
TABLET ORAL
Qty: 60 TABLET | Refills: 0 | Status: SHIPPED | OUTPATIENT
Start: 2021-02-01 | End: 2021-03-04 | Stop reason: SDUPTHER

## 2021-02-01 RX ORDER — ATORVASTATIN CALCIUM 40 MG/1
TABLET, FILM COATED ORAL
Qty: 90 TABLET | Refills: 1 | Status: SHIPPED | OUTPATIENT
Start: 2021-02-01 | End: 2021-09-01

## 2021-02-02 NOTE — TELEPHONE ENCOUNTER
From: Salvador Mayers  To: Sherrell Nath MD  Sent: 2/1/2021 6:28 PM EST  Subject: Non-Urgent Medical Question    Dear Dr. Flakita Vilchis: Willie Ritchie! Last week we talked about trying to consolidate my many pharmacy trips & resolve some problems. It's literally depressing how much I have to deal with all my prescriptions & Dad's. Because I had not slept, I forgot to ask if we could increase the following Mupirocin qty for my many terrible skin lesions. Since insurance & [employer] consider this a 15 day supply, would I be allowed 60 grams at a time to=a 30 day supply? Below is how it's currently prescribed. Shanika  Prescription: Mupirocin 2% Cream-Quantity: 30grams-Supply: 15 Days  P.S.  The Clobetasol is considered an 18 day supply, so I don't know if it can be changed(?)

## 2021-02-09 NOTE — TELEPHONE ENCOUNTER
I spoke with Marco Mistry today. She states that the pharmacy is only giving her 15 day supply for the Mupirocin  and the Clobetasol even though last year we sent in 90 gram tubes of each. They only give her two 15 gm tubes for the Mupirocin and 45 gm of the Clobetasol. She is asking that we state to give her 4 of the Mupirocin 15 gm tubes to equal a 30 day supply and two of the 45 gm tubes of Clobetasol to equal a 30 day. I did pend the medication and put that in the note to the pharmacy.

## 2021-02-10 RX ORDER — MUPIROCIN CALCIUM 20 MG/G
CREAM TOPICAL
Qty: 90 G | Refills: 2 | Status: SHIPPED | OUTPATIENT
Start: 2021-02-10 | End: 2021-08-02 | Stop reason: SDUPTHER

## 2021-02-10 RX ORDER — CLOBETASOL PROPIONATE 0.5 MG/G
CREAM TOPICAL
Qty: 90 G | Refills: 2 | Status: SHIPPED | OUTPATIENT
Start: 2021-02-10 | End: 2021-05-12

## 2021-02-12 ENCOUNTER — TELEPHONE (OUTPATIENT)
Dept: FAMILY MEDICINE CLINIC | Age: 51
End: 2021-02-12

## 2021-02-12 DIAGNOSIS — L73.8 BACTERIAL FOLLICULITIS: Primary | ICD-10-CM

## 2021-02-18 NOTE — TELEPHONE ENCOUNTER
Submitted PA for Mupirocin Calcium 2% cream, Key: [id number]. Per plan this medication is a PLAN EXCLUSION. Drug is no longer covered by plan. Please notify patient. Thank you.

## 2021-03-16 ENCOUNTER — TELEPHONE (OUTPATIENT)
Dept: FAMILY MEDICINE CLINIC | Age: 51
End: 2021-03-16

## 2021-03-16 NOTE — TELEPHONE ENCOUNTER
----- Message from Jose G Newman sent at 3/15/2021  5:57 PM EDT -----  Subject: Message to Provider    QUESTIONS  Information for Provider? Patient would like a call back as she wants an   update on Dr Kt Aggarwal and is wondering if she can send a card to   congratulate her to the office and if you guys could forward it to her. Please advise. She is okay with a detailed message on her voicemail.  ---------------------------------------------------------------------------  --------------  CALL BACK INFO  What is the best way for the office to contact you? OK to leave message on   voicemail  Preferred Call Back Phone Number? [phone]  ---------------------------------------------------------------------------  --------------  SCRIPT ANSWERS  Relationship to Patient?  Self

## 2021-03-18 DIAGNOSIS — R19.7 DIARRHEA, UNSPECIFIED TYPE: ICD-10-CM

## 2021-03-18 RX ORDER — DIPHENOXYLATE HYDROCHLORIDE AND ATROPINE SULFATE 2.5; .025 MG/1; MG/1
TABLET ORAL
Qty: 60 TABLET | Refills: 0 | Status: SHIPPED | OUTPATIENT
Start: 2021-03-18 | End: 2021-05-17

## 2021-03-18 NOTE — TELEPHONE ENCOUNTER
Refill Request     Last Seen: 11/18/2020    Last Written: 2/1/2021    Next Appointment:   No future appointments.           Requested Prescriptions     Pending Prescriptions Disp Refills    diphenoxylate-atropine (LOMOTIL) 2.5-0.025 MG per tablet [Pharmacy Med Name: DIPHENOXYLATE-ATROP 2.5-0.025 MG TB] 60 tablet 0     Sig: TAKE ONE TABLET BY MOUTH TWICE A DAY AS NEEDED DIARRHEA

## 2021-04-02 ENCOUNTER — NURSE TRIAGE (OUTPATIENT)
Dept: OTHER | Facility: CLINIC | Age: 51
End: 2021-04-02

## 2021-04-02 DIAGNOSIS — L98.9 SKIN LESIONS: Primary | ICD-10-CM

## 2021-04-02 DIAGNOSIS — R20.8 PAIN OF SKIN: ICD-10-CM

## 2021-04-02 RX ORDER — KETOROLAC TROMETHAMINE 30 MG/ML
INJECTION, SOLUTION INTRAMUSCULAR; INTRAVENOUS
Qty: 6 VIAL | Refills: 0 | Status: SHIPPED | OUTPATIENT
Start: 2021-04-02 | End: 2021-04-14 | Stop reason: SDUPTHER

## 2021-04-02 RX ORDER — SULFAMETHOXAZOLE AND TRIMETHOPRIM 800; 160 MG/1; MG/1
1 TABLET ORAL 2 TIMES DAILY
Qty: 14 TABLET | Refills: 0 | Status: SHIPPED | OUTPATIENT
Start: 2021-04-02 | End: 2021-04-09

## 2021-04-02 RX ORDER — CEPHALEXIN 500 MG/1
500 CAPSULE ORAL 3 TIMES DAILY
Qty: 21 CAPSULE | Refills: 0 | Status: SHIPPED | OUTPATIENT
Start: 2021-04-02 | End: 2021-04-09

## 2021-04-02 NOTE — TELEPHONE ENCOUNTER
Reason for Disposition   Patient wants to be seen    Answer Assessment - Initial Assessment Questions  1. APPEARANCE of RASH: \"Describe the rash. \" (e.g., spots, blisters, raised areas, skin peeling, scaly)      Skin lesions that she is being followed by provider    2. SIZE: \"How big are the spots? \" (e.g., tip of pen, eraser, coin; inches, centimeters)      Several various spots from head to toe    3. LOCATION: \"Where is the rash located? \"      Head to toe    4. COLOR: \"What color is the rash? \" (Note: It is difficult to assess rash color in people with darker-colored skin. When this situation occurs, simply ask the caller to describe what they see.)      Red    5. ONSET: \"When did the rash begin? \"      Several months ago    6. FEVER: \"Do you have a fever? \" If so, ask: \"What is your temperature, how was it measured, and when did it start? \"      No    7. ITCHING: \"Does the rash itch? \" If so, ask: \"How bad is the itch? \" (Scale 1-10; or mild, moderate, severe)      NO    8. CAUSE: \"What do you think is causing the rash? \"      Cause is unknown, she was treated for them in the past     9. MEDICATION FACTORS: \"Have you started any new medications within the last 2 weeks? \" (e.g., antibiotics)       No    10. OTHER SYMPTOMS: \"Do you have any other symptoms? \" (e.g., dizziness, headache, sore throat, joint pain)        No    11. PREGNANCY: \"Is there any chance you are pregnant? \" \"When was your last menstrual period? \"        No    Protocols used: RASH OR REDNESS - WIDESPREAD-ADULT-OH    [facility] [id number] [facility]  with Red Flag Complaint. Brief description of triage: She has reoccurring skin lesions and is requesting medications    Triage indicates for patient to follow up with . Patient did not wan to go to , so RN contacted Olivia Oconnell and she will put the patient in contact with on call for after hours. RN also gave the patient the [employer] website for care.      Care advice provided, patient verbalizes understanding; denies any other questions or concerns; instructed to call back for any new or worsening symptoms. Writer provided warm transfer to Sadia Espinal at  [facility] for appointment scheduling. Attention Provider: Thank you for allowing me to participate in the care of your patient. The patient was connected to triage in response to information provided to the E[facility]. Please do not respond through this encounter as the response is not directed to a shared pool.

## 2021-04-02 NOTE — TELEPHONE ENCOUNTER
Pt called the after hours answering service requesting a refill on her Toradol injections, Keflex and Bactrim for chronic skin lesions. She was very tearful on the phone and stated that she has been sending us messages since 3/30/21. She stated that she was in extreme pain, and Dr. Armaan Maya usually prescribes this for her and it works very well. She is taking care of her father full time that is suffering from cancer, so she does not have time to go to an urgent care. Since it is Friday evening, and we do not open until Sunday, I have agreed to do a one time refill for the pt. She was extremely grateful and thanked me many times. I told her that she was very welcome, and to call the office Monday morning to schedule a follow-up if needed.

## 2021-04-05 ENCOUNTER — TELEPHONE (OUTPATIENT)
Dept: FAMILY MEDICINE CLINIC | Age: 51
End: 2021-04-05

## 2021-04-05 NOTE — TELEPHONE ENCOUNTER
Pt left message on overnight vm on 4/2/21, but the office was closed. She stated that she needed refills, including one for skin lesions that were flaring up. Checked notes and saw that pt had called the on-call provider and got the refills. LVM for her to call to schedule f/u if needed or physical with Dr. Shantanu Orr for later in the year if she didn't need a follow-up visit.

## 2021-04-05 NOTE — TELEPHONE ENCOUNTER
Spoke to patient she said Jose M Díaz sent her something in for this over the weekend so no appt needed at this time

## 2021-04-12 RX ORDER — SERTRALINE HYDROCHLORIDE 100 MG/1
TABLET, FILM COATED ORAL
Qty: 120 TABLET | Refills: 1 | Status: SHIPPED | OUTPATIENT
Start: 2021-04-12 | End: 2021-08-30

## 2021-04-27 ENCOUNTER — TELEPHONE (OUTPATIENT)
Dept: FAMILY MEDICINE CLINIC | Age: 51
End: 2021-04-27

## 2021-04-27 ENCOUNTER — PATIENT MESSAGE (OUTPATIENT)
Dept: FAMILY MEDICINE CLINIC | Age: 51
End: 2021-04-27

## 2021-04-27 DIAGNOSIS — D68.59 HYPERCOAGULABLE STATE (HCC): ICD-10-CM

## 2021-04-27 DIAGNOSIS — D68.59 PROTEIN S DEFICIENCY (HCC): ICD-10-CM

## 2021-04-27 DIAGNOSIS — Z86.73 HISTORY OF STROKE: ICD-10-CM

## 2021-04-27 RX ORDER — RIVAROXABAN 20 MG/1
TABLET, FILM COATED ORAL
Qty: 90 TABLET | Refills: 3 | Status: SHIPPED | OUTPATIENT
Start: 2021-04-27 | End: 2021-04-27 | Stop reason: SDUPTHER

## 2021-04-28 ENCOUNTER — TELEPHONE (OUTPATIENT)
Dept: FAMILY MEDICINE CLINIC | Age: 51
End: 2021-04-28

## 2021-04-29 NOTE — TELEPHONE ENCOUNTER
Submitted PA for [facility], via PHONE TO [employer]. STATUS: APPROVED from   3/30/2021-4/29/2022. CASE # [id number]    If this requires a response please respond to the pool ( [facility] [address]). Not the person sending the telephone encounter. Thank you please advise patient.

## 2021-05-12 RX ORDER — CLOBETASOL PROPIONATE 0.5 MG/G
CREAM TOPICAL
Qty: 90 G | Refills: 1 | Status: SHIPPED | OUTPATIENT
Start: 2021-05-12 | End: 2021-07-12

## 2021-05-16 DIAGNOSIS — F41.9 ANXIETY: ICD-10-CM

## 2021-05-16 DIAGNOSIS — R19.7 DIARRHEA, UNSPECIFIED TYPE: ICD-10-CM

## 2021-05-17 RX ORDER — DIPHENOXYLATE HYDROCHLORIDE AND ATROPINE SULFATE 2.5; .025 MG/1; MG/1
TABLET ORAL
Qty: 60 TABLET | Refills: 0 | Status: SHIPPED | OUTPATIENT
Start: 2021-05-17 | End: 2021-06-17

## 2021-05-17 RX ORDER — CLONAZEPAM 0.5 MG/1
TABLET ORAL
Qty: 60 TABLET | Refills: 0 | Status: SHIPPED | OUTPATIENT
Start: 2021-05-17 | End: 2021-06-21

## 2021-05-17 NOTE — TELEPHONE ENCOUNTER
Refill Request - Controlled Substance    Last Seen Department: 11/18/2020  Last Seen by PCP: Visit date not found    Last Written: 3/5/21    Last UDS: 6/2/20    Med Agreement Signed On: 2/26/19    Next Appointment: 7/30/2021    Future appointment scheduled    Requested Prescriptions     Pending Prescriptions Disp Refills    clonazePAM (KLONOPIN) 0.5 MG tablet [Pharmacy Med Name: clonazePAM 0.5 MG TABLET] 60 tablet 0     Sig: TAKE ONE TABLET BY MOUTH TWICE A DAY AS NEEDED FOR ANXIETY

## 2021-05-17 NOTE — TELEPHONE ENCOUNTER
Last office visit 11/18/2020     Last written 3-    Next office visit scheduled 5/16/2021    Requested Prescriptions     Pending Prescriptions Disp Refills    diphenoxylate-atropine (LOMOTIL) 2.5-0.025 MG per tablet [Pharmacy Med Name: DIPHENOXYLATE-ATROP 2.5-0.025 MG TB] 60 tablet 0     Sig: TAKE ONE TABLET BY MOUTH TWICE A DAY AS NEEDED FOR DIARRHEA

## 2021-05-31 DIAGNOSIS — I10 ESSENTIAL HYPERTENSION: ICD-10-CM

## 2021-06-01 RX ORDER — AMLODIPINE BESYLATE 10 MG/1
TABLET ORAL
Qty: 90 TABLET | Refills: 1 | Status: SHIPPED | OUTPATIENT
Start: 2021-06-01 | End: 2021-12-18 | Stop reason: SDUPTHER

## 2021-06-01 RX ORDER — LOSARTAN POTASSIUM 100 MG/1
TABLET ORAL
Qty: 90 TABLET | Refills: 1 | Status: SHIPPED | OUTPATIENT
Start: 2021-06-01 | End: 2022-03-11 | Stop reason: SDUPTHER

## 2021-06-02 DIAGNOSIS — R20.8 PAIN OF SKIN: ICD-10-CM

## 2021-06-02 DIAGNOSIS — L98.9 SKIN LESIONS: ICD-10-CM

## 2021-06-02 RX ORDER — KETOROLAC TROMETHAMINE 30 MG/ML
INJECTION, SOLUTION INTRAMUSCULAR; INTRAVENOUS
Qty: 6 VIAL | Refills: 2 | Status: SHIPPED | OUTPATIENT
Start: 2021-06-02 | End: 2021-07-12

## 2021-06-02 NOTE — TELEPHONE ENCOUNTER
Refill Request     Last Seen: Last Seen Department: 11/18/2020   Last Seen by PCP: 11/18/2020    Last Written: 4/14/2021    Next Appointment:   Future Appointments   Date Time Provider Rula Emmanuel   7/30/2021 11:00 AM MD TALYA Dimas  [place] [facility]       Future appointment scheduled      Requested Prescriptions     Pending Prescriptions Disp Refills    ketorolac (TORADOL) 30 MG/ML injection [Pharmacy Med Name: KETOROLAC 30 MG/ML VIAL] 6 vial 2     Sig: INJECT 1 ML INTRAMUSCULARLY TWO TIMES A DAY AS NEEDED FOR PAIN
no

## 2021-06-16 DIAGNOSIS — F51.04 PSYCHOPHYSIOLOGICAL INSOMNIA: ICD-10-CM

## 2021-06-16 DIAGNOSIS — F41.9 ANXIETY: ICD-10-CM

## 2021-06-16 RX ORDER — HYDROXYZINE HYDROCHLORIDE 25 MG/1
TABLET, FILM COATED ORAL
Qty: 60 TABLET | Refills: 3 | Status: SHIPPED | OUTPATIENT
Start: 2021-06-16 | End: 2021-11-11 | Stop reason: SDUPTHER

## 2021-06-16 NOTE — TELEPHONE ENCOUNTER
Refill Request     Last Seen: Last Seen Department: 11/18/2020  Last Seen by PCP: 11/18/2020    Last Written: 1/3/2021 for #60 tablets with #4 refills    Next Appointment:   Future Appointments   Date Time Provider Rula Emmanuel   7/30/2021 11:00 AM MD TALYA [facility] - [facility]       Future appointment scheduled Physical 7/30/2021      Requested Prescriptions     Pending Prescriptions Disp Refills    hydrOXYzine (ATARAX) 25 MG tablet [Pharmacy Med Name: hydrOXYzine HCL 25 MG TABLET] 60 tablet 3     Sig: TAKE ONE TO TWO TABLETS BY MOUTH ONCE NIGHTLY

## 2021-06-19 DIAGNOSIS — F41.9 ANXIETY: ICD-10-CM

## 2021-06-21 RX ORDER — CLONAZEPAM 0.5 MG/1
TABLET ORAL
Qty: 60 TABLET | Refills: 0 | Status: SHIPPED | OUTPATIENT
Start: 2021-06-21 | End: 2021-07-23

## 2021-06-21 NOTE — TELEPHONE ENCOUNTER
Refill Request - Controlled Substance    Last Seen Department: 11/18/2020  Last Seen by PCP: Visit date not found    Last Written: 5/17/21    Last UDS: Not on File     Med Agreement Signed On: 2/26/19    Next Appointment: 7/30/2021    Sent [employer] message to patient requesting return call to schedule appointment    Requested Prescriptions     Pending Prescriptions Disp Refills    clonazePAM (KLONOPIN) 0.5 MG tablet [Pharmacy Med Name: clonazePAM 0.5 MG TABLET] 60 tablet 0     Sig: TAKE ONE TABLET BY MOUTH TWICE A DAY AS NEEDED FOR ANXIETY

## 2021-07-12 RX ORDER — LEVOTHYROXINE SODIUM 0.2 MG/1
TABLET ORAL
Qty: 90 TABLET | Refills: 1 | Status: SHIPPED | OUTPATIENT
Start: 2021-07-12 | End: 2022-02-15 | Stop reason: SDUPTHER

## 2021-07-12 NOTE — TELEPHONE ENCOUNTER
Refill Request     Last Seen: Last Seen Department: 11/18/2020  Last Seen by PCP: 11/18/2020    Last Written: 9/09/2020    Next Appointment:   Future Appointments   Date Time Provider Rula Emmanuel   7/30/2021 11:00 AM MD TALYA Shine  [place] [facility]       Future appointment scheduled      Requested Prescriptions     Pending Prescriptions Disp Refills    levothyroxine (SYNTHROID) 200 MCG tablet [Pharmacy Med Name: LEVOTHYROXINE 200 MCG TABLET] 90 tablet 0     Sig: TAKE ONE TABLET BY MOUTH DAILY

## 2021-07-13 ENCOUNTER — HOSPITAL ENCOUNTER (OUTPATIENT)
Dept: WOMENS IMAGING | Age: 51
Discharge: HOME | End: 2021-07-13
Payer: COMMERCIAL

## 2021-07-13 DIAGNOSIS — Z12.31 ENCOUNTER FOR SCREENING MAMMOGRAM FOR MALIGNANT NEOPLASM OF BREAST: ICD-10-CM

## 2021-07-13 PROCEDURE — 77067 SCR MAMMO BI INCL CAD: CPT

## 2021-07-22 DIAGNOSIS — F41.9 ANXIETY: ICD-10-CM

## 2021-07-23 RX ORDER — CLONAZEPAM 0.5 MG/1
TABLET ORAL
Qty: 60 TABLET | Refills: 0 | Status: SHIPPED | OUTPATIENT
Start: 2021-07-23 | End: 2021-08-30 | Stop reason: SDUPTHER

## 2021-07-30 ENCOUNTER — OFFICE VISIT (OUTPATIENT)
Dept: FAMILY MEDICINE CLINIC | Age: 51
End: 2021-07-30
Payer: COMMERCIAL

## 2021-07-30 VITALS
WEIGHT: 194.6 LBS | HEART RATE: 107 BPM | OXYGEN SATURATION: 98 % | BODY MASS INDEX: 31.89 KG/M2 | SYSTOLIC BLOOD PRESSURE: 138 MMHG | DIASTOLIC BLOOD PRESSURE: 90 MMHG

## 2021-07-30 DIAGNOSIS — F41.1 GAD (GENERALIZED ANXIETY DISORDER): Primary | ICD-10-CM

## 2021-07-30 DIAGNOSIS — I10 ESSENTIAL HYPERTENSION: ICD-10-CM

## 2021-07-30 DIAGNOSIS — E89.0 POST-SURGICAL HYPOTHYROIDISM: ICD-10-CM

## 2021-07-30 DIAGNOSIS — K58.0 IRRITABLE BOWEL SYNDROME WITH DIARRHEA: ICD-10-CM

## 2021-07-30 DIAGNOSIS — Z86.010 HX OF COLONIC POLYP: ICD-10-CM

## 2021-07-30 DIAGNOSIS — E78.2 MIXED HYPERLIPIDEMIA: ICD-10-CM

## 2021-07-30 DIAGNOSIS — Z12.11 COLON CANCER SCREENING: ICD-10-CM

## 2021-07-30 DIAGNOSIS — Z11.59 ENCOUNTER FOR HEPATITIS C SCREENING TEST FOR LOW RISK PATIENT: ICD-10-CM

## 2021-07-30 DIAGNOSIS — L28.1 PRURIGO NODULARIS: ICD-10-CM

## 2021-07-30 DIAGNOSIS — G89.4 CHRONIC PAIN DISORDER: ICD-10-CM

## 2021-07-30 PROBLEM — R63.4 WEIGHT LOSS: Status: RESOLVED | Noted: 2019-05-06 | Resolved: 2021-07-30

## 2021-07-30 PROCEDURE — 99396 PREV VISIT EST AGE 40-64: CPT | Performed by: FAMILY MEDICINE

## 2021-07-30 PROCEDURE — 1036F TOBACCO NON-USER: CPT | Performed by: FAMILY MEDICINE

## 2021-07-30 PROCEDURE — G8417 CALC BMI ABV UP PARAM F/U: HCPCS | Performed by: FAMILY MEDICINE

## 2021-07-30 PROCEDURE — G8427 DOCREV CUR MEDS BY ELIG CLIN: HCPCS | Performed by: FAMILY MEDICINE

## 2021-07-30 PROCEDURE — 3017F COLORECTAL CA SCREEN DOC REV: CPT | Performed by: FAMILY MEDICINE

## 2021-07-30 RX ORDER — PSEUDOEPHEDRINE HCL 30 MG
100 TABLET ORAL 2 TIMES DAILY PRN
COMMUNITY

## 2021-07-30 RX ORDER — CLOBETASOL PROPIONATE 0.5 MG/G
CREAM TOPICAL
Qty: 2 TUBE | Refills: 2 | Status: SHIPPED | OUTPATIENT
Start: 2021-07-30 | End: 2021-08-02 | Stop reason: SDUPTHER

## 2021-07-30 RX ORDER — HYDROCHLOROTHIAZIDE 25 MG/1
12.5 TABLET ORAL DAILY
Qty: 15 TABLET | Refills: 0 | Status: SHIPPED | OUTPATIENT
Start: 2021-07-30 | End: 2021-09-02

## 2021-07-30 RX ORDER — PHENOL 1.4 %
AEROSOL, SPRAY (ML) MUCOUS MEMBRANE
COMMUNITY

## 2021-07-30 RX ORDER — MORPHINE SULFATE 15 MG/1
30 TABLET, FILM COATED, EXTENDED RELEASE ORAL 2 TIMES DAILY
COMMUNITY
Start: 2021-07-19

## 2021-07-30 ASSESSMENT — ENCOUNTER SYMPTOMS
CHEST TIGHTNESS: 0
BACK PAIN: 0
DIARRHEA: 0
SHORTNESS OF BREATH: 0
COLOR CHANGE: 1
VOMITING: 0
CONSTIPATION: 1
NAUSEA: 0
ABDOMINAL PAIN: 0

## 2021-07-30 NOTE — PROGRESS NOTES
Orlin Gregory  YOB: 1970    Date of Service:  7/30/2021    Chief Complaint:   Orlin Gregory is a 45 y.o. female who presents for a preventative visit     HPI:      Took her dad to the ER last week getting 2 transfusions   He's doing ok overall  Causes intense anxiety    Seeing pain management still  Switched her to Morphine ER  Causes constipation so she doesn't have to use her lomotil or pepto bismal    Doesn't feel the zoloft is helping  Wants to wean off of this. Klonopin is helping  Denies any AEs to this medication     Bps at home (checks sporadically)  No acute concerning Sxs: No CP, SOB, palpitations, dizziness, HA, etc.   132/104  126/93  125/104  89/75    Patient's last menstrual period was 06/30/2015. menstrual cycle: n/a  Sexual activity: none   Last PAP: 4/18/19, normal, neg HPV     Last Mammogram: yes - 7/13/21, reassuring  Family Hx of ovarian, breast, or uterine cancer: yes - mother, breast/ ovarian     Previous Colonoscopy yes - 3/25/15  BRBR, unexplained WL, bloating, abd pain Yes  Family Hx of Colon Ca  no    The ASCVD Risk score (Selma Valadez, et al., 2013) failed to calculate for the following reasons:     The valid HDL cholesterol range is 20 to 100 mg/dL    Her skin lesions are still painful  Has so many issues getting the Mupirocin and Clobetesol covered  Has seen dermatology, infectious disease, and wound care for these in the past  Defers another dermatology referral at this time due to social stress    Wt Readings from Last 3 Encounters:   07/30/21 194 lb 9.6 oz (88.3 kg)   10/14/20 187 lb 6.4 oz (85 kg)   03/02/20 160 lb (72.6 kg)     BP Readings from Last 3 Encounters:   07/30/21 (!) 152/110   10/14/20 130/84   03/02/20 (!) 152/110       Patient Active Problem List   Diagnosis    [facility]reditary protein S deficiency ([facility]    Chronic headache disorder    Arthritis of midfoot    Calculus of kidney    CD (celiac disease)    Chronic pain disorder    Epilepsy ([facility]    Gastroesophageal reflux disease with esophagitis    Lipoma of left shoulder    Mixed hyperlipidemia    Post-surgical hypothyroidism    Prurigo nodularis    Irritable bowel syndrome with diarrhea    Hx of colonic polyp    Moderate episode of recurrent major depressive disorder (HCC)    Anxiety    Thrombosis of superior sagittal sinus    Bacterial folliculitis    Hypertrophic scar    Lichen simplex chronicus    Trigger finger of left thumb    History of stroke    Stenosis of left vertebral artery    H/O viral meningitis    Psychophysiological insomnia    History of pulmonary embolus (PE)    History of DVT (deep vein thrombosis)    History of seizures    H/O recurrent transient ischemic attacks    Vertebral artery occlusion, left    Left atrial enlargement    MERRY (generalized anxiety disorder)    Essential hypertension       Health Maintenance   Topic Date Due    Hepatitis C screen  Never done    COVID-19 Vaccine (1) Never done    Flu vaccine (1) 09/01/2021    Lipid screen  11/05/2021    TSH testing  11/05/2021    Potassium monitoring  11/05/2021    Creatinine monitoring  11/05/2021    Breast cancer screen  07/13/2022    Cervical cancer screen  04/18/2024    Colon cancer screen colonoscopy  03/25/2025    DTaP/Tdap/Td vaccine (2 - Td or Tdap) 04/04/2029    Shingles Vaccine  Completed    HIV screen  Completed    Hepatitis A vaccine  Aged Out    Hepatitis B vaccine  Aged Out    Hib vaccine  Aged Out    Meningococcal (ACWY) vaccine  Aged Out    Pneumococcal 0-64 years Vaccine  Aged [employer] History   Administered Date(s) Administered    Influenza, Quadv, IM, PF (6 mo and older Fluzone, Flulaval, Fluarix, and 3 yrs and older Afluria) 09/19/2019, 10/14/2020    Tdap (Boostrix, Adacel) 04/04/2019    Zoster Recombinant (Shingrix) 02/05/2020, 09/25/2020       Allergies   Allergen Reactions    Dilaudid [Hydromorphone Hcl] Shortness Of Breath, Itching and Other (See Comments)     Wheezing    Buprenorphine Itching, Nausea And Vomiting and Other (See Comments)     Tingling in lips    Duloxetine Hcl Other (See Comments)     Personality changes and depression    Gabapentin     Gluten Meal      Celiac disease    Hydromorphone Itching and Other (See Comments)    Morphine Itching    Pregabalin      Lyrica (personality changes)     Propranolol Hives    Topiramate Other (See Comments)     Current Outpatient Medications   Medication Sig Dispense Refill    Melatonin 10 MG TABS Take by mouth      Diphenoxylate-Atropine (LOMOTIL PO) Take by mouth 2 times daily as needed      mupirocin (BACTROBAN) 2 % ointment Apply 3 times daily. 30 gm tube 3 Tube 5    clobetasol (TEMOVATE) 0.05 % cream Apply topically 2 times daily.  2 Tube 2    hydroCHLOROthiazide (HYDRODIURIL) 25 MG tablet Take 0.5 tablets by mouth daily 15 tablet 0    clonazePAM (KLONOPIN) 0.5 MG tablet TAKE ONE TABLET BY MOUTH TWICE A DAY AS NEEDED FOR ANXIETY 60 tablet 0    ketorolac (TORADOL) 30 MG/ML injection INJECT 1 ML INTRAMUSCULARLY TWO TIMES A DAY AS NEEDED FOR PAIN 12 vial 1    levothyroxine (SYNTHROID) 200 MCG tablet TAKE ONE TABLET BY MOUTH DAILY 90 tablet 1    hydrOXYzine (ATARAX) 25 MG tablet TAKE ONE TO TWO TABLETS BY MOUTH ONCE NIGHTLY 60 tablet 3    amLODIPine (NORVASC) 10 MG tablet TAKE ONE TABLET BY MOUTH DAILY 90 tablet 1    losartan (COZAAR) 100 MG tablet TAKE ONE TABLET BY MOUTH DAILY 90 tablet 1    XARELTO 20 MG TABS tablet TAKE ONE TABLET BY MOUTH DAILY 30 tablet 3    sertraline (ZOLOFT) 100 MG tablet TAKE TWO TABLETS BY MOUTH DAILY 120 tablet 1    atorvastatin (LIPITOR) 40 MG tablet TAKE ONE TABLET BY MOUTH DAILY 90 tablet 1    bismuth subsalicylate (PEPTO BISMOL) 262 MG chewable tablet Take by mouth      hydrOXYzine (ATARAX) 50 MG tablet Take 0.5-1 tablets by mouth nightly as needed for Anxiety (insomnia) 30 tablet 5    fentaNYL (ACTIQ) 800 MCG lollipop Take 1 each by mouth 3 times daily as needed.  naloxone (NARCAN) 4 MG/0.1ML LIQD nasal spray 1 spray by Nasal route as needed for Opioid Reversal      Cyanocobalamin (VITAMIN B-12) 5000 MCG SUBL Take 1 each by mouth every other day      Cholecalciferol (VITAMIN D3) 2000 units CAPS Take 1 capsule by mouth daily      Doxylamine Succinate, Sleep, (UNISOM PO) Take by mouth      carBAMazepine (TEGRETOL XR) 200 MG extended release tablet Take 400 mg by mouth every 8 hours       primidone (MYSOLINE) 250 MG tablet Take 250 mg by mouth 4 times daily       SYRINGE-NEEDLE, DISP, 3 ML (B-D 3CC LUER-ALEX SYR 25GX1\") 25G X 1\" 3 ML MISC Use to inject into the muscle two times a day as needed for pain 100 each 2    mupirocin (BACTROBAN) 2 % cream Apply topically 3 times daily. (Patient not taking: Reported on 7/30/2021) 90 g 2     No current facility-administered medications for this visit.        Past Medical History:   Diagnosis Date    Benign essential tremor     BRCA1 negative     Colon polyp 05/06/2019    Degenerative disc disease     DVT, lower extremity (HCC) 1989    Fibromyalgia     Kidney stones     Left-sided trigeminal neuralgia     Malignant neoplasm of thyroid gland ([address] 8/22/2013    Migraines, neuralgic     since stroke 1999 Chronic    PONV (postoperative nausea and vomiting)     Poor venous access     Protein S deficiency ([address]     Pulmonary embolism ([address] 1989    Seizure disorder ([address]     grand mal in 2005 ( childhood febrile seizure also-from a baby to age 14) and also X 1 ~2005 with headache treatment    Stroke ([address] 2/25/2019    Thrombosis of superior sagittal sinus 1999    Unspecified cerebral artery occlusion with cerebral infarction 1999    Vertebral artery occlusion 1999    White coat syndrome with high blood pressure but without hypertension      Past Surgical History:   Procedure Laterality Date    BREAST BIOPSY      BREAST LUMPECTOMY      x 3    CYSTOSCOPY  01/09/2012    w/ left ureteroscopy, holmium laser. stone manipulation and left stent insertion    FINGER TRIGGER RELEASE Left 10/31/2019    LEFT THUMB TRIGGER DIGIT RELEASE performed by Searcy Galeazzi, MD at [address] Left 2000    pins later removed    FOOT SURGERY Left 07/10/2017    LARYNX SURGERY      vocal cord nodule    LITHOTRIPSY  12/11    REFRACTIVE SURGERY      THYROIDECTOMY  05/18/2011          Family History   Problem Relation Age of Onset    Cancer Mother         breast x2, ovarian x1, brain     High Cholesterol Mother     Breast Cancer Mother     Ovarian Cancer Mother     High Cholesterol Father     Other Father    Dafne Torres Migraines Sister     Other Sister     Thyroid Disease Maternal Grandmother      Social History     Socioeconomic History    Marital status: Single     Spouse name: Not on file    Number of children: Not on file    Years of education: Not on file    Highest education level: Not on file   Occupational History    Not on file   Tobacco Use    Smoking status: Never Smoker    Smokeless tobacco: Never Used   Vaping Use    Vaping Use: Never assessed   Substance and Sexual Activity    Alcohol use: No    Drug use: No    Sexual activity: Not on file   Other Topics Concern    Not on file   Social History Narrative    Not on file     Social Determinants of Health     Financial Resource Strain:     Difficulty of Paying Living Expenses:    Food Insecurity:     Worried About Running Out of Food in the Last Year:     Ran Out of Food in the Last Year:    Transportation Needs:     Lack of Transportation (Medical):      Lack of Transportation (Non-Medical):    Physical Activity:     Days of Exercise per Week:     Minutes of Exercise per Session:    Stress:     Feeling of Stress :    Social Connections:     Frequency of Communication with Friends and Family:     Frequency of Social Gatherings with Friends and Family:     Attends Yazdanism Services:     Active Member of Clubs or Organizations:     Attends Club or Organization Meetings:     Marital Status:    Intimate Partner Violence:     Fear of Current or Ex-Partner:     Emotionally Abused:     Physically Abused:     Sexually Abused:        Review of Systems:  Review of Systems   Constitutional: Negative for activity change, appetite change, chills, diaphoresis, fatigue, fever and unexpected weight change. Eyes: Negative for visual disturbance. Respiratory: Negative for chest tightness and shortness of breath. Cardiovascular: Negative for chest pain, palpitations and leg swelling. Gastrointestinal: Positive for constipation. Negative for abdominal pain, diarrhea, nausea and vomiting. Genitourinary: Negative for decreased urine volume. Musculoskeletal: Positive for arthralgias. Negative for back pain. Skin: Positive for color change and wound. Negative for pallor and rash. Neurological: Negative for dizziness, tremors, seizures, weakness, light-headedness, numbness and headaches. Psychiatric/Behavioral: Positive for sleep disturbance. Negative for agitation, behavioral problems, confusion, decreased concentration, dysphoric mood, hallucinations, self-injury and suicidal ideas. The patient is nervous/anxious. The patient is not hyperactive. Physical Exam:   Vitals:    07/30/21 1113   BP: (!) 152/110   Site: Left Upper Arm   Position: Sitting   Cuff Size: Large Adult   Pulse: 107   SpO2: 98%   Weight: 194 lb 9.6 oz (88.3 kg)     Body mass index is 31.89 kg/m². Physical Exam  Vitals and nursing note reviewed. Constitutional:       General: She is not in acute distress. Appearance: She is well-developed. She is not diaphoretic. HENT:      Head: Normocephalic and atraumatic. Eyes:      Conjunctiva/sclera: Conjunctivae normal.      Pupils: Pupils are equal, round, and reactive to light. Neck:      Vascular: No JVD. Cardiovascular:      Rate and Rhythm: Normal rate and regular rhythm. Heart sounds: Normal heart sounds. No murmur heard. No friction rub. No gallop. Pulmonary:      Effort: Pulmonary effort is normal. No respiratory distress. Breath sounds: Normal breath sounds. No wheezing or rales. Chest:      Chest wall: No tenderness. Abdominal:      Palpations: Abdomen is soft. Tenderness: There is no abdominal tenderness. Musculoskeletal:         General: Normal range of motion. Cervical back: Normal range of motion and neck supple. Skin:     General: Skin is warm and dry. Capillary Refill: Capillary refill takes 2 to 3 seconds. Findings: No erythema. Neurological:      Mental Status: She is alert and oriented to person, place, and time. Psychiatric:         Mood and Affect: Mood normal.         Behavior: Behavior normal.         Thought Content: Thought content normal.         Judgment: Judgment normal.         Assessment/Plan:  1. MERRY (generalized anxiety disorder)  [facility] [place] requests to wean her Zoloft  Weaning schedule provided  Will see back in 1 mo to monitor very closely  C/w Klonopin, UDS today   - Drug Panel-PM-HI Res-UR Interp-A; Future  - TSH with Reflex; Future    2. Essential hypertension  C/w Norvasc 10, Losartan 100   Add HCTZ   - hydroCHLOROthiazide (HYDRODIURIL) 25 MG tablet; Take 0.5 tablets by mouth daily  Dispense: 15 tablet; Refill: 0  - BASIC METABOLIC PANEL; Future  - TSH with Reflex; Future    3. Mixed hyperlipidemia  The ASCVD Risk score (Reza Licona., et al., 2013) failed to calculate for the following reasons: The valid HDL cholesterol range is 20 to 100 mg/dL  - Lipid Panel; Future    4. Encounter for hepatitis C screening test for low risk patient  - Hepatitis C Antibody; Future    5. Colon cancer screening  - [facility]  6. Hx of colonic polyp  - [facility]    7.  Irritable bowel syndrome with diarrhea  Now has constipation due to opiates prescribed by pain management  Encouraged her not to take this medication if not needed  - Diphenoxylate-Atropine (LOMOTIL PO); Take by mouth 2 times daily as needed    8. Prurigo nodularis  - mupirocin (BACTROBAN) 2 % ointment; Apply 3 times daily. 30 gm tube  Dispense: 3 Tube; Refill: 5  - clobetasol (TEMOVATE) 0.05 % cream; Apply topically 2 times daily. Dispense: 2 Tube; Refill: 2    9. Post-surgical hypothyroidism  - TSH with Reflex; Future    10. Chronic pain disorder  Sees pain management       While assessing care for this patient, I have reviewed all pertinent lab work/imaging/ specialist notes and care in reference to those problems addressed above in detail. Appropriate medical decision making was based on this. Please note that portions of this note may have been completed with a voice recognition program. Efforts were made to edit the dictations but occasionally words are mis-transcribed. Return in about 4 weeks (around 8/27/2021) for follow up moods, 30 minute visit, follow up blood pressure.

## 2021-08-02 ENCOUNTER — TELEPHONE (OUTPATIENT)
Dept: FAMILY MEDICINE CLINIC | Age: 51
End: 2021-08-02

## 2021-08-02 DIAGNOSIS — L28.1 PRURIGO NODULARIS: ICD-10-CM

## 2021-08-02 RX ORDER — CLOBETASOL PROPIONATE 0.5 MG/G
CREAM TOPICAL
Qty: 1000 G | Refills: 2 | Status: SHIPPED | OUTPATIENT
Start: 2021-08-02 | End: 2021-08-03 | Stop reason: SDUPTHER

## 2021-08-02 RX ORDER — MUPIROCIN CALCIUM 20 MG/G
CREAM TOPICAL
Qty: 1000 G | Refills: 2 | Status: SHIPPED
Start: 2021-08-02 | End: 2021-08-30 | Stop reason: SDUPTHER

## 2021-08-02 NOTE — TELEPHONE ENCOUNTER
Recently sent but the quantity given was not enough for a 30 day supply. Pt. Requesting a larger supply to last for 30 days. I did adjust RX.

## 2021-08-03 DIAGNOSIS — L28.1 PRURIGO NODULARIS: ICD-10-CM

## 2021-08-03 RX ORDER — CLOBETASOL PROPIONATE 0.5 MG/G
CREAM TOPICAL
Qty: 180 G | Refills: 5 | Status: SHIPPED | OUTPATIENT
Start: 2021-08-03 | End: 2021-08-30 | Stop reason: ALTCHOICE

## 2021-08-03 NOTE — TELEPHONE ENCOUNTER
I spoke with patient and she is confused. She states that she was supposed to get three 30 g tubes for the Mupirocin and was only given two 22 g tubes of the Mupirocin ointment. Today she received a message that there is a tube of Mupirocin cream ready for  1 tube. Also was supposed to get three 60 g tubes of the Clobetasol cream, but I got a notice that only one 60 g tubes is ready. She is asking if we can send in the correct amount of both Clobetasol and Mupirocin to the [facility]. She understands that she will not be able to  the Mupirocin for at least 14 days since she picked up the two 22 g of the ointment. She has not picked up the Clobetasol yet.

## 2021-08-03 NOTE — TELEPHONE ENCOUNTER
I submitted PA for Mupirocin Calcium 2% cream, Key: [id number]. Per plan - Drug is covered by current benefit plan. No further PA activity needed. Please notify patient. Thank you.

## 2021-08-04 LAB
6-ACETYLMORPHINE: NOT DETECTED
7-AMINOCLONAZEPAM: PRESENT
ALPHA-OH-ALPRAZOLAM: NOT DETECTED
ALPHA-OH-MIDAZOLAM, URINE: NOT DETECTED
ALPRAZOLAM: NOT DETECTED
AMPHETAMINE: NOT DETECTED
BARBITURATES: PRESENT
BENZOYLECGONINE: NOT DETECTED
BUPRENORPHINE: NOT DETECTED
CARISOPRODOL: NOT DETECTED
CLONAZEPAM: NOT DETECTED
CODEINE: NOT DETECTED
CREATININE URINE: 232.9 MG/DL (ref 20–400)
DIAZEPAM: NOT DETECTED
DRUGS EXPECTED: NORMAL
EER PAIN MGT DRUG PANEL, HIGH RES/EMIT U: NORMAL
ETHYL GLUCURONIDE: PRESENT
FENTANYL: PRESENT
GABAPENTIN: NOT DETECTED
HYDROCODONE: NOT DETECTED
HYDROMORPHONE: PRESENT
LORAZEPAM: NOT DETECTED
MARIJUANA METABOLITE: NOT DETECTED
MDA: NOT DETECTED
MDEA: NOT DETECTED
MDMA URINE: NOT DETECTED
MEPERIDINE: NOT DETECTED
METHADONE: NOT DETECTED
METHAMPHETAMINE: NOT DETECTED
METHYLPHENIDATE: NOT DETECTED
MIDAZOLAM: NOT DETECTED
MORPHINE: PRESENT
NALOXONE: NOT DETECTED
NORBUPRENORPHINE, FREE: NOT DETECTED
NORDIAZEPAM: NOT DETECTED
NORFENTANYL: PRESENT
NORHYDROCODONE, URINE: NOT DETECTED
NOROXYCODONE: NOT DETECTED
NOROXYMORPHONE, URINE: NOT DETECTED
OXAZEPAM: NOT DETECTED
OXYCODONE: NOT DETECTED
OXYMORPHONE: NOT DETECTED
PAIN MANAGEMENT DRUG PANEL: NORMAL
PAIN MANAGEMENT DRUG PANEL: NORMAL
PCP: NOT DETECTED
PHENTERMINE: NOT DETECTED
PREGABALIN: NOT DETECTED
TAPENTADOL, URINE: NOT DETECTED
TAPENTADOL-O-SULFATE, URINE: NOT DETECTED
TEMAZEPAM: NOT DETECTED
TRAMADOL: NOT DETECTED
ZOLPIDEM: NOT DETECTED

## 2021-08-16 DIAGNOSIS — L98.9 SKIN LESIONS: ICD-10-CM

## 2021-08-16 DIAGNOSIS — R20.8 PAIN OF SKIN: ICD-10-CM

## 2021-08-16 RX ORDER — KETOROLAC TROMETHAMINE 30 MG/ML
INJECTION, SOLUTION INTRAMUSCULAR; INTRAVENOUS
Qty: 12 VIAL | Refills: 1 | Status: SHIPPED | OUTPATIENT
Start: 2021-08-16 | End: 2021-09-13

## 2021-08-16 NOTE — TELEPHONE ENCOUNTER
Refill Request     Last Seen: Last Seen Department: 7/30/2021    Last Seen by PCP: 7/30/2021    Last Written: 7/12/21 12 vial 1 refill    Next Appointment: 8/30/21   Future Appointments   Date Time Provider Rula Emmanuel   8/30/2021 11:30 AM MD TALYA Nunezci - [facility]       Future appointment scheduled      Requested Prescriptions     Pending Prescriptions Disp Refills    ketorolac (TORADOL) 30 MG/ML injection [Pharmacy Med Name: KETOROLAC 30 MG/ML VIAL]       Sig: INJECT 1 MILLILITER INTRAMUSCULARLY TWO TIMES A DAY AS NEEDED FOR PAIN

## 2021-08-30 ENCOUNTER — OFFICE VISIT (OUTPATIENT)
Dept: FAMILY MEDICINE CLINIC | Age: 51
End: 2021-08-30
Payer: COMMERCIAL

## 2021-08-30 VITALS
WEIGHT: 187.4 LBS | BODY MASS INDEX: 31.22 KG/M2 | HEART RATE: 130 BPM | OXYGEN SATURATION: 98 % | SYSTOLIC BLOOD PRESSURE: 124 MMHG | HEIGHT: 65 IN | DIASTOLIC BLOOD PRESSURE: 90 MMHG

## 2021-08-30 DIAGNOSIS — I10 ESSENTIAL HYPERTENSION: Primary | ICD-10-CM

## 2021-08-30 DIAGNOSIS — F41.9 ANXIETY: ICD-10-CM

## 2021-08-30 PROBLEM — Z85.850 HISTORY OF THYROID CANCER: Status: ACTIVE | Noted: 2021-08-30

## 2021-08-30 PROCEDURE — G8427 DOCREV CUR MEDS BY ELIG CLIN: HCPCS | Performed by: FAMILY MEDICINE

## 2021-08-30 PROCEDURE — 99214 OFFICE O/P EST MOD 30 MIN: CPT | Performed by: FAMILY MEDICINE

## 2021-08-30 PROCEDURE — 3017F COLORECTAL CA SCREEN DOC REV: CPT | Performed by: FAMILY MEDICINE

## 2021-08-30 PROCEDURE — 1036F TOBACCO NON-USER: CPT | Performed by: FAMILY MEDICINE

## 2021-08-30 PROCEDURE — G8417 CALC BMI ABV UP PARAM F/U: HCPCS | Performed by: FAMILY MEDICINE

## 2021-08-30 RX ORDER — CLONAZEPAM 0.5 MG/1
TABLET ORAL
Qty: 60 TABLET | Refills: 0 | Status: SHIPPED | OUTPATIENT
Start: 2021-08-30 | End: 2021-10-06

## 2021-08-30 RX ORDER — SERTRALINE HYDROCHLORIDE 25 MG/1
25 TABLET, FILM COATED ORAL DAILY
Qty: 7 TABLET | Refills: 0 | Status: SHIPPED | OUTPATIENT
Start: 2021-08-30 | End: 2021-10-15 | Stop reason: ALTCHOICE

## 2021-08-30 ASSESSMENT — ENCOUNTER SYMPTOMS
COLOR CHANGE: 0
BACK PAIN: 0
VOMITING: 0
NAUSEA: 0
CHEST TIGHTNESS: 0
SHORTNESS OF BREATH: 0
ABDOMINAL PAIN: 0

## 2021-09-02 ENCOUNTER — PATIENT MESSAGE (OUTPATIENT)
Dept: FAMILY MEDICINE CLINIC | Age: 51
End: 2021-09-02

## 2021-09-02 DIAGNOSIS — I10 ESSENTIAL HYPERTENSION: ICD-10-CM

## 2021-09-03 NOTE — TELEPHONE ENCOUNTER
From: Salomón Hardy  To: Mirta Gottlieb MD  Sent: 9/2/2021 11:11 PM EDT  Subject: Prescription Question    Hi, Dr. Anirudh Lomeli-  There's miscommunication with my phone call today. I asked for a new script sent to [facility] for Diuretic BP Medication (Hydrochloro.) because I have no refills. I asked to talk directly to your office, but [facility] didn't let me. I had no refills so I needed a new script with maybe an additional qty-to last me til I see you next month; to cover those days as you instructed me that I may need to take the second 1/2 of the tablet; & maybe new instructions on the bottle. I did not state the following. That's wrong. Please help. Thank you, Zahira Baptiste Documentation says:Telephone Encounter-MA Jay Pena at 9/2/2021 2:09 PM-Pt. States you increased her to 1/2 tablet twice daily. I could not adjust RX.

## 2021-09-13 DIAGNOSIS — L98.9 SKIN LESIONS: ICD-10-CM

## 2021-09-13 DIAGNOSIS — R20.8 PAIN OF SKIN: ICD-10-CM

## 2021-09-13 RX ORDER — KETOROLAC TROMETHAMINE 30 MG/ML
INJECTION, SOLUTION INTRAMUSCULAR; INTRAVENOUS
Qty: 12 EACH | Refills: 0 | Status: SHIPPED | OUTPATIENT
Start: 2021-09-13 | End: 2021-10-01

## 2021-09-13 NOTE — TELEPHONE ENCOUNTER
.  Last office visit 8/30/2021     Last written 8/16/21 12vials with 1      Next office visit scheduled 10/4/2021    Requested Prescriptions     Pending Prescriptions Disp Refills    ketorolac (TORADOL) 30 MG/ML injection [Pharmacy Med Name: KETOROLAC 30 MG/ML VIAL]       Sig: INJECT ONE MILLILITER INTRAMUSCULARLY TWICE A DAY AS NEEDED FOR PAIN

## 2021-09-30 DIAGNOSIS — L98.9 SKIN LESIONS: ICD-10-CM

## 2021-09-30 DIAGNOSIS — R20.8 PAIN OF SKIN: ICD-10-CM

## 2021-10-01 RX ORDER — KETOROLAC TROMETHAMINE 30 MG/ML
INJECTION, SOLUTION INTRAMUSCULAR; INTRAVENOUS
Qty: 12 EACH | Refills: 2 | Status: SHIPPED | OUTPATIENT
Start: 2021-10-01 | End: 2021-11-11 | Stop reason: SDUPTHER

## 2021-10-01 NOTE — TELEPHONE ENCOUNTER
Refill Request     Last Seen: Last Seen Department: 8/30/2021  Last Seen by PCP: 8/30/2021    Last Written: 9/13/21 12 each 0 refill     Next Appointment: 10/4/21     Future Appointments   Date Time Provider Rula Emmanuel   10/4/2021  9:00 AM MD TALYA [place]  [place] - [facility]       Future appointment scheduled      Requested Prescriptions     Pending Prescriptions Disp Refills    ketorolac (TORADOL) 30 MG/ML injection [Pharmacy Med Name: KETOROLAC 30 MG/ML VIAL]       Sig: INJECT ONE MILLILITER INTRAMUSCULARLY TWO TIMES A DAY AS NEEDED FOR PAIN

## 2021-10-05 DIAGNOSIS — F41.9 ANXIETY: ICD-10-CM

## 2021-10-05 NOTE — TELEPHONE ENCOUNTER
Refill Request - Controlled Substance    Last Seen Department: 8/30/2021  Last Seen by PCP: 8/30/2021    Last Written: 8/30/21    Last UDS: 7/30/21    Med Agreement Signed On: 8/12/21    Next Appointment: Visit date not found      Requested Prescriptions     Pending Prescriptions Disp Refills    clonazePAM (KLONOPIN) 0.5 MG tablet [Pharmacy Med Name: clonazePAM 0.5 MG TABLET] 60 tablet      Sig: TAKE ONE TABLET BY MOUTH TWICE A DAY AS NEEDED FOR ANXIETY

## 2021-10-06 RX ORDER — CLONAZEPAM 0.5 MG/1
TABLET ORAL
Qty: 60 TABLET | Refills: 1 | Status: SHIPPED | OUTPATIENT
Start: 2021-10-06 | End: 2021-12-10 | Stop reason: SDUPTHER

## 2021-10-15 ENCOUNTER — OFFICE VISIT (OUTPATIENT)
Dept: FAMILY MEDICINE CLINIC | Age: 51
End: 2021-10-15
Payer: COMMERCIAL

## 2021-10-15 VITALS
DIASTOLIC BLOOD PRESSURE: 96 MMHG | SYSTOLIC BLOOD PRESSURE: 132 MMHG | BODY MASS INDEX: 32.27 KG/M2 | OXYGEN SATURATION: 98 % | HEART RATE: 103 BPM | WEIGHT: 193 LBS

## 2021-10-15 DIAGNOSIS — J38.3 VOCAL CORD MASS: ICD-10-CM

## 2021-10-15 DIAGNOSIS — F41.1 GAD (GENERALIZED ANXIETY DISORDER): ICD-10-CM

## 2021-10-15 DIAGNOSIS — R07.89 CHEST PRESSURE: ICD-10-CM

## 2021-10-15 DIAGNOSIS — R49.0 HOARSENESS OF VOICE: ICD-10-CM

## 2021-10-15 DIAGNOSIS — I10 ESSENTIAL HYPERTENSION: Primary | ICD-10-CM

## 2021-10-15 PROCEDURE — G8417 CALC BMI ABV UP PARAM F/U: HCPCS | Performed by: FAMILY MEDICINE

## 2021-10-15 PROCEDURE — 99214 OFFICE O/P EST MOD 30 MIN: CPT | Performed by: FAMILY MEDICINE

## 2021-10-15 PROCEDURE — G8427 DOCREV CUR MEDS BY ELIG CLIN: HCPCS | Performed by: FAMILY MEDICINE

## 2021-10-15 PROCEDURE — 93000 ELECTROCARDIOGRAM COMPLETE: CPT | Performed by: FAMILY MEDICINE

## 2021-10-15 PROCEDURE — G8484 FLU IMMUNIZE NO ADMIN: HCPCS | Performed by: FAMILY MEDICINE

## 2021-10-15 PROCEDURE — 3017F COLORECTAL CA SCREEN DOC REV: CPT | Performed by: FAMILY MEDICINE

## 2021-10-15 PROCEDURE — 1036F TOBACCO NON-USER: CPT | Performed by: FAMILY MEDICINE

## 2021-10-15 RX ORDER — FAMOTIDINE 20 MG/1
20 TABLET, FILM COATED ORAL NIGHTLY
Qty: 30 TABLET | Refills: 0 | Status: SHIPPED | OUTPATIENT
Start: 2021-10-15 | End: 2022-01-26 | Stop reason: ALTCHOICE

## 2021-10-15 NOTE — PROGRESS NOTES
10/15/2021    This is a 45 y.o. female who presents for  Chief Complaint   Patient presents with    1 Month Follow-Up    Hypertension       HPI:     HTN:  Taking medication(s) daily  Checking Bps at home? Yes, scanned into media, much improved  Has situational variance with increased anxiety or poor sleep   No new AEs to the medication(s)  No acute concerning Sxs: No CP, SOB, palpitations, dizziness, HA, etc.      Anxiety/Moods:  Requested to wean zoloft at her last visit   Feels better with weaning, currently taking 50 mg daily  No SI/HI    Hx of vocal cord mass, removed, \"years ago\"  + worsening hoarseness of voice      Past Medical History:   Diagnosis Date    Benign essential tremor     BRCA1 negative     Colon polyp 05/06/2019    Degenerative disc disease     DVT, lower extremity ([address] 1989    Fibromyalgia     Kidney stones     Left-sided trigeminal neuralgia     Malignant neoplasm of thyroid gland ([address] 8/22/2013    Migraines, neuralgic     since stroke 1999 Chronic    PONV (postoperative nausea and vomiting)     Poor venous access     Protein S deficiency ([address]     Pulmonary embolism ([address] 1989    Seizure disorder ([address]     grand mal in 2005 ( childhood febrile seizure also-from a baby to age 14) and also X 1 ~2005 with headache treatment    Stroke ([address] 2/25/2019    Thrombosis of superior sagittal sinus 1999    Unspecified cerebral artery occlusion with cerebral infarction 1999    Vertebral artery occlusion 1999    White coat syndrome with high blood pressure but without hypertension        Past Surgical History:   Procedure Laterality Date    BREAST BIOPSY      BREAST LUMPECTOMY      x 3    CYSTOSCOPY  01/09/2012    w/ left ureteroscopy, holmium laser.  stone manipulation and left stent insertion    FINGER TRIGGER RELEASE Left 10/31/2019    LEFT THUMB TRIGGER DIGIT RELEASE performed by Lexa Jeffery MD at [address] Left 2000    pins later removed    FOOT SURGERY Left 07/10/2017    LARYNX SURGERY      vocal cord nodule    LITHOTRIPSY  12/11    REFRACTIVE SURGERY      THYROIDECTOMY  05/18/2011            Social History     Socioeconomic History    Marital status: Single     Spouse name: Not on file    Number of children: Not on file    Years of education: Not on file    Highest education level: Not on file   Occupational History    Not on file   Tobacco Use    Smoking status: Never Smoker    Smokeless tobacco: Never Used   Vaping Use    Vaping Use: Not on file   Substance and Sexual Activity    Alcohol use: No    Drug use: No    Sexual activity: Not on file   Other Topics Concern    Not on file   Social History Narrative    Not on file     Social Determinants of Health     Financial Resource Strain:     Difficulty of Paying Living Expenses: Not on file   Food Insecurity:     Worried About Running Out of Food in the Last Year: Not on file    Jorge of Food in the Last Year: Not on file   Transportation Needs:     Lack of Transportation (Medical): Not on file    Lack of Transportation (Non-Medical):  Not on file   Physical Activity:     Days of Exercise per Week: Not on file    Minutes of Exercise per Session: Not on file   Stress:     Feeling of Stress : Not on file   Social Connections:     Frequency of Communication with Friends and Family: Not on file    Frequency of Social Gatherings with Friends and Family: Not on file    Attends Congregational Services: Not on file    Active Member of Clubs or Organizations: Not on file    Attends Club or Organization Meetings: Not on file    Marital Status: Not on file   Intimate Partner Violence:     Fear of Current or Ex-Partner: Not on file    Emotionally Abused: Not on file    Physically Abused: Not on file    Sexually Abused: Not on file   Housing Stability:     Unable to Pay for Housing in the Last Year: Not on file    Number of [place] in the Last Year: Not on file    Unstable Housing in the Last Year: Not on file       Family History   Problem Relation Age of Onset    Cancer Mother         breast x2, ovarian x1, brain     High Cholesterol Mother     Breast Cancer Mother     Ovarian Cancer Mother     High Cholesterol Father     Other Father     Migraines Sister     Other Sister     Thyroid Disease Maternal Grandmother        Current Outpatient Medications   Medication Sig Dispense Refill    famotidine (PEPCID) 20 MG tablet Take 1 tablet by mouth nightly 30 tablet 0    clonazePAM (KLONOPIN) 0.5 MG tablet TAKE ONE TABLET BY MOUTH TWICE A DAY AS NEEDED FOR ANXIETY 60 tablet 1    hydroCHLOROthiazide (HYDRODIURIL) 25 MG tablet Take 0.5 tablets by mouth 2 times daily 30 tablet 1    atorvastatin (LIPITOR) 40 MG tablet TAKE ONE TABLET BY MOUTH DAILY 90 tablet 1    mupirocin (BACTROBAN) 2 % ointment Apply 3 times daily 90 g 5    Melatonin 10 MG TABS Take by mouth      diphenhydrAMINE (SOMINEX) 25 MG tablet Take 50 mg by mouth nightly as needed      docusate (COLACE, DULCOLAX) 100 MG CAPS Take 100 mg by mouth 2 times daily      morphine (MS CONTIN) 15 MG extended release tablet       levothyroxine (SYNTHROID) 200 MCG tablet TAKE ONE TABLET BY MOUTH DAILY 90 tablet 1    hydrOXYzine (ATARAX) 25 MG tablet TAKE ONE TO TWO TABLETS BY MOUTH ONCE NIGHTLY 60 tablet 3    amLODIPine (NORVASC) 10 MG tablet TAKE ONE TABLET BY MOUTH DAILY 90 tablet 1    losartan (COZAAR) 100 MG tablet TAKE ONE TABLET BY MOUTH DAILY 90 tablet 1    XARELTO 20 MG TABS tablet TAKE ONE TABLET BY MOUTH DAILY 30 tablet 3    SYRINGE-NEEDLE, DISP, 3 ML (B-D 3CC LUER-ALEX SYR 25GX1\") 25G X 1\" 3 ML MISC Use to inject into the muscle two times a day as needed for pain 100 each 2    fentaNYL (ACTIQ) 800 MCG lollipop Take 1 each by mouth 3 times daily as needed.       naloxone (NARCAN) 4 MG/0.1ML LIQD nasal spray 1 spray by Nasal route as needed for Opioid Reversal      Cyanocobalamin (VITAMIN B-12) 5000 MCG SUBL Take 1 each by mouth every other day      Cholecalciferol (VITAMIN D3) 2000 units CAPS Take 1 capsule by mouth daily      Doxylamine Succinate, Sleep, (UNISOM PO) Take by mouth      carBAMazepine (TEGRETOL XR) 200 MG extended release tablet Take 400 mg by mouth every 8 hours       primidone (MYSOLINE) 250 MG tablet Take 250 mg by mouth 3 times daily       ketorolac (TORADOL) 30 MG/ML injection INJECT ONE MILLILITER INTRAMUSCULARLY TWO TIMES A DAY AS NEEDED FOR PAIN 12 each 2     No current facility-administered medications for this visit. Immunization History   Administered Date(s) Administered    Influenza, Quadv, IM, PF (6 mo and older Fluzone, Flulaval, Fluarix, and 3 yrs and older Afluria) 09/19/2019, 10/14/2020    Tdap (Boostrix, Adacel) 04/04/2019    Zoster Recombinant (Shingrix) 02/05/2020, 09/25/2020       Allergies   Allergen Reactions    Dilaudid [Hydromorphone Hcl] Shortness Of Breath, Itching and Other (See Comments)     Wheezing    Buprenorphine Itching, Nausea And Vomiting and Other (See Comments)     Tingling in lips    Duloxetine Hcl Other (See Comments)     Personality changes and depression    Gabapentin     Gluten Meal      Celiac disease    Hydromorphone Itching and Other (See Comments)    Morphine Itching    Pregabalin      Lyrica (personality changes)     Propranolol Hives    Topiramate Other (See Comments)    Zoloft [Sertraline]        Review of Systems   Constitutional: Negative for activity change, appetite change, chills, diaphoresis, fatigue and fever. HENT: Positive for voice change. Eyes: Negative for visual disturbance. Respiratory: Negative for chest tightness and shortness of breath. Cardiovascular: Negative for chest pain, palpitations and leg swelling. Gastrointestinal: Negative for abdominal pain, nausea and vomiting. Genitourinary: Negative for decreased urine volume. Skin: Negative for color change.    Neurological: Negative for dizziness, weakness, light-headedness, numbness and headaches. Psychiatric/Behavioral: The patient is nervous/anxious. BP (!) 132/96   Pulse 103   Wt 193 lb (87.5 kg)   LMP 06/30/2015   SpO2 98%   BMI 32.27 kg/m²     Physical Exam  Vitals and nursing note reviewed. Constitutional:       General: She is not in acute distress. Appearance: She is well-developed. She is not diaphoretic. HENT:      Head: Normocephalic and atraumatic. Eyes:      Conjunctiva/sclera: Conjunctivae normal.      Pupils: Pupils are equal, round, and reactive to light. Neck:      Vascular: No JVD. Cardiovascular:      Rate and Rhythm: Normal rate and regular rhythm. Heart sounds: Normal heart sounds. No murmur heard. No friction rub. No gallop. Pulmonary:      Effort: Pulmonary effort is normal. No respiratory distress. Breath sounds: Normal breath sounds. No wheezing or rales. Chest:      Chest wall: No tenderness. Abdominal:      Palpations: Abdomen is soft. Tenderness: There is no abdominal tenderness. Musculoskeletal:         General: Normal range of motion. Cervical back: Normal range of motion and neck supple. Skin:     General: Skin is warm and dry. Capillary Refill: Capillary refill takes 2 to 3 seconds. Findings: No erythema. Neurological:      Mental Status: She is alert and oriented to person, place, and time. Psychiatric:         Behavior: Behavior normal.         Thought Content: Thought content normal.         Judgment: Judgment normal.         Plan  1. Essential hypertension  Still moderately labile but improved with medication changes and PRN instructions    2. MERRY (generalized anxiety disorder)  C/w Klonopin, appropriate for use    3. Vocal cord mass  - [facility] sus[facility], OtolaryngologyCharbel    4. Hoarseness of voice  - Brannon Mack DO, OtolaryngologyCharbel    5.  Chest pressure  - EKG 12 Lead        While assessing care for this patient, I have reviewed all pertinent lab work/imaging/ specialist notes and care in reference to those problems addressed above in detail. Appropriate medical decision making was based on this. Please note that portions of this note may have been completed with a voice recognition program. Efforts were made to edit the dictations but occasionally words are mis-transcribed. Return in about 3 months (around 1/15/2022) for follow up moods, 30 minute visit.

## 2021-11-05 ENCOUNTER — OFFICE VISIT (OUTPATIENT)
Dept: ENT CLINIC | Age: 51
End: 2021-11-05
Payer: COMMERCIAL

## 2021-11-05 VITALS
TEMPERATURE: 97 F | HEART RATE: 99 BPM | WEIGHT: 192 LBS | SYSTOLIC BLOOD PRESSURE: 162 MMHG | BODY MASS INDEX: 31.99 KG/M2 | DIASTOLIC BLOOD PRESSURE: 104 MMHG | HEIGHT: 65 IN

## 2021-11-05 DIAGNOSIS — R49.0 DYSPHONIA: ICD-10-CM

## 2021-11-05 DIAGNOSIS — K21.9 GASTROESOPHAGEAL REFLUX DISEASE, UNSPECIFIED WHETHER ESOPHAGITIS PRESENT: Primary | ICD-10-CM

## 2021-11-05 PROCEDURE — 1036F TOBACCO NON-USER: CPT | Performed by: OTOLARYNGOLOGY

## 2021-11-05 PROCEDURE — G8427 DOCREV CUR MEDS BY ELIG CLIN: HCPCS | Performed by: OTOLARYNGOLOGY

## 2021-11-05 PROCEDURE — 3017F COLORECTAL CA SCREEN DOC REV: CPT | Performed by: OTOLARYNGOLOGY

## 2021-11-05 PROCEDURE — 31575 DIAGNOSTIC LARYNGOSCOPY: CPT | Performed by: OTOLARYNGOLOGY

## 2021-11-05 PROCEDURE — G8484 FLU IMMUNIZE NO ADMIN: HCPCS | Performed by: OTOLARYNGOLOGY

## 2021-11-05 PROCEDURE — 99203 OFFICE O/P NEW LOW 30 MIN: CPT | Performed by: OTOLARYNGOLOGY

## 2021-11-05 PROCEDURE — G8417 CALC BMI ABV UP PARAM F/U: HCPCS | Performed by: OTOLARYNGOLOGY

## 2021-11-05 ASSESSMENT — ENCOUNTER SYMPTOMS
VOICE CHANGE: 1
EYE ITCHING: 0
FACIAL SWELLING: 0
SHORTNESS OF BREATH: 0
SORE THROAT: 0
SINUS PRESSURE: 0
COUGH: 0
TROUBLE SWALLOWING: 0
APNEA: 0

## 2021-11-05 NOTE — PROGRESS NOTES
[facility], [address], [address], [address]  P: [phone]       Patient     Shun Bee  1970    ChiefComplaint     Chief Complaint   Patient presents with    New Patient     Voice hoarseness x 3 months       History of Present Illness     Jesus Ivan is a 48-year-old female here today for evaluation of dysphonia. She reports for the last 3 months a hoarse, rough vocal quality that occurs in the evenings. Occurs 3-4 nights per week. Voice is normal at all other intervals. History of vocal cord nodules removed in 1995. Recently seen by PCP for chest heaviness present at night. Started on Pepcid to see if this improves symptoms. She does not feel it made a difference. 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 Denies dysphagia.     Past Medical History     Past Medical History:   Diagnosis Date    Benign essential tremor     BRCA1 negative     Colon polyp 05/06/2019    Degenerative disc disease     DVT, lower extremity (HCC) 1989    Fibromyalgia     Kidney stones     Left-sided trigeminal neuralgia     Malignant neoplasm of thyroid gland ([address] 8/22/2013    Migraines, neuralgic     since stroke 1999 Chronic    PONV (postoperative nausea and vomiting)     Poor venous access     Protein S deficiency ([address]     Pulmonary embolism ([address] 1989    Seizure disorder ([address]     grand mal in 2005 ( childhood febrile seizure also-from a baby to age 14) and also X 1 ~2005 with headache treatment    Stroke ([address] 2/25/2019    Thrombosis of superior sagittal sinus 1999    Unspecified cerebral artery occlusion with cerebral infarction 1999    Vertebral artery occlusion 1999    White coat syndrome with high blood pressure but without hypertension        Past Surgical History     Past Surgical History:   Procedure Laterality Date    BREAST BIOPSY      BREAST LUMPECTOMY      x 3    CYSTOSCOPY  01/09/2012    w/ left ureteroscopy, holmium laser.  stone manipulation and left stent insertion    FINGER TRIGGER RELEASE Left 10/31/2019    LEFT THUMB TRIGGER DIGIT RELEASE performed by Lizandro Tadeo MD at [address] Left 2000    pins later removed    FOOT SURGERY Left 07/10/2017    LARYNX SURGERY      vocal cord nodule    LITHOTRIPSY  12/11    REFRACTIVE SURGERY      THYROIDECTOMY  05/18/2011            Family History     Family History   Problem Relation Age of Onset    Cancer Mother         breast x2, ovarian x1, brain     High Cholesterol Mother     Breast Cancer Mother     Ovarian Cancer Mother     High Cholesterol Father     Other Father    Malena Safer Migraines Sister     Other Sister     Thyroid Disease Maternal Grandmother        Social History     Social History     Tobacco Use    Smoking status: Never Smoker    Smokeless tobacco: Never Used   Vaping Use    Vaping Use: Never assessed   Substance Use Topics    Alcohol use: No    Drug use: No        Allergies     Allergies   Allergen Reactions    Dilaudid [Hydromorphone Hcl] Shortness Of Breath, Itching and Other (See Comments)     Wheezing    Buprenorphine Itching, Nausea And Vomiting and Other (See Comments)     Tingling in lips    Duloxetine Hcl Other (See Comments)     Personality changes and depression    Gabapentin     Gluten Meal      Celiac disease    Hydromorphone Itching and Other (See Comments)    Morphine Itching    Pregabalin      Lyrica (personality changes)     Propranolol Hives    Topiramate Other (See Comments)    Zoloft [Sertraline]        Medications     Current Outpatient Medications   Medication Sig Dispense Refill    famotidine (PEPCID) 20 MG tablet Take 1 tablet by mouth nightly 30 tablet 0    clonazePAM (KLONOPIN) 0.5 MG tablet TAKE ONE TABLET BY MOUTH TWICE A DAY AS NEEDED FOR ANXIETY 60 tablet 1    ketorolac (TORADOL) 30 MG/ML injection INJECT ONE MILLILITER INTRAMUSCULARLY TWO TIMES A DAY AS NEEDED FOR PAIN 12 each 2    hydroCHLOROthiazide (HYDRODIURIL) 25 MG tablet Take 0.5 tablets by mouth 2 times daily 30 tablet 1    atorvastatin (LIPITOR) 40 MG tablet TAKE ONE TABLET BY MOUTH DAILY 90 tablet 1    mupirocin (BACTROBAN) 2 % ointment Apply 3 times daily 90 g 5    Melatonin 10 MG TABS Take by mouth      diphenhydrAMINE (SOMINEX) 25 MG tablet Take 50 mg by mouth nightly as needed      docusate (COLACE, DULCOLAX) 100 MG CAPS Take 100 mg by mouth 2 times daily      morphine (MS CONTIN) 15 MG extended release tablet       levothyroxine (SYNTHROID) 200 MCG tablet TAKE ONE TABLET BY MOUTH DAILY 90 tablet 1    hydrOXYzine (ATARAX) 25 MG tablet TAKE ONE TO TWO TABLETS BY MOUTH ONCE NIGHTLY 60 tablet 3    amLODIPine (NORVASC) 10 MG tablet TAKE ONE TABLET BY MOUTH DAILY 90 tablet 1    losartan (COZAAR) 100 MG tablet TAKE ONE TABLET BY MOUTH DAILY 90 tablet 1    XARELTO 20 MG TABS tablet TAKE ONE TABLET BY MOUTH DAILY 30 tablet 3    SYRINGE-NEEDLE, DISP, 3 ML (B-D 3CC LUER-ALEX SYR 25GX1\") 25G X 1\" 3 ML MISC Use to inject into the muscle two times a day as needed for pain 100 each 2    fentaNYL (ACTIQ) 800 MCG lollipop Take 1 each by mouth 3 times daily as needed.  naloxone (NARCAN) 4 MG/0.1ML LIQD nasal spray 1 spray by Nasal route as needed for Opioid Reversal      Cyanocobalamin (VITAMIN B-12) 5000 MCG SUBL Take 1 each by mouth every other day      Cholecalciferol (VITAMIN D3) 2000 units CAPS Take 1 capsule by mouth daily      Doxylamine Succinate, Sleep, (UNISOM PO) Take by mouth      carBAMazepine (TEGRETOL XR) 200 MG extended release tablet Take 400 mg by mouth every 8 hours       primidone (MYSOLINE) 250 MG tablet Take 250 mg by mouth 3 times daily        No current facility-administered medications for this visit. Review of Systems     Review of Systems   Constitutional: Negative for appetite change, chills, fatigue, fever and unexpected weight change.    HENT: Positive for voice change. Negative for congestion, ear discharge, ear pain, facial swelling, hearing loss, nosebleeds, postnasal drip, sinus pressure, sneezing, sore throat, tinnitus and trouble swallowing. Eyes: Negative for itching. Respiratory: Negative for apnea, cough and shortness of breath. Endocrine: Negative for cold intolerance and heat intolerance. Musculoskeletal: Negative for myalgias and neck pain. Skin: Negative for rash. Allergic/Immunologic: Negative for environmental allergies. Neurological: Negative for dizziness and headaches. Psychiatric/Behavioral: Negative for confusion, decreased concentration and sleep disturbance. PhysicalExam     Vitals:    11/05/21 1246 11/05/21 1247   BP: (!) 183/105 (!) 162/104   Site: Right Upper Arm Left Upper Arm   Position: Sitting Sitting   Pulse: 99    Temp: 97 °F (36.1 °C)    Weight: 192 lb (87.1 kg)    Height: 5' 4.5\" (1.638 m)        Physical Exam  Constitutional:       General: She is not in acute distress. Appearance: She is well-developed. HENT:      Head: Normocephalic and atraumatic. Right Ear: Tympanic membrane, ear canal and external ear normal. No drainage. No middle ear effusion. Tympanic membrane is not bulging. Tympanic membrane has normal mobility. Left Ear: Tympanic membrane, ear canal and external ear normal. No drainage. No middle ear effusion. Tympanic membrane is not bulging. Tympanic membrane has normal mobility. Nose: No mucosal edema or rhinorrhea. Mouth/Throat:      Lips: Pink. Mouth: Mucous membranes are moist.      Tongue: No lesions. Palate: No mass. Pharynx: Uvula midline. Eyes:      Pupils: Pupils are equal, round, and reactive to light. Neck:      Trachea: Trachea and phonation normal.      Comments: S/p thyroidectomy  Cardiovascular:      Pulses: Normal pulses. Pulmonary:      Effort: Pulmonary effort is normal. No accessory muscle usage or respiratory distress. Breath sounds: No stridor. Musculoskeletal:      Cervical back: Full passive range of motion without pain. Lymphadenopathy:      Head:      Right side of head: No submental or submandibular adenopathy. Left side of head: No submental or submandibular adenopathy. Cervical: No cervical adenopathy. Right cervical: No superficial, deep or posterior cervical adenopathy. Left cervical: No superficial, deep or posterior cervical adenopathy. Skin:     General: Skin is warm and dry. Neurological:      Mental Status: She is alert and oriented to person, place, and time. Cranial Nerves: No cranial nerve deficit. Coordination: Coordination normal.      Gait: Gait normal.   Psychiatric:         Thought Content: Thought content normal.           Procedure     Flexible Laryngoscopy    Pre op: dysphonia  Post op: same, gerd  Procedure : Flexible Laryngoscopy  Surgeon: Denice Don DO  Anesthesia: Afrin with 4% lidocaine  Indication: Laryngeal mirror examination was not tolerated due to gag reflex  Description:  The scope was passed along the floor of the right naris to the level of the larynx. There was no evidence of concerning masses or lesions of the base of tongue, vallecula, epiglottis, aryepiglottic folds, arytenoids, false vocal folds, true vocal folds, or pyriform sinuses. True vocal folds exhibited symmetric motion bilaterally without evidence of paralysis or paresis. The scope was removed. The patient tolerated the procedure without difficulty. There were no complications. Pertinent findings: post cricoid thickening and erythema consistent with reflux related changes, no evidence of mass or lesion        Assessment and Plan     1. Dysphonia  -Flexible laryngoscopy demonstrates findings consistent with reflux.  No evidence of vocal cord nodules or other lesions  -Discussed that if voice fails to improve after management of reflux she will will call and follow-up with Devika Perry, [place] for video stroboscopy    2. Gastroesophageal reflux disease, unspecified whether esophagitis present  -Recommend follow-up with PCP or GI for improved reflux management  -Reflux diet information provided      Return as needed    Melissa Reed DO  11/5/21      Portions of this note were dictated using Dragon.  There may be linguistic errors secondary to the use of this program.

## 2021-11-05 NOTE — PATIENT INSTRUCTIONS

## 2021-11-11 DIAGNOSIS — L98.9 SKIN LESIONS: ICD-10-CM

## 2021-11-11 DIAGNOSIS — R20.8 PAIN OF SKIN: ICD-10-CM

## 2021-11-12 RX ORDER — KETOROLAC TROMETHAMINE 30 MG/ML
INJECTION, SOLUTION INTRAMUSCULAR; INTRAVENOUS
Qty: 12 EACH | Refills: 2 | Status: SHIPPED | OUTPATIENT
Start: 2021-11-12 | End: 2021-12-24 | Stop reason: SDUPTHER

## 2021-11-12 ASSESSMENT — ENCOUNTER SYMPTOMS
SHORTNESS OF BREATH: 0
VOICE CHANGE: 1
ABDOMINAL PAIN: 0
VOMITING: 0
NAUSEA: 0
COLOR CHANGE: 0
CHEST TIGHTNESS: 0

## 2021-11-12 NOTE — TELEPHONE ENCOUNTER
Requested Prescriptions     Pending Prescriptions Disp Refills    ketorolac (TORADOL) 30 MG/ML injection 12 each 2     Sig: INJECT ONE MILLILITER INTRAMUSCULARLY TWO TIMES A DAY AS NEEDED FOR PAIN     Last OV 10/15/21  Next OV 1/17/22  Last refill 10/1/21  Last labs 8/27/21

## 2021-12-07 ENCOUNTER — PATIENT MESSAGE (OUTPATIENT)
Dept: FAMILY MEDICINE CLINIC | Age: 51
End: 2021-12-07

## 2021-12-07 DIAGNOSIS — F41.9 ANXIETY: ICD-10-CM

## 2021-12-07 NOTE — TELEPHONE ENCOUNTER
From: Oralia Thakkar  To: Dr. Dunne Flower: 12/7/2021 12:04 AM EST  Subject: Klonopin, B/P Diuretic, GERD Med    Hi, Dr. Phuong Davidson have a few prescription issues, please: 1) I don't need it quite yet, but you've told me to let you know at least 4 days prior to needing new Klonopin script 2) After seeing my last Blood Pressure readings, if you want to continue same routine, I need a new script sent for the Diuretic 3) Since I've been out of Pepcid for a while now & it wasn't helping anyway, do you want to send in anything new based on Dr. Nohemi Castro? I'm still having chest pressure/tightness/pain when I haven't even eaten for a long while. I'll check with [facility] [employer] [facility] in a couple days unless I hear otherwise from you. And just to let you know, there's a lot of Pharmacy problems with [facility] closing their drive-thru, not having things in stock, having a \"staff shortage\" (their words), etc... Thank you!    Sincerely-Elvira

## 2021-12-10 RX ORDER — CLONAZEPAM 0.5 MG/1
TABLET ORAL
Qty: 60 TABLET | Refills: 1 | Status: SHIPPED | OUTPATIENT
Start: 2021-12-10 | End: 2022-03-11 | Stop reason: SDUPTHER

## 2021-12-18 DIAGNOSIS — I10 ESSENTIAL HYPERTENSION: ICD-10-CM

## 2021-12-20 DIAGNOSIS — L28.1 PRURIGO NODULARIS: ICD-10-CM

## 2021-12-20 RX ORDER — CLOBETASOL PROPIONATE 0.5 MG/G
CREAM TOPICAL
Qty: 180 G | Refills: 5 | Status: SHIPPED | OUTPATIENT
Start: 2021-12-20 | End: 2022-10-25 | Stop reason: SDUPTHER

## 2021-12-20 RX ORDER — AMLODIPINE BESYLATE 10 MG/1
10 TABLET ORAL DAILY
Qty: 90 TABLET | Refills: 1 | Status: SHIPPED | OUTPATIENT
Start: 2021-12-20 | End: 2022-09-01 | Stop reason: SDUPTHER

## 2021-12-20 NOTE — TELEPHONE ENCOUNTER
Refill Request     Last Seen: Last Seen Department: 10/15/2021  Last Seen by PCP: 10/15/2021    Last Written: 8/03/2021 180 G X 5    Next Appointment:   Future Appointments   Date Time Provider Rula Lien   1/17/2022  1:00 PM MD TALYA Talamantes  [facility]       Future appointment scheduled      Requested Prescriptions     Pending Prescriptions Disp Refills    clobetasol (TEMOVATE) 0.05 % cream 180 g 5     Sig: Apply topically 2 times daily.

## 2021-12-24 DIAGNOSIS — L98.9 SKIN LESIONS: ICD-10-CM

## 2021-12-24 DIAGNOSIS — R20.8 PAIN OF SKIN: ICD-10-CM

## 2021-12-27 RX ORDER — KETOROLAC TROMETHAMINE 30 MG/ML
INJECTION, SOLUTION INTRAMUSCULAR; INTRAVENOUS
Qty: 12 EACH | Refills: 2 | Status: SHIPPED
Start: 2021-12-27 | End: 2022-01-26

## 2022-01-13 ENCOUNTER — TELEPHONE (OUTPATIENT)
Dept: FAMILY MEDICINE CLINIC | Age: 52
End: 2022-01-13

## 2022-01-14 NOTE — TELEPHONE ENCOUNTER
Submitted PA for Ketorolac Tromethamine 30 MG/ML injection solutions, Key: [id number]. Medication has been DENIED. Will scan denial letter once received. Submitted PA for Clobetasol Propionate 0.05% cream, Key: [id number]. Medication has been APPROVED through 04/14/2022. Please notify patient. Thank you.

## 2022-01-17 NOTE — TELEPHONE ENCOUNTER
Please let her know about the toradol denial. I would encourage her to follow up with her pain management doctor.

## 2022-01-26 ENCOUNTER — TELEMEDICINE (OUTPATIENT)
Dept: FAMILY MEDICINE CLINIC | Age: 52
End: 2022-01-26
Payer: COMMERCIAL

## 2022-01-26 DIAGNOSIS — Z72.820 POOR SLEEP: ICD-10-CM

## 2022-01-26 DIAGNOSIS — L98.9 SKIN LESIONS: ICD-10-CM

## 2022-01-26 DIAGNOSIS — I10 ESSENTIAL HYPERTENSION: Primary | ICD-10-CM

## 2022-01-26 DIAGNOSIS — R20.8 PAIN OF SKIN: ICD-10-CM

## 2022-01-26 PROCEDURE — 99443 PR PHYS/QHP TELEPHONE EVALUATION 21-30 MIN: CPT | Performed by: FAMILY MEDICINE

## 2022-01-26 RX ORDER — KETOROLAC TROMETHAMINE 30 MG/ML
INJECTION, SOLUTION INTRAMUSCULAR; INTRAVENOUS
Qty: 12 EACH | Refills: 2
Start: 2022-01-26 | End: 2022-03-11 | Stop reason: SDUPTHER

## 2022-01-26 RX ORDER — HYDROCHLOROTHIAZIDE 25 MG/1
12.5 TABLET ORAL 2 TIMES DAILY
Qty: 90 TABLET | Refills: 1 | Status: SHIPPED | OUTPATIENT
Start: 2022-01-26 | End: 2022-08-26 | Stop reason: SDUPTHER

## 2022-01-26 RX ORDER — PANTOPRAZOLE SODIUM 40 MG/1
40 TABLET, DELAYED RELEASE ORAL
Qty: 180 TABLET | Refills: 1 | Status: SHIPPED | OUTPATIENT
Start: 2022-01-26 | End: 2022-04-27 | Stop reason: ALTCHOICE

## 2022-01-26 ASSESSMENT — PATIENT HEALTH QUESTIONNAIRE - PHQ9
4. FEELING TIRED OR HAVING LITTLE ENERGY: 3
6. FEELING BAD ABOUT YOURSELF - OR THAT YOU ARE A FAILURE OR HAVE LET YOURSELF OR YOUR FAMILY DOWN: 1
9. THOUGHTS THAT YOU WOULD BE BETTER OFF DEAD, OR OF HURTING YOURSELF: 0
SUM OF ALL RESPONSES TO PHQ QUESTIONS 1-9: 15
10. IF YOU CHECKED OFF ANY PROBLEMS, HOW DIFFICULT HAVE THESE PROBLEMS MADE IT FOR YOU TO DO YOUR WORK, TAKE CARE OF THINGS AT HOME, OR GET ALONG WITH OTHER PEOPLE: 1
1. LITTLE INTEREST OR PLEASURE IN DOING THINGS: 3
2. FEELING DOWN, DEPRESSED OR HOPELESS: 2
SUM OF ALL RESPONSES TO PHQ QUESTIONS 1-9: 15
7. TROUBLE CONCENTRATING ON THINGS, SUCH AS READING THE NEWSPAPER OR WATCHING TELEVISION: 1
SUM OF ALL RESPONSES TO PHQ QUESTIONS 1-9: 15
SUM OF ALL RESPONSES TO PHQ9 QUESTIONS 1 & 2: 5
3. TROUBLE FALLING OR STAYING ASLEEP: 3
8. MOVING OR SPEAKING SO SLOWLY THAT OTHER PEOPLE COULD HAVE NOTICED. OR THE OPPOSITE, BEING SO FIGETY OR RESTLESS THAT YOU HAVE BEEN MOVING AROUND A LOT MORE THAN USUAL: 0
5. POOR APPETITE OR OVEREATING: 2
SUM OF ALL RESPONSES TO PHQ QUESTIONS 1-9: 15

## 2022-01-26 NOTE — PROGRESS NOTES
Zane Yuan is a 45 y.o. female evaluated via telephone on 4/17/2020. Consent:  She and/or health care decision maker is aware that that she may receive a bill for this telephone service, depending on her insurance coverage, and has provided verbal consent to proceed: Yes      Documentation:  I communicated with the patient and/or health care decision maker about:    Chief Complaint   Patient presents with    Hypertension    Depression        HTN:  Taking medication(s) daily  Checking Bps at home? Yes, scanned into media, much improved  Has situational variance with increased anxiety or poor sleep   No new AEs to the medication(s)  No acute concerning Sxs: No CP, SOB, palpitations, dizziness, HA, etc.      152/122  129/102  117/99  127/101    Anxiety/Moods:  Requested to wean zoloft at her last visit   Feels better with weaning, currently taking 50 mg daily  No SI/HI      Details of this discussion including any medical advice provided:     1. Essential hypertension  C/w Losartan and Norvasc  For persistent BP >140/90 after initial medications, may take additional 12.5 mg dose of HCTZ. - hydroCHLOROthiazide (HYDRODIURIL) 25 MG tablet; Take 0.5 tablets by mouth 2 times daily  Dispense: 90 tablet; Refill: 1    2. Poor sleep  - Helpr    3. Pain of skin  4. Skin lesions  - ketorolac (TORADOL) 30 MG/ML injection; INJECT ONE MILLILITER INTRAMUSCULARLY TWO TIMES A DAY AS NEEDED FOR PAIN  Dispense: 12 each; Refill: 2         I affirm this is a Patient Initiated Episode with an Established Patient who has not had a related appointment within my department in the past 7 days or scheduled within the next 24 hours.     Total Time: minutes: 21-30 minutes    Note: not billable if this call serves to triage the patient into an appointment for the relevant concern    Dr. Massie Harada, MD  1/26/22

## 2022-02-15 RX ORDER — ATORVASTATIN CALCIUM 40 MG/1
TABLET, FILM COATED ORAL
Qty: 90 TABLET | Refills: 1 | Status: SHIPPED | OUTPATIENT
Start: 2022-02-15

## 2022-02-15 RX ORDER — LEVOTHYROXINE SODIUM 0.2 MG/1
TABLET ORAL
Qty: 90 TABLET | Refills: 1 | Status: SHIPPED | OUTPATIENT
Start: 2022-02-15 | End: 2022-09-01 | Stop reason: SDUPTHER

## 2022-02-15 NOTE — TELEPHONE ENCOUNTER
Refill Request     Last Seen: Last Seen Department: 1/26/2022  Last Seen by PCP: 1/26/2022    Last Written: 9/1/2021 and 7/12/2021    Next Appointment:   No future appointments. Please advise when patient is due for a follow up appointment.        Requested Prescriptions     Pending Prescriptions Disp Refills    levothyroxine (SYNTHROID) 200 MCG tablet 90 tablet 1     Sig: TAKE ONE TABLET BY MOUTH DAILY    atorvastatin (LIPITOR) 40 MG tablet 90 tablet 1     Sig: TAKE ONE TABLET BY MOUTH DAILY

## 2022-03-09 ENCOUNTER — TELEPHONE (OUTPATIENT)
Dept: FAMILY MEDICINE CLINIC | Age: 52
End: 2022-03-09

## 2022-03-09 NOTE — TELEPHONE ENCOUNTER
----- Message from [facility] sent at 3/9/2022  2:24 PM EST -----  Subject: Appointment Request    Reason for Call: Routine (Patient Request) No Script    QUESTIONS  Type of Appointment? Established Patient  Reason for appointment request? Available appointments did not meet   patient need  Additional Information for Provider? pt called to schedule a f/u but there   are no 45 min apts available. She would like to come in on 06/06 in the   afternoon. ---------------------------------------------------------------------------  --------------  Lori GRECO  What is the best way for the office to contact you? OK to leave message on   voicemail  Preferred Call Back Phone Number? [phone]  ---------------------------------------------------------------------------  --------------  SCRIPT ANSWERS  Relationship to Patient? Self  (Is the patient requesting to see the provider for a procedure?)? No  (Is the patient requesting to see the provider urgently  today or   tomorrow. )? No  Have you been diagnosed with, awaiting test results for, or told that you   are suspected of having COVID-19 (Coronavirus)? (If patient has tested   negative or was tested as a requirement for work, school, or travel and   not based on symptoms, answer no)? No  Within the past 10 days have you developed any of the following symptoms   (answer no if symptoms have been present longer than 10 days or began   more than 10 days ago)? Fever or Chills, Cough, Shortness of breath or   difficulty breathing, Loss of taste or smell, Sore throat, Nasal   congestion, Sneezing or runny nose, Fatigue or generalized body aches   (answer no if pain is specific to a body part e.g. back pain), Diarrhea,   Headache? No  Have you had close contact with someone with COVID-19 in the last 7 days? No  (Service Expert  click yes below to proceed with [employer] As Usual   Scheduling)?  Yes

## 2022-03-11 ENCOUNTER — TELEPHONE (OUTPATIENT)
Dept: FAMILY MEDICINE CLINIC | Age: 52
End: 2022-03-11

## 2022-03-11 DIAGNOSIS — I10 ESSENTIAL HYPERTENSION: ICD-10-CM

## 2022-03-11 DIAGNOSIS — D68.59 PROTEIN S DEFICIENCY (HCC): ICD-10-CM

## 2022-03-11 DIAGNOSIS — F41.9 ANXIETY: ICD-10-CM

## 2022-03-11 DIAGNOSIS — Z86.73 HISTORY OF STROKE: ICD-10-CM

## 2022-03-11 DIAGNOSIS — R20.8 PAIN OF SKIN: ICD-10-CM

## 2022-03-11 DIAGNOSIS — L98.9 SKIN LESIONS: ICD-10-CM

## 2022-03-11 DIAGNOSIS — D68.59 HYPERCOAGULABLE STATE (HCC): ICD-10-CM

## 2022-03-11 RX ORDER — RIVAROXABAN 20 MG/1
TABLET, FILM COATED ORAL
Qty: 90 TABLET | Refills: 1 | Status: SHIPPED | OUTPATIENT
Start: 2022-03-11

## 2022-03-11 RX ORDER — KETOROLAC TROMETHAMINE 30 MG/ML
INJECTION, SOLUTION INTRAMUSCULAR; INTRAVENOUS
Qty: 12 EACH | Refills: 0 | Status: SHIPPED | OUTPATIENT
Start: 2022-03-11 | End: 2022-03-28 | Stop reason: SDUPTHER

## 2022-03-11 RX ORDER — CLONAZEPAM 0.5 MG/1
TABLET ORAL
Qty: 60 TABLET | Refills: 0 | Status: SHIPPED | OUTPATIENT
Start: 2022-03-11 | End: 2022-04-19 | Stop reason: SDUPTHER

## 2022-03-11 RX ORDER — LOSARTAN POTASSIUM 100 MG/1
TABLET ORAL
Qty: 90 TABLET | Refills: 1 | Status: SHIPPED | OUTPATIENT
Start: 2022-03-11 | End: 2022-10-25 | Stop reason: SDUPTHER

## 2022-03-11 NOTE — TELEPHONE ENCOUNTER
Submitted PA for Ketorolac Tromethamine 30 MG/ML injection solutions, Key: [id number]. Medication has been APPROVED through 03/17/2022. Please notify patient. Thank you.

## 2022-03-11 NOTE — TELEPHONE ENCOUNTER
PA needed for KETOROLAC TROMETHAMINE 30MG/ML SOLUTION    KEY: [id number]  Diagnosis Association: Pain of skin (R20.8);  Skin lesions (L98.9)

## 2022-03-14 NOTE — TELEPHONE ENCOUNTER
Left a detailed message for [address] [place] [address] stating that Ketorolac has been approved.

## 2022-03-17 ENCOUNTER — PATIENT MESSAGE (OUTPATIENT)
Dept: FAMILY MEDICINE CLINIC | Age: 52
End: 2022-03-17

## 2022-03-17 NOTE — TELEPHONE ENCOUNTER
Patient is questioning why the Ketorolac was only approved until today 3/17/2022. Can you please let me know why or do we need to do another Prior Authorization.

## 2022-03-17 NOTE — TELEPHONE ENCOUNTER
They only did a three day supply because that is not an injection that is FDA APPROVED to use more that two days. Ketorolac Trometamol 30 mg/ml solution for injection is indicated for the short-term management of moderate to severe acute post-operative pain. Treatment should only be initiated in hospitals. The maximum duration of treatment is two days. Please notify patient. Thank you.

## 2022-03-17 NOTE — TELEPHONE ENCOUNTER
From: Monalisa Peabody  To: Dr. Hernandez Iba: 3/17/2022 2:39 AM EDT  Subject: Attn: Dr. Margaret Hernandez &/or Kay Mosquera, please    Dr. Margaret Matthews &/or Kay Mosquera-  I sent you a [employer] message on February 3 & March 11 & also left a phone message on March 10 for you. I have not heard anything back & wondered if you could please reply here at your earliest convenience? Also, as I was just checking here for a reply, I noticed some notes about the ketorolac prior auth being approved until 3/17/2022 (which is today). So Im a little confused as to why that was only approved for not even a week-or is it approved longer & just a typo on the date? Sorry to be a bother. Take care!   Thank Sera Jaimes

## 2022-03-28 DIAGNOSIS — L98.9 SKIN LESIONS: ICD-10-CM

## 2022-03-28 DIAGNOSIS — R20.8 PAIN OF SKIN: ICD-10-CM

## 2022-03-28 RX ORDER — KETOROLAC TROMETHAMINE 30 MG/ML
INJECTION, SOLUTION INTRAMUSCULAR; INTRAVENOUS
Qty: 12 EACH | Refills: 0 | Status: SHIPPED | OUTPATIENT
Start: 2022-03-28 | End: 2022-04-19 | Stop reason: SDUPTHER

## 2022-03-28 NOTE — TELEPHONE ENCOUNTER
Refill Request - Controlled Substance    Last Seen Department: 1/26/2022  Last Seen by PCP: 1/26/2022    Last Written: 3/11/22    Last UDS: 7/30/21    Med Agreement Signed On: 2/26/19    Next Appointment: 6/8/2022    Future appointment scheduled    Requested Prescriptions     Pending Prescriptions Disp Refills    ketorolac (TORADOL) 30 MG/ML injection 12 each 0     Sig: INJECT ONE MILLILITER INTRAMUSCULARLY TWO TIMES A DAY AS NEEDED FOR PAIN       There is an approval from insurance for Toradol in pt chart.  [employer] message sent to see if pt was able to  rx from 3/11/22

## 2022-04-04 ENCOUNTER — PATIENT MESSAGE (OUTPATIENT)
Dept: FAMILY MEDICINE CLINIC | Age: 52
End: 2022-04-04

## 2022-04-04 NOTE — TELEPHONE ENCOUNTER
From: Vance Ibrahim  To: Dr. Deras Beat: 4/4/2022 3:54 AM EDT  Subject: ATTN: Kaylan Brannon-Hope you're well. Could you please send my unanswered [employer] messages & phone mssg to Dr. Mena Fontaine? The [employer] mssgs were Feb 3 & March 11 & I left a phone message (via human being) on March 10 (addressing Feb 3), but don't see it documented anywhere. I'm not against it if I need to schedule a phone visit to get my issues addressed. The last time I talked to Dr. Mena Fontaine, I told her I had sent messages (from last year) & she said she'd look into them & for me to keep \"bugging\" y'all if I don't get replies. I'm hoping I did/said nothing wrong? A reply here is good for me since I never know when I'll be up or down (trying to sleep). Thanks!    Sincerely-Elvira

## 2022-04-19 DIAGNOSIS — L98.9 SKIN LESIONS: ICD-10-CM

## 2022-04-19 DIAGNOSIS — F41.9 ANXIETY: ICD-10-CM

## 2022-04-19 DIAGNOSIS — R20.8 PAIN OF SKIN: ICD-10-CM

## 2022-04-19 NOTE — TELEPHONE ENCOUNTER
.  Refill Request - Controlled Substance    Last Seen Department: 1/26/2022  Last Seen by PCP: 1/26/2022    Last Written: 3/11/22 60 with 0   Ketoralac 3/28/22 12 with 0     Last UDS: 7/30/21    Med Agreement Signed On: 8/12/21    Next Appointment: 4/27/2022      Requested Prescriptions     Pending Prescriptions Disp Refills    clonazePAM (KLONOPIN) 0.5 MG tablet 60 tablet 0     Sig: Take one tablet by mouth twice a day as needed for anxiety    ketorolac (TORADOL) 30 MG/ML injection 12 each 0     Sig: INJECT ONE MILLILITER INTRAMUSCULARLY TWO TIMES A DAY AS NEEDED FOR PAIN

## 2022-04-20 RX ORDER — KETOROLAC TROMETHAMINE 30 MG/ML
INJECTION, SOLUTION INTRAMUSCULAR; INTRAVENOUS
Qty: 12 EACH | Refills: 0 | Status: SHIPPED | OUTPATIENT
Start: 2022-04-20 | End: 2022-05-12 | Stop reason: SDUPTHER

## 2022-04-20 RX ORDER — CLONAZEPAM 0.5 MG/1
TABLET ORAL
Qty: 60 TABLET | Refills: 0 | Status: SHIPPED | OUTPATIENT
Start: 2022-04-20 | End: 2022-04-27

## 2022-04-27 ENCOUNTER — TELEMEDICINE (OUTPATIENT)
Dept: FAMILY MEDICINE CLINIC | Age: 52
End: 2022-04-27
Payer: COMMERCIAL

## 2022-04-27 DIAGNOSIS — F41.9 ANXIETY: ICD-10-CM

## 2022-04-27 DIAGNOSIS — G47.09 OTHER INSOMNIA: Primary | ICD-10-CM

## 2022-04-27 DIAGNOSIS — K21.9 GASTROESOPHAGEAL REFLUX DISEASE, UNSPECIFIED WHETHER ESOPHAGITIS PRESENT: ICD-10-CM

## 2022-04-27 DIAGNOSIS — R20.8 PAIN OF SKIN: ICD-10-CM

## 2022-04-27 PROCEDURE — 99214 OFFICE O/P EST MOD 30 MIN: CPT | Performed by: FAMILY MEDICINE

## 2022-04-27 RX ORDER — CARBAMAZEPINE 400 MG/1
1 TABLET, EXTENDED RELEASE ORAL EVERY 8 HOURS
COMMUNITY
Start: 2022-04-04

## 2022-04-27 RX ORDER — OMEPRAZOLE 40 MG/1
40 CAPSULE, DELAYED RELEASE ORAL
Qty: 30 CAPSULE | Refills: 0 | Status: SHIPPED | OUTPATIENT
Start: 2022-04-27 | End: 2022-08-05

## 2022-04-27 RX ORDER — CLONAZEPAM 1 MG/1
1 TABLET ORAL 2 TIMES DAILY PRN
Qty: 60 TABLET | Refills: 2 | Status: SHIPPED | OUTPATIENT
Start: 2022-04-27 | End: 2022-08-05

## 2022-04-27 RX ORDER — ESZOPICLONE 1 MG/1
1 TABLET, FILM COATED ORAL NIGHTLY PRN
Qty: 30 TABLET | Refills: 0 | Status: SHIPPED | OUTPATIENT
Start: 2022-04-27 | End: 2022-05-04

## 2022-04-27 RX ORDER — SYRINGE WITH NEEDLE, 1 ML 25GX5/8"
SYRINGE, EMPTY DISPOSABLE MISCELLANEOUS
Qty: 100 EACH | Refills: 2 | Status: SHIPPED | OUTPATIENT
Start: 2022-04-27

## 2022-04-27 NOTE — PROGRESS NOTES
4/27/2022    This is a 47 y.o. female who presents for  Chief Complaint   Patient presents with    Other     Sleep referral, discuss on the mychart and are on the same page.  Gastroesophageal Reflux     prevacid , Prilosec, possible instead of Protonix.  better now     Anxiety       HPI:     Main concern: Sleep   Unable to fall asleep  Had a 2 day episode of insomnia  Lays for hours   Unable to obtain the sleep study  $900 to complete at home, $9K at home   Hasn't discussed this with the sleep center   S/p biofeedback, healing touch therapy, acupuncture   Wishes to start acupuncture again   Saw a sleep psychiatrist   Has tried Trazodone, Ambien, Elavil (hx of seizure), Doxepin, Gabapentin, Lyrica, hydroxyzine (doesn't help)    Wasn't taking her Vitamin D faithfully until now, Started Vitamin C   Zinc 22 mg, vegetarian gummies, citrate     GERD: better since 2/22  S/p visit with Dr. Manas Lewis, dx'd     6/8 physical?     BP: situational higher  165/142 on day of her dad's procedure  Took additional dose: 153/123   Routine levels: 117/95, 124/90, 101/88, 133/75, 126/90  With lack of sleep: 132/108, down to 122/90  133/75     Anxiety is not well controlled at this time with Klonopin 0.5 BID     Past Medical History:   Diagnosis Date    Benign essential tremor     BRCA1 negative     Colon polyp 05/06/2019    Degenerative disc disease     DVT, lower extremity ([address] 1989    Fibromyalgia     Kidney stones     Left-sided trigeminal neuralgia     Malignant neoplasm of thyroid gland ([address] 8/22/2013    Migraines, neuralgic     since stroke 1999 Chronic    PONV (postoperative nausea and vomiting)     Poor venous access     Protein S deficiency ([address]     Pulmonary embolism ([address] 1989    Seizure disorder ([address]     grand mal in 2005 ( childhood febrile seizure also-from a baby to age 14) and also X 1 ~2005 with headache treatment    Stroke ([address] 2/25/2019    Thrombosis of superior sagittal sinus 1999    Unspecified cerebral artery occlusion with cerebral infarction 1999    Vertebral artery occlusion 1999    White coat syndrome with high blood pressure but without hypertension        Past Surgical History:   Procedure Laterality Date    BREAST BIOPSY      BREAST LUMPECTOMY      x 3    CYSTOSCOPY  01/09/2012    w/ left ureteroscopy, holmium laser. stone manipulation and left stent insertion    FINGER TRIGGER RELEASE Left 10/31/2019    LEFT THUMB TRIGGER DIGIT RELEASE performed by Brittani Chappell MD at [address] Left 2000    pins later removed    FOOT SURGERY Left 07/10/2017    LARYNX SURGERY      vocal cord nodule    LITHOTRIPSY  12/11    REFRACTIVE SURGERY      THYROIDECTOMY  05/18/2011            Social History     Socioeconomic History    Marital status: Single     Spouse name: Not on file    Number of children: Not on file    Years of education: Not on file    Highest education level: Not on file   Occupational History    Not on file   Tobacco Use    Smoking status: Never Smoker    Smokeless tobacco: Never Used   Vaping Use    Vaping Use: Not on file   Substance and Sexual Activity    Alcohol use: No    Drug use: No    Sexual activity: Not on file   Other Topics Concern    Not on file   Social History Narrative    Not on file     Social Determinants of Health     Financial Resource Strain:     Difficulty of Paying Living Expenses: Not on file   Food Insecurity:     Worried About Running Out of Food in the Last Year: Not on file    Jorge of Food in the Last Year: Not on file   Transportation Needs:     Lack of Transportation (Medical): Not on file    Lack of Transportation (Non-Medical):  Not on file   Physical Activity:     Days of Exercise per Week: Not on file    Minutes of Exercise per Session: Not on file   Stress:     Feeling of Stress : Not on file   Social Connections:     Frequency of Communication with Friends and Family: Not on file    Frequency of Social Gatherings with Friends and Family: Not on file    Attends Restoration Services: Not on file    Active Member of Clubs or Organizations: Not on file    Attends Club or Organization Meetings: Not on file    Marital Status: Not on file   Intimate Partner Violence:     Fear of Current or Ex-Partner: Not on file    Emotionally Abused: Not on file    Physically Abused: Not on file    Sexually Abused: Not on file   Housing Stability:     Unable to Pay for Housing in the Last Year: Not on file    Number of [place] in the Last Year: Not on file    Unstable Housing in the Last Year: Not on file       Family History   Problem Relation Age of Onset    Cancer Mother         breast x2, ovarian x1, brain     High Cholesterol Mother     Breast Cancer Mother     Ovarian Cancer Mother     High Cholesterol Father     Other Father     Migraines Sister     Other Sister     Thyroid Disease Maternal Grandmother        Current Outpatient Medications   Medication Sig Dispense Refill    SYRINGE-NEEDLE, DISP, 3 ML (B-D 3CC LUER-ALEX SYR 25GX1\") 25G X 1\" 3 ML MISC Use to inject into the muscle two times a day as needed for pain 100 each 2    omeprazole (PRILOSEC) 40 MG delayed release capsule Take 1 capsule by mouth every morning (before breakfast) 30 capsule 0    clonazePAM (KLONOPIN) 1 MG tablet Take 1 tablet by mouth 2 times daily as needed for Anxiety for up to 30 days.  60 tablet 2    ketorolac (TORADOL) 30 MG/ML injection INJECT ONE MILLILITER INTRAMUSCULARLY TWO TIMES A DAY AS NEEDED FOR PAIN 12 each 0    losartan (COZAAR) 100 MG tablet TAKE ONE TABLET BY MOUTH DAILY 90 tablet 1    XARELTO 20 MG TABS tablet TAKE ONE TABLET BY MOUTH DAILY 90 tablet 1    levothyroxine (SYNTHROID) 200 MCG tablet TAKE ONE TABLET BY MOUTH DAILY 90 tablet 1    atorvastatin (LIPITOR) 40 MG tablet TAKE ONE TABLET BY MOUTH DAILY 90 tablet 1    hydroCHLOROthiazide (HYDRODIURIL) 25 MG tablet Take 0.5 tablets by mouth 2 times daily 90 tablet 1    amLODIPine (NORVASC) 10 MG tablet Take 1 tablet by mouth daily 90 tablet 1    clobetasol (TEMOVATE) 0.05 % cream Apply topically 2 times daily. 180 g 5    mupirocin (BACTROBAN) 2 % ointment Apply 3 times daily 90 g 5    Melatonin 10 MG TABS Take by mouth      diphenhydrAMINE (SOMINEX) 25 MG tablet Take 50 mg by mouth nightly as needed      docusate (COLACE, DULCOLAX) 100 MG CAPS Take 100 mg by mouth 2 times daily as needed       morphine (MS CONTIN) 15 MG extended release tablet 30 mg 2 times daily.  fentaNYL (ACTIQ) 800 MCG lollipop Take 1 each by mouth 3 times daily as needed.  naloxone (NARCAN) 4 MG/0.1ML LIQD nasal spray 1 spray by Nasal route as needed for Opioid Reversal      Cyanocobalamin (VITAMIN B-12) 5000 MCG SUBL Take 1 each by mouth every other day      Cholecalciferol (VITAMIN D3) 2000 units CAPS Take 1 capsule by mouth daily      Doxylamine Succinate, Sleep, (UNISOM PO) Take by mouth      primidone (MYSOLINE) 250 MG tablet Take 250 mg by mouth 3 times daily       eszopiclone (LUNESTA) 1 MG TABS Take 1 tablet by mouth nightly as needed (insomnia) for up to 23 days. 15 tablet 0    carBAMazepine (TEGRETOL XR) 400 MG extended release tablet Take 1 tablet by mouth every 8 hours       No current facility-administered medications for this visit.        Immunization History   Administered Date(s) Administered    Influenza, Quadv, IM, PF (6 mo and older Fluzone, Flulaval, Fluarix, and 3 yrs and older Afluria) 09/19/2019, 10/14/2020    Tdap (Boostrix, Adacel) 04/04/2019    Zoster Recombinant (Shingrix) 02/05/2020, 09/25/2020       Allergies   Allergen Reactions    Dilaudid [Hydromorphone Hcl] Shortness Of Breath, Itching and Other (See Comments)     Wheezing    Buprenorphine Itching, Nausea And Vomiting and Other (See Comments)     Tingling in lips    Duloxetine Hcl Other (See Comments)     Personality changes and depression    Elavil [Amitriptyline]      Seizure with high dose     Gabapentin     Gluten Meal      Celiac disease    Hydromorphone Itching and Other (See Comments)    Morphine Itching     Is able to take MS Contin     Pregabalin      Lyrica (personality changes)     Propranolol Hives    Topiramate Other (See Comments)    Zoloft [Sertraline]        Review of Systems   Constitutional: Negative for activity change, appetite change, chills, diaphoresis, fatigue and fever. Eyes: Negative for visual disturbance. Respiratory: Negative for chest tightness and shortness of breath. Cardiovascular: Positive for palpitations. Negative for chest pain and leg swelling. Gastrointestinal: Negative for abdominal pain, nausea and vomiting. Genitourinary: Negative for decreased urine volume. Skin: Negative for color change. Neurological: Negative for dizziness, weakness, light-headedness, numbness and headaches. Psychiatric/Behavioral: Positive for dysphoric mood and sleep disturbance. The patient is nervous/anxious. LMP 06/30/2015     Physical Exam  Vitals and nursing note reviewed. Constitutional:       General: She is not in acute distress. Appearance: Normal appearance. She is well-developed. She is not diaphoretic. HENT:      Head: Normocephalic and atraumatic. Eyes:      Extraocular Movements: Extraocular movements intact. Conjunctiva/sclera: Conjunctivae normal.      Pupils: Pupils are equal, round, and reactive to light. Cardiovascular:      Rate and Rhythm: Normal rate and regular rhythm. Pulmonary:      Effort: Pulmonary effort is normal. No respiratory distress. Abdominal:      Palpations: Abdomen is soft. Musculoskeletal:         General: Normal range of motion. Cervical back: Normal range of motion and neck supple. Skin:     General: Skin is warm and dry. Neurological:      Mental Status: She is alert and oriented to person, place, and time.    Psychiatric:         Attention and Perception: She is attentive. Mood and Affect: Mood normal.         Speech: Speech normal.         Behavior: Behavior normal.         Thought Content: Thought content normal.         Judgment: Judgment normal.         Plan  1. Other insomnia    2. Gastroesophageal reflux disease, unspecified whether esophagitis present  - omeprazole (PRILOSEC) 40 MG delayed release capsule; Take 1 capsule by mouth every morning (before breakfast)  Dispense: 30 capsule; Refill: 0    3. Anxiety  - clonazePAM (KLONOPIN) 1 MG tablet; Take 1 tablet by mouth 2 times daily as needed for Anxiety for up to 30 days. Dispense: 60 tablet; Refill: 2        While assessing care for this patient, I have reviewed all pertinent lab work/imaging/ specialist notes and care in reference to those problems addressed above in detail. Appropriate medical decision making was based on this. Please note that portions of this note may have been completed with a voice recognition program. Efforts were made to edit the dictations but occasionally words are mis-transcribed. Return in about 3 months (around 7/27/2022) for 30 minute visit.

## 2022-04-27 NOTE — TELEPHONE ENCOUNTER
Refill Request     Last Seen: Last Seen Department: 1/26/2022  Last Seen by PCP: 1/26/2022    Last Written: 3/5/2021 for #100 with #2 refills     Next Appointment:   Future Appointments   Date Time Provider Rula Emmanuel   4/27/2022  5:00 PM MD TALYA James   6/8/2022  2:15 PM MD TALYA James  [place]  [facility]       Future appointment scheduled      Requested Prescriptions     Pending Prescriptions Disp Refills    SYRINGE-NEEDLE, DISP, 3 ML (B-D 3CC LUER-ALEX SYR 25GX1\") 25G X 1\" 3 ML MISC 100 each 2     Sig: Use to inject into the muscle two times a day as needed for pain

## 2022-04-28 ENCOUNTER — TELEPHONE (OUTPATIENT)
Dept: FAMILY MEDICINE CLINIC | Age: 52
End: 2022-04-28

## 2022-04-28 NOTE — TELEPHONE ENCOUNTER
Submitted PA for Eszopiclone 1MG tablets, Key: [id number]. Medication has been DENIED. Denial letter attached. Please notify patient. Thank you.

## 2022-04-28 NOTE — TELEPHONE ENCOUNTER
PA needed for ESZOPICLONE 1MG TABLETS    KEY: [id number]  Diagnosis Association: Other insomnia (G47.09)

## 2022-04-29 ENCOUNTER — PATIENT MESSAGE (OUTPATIENT)
Dept: FAMILY MEDICINE CLINIC | Age: 52
End: 2022-04-29

## 2022-04-29 NOTE — TELEPHONE ENCOUNTER
From: Froylan Boogie  To: Dr. Campos Gillespie: 4/29/2022 5:47 PM EDT  Subject: P/Auth needed for Dr. Herbie Carter (Eszopiclone 1mg) you just prescribed in our VV 4/27 requires a Prior Auth. Pharmacy just told me they sent you a notice & I asked them to resend it just to be sure. I will recheck with them Mon./May 2nd afternoon. This new insurance company is unfortunately not working out very well re: a lot of things! Sorry for the inconvenience!  Thank you very much-Elvira

## 2022-05-04 DIAGNOSIS — G47.09 OTHER INSOMNIA: ICD-10-CM

## 2022-05-04 RX ORDER — ESZOPICLONE 1 MG/1
1 TABLET, FILM COATED ORAL NIGHTLY PRN
Qty: 15 TABLET | Refills: 0 | Status: SHIPPED | OUTPATIENT
Start: 2022-05-04 | End: 2022-05-27

## 2022-05-05 ASSESSMENT — ENCOUNTER SYMPTOMS
SHORTNESS OF BREATH: 0
VOMITING: 0
CHEST TIGHTNESS: 0
ABDOMINAL PAIN: 0
COLOR CHANGE: 0
NAUSEA: 0

## 2022-05-06 NOTE — TELEPHONE ENCOUNTER
Patient is requesting to appeal this medication denial. Please advise. She would like this to be expedited.

## 2022-05-09 NOTE — TELEPHONE ENCOUNTER
There was a denial letter, and I adjusted the script based on what the letter said they would cover. Please update pt.

## 2022-05-09 NOTE — TELEPHONE ENCOUNTER
Please see patient mychart note from 5/06/2022. Patient does not need the appeal, she is able to get a 30 day supply from [facility] and it will cost her $15 dollars.

## 2022-05-09 NOTE — TELEPHONE ENCOUNTER
No further assistance needed, see patient mychart message from Friday. [facility] called [facility]-not my insurance. Right now they're filling Generic Lunesta Qty#30 through [facility] & it'll cost me around $15.00!!!  No PA, Appeal, or anything needed

## 2022-05-10 ENCOUNTER — TELEPHONE (OUTPATIENT)
Dept: FAMILY MEDICINE CLINIC | Age: 52
End: 2022-05-10

## 2022-05-10 DIAGNOSIS — E55.9 VITAMIN D DEFICIENCY: ICD-10-CM

## 2022-05-10 DIAGNOSIS — Z00.00 ANNUAL PHYSICAL EXAM: Primary | ICD-10-CM

## 2022-05-10 DIAGNOSIS — E66.09 CLASS 1 OBESITY DUE TO EXCESS CALORIES WITH SERIOUS COMORBIDITY AND BODY MASS INDEX (BMI) OF 32.0 TO 32.9 IN ADULT: ICD-10-CM

## 2022-05-10 NOTE — TELEPHONE ENCOUNTER
----- Message from Maribell Fletcher sent at 5/10/2022  1:21 PM EDT -----  Subject: Message to Provider    QUESTIONS  Information for Provider? Patient is requesting orders for labs to be done   Prior to Yearly Physical on 9/22/2022. So she may get those labs done and   have the results at the time of the appointment. Thank you   ---------------------------------------------------------------------------  --------------  CALL BACK INFO  What is the best way for the office to contact you? OK to leave message on   voicemail  Preferred Call Back Phone Number? [phone]  ---------------------------------------------------------------------------  --------------  SCRIPT ANSWERS  Relationship to Patient?  Self

## 2022-05-12 DIAGNOSIS — L98.9 SKIN LESIONS: ICD-10-CM

## 2022-05-12 DIAGNOSIS — R20.8 PAIN OF SKIN: ICD-10-CM

## 2022-05-13 ENCOUNTER — PATIENT MESSAGE (OUTPATIENT)
Dept: FAMILY MEDICINE CLINIC | Age: 52
End: 2022-05-13

## 2022-05-13 ENCOUNTER — TELEPHONE (OUTPATIENT)
Dept: ADMINISTRATIVE | Age: 52
End: 2022-05-13

## 2022-05-13 RX ORDER — KETOROLAC TROMETHAMINE 30 MG/ML
INJECTION, SOLUTION INTRAMUSCULAR; INTRAVENOUS
Qty: 12 EACH | Refills: 0 | Status: SHIPPED | OUTPATIENT
Start: 2022-05-13 | End: 2022-06-04 | Stop reason: SDUPTHER

## 2022-05-13 NOTE — TELEPHONE ENCOUNTER
Submitted PA for Ketorolac Tromethamine 30 MG/ML injection solutions, Key: [id number]. Status PENDING.

## 2022-05-13 NOTE — TELEPHONE ENCOUNTER
Refill Request     CONFIRM preferrred pharmacy with the patient. If Mail Order Rx - Pend for 90 day refill.       Last Seen: Last Seen Department: 4/27/2022  Last Seen by PCP: 4/27/2022    Last Written: 4/20/2022    Next Appointment:   Future Appointments   Date Time Provider Rula Emmanuel   9/22/2022  1:45 PM MD TALYA Quiroz  [place] [facility]       Future appointment scheduled      Requested Prescriptions     Pending Prescriptions Disp Refills    ketorolac (TORADOL) 30 MG/ML injection 12 each 0     Sig: INJECT ONE MILLILITER INTRAMUSCULARLY TWO TIMES A DAY AS NEEDED FOR PAIN

## 2022-06-01 DIAGNOSIS — L98.9 SKIN LESIONS: ICD-10-CM

## 2022-06-01 DIAGNOSIS — R20.8 PAIN OF SKIN: ICD-10-CM

## 2022-06-01 NOTE — TELEPHONE ENCOUNTER
Refill Request     CONFIRM preferrred pharmacy with the patient. If Mail Order Rx - Pend for 90 day refill.       Last Seen: Last Seen Department: 4/27/2022  Last Seen by PCP: 4/27/2022    Last Written: 5/13/2022    Next Appointment:   Future Appointments   Date Time Provider Rula Emmanuel   9/22/2022  1:45 PM MD TALYA Lopez  [place] [facility]       Future appointment scheduled      Requested Prescriptions     Pending Prescriptions Disp Refills    ketorolac (TORADOL) 30 MG/ML injection 12 each 0     Sig: INJECT ONE MILLILITER INTRAMUSCULARLY TWO TIMES A DAY AS NEEDED FOR PAIN

## 2022-06-04 DIAGNOSIS — L98.9 SKIN LESIONS: ICD-10-CM

## 2022-06-04 DIAGNOSIS — R20.8 PAIN OF SKIN: ICD-10-CM

## 2022-06-06 RX ORDER — KETOROLAC TROMETHAMINE 30 MG/ML
INJECTION, SOLUTION INTRAMUSCULAR; INTRAVENOUS
Qty: 12 EACH | Refills: 0 | Status: SHIPPED | OUTPATIENT
Start: 2022-06-06 | End: 2022-06-20 | Stop reason: SDUPTHER

## 2022-06-06 RX ORDER — KETOROLAC TROMETHAMINE 30 MG/ML
INJECTION, SOLUTION INTRAMUSCULAR; INTRAVENOUS
Qty: 12 EACH | Refills: 0 | Status: CANCELLED | OUTPATIENT
Start: 2022-06-06

## 2022-06-06 NOTE — TELEPHONE ENCOUNTER
.  Refill Request     CONFIRM preferrred pharmacy with the patient. If Mail Order Rx - Pend for 90 day refill.       Last Seen: Last Seen Department: 4/27/2022  Last Seen by PCP: 4/27/2022    Last Written: 5-13-22 12 with 0     Next Appointment:   Future Appointments   Date Time Provider Rula Emmanuel   9/22/2022  1:45 PM Radha Tatum MD [facility] [place] - [place]       Future appointment scheduled      Requested Prescriptions     Pending Prescriptions Disp Refills    ketorolac (TORADOL) 30 MG/ML injection 12 each 0     Sig: INJECT ONE MILLILITER INTRAMUSCULARLY TWO TIMES A DAY AS NEEDED FOR PAIN

## 2022-06-07 DIAGNOSIS — G47.09 OTHER INSOMNIA: ICD-10-CM

## 2022-06-07 NOTE — TELEPHONE ENCOUNTER
Refill Request     CONFIRM preferrred pharmacy with the patient. If Mail Order Rx - Pend for 90 day refill. Last Seen: Last Seen Department: 4/27/2022  Last Seen by PCP: 4/27/2022    Last Written: 04/27/22 30 with 0    Next Appointment:   Future Appointments   Date Time Provider Rula Emmanuel   9/22/2022  1:45 PM MD TALYA James  [place]       Future appointment scheduled      Requested Prescriptions     Pending Prescriptions Disp Refills    eszopiclone (LUNESTA) 1 MG TABS 30 tablet 0     Sig: Take 1 tablet by mouth nightly as needed (insomnia) for up to 30 days.

## 2022-06-08 RX ORDER — ESZOPICLONE 1 MG/1
1 TABLET, FILM COATED ORAL NIGHTLY PRN
Qty: 15 TABLET | Refills: 0 | Status: SHIPPED | OUTPATIENT
Start: 2022-06-08 | End: 2022-07-03

## 2022-06-10 RX ORDER — ESZOPICLONE 1 MG/1
1 TABLET, FILM COATED ORAL NIGHTLY PRN
Qty: 30 TABLET | Refills: 0 | Status: SHIPPED | OUTPATIENT
Start: 2022-06-10 | End: 2022-07-10

## 2022-06-14 ENCOUNTER — TELEPHONE (OUTPATIENT)
Dept: FAMILY MEDICINE CLINIC | Age: 52
End: 2022-06-14

## 2022-06-14 NOTE — TELEPHONE ENCOUNTER
----- Message from Owingo sent at 6/14/2022 11:28 AM EDT -----  Subject: Message to Provider    QUESTIONS  Information for Provider? Pt sees Dr. Shannon Campa & is returning a call to Dr. Juan Flores assistant regarding her Pearl Lee. Pt says RX is fixed now and   reports that she did not  the first qty of 15 & will  the   qty of 30 today. Please advise if you need anything further. ---------------------------------------------------------------------------  --------------  Ana Dena INFO  What is the best way for the office to contact you? OK to leave message on   voicemail  Preferred Call Back Phone Number? [phone]  ---------------------------------------------------------------------------  --------------  SCRIPT ANSWERS  Relationship to Patient?  Self

## 2022-06-20 DIAGNOSIS — R20.8 PAIN OF SKIN: ICD-10-CM

## 2022-06-20 DIAGNOSIS — L98.9 SKIN LESIONS: ICD-10-CM

## 2022-06-20 RX ORDER — KETOROLAC TROMETHAMINE 30 MG/ML
INJECTION, SOLUTION INTRAMUSCULAR; INTRAVENOUS
Qty: 12 EACH | Refills: 0 | Status: SHIPPED | OUTPATIENT
Start: 2022-06-20 | End: 2022-07-08 | Stop reason: SDUPTHER

## 2022-06-20 NOTE — TELEPHONE ENCOUNTER
.  Refill Request     CONFIRM preferrred pharmacy with the patient. If Mail Order Rx - Pend for 90 day refill.       Last Seen: Last Seen Department: 4/27/2022  Last Seen by PCP: Visit date not found    Last Written: 6-6-22 12 with 0     Next Appointment:   Future Appointments   Date Time Provider Rula Emmanuel   9/22/2022  1:45 PM Cisco Springer MD [facility] - [facility]       Future appointment scheduled      Requested Prescriptions     Pending Prescriptions Disp Refills    ketorolac (TORADOL) 30 MG/ML injection 12 each 0     Sig: INJECT ONE MILLILITER INTRAMUSCULARLY TWO TIMES A DAY AS NEEDED FOR PAIN

## 2022-07-07 DIAGNOSIS — L98.9 SKIN LESIONS: ICD-10-CM

## 2022-07-07 DIAGNOSIS — R20.8 PAIN OF SKIN: ICD-10-CM

## 2022-07-07 NOTE — TELEPHONE ENCOUNTER
Refill Request     CONFIRM preferrred pharmacy with the patient. If Mail Order Rx - Pend for 90 day refill.       Last Seen: Last Seen Department: 4/27/2022  Last Seen by PCP: 4/27/2022    Last Written: 12 with 0 6/20/2022   Next Appointment:   Future Appointments   Date Time Provider Rula Emmanuel   9/22/2022  1:45 PM Jen Baron MD [facility] [place]       Future appointment scheduled      Requested Prescriptions     Pending Prescriptions Disp Refills    ketorolac (TORADOL) 30 MG/ML injection 12 each 0     Sig: INJECT ONE MILLILITER INTRAMUSCULARLY TWO TIMES A DAY AS NEEDED FOR PAIN

## 2022-07-08 RX ORDER — KETOROLAC TROMETHAMINE 30 MG/ML
INJECTION, SOLUTION INTRAMUSCULAR; INTRAVENOUS
Qty: 12 EACH | Refills: 0 | Status: SHIPPED | OUTPATIENT
Start: 2022-07-08 | End: 2022-07-28 | Stop reason: SDUPTHER

## 2022-07-16 ENCOUNTER — HOSPITAL ENCOUNTER (EMERGENCY)
Age: 52
Discharge: HOME | End: 2022-07-16
Payer: COMMERCIAL

## 2022-07-16 ENCOUNTER — APPOINTMENT (OUTPATIENT)
Dept: CT IMAGING | Age: 52
End: 2022-07-16
Payer: COMMERCIAL

## 2022-07-16 ENCOUNTER — APPOINTMENT (OUTPATIENT)
Dept: GENERAL RADIOLOGY | Age: 52
End: 2022-07-16
Payer: COMMERCIAL

## 2022-07-16 VITALS
HEART RATE: 88 BPM | WEIGHT: 192 LBS | BODY MASS INDEX: 31.99 KG/M2 | RESPIRATION RATE: 16 BRPM | SYSTOLIC BLOOD PRESSURE: 113 MMHG | OXYGEN SATURATION: 98 % | DIASTOLIC BLOOD PRESSURE: 91 MMHG | TEMPERATURE: 97.7 F | HEIGHT: 65 IN

## 2022-07-16 DIAGNOSIS — R42 LIGHTHEADEDNESS: Primary | ICD-10-CM

## 2022-07-16 LAB
A/G RATIO: 1.9 (ref 1.1–2.2)
ALBUMIN SERPL-MCNC: 4.8 G/DL (ref 3.4–5)
ALP BLD-CCNC: 144 U/L (ref 40–129)
ALT SERPL-CCNC: 20 U/L (ref 10–40)
ANION GAP SERPL CALCULATED.3IONS-SCNC: 11 MMOL/L (ref 3–16)
AST SERPL-CCNC: 30 U/L (ref 15–37)
BASOPHILS ABSOLUTE: 0 K/UL (ref 0–0.2)
BASOPHILS RELATIVE PERCENT: 0.6 %
BILIRUB SERPL-MCNC: 0.4 MG/DL (ref 0–1)
BILIRUBIN URINE: NEGATIVE
BLOOD, URINE: NEGATIVE
BUN BLDV-MCNC: 24 MG/DL (ref 7–20)
CALCIUM SERPL-MCNC: 9.8 MG/DL (ref 8.3–10.6)
CHLORIDE BLD-SCNC: 99 MMOL/L (ref 99–110)
CLARITY: CLEAR
CO2: 27 MMOL/L (ref 21–32)
COLOR: YELLOW
CREAT SERPL-MCNC: 0.6 MG/DL (ref 0.6–1.1)
EOSINOPHILS ABSOLUTE: 0.1 K/UL (ref 0–0.6)
EOSINOPHILS RELATIVE PERCENT: 3.1 %
GFR AFRICAN AMERICAN: >60
GFR NON-AFRICAN AMERICAN: >60
GLUCOSE BLD-MCNC: 100 MG/DL (ref 70–99)
GLUCOSE URINE: NEGATIVE MG/DL
HCT VFR BLD CALC: 39.5 % (ref 36–48)
HEMOGLOBIN: 13.6 G/DL (ref 12–16)
KETONES, URINE: NEGATIVE MG/DL
LEUKOCYTE ESTERASE, URINE: NEGATIVE
LYMPHOCYTES ABSOLUTE: 1.1 K/UL (ref 1–5.1)
LYMPHOCYTES RELATIVE PERCENT: 26.6 %
MCH RBC QN AUTO: 30.4 PG (ref 26–34)
MCHC RBC AUTO-ENTMCNC: 34.3 G/DL (ref 31–36)
MCV RBC AUTO: 88.5 FL (ref 80–100)
MICROSCOPIC EXAMINATION: NORMAL
MONOCYTES ABSOLUTE: 0.4 K/UL (ref 0–1.3)
MONOCYTES RELATIVE PERCENT: 9.4 %
NEUTROPHILS ABSOLUTE: 2.4 K/UL (ref 1.7–7.7)
NEUTROPHILS RELATIVE PERCENT: 60.3 %
NITRITE, URINE: NEGATIVE
PDW BLD-RTO: 13.3 % (ref 12.4–15.4)
PH UA: 6 (ref 5–8)
PLATELET # BLD: 205 K/UL (ref 135–450)
PMV BLD AUTO: 8.3 FL (ref 5–10.5)
POTASSIUM SERPL-SCNC: 5.2 MMOL/L (ref 3.5–5.1)
PROTEIN UA: NEGATIVE MG/DL
RBC # BLD: 4.46 M/UL (ref 4–5.2)
SARS-COV-2, NAAT: NOT DETECTED
SODIUM BLD-SCNC: 137 MMOL/L (ref 136–145)
SPECIFIC GRAVITY UA: 1.01 (ref 1–1.03)
TOTAL PROTEIN: 7.3 G/DL (ref 6.4–8.2)
TROPONIN: <0.01 NG/ML
URINE REFLEX TO CULTURE: NORMAL
URINE TYPE: NORMAL
UROBILINOGEN, URINE: 0.2 E.U./DL
WBC # BLD: 4 K/UL (ref 4–11)

## 2022-07-16 PROCEDURE — 70450 CT HEAD/BRAIN W/O DYE: CPT

## 2022-07-16 PROCEDURE — 2580000003 HC RX 258: Performed by: NURSE PRACTITIONER

## 2022-07-16 PROCEDURE — 84484 ASSAY OF TROPONIN QUANT: CPT

## 2022-07-16 PROCEDURE — 85025 COMPLETE CBC W/AUTO DIFF WBC: CPT

## 2022-07-16 PROCEDURE — 71046 X-RAY EXAM CHEST 2 VIEWS: CPT

## 2022-07-16 PROCEDURE — 99284 EMERGENCY DEPT VISIT MOD MDM: CPT

## 2022-07-16 PROCEDURE — 81003 URINALYSIS AUTO W/O SCOPE: CPT

## 2022-07-16 PROCEDURE — 87635 SARS-COV-2 COVID-19 AMP PRB: CPT

## 2022-07-16 PROCEDURE — 6370000000 HC RX 637 (ALT 250 FOR IP): Performed by: NURSE PRACTITIONER

## 2022-07-16 PROCEDURE — 80053 COMPREHEN METABOLIC PANEL: CPT

## 2022-07-16 RX ORDER — 0.9 % SODIUM CHLORIDE 0.9 %
1000 INTRAVENOUS SOLUTION INTRAVENOUS ONCE
Status: COMPLETED | OUTPATIENT
Start: 2022-07-16 | End: 2022-07-16

## 2022-07-16 RX ORDER — MECLIZINE HCL 12.5 MG/1
12.5 TABLET ORAL 3 TIMES DAILY PRN
Qty: 15 TABLET | Refills: 0 | Status: SHIPPED | OUTPATIENT
Start: 2022-07-16 | End: 2022-07-26

## 2022-07-16 RX ORDER — MECLIZINE HCL 12.5 MG/1
25 TABLET ORAL ONCE
Status: COMPLETED | OUTPATIENT
Start: 2022-07-16 | End: 2022-07-16

## 2022-07-16 RX ADMIN — MECLIZINE 25 MG: 12.5 TABLET ORAL at 16:01

## 2022-07-16 RX ADMIN — SODIUM CHLORIDE 1000 ML: 9 INJECTION, SOLUTION INTRAVENOUS at 14:27

## 2022-07-16 ASSESSMENT — PAIN - FUNCTIONAL ASSESSMENT: PAIN_FUNCTIONAL_ASSESSMENT: NONE - DENIES PAIN

## 2022-07-16 NOTE — ED NOTES
Pt ok to d/c to home. Pt given d/c instructions. Pt verbalized understating including Rx and follow up care. Pt ambulated to [facility] for ride home.  0 s/s of distress at time of d/c.          Rosemary Swenson RN  07/16/22 6049

## 2022-07-16 NOTE — ED NOTES
Pt assisted up to bedside commode, she is \"feeling better, I am not as dizzy \"  Pt is able to stand without issues. Will continue to monitor. She is given a snack as well.       Nikko Bartlett RN  07/16/22 2123

## 2022-07-16 NOTE — ED NOTES
Pt ambulated w/o assist to and from bathroom. Pt denies dizziness.        Jaquelin Sharpe RN  07/16/22 8553

## 2022-07-16 NOTE — ED PROVIDER NOTES
[facility] Emergency Department    CHIEF COMPLAINT  Dizziness (And off balance. Sx started 0930)      HISTORY OF PRESENT ILLNESS  Sowmya Saini is a 47 y.o. female who presents to the ED complaining of generalized weakness, fatigue, and feeling \"off balance\" and dizzy. Patient has been at the hospital the past 3 days caring for her father who is admitted and reports today she just felt overall \"unwell\" she was \"staggering\" down the hallway and nurse assisted her to a seated position and checked her vital signs which were reportedly \"normal\" her blood pressure was a little \"elevated\" and then a suggested that she come to the emergency department for evaluation. Patient otherwise has no complaints denies numbness, tingling, extremity weakness, slurred speech, difficulty with word finding, facial droop, headache, chest pain, shortness of breath, palpitations, abdominal pain, nausea vomiting, diarrhea, urinary complaints, flank pain, leg swelling, calf pain. Patient denies recent illness, fever or cough. No other complaints, modifying factors or associated symptoms. Nursing notes reviewed.    Past Medical History:   Diagnosis Date    Benign essential tremor     BRCA1 negative     Colon polyp 05/06/2019    Degenerative disc disease     DVT, lower extremity (HCC) 1989    Fibromyalgia     Kidney stones     Left-sided trigeminal neuralgia     Malignant neoplasm of thyroid gland ([address] 8/22/2013    Migraines, neuralgic     since stroke 1999 Chronic    PONV (postoperative nausea and vomiting)     Poor venous access     Protein S deficiency ([address]     Pulmonary embolism ([address] 1989    Seizure disorder ([address]     grand mal in 2005 ( childhood febrile seizure also-from a baby to age 14) and also X 1 ~2005 with headache treatment    Stroke ([address] 2/25/2019    Thrombosis of superior sagittal sinus 1999    Unspecified cerebral artery occlusion with cerebral infarction 1999    Vertebral artery occlusion 1999    White coat syndrome with high blood pressure but without hypertension      Past Surgical History:   Procedure Laterality Date    BREAST BIOPSY      BREAST LUMPECTOMY      x 3    CYSTOSCOPY  01/09/2012    w/ left ureteroscopy, holmium laser. stone manipulation and left stent insertion    FINGER TRIGGER RELEASE Left 10/31/2019    LEFT THUMB TRIGGER DIGIT RELEASE performed by Silas Martines MD at [address] [place] [address] [address] 2000    pins later removed    FOOT SURGERY Left 07/10/2017    LARYNX SURGERY      vocal cord nodule    LITHOTRIPSY  12/11    REFRACTIVE SURGERY      THYROIDECTOMY  05/18/2011          Family History   Problem Relation Age of Onset    Cancer Mother         breast x2, ovarian x1, brain     High Cholesterol Mother     Breast Cancer Mother     Ovarian Cancer Mother     High Cholesterol Father     Other Father     Migraines Sister     Other Sister     Thyroid Disease Maternal Grandmother      Social History     Socioeconomic History    Marital status: Single     Spouse name: Not on file    Number of children: Not on file    Years of education: Not on file    Highest education level: Not on file   Occupational History    Not on file   Tobacco Use    Smoking status: Never    Smokeless tobacco: Never   Vaping Use    Vaping Use: Not on file   Substance and Sexual Activity    Alcohol use: No    Drug use: No    Sexual activity: Not on file   Other Topics Concern    Not on file   Social History Narrative    Not on file     Social Determinants of Health     Financial Resource Strain: Not on file   Food Insecurity: Not on file   Transportation Needs: Not on file   Physical Activity: Not on file   Stress: Not on file   Social Connections: Not on file   Intimate Partner Violence: Not on file   Housing Stability: Not on file     No current facility-administered medications for this encounter.      Current Outpatient Medications   Medication Sig Dispense Refill meclizine (ANTIVERT) 12.5 MG tablet Take 1 tablet by mouth 3 times daily as needed for Dizziness 15 tablet 0    ketorolac (TORADOL) 30 MG/ML injection INJECT ONE MILLILITER INTRAMUSCULARLY TWO TIMES A DAY AS NEEDED FOR PAIN 12 each 0    SYRINGE-NEEDLE, DISP, 3 ML (B-D 3CC LUER-ALEX SYR 25GX1\") 25G X 1\" 3 ML MISC Use to inject into the muscle two times a day as needed for pain 100 each 2    carBAMazepine (TEGRETOL XR) 400 MG extended release tablet Take 1 tablet by mouth every 8 hours      omeprazole (PRILOSEC) 40 MG delayed release capsule Take 1 capsule by mouth every morning (before breakfast) 30 capsule 0    clonazePAM (KLONOPIN) 1 MG tablet Take 1 tablet by mouth 2 times daily as needed for Anxiety for up to 30 days. 60 tablet 2    losartan (COZAAR) 100 MG tablet TAKE ONE TABLET BY MOUTH DAILY 90 tablet 1    XARELTO 20 MG TABS tablet TAKE ONE TABLET BY MOUTH DAILY 90 tablet 1    levothyroxine (SYNTHROID) 200 MCG tablet TAKE ONE TABLET BY MOUTH DAILY 90 tablet 1    atorvastatin (LIPITOR) 40 MG tablet TAKE ONE TABLET BY MOUTH DAILY 90 tablet 1    hydroCHLOROthiazide (HYDRODIURIL) 25 MG tablet Take 0.5 tablets by mouth 2 times daily 90 tablet 1    amLODIPine (NORVASC) 10 MG tablet Take 1 tablet by mouth daily 90 tablet 1    clobetasol (TEMOVATE) 0.05 % cream Apply topically 2 times daily. 180 g 5    mupirocin (BACTROBAN) 2 % ointment Apply 3 times daily 90 g 5    Melatonin 10 MG TABS Take by mouth      diphenhydrAMINE (SOMINEX) 25 MG tablet Take 50 mg by mouth nightly as needed      docusate (COLACE, DULCOLAX) 100 MG CAPS Take 100 mg by mouth 2 times daily as needed       morphine (MS CONTIN) 15 MG extended release tablet 30 mg 2 times daily. fentaNYL (ACTIQ) 800 MCG lollipop Take 1 each by mouth 3 times daily as needed.       naloxone (NARCAN) 4 MG/0.1ML LIQD nasal spray 1 spray by Nasal route as needed for Opioid Reversal      Cyanocobalamin (VITAMIN B-12) 5000 MCG SUBL Take 1 each by mouth every other day Cholecalciferol (VITAMIN D3) 2000 units CAPS Take 1 capsule by mouth daily      Doxylamine Succinate, Sleep, (UNISOM PO) Take by mouth      primidone (MYSOLINE) 250 MG tablet Take 250 mg by mouth 3 times daily        Allergies   Allergen Reactions    Dilaudid [Hydromorphone Hcl] Shortness Of Breath, Itching and Other (See Comments)     Wheezing    Buprenorphine Itching, Nausea And Vomiting and Other (See Comments)     Tingling in lips    Duloxetine Hcl Other (See Comments)     Personality changes and depression    Elavil [Amitriptyline]      Seizure with high dose     Gabapentin     Gluten Meal      Celiac disease    Hydromorphone Itching and Other (See Comments)    Morphine Itching     Is able to take MS Contin     Pregabalin      Lyrica (personality changes)     Propranolol Hives    Topiramate Other (See Comments)    Zoloft [Sertraline]        REVIEW OF SYSTEMS  10 systems reviewed, pertinent positives per HPI otherwise noted to be negative    PHYSICAL EXAM  BP (!) 113/91   Pulse 88   Temp 97.7 °F (36.5 °C) (Oral)   Resp 16   Ht 5' 5\" (1.651 m)   Wt 192 lb (87.1 kg)   LMP 06/30/2015   SpO2 98%   BMI 31.95 kg/m²   GENERAL APPEARANCE: Awake and alert. Cooperative. No acute distress. Vital signs are stable. Well appearing and non toxic. HEAD: Normocephalic. Atraumatic. EYES: PERRL. EOM's grossly intact. ENT: Mucous membranes are moist.   NECK: Supple. Normal ROM. HEART: RRR. Distal pulses are equal and intact. Cap refill less than 2 seconds. LUNGS: Respirations unlabored. CTAB. Good air exchange. Speaking comfortably in full sentences. No wheezing, rhonchi, rales, stridor. ABDOMEN: Soft. Non-distended. Non-tender. No guarding or rebound. No rigidity. Bowel sounds are present. Negative stiles's. Negative McBurney's point. Negative CVA tenderness. EXTREMITIES: No peripheral edema. Moves all extremities equally. All extremities neurovascularly intact. SKIN: Warm and dry. No acute rashes. NEUROLOGICAL: Alert and oriented. No gross facial drooping. Strength 5/5, sensation intact. Speech is clear. No dysmetria or dysarthria. No ataxia. Patient able to perform finger-nose. CN II through XII intact. No focal neurodeficit  PSYCHIATRIC: Normal mood and affect. SCREENINGS  NIH Stroke Scale  Interval: Baseline  Level of Consciousness (1a): Alert  LOC Questions (1b): Answers both correctly  LOC Commands (1c): Performs both tasks correctly  Best Gaze (2): Normal  Visual (3): No visual loss  Facial Palsy (4): Normal symmetrical movement  Motor Arm, Left (5a): No drift  Motor Arm, Right (5b): No drift  Motor Leg, Left (6a): No drift  Motor Leg, Right (6b): No drift  Limb Ataxia (7): Absent  Sensory (8): Normal  Best Language (9): No aphasia  Dysarthria (10): Normal  Extinction and Inattention (11): No abnormality  Total: 0  NIH Score  NIH Stroke Scale  Interval: Baseline  Level of Consciousness (1a): Alert  LOC Questions (1b): Answers both correctly  LOC Commands (1c): Performs both tasks correctly  Best Gaze (2): Normal  Visual (3): No visual loss  Facial Palsy (4): Normal symmetrical movement  Motor Arm, Left (5a): No drift  Motor Arm, Right (5b): No drift  Motor Leg, Left (6a): No drift  Motor Leg, Right (6b): No drift  Limb Ataxia (7): Absent  Sensory (8): Normal  Best Language (9): No aphasia  Dysarthria (10): Normal  Extinction and Inattention (11): No abnormality  Total: 0          RADIOLOGY  XR CHEST (2 VW)    Result Date: 7/16/2022  EXAMINATION: TWO XRAY VIEWS OF THE CHEST 7/16/2022 1:07 pm COMPARISON: 01/09/2020 HISTORY: ORDERING SYSTEM PROVIDED HISTORY: dizziness TECHNOLOGIST PROVIDED HISTORY: Reason for exam:->dizziness Reason for Exam: dizziness, unable to stand up straight or walk FINDINGS: The heart and pulmonary vascularity are within normal limits. There are no focal areas of consolidation or pleural effusion. The osseous structures are intact. There are moderate degenerative changes in both shoulders     No acute abnormality     CT HEAD WO CONTRAST    Result Date: 7/16/2022  EXAMINATION: CT OF THE HEAD WITHOUT CONTRAST  7/16/2022 1:24 pm TECHNIQUE: CT of the head was performed without the administration of intravenous contrast. Automated exposure control, iterative reconstruction, and/or weight based adjustment of the mA/kV was utilized to reduce the radiation dose to as low as reasonably achievable. COMPARISON: None. HISTORY: Acute dizziness and unsteady gait. FINDINGS: BRAIN/VENTRICLES: No acute intracranial hemorrhage, mass effect, or midline shift. No abnormal extra-axial fluid collection. The gray-white differentiation is maintained without evidence of an acute infarct. Minimal chronic small vessel ischemic changes. No hydrocephalus. ORBITS: The visualized portion of the orbits demonstrate no acute abnormality. SINUSES: The visualized paranasal sinuses and mastoid air cells demonstrate no acute abnormality. SOFT TISSUES/SKULL:  No acute abnormality of the visualized skull or soft tissues. No acute abnormality. ED COURSE/MDM  Patient seen and evaluated. Old records reviewed. Diagnostic testing reviewed and results discussed. I have independently evaluated this patient based upon my scope of practice. Supervising physician was in the department for consultation as needed. Antolin Post presented to the ED today with above noted complaints. Upon arrival to the emergency department vital signs are stable. Nontoxic appearance. Patient initially given sodium chloride bolus and meclizine for her symptoms. Orthostatic vital signs are negative. Emergency department work-up unremarkable. No JEANNIE, anemia, significant electrolyte abnormality. Troponin less than 0.01. No leukocytosis or bandemia. Rapid COVID-negative. Urinalysis negative for infection or hematuria. CT of the head shows no acute abnormality  Chest x-ray shows no acute abnormality.   No focal areas of consolidation or pleural effusion. Upon reevaluation symptoms have greatly improved after sodium chloride bolus and meclizine. Patient ambulatory in the emergency department with steady gait no assistance. Patient feels comfortable being discharged home and will follow-up closely with her primary care physician this week        While in ED patient received   Medications   0.9 % sodium chloride bolus (0 mLs IntraVENous Stopped 7/16/22 1833)   meclizine (ANTIVERT) tablet 25 mg (25 mg Oral Given 7/16/22 1601)             At this point I do not feel the patient requires further work up and it is reasonable to discharge the patient. A discussion was had with the patient and/or their surrogate regarding diagnosis, diagnostic testing results, treatment/ plan of care, and follow up. There was shared decision-making between myself as well as the patient and/or their surrogate and we are all in agreement with discharge home. There was an opportunity for questions and all questions were answered to the best of my ability and to the satisfaction of the patient and/or patient family. Patient will follow up with pcp for further evaluation/treatment. The patient was given strict return precautions as we discussed symptoms that would necessitate return to the ED. Patient will return to ED for new/worsening symptoms. The patient verbalized their understanding and agreement with the above plan. Please refer to AVS for further details regarding discharge instructions.       Results for orders placed or performed during the hospital encounter of 07/16/22   COVID-19, Rapid    Specimen: Nasopharyngeal Swab   Result Value Ref Range    SARS-CoV-2, NAAT Not Detected Not Detected   CBC with Auto Differential   Result Value Ref Range    WBC 4.0 4.0 - 11.0 K/uL    RBC 4.46 4.00 - 5.20 M/uL    Hemoglobin 13.6 12.0 - 16.0 g/dL    Hematocrit 39.5 36.0 - 48.0 %    MCV 88.5 80.0 - 100.0 fL    MCH 30.4 26.0 - 34.0 pg    MCHC 34.3 31.0 - 36.0 g/dL    RDW 13.3 12.4 - 15.4 %    Platelets 734 989 - 909 K/uL    MPV 8.3 5.0 - 10.5 fL    Neutrophils % 60.3 %    Lymphocytes % 26.6 %    Monocytes % 9.4 %    Eosinophils % 3.1 %    Basophils % 0.6 %    Neutrophils Absolute 2.4 1.7 - 7.7 K/uL    Lymphocytes Absolute 1.1 1.0 - 5.1 K/uL    Monocytes Absolute 0.4 0.0 - 1.3 K/uL    Eosinophils Absolute 0.1 0.0 - 0.6 K/uL    Basophils Absolute 0.0 0.0 - 0.2 K/uL   Comprehensive Metabolic Panel   Result Value Ref Range    Sodium 137 136 - 145 mmol/L    Potassium 5.2 (H) 3.5 - 5.1 mmol/L    Chloride 99 99 - 110 mmol/L    CO2 27 21 - 32 mmol/L    Anion Gap 11 3 - 16    Glucose 100 (H) 70 - 99 mg/dL    BUN 24 (H) 7 - 20 mg/dL    CREATININE 0.6 0.6 - 1.1 mg/dL    GFR Non-African American >60 >60    GFR African American >60 >60    Calcium 9.8 8.3 - 10.6 mg/dL    Total Protein 7.3 6.4 - 8.2 g/dL    Albumin 4.8 3.4 - 5.0 g/dL    Albumin/Globulin Ratio 1.9 1.1 - 2.2    Total Bilirubin 0.4 0.0 - 1.0 mg/dL    Alkaline Phosphatase 144 (H) 40 - 129 U/L    ALT 20 10 - 40 U/L    AST 30 15 - 37 U/L   Troponin   Result Value Ref Range    Troponin <0.01 <0.01 ng/mL   Urinalysis with Reflex to Culture    Specimen: Urine   Result Value Ref Range    Color, UA Yellow Straw/Yellow    Clarity, UA Clear Clear    Glucose, Ur Negative Negative mg/dL    Bilirubin Urine Negative Negative    Ketones, Urine Negative Negative mg/dL    Specific Gravity, UA 1.010 1.005 - 1.030    Blood, Urine Negative Negative    pH, UA 6.0 5.0 - 8.0    Protein, UA Negative Negative mg/dL    Urobilinogen, Urine 0.2 <2.0 E.U./dL    Nitrite, Urine Negative Negative    Leukocyte Esterase, Urine Negative Negative    Microscopic Examination Not Indicated     Urine Type NotGiven     Urine Reflex to Culture Not Indicated        I estimate there is LOW risk for SUBARACHNOID HEMORRHAGE, MENINGITIS, INTRACRANIAL HEMORRHAGE, SUBDURAL HEMATOMA, OR STROKE, thus I consider the discharge disposition reasonable. Danica Butler and I have discussed the diagnosis and risks, and we agree with discharging home to follow-up with their primary doctor. We also discussed returning to the Emergency Department immediately if new or worsening symptoms occur. We have discussed the symptoms which are most concerning (e.g., changing or worsening pain, weakness, vomiting, fever) that necessitate immediate return. Final Impression    1. Lightheadedness        Discharge Vital Signs:  Blood pressure (!) 113/91, pulse 88, temperature 97.7 °F (36.5 °C), temperature source Oral, resp. rate 16, height 5' 5\" (1.651 m), weight 192 lb (87.1 kg), last menstrual period 06/30/2015, SpO2 98 %, not currently breastfeeding.   mdm    Patient was sent home with a prescription for below medication/s. I did [place] patient on appropriate use of these medication. Discharge Medication List as of 7/16/2022  6:26 PM        START taking these medications    Details   meclizine (ANTIVERT) 12.5 MG tablet Take 1 tablet by mouth 3 times daily as needed for Dizziness, Disp-15 tablet, R-0Normal                 FOLLOW UP  Silvia La MD  [address]  [address]        DISPOSITION  Patient was discharged to home in good condition. Comment: Please note this report has been produced using speech recognition software and may contain errors related to that system including errors in grammar, punctuation, and spelling, as well as words and phrases that may be inappropriate. If there are any questions or concerns please feel free to contact the dictating provider for clarification.             Colton Valdovinos, NATHAN - ELMO  07/17/22 2650

## 2022-07-17 ENCOUNTER — PATIENT MESSAGE (OUTPATIENT)
Dept: FAMILY MEDICINE CLINIC | Age: 52
End: 2022-07-17

## 2022-07-18 NOTE — TELEPHONE ENCOUNTER
From: Jolly Johansen  To: Dr. Ventura Rosales: 7/17/2022 11:13 PM EDT  Subject: Joann Curiel    Dear Dr. Marianela Evans-  This past Sat 7/16 I was taken from [facility] hospital room at [facility] to the Emergency Dept. Could you please take a look at the labs to see if they did some you already wanted for my Sept appt? Then maybe I wouldn't need them repeated? They were very challenged w/trying to insert an IV & drawing blood. My arms & veins are \"beaten up\" by OVER 10 sticks-i.e. vein would blow, roll, they just couldn't get a flash of blood or they'd finally get IV in & it'd quit working. 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 Because of all that, they could not do the brain scan with contrast. Please let me know in case you DO want me to try to get labs before I call to schedule my [address] [place], [place] [address]jamison Shaw.  I look forward to see Abebe Dubon

## 2022-07-28 DIAGNOSIS — R20.8 PAIN OF SKIN: ICD-10-CM

## 2022-07-28 DIAGNOSIS — L98.9 SKIN LESIONS: ICD-10-CM

## 2022-07-28 NOTE — TELEPHONE ENCOUNTER
Refill Request     CONFIRM preferrred pharmacy with the patient. If Mail Order Rx - Pend for 90 day refill.       Last Seen: Last Seen Department: 4/27/2022  Last Seen by PCP: 4/27/2022    Last Written: 07/08/2022 12 each 0 refills    Next Appointment:   Future Appointments   Date Time Provider Rula Emmanuel   9/22/2022  1:45 PM [facility]  [place] [facility] [place]       Future appointment scheduled      Requested Prescriptions     Pending Prescriptions Disp Refills    ketorolac (TORADOL) 30 MG/ML injection 12 each 0     Sig: INJECT ONE MILLILITER INTRAMUSCULARLY TWO TIMES A DAY AS NEEDED FOR PAIN

## 2022-07-29 RX ORDER — KETOROLAC TROMETHAMINE 30 MG/ML
INJECTION, SOLUTION INTRAMUSCULAR; INTRAVENOUS
Qty: 12 EACH | Refills: 0 | Status: SHIPPED | OUTPATIENT
Start: 2022-07-29 | End: 2022-09-01 | Stop reason: SDUPTHER

## 2022-08-04 NOTE — TELEPHONE ENCOUNTER
Patient calling back requesting to speak with you regarding a recent natue message. Asking for you to call her at [phone].

## 2022-08-04 NOTE — TELEPHONE ENCOUNTER
Patient called today stating that she is still feeling as she did back on 7/16. She stated that she is very concerned and is not sure if it is safe to even drive to go see her father who is back in the hospital. Patient would like to know if Dr. Bradley Duff feels as if she should be seen sooner than her appointment on 8/22 or if she thinks it is okay to wait till then. She would also like to know if she should stop driving until seen. Patient is asking for a response as soon as possible.

## 2022-08-04 NOTE — TELEPHONE ENCOUNTER
I called and spoke with patient to get her an appointment sooner with Dr. Reta Blank. I have her scheduled for tomorrow at 11:45 am per Dr. Reta Blank request.   Patient is tearful while talking, she has a lot of stress and her dad is back in the hospital. I told her I would recommend not driving to the \[facility]\"" tonight to visit because of the dizziness she voiced understanding.    Patient will come into the office tomorrow at 11:45 am.

## 2022-08-05 ENCOUNTER — HOSPITAL ENCOUNTER (OUTPATIENT)
Dept: MRI IMAGING | Age: 52
Discharge: HOME | End: 2022-08-05
Payer: COMMERCIAL

## 2022-08-05 ENCOUNTER — OFFICE VISIT (OUTPATIENT)
Dept: FAMILY MEDICINE CLINIC | Age: 52
End: 2022-08-05
Payer: COMMERCIAL

## 2022-08-05 VITALS
SYSTOLIC BLOOD PRESSURE: 120 MMHG | DIASTOLIC BLOOD PRESSURE: 100 MMHG | WEIGHT: 183 LBS | OXYGEN SATURATION: 98 % | BODY MASS INDEX: 30.45 KG/M2 | HEART RATE: 123 BPM

## 2022-08-05 DIAGNOSIS — I65.02 STENOSIS OF LEFT VERTEBRAL ARTERY: ICD-10-CM

## 2022-08-05 DIAGNOSIS — Z86.73 HISTORY OF STROKE: ICD-10-CM

## 2022-08-05 DIAGNOSIS — Z86.73 H/O RECURRENT TRANSIENT ISCHEMIC ATTACKS: ICD-10-CM

## 2022-08-05 DIAGNOSIS — R29.90 ABNORMAL NEUROLOGICAL EXAM: ICD-10-CM

## 2022-08-05 DIAGNOSIS — E89.0 POST-SURGICAL HYPOTHYROIDISM: ICD-10-CM

## 2022-08-05 DIAGNOSIS — R26.89 IMBALANCE: ICD-10-CM

## 2022-08-05 DIAGNOSIS — Z86.61 H/O VIRAL MENINGITIS: ICD-10-CM

## 2022-08-05 DIAGNOSIS — G08 THROMBOSIS OF SUPERIOR SAGITTAL SINUS: ICD-10-CM

## 2022-08-05 DIAGNOSIS — R29.90 ABNORMAL NEUROLOGICAL EXAM: Primary | ICD-10-CM

## 2022-08-05 DIAGNOSIS — E78.2 MIXED HYPERLIPIDEMIA: ICD-10-CM

## 2022-08-05 DIAGNOSIS — I65.02 VERTEBRAL ARTERY OCCLUSION, LEFT: ICD-10-CM

## 2022-08-05 DIAGNOSIS — R26.2 AMBULATORY DYSFUNCTION: ICD-10-CM

## 2022-08-05 DIAGNOSIS — R53.1 WEAKNESS: ICD-10-CM

## 2022-08-05 DIAGNOSIS — Z87.898 HISTORY OF SEIZURES: ICD-10-CM

## 2022-08-05 DIAGNOSIS — I10 ESSENTIAL HYPERTENSION: ICD-10-CM

## 2022-08-05 DIAGNOSIS — G44.321 INTRACTABLE CHRONIC POST-TRAUMATIC HEADACHE: Chronic | ICD-10-CM

## 2022-08-05 DIAGNOSIS — F41.9 ANXIETY: ICD-10-CM

## 2022-08-05 DIAGNOSIS — D68.59 HEREDITARY PROTEIN S DEFICIENCY (HCC): Chronic | ICD-10-CM

## 2022-08-05 PROCEDURE — 6360000004 HC RX CONTRAST MEDICATION: Performed by: FAMILY MEDICINE

## 2022-08-05 PROCEDURE — A9579 GAD-BASE MR CONTRAST NOS,1ML: HCPCS | Performed by: FAMILY MEDICINE

## 2022-08-05 PROCEDURE — 70546 MR ANGIOGRAPH HEAD W/O&W/DYE: CPT

## 2022-08-05 PROCEDURE — 99215 OFFICE O/P EST HI 40 MIN: CPT | Performed by: FAMILY MEDICINE

## 2022-08-05 PROCEDURE — 70549 MR ANGIOGRAPH NECK W/O&W/DYE: CPT

## 2022-08-05 RX ORDER — CYCLOBENZAPRINE HCL 10 MG
10 TABLET ORAL 3 TIMES DAILY PRN
COMMUNITY

## 2022-08-05 RX ORDER — CLONAZEPAM 1 MG/1
TABLET ORAL
Qty: 75 TABLET | Refills: 1 | Status: SHIPPED | OUTPATIENT
Start: 2022-08-05 | End: 2022-09-01

## 2022-08-05 RX ADMIN — GADOTERIDOL 17 ML: 279.3 INJECTION, SOLUTION INTRAVENOUS at 16:23

## 2022-08-05 ASSESSMENT — ANXIETY QUESTIONNAIRES
1. FEELING NERVOUS, ANXIOUS, OR ON EDGE: 3
6. BECOMING EASILY ANNOYED OR IRRITABLE: 0
3. WORRYING TOO MUCH ABOUT DIFFERENT THINGS: 3
5. BEING SO RESTLESS THAT IT IS HARD TO SIT STILL: 0
7. FEELING AFRAID AS IF SOMETHING AWFUL MIGHT HAPPEN: 3
GAD7 TOTAL SCORE: 15
4. TROUBLE RELAXING: 3
2. NOT BEING ABLE TO STOP OR CONTROL WORRYING: 3
IF YOU CHECKED OFF ANY PROBLEMS ON THIS QUESTIONNAIRE, HOW DIFFICULT HAVE THESE PROBLEMS MADE IT FOR YOU TO DO YOUR WORK, TAKE CARE OF THINGS AT HOME, OR GET ALONG WITH OTHER PEOPLE: SOMEWHAT DIFFICULT

## 2022-08-05 ASSESSMENT — ENCOUNTER SYMPTOMS
NAUSEA: 0
VOMITING: 0
CONSTIPATION: 0
TROUBLE SWALLOWING: 0
BLOOD IN STOOL: 0
DIARRHEA: 0
COLOR CHANGE: 0
COUGH: 0
BACK PAIN: 0
ABDOMINAL PAIN: 0
SHORTNESS OF BREATH: 0

## 2022-08-05 ASSESSMENT — PATIENT HEALTH QUESTIONNAIRE - PHQ9
3. TROUBLE FALLING OR STAYING ASLEEP: 3
SUM OF ALL RESPONSES TO PHQ QUESTIONS 1-9: 18
5. POOR APPETITE OR OVEREATING: 3
6. FEELING BAD ABOUT YOURSELF - OR THAT YOU ARE A FAILURE OR HAVE LET YOURSELF OR YOUR FAMILY DOWN: 1
9. THOUGHTS THAT YOU WOULD BE BETTER OFF DEAD, OR OF HURTING YOURSELF: 0
SUM OF ALL RESPONSES TO PHQ9 QUESTIONS 1 & 2: 6
SUM OF ALL RESPONSES TO PHQ QUESTIONS 1-9: 18
7. TROUBLE CONCENTRATING ON THINGS, SUCH AS READING THE NEWSPAPER OR WATCHING TELEVISION: 2
2. FEELING DOWN, DEPRESSED OR HOPELESS: 3
4. FEELING TIRED OR HAVING LITTLE ENERGY: 3
SUM OF ALL RESPONSES TO PHQ QUESTIONS 1-9: 18
8. MOVING OR SPEAKING SO SLOWLY THAT OTHER PEOPLE COULD HAVE NOTICED. OR THE OPPOSITE, BEING SO FIGETY OR RESTLESS THAT YOU HAVE BEEN MOVING AROUND A LOT MORE THAN USUAL: 0
SUM OF ALL RESPONSES TO PHQ QUESTIONS 1-9: 18
1. LITTLE INTEREST OR PLEASURE IN DOING THINGS: 3

## 2022-08-08 ENCOUNTER — TELEPHONE (OUTPATIENT)
Dept: FAMILY MEDICINE CLINIC | Age: 52
End: 2022-08-08

## 2022-08-08 NOTE — TELEPHONE ENCOUNTER
There is a result note in the chart and this was sent to [facility].  Thank you

## 2022-08-08 NOTE — TELEPHONE ENCOUNTER
.Telephone call regarding Test Results Discussion     If test was ordered by another provider, has the patient discussed results with ordering provider?  - No  Schedule an Office Visit for the patient, if patient has questions about tests from another provider.       Test(s) ordered - MRI - Site Neck/head  Ordering provider - Owen    Test(s) date and time - 8/5/22  Test results in Epic? -  Yes    Last Appointment at [facility] - 8/5/2022

## 2022-08-09 DIAGNOSIS — Z86.61 H/O VIRAL MENINGITIS: ICD-10-CM

## 2022-08-09 DIAGNOSIS — R53.1 WEAKNESS: ICD-10-CM

## 2022-08-09 DIAGNOSIS — I65.02 STENOSIS OF LEFT VERTEBRAL ARTERY: ICD-10-CM

## 2022-08-09 DIAGNOSIS — E66.09 CLASS 1 OBESITY DUE TO EXCESS CALORIES WITH SERIOUS COMORBIDITY AND BODY MASS INDEX (BMI) OF 32.0 TO 32.9 IN ADULT: ICD-10-CM

## 2022-08-09 DIAGNOSIS — F41.9 ANXIETY: ICD-10-CM

## 2022-08-09 DIAGNOSIS — D68.59 HEREDITARY PROTEIN S DEFICIENCY (HCC): Chronic | ICD-10-CM

## 2022-08-09 DIAGNOSIS — E55.9 VITAMIN D DEFICIENCY: ICD-10-CM

## 2022-08-09 DIAGNOSIS — I65.02 VERTEBRAL ARTERY OCCLUSION, LEFT: ICD-10-CM

## 2022-08-09 DIAGNOSIS — E89.0 POST-SURGICAL HYPOTHYROIDISM: ICD-10-CM

## 2022-08-09 DIAGNOSIS — Z00.00 ANNUAL PHYSICAL EXAM: ICD-10-CM

## 2022-08-09 DIAGNOSIS — G44.321 INTRACTABLE CHRONIC POST-TRAUMATIC HEADACHE: Chronic | ICD-10-CM

## 2022-08-09 DIAGNOSIS — R26.89 IMBALANCE: ICD-10-CM

## 2022-08-09 DIAGNOSIS — I10 ESSENTIAL HYPERTENSION: ICD-10-CM

## 2022-08-09 DIAGNOSIS — Z87.898 HISTORY OF SEIZURES: ICD-10-CM

## 2022-08-09 DIAGNOSIS — R26.2 AMBULATORY DYSFUNCTION: ICD-10-CM

## 2022-08-09 DIAGNOSIS — Z86.73 H/O RECURRENT TRANSIENT ISCHEMIC ATTACKS: ICD-10-CM

## 2022-08-09 DIAGNOSIS — Z86.73 HISTORY OF STROKE: ICD-10-CM

## 2022-08-09 DIAGNOSIS — E78.2 MIXED HYPERLIPIDEMIA: ICD-10-CM

## 2022-08-09 DIAGNOSIS — R29.90 ABNORMAL NEUROLOGICAL EXAM: ICD-10-CM

## 2022-08-09 DIAGNOSIS — G08 THROMBOSIS OF SUPERIOR SAGITTAL SINUS: ICD-10-CM

## 2022-08-09 LAB
A/G RATIO: 1.8 (ref 1.1–2.2)
ALBUMIN SERPL-MCNC: 4.4 G/DL (ref 3.4–5)
ALP BLD-CCNC: 145 U/L (ref 40–129)
ALT SERPL-CCNC: 44 U/L (ref 10–40)
ANION GAP SERPL CALCULATED.3IONS-SCNC: 14 MMOL/L (ref 3–16)
AST SERPL-CCNC: 52 U/L (ref 15–37)
BASOPHILS ABSOLUTE: 0 K/UL (ref 0–0.2)
BASOPHILS RELATIVE PERCENT: 0.6 %
BILIRUB SERPL-MCNC: 0.4 MG/DL (ref 0–1)
BUN BLDV-MCNC: 23 MG/DL (ref 7–20)
CALCIUM SERPL-MCNC: 9.2 MG/DL (ref 8.3–10.6)
CHLORIDE BLD-SCNC: 105 MMOL/L (ref 99–110)
CHOLESTEROL, TOTAL: 207 MG/DL (ref 0–199)
CO2: 23 MMOL/L (ref 21–32)
CREAT SERPL-MCNC: 0.6 MG/DL (ref 0.6–1.1)
EOSINOPHILS ABSOLUTE: 0.2 K/UL (ref 0–0.6)
EOSINOPHILS RELATIVE PERCENT: 3.2 %
FOLATE: 3.44 NG/ML (ref 4.78–24.2)
GFR AFRICAN AMERICAN: >60
GFR NON-AFRICAN AMERICAN: >60
GLUCOSE BLD-MCNC: 87 MG/DL (ref 70–99)
HCT VFR BLD CALC: 39.5 % (ref 36–48)
HDLC SERPL-MCNC: 92 MG/DL (ref 40–60)
HEMOGLOBIN: 13.3 G/DL (ref 12–16)
LDL CHOLESTEROL CALCULATED: 91 MG/DL
LYMPHOCYTES ABSOLUTE: 1.7 K/UL (ref 1–5.1)
LYMPHOCYTES RELATIVE PERCENT: 34.3 %
MAGNESIUM: 1.9 MG/DL (ref 1.8–2.4)
MCH RBC QN AUTO: 30.4 PG (ref 26–34)
MCHC RBC AUTO-ENTMCNC: 33.5 G/DL (ref 31–36)
MCV RBC AUTO: 90.6 FL (ref 80–100)
MONOCYTES ABSOLUTE: 0.3 K/UL (ref 0–1.3)
MONOCYTES RELATIVE PERCENT: 7.1 %
NEUTROPHILS ABSOLUTE: 2.7 K/UL (ref 1.7–7.7)
NEUTROPHILS RELATIVE PERCENT: 54.8 %
PDW BLD-RTO: 13.4 % (ref 12.4–15.4)
PLATELET # BLD: 230 K/UL (ref 135–450)
PMV BLD AUTO: 8.9 FL (ref 5–10.5)
POTASSIUM SERPL-SCNC: 4.7 MMOL/L (ref 3.5–5.1)
RBC # BLD: 4.36 M/UL (ref 4–5.2)
SODIUM BLD-SCNC: 142 MMOL/L (ref 136–145)
TOTAL PROTEIN: 6.8 G/DL (ref 6.4–8.2)
TRIGL SERPL-MCNC: 118 MG/DL (ref 0–150)
TSH REFLEX: 1.03 UIU/ML (ref 0.27–4.2)
VITAMIN B-12: 521 PG/ML (ref 211–911)
VITAMIN D 25-HYDROXY: 17.1 NG/ML
VLDLC SERPL CALC-MCNC: 24 MG/DL
WBC # BLD: 4.9 K/UL (ref 4–11)

## 2022-08-10 LAB
ESTIMATED AVERAGE GLUCOSE: 96.8 MG/DL
HBA1C MFR BLD: 5 %

## 2022-08-10 RX ORDER — ERGOCALCIFEROL 1.25 MG/1
50000 CAPSULE ORAL WEEKLY
Qty: 12 CAPSULE | Refills: 1 | Status: SHIPPED | OUTPATIENT
Start: 2022-08-10 | End: 2022-10-27

## 2022-08-10 RX ORDER — FOLIC ACID 1 MG/1
1 TABLET ORAL DAILY
Qty: 90 TABLET | Refills: 1 | Status: SHIPPED | OUTPATIENT
Start: 2022-08-10

## 2022-08-12 ENCOUNTER — HOSPITAL ENCOUNTER (EMERGENCY)
Age: 52
Discharge: HOME | End: 2022-08-12
Payer: COMMERCIAL

## 2022-08-12 ENCOUNTER — APPOINTMENT (OUTPATIENT)
Dept: GENERAL RADIOLOGY | Age: 52
End: 2022-08-12
Payer: COMMERCIAL

## 2022-08-12 ENCOUNTER — APPOINTMENT (OUTPATIENT)
Dept: CT IMAGING | Age: 52
End: 2022-08-12
Payer: COMMERCIAL

## 2022-08-12 ENCOUNTER — TELEPHONE (OUTPATIENT)
Dept: FAMILY MEDICINE CLINIC | Age: 52
End: 2022-08-12

## 2022-08-12 VITALS
SYSTOLIC BLOOD PRESSURE: 121 MMHG | HEIGHT: 65 IN | DIASTOLIC BLOOD PRESSURE: 96 MMHG | RESPIRATION RATE: 18 BRPM | BODY MASS INDEX: 30.49 KG/M2 | OXYGEN SATURATION: 96 % | HEART RATE: 94 BPM | WEIGHT: 183 LBS | TEMPERATURE: 99.3 F

## 2022-08-12 DIAGNOSIS — R11.0 NAUSEA: ICD-10-CM

## 2022-08-12 DIAGNOSIS — R05.9 COUGH: ICD-10-CM

## 2022-08-12 DIAGNOSIS — U07.1 ACUTE COVID-19: Primary | ICD-10-CM

## 2022-08-12 LAB
A/G RATIO: 1.7 (ref 1.1–2.2)
ALBUMIN SERPL-MCNC: 4.7 G/DL (ref 3.4–5)
ALP BLD-CCNC: 171 U/L (ref 40–129)
ALT SERPL-CCNC: 93 U/L (ref 10–40)
ANION GAP SERPL CALCULATED.3IONS-SCNC: 14 MMOL/L (ref 3–16)
AST SERPL-CCNC: 153 U/L (ref 15–37)
BASOPHILS ABSOLUTE: 0 K/UL (ref 0–0.2)
BASOPHILS RELATIVE PERCENT: 0.4 %
BILIRUB SERPL-MCNC: <0.2 MG/DL (ref 0–1)
BUN BLDV-MCNC: 17 MG/DL (ref 7–20)
CALCIUM SERPL-MCNC: 8.5 MG/DL (ref 8.3–10.6)
CHLORIDE BLD-SCNC: 100 MMOL/L (ref 99–110)
CO2: 21 MMOL/L (ref 21–32)
CREAT SERPL-MCNC: 0.7 MG/DL (ref 0.6–1.1)
EOSINOPHILS ABSOLUTE: 0 K/UL (ref 0–0.6)
EOSINOPHILS RELATIVE PERCENT: 0 %
GFR AFRICAN AMERICAN: >60
GFR NON-AFRICAN AMERICAN: >60
GLUCOSE BLD-MCNC: 139 MG/DL (ref 70–99)
HCT VFR BLD CALC: 39.9 % (ref 36–48)
HEMOGLOBIN: 13.5 G/DL (ref 12–16)
LACTIC ACID, SEPSIS: 1.1 MMOL/L (ref 0.4–1.9)
LYMPHOCYTES ABSOLUTE: 0.3 K/UL (ref 1–5.1)
LYMPHOCYTES RELATIVE PERCENT: 3.4 %
MCH RBC QN AUTO: 30.1 PG (ref 26–34)
MCHC RBC AUTO-ENTMCNC: 33.9 G/DL (ref 31–36)
MCV RBC AUTO: 88.9 FL (ref 80–100)
MONOCYTES ABSOLUTE: 0.5 K/UL (ref 0–1.3)
MONOCYTES RELATIVE PERCENT: 7 %
NEUTROPHILS ABSOLUTE: 7 K/UL (ref 1.7–7.7)
NEUTROPHILS RELATIVE PERCENT: 89.2 %
PDW BLD-RTO: 13.5 % (ref 12.4–15.4)
PLATELET # BLD: 189 K/UL (ref 135–450)
PMV BLD AUTO: 8.7 FL (ref 5–10.5)
POTASSIUM SERPL-SCNC: 4.2 MMOL/L (ref 3.5–5.1)
PRO-BNP: 1605 PG/ML (ref 0–124)
RBC # BLD: 4.49 M/UL (ref 4–5.2)
SARS-COV-2, NAAT: DETECTED
SODIUM BLD-SCNC: 135 MMOL/L (ref 136–145)
TOTAL PROTEIN: 7.5 G/DL (ref 6.4–8.2)
TROPONIN: <0.01 NG/ML
WBC # BLD: 7.8 K/UL (ref 4–11)

## 2022-08-12 PROCEDURE — 71250 CT THORAX DX C-: CPT

## 2022-08-12 PROCEDURE — 80053 COMPREHEN METABOLIC PANEL: CPT

## 2022-08-12 PROCEDURE — 99284 EMERGENCY DEPT VISIT MOD MDM: CPT

## 2022-08-12 PROCEDURE — 2580000003 HC RX 258: Performed by: NURSE PRACTITIONER

## 2022-08-12 PROCEDURE — 87635 SARS-COV-2 COVID-19 AMP PRB: CPT

## 2022-08-12 PROCEDURE — 83880 ASSAY OF NATRIURETIC PEPTIDE: CPT

## 2022-08-12 PROCEDURE — 83605 ASSAY OF LACTIC ACID: CPT

## 2022-08-12 PROCEDURE — 71045 X-RAY EXAM CHEST 1 VIEW: CPT

## 2022-08-12 PROCEDURE — 84484 ASSAY OF TROPONIN QUANT: CPT

## 2022-08-12 PROCEDURE — 6370000000 HC RX 637 (ALT 250 FOR IP): Performed by: NURSE PRACTITIONER

## 2022-08-12 PROCEDURE — 85025 COMPLETE CBC W/AUTO DIFF WBC: CPT

## 2022-08-12 RX ORDER — ONDANSETRON 4 MG/1
4 TABLET, ORALLY DISINTEGRATING ORAL EVERY 8 HOURS PRN
Qty: 20 TABLET | Refills: 0 | Status: SHIPPED | OUTPATIENT
Start: 2022-08-12

## 2022-08-12 RX ORDER — ACETAMINOPHEN 500 MG
1000 TABLET ORAL ONCE
Status: COMPLETED | OUTPATIENT
Start: 2022-08-12 | End: 2022-08-12

## 2022-08-12 RX ORDER — 0.9 % SODIUM CHLORIDE 0.9 %
1000 INTRAVENOUS SOLUTION INTRAVENOUS ONCE
Status: COMPLETED | OUTPATIENT
Start: 2022-08-12 | End: 2022-08-12

## 2022-08-12 RX ADMIN — ACETAMINOPHEN 1000 MG: 500 TABLET ORAL at 18:03

## 2022-08-12 RX ADMIN — SODIUM CHLORIDE 1000 ML: 9 INJECTION, SOLUTION INTRAVENOUS at 18:31

## 2022-08-12 ASSESSMENT — PAIN SCALES - GENERAL
PAINLEVEL_OUTOF10: 7
PAINLEVEL_OUTOF10: 0

## 2022-08-12 ASSESSMENT — PAIN DESCRIPTION - DESCRIPTORS: DESCRIPTORS: ACHING

## 2022-08-12 ASSESSMENT — PAIN - FUNCTIONAL ASSESSMENT: PAIN_FUNCTIONAL_ASSESSMENT: NONE - DENIES PAIN

## 2022-08-12 ASSESSMENT — PAIN DESCRIPTION - LOCATION: LOCATION: HEAD

## 2022-08-12 NOTE — TELEPHONE ENCOUNTER
[facility] are sending [facility] to the ED. Pt called and said that wasn't the answers she got earlier. I advised the pt to call the squad and not to drive as zahraa noted. Since [facility] referred her to the ER. she didn't feel safe having the pt drive herself. Pt verbalized understanding she was going to call the squad and get to the hospital instead of driving.

## 2022-08-12 NOTE — TELEPHONE ENCOUNTER
Message/Telephone call regarding - New or worsening symptoms      Symptoms reported - Pulmonary / Respiratory / Ears, Nose, Throat- Concern for COVID - COVID Symptom start date - 08/10/2022, COVID Sxs - Fever greater than 100.0 F - What was your last temp?  102.6, Cough, Muscle aches, Headache, Sore throat, Runny nose / nasal congestion, and Sneezing, and EFM COVID Exposure - Yes = 1, fever / chills, chest congestion / wheezing , cough - no sputum, sore throat, nasal congestion / runny nose, headache, and body aches / myalgias - ALERT - INDICATE RED VISIT IN APPT NOTES  Pain Scale - N/A     Symptom Onset Date - 08/10/2022  Onset - with a sudden onset    Aggravating factors - everything   Relieving actions and treatment(s) attempted - None    Last Appointment at [facility] - 8/5/2022  Next Appointment - [email or web address]      Is there any other information to share with PCP -  no    REFRESH after scheduling appointment.     Future Appointments   Date Time Provider Rula Emmanuel   8/26/2022 10:00 AM Brett [address] [facility] DANIEL [facility]   9/2/2022 10:30 AM MD TALYA Simeon  [place] - [place]   9/22/2022  1:45 PM MD TALYA Simeon  [place]ia - [place]

## 2022-08-12 NOTE — TELEPHONE ENCOUNTER
Yes, agree she would be a candidate for monoclonal abs. If dispatch [facility] can obtain testing and it's positive, will order antibody infusion with the infusion center. Please prep paperwork if positive. Thank you     Angle Johnson: thank you!

## 2022-08-12 NOTE — TELEPHONE ENCOUNTER
Pt called in and said asked If she could have [facility] come out and assess her. Pt had declined [facility] earlier when they called her. And now is requesting them to come out as she feels  worst. [facility]. [facility] to get scheduled this evening.

## 2022-08-12 NOTE — TELEPHONE ENCOUNTER
Would schedule with VV to assess symptom severity and needs at this time.  Would recommend paxlovid if she is a candidate based on their eval. Thank you

## 2022-08-12 NOTE — ED PROVIDER NOTES
Evaluated by Advanced Practice Provider    EMERGENCY DEPARTMENT ENCOUNTER      CHIEFCOMPLAINT  Concern For COVID-19 (Beginning Monday, c/o sore throat and headache. States father who patient lives with tested positive for covid on Wednesday )    PAU Garcia is a 47 y.o. female who presents to the emergency department with complaints of shortness of breath. Patient reports that she lives with her father, he is ill and she cares for him. Patient also is not healthy, she has a history of CVA, cancer, blood clots in multiple places due to protein S deficiency. She is on Xarelto. She reports that on Tuesday after her father had an appointment with his oncologist he was not acting right, she found that he had a fever. He was brought in here Wednesday and admitted for COVID as well as pneumonia, sepsis and CHF. On top of feeling very anxious about her dad her symptoms developed Wednesday after she went home when he was admitted. She also reports that she has been fatigued in addition to sore throat, fever up to 102.6 at home. Per her PCP she is not a candidate for Paxil elevated. PCP advised her to come to the hospital for evaluation also advised her on discussing whether or not she would be a candidate for the monoclonal antibodies. Patient does report sore throat, headache, body aches. She denies chest pain or shortness of breath. She does report some nausea but no vomiting diarrhea or abdominal pain. Treatments tried prior to arrival in the ED include: Tylenol over-the-counter approximately 5 to 6 hours ago. The patient arrived to the ED via private car.     PAST MEDICAL HISTORY    Past Medical History:   Diagnosis Date    Benign essential tremor     BRCA1 negative     Colon polyp 05/06/2019    Degenerative disc disease     DVT, lower extremity (HCC) 1989    Fibromyalgia     Kidney stones     Left-sided trigeminal neuralgia     Malignant neoplasm of thyroid gland ([facility] [address] 8/22/2013 Migraines, neuralgic     since stroke 1999 Chronic    PONV (postoperative nausea and vomiting)     Poor venous access     Protein S deficiency (HCC)     Pulmonary embolism ([facility] [address] 1989    Seizure disorder ([facility] [address]     grand mal in 2005 ( childhood febrile seizure also-from a baby to age 14) and also X 1 ~2005 with headache treatment    Stroke ([facility] [address] 2/25/2019    Thrombosis of superior sagittal sinus 1999    Unspecified cerebral artery occlusion with cerebral infarction 1999    Vertebral artery occlusion 1999    White coat syndrome with high blood pressure but without hypertension        SURGICAL HISTORY    Past Surgical History:   Procedure Laterality Date    BREAST BIOPSY      BREAST LUMPECTOMY      x 3    CYSTOSCOPY  01/09/2012    w/ left ureteroscopy, holmium laser. stone manipulation and left stent insertion    FINGER TRIGGER RELEASE Left 10/31/2019    LEFT THUMB TRIGGER DIGIT RELEASE performed by Kelsey Wray MD at [address] [place] [address] 83 Left 2000    pins later removed    FOOT SURGERY Left 07/10/2017    LARYNX SURGERY      vocal cord nodule    LITHOTRIPSY  12/11    REFRACTIVE SURGERY      THYROIDECTOMY  05/18/2011            CURRENT MEDICATIONS    Current Outpatient Rx   Medication Sig Dispense Refill    ondansetron (ZOFRAN ODT) 4 MG disintegrating tablet Take 1 tablet by mouth every 8 hours as needed for Nausea 20 tablet 0    folic acid (FOLVITE) 1 MG tablet Take 1 tablet by mouth in the morning.  90 tablet 1    vitamin D (ERGOCALCIFEROL) 1.25 MG (33484 UT) CAPS capsule Take 1 capsule by mouth once a week for 12 doses 12 capsule 1    cyclobenzaprine (FLEXERIL) 10 MG tablet Take 10 mg by mouth 3 times daily as needed for Muscle spasms      clonazePAM (KLONOPIN) 1 MG tablet Take 1 tablet my mouth in the morning, take 1.5 tablets by mouth nightly 75 tablet 1    ketorolac (TORADOL) 30 MG/ML injection INJECT ONE MILLILITER INTRAMUSCULARLY TWO TIMES A DAY AS NEEDED FOR PAIN 12 each 0 SYRINGE-NEEDLE, DISP, 3 ML (B-D 3CC LUER-ALEX SYR 25GX1\") 25G X 1\" 3 ML MISC Use to inject into the muscle two times a day as needed for pain 100 each 2    carBAMazepine (TEGRETOL XR) 400 MG extended release tablet Take 1 tablet by mouth every 8 hours      losartan (COZAAR) 100 MG tablet TAKE ONE TABLET BY MOUTH DAILY 90 tablet 1    XARELTO 20 MG TABS tablet TAKE ONE TABLET BY MOUTH DAILY 90 tablet 1    levothyroxine (SYNTHROID) 200 MCG tablet TAKE ONE TABLET BY MOUTH DAILY 90 tablet 1    atorvastatin (LIPITOR) 40 MG tablet TAKE ONE TABLET BY MOUTH DAILY 90 tablet 1    hydroCHLOROthiazide (HYDRODIURIL) 25 MG tablet Take 0.5 tablets by mouth 2 times daily 90 tablet 1    amLODIPine (NORVASC) 10 MG tablet Take 1 tablet by mouth daily 90 tablet 1    clobetasol (TEMOVATE) 0.05 % cream Apply topically 2 times daily. 180 g 5    mupirocin (BACTROBAN) 2 % ointment Apply 3 times daily 90 g 5    Melatonin 10 MG TABS Take by mouth      diphenhydrAMINE (SOMINEX) 25 MG tablet Take 50 mg by mouth nightly as needed      docusate (COLACE, DULCOLAX) 100 MG CAPS Take 100 mg by mouth 2 times daily as needed       morphine (MS CONTIN) 15 MG extended release tablet 30 mg 2 times daily. fentaNYL (ACTIQ) 800 MCG lollipop Take 1 each by mouth 3 times daily as needed.       naloxone 4 MG/0.1ML LIQD nasal spray 1 spray by Nasal route as needed for Opioid Reversal      Cyanocobalamin (VITAMIN B-12) 5000 MCG SUBL Take 1 each by mouth every other day      Cholecalciferol (VITAMIN D3) 2000 units CAPS Take 1 capsule by mouth daily      Doxylamine Succinate, Sleep, (UNISOM PO) Take by mouth      primidone (MYSOLINE) 250 MG tablet Take 250 mg by mouth 3 times daily          ALLERGIES    Allergies   Allergen Reactions    Dilaudid [Hydromorphone Hcl] Shortness Of Breath, Itching and Other (See Comments)     Wheezing    Buprenorphine Itching, Nausea And Vomiting and Other (See Comments)     Tingling in lips    Duloxetine Hcl Other (See Comments) Personality changes and depression    Elavil [Amitriptyline]      Seizure with high dose     Gabapentin     Gluten Meal      Celiac disease    Hydromorphone Itching and Other (See Comments)    Morphine Itching     Is able to take MS Contin     Pregabalin      Lyrica (personality changes)     Propranolol Hives    Topiramate Other (See Comments)    Zoloft [Sertraline]        FAMILY HISTORY    Family History   Problem Relation Age of Onset    Cancer Mother         breast x2, ovarian x1, brain     High Cholesterol Mother     Breast Cancer Mother     Ovarian Cancer Mother     High Cholesterol Father     Other Father     Migraines Sister     Other Sister     Thyroid Disease Maternal Grandmother        SOCIAL HISTORY    Social History     Socioeconomic History    Marital status: Single     Spouse name: None    Number of children: None    Years of education: None    Highest education level: None   Tobacco Use    Smoking status: Never    Smokeless tobacco: Never   Substance and Sexual Activity    Alcohol use: No    Drug use: No       REVIEW OF SYSTEMS    10 systems reviewed, pertinent positives per HPI otherwise noted to be negative    PHYSICAL EXAM  Physical Exam  Vitals:    08/12/22 2002   BP:    Pulse:    Resp:    Temp: 99.3 °F (37.4 °C)   SpO2:      GENERAL: Patient is well-developed, chronically ill-appearing. Awake and alert. Cooperative. Resting in bed. Moderately distressed due to her current symptoms, not toxic in appearance. HEENT:  Normocephalic, atraumatic. Conjunctiva appear normal. Sclera is non-icteric. External ears are normal.    NECK: Supple with normal ROM. Trachea midline  LUNGS: Equal and symmetric chest rise. Breathing is unlabored. Speaking comfortably in full sentences. Lungs are clear bilaterally to auscultation. Without wheezing, rales, or rhonchi. CADIOVASCULAR:  Regular rate and rhythm. Normal S1-S2 sounds. No murmurs, rubs, or gallops. Capillary refill is brisk in all 4extremities. Bilateral lower extremities are equal in size, there is no swelling observed. There is no tenderness to palpation. There is no erythema observed or warmth palpated. GI: Soft, nontender, nondistended with positive bowelsounds. No rebound tenderness, guarding or any peritoneal signs. No masses or hepatosplenomegaly   MUSCULOSKELETAL:  No gross deformities or trauma noted. Moving allextremities equally and appropriately. Normal ROM. SKIN: Warm/dry. Skin is intact. Norashes/lesions noted. PSYCHIATRIC: Mood and affect appropriate. Speech is clear andarticulate. NEUROLOGIC: Alert and oriented. No focal motor or sensory deficits. LABS  I havereviewed all labs for this visit.    Results for orders placed or performed during the hospital encounter of 08/12/22   COVID-19, Rapid    Specimen: Nasopharyngeal Swab   Result Value Ref Range    SARS-CoV-2, NAAT DETECTED (AA) Not Detected   CBC with Auto Differential   Result Value Ref Range    WBC 7.8 4.0 - 11.0 K/uL    RBC 4.49 4.00 - 5.20 M/uL    Hemoglobin 13.5 12.0 - 16.0 g/dL    Hematocrit 39.9 36.0 - 48.0 %    MCV 88.9 80.0 - 100.0 fL    MCH 30.1 26.0 - 34.0 pg    MCHC 33.9 31.0 - 36.0 g/dL    RDW 13.5 12.4 - 15.4 %    Platelets 197 840 - 602 K/uL    MPV 8.7 5.0 - 10.5 fL    Neutrophils % 89.2 %    Lymphocytes % 3.4 %    Monocytes % 7.0 %    Eosinophils % 0.0 %    Basophils % 0.4 %    Neutrophils Absolute 7.0 1.7 - 7.7 K/uL    Lymphocytes Absolute 0.3 (L) 1.0 - 5.1 K/uL    Monocytes Absolute 0.5 0.0 - 1.3 K/uL    Eosinophils Absolute 0.0 0.0 - 0.6 K/uL    Basophils Absolute 0.0 0.0 - 0.2 K/uL   CMP   Result Value Ref Range    Sodium 135 (L) 136 - 145 mmol/L    Potassium 4.2 3.5 - 5.1 mmol/L    Chloride 100 99 - 110 mmol/L    CO2 21 21 - 32 mmol/L    Anion Gap 14 3 - 16    Glucose 139 (H) 70 - 99 mg/dL    BUN 17 7 - 20 mg/dL    Creatinine 0.7 0.6 - 1.1 mg/dL    GFR Non-African American >60 >60    GFR African American >60 >60    Calcium 8.5 8.3 - 10.6 mg/dL    Total Protein 7.5 6.4 - 8.2 g/dL    Albumin 4.7 3.4 - 5.0 g/dL    Albumin/Globulin Ratio 1.7 1.1 - 2.2    Total Bilirubin <0.2 0.0 - 1.0 mg/dL    Alkaline Phosphatase 171 (H) 40 - 129 U/L    ALT 93 (H) 10 - 40 U/L     (H) 15 - 37 U/L   Troponin   Result Value Ref Range    Troponin <0.01 <0.01 ng/mL   Brain Natriuretic Peptide   Result Value Ref Range    Pro-BNP 1,605 (H) 0 - 124 pg/mL   Lactate, Sepsis   Result Value Ref Range    Lactic Acid, Sepsis 1.1 0.4 - 1.9 mmol/L       RADIOLOGY    CT CHEST WO CONTRAST    Result Date: 8/12/2022  EXAMINATION: CT OF THE CHEST WITHOUT CONTRAST 8/12/2022 4:34 pm TECHNIQUE: CT of the chest was performed without the administration of intravenous contrast. Multiplanar reformatted images are provided for review. Automated exposure control, iterative reconstruction, and/or weight based adjustment of the mA/kV was utilized to reduce the radiation dose to as low as reasonably achievable. COMPARISON: CT PA, 01/09/2020 HISTORY: ORDERING SYSTEM PROVIDED HISTORY: COVID, cough TECHNOLOGIST PROVIDED HISTORY: Reason for exam:->COVID, cough Decision Support Exception - unselect if not a suspected or confirmed emergency medical condition->Emergency Medical Condition (MA) Is the patient pregnant?->No FINDINGS: Mediastinum: Thyroid is either atrophic or has been resected. No lymphadenopathy. Esophagus is unremarkable. Noncontrast imaging the cardiac chambers, thoracic aorta, and pulmonary arteries is unremarkable. A very small pericardial effusion is noted. Lungs/pleura: With exception of minimal atelectasis in the left lower lung, the lungs are clear. No pneumothorax is found. No free air. Airways are patent. Upper Abdomen: Limited imaging of the upper abdomen is unremarkable. Soft Tissues/Bones: Visualized extra thoracic soft tissues are unremarkable. No acute or suspicious bony abnormalities are identified. No acute abnormality detected.      XR CHEST PORTABLE    Result Date: 8/12/2022  EXAMINATION: ONE XRAY VIEW OF THE CHEST 8/12/2022 6:06 pm COMPARISON: 07/16/2022 HISTORY: ORDERING SYSTEM PROVIDED HISTORY: [facility] TECHNOLOGIST PROVIDED HISTORY: Reason for exam:->[place] COVID Reason for Exam:  [place] COVID FINDINGS: The lungs are without acute focal process. There is no effusion or pneumothorax. The cardiomediastinal silhouette is stable. The osseous structures are stable. No acute process. ED COURSE/MDM  Patient seen and evaluated. Old records reviewed. Diagnostic testing reviewed and results discussed. I have evaluated this patient. My supervising physician was available for consultation. Rose Norris presented to the ED with the above noted complaints. Arrival vital signs: Febrile with a temperature of 103, heart rate 94, respirations normal at 18. BP elevated 121/96. Well saturated on room air. Physical exam performed at 60 [phone]: No adventitious breath sounds on exam.  No reproducible abdominal tenderness to palpation. Blood work: No leukocytosis, absolute lymphocytes low at 0.3. No further differential shift. No anemia. No significant electrolyte abnormality. No evidence of acute kidney injury. There is a transaminitis as alk phos, ALT, AST are elevated at 171/93/153. Patient has had prior elevations in her LFTs, this is more elevated than most recent earlier this week. Troponin is negative. BNP elevated at 1605. Lactic acid level normal at 1.1. COVID swab obtained and detected. Imaging: Chest x-ray is without acute findings. CT chest obtained and without acute findings. Medications given in the ED:   Medications   0.9 % sodium chloride bolus (0 mLs IntraVENous Stopped 8/12/22 2033)   acetaminophen (TYLENOL) tablet 1,000 mg (1,000 mg Oral Given 8/12/22 1803)      Patient's temperature and HR normalized. She was able to walk in the ER without hypoxia on RA. Diagnostic studies are reassuring int he setting of COVID.  Patient not a candidate for paxlovid due to xarelto. She can get the monoclonal antibody infusion through infusion center on Monday. Orders faxed and patient advised to call them on Monday if she has not heard from them by 10 am Monday. We discussed symptomatic treatment for comfort. She has pulse ox available at home. At this point I do not feel the patient requires further work up and it is reasonable to discharge the patient. Please refer to AVS for further details regarding dischargeinstructions. A discussion was had with the patient regarding diagnosis, diagnostic testing results,treatment/ plan of care, and follow up. All questions were answered. Patient will follow up as directed for further evaluation/treatment. The patient was given strict return precautions as we discussed symptoms that wouldnecessitate return to the ED. Patient will return to ED for new/worsening symptoms. The patient verbalized their understanding and agreement with the above plan. I estimate there is LOW risk for PULMONARY EMBOLISM, ACUTE CORONARY SYNDROME, OR THORACIC AORTIC DISSECTION, thus I consider the discharge disposition reasonable. Rosaline Srivastava and I have discussed the diagnosisand risks, and we agree with discharging home to follow-up with their primary doctor. We also discussed returning to the Emergency Department immediately if new or worsening symptoms occur. We have discussed the symptomswhich are most concerning (e.g., bloody sputum, fever, worsening pain or shortness of breath, vomiting) that necessitate immediate return. was sent home with a prescription for below medication/s. I did [place] patient on appropriate use of these medication.   Discharge Medication List as of 8/12/2022  9:29 PM        START taking these medications    Details   ondansetron (ZOFRAN ODT) 4 MG disintegrating tablet Take 1 tablet by mouth every 8 hours as needed for Nausea, Disp-20 tablet, R-0Normal           CLINICAL IMPRESSION    1.

## 2022-08-12 NOTE — TELEPHONE ENCOUNTER
It was hard to communicate due to video and audio difficulties on patient side. I decided because of recent events with her dads scenario to send out [facility]. She is feeling \"wobbly\" the last day (don't feel safe with her driving) and stated it was somewhat better today but need her tested for COVID. She had not tested and assumes (pretty sure) that she is positive. 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 In review of her medication she has so many medications that would interact with Paxlovid she would not be candidate for that safely In my opinion but could be for Monoclonal.  Dispatch going out can do that necessary testing in order for us to get that Monoclonal done within time limit. Your thoughts?  Thanks, Chetan Harper

## 2022-08-13 NOTE — ED NOTES
Pt ok to d/c to home. Pt given d/c instructions. Pt verbalized understating including Rx and follow up care. Pt ambulated to [facility] for ride home.  0 s/s of distress at time of d/c.          Rae Gonzalez RN  08/12/22 0897

## 2022-08-13 NOTE — ED NOTES
Pt ambulating in and out of room w/o difficulty. Pt talking in complete sentences showing no s/s of distress or SOB at this time.        Omayra Freeman RN  08/12/22 2045

## 2022-08-15 ENCOUNTER — HOSPITAL ENCOUNTER (OUTPATIENT)
Dept: NURSING | Age: 52
Setting detail: INFUSION SERIES
Discharge: HOME | End: 2022-08-15
Payer: COMMERCIAL

## 2022-08-15 VITALS
SYSTOLIC BLOOD PRESSURE: 137 MMHG | OXYGEN SATURATION: 98 % | RESPIRATION RATE: 16 BRPM | TEMPERATURE: 98.4 F | DIASTOLIC BLOOD PRESSURE: 82 MMHG | HEART RATE: 97 BPM

## 2022-08-15 PROCEDURE — 99211 OFF/OP EST MAY X REQ PHY/QHP: CPT

## 2022-08-15 PROCEDURE — M0222 HC BEBTELOVIMAB INJECTION: HCPCS

## 2022-08-15 PROCEDURE — 94760 N-INVAS EAR/PLS OXIMETRY 1: CPT

## 2022-08-15 PROCEDURE — 6360000002 HC RX W HCPCS: Performed by: NURSE PRACTITIONER

## 2022-08-15 PROCEDURE — 2580000003 HC RX 258: Performed by: NURSE PRACTITIONER

## 2022-08-15 RX ORDER — FAMOTIDINE 10 MG/ML
20 INJECTION, SOLUTION INTRAVENOUS
Status: ACTIVE | OUTPATIENT
Start: 2022-08-15 | End: 2022-08-15

## 2022-08-15 RX ORDER — SODIUM CHLORIDE 9 MG/ML
20 INJECTION, SOLUTION INTRAVENOUS CONTINUOUS PRN
Status: DISCONTINUED | OUTPATIENT
Start: 2022-08-15 | End: 2022-08-16 | Stop reason: HOSPADM

## 2022-08-15 RX ORDER — DIPHENHYDRAMINE HYDROCHLORIDE 50 MG/ML
50 INJECTION INTRAMUSCULAR; INTRAVENOUS
Status: ACTIVE | OUTPATIENT
Start: 2022-08-15 | End: 2022-08-15

## 2022-08-15 RX ORDER — SODIUM CHLORIDE 9 MG/ML
100 INJECTION, SOLUTION INTRAVENOUS CONTINUOUS PRN
Status: DISCONTINUED | OUTPATIENT
Start: 2022-08-15 | End: 2022-08-16 | Stop reason: HOSPADM

## 2022-08-15 RX ORDER — EPINEPHRINE 1 MG/ML
0.3 INJECTION, SOLUTION, CONCENTRATE INTRAVENOUS PRN
Status: DISCONTINUED | OUTPATIENT
Start: 2022-08-15 | End: 2022-08-16 | Stop reason: HOSPADM

## 2022-08-15 RX ORDER — SODIUM CHLORIDE 0.9 % (FLUSH) 0.9 %
5-40 SYRINGE (ML) INJECTION ONCE
Status: DISCONTINUED | OUTPATIENT
Start: 2022-08-15 | End: 2022-08-16 | Stop reason: HOSPADM

## 2022-08-15 RX ORDER — METHYLPREDNISOLONE SODIUM SUCCINATE 125 MG/2ML
125 INJECTION, POWDER, LYOPHILIZED, FOR SOLUTION INTRAMUSCULAR; INTRAVENOUS
Status: ACTIVE | OUTPATIENT
Start: 2022-08-15 | End: 2022-08-15

## 2022-08-15 RX ORDER — ONDANSETRON 2 MG/ML
8 INJECTION INTRAMUSCULAR; INTRAVENOUS
Status: ACTIVE | OUTPATIENT
Start: 2022-08-15 | End: 2022-08-15

## 2022-08-15 RX ORDER — ALBUTEROL SULFATE 90 UG/1
4 AEROSOL, METERED RESPIRATORY (INHALATION) PRN
Status: DISCONTINUED | OUTPATIENT
Start: 2022-08-15 | End: 2022-08-16 | Stop reason: HOSPADM

## 2022-08-15 RX ORDER — ACETAMINOPHEN 325 MG/1
650 TABLET ORAL
Status: ACTIVE | OUTPATIENT
Start: 2022-08-15 | End: 2022-08-15

## 2022-08-15 RX ORDER — BEBTELOVIMAB 87.5 MG/ML
175 INJECTION, SOLUTION INTRAVENOUS ONCE
Status: COMPLETED | OUTPATIENT
Start: 2022-08-15 | End: 2022-08-15

## 2022-08-15 RX ADMIN — SODIUM CHLORIDE 20 ML/HR: 9 INJECTION, SOLUTION INTRAVENOUS at 12:34

## 2022-08-15 RX ADMIN — BEBTELOVIMAB 175 MG: 87.5 INJECTION, SOLUTION INTRAVENOUS at 12:35

## 2022-08-15 ASSESSMENT — PAIN SCALES - GENERAL: PAINLEVEL_OUTOF10: 8

## 2022-08-15 NOTE — PROGRESS NOTES
Arrived to [facility] room with cane ambulates slow ,using cane since July 16th and has had leg weakness of unknown origin as of yet.

## 2022-08-26 DIAGNOSIS — I10 ESSENTIAL HYPERTENSION: ICD-10-CM

## 2022-08-26 RX ORDER — HYDROCHLOROTHIAZIDE 25 MG/1
12.5 TABLET ORAL 2 TIMES DAILY
Qty: 90 TABLET | Refills: 1 | Status: SHIPPED | OUTPATIENT
Start: 2022-08-26

## 2022-09-01 DIAGNOSIS — R20.8 PAIN OF SKIN: ICD-10-CM

## 2022-09-01 DIAGNOSIS — L98.9 SKIN LESIONS: ICD-10-CM

## 2022-09-01 DIAGNOSIS — I10 ESSENTIAL HYPERTENSION: ICD-10-CM

## 2022-09-01 RX ORDER — AMLODIPINE BESYLATE 10 MG/1
10 TABLET ORAL DAILY
Qty: 90 TABLET | Refills: 1 | Status: SHIPPED | OUTPATIENT
Start: 2022-09-01

## 2022-09-01 RX ORDER — LEVOTHYROXINE SODIUM 0.2 MG/1
TABLET ORAL
Qty: 90 TABLET | Refills: 1 | Status: SHIPPED | OUTPATIENT
Start: 2022-09-01

## 2022-09-01 RX ORDER — KETOROLAC TROMETHAMINE 30 MG/ML
INJECTION, SOLUTION INTRAMUSCULAR; INTRAVENOUS
Qty: 12 EACH | Refills: 0 | Status: SHIPPED | OUTPATIENT
Start: 2022-09-01 | End: 2022-09-21 | Stop reason: SDUPTHER

## 2022-09-01 NOTE — TELEPHONE ENCOUNTER
Refill Request     CONFIRM preferrred pharmacy with the patient. If Mail Order Rx - Pend for 90 day refill.       Last Seen: Last Seen Department: 8/5/2022  Last Seen by PCP: 8/5/2022    Last Written:   Amlodipine 12/20/21, 90 tablets, 1 refill  Toradol 7/29/22, 12 each, 0 refills      Next Appointment:   Future Appointments   Date Time Provider Rula Emmanuel   9/2/2022 10:30 AM MD TALYA Hansen   9/22/2022  1:45 PM MD TALYA Hansen  Lavinia - [facility]         Requested Prescriptions     Pending Prescriptions Disp Refills    amLODIPine (NORVASC) 10 MG tablet 90 tablet 1     Sig: Take 1 tablet by mouth daily    ketorolac (TORADOL) 30 MG/ML injection 12 each 0     Sig: INJECT ONE MILLILITER INTRAMUSCULARLY TWO TIMES A DAY AS NEEDED FOR PAIN

## 2022-09-12 ENCOUNTER — TELEPHONE (OUTPATIENT)
Dept: FAMILY MEDICINE CLINIC | Age: 52
End: 2022-09-12

## 2022-09-12 DIAGNOSIS — F41.9 ANXIETY: ICD-10-CM

## 2022-09-12 RX ORDER — SERTRALINE HYDROCHLORIDE 25 MG/1
TABLET, FILM COATED ORAL
Qty: 67 TABLET | Refills: 0 | Status: SHIPPED
Start: 2022-09-12 | End: 2022-10-12 | Stop reason: SINTOL

## 2022-09-12 NOTE — TELEPHONE ENCOUNTER
Patient called in, wanting to speak with Mary, pt was crying stating the father his in [address] [facility]. Pt is wanting some type of medication to calm her down- because \"I am distressed\".

## 2022-09-12 NOTE — TELEPHONE ENCOUNTER
On Call Provider Communication:    Called by patient due to severe anxiety. Father is currently in [facility]. 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 Patient has been told that her father will be discharged from [facility] and she will now have to go back to caring for him 24/7 as he did not qualify for aid. Patient is wondering if extra anxiety medication as Michelle Last is not in a good way\". No SI/ HI- just completely overwhelmed. Continuing to take Klonopin 1 mg at night and 1 PRN. Father is also also a patient of Dr. Agus Mehta she did not know if anything could be done on PCP end. Discussed with patient I will let PCP know her concerns but she will have to discuss medication management with PCP due to pain medications/ anxiety medications/ muscle relaxer use. Patient verbalized understanding.

## 2022-09-12 NOTE — TELEPHONE ENCOUNTER
I spoke with the patient, explained Dr. Green Ee plan of care with her medication. The patient has been notified of this information and all questions answered.

## 2022-09-12 NOTE — TELEPHONE ENCOUNTER
Patient confirmed that her dose of Klonopin. She is willing to try Zoloft again and would like to see Dr. María An. She will call back to schedule Dr. María An after she knows the  information. Please send Zoloft to [facility].

## 2022-09-12 NOTE — TELEPHONE ENCOUNTER
Pt calling back wanting another round for Meclizine 12.5 mg- for dizziness, after hearing the news of her father passing.

## 2022-09-13 RX ORDER — MECLIZINE HCL 12.5 MG/1
12.5-25 TABLET ORAL 3 TIMES DAILY PRN
Qty: 60 TABLET | Refills: 0 | Status: SHIPPED | OUTPATIENT
Start: 2022-09-13 | End: 2022-09-23

## 2022-09-21 DIAGNOSIS — L98.9 SKIN LESIONS: ICD-10-CM

## 2022-09-21 DIAGNOSIS — R20.8 PAIN OF SKIN: ICD-10-CM

## 2022-09-22 RX ORDER — KETOROLAC TROMETHAMINE 30 MG/ML
INJECTION, SOLUTION INTRAMUSCULAR; INTRAVENOUS
Qty: 12 EACH | Refills: 0 | OUTPATIENT
Start: 2022-09-22

## 2022-09-22 RX ORDER — KETOROLAC TROMETHAMINE 30 MG/ML
INJECTION, SOLUTION INTRAMUSCULAR; INTRAVENOUS
Qty: 12 EACH | Refills: 0 | Status: SHIPPED | OUTPATIENT
Start: 2022-09-22 | End: 2022-10-20 | Stop reason: SDUPTHER

## 2022-09-22 NOTE — TELEPHONE ENCOUNTER
.Refill Request     CONFIRM preferrred pharmacy with the patient. If Mail Order Rx - Pend for 90 day refill. Last Seen: Last Seen Department: 8/5/2022  Last Seen by PCP: 8/5/2022    Last Written: 9-1-22 12 with 0     If no future appointment scheduled, route STAFF MESSAGE with patient name to the [facility] for scheduling. Next Appointment:   Future Appointments   Date Time Provider Rula Emmanuel   10/26/2022 10:30 AM Aure Wolff MD [facility] [place] - DYD       Message sent to [address] [place] [address] to schedule appt with patient?   N/A      Requested Prescriptions     Pending Prescriptions Disp Refills    ketorolac (TORADOL) 30 MG/ML injection 12 each 0     Sig: INJECT ONE MILLILITER INTRAMUSCULARLY TWO TIMES A DAY AS NEEDED FOR PAIN

## 2022-10-05 ENCOUNTER — PATIENT MESSAGE (OUTPATIENT)
Dept: FAMILY MEDICINE CLINIC | Age: 52
End: 2022-10-05

## 2022-10-06 ENCOUNTER — TELEMEDICINE (OUTPATIENT)
Dept: PSYCHOLOGY | Age: 52
End: 2022-10-06
Payer: COMMERCIAL

## 2022-10-06 ENCOUNTER — TELEPHONE (OUTPATIENT)
Dept: FAMILY MEDICINE CLINIC | Age: 52
End: 2022-10-06

## 2022-10-06 DIAGNOSIS — F43.21 GRIEF: Primary | ICD-10-CM

## 2022-10-06 DIAGNOSIS — F41.1 GAD (GENERALIZED ANXIETY DISORDER): ICD-10-CM

## 2022-10-06 PROCEDURE — 90791 PSYCH DIAGNOSTIC EVALUATION: CPT | Performed by: PSYCHOLOGIST

## 2022-10-06 NOTE — PROGRESS NOTES
Behavioral Health Consultation  Ronda Atkins PsyD  Psychologist  10/6/2022  11:51 AM EDT    Time spent with Patient: 30 minutes  This is patient's first [facility] appointment. Reason for Consult:    Chief Complaint   Patient presents with    Other     grief     Referring Provider: Alicia Nunez MD    Pt provided informed consent for the behavioral health program. Discussed with patient model of service to include the limits of confidentiality (i.e. abuse reporting, suicide intervention, etc.) and short-term intervention focused approach. Pt indicated understanding. Feedback given to PCP. TELEHEALTH VISIT -- Audio Only (During [facility] public health emergency)    Pursuant to the emergency declaration under the [address] [place] [address] waiver authority and the [employer] Act, this phone call was conducted, with patient's consent, to reduce the patient's risk of exposure to COVID-19 and provide continuity of care for an established patient. Services were provided through a phone call discussion to substitute for in-person clinic visit. Pt gave verbal informed consent to participate in telehealth services. Consent:  She and/or health care decision maker is aware that that she may receive a bill for this telephone service, depending on her insurance coverage, and has provided verbal consent to proceed: Yes    Conducted a risk-benefit analysis and determined that the patient's presenting problems are consistent with the use of telepsychology. Determined that the patient has sufficient knowledge and skills in the use of technology enabling them to adequately benefit from telepsychology. It was determined that this patient was able to be properly treated without an in-person session. Patient verified that they were currently located at the [facility] address that was provided during registration.     Verified the following information:  Patient's identification: Yes  Patient location: [facility] [place] [address]  Patient's call back number: [phone]   Patient's emergency contact's name and number, as well as permission to contact them if needed: Extended Emergency Contact Information  Primary Emergency Contact: [address], [address] Bobby Heredia of [facility] in [place]  Phone: [phone]  Relation: Other     Provider location: [facility], [address] [place] [address] this is an episode with an Established Patient who has not had a related appointment within my department in the past 7 days or scheduled within the next 24 hours. S:  Pt reports she is not sleeping. Lost her dad September 12 and still waking on same routine when taking care of her father. She has been caring for him the at 3 years. Also lost her cat September 19. Is not doing well. Having issues with her legs that has made things much harder as well. Not caring for herself much. Not eating much. Eating very little. Is angry and feels guilty. Last 2 weeks has been more emotional- friend has pointed it out to her.  Realizes that tried zoloft in the past and that is now on her allergy list.     O:  MSE:    Attitude: cooperative and friendly  Consciousness: alert  Orientation: oriented to person, place, time, general circumstance  Memory: recent and remote memory intact  Attention/Concentration: intact during session  Speech: normal rate and volume, well-articulated  Mood: \"not good\"  Affect: euthymic and congruent  Perception: within normal limits  Thought Content: within normal limits  Thought Process: logical, coherent and goal-directed  Insight: good  Judgment: intact  Ability to understand instructions: Yes  Ability to respond meaningfully: Yes  Morbid Ideation: no   Suicide Assessment: no suicidal ideation, plan, or intent  Homicidal Ideation: no    History:    Medications:   Current Outpatient Medications   Medication Sig Dispense Refill ketorolac (TORADOL) 30 MG/ML injection INJECT ONE MILLILITER INTRAMUSCULARLY TWO TIMES A DAY AS NEEDED FOR PAIN 12 each 0    sertraline (ZOLOFT) 25 MG tablet Take 1 tablet by mouth daily for 7 days, THEN 2 tablets daily. 67 tablet 0    levothyroxine (SYNTHROID) 200 MCG tablet TAKE ONE TABLET BY MOUTH DAILY 90 tablet 1    amLODIPine (NORVASC) 10 MG tablet Take 1 tablet by mouth daily 90 tablet 1    hydroCHLOROthiazide (HYDRODIURIL) 25 MG tablet Take 0.5 tablets by mouth 2 times daily 90 tablet 1    ondansetron (ZOFRAN ODT) 4 MG disintegrating tablet Take 1 tablet by mouth every 8 hours as needed for Nausea 20 tablet 0    folic acid (FOLVITE) 1 MG tablet Take 1 tablet by mouth in the morning. 90 tablet 1    vitamin D (ERGOCALCIFEROL) 1.25 MG (51289 UT) CAPS capsule Take 1 capsule by mouth once a week for 12 doses 12 capsule 1    cyclobenzaprine (FLEXERIL) 10 MG tablet Take 10 mg by mouth 3 times daily as needed for Muscle spasms      clonazePAM (KLONOPIN) 1 MG tablet Take 1 tablet my mouth in the morning, take 1.5 tablets by mouth nightly 75 tablet 1    SYRINGE-NEEDLE, DISP, 3 ML (B-D 3CC LUER-ALEX SYR 25GX1\") 25G X 1\" 3 ML MISC Use to inject into the muscle two times a day as needed for pain 100 each 2    carBAMazepine (TEGRETOL XR) 400 MG extended release tablet Take 1 tablet by mouth every 8 hours      losartan (COZAAR) 100 MG tablet TAKE ONE TABLET BY MOUTH DAILY 90 tablet 1    XARELTO 20 MG TABS tablet TAKE ONE TABLET BY MOUTH DAILY 90 tablet 1    atorvastatin (LIPITOR) 40 MG tablet TAKE ONE TABLET BY MOUTH DAILY 90 tablet 1    clobetasol (TEMOVATE) 0.05 % cream Apply topically 2 times daily.  180 g 5    mupirocin (BACTROBAN) 2 % ointment Apply 3 times daily 90 g 5    Melatonin 10 MG TABS Take by mouth      diphenhydrAMINE (SOMINEX) 25 MG tablet Take 50 mg by mouth nightly as needed      docusate (COLACE, DULCOLAX) 100 MG CAPS Take 100 mg by mouth 2 times daily as needed       morphine (MS CONTIN) 15 MG extended release tablet 30 mg 2 times daily. fentaNYL (ACTIQ) 800 MCG lollipop Take 1 each by mouth 3 times daily as needed. naloxone 4 MG/0.1ML LIQD nasal spray 1 spray by Nasal route as needed for Opioid Reversal      Cyanocobalamin (VITAMIN B-12) 5000 MCG SUBL Take 1 each by mouth every other day      Cholecalciferol (VITAMIN D3) 2000 units CAPS Take 1 capsule by mouth daily      Doxylamine Succinate, Sleep, (UNISOM PO) Take by mouth      primidone (MYSOLINE) 250 MG tablet Take 250 mg by mouth 3 times daily        No current facility-administered medications for this visit. Social History:   Social History     Socioeconomic History    Marital status: Single     Spouse name: Not on file    Number of children: Not on file    Years of education: Not on file    Highest education level: Not on file   Occupational History    Not on file   Tobacco Use    Smoking status: Never    Smokeless tobacco: Never   Vaping Use    Vaping Use: Not on file   Substance and Sexual Activity    Alcohol use: No    Drug use: No    Sexual activity: Not on file   Other Topics Concern    Not on file   Social History Narrative    Not on file     Social Determinants of Health     Financial Resource Strain: Not on file   Food Insecurity: Not on file   Transportation Needs: Not on file   Physical Activity: Not on file   Stress: Not on file   Social Connections: Not on file   Intimate Partner Violence: Not on file   Housing Stability: Not on file     TOBACCO:   reports that she has never smoked. She has never used smokeless tobacco.  ETOH:   reports no history of alcohol use.   Family History:   Family History   Problem Relation Age of Onset    Cancer Mother         breast x2, ovarian x1, brain     High Cholesterol Mother     Breast Cancer Mother     Ovarian Cancer Mother     High Cholesterol Father     Other Father     Migraines Sister     Other Sister     Thyroid Disease Maternal Grandmother        A:  Ms. Loyd Lakhani lost her father whom she was

## 2022-10-06 NOTE — TELEPHONE ENCOUNTER
Thuy Pel \"Elvira\"  P [facility] Practice Support (supporting Khari Doshi MD) 9 hours ago (10:20 PM)     Dear Dr. Gabi Madrigal-  As I was answering the questions for my appt w/Dr. Awilda Guzmán tomorrow, I noticed Sertraline is on my list of ALLERGIES. I was going to mention to Dr. Awilda Guzmán that my [facility] best friend who I talk to daily, has noticed a change in me in the past couple weeks (for the worse) & I agree. Maybe I should taper off it? Would maybe the Lunesta be a better idea? I can get qty#30 of generic thru [address] at an affordable cost. I was scared to take it (even though it helped) because I never knew if I'd have to get Dad to ER in middle of night after taking it. I DO think the Zoloft is \"messing w/me. \" Plz help!   Dax Grijalva

## 2022-10-06 NOTE — TELEPHONE ENCOUNTER
From: Nic Fournier  To: Dr. Mast Laser: 10/5/2022 10:20 PM EDT  Subject: Zoloft \"Adverse Effects\"    Dear Dr. Teresa Abarca-  As I was answering the questions for my appt w/Dr. Ranulfo Vaughan tomorrow, I noticed Sertraline is on my list of ALLERGIES. I was going to mention to Dr. Ranulfo Vaughan that my [facility] best friend who I talk to daily, has noticed a change in me in the past couple weeks (for the worse) & I agree. Maybe I should taper off it? Would maybe the Lunesta be a better idea? I can get qty#30 of generic thru [address] at an affordable cost. I was scared to take it (even though it helped) because I never knew if I'd have to get Dad to ER in middle of night after taking it. I DO think the Zoloft is \"messing w/me. \" Plz help!  Jania Tejeda

## 2022-10-06 NOTE — Clinical Note
Hi. She says she has been more emotional and feels worse since starting Zoloft. She remembers this happened in the past and it is listed on her allergy list. Should she stop the medication?

## 2022-10-12 NOTE — TELEPHONE ENCOUNTER
Spoke with Dr. Molly Garvin, patient can titrate off of the Zoloft. The patient has been notified of this information and all questions answered.

## 2022-10-12 NOTE — TELEPHONE ENCOUNTER
Patient is calling back regarding this. She also states that Dr. Racheal Martin is very concerned about this. Jania Perse states that her best friend has pointed out that she just doesn't seem right. Her dad has recently passed away and her cat also recently passed away. Patient states that she is currently taking 1 tablet daily instead of two because she does not want to be on this medication. Please advise.

## 2022-10-19 ENCOUNTER — SCHEDULED TELEPHONE ENCOUNTER (OUTPATIENT)
Dept: PSYCHOLOGY | Age: 52
End: 2022-10-19

## 2022-10-19 DIAGNOSIS — F41.1 GAD (GENERALIZED ANXIETY DISORDER): Primary | ICD-10-CM

## 2022-10-19 DIAGNOSIS — F43.21 GRIEF: ICD-10-CM

## 2022-10-19 PROCEDURE — 99443 PR PHYS/QHP TELEPHONE EVALUATION 21-30 MIN: CPT | Performed by: PSYCHOLOGIST

## 2022-10-19 NOTE — Clinical Note
She declined follow up at this time. I have encouraged trauma treatment for her. She says finances are a barrier. However, if she is paying the same co-pay for for for a 25 min visit she should see a trauma therapist for the same co-pay. She is going to think about it. Despite saying she is overwhelmed, etc she is feeling better since stopping the zoloft. She discontinued it because she felt worse on it.

## 2022-10-19 NOTE — PROGRESS NOTES
Behavioral Health Consultation  Arianna Ashraf PsyD  Psychologist  10/19/2022  11:34 AM      Time spent with Patient: 30 minutes  This is patient's second [facility] appointment. Reason for Consult:    Chief Complaint   Patient presents with    Depression    Stress    Anxiety    Other     grief       Referring Provider: Michael Weber MD        TELEHEALTH VISIT -- Audio Only (During [facility] public [facility] emergency)    Pursuant to the emergency declaration under the [address] [place] [address] waiver authority and the [employer] Act, this phone call was conducted, with patient's consent, to reduce the patient's risk of exposure to COVID-19 and provide continuity of care for an established patient. Services were provided through a phone call discussion to substitute for in-person clinic visit. Pt gave verbal informed consent to participate in telehealth services. Consent:  She and/or health care decision maker is aware that that she may receive a bill for this telephone service, depending on her insurance coverage, and has provided verbal consent to proceed: Yes    Conducted a risk-benefit analysis and determined that the patient's presenting problems are consistent with the use of telepsychology. Determined that the patient has sufficient knowledge and skills in the use of technology enabling them to adequately benefit from telepsychology. It was determined that this patient was able to be properly treated without an in-person session. Patient verified that they were currently located at the [facility] address that was provided during registration.     Verified the following information:  Patient's identification: Yes  Patient location: [address] [place] [address]  Patient's call back number: [phone]   Patient's emergency contact's name and number, as well as permission to contact them if needed: No emergency contact information on file. Provider location: [place], [address] this is an episode with an Established Patient who has not had a related appointment within my department in the past 7 days or scheduled within the next 24 hours. S:  Pt reports she is still not sleeping well. Very tired and plans to sleep after the visit today. Has weaned herself off the Zoloft- fully off it yesterday. Can tell she is not as emotional- can have more conversations with people and not be in tears. Still feels off. Hasn't wobbled when walking since last visit  Wonders about trauma that was discussed at last visit. Feels that resonates. Concerned about finances. Did get some inheritance from father and plans to use this to get a car and a place to live. Worries about cost of medical and therapy co-pays but is interested in trauma treatment.       O:  MSE:    Attitude: cooperative and friendly  Consciousness: alert  Orientation: oriented to person, place, time, general circumstance  Memory: recent and remote memory intact  Attention/Concentration: intact during session  Speech: normal rate and volume, well-articulated  Mood: \"tired\"  Affect: euthymic and congruent  Perception: within normal limits  Thought Content: within normal limits  Thought Process: logical, coherent and tangential at times  Insight: good  Judgment: intact  Ability to understand instructions: Yes  Ability to respond meaningfully: Yes  Morbid Ideation: no   Suicide Assessment: no suicidal ideation, plan, or intent  Homicidal Ideation: no    History:    Medications:   Current Outpatient Medications   Medication Sig Dispense Refill    ketorolac (TORADOL) 30 MG/ML injection INJECT ONE MILLILITER INTRAMUSCULARLY TWO TIMES A DAY AS NEEDED FOR PAIN 12 each 0    levothyroxine (SYNTHROID) 200 MCG tablet TAKE ONE TABLET BY MOUTH DAILY 90 tablet 1    amLODIPine (NORVASC) 10 MG tablet Take 1 tablet by mouth daily 90 tablet 1    hydroCHLOROthiazide (HYDRODIURIL) 25 MG tablet Take 0.5 tablets by mouth 2 times daily 90 tablet 1    ondansetron (ZOFRAN ODT) 4 MG disintegrating tablet Take 1 tablet by mouth every 8 hours as needed for Nausea 20 tablet 0    folic acid (FOLVITE) 1 MG tablet Take 1 tablet by mouth in the morning. 90 tablet 1    vitamin D (ERGOCALCIFEROL) 1.25 MG (25609 UT) CAPS capsule Take 1 capsule by mouth once a week for 12 doses 12 capsule 1    cyclobenzaprine (FLEXERIL) 10 MG tablet Take 10 mg by mouth 3 times daily as needed for Muscle spasms      clonazePAM (KLONOPIN) 1 MG tablet Take 1 tablet my mouth in the morning, take 1.5 tablets by mouth nightly 75 tablet 1    SYRINGE-NEEDLE, DISP, 3 ML (B-D 3CC LUER-ALEX SYR 25GX1\") 25G X 1\" 3 ML MISC Use to inject into the muscle two times a day as needed for pain 100 each 2    carBAMazepine (TEGRETOL XR) 400 MG extended release tablet Take 1 tablet by mouth every 8 hours      losartan (COZAAR) 100 MG tablet TAKE ONE TABLET BY MOUTH DAILY 90 tablet 1    XARELTO 20 MG TABS tablet TAKE ONE TABLET BY MOUTH DAILY 90 tablet 1    atorvastatin (LIPITOR) 40 MG tablet TAKE ONE TABLET BY MOUTH DAILY 90 tablet 1    clobetasol (TEMOVATE) 0.05 % cream Apply topically 2 times daily. 180 g 5    mupirocin (BACTROBAN) 2 % ointment Apply 3 times daily 90 g 5    Melatonin 10 MG TABS Take by mouth      diphenhydrAMINE (SOMINEX) 25 MG tablet Take 50 mg by mouth nightly as needed      docusate (COLACE, DULCOLAX) 100 MG CAPS Take 100 mg by mouth 2 times daily as needed       morphine (MS CONTIN) 15 MG extended release tablet 30 mg 2 times daily. fentaNYL (ACTIQ) 800 MCG lollipop Take 1 each by mouth 3 times daily as needed.       naloxone 4 MG/0.1ML LIQD nasal spray 1 spray by Nasal route as needed for Opioid Reversal      Cyanocobalamin (VITAMIN B-12) 5000 MCG SUBL Take 1 each by mouth every other day      Cholecalciferol (VITAMIN D3) 2000 units CAPS Take 1 capsule by mouth daily      Doxylamine Succinate, Sleep, (UNISOM PO) Take by mouth      primidone (MYSOLINE) 250 MG tablet Take 250 mg by mouth 3 times daily        No current facility-administered medications for this visit. Social History:   Social History     Socioeconomic History    Marital status: Single     Spouse name: Not on file    Number of children: Not on file    Years of education: Not on file    Highest education level: Not on file   Occupational History    Not on file   Tobacco Use    Smoking status: Never    Smokeless tobacco: Never   Vaping Use    Vaping Use: Not on file   Substance and Sexual Activity    Alcohol use: No    Drug use: No    Sexual activity: Not on file   Other Topics Concern    Not on file   Social History Narrative    Not on file     Social Determinants of Health     Financial Resource Strain: Not on file   Food Insecurity: Not on file   Transportation Needs: Not on file   Physical Activity: Not on file   Stress: Not on file   Social Connections: Not on file   Intimate Partner Violence: Not on file   Housing Stability: Not on file     TOBACCO:   reports that she has never smoked. She has never used smokeless tobacco.  ETOH:   reports no history of alcohol use. Family History:   Family History   Problem Relation Age of Onset    Cancer Mother         breast x2, ovarian x1, brain     High Cholesterol Mother     Breast Cancer Mother     Ovarian Cancer Mother     High Cholesterol Father     Other Father     Migraines Sister     Other Sister     Thyroid Disease Maternal Grandmother        A:  Ms. Rosalba Palafox  continues to grieve the loss of her father and struggles with anxiety management. She has been sleeping poorly which exacerbates her mood and health problems. Due to limited finances, pt is unsure about following up with specialty mental health as this writer has recommended. She reports some mild improvement in symptoms since the last visit as she discontinued Zoloft which she believes she was having ASE from.   Due to improvement and her upcoming move she will wait to schedule f/u at this time. She continues to be talkative and engaged. She is often tangential.    Diagnosis:    1. MERRY (generalized anxiety disorder)    2.  Grief    R//O PTSD        Diagnosis Date    Benign essential tremor     BRCA1 negative     Colon polyp 05/06/2019    Degenerative disc disease     DVT, lower extremity (HCC) 1989    Fibromyalgia     Kidney stones     Left-sided trigeminal neuralgia     Malignant neoplasm of thyroid gland ([address] 8/22/2013    Migraines, neuralgic     since stroke 1999 Chronic    PONV (postoperative nausea and vomiting)     Poor venous access     Protein S deficiency ([address]     Pulmonary embolism ([address] 1989    Seizure disorder ([address]     grand mal in 2005 ( childhood febrile seizure also-from a baby to age 14) and also X 1 ~2005 with headache treatment    Stroke ([address] 2/25/2019    Thrombosis of superior sagittal sinus 1999    Unspecified cerebral artery occlusion with cerebral infarction 1999    Vertebral artery occlusion 1999    White coat syndrome with high blood pressure but without hypertension      Plan:  Pt interventions:  [place]-setting to identify pt's primary goals for [facility] visit / overall health, Supportive techniques, and Emphasized self-care as important for managing overall health, CBT interventions, treatment planning, discussed specialty mental health, psychoeducation re: trauma response and trauma treatment    Pt Behavioral Change Plan:   Pt set goals to 1) continue to protect sleep 2) consider specialty mental health for trauma treatment 3) f/u, as needed

## 2022-10-20 DIAGNOSIS — R20.8 PAIN OF SKIN: ICD-10-CM

## 2022-10-20 DIAGNOSIS — L98.9 SKIN LESIONS: ICD-10-CM

## 2022-10-21 RX ORDER — KETOROLAC TROMETHAMINE 30 MG/ML
INJECTION, SOLUTION INTRAMUSCULAR; INTRAVENOUS
Qty: 12 EACH | Refills: 0 | Status: SHIPPED | OUTPATIENT
Start: 2022-10-21

## 2022-10-21 NOTE — TELEPHONE ENCOUNTER
Refill Request     CONFIRM preferrred pharmacy with the patient. If Mail Order Rx - Pend for 90 day refill. Last Seen: Last Seen Department: 8/5/2022  Last Seen by PCP: 8/5/2022    Last Written: 9/22/22 12 tabs 0 refills     If no future appointment scheduled, route STAFF MESSAGE with patient name to the [facility] for scheduling. Next Appointment:   Future Appointments   Date Time Provider Rula Emmanuel   10/26/2022 10:30 AM MD TALYA Maher  [place] - DYALBERTO       Message sent to [address] [place] [address] to schedule appt with patient?   NO      Requested Prescriptions     Pending Prescriptions Disp Refills    ketorolac (TORADOL) 30 MG/ML injection 12 each 0     Sig: INJECT ONE MILLILITER INTRAMUSCULARLY TWO TIMES A DAY AS NEEDED FOR PAIN

## 2022-10-25 DIAGNOSIS — L28.1 PRURIGO NODULARIS: ICD-10-CM

## 2022-10-25 DIAGNOSIS — I10 ESSENTIAL HYPERTENSION: ICD-10-CM

## 2022-10-25 RX ORDER — LOSARTAN POTASSIUM 100 MG/1
TABLET ORAL
Qty: 90 TABLET | Refills: 1 | Status: SHIPPED | OUTPATIENT
Start: 2022-10-25

## 2022-10-25 RX ORDER — CLOBETASOL PROPIONATE 0.5 MG/G
CREAM TOPICAL
Qty: 180 G | Refills: 5 | Status: SHIPPED | OUTPATIENT
Start: 2022-10-25

## 2022-10-25 NOTE — TELEPHONE ENCOUNTER
Refill Request     CONFIRM preferrred pharmacy with the patient. If Mail Order Rx - Pend for 90 day refill. Last Seen: Last Seen Department: 8/5/2022  Last Seen by PCP: Visit date not found    Last Written: 08/03/2021 90 g 5 refills     If no future appointment scheduled, route STAFF MESSAGE with patient name to the [facility] for scheduling. Next Appointment:   Future Appointments   Date Time Provider Rula Emmanuel   10/26/2022 10:30 AM MD TALYA Hall  [place] - DYD       Message sent to [address] [place] [address] to schedule appt with patient?   NO      Requested Prescriptions     Pending Prescriptions Disp Refills    mupirocin (BACTROBAN) 2 % ointment 90 g 5     Sig: Apply 3 times daily

## 2022-10-25 NOTE — TELEPHONE ENCOUNTER
Refill Request     CONFIRM preferrred pharmacy with the patient. If Mail Order Rx - Pend for 90 day refill. Last Seen: Last Seen Department: 8/5/2022  Last Seen by PCP: 8/5/2022    Last Written:   Losartan 3/11/22 90 tablet 1 refill   Clobetasol 12/20/21 180 g 5 refill    If no future appointment scheduled, route STAFF MESSAGE with patient name to the [facility] for scheduling. Next Appointment:   Future Appointments   Date Time Provider Rula Emmanuel   10/26/2022 10:30 AM MD TALYA Garrett  [place] - DYD       Message sent to [address] [place] [address] to schedule appt with patient? NO      Requested Prescriptions     Pending Prescriptions Disp Refills    clobetasol (TEMOVATE) 0.05 % cream 180 g 5     Sig: Apply topically 2 times daily.     losartan (COZAAR) 100 MG tablet 90 tablet 1     Sig: TAKE ONE TABLET BY MOUTH DAILY

## 2022-10-26 ENCOUNTER — TELEPHONE (OUTPATIENT)
Dept: ADMINISTRATIVE | Age: 52
End: 2022-10-26

## 2022-10-26 ENCOUNTER — OFFICE VISIT (OUTPATIENT)
Dept: FAMILY MEDICINE CLINIC | Age: 52
End: 2022-10-26
Payer: COMMERCIAL

## 2022-10-26 ENCOUNTER — TELEPHONE (OUTPATIENT)
Dept: FAMILY MEDICINE CLINIC | Age: 52
End: 2022-10-26

## 2022-10-26 VITALS
WEIGHT: 172 LBS | TEMPERATURE: 96 F | HEIGHT: 65 IN | SYSTOLIC BLOOD PRESSURE: 156 MMHG | DIASTOLIC BLOOD PRESSURE: 96 MMHG | HEART RATE: 116 BPM | BODY MASS INDEX: 28.66 KG/M2 | OXYGEN SATURATION: 100 %

## 2022-10-26 DIAGNOSIS — E87.1 HYPONATREMIA: ICD-10-CM

## 2022-10-26 DIAGNOSIS — I16.0 HYPERTENSIVE URGENCY: ICD-10-CM

## 2022-10-26 DIAGNOSIS — Z59.9 FINANCIAL DIFFICULTIES: ICD-10-CM

## 2022-10-26 DIAGNOSIS — Z86.73 HISTORY OF STROKE: ICD-10-CM

## 2022-10-26 DIAGNOSIS — Z12.4 CERVICAL CANCER SCREENING: ICD-10-CM

## 2022-10-26 DIAGNOSIS — Z00.00 ANNUAL PHYSICAL EXAM: Primary | ICD-10-CM

## 2022-10-26 DIAGNOSIS — R79.89 ELEVATED BRAIN NATRIURETIC PEPTIDE (BNP) LEVEL: ICD-10-CM

## 2022-10-26 DIAGNOSIS — Z59.89 INSURANCE COVERAGE PROBLEMS: ICD-10-CM

## 2022-10-26 DIAGNOSIS — Z11.51 ENCOUNTER FOR SCREENING FOR HUMAN PAPILLOMAVIRUS (HPV): ICD-10-CM

## 2022-10-26 DIAGNOSIS — R74.01 TRANSAMINITIS: ICD-10-CM

## 2022-10-26 DIAGNOSIS — E53.8 FOLATE DEFICIENCY: ICD-10-CM

## 2022-10-26 DIAGNOSIS — Z12.31 ENCOUNTER FOR SCREENING MAMMOGRAM FOR MALIGNANT NEOPLASM OF BREAST: ICD-10-CM

## 2022-10-26 DIAGNOSIS — E55.9 VITAMIN D DEFICIENCY: ICD-10-CM

## 2022-10-26 PROBLEM — M51.369 DEGENERATIVE DISC DISEASE, LUMBAR: Status: ACTIVE | Noted: 2022-10-26

## 2022-10-26 PROBLEM — M51.36 DEGENERATIVE DISC DISEASE, LUMBAR: Status: ACTIVE | Noted: 2022-10-26

## 2022-10-26 PROCEDURE — 99214 OFFICE O/P EST MOD 30 MIN: CPT | Performed by: FAMILY MEDICINE

## 2022-10-26 PROCEDURE — 3078F DIAST BP <80 MM HG: CPT | Performed by: FAMILY MEDICINE

## 2022-10-26 PROCEDURE — 90674 CCIIV4 VAC NO PRSV 0.5 ML IM: CPT | Performed by: FAMILY MEDICINE

## 2022-10-26 PROCEDURE — 3074F SYST BP LT 130 MM HG: CPT | Performed by: FAMILY MEDICINE

## 2022-10-26 PROCEDURE — 90471 IMMUNIZATION ADMIN: CPT | Performed by: FAMILY MEDICINE

## 2022-10-26 PROCEDURE — 99396 PREV VISIT EST AGE 40-64: CPT | Performed by: FAMILY MEDICINE

## 2022-10-26 SDOH — ECONOMIC STABILITY - INCOME SECURITY: PROBLEM RELATED TO HOUSING AND ECONOMIC CIRCUMSTANCES, UNSPECIFIED: Z59.9

## 2022-10-26 SDOH — ECONOMIC STABILITY: FOOD INSECURITY: WITHIN THE PAST 12 MONTHS, YOU WORRIED THAT YOUR FOOD WOULD RUN OUT BEFORE YOU GOT MONEY TO BUY MORE.: NEVER TRUE

## 2022-10-26 SDOH — ECONOMIC STABILITY: FOOD INSECURITY: WITHIN THE PAST 12 MONTHS, THE FOOD YOU BOUGHT JUST DIDN'T LAST AND YOU DIDN'T HAVE MONEY TO GET MORE.: NEVER TRUE

## 2022-10-26 SDOH — ECONOMIC STABILITY - INCOME SECURITY: OTHER PROBLEMS RELATED TO HOUSING AND ECONOMIC CIRCUMSTANCES: Z59.89

## 2022-10-26 ASSESSMENT — ENCOUNTER SYMPTOMS
TROUBLE SWALLOWING: 0
COLOR CHANGE: 1
BLOOD IN STOOL: 0
ABDOMINAL PAIN: 0
NAUSEA: 0
COUGH: 0
BACK PAIN: 0
CONSTIPATION: 0
SHORTNESS OF BREATH: 0
VOMITING: 0
DIARRHEA: 1

## 2022-10-26 ASSESSMENT — SOCIAL DETERMINANTS OF HEALTH (SDOH): HOW HARD IS IT FOR YOU TO PAY FOR THE VERY BASICS LIKE FOOD, HOUSING, MEDICAL CARE, AND HEATING?: NOT HARD AT ALL

## 2022-10-26 NOTE — TELEPHONE ENCOUNTER
SW contacted patient to follow-up on Primary Care First referral from PCP. Sw was unable to reach pt and left message asking for a return call. SW left contact information as well as reason for call per request from her PCP.

## 2022-10-26 NOTE — TELEPHONE ENCOUNTER
Submitted PA for Clobetasol Propionate 0.05% cream, Key: [id number]. Medication has been APPROVED. Please notify patient. Thank you.

## 2022-10-26 NOTE — PROGRESS NOTES
10/26/2022    This is a 47 y.o. female who presents for  Chief Complaint   Patient presents with    Annual Exam       HPI:     Patient's last menstrual period was 2015. Denies any vaginal discharge, new pelvic pain, dyspareunia, AUB  No new sexual partners  Defers any STD testing  Last pap with me in , normal with neg HPV     Breast exam:   Denies any new lumps or bumps, skin changes, nipple drainage, new tenderness  Family history updated  Not UTD with her mammogram     Colonoscopy completed in 3/15 with [facility]   No new concerns or family hx of colon ca     Moving from her parents home  Having financial concerns     Lost her cat 7 days after her dad      No acute concerning Sxs: No CP, SOB, palpitations, dizziness, HA, etc.   Checks BP intermittently     Taking folate and vitamin D supplements daily      Past Medical History:   Diagnosis Date    Benign essential tremor     BRCA1 negative     Colon polyp 2019    Degenerative disc disease     DVT, lower extremity ([address]     Fibromyalgia     Kidney stones     Left-sided trigeminal neuralgia     Malignant neoplasm of thyroid gland ([address] 2013    Migraines, neuralgic     since stroke  Chronic    PONV (postoperative nausea and vomiting)     Poor venous access     Protein S deficiency ([address]     Pulmonary embolism ([address]     Seizure disorder ([address]     grand mal in  ( childhood febrile seizure also-from a baby to age 14) and also X 1 ~2005 with headache treatment    Stroke ([address] 2019    Thrombosis of superior sagittal sinus 1999    Unspecified cerebral artery occlusion with cerebral infarction     Vertebral artery occlusion 1999    White coat syndrome with high blood pressure but without hypertension        Past Surgical History:   Procedure Laterality Date    BREAST BIOPSY      BREAST LUMPECTOMY      x 3    CYSTOSCOPY  2012    w/ left ureteroscopy, holmium laser.  stone manipulation and left stent insertion    FINGER TRIGGER RELEASE Left 10/31/2019    LEFT THUMB TRIGGER DIGIT RELEASE performed by Mirna Rubio MD at [address] [place] [address] [address] 2000    pins later removed    FOOT SURGERY Left 07/10/2017    LARYNX SURGERY      vocal cord nodule    LITHOTRIPSY  12/11    REFRACTIVE SURGERY      THYROIDECTOMY  05/18/2011            Social History     Socioeconomic History    Marital status: Single     Spouse name: Not on file    Number of children: Not on file    Years of education: Not on file    Highest education level: Not on file   Occupational History    Not on file   Tobacco Use    Smoking status: Never    Smokeless tobacco: Never   Vaping Use    Vaping Use: Not on file   Substance and Sexual Activity    Alcohol use: No    Drug use: No    Sexual activity: Not on file   Other Topics Concern    Not on file   Social History Narrative    Not on file     Social Determinants of Health     Financial Resource Strain: Low Risk     Difficulty of Paying Living Expenses: Not hard at all   Food Insecurity: No Food Insecurity    Worried About Running Out of Food in the Last Year: Never true    Ran Out of Food in the Last Year: Never true   Transportation Needs: Not on file   Physical Activity: Not on file   Stress: Not on file   Social Connections: Not on file   Intimate Partner Violence: Not on file   Housing Stability: Not on file       Family History   Problem Relation Age of Onset    Cancer Mother         breast x2, ovarian x1, brain     High Cholesterol Mother     Breast Cancer Mother     Ovarian Cancer Mother     High Cholesterol Father     Other Father     Migraines Sister     Other Sister     Thyroid Disease Maternal Grandmother        Current Outpatient Medications   Medication Sig Dispense Refill    mupirocin (BACTROBAN) 2 % ointment Apply 3 times daily 90 g 5    clobetasol (TEMOVATE) 0.05 % cream Apply topically 2 times daily.  180 g 5    losartan (COZAAR) 100 MG tablet TAKE ONE TABLET BY MOUTH DAILY 90 tablet 1    ketorolac (TORADOL) 30 MG/ML injection INJECT ONE MILLILITER INTRAMUSCULARLY TWO TIMES A DAY AS NEEDED FOR PAIN 12 each 0    levothyroxine (SYNTHROID) 200 MCG tablet TAKE ONE TABLET BY MOUTH DAILY 90 tablet 1    amLODIPine (NORVASC) 10 MG tablet Take 1 tablet by mouth daily 90 tablet 1    hydroCHLOROthiazide (HYDRODIURIL) 25 MG tablet Take 0.5 tablets by mouth 2 times daily 90 tablet 1    ondansetron (ZOFRAN ODT) 4 MG disintegrating tablet Take 1 tablet by mouth every 8 hours as needed for Nausea 20 tablet 0    folic acid (FOLVITE) 1 MG tablet Take 1 tablet by mouth in the morning. 90 tablet 1    vitamin D (ERGOCALCIFEROL) 1.25 MG (92549 UT) CAPS capsule Take 1 capsule by mouth once a week for 12 doses 12 capsule 1    cyclobenzaprine (FLEXERIL) 10 MG tablet Take 10 mg by mouth 3 times daily as needed for Muscle spasms      SYRINGE-NEEDLE, DISP, 3 ML (B-D 3CC LUER-ALEX SYR 25GX1\") 25G X 1\" 3 ML MISC Use to inject into the muscle two times a day as needed for pain 100 each 2    carBAMazepine (TEGRETOL XR) 400 MG extended release tablet Take 1 tablet by mouth every 8 hours      XARELTO 20 MG TABS tablet TAKE ONE TABLET BY MOUTH DAILY 90 tablet 1    atorvastatin (LIPITOR) 40 MG tablet TAKE ONE TABLET BY MOUTH DAILY 90 tablet 1    Melatonin 10 MG TABS Take by mouth      diphenhydrAMINE (SOMINEX) 25 MG tablet Take 50 mg by mouth nightly as needed      docusate (COLACE, DULCOLAX) 100 MG CAPS Take 100 mg by mouth 2 times daily as needed       morphine (MS CONTIN) 15 MG extended release tablet 30 mg 2 times daily. fentaNYL (ACTIQ) 800 MCG lollipop Take 1 each by mouth 3 times daily as needed.       naloxone 4 MG/0.1ML LIQD nasal spray 1 spray by Nasal route as needed for Opioid Reversal      Cyanocobalamin (VITAMIN B-12) 5000 MCG SUBL Take 1 each by mouth every other day      Cholecalciferol (VITAMIN D3) 2000 units CAPS Take 1 capsule by mouth daily      Doxylamine Succinate, Sleep, (UNISOM PO) Take by mouth      primidone (MYSOLINE) 250 MG tablet Take 250 mg by mouth 3 times daily       clonazePAM (KLONOPIN) 1 MG tablet Take 1 tablet my mouth in the morning, take 1.5 tablets by mouth nightly 75 tablet 1     No current facility-administered medications for this visit. Immunization History   Administered Date(s) Administered    Influenza, FLUARIX, FLULAVAL, FLUZONE (age 10 mo+) AND AFLURIA, (age 1 y+), PF, 0.5mL 09/19/2019, 10/14/2020    Influenza, FLUCELVAX, (age 10 mo+), MDCK, PF, 0.5mL 10/26/2022    Tdap (Boostrix, Adacel) 04/04/2019    Zoster Recombinant (Shingrix) 02/05/2020, 02/17/2020, 09/25/2020       Allergies   Allergen Reactions    Dilaudid [Hydromorphone Hcl] Shortness Of Breath, Itching and Other (See Comments)     Wheezing    Buprenorphine Itching, Nausea And Vomiting and Other (See Comments)     Tingling in lips    Duloxetine Hcl Other (See Comments)     Personality changes and depression    Elavil [Amitriptyline]      Seizure with high dose     Gabapentin     Gluten Meal      Celiac disease    Hydromorphone Itching and Other (See Comments)    Morphine Itching     Is able to take MS Contin     Pregabalin      Lyrica (personality changes)     Propranolol Hives    Topiramate Other (See Comments)    Zoloft [Sertraline]        Review of Systems   Constitutional:  Positive for diaphoresis and fatigue. Negative for activity change, appetite change, chills, fever and unexpected weight change. HENT:  Negative for ear pain, hearing loss and trouble swallowing. Eyes:  Negative for visual disturbance. Respiratory:  Negative for cough and shortness of breath. Cardiovascular:  Positive for palpitations. Negative for chest pain and leg swelling. Gastrointestinal:  Positive for diarrhea. Negative for abdominal pain, blood in stool, constipation, nausea and vomiting. Genitourinary:  Negative for decreased urine volume, difficulty urinating, dysuria, flank pain, hematuria and urgency. Musculoskeletal:  Positive for arthralgias. Negative for back pain. Skin:  Positive for color change and wound. Negative for rash. Neurological:  Negative for dizziness, weakness, light-headedness, numbness and headaches. Psychiatric/Behavioral:  Positive for dysphoric mood and sleep disturbance. The patient is nervous/anxious. BP (!) 156/96   Pulse (!) 116   Temp (!) 96 °F (35.6 °C) (Temporal)   Ht 5' 5\" (1.651 m)   Wt 172 lb (78 kg)   LMP 06/30/2015   SpO2 100%   BMI 28.62 kg/m²     Physical Exam  Vitals and nursing note reviewed. Exam conducted with a chaperone present. Constitutional:       General: She is not in acute distress. Appearance: She is well-developed. She is not diaphoretic. HENT:      Head: Normocephalic and atraumatic. Right Ear: External ear normal.      Left Ear: External ear normal.      Mouth/Throat:      Pharynx: No oropharyngeal exudate. Eyes:      General:         Right eye: No discharge. Left eye: No discharge. Conjunctiva/sclera: Conjunctivae normal.      Pupils: Pupils are equal, round, and reactive to light. Neck:      Thyroid: No thyromegaly. Cardiovascular:      Rate and Rhythm: Normal rate and regular rhythm. Heart sounds: Normal heart sounds. No murmur heard. No friction rub. No gallop. Pulmonary:      Effort: Pulmonary effort is normal. No respiratory distress. Breath sounds: Normal breath sounds. No wheezing or rales. Abdominal:      General: Bowel sounds are normal. There is no distension. Palpations: Abdomen is soft. There is no mass. Tenderness: There is no abdominal tenderness. There is no guarding or rebound. Genitourinary:     Labia:         Right: No rash, tenderness, lesion or injury. Left: No rash, tenderness, lesion or injury. Vagina: Normal. No signs of injury and foreign body. No vaginal discharge, erythema, tenderness or bleeding.       Cervix: No cervical motion tenderness, discharge or friability. Uterus: Not deviated, not enlarged, not fixed and not tender. Adnexa:         Right: No mass, tenderness or fullness. Left: No mass, tenderness or fullness. Rectum: No external hemorrhoid. Musculoskeletal:         General: Normal range of motion. Cervical back: Normal range of motion and neck supple. Lymphadenopathy:      Cervical: No cervical adenopathy. Skin:     General: Skin is warm and dry. Coloration: Skin is not pale. Findings: No erythema or rash. Neurological:      Mental Status: She is alert. Cranial Nerves: No cranial nerve deficit. Coordination: Coordination normal.   Psychiatric:         Behavior: Behavior normal.         Thought Content: Thought content normal.         Judgment: Judgment normal.       Plan  1. Annual physical exam    2. Cervical cancer screening  - PAP SMEAR  - HUMAN PAPILLOMAVIRUS (HPV) DNA PROBE THIN PREP HIGH RISK    3. Encounter for screening for human papillomavirus (HPV)  - HUMAN PAPILLOMAVIRUS (HPV) DNA PROBE THIN PREP HIGH RISK    4. Encounter for screening mammogram for malignant neoplasm of breast  - ROBEL DIGITAL SCREEN W OR WO CAD BILATERAL; Future    5. Transaminitis  Will return in a few days after increased hydration for labs  If LFTs are normal, will defer US. If elevated, will schedule US. Discussed   - US LIVER; Future  - Comprehensive Metabolic Panel; Future    6. Hyponatremia  CMP ordered    7. Folate deficiency  Taking daily supplement   - Folate; Future    8. Vitamin D deficiency  Taking daily supplement   - Vitamin D 25 Hydroxy; Future    9. [facility] coverage problems  [facility]  10. Financial difficulties  - [facility]    11. Elevated brain natriuretic peptide (BNP) level  - Echocardiogram complete; Future  12. Hypertensive urgency  - Echocardiogram complete; Future  13.  History of stroke  - Echocardiogram complete; Future      While assessing care for this patient, I have reviewed all pertinent lab work/imaging/ specialist notes and care in reference to those problems addressed above in detail. Appropriate medical decision making was based on this. Please note that portions of this note may have been completed with a voice recognition program. Efforts were made to edit the dictations but occasionally words are mis-transcribed. Return in about 3 months (around 1/26/2023) for 30 minute visit, follow up blood pressure.

## 2022-10-28 NOTE — TELEPHONE ENCOUNTER
SW made 2nd call attempt to contact patient to follow-up on Primary Care First referral from PCP. SW explained in message how did receive her message through [address] and on voicemail but wanted to try and make contact today. Tamie left message asking pt to contact SW either by phone or through [address] to inform of a good time/date to connect.

## 2022-10-31 ENCOUNTER — TELEPHONE (OUTPATIENT)
Dept: FAMILY MEDICINE CLINIC | Age: 52
End: 2022-10-31

## 2022-10-31 NOTE — TELEPHONE ENCOUNTER
Pt returned call to JABIER in regards to follow-up on Primary Care First referral from PCP. Pt described many issues she is experiencing and SW assisted pt in identifying what she considers her main prioritizes at this point. Pt stated since her father passed away a few months ago. Since that occurred, her father owned the home where she was living to care for him, her sister is now the executor of the estate, thus pt has to move. Pt also discussed how her father was under care with [address] and other grief/loss issues she has experienced over the years, however, has never received grief counseling. Pt also mentioned having difficulty getting her medications covered under [facility]. SW provided pt with empathy and resources to assist her. JABIER explained how would be helpful for pt to move since pt has the financial resources now to do so. SW also provided pt with information about [facility] for counseling with grief/loss issues. Pt stated she would call to obtain Grief Counseling since her father received care through [address]. JABIER explained to pt how would ask Cfeerino Kline with [facility] to contact her to assist her with information other [facility]. During JABIER assessment of ct's needs, it was determined pt is over resource limit to receive Medicaid. Pt may also be over-resource limit for [facility] as well since received money from father's estate. Pt was informed to contact JABIER as needed for additional resources. JABIER referred pt to [facility] and will follow-up with pt to assess needs further next week or prn.

## 2022-10-31 NOTE — TELEPHONE ENCOUNTER
SW contacted patient to follow-up on Primary Care First referral from PCP. SW informed pt about how she should go ahead and complete [employer] form and fax it to that department. SW provided pt with fax number. SW explained how pt's eligibility is based on her monthly income only and how pt could qualify for assistance after SW spoke with Nadya Duran . Pt thanked JABIER for the information so she could apply for some assistance.

## 2022-11-02 ENCOUNTER — TELEPHONE (OUTPATIENT)
Dept: OTHER | Age: 52
End: 2022-11-02

## 2022-11-02 NOTE — TELEPHONE ENCOUNTER
SIGRID-JABIER attempted to contact patient on this date as referred by PCF. SW left voicemail requesting return call.

## 2022-11-03 ENCOUNTER — COMMUNITY OUTREACH (OUTPATIENT)
Dept: OTHER | Age: 52
End: 2022-11-03

## 2022-11-03 DIAGNOSIS — E53.8 FOLATE DEFICIENCY: ICD-10-CM

## 2022-11-03 DIAGNOSIS — R74.01 TRANSAMINITIS: ICD-10-CM

## 2022-11-03 DIAGNOSIS — E55.9 VITAMIN D DEFICIENCY: ICD-10-CM

## 2022-11-03 LAB
A/G RATIO: 2 (ref 1.1–2.2)
ALBUMIN SERPL-MCNC: 4.4 G/DL (ref 3.4–5)
ALP BLD-CCNC: 120 U/L (ref 40–129)
ALT SERPL-CCNC: 15 U/L (ref 10–40)
ANION GAP SERPL CALCULATED.3IONS-SCNC: 13 MMOL/L (ref 3–16)
AST SERPL-CCNC: 21 U/L (ref 15–37)
BILIRUB SERPL-MCNC: 0.4 MG/DL (ref 0–1)
BUN BLDV-MCNC: 16 MG/DL (ref 7–20)
CALCIUM SERPL-MCNC: 9.4 MG/DL (ref 8.3–10.6)
CHLORIDE BLD-SCNC: 102 MMOL/L (ref 99–110)
CO2: 25 MMOL/L (ref 21–32)
CREAT SERPL-MCNC: 0.6 MG/DL (ref 0.6–1.1)
FOLATE: >20 NG/ML (ref 4.78–24.2)
GFR SERPL CREATININE-BSD FRML MDRD: >60 ML/MIN/{1.73_M2}
GLUCOSE BLD-MCNC: 89 MG/DL (ref 70–99)
POTASSIUM SERPL-SCNC: 5.1 MMOL/L (ref 3.5–5.1)
SODIUM BLD-SCNC: 140 MMOL/L (ref 136–145)
TOTAL PROTEIN: 6.6 G/DL (ref 6.4–8.2)
VITAMIN D 25-HYDROXY: 51.4 NG/ML

## 2022-11-03 NOTE — PROGRESS NOTES
[facility]-SW spoke with patient on this date as referred by PCF SW. SW and patient discussed current income and household circumstances. Patient explained concerns with current coverage. SW to look into Market[employer] plans for patient to switch during open enrollment. Patient reports currently able to get medications and is also working on moving. SW to follow up with patient next Wednesday 11/9 to discuss next steps regarding plans available. Patient voiced understanding.

## 2022-11-09 ENCOUNTER — TELEPHONE (OUTPATIENT)
Dept: OTHER | Age: 52
End: 2022-11-09

## 2022-11-09 NOTE — TELEPHONE ENCOUNTER
[facility]-SW attempted to contact patient on this date to follow up on previous conversation. SW unable to leave message and phone just continued to ring.

## 2022-11-10 ENCOUNTER — COMMUNITY OUTREACH (OUTPATIENT)
Dept: OTHER | Age: 52
End: 2022-11-10

## 2022-11-10 DIAGNOSIS — F41.9 ANXIETY: ICD-10-CM

## 2022-11-10 RX ORDER — CLONAZEPAM 1 MG/1
TABLET ORAL
Qty: 75 TABLET | Refills: 1 | Status: SHIPPED | OUTPATIENT
Start: 2022-11-10 | End: 2022-12-07

## 2022-11-10 NOTE — TELEPHONE ENCOUNTER
Refill Request - Controlled Substance    CONFIRM preferrred pharmacy with the patient. If Mail Order Rx - Pend for 90 day refill. Last Seen Department: 10/26/2022  Last Seen by PCP: 10/26/2022    Last Written: 8/5/22 75 tabs 1 refill     Last UDS: 7/30/21    Med Agreement Signed On: 2/26/2019    If no future appointment scheduled, route STAFF MESSAGE with patient name to the [facility] for scheduling. CONFIRM preferrred pharmacy with the patient. Next Appointment:   Future Appointments   Date Time Provider Rula Emmanuel   1/30/2023  1:00 PM MD TALYA Jenkins  [place] - [place]       Message sent to [employer] to schedule appt with patient?   NO      Requested Prescriptions     Pending Prescriptions Disp Refills    clonazePAM (KLONOPIN) 1 MG tablet 75 tablet 1     Sig: Take 1 tablet my mouth in the morning, take 1.5 tablets by mouth nightly

## 2022-11-10 NOTE — PROGRESS NOTES
SIGRID-JABIER spoke with patient on this date. SW reviewed that Marketplace plans were found that are affordable and cover her medications. SW to e-mail options to patient. SW and patient scheduled phone call for 11/11 at [facility] to go over the plans. Patient voiced understanding.

## 2022-11-11 ENCOUNTER — COMMUNITY OUTREACH (OUTPATIENT)
Dept: OTHER | Age: 52
End: 2022-11-11

## 2022-11-11 NOTE — PROGRESS NOTES
SIGRID-JABIER spoke with patient on this date as scheduled. SW and patient reviewed Market[employer] plans sent to patient and discussed each plan in detail. SW also reviewed how patient can update current Marketplace application and enroll in new plan for next year. Patient asked about getting mammogram and echocardiogram as ordered by PCP. SW advised patient to contact current insurance to see how much of her deductible she met and how much they tests may cost her. Patient may wait until new plan starts. SW also checked and financial assistance application recently submitted was scanned in. Patient to reach out if she hasn't heard an update in a few weeks. Patient to reach out with any questions.

## 2022-11-14 DIAGNOSIS — L98.9 SKIN LESIONS: ICD-10-CM

## 2022-11-14 DIAGNOSIS — R20.8 PAIN OF SKIN: ICD-10-CM

## 2022-11-14 DIAGNOSIS — Z86.73 HISTORY OF STROKE: ICD-10-CM

## 2022-11-14 DIAGNOSIS — D68.59 HYPERCOAGULABLE STATE (HCC): ICD-10-CM

## 2022-11-14 DIAGNOSIS — D68.59 PROTEIN S DEFICIENCY (HCC): ICD-10-CM

## 2022-11-15 RX ORDER — RIVAROXABAN 20 MG/1
TABLET, FILM COATED ORAL
Qty: 90 TABLET | Refills: 1 | Status: SHIPPED | OUTPATIENT
Start: 2022-11-15

## 2022-11-15 RX ORDER — KETOROLAC TROMETHAMINE 30 MG/ML
INJECTION, SOLUTION INTRAMUSCULAR; INTRAVENOUS
Qty: 12 EACH | Refills: 0 | Status: SHIPPED | OUTPATIENT
Start: 2022-11-15

## 2022-11-15 NOTE — TELEPHONE ENCOUNTER
Refill Request     CONFIRM preferrred pharmacy with the patient. If Mail Order Rx - Pend for 90 day refill. Last Seen: Last Seen Department: 10/26/2022  Last Seen by PCP: 10/26/2022    Last Written: 3/11/2022 90 tabs 1 refill    If no future appointment scheduled, route STAFF MESSAGE with patient name to the [facility] for scheduling. Next Appointment:   Future Appointments   Date Time Provider Rula Emmanuel   1/30/2023  1:00 PM [facility]  [place] [facility] [place]       Message sent to [address] [place] [address] to schedule appt with patient?   NO      Requested Prescriptions     Pending Prescriptions Disp Refills    XARELTO 20 MG TABS tablet 90 tablet 1     Sig: TAKE ONE TABLET BY MOUTH DAILY

## 2022-11-15 NOTE — TELEPHONE ENCOUNTER
Refill Request     CONFIRM preferrred pharmacy with the patient. If Mail Order Rx - Pend for 90 day refill. Last Seen: Last Seen Department: 10/26/2022  Last Seen by PCP: 10/26/2022    Last Written: 10/21/2022    If no future appointment scheduled, route STAFF MESSAGE with patient name to the [facility] for scheduling. Next Appointment:   Future Appointments   Date Time Provider Rula Emmanuel   1/30/2023  1:00 PM MD TALYA Mendiola  [place] - [place]       Message sent to [address] [place] [address] to schedule appt with patient?   NO      Requested Prescriptions     Pending Prescriptions Disp Refills    ketorolac (TORADOL) 30 MG/ML injection 12 each 0     Sig: INJECT ONE MILLILITER INTRAMUSCULARLY TWO TIMES A DAY AS NEEDED FOR PAIN

## 2022-11-17 ENCOUNTER — TELEPHONE (OUTPATIENT)
Dept: ADMINISTRATIVE | Age: 52
End: 2022-11-17

## 2022-11-17 NOTE — TELEPHONE ENCOUNTER
PA submitted VIA CMM for  Ketorolac Tromethamine 30 MG/ML injection solutions  Key: [id number] - PA Case ID: Lauro Morgan - Rx #: [id number] PENDING    Denied request. A notification with additional details has been faxed to your office.

## 2022-11-22 NOTE — TELEPHONE ENCOUNTER
SW attempted to reach pt to follow-up on Primary Care First referral from PCP and assess if needs any further assistance. Pt left message asking for a return call and explained was following-up to see if pt needs any further assistance.

## 2022-12-06 DIAGNOSIS — R20.8 PAIN OF SKIN: ICD-10-CM

## 2022-12-06 DIAGNOSIS — L98.9 SKIN LESIONS: ICD-10-CM

## 2022-12-06 RX ORDER — KETOROLAC TROMETHAMINE 30 MG/ML
INJECTION, SOLUTION INTRAMUSCULAR; INTRAVENOUS
Qty: 12 EACH | Refills: 0 | Status: SHIPPED | OUTPATIENT
Start: 2022-12-06

## 2022-12-06 NOTE — TELEPHONE ENCOUNTER
.Refill Request     CONFIRM preferrred pharmacy with the patient. If Mail Order Rx - Pend for 90 day refill. Last Seen: Last Seen Department: 10/26/2022  Last Seen by PCP: 10/26/2022    Last Written: 11/15/22 12 with 0     If no future appointment scheduled, route STAFF MESSAGE with patient name to the [facility] for scheduling. Next Appointment:   Future Appointments   Date Time Provider Department Center   2/22/2023  1:30 PM [facility]       Message sent to [address] [place] [address] to schedule appt with patient?   N/A      Requested Prescriptions     Pending Prescriptions Disp Refills    ketorolac (TORADOL) 30 MG/ML injection 12 each 0     Sig: INJECT ONE MILLILITER INTRAMUSCULARLY TWO TIMES A DAY AS NEEDED FOR PAIN

## 2022-12-06 NOTE — TELEPHONE ENCOUNTER
SW attempted to follow-up with patient to follow-up on Primary Care First referral from PCP. SW left message asking pt to contact SW explaining how is following up with pt to see how she is doing after providing resources to pt and see how she is managing. SW left contact information.

## 2022-12-08 ENCOUNTER — PATIENT MESSAGE (OUTPATIENT)
Dept: FAMILY MEDICINE CLINIC | Age: 52
End: 2022-12-08

## 2022-12-08 DIAGNOSIS — F41.9 ANXIETY: ICD-10-CM

## 2022-12-09 RX ORDER — CLONAZEPAM 1 MG/1
TABLET ORAL
Qty: 75 TABLET | Refills: 1 | OUTPATIENT
Start: 2022-12-09 | End: 2023-01-04

## 2022-12-09 NOTE — TELEPHONE ENCOUNTER
From: April Buck  To: Dr. Gabriela Winslow: 12/8/2022 10:35 PM EST  Subject: Clonazepam refill    Dear Dr. Jahaira Thomas I requested a Clonazepam Refill when I just realized I have a refill. You've told me to request it 4 days before I might need it, but at the time I did not realize I have a Refill.  Please disregard my request. Michael Servin

## 2022-12-09 NOTE — TELEPHONE ENCOUNTER
Patient sent [facility] message staying that she doesn't need a refill. Unable to obtain sputum culture from patient at this time

## 2022-12-15 ENCOUNTER — TELEPHONE (OUTPATIENT)
Dept: OTHER | Age: 52
End: 2022-12-15

## 2022-12-16 ENCOUNTER — COMMUNITY OUTREACH (OUTPATIENT)
Dept: OTHER | Age: 52
End: 2022-12-16

## 2022-12-16 NOTE — PROGRESS NOTES
ALLEN-JABIER spoke with patient on this date who wanted to check on the status of her financial assistance. SW checked and it still has not been processed. SW to call financial assistance and check on why. SW will follow up with patient.

## 2022-12-19 ENCOUNTER — COMMUNITY OUTREACH (OUTPATIENT)
Dept: OTHER | Age: 52
End: 2022-12-19

## 2022-12-19 NOTE — PROGRESS NOTES
SIGRID-JABIER spoke with patient on this date after checking with financial assistance. SW advised patient that it may take a few more weeks for them to process. Patient to call back on 1/6 if she still has not heard anything for SW to check accounts.

## 2023-01-13 DIAGNOSIS — F41.9 ANXIETY: ICD-10-CM

## 2023-01-13 DIAGNOSIS — R20.8 PAIN OF SKIN: ICD-10-CM

## 2023-01-13 DIAGNOSIS — L98.9 SKIN LESIONS: ICD-10-CM

## 2023-01-13 RX ORDER — CLONAZEPAM 1 MG/1
TABLET ORAL
Qty: 75 TABLET | Refills: 0 | Status: SHIPPED | OUTPATIENT
Start: 2023-01-13 | End: 2023-02-09

## 2023-01-13 RX ORDER — KETOROLAC TROMETHAMINE 30 MG/ML
INJECTION, SOLUTION INTRAMUSCULAR; INTRAVENOUS
Qty: 12 EACH | Refills: 0 | Status: SHIPPED | OUTPATIENT
Start: 2023-01-13

## 2023-01-13 NOTE — TELEPHONE ENCOUNTER
.Refill Request - Controlled Substance    CONFIRM preferrred pharmacy with the patient. If Mail Order Rx - Pend for 90 day refill. Last Seen Department: 10/26/2022  Last Seen by PCP: 10/26/2022    Last Written: 11/10/22 75 with 1     Last UDS: 7/30/21    Med Agreement Signed On: 2/26/19    If no future appointment scheduled, route STAFF MESSAGE with patient name to the [facility] for scheduling. CONFIRM preferrred pharmacy with the patient. Next Appointment:   Future Appointments   Date Time Provider Department Center   2/22/2023  1:30 PM Jose Angel Oliver MD [facility] [place] [employer] [facility]       Message sent to [address] [place] [address] to schedule appt with patient?   N/A      Requested Prescriptions     Pending Prescriptions Disp Refills    clonazePAM (KLONOPIN) 1 MG tablet 75 tablet 0     Sig: Take 1 tablet my mouth in the morning, take 1.5 tablets by mouth nightly    ketorolac (TORADOL) 30 MG/ML injection 12 each 0     Sig: INJECT ONE MILLILITER INTRAMUSCULARLY TWO TIMES A DAY AS NEEDED FOR PAIN

## 2023-01-18 ENCOUNTER — COMMUNITY OUTREACH (OUTPATIENT)
Dept: OTHER | Age: 53
End: 2023-01-18

## 2023-01-18 NOTE — PROGRESS NOTES
SIGRID-SW spoke with patient on this date and advised that financial assistance was approved until 4/1/2023 for 75%. SW advised to call billing department to end automatic payment plan. Patient voiced understanding.

## 2023-01-31 ENCOUNTER — HOSPITAL ENCOUNTER (EMERGENCY)
Age: 53
Discharge: HOME | End: 2023-01-31
Payer: COMMERCIAL

## 2023-01-31 ENCOUNTER — APPOINTMENT (OUTPATIENT)
Dept: GENERAL RADIOLOGY | Age: 53
End: 2023-01-31
Payer: COMMERCIAL

## 2023-01-31 VITALS
DIASTOLIC BLOOD PRESSURE: 97 MMHG | HEART RATE: 84 BPM | SYSTOLIC BLOOD PRESSURE: 152 MMHG | OXYGEN SATURATION: 100 % | HEIGHT: 65 IN | TEMPERATURE: 98.9 F | BODY MASS INDEX: 28.66 KG/M2 | RESPIRATION RATE: 16 BRPM | WEIGHT: 172 LBS

## 2023-01-31 DIAGNOSIS — M25.551 RIGHT HIP PAIN: Primary | ICD-10-CM

## 2023-01-31 PROCEDURE — 99283 EMERGENCY DEPT VISIT LOW MDM: CPT

## 2023-01-31 PROCEDURE — 73502 X-RAY EXAM HIP UNI 2-3 VIEWS: CPT

## 2023-01-31 PROCEDURE — 6370000000 HC RX 637 (ALT 250 FOR IP): Performed by: PHYSICIAN ASSISTANT

## 2023-01-31 RX ORDER — HYDROCODONE BITARTRATE AND ACETAMINOPHEN 5; 325 MG/1; MG/1
1 TABLET ORAL ONCE
Status: COMPLETED | OUTPATIENT
Start: 2023-01-31 | End: 2023-01-31

## 2023-01-31 RX ORDER — IBUPROFEN 800 MG/1
800 TABLET ORAL ONCE
Status: COMPLETED | OUTPATIENT
Start: 2023-01-31 | End: 2023-01-31

## 2023-01-31 RX ORDER — ACETAMINOPHEN 325 MG/1
650 TABLET ORAL ONCE
Status: COMPLETED | OUTPATIENT
Start: 2023-01-31 | End: 2023-01-31

## 2023-01-31 RX ADMIN — HYDROCODONE BITARTRATE AND ACETAMINOPHEN 1 TABLET: 5; 325 TABLET ORAL at 13:10

## 2023-01-31 RX ADMIN — ACETAMINOPHEN 650 MG: 325 TABLET ORAL at 13:10

## 2023-01-31 RX ADMIN — IBUPROFEN 800 MG: 800 TABLET, FILM COATED ORAL at 13:10

## 2023-01-31 ASSESSMENT — PAIN DESCRIPTION - PAIN TYPE: TYPE: ACUTE PAIN

## 2023-01-31 ASSESSMENT — PAIN DESCRIPTION - ORIENTATION: ORIENTATION: RIGHT

## 2023-01-31 ASSESSMENT — PAIN - FUNCTIONAL ASSESSMENT: PAIN_FUNCTIONAL_ASSESSMENT: 0-10

## 2023-01-31 ASSESSMENT — PAIN DESCRIPTION - LOCATION: LOCATION: LEG

## 2023-01-31 ASSESSMENT — PAIN SCALES - GENERAL
PAINLEVEL_OUTOF10: 10
PAINLEVEL_OUTOF10: 10
PAINLEVEL_OUTOF10: 8

## 2023-01-31 NOTE — DISCHARGE INSTRUCTIONS
-Take 400mg Ibuprofen (Motrin) and 500mg Acetaminophen (Tylenol) every 4 hours for pain.   -Come back if you feel worse.   -Follow up with ortho and physical therapy.

## 2023-01-31 NOTE — ED NOTES
Pt instructed to follow up with PCP. Assessed per Little ROGERS.      Bebe Cedillo LPN  29/58/50 [id number]

## 2023-02-01 NOTE — ED PROVIDER NOTES
[facility]  ED  EMERGENCY DEPARTMENT ENCOUNTER        Pt Name: Sakshi Singleton  MRN: [id number]  [place] 1970  Date of evaluation: 1/31/2023  Provider: KEN Calvo  PCP: Jatin Srivastava MD  Note Started: 8:11 PM EST       KARTHIKEYAN. I have evaluated this patient. My supervising physician was available for consultation. CHIEF COMPLAINT       Chief Complaint   Patient presents with    Leg Pain     Pt reporting right leg pain (upper thigh) for the past month. States she's had NO injury. States she does have history of blood clots. HISTORY OF PRESENT ILLNESS      Chief Complaint: Right hip pain    Sakshi Singleton is a 47 y.o. female who presents right hip pain. Worse with motion better with rest.  Ongoing for past month. No trauma. Has a history of blood clots, currently taking Eliquis. She denies shortness of breath. No chest pain. No nausea or vomiting. She uses a cane, because of the pain in her hip. She denies leg swelling. No fevers or chills. She reports has been moving out of her place, and when carrying heavy boxes. She normally does not exercise. SCREENINGS           Is this patient to be included in the SEP-1 Core Measure due to severe sepsis or septic shock? No   Exclusion criteria - the patient is NOT to be included for SEP-1 Core Measure due to: Infection is not suspected      PHYSICAL EXAM     Vitals: BP (!) 152/97 Comment: pt takes b/p meds daily  Pulse 84   Temp 98.9 °F (37.2 °C) (Oral)   Resp 16   Ht 5' 5\" (1.651 m)   Wt 172 lb (78 kg)   LMP 06/30/2015   SpO2 100%   BMI 28.62 kg/m²    General: awake, alert, no apparent distress  Pupils: equal, reactive  Head: Non-traumatic  Heart: Rate as noted, regular rhythm, no murmur or rubs. Chest/Lungs: CTAB, no wheezes or crackles  Abdomen: soft, nondistended, no tenderness to palpation   right lower extremity: No swelling, redness. No pain in the lower extremity. No pain at the knee. Anterior hip pain. Worse with resisted hip flexion. No pain with internal or external hip rotation. Neuro: no facial droop, no slurred speech, answers questions appropriately. Skin: Warm. No visible rash, lesions, or bruising       DIAGNOSTIC RESULTS   LABS:    Labs Reviewed - No data to display    EKG: When ordered, EKG's are interpreted by the Emergency Department Physician in the absence of a cardiologist.  Please see their note for interpretation of EKG. RADIOLOGY:   XR HIP 2-3 VW W PELVIS RIGHT   Final Result   No acute findings or significant degenerative change           XR HIP 2-3 VW W PELVIS RIGHT    Result Date: 1/31/2023  EXAMINATION: ONE XRAY VIEW OF THE PELVIS AND TWO XRAY VIEWS RIGHT HIP 1/31/2023 12:56 pm COMPARISON: None. HISTORY: ORDERING SYSTEM PROVIDED HISTORY: pain. no trauma TECHNOLOGIST PROVIDED HISTORY: Reason for exam:->pain. no trauma Reason for Exam: pain in right hip FINDINGS: No fracture or dislocation. No osteonecrosis. Both hip joint spaces are grossly preserved. No acute findings or significant degenerative change       No results found. PROCEDURES       CRITICAL CARE TIME   I personally saw the patient and independently provided 0 minutes of non-concurrent critical care time out of the total critical care time provided. This excludes time spent doing separately billable procedures. This includes time at the bedside, data interpretation, medication management, obtaining critical history from collateral sources if the patient is unable to provide it directly, and physician consultation. Specifics of interventions taken and potentially life-threatening diagnostic considerations are listed above in the medical decision making.     CONSULTS   None    ED COURSE and MEDICAL DECISIONS MAKING:   Vitals:    Vitals:    01/31/23 1213 01/31/23 1343 01/31/23 1359   BP: (!) 149/99  (!) 152/97   Pulse: (!) 120 93 84   Resp: 20  16   Temp: 98.9 °F (37.2 °C)     TempSrc: Oral SpO2: 95%  100%   Weight: 172 lb (78 kg)     Height: 5' 5\" (1.651 m)       MEDICATIONS:  Medications   HYDROcodone-acetaminophen (NORCO) 5-325 MG per tablet 1 tablet (1 tablet Oral Given 1/31/23 1310)   acetaminophen (TYLENOL) tablet 650 mg (650 mg Oral Given 1/31/23 1310)   ibuprofen (ADVIL;MOTRIN) tablet 800 mg (800 mg Oral Given 1/31/23 1310)           43-year-old female presents with right hip pain. Has a history of blood clot, patient is concerned about blood clot. Presented initially tachycardic, but this resolved after observation and pain control. Does not have redness, swelling of her leg. Has pain located in the anterior part of her hip, not her deep vasculature. She has no shortness of breath. She is satting at 100%. Given her benign presentation, as well as her current anticoagulation use I do not suspect that this is a DVT. I do suspect that this is an overuse injury, as she has recently increased her exercise because she is moving out of her current living situation and into another 1. She has pain, worse with motion. She has been using a cane because of this. She currently has a pain contract, we will not be prescribing controlled substances for her pain. Recommended she follow-up with orthopedics, as well as physical therapy for continued management. Her work-up included a right hip x-ray, which showed no fractures or masses. FINAL IMPRESSION      1.  Right hip pain          DISPOSITION/PLAN   DISPOSITION Decision To Discharge 01/31/2023 01:43:26 PM      PATIENT REFERRED TO:  Tayla Cordova PT  [id number] [facility], [address] [facility] [id number]  [address] [facility] [address]  [address] [place] [address]  [phone]        DISCHARGE MEDICATIONS:  Discharge Medication List as of 1/31/2023  1:57 PM          KEN Castro (electronically signed)        KEN Raymundo  02/03/23 [id number]

## 2023-02-15 DIAGNOSIS — R20.8 PAIN OF SKIN: ICD-10-CM

## 2023-02-15 DIAGNOSIS — F41.9 ANXIETY: ICD-10-CM

## 2023-02-15 DIAGNOSIS — L98.9 SKIN LESIONS: ICD-10-CM

## 2023-02-16 RX ORDER — CLONAZEPAM 1 MG/1
TABLET ORAL
Qty: 75 TABLET | Refills: 0 | Status: SHIPPED | OUTPATIENT
Start: 2023-02-16 | End: 2023-03-14

## 2023-02-16 RX ORDER — KETOROLAC TROMETHAMINE 30 MG/ML
INJECTION, SOLUTION INTRAMUSCULAR; INTRAVENOUS
Qty: 12 EACH | Refills: 0 | Status: SHIPPED | OUTPATIENT
Start: 2023-02-16

## 2023-02-16 NOTE — TELEPHONE ENCOUNTER
Refill Request - Controlled Substance    CONFIRM preferrred pharmacy with the patient. If Mail Order Rx - Pend for 90 day refill. Last Seen Department: 10/26/2022  Last Seen by PCP: 10/26/2022    Last Written:   Klonopin-01/13/2023 75 tablet 0 refills   Toradol-01/13/2023 12 each 0 refills     Last UDS: 07/30/2021    Med Agreement Signed On: 02/26/2019    If no future appointment scheduled, route STAFF MESSAGE with patient name to the [facility] for scheduling. CONFIRM preferrred pharmacy with the patient. Next Appointment:   Future Appointments   Date Time Provider Department Center   2/22/2023  1:30 PM Jake Massey MD [facility]       Message sent to [address] [place] [address] to schedule appt with patient?   NO      Requested Prescriptions     Pending Prescriptions Disp Refills    clonazePAM (KLONOPIN) 1 MG tablet 75 tablet 0     Sig: Take 1 tablet my mouth in the morning, take 1.5 tablets by mouth nightly    ketorolac (TORADOL) 30 MG/ML injection 12 each 0     Sig: INJECT ONE MILLILITER INTRAMUSCULARLY TWO TIMES A DAY AS NEEDED FOR PAIN

## 2023-02-22 ENCOUNTER — OFFICE VISIT (OUTPATIENT)
Dept: FAMILY MEDICINE CLINIC | Age: 53
End: 2023-02-22
Payer: COMMERCIAL

## 2023-02-22 VITALS
SYSTOLIC BLOOD PRESSURE: 134 MMHG | HEIGHT: 65 IN | HEART RATE: 104 BPM | WEIGHT: 172 LBS | OXYGEN SATURATION: 99 % | TEMPERATURE: 97 F | BODY MASS INDEX: 28.66 KG/M2 | DIASTOLIC BLOOD PRESSURE: 80 MMHG

## 2023-02-22 DIAGNOSIS — M47.816 SPONDYLOSIS OF LUMBAR SPINE: ICD-10-CM

## 2023-02-22 DIAGNOSIS — M51.36 DDD (DEGENERATIVE DISC DISEASE), LUMBAR: ICD-10-CM

## 2023-02-22 DIAGNOSIS — M25.551 CHRONIC RIGHT HIP PAIN: ICD-10-CM

## 2023-02-22 DIAGNOSIS — G89.29 CHRONIC PAIN OF RIGHT INGUINAL REGION: ICD-10-CM

## 2023-02-22 DIAGNOSIS — G89.29 CHRONIC RIGHT HIP PAIN: ICD-10-CM

## 2023-02-22 DIAGNOSIS — G62.9 NEUROPATHY: ICD-10-CM

## 2023-02-22 DIAGNOSIS — F43.21 COMPLICATED GRIEF: Primary | ICD-10-CM

## 2023-02-22 DIAGNOSIS — F33.2 SEVERE EPISODE OF RECURRENT MAJOR DEPRESSIVE DISORDER, WITHOUT PSYCHOTIC FEATURES (HCC): ICD-10-CM

## 2023-02-22 DIAGNOSIS — R10.31 CHRONIC PAIN OF RIGHT INGUINAL REGION: ICD-10-CM

## 2023-02-22 DIAGNOSIS — M43.10 RETROLISTHESIS OF VERTEBRAE: ICD-10-CM

## 2023-02-22 PROCEDURE — 3074F SYST BP LT 130 MM HG: CPT | Performed by: FAMILY MEDICINE

## 2023-02-22 PROCEDURE — 3078F DIAST BP <80 MM HG: CPT | Performed by: FAMILY MEDICINE

## 2023-02-22 PROCEDURE — 99215 OFFICE O/P EST HI 40 MIN: CPT | Performed by: FAMILY MEDICINE

## 2023-02-22 RX ORDER — DULOXETIN HYDROCHLORIDE 20 MG/1
20 CAPSULE, DELAYED RELEASE ORAL DAILY
Qty: 30 CAPSULE | Refills: 3 | Status: SHIPPED | OUTPATIENT
Start: 2023-02-22

## 2023-02-22 SDOH — ECONOMIC STABILITY: FOOD INSECURITY: WITHIN THE PAST 12 MONTHS, YOU WORRIED THAT YOUR FOOD WOULD RUN OUT BEFORE YOU GOT MONEY TO BUY MORE.: NEVER TRUE

## 2023-02-22 SDOH — ECONOMIC STABILITY: INCOME INSECURITY: HOW HARD IS IT FOR YOU TO PAY FOR THE VERY BASICS LIKE FOOD, HOUSING, MEDICAL CARE, AND HEATING?: NOT HARD AT ALL

## 2023-02-22 SDOH — ECONOMIC STABILITY: FOOD INSECURITY: WITHIN THE PAST 12 MONTHS, THE FOOD YOU BOUGHT JUST DIDN'T LAST AND YOU DIDN'T HAVE MONEY TO GET MORE.: NEVER TRUE

## 2023-02-22 SDOH — ECONOMIC STABILITY: HOUSING INSECURITY
IN THE LAST 12 MONTHS, WAS THERE A TIME WHEN YOU DID NOT HAVE A STEADY PLACE TO SLEEP OR SLEPT IN A SHELTER (INCLUDING NOW)?: NO

## 2023-02-22 ASSESSMENT — PATIENT HEALTH QUESTIONNAIRE - PHQ9
SUM OF ALL RESPONSES TO PHQ9 QUESTIONS 1 & 2: 6
8. MOVING OR SPEAKING SO SLOWLY THAT OTHER PEOPLE COULD HAVE NOTICED. OR THE OPPOSITE, BEING SO FIGETY OR RESTLESS THAT YOU HAVE BEEN MOVING AROUND A LOT MORE THAN USUAL: 1
6. FEELING BAD ABOUT YOURSELF - OR THAT YOU ARE A FAILURE OR HAVE LET YOURSELF OR YOUR FAMILY DOWN: 1
10. IF YOU CHECKED OFF ANY PROBLEMS, HOW DIFFICULT HAVE THESE PROBLEMS MADE IT FOR YOU TO DO YOUR WORK, TAKE CARE OF THINGS AT HOME, OR GET ALONG WITH OTHER PEOPLE: 0
9. THOUGHTS THAT YOU WOULD BE BETTER OFF DEAD, OR OF HURTING YOURSELF: 0
SUM OF ALL RESPONSES TO PHQ QUESTIONS 1-9: 18
7. TROUBLE CONCENTRATING ON THINGS, SUCH AS READING THE NEWSPAPER OR WATCHING TELEVISION: 1
3. TROUBLE FALLING OR STAYING ASLEEP: 3
5. POOR APPETITE OR OVEREATING: 3
4. FEELING TIRED OR HAVING LITTLE ENERGY: 3
1. LITTLE INTEREST OR PLEASURE IN DOING THINGS: 3
SUM OF ALL RESPONSES TO PHQ QUESTIONS 1-9: 18
2. FEELING DOWN, DEPRESSED OR HOPELESS: 3

## 2023-02-22 ASSESSMENT — ANXIETY QUESTIONNAIRES
7. FEELING AFRAID AS IF SOMETHING AWFUL MIGHT HAPPEN: 3
3. WORRYING TOO MUCH ABOUT DIFFERENT THINGS: 3
IF YOU CHECKED OFF ANY PROBLEMS ON THIS QUESTIONNAIRE, HOW DIFFICULT HAVE THESE PROBLEMS MADE IT FOR YOU TO DO YOUR WORK, TAKE CARE OF THINGS AT HOME, OR GET ALONG WITH OTHER PEOPLE: VERY DIFFICULT
6. BECOMING EASILY ANNOYED OR IRRITABLE: 2
2. NOT BEING ABLE TO STOP OR CONTROL WORRYING: 3
4. TROUBLE RELAXING: 2
1. FEELING NERVOUS, ANXIOUS, OR ON EDGE: 3
5. BEING SO RESTLESS THAT IT IS HARD TO SIT STILL: 0
GAD7 TOTAL SCORE: 16

## 2023-02-22 NOTE — PROGRESS NOTES
2/22/2023    This is a 47 y.o. female who presents for  Chief Complaint   Patient presents with    Hip Pain     Right hip pain that radiates to her quad and calf, pain becomes worse with activity. HPI:     CC per above  Pain is worsening progressively   +Burning and stinging   Different than chronic headache  Could hardly walk from bedroom to bathroom  Thought about going to the ER on Sunday  Makes it difficult to ambulate      Skin lesions are flaring, bothering her     Different from when she had her wobbly leg problem     Left the house she was taking care of her father in for 6.5 years, took care of her mother there as well, having issues moving on from this   Tried seeing [facility], didn't work out for family members     Saw pain management 3 weeks ago, pain was not happening then  [place] to the ER, XR   No fracture or dislocation. No osteonecrosis.   Both hip joint spaces are   grossly preserved       Past Medical History:   Diagnosis Date    Benign essential tremor     BRCA1 negative     Colon polyp 05/06/2019    Degenerative disc disease     DVT, lower extremity (HCC) 1989    Fibromyalgia     Kidney stones     Left-sided trigeminal neuralgia     Malignant neoplasm of thyroid gland ([address] 8/22/2013    Migraines, neuralgic     since stroke 1999 Chronic    PONV (postoperative nausea and vomiting)     Poor venous access     Protein S deficiency ([address]     Pulmonary embolism ([address] 1989    Seizure disorder ([address]     grand mal in 2005 ( childhood febrile seizure also-from a baby to age 14) and also X 1 ~2005 with headache treatment    Stroke ([address] 2/25/2019    Thrombosis of superior sagittal sinus 1999    Unspecified cerebral artery occlusion with cerebral infarction 1999    Vertebral artery occlusion 1999    White coat syndrome with high blood pressure but without hypertension        Past Surgical History:   Procedure Laterality Date    BREAST BIOPSY      BREAST LUMPECTOMY      x 3 CYSTOSCOPY  01/09/2012    w/ left ureteroscopy, holmium laser. stone manipulation and left stent insertion    FINGER TRIGGER RELEASE Left 10/31/2019    LEFT THUMB TRIGGER DIGIT RELEASE performed by Rose Hauser MD at [address] [place] [address] 83 Left 2000    pins later removed    FOOT SURGERY Left 07/10/2017    LARYNX SURGERY      vocal cord nodule    LITHOTRIPSY  12/11    REFRACTIVE SURGERY      THYROIDECTOMY  05/18/2011            Social History     Socioeconomic History    Marital status: Single     Spouse name: Not on file    Number of children: Not on file    Years of education: Not on file    Highest education level: Not on file   Occupational History    Not on file   Tobacco Use    Smoking status: Never    Smokeless tobacco: Never   Vaping Use    Vaping Use: Not on file   Substance and Sexual Activity    Alcohol use: No    Drug use: No    Sexual activity: Not on file   Other Topics Concern    Not on file   Social History Narrative    Not on file     Social Determinants of Health     Financial Resource Strain: Low Risk     Difficulty of Paying Living Expenses: Not hard at all   Food Insecurity: No Food Insecurity    Worried About Running Out of Food in the Last Year: Never true    Ran Out of Food in the Last Year: Never true   Transportation Needs: Unknown    Lack of Transportation (Medical): Not on file    Lack of Transportation (Non-Medical):  No   Physical Activity: Not on file   Stress: Not on file   Social Connections: Not on file   Intimate Partner Violence: Not on file   Housing Stability: Unknown    Unable to Pay for Housing in the Last Year: Not on file    Number of Places Lived in the Last Year: Not on file    Unstable Housing in the Last Year: No       Family History   Problem Relation Age of Onset    Cancer Mother         breast x2, ovarian x1, brain     High Cholesterol Mother     Breast Cancer Mother     Ovarian Cancer Mother     High Cholesterol Father     Other Father     Migraines Sister     Other Sister     Thyroid Disease Maternal Grandmother        Current Outpatient Medications   Medication Sig Dispense Refill    DULoxetine (CYMBALTA) 20 MG extended release capsule Take 1 capsule by mouth daily 30 capsule 3    Handicap Placard MISC by Does not apply route 1 each 0    clonazePAM (KLONOPIN) 1 MG tablet Take 1 tablet my mouth in the morning, take 1.5 tablets by mouth nightly 75 tablet 0    ketorolac (TORADOL) 30 MG/ML injection INJECT ONE MILLILITER INTRAMUSCULARLY TWO TIMES A DAY AS NEEDED FOR PAIN 12 each 0    XARELTO 20 MG TABS tablet TAKE ONE TABLET BY MOUTH DAILY 90 tablet 1    mupirocin (BACTROBAN) 2 % ointment Apply 3 times daily 90 g 5    clobetasol (TEMOVATE) 0.05 % cream Apply topically 2 times daily. 180 g 5    losartan (COZAAR) 100 MG tablet TAKE ONE TABLET BY MOUTH DAILY 90 tablet 1    levothyroxine (SYNTHROID) 200 MCG tablet TAKE ONE TABLET BY MOUTH DAILY 90 tablet 1    amLODIPine (NORVASC) 10 MG tablet Take 1 tablet by mouth daily 90 tablet 1    hydroCHLOROthiazide (HYDRODIURIL) 25 MG tablet Take 0.5 tablets by mouth 2 times daily 90 tablet 1    ondansetron (ZOFRAN ODT) 4 MG disintegrating tablet Take 1 tablet by mouth every 8 hours as needed for Nausea 20 tablet 0    folic acid (FOLVITE) 1 MG tablet Take 1 tablet by mouth in the morning.  90 tablet 1    cyclobenzaprine (FLEXERIL) 10 MG tablet Take 10 mg by mouth 3 times daily as needed for Muscle spasms      SYRINGE-NEEDLE, DISP, 3 ML (B-D 3CC LUER-ALEX SYR 25GX1\") 25G X 1\" 3 ML MISC Use to inject into the muscle two times a day as needed for pain 100 each 2    carBAMazepine (TEGRETOL XR) 400 MG extended release tablet Take 1 tablet by mouth every 8 hours      atorvastatin (LIPITOR) 40 MG tablet TAKE ONE TABLET BY MOUTH DAILY 90 tablet 1    Melatonin 10 MG TABS Take by mouth      diphenhydrAMINE (SOMINEX) 25 MG tablet Take 50 mg by mouth nightly as needed      docusate (COLACE, DULCOLAX) 100 MG CAPS Take 100 mg by mouth 2 times daily as needed       morphine (MS CONTIN) 15 MG extended release tablet 30 mg 2 times daily. fentaNYL (ACTIQ) 800 MCG lollipop Take 1 each by mouth 3 times daily as needed. naloxone 4 MG/0.1ML LIQD nasal spray 1 spray by Nasal route as needed for Opioid Reversal      Cyanocobalamin (VITAMIN B-12) 5000 MCG SUBL Take 1 each by mouth every other day      Cholecalciferol (VITAMIN D3) 2000 units CAPS Take 1 capsule by mouth daily      Doxylamine Succinate, Sleep, (UNISOM PO) Take by mouth      primidone (MYSOLINE) 250 MG tablet Take 250 mg by mouth 3 times daily       vitamin D (ERGOCALCIFEROL) 1.25 MG (41883 UT) CAPS capsule Take 1 capsule by mouth once a week for 12 doses 12 capsule 1     No current facility-administered medications for this visit. Immunization History   Administered Date(s) Administered    Influenza, FLUARIX, FLULAVAL, FLUZONE (age 10 mo+) AND AFLURIA, (age 1 y+), PF, 0.5mL 09/19/2019, 10/14/2020    Influenza, FLUCELVAX, (age 10 mo+), MDCK, PF, 0.5mL 10/26/2022    Tdap (Boostrix, Adacel) 04/04/2019    Zoster Recombinant (Shingrix) 02/05/2020, 02/17/2020, 09/25/2020       Allergies   Allergen Reactions    Dilaudid [Hydromorphone Hcl] Shortness Of Breath, Itching and Other (See Comments)     Wheezing    Buprenorphine Itching, Nausea And Vomiting and Other (See Comments)     Tingling in lips    Elavil [Amitriptyline]      Seizure with high dose     Gabapentin     Gluten Meal      Celiac disease    Hydromorphone Itching and Other (See Comments)    Morphine Itching     Is able to take MS Contin     Pregabalin      Lyrica (personality changes)     Propranolol Hives    Topiramate Other (See Comments)    Zoloft [Sertraline]        Review of Systems   Constitutional:  Negative for activity change, chills, diaphoresis, fatigue, fever and unexpected weight change. Respiratory:  Negative for shortness of breath.     Cardiovascular:  Negative for chest pain and leg swelling. Gastrointestinal:  Negative for abdominal pain, nausea and vomiting. Genitourinary:  Negative for difficulty urinating. Musculoskeletal:  Positive for arthralgias, back pain, gait problem and myalgias. Negative for joint swelling, neck pain and neck stiffness. Skin:  Positive for color change and wound. Negative for pallor and rash. Allergic/Immunologic: Negative for immunocompromised state. Neurological:  Positive for weakness and numbness. Negative for headaches. Hematological:  Negative for adenopathy. Psychiatric/Behavioral:  Positive for dysphoric mood and sleep disturbance. The patient is nervous/anxious. /80   Pulse (!) 104   Temp 97 °F (36.1 °C)   Ht 5' 5\" (1.651 m)   Wt 172 lb (78 kg)   LMP 06/30/2015   SpO2 99%   BMI 28.62 kg/m²     Physical Exam  Vitals and nursing note reviewed. Constitutional:       General: She is not in acute distress. Appearance: Normal appearance. She is well-developed. She is not diaphoretic. HENT:      Head: Normocephalic and atraumatic. Eyes:      Extraocular Movements: Extraocular movements intact. Conjunctiva/sclera: Conjunctivae normal.      Pupils: Pupils are equal, round, and reactive to light. Cardiovascular:      Rate and Rhythm: Normal rate and regular rhythm. Pulmonary:      Effort: Pulmonary effort is normal. No respiratory distress. Abdominal:      Palpations: Abdomen is soft. Tenderness: There is no abdominal tenderness. Musculoskeletal:         General: No swelling, tenderness, deformity or signs of injury. Normal range of motion. Cervical back: Normal range of motion and neck supple. Skin:     General: Skin is warm and dry. Coloration: Skin is not pale. Findings: No erythema or rash. Neurological:      Mental Status: She is alert and oriented to person, place, and time. Psychiatric:         Attention and Perception: She is attentive.          Mood and Affect: Mood normal. Speech: Speech normal.         Behavior: Behavior normal.         Thought Content: Thought content normal.         Judgment: Judgment normal.       Plan  1. Complicated grief  - External Referral To Psychology  - DULoxetine (CYMBALTA) 20 MG extended release capsule; Take 1 capsule by mouth daily  Dispense: 30 capsule; Refill: 3    2. Severe episode of recurrent major depressive disorder, without psychotic features ([facility]  - External Referral To Psychology  - DULoxetine (CYMBALTA) 20 MG extended release capsule; Take 1 capsule by mouth daily  Dispense: 30 capsule; Refill: 3    3. Chronic right hip pain  - MRI HIP RIGHT WO CONTRAST; Future  - DULoxetine (CYMBALTA) 20 MG extended release capsule; Take 1 capsule by mouth daily  Dispense: 30 capsule; Refill: 3  - Handicap Placard MISC; by Does not apply route  Dispense: 1 each; Refill: 0    4. Chronic pain of right inguinal region  - MRI HIP RIGHT WO CONTRAST; Future  - DULoxetine (CYMBALTA) 20 MG extended release capsule; Take 1 capsule by mouth daily  Dispense: 30 capsule; Refill: 3  - Handicap Placard MISC; by Does not apply route  Dispense: 1 each; Refill: 0    5. Neuropathy  - MRI HIP RIGHT WO CONTRAST; Future  - DULoxetine (CYMBALTA) 20 MG extended release capsule; Take 1 capsule by mouth daily  Dispense: 30 capsule; Refill: 3  - Handicap Placard MISC; by Does not apply route  Dispense: 1 each; Refill: 0    6. Spondylosis of lumbar spine  - MRI LUMBAR SPINE WO CONTRAST; Future  - DULoxetine (CYMBALTA) 20 MG extended release capsule; Take 1 capsule by mouth daily  Dispense: 30 capsule; Refill: 3  - Handicap Placard MISC; by Does not apply route  Dispense: 1 each; Refill: 0    7. DDD (degenerative disc disease), lumbar  - MRI LUMBAR SPINE WO CONTRAST; Future  - DULoxetine (CYMBALTA) 20 MG extended release capsule; Take 1 capsule by mouth daily  Dispense: 30 capsule; Refill: 3  - Handicap Placard MISC; by Does not apply route  Dispense: 1 each; Refill: 0    8. Retrolisthesis of vertebra   - MRI LUMBAR SPINE WO CONTRAST; Future  - DULoxetine (CYMBALTA) 20 MG extended release capsule; Take 1 capsule by mouth daily  Dispense: 30 capsule; Refill: 3  - Handicap Placard MISC; by Does not apply route  Dispense: 1 each; Refill: 0    I have spent 40 minutes on patient care, with more than 50% spent counseling and coordinating care for diagnoses and plans listed above. While assessing care for this patient, I have reviewed all pertinent lab work/imaging/ specialist notes and care in reference to those problems addressed above in detail. Appropriate medical decision making was based on this. Please note that portions of this note may have been completed with a voice recognition program. Efforts were made to edit the dictations but occasionally words are mis-transcribed. Return if symptoms worsen or fail to improve.

## 2023-02-22 NOTE — PATIENT INSTRUCTIONS
[facility] (previously [facility]) (multiple locations)   [address]   [address] [facility] (previously [address] [facility])  [address] [facility]  [address], [facility] [address] [facility], [address] [facility],[address]  [address] [facility]  [place], [address]  [phone]      Psychiatrists    Dr. Jolee Schilder Dr. Donna Mast  [facility]    [address], [address]   [place], [address] [address] [facility]  [phone]    Dr. Louie Sexton, [place]. [address] [facility] [address] [facility], [address]   [place], [address]  [address] [facility]      [facility]   Dr. Gabi Friedmano [address]   33 [facility], [facility] [address] [place], [address]   [place], [address] [facility] [address]     [phone]    Dr. Heide Mina Sink. [place], [address]   [place], [address]  [address]      Dr. Danielito Momin [place], [place]. [address]   [address]  [place], [address] [facility] [address]   [place], [address]  [phone]    Dr. Victoria Crowder. [address] [facility], [facility] [address], [address] [facility] [address]  [address] [facility] [address], [address] [facility] [address]   [place], [address] [facility],[address]  [phone]    [address]) [facility]  [address]    [address]  [facility], [address]    [place], [address] [facility],[address]  [phone]    [employer] ([address])  The [address]   [address] [facility] [address] [facility], [address] [facility]        [phone]

## 2023-02-22 NOTE — LETTER
[address]  [address]  [address] [facility] [address]  Phone: [phone]  Fax: [phone]    Charlene Almonte MD         February 22, 2023     Patient: Giovany Boogie   YOB: 1970   Date of Visit: 2/22/2023       To Whom It May Concern: It is my medical opinion that José Miguel Quevedo requires a disability parking placard for the following reasons:  She cannot walk 200 feet without stopping to rest.  Duration of need: 1 year    If you have any questions or concerns, please don't hesitate to call.     Sincerely,        Charlene Almonte MD

## 2023-02-24 ENCOUNTER — TELEPHONE (OUTPATIENT)
Dept: FAMILY MEDICINE CLINIC | Age: 53
End: 2023-02-24

## 2023-02-25 ENCOUNTER — HOSPITAL ENCOUNTER (EMERGENCY)
Age: 53
Discharge: HOME | End: 2023-02-25
Payer: COMMERCIAL

## 2023-02-25 VITALS
TEMPERATURE: 98.6 F | RESPIRATION RATE: 18 BRPM | DIASTOLIC BLOOD PRESSURE: 86 MMHG | OXYGEN SATURATION: 100 % | SYSTOLIC BLOOD PRESSURE: 143 MMHG | HEART RATE: 90 BPM

## 2023-02-25 DIAGNOSIS — M53.3 PAIN OF RIGHT SACROILIAC JOINT: ICD-10-CM

## 2023-02-25 DIAGNOSIS — M25.551 RIGHT HIP PAIN: Primary | ICD-10-CM

## 2023-02-25 LAB
A/G RATIO: 1.5 (ref 1.1–2.2)
ALBUMIN SERPL-MCNC: 4.4 G/DL (ref 3.4–5)
ALP BLD-CCNC: 111 U/L (ref 40–129)
ALT SERPL-CCNC: 13 U/L (ref 10–40)
AMORPHOUS: ABNORMAL /HPF
AMPHETAMINE SCREEN, URINE: ABNORMAL
ANION GAP SERPL CALCULATED.3IONS-SCNC: 14 MMOL/L (ref 3–16)
AST SERPL-CCNC: 22 U/L (ref 15–37)
BARBITURATE SCREEN URINE: POSITIVE
BASOPHILS ABSOLUTE: 0 K/UL (ref 0–0.2)
BASOPHILS RELATIVE PERCENT: 0.7 %
BENZODIAZEPINE SCREEN, URINE: POSITIVE
BILIRUB SERPL-MCNC: 0.3 MG/DL (ref 0–1)
BILIRUBIN URINE: ABNORMAL
BLOOD, URINE: NEGATIVE
BUN BLDV-MCNC: 13 MG/DL (ref 7–20)
C-REACTIVE PROTEIN: 11.5 MG/L (ref 0–5.1)
CALCIUM SERPL-MCNC: 9.8 MG/DL (ref 8.3–10.6)
CANNABINOID SCREEN URINE: ABNORMAL
CHLORIDE BLD-SCNC: 100 MMOL/L (ref 99–110)
CLARITY: CLEAR
CO2: 24 MMOL/L (ref 21–32)
COCAINE METABOLITE SCREEN URINE: ABNORMAL
COLOR: YELLOW
CREAT SERPL-MCNC: <0.5 MG/DL (ref 0.6–1.1)
EOSINOPHILS ABSOLUTE: 0 K/UL (ref 0–0.6)
EOSINOPHILS RELATIVE PERCENT: 0.9 %
EPITHELIAL CELLS, UA: ABNORMAL /HPF (ref 0–5)
FENTANYL SCREEN, URINE: POSITIVE
GFR SERPL CREATININE-BSD FRML MDRD: >60 ML/MIN/{1.73_M2}
GLUCOSE BLD-MCNC: 86 MG/DL (ref 70–99)
GLUCOSE URINE: NEGATIVE MG/DL
HCT VFR BLD CALC: 40.5 % (ref 36–48)
HEMOGLOBIN: 13.3 G/DL (ref 12–16)
HYALINE CASTS: ABNORMAL /LPF (ref 0–2)
INR BLD: 0.98 (ref 0.87–1.14)
KETONES, URINE: 40 MG/DL
LEUKOCYTE ESTERASE, URINE: ABNORMAL
LYMPHOCYTES ABSOLUTE: 0.6 K/UL (ref 1–5.1)
LYMPHOCYTES RELATIVE PERCENT: 12.3 %
Lab: ABNORMAL
MCH RBC QN AUTO: 29.1 PG (ref 26–34)
MCHC RBC AUTO-ENTMCNC: 32.9 G/DL (ref 31–36)
MCV RBC AUTO: 88.6 FL (ref 80–100)
METHADONE SCREEN, URINE: ABNORMAL
MICROSCOPIC EXAMINATION: YES
MONOCYTES ABSOLUTE: 0.3 K/UL (ref 0–1.3)
MONOCYTES RELATIVE PERCENT: 5.8 %
MUCUS: ABNORMAL /LPF
NEUTROPHILS ABSOLUTE: 4.2 K/UL (ref 1.7–7.7)
NEUTROPHILS RELATIVE PERCENT: 80.3 %
NITRITE, URINE: NEGATIVE
OPIATE SCREEN URINE: POSITIVE
OXYCODONE URINE: ABNORMAL
PDW BLD-RTO: 14.8 % (ref 12.4–15.4)
PH UA: 8.5
PH UA: 8.5 (ref 5–8)
PHENCYCLIDINE SCREEN URINE: ABNORMAL
PLATELET # BLD: 269 K/UL (ref 135–450)
PMV BLD AUTO: 8.5 FL (ref 5–10.5)
POTASSIUM REFLEX MAGNESIUM: 4 MMOL/L (ref 3.5–5.1)
PROTEIN UA: ABNORMAL MG/DL
PROTHROMBIN TIME: 12.9 SEC (ref 11.7–14.5)
RBC # BLD: 4.57 M/UL (ref 4–5.2)
RBC UA: ABNORMAL /HPF (ref 0–4)
SEDIMENTATION RATE, ERYTHROCYTE: 14 MM/HR (ref 0–30)
SODIUM BLD-SCNC: 138 MMOL/L (ref 136–145)
SPECIFIC GRAVITY UA: 1.01 (ref 1–1.03)
TOTAL PROTEIN: 7.3 G/DL (ref 6.4–8.2)
URINE REFLEX TO CULTURE: ABNORMAL
URINE TYPE: ABNORMAL
UROBILINOGEN, URINE: 0.2 E.U./DL
WBC # BLD: 5.2 K/UL (ref 4–11)
WBC UA: ABNORMAL /HPF (ref 0–5)

## 2023-02-25 PROCEDURE — 85610 PROTHROMBIN TIME: CPT

## 2023-02-25 PROCEDURE — 80053 COMPREHEN METABOLIC PANEL: CPT

## 2023-02-25 PROCEDURE — 99284 EMERGENCY DEPT VISIT MOD MDM: CPT

## 2023-02-25 PROCEDURE — 80307 DRUG TEST PRSMV CHEM ANLYZR: CPT

## 2023-02-25 PROCEDURE — 85025 COMPLETE CBC W/AUTO DIFF WBC: CPT

## 2023-02-25 PROCEDURE — 96374 THER/PROPH/DIAG INJ IV PUSH: CPT

## 2023-02-25 PROCEDURE — 6360000002 HC RX W HCPCS: Performed by: PHYSICIAN ASSISTANT

## 2023-02-25 PROCEDURE — 85652 RBC SED RATE AUTOMATED: CPT

## 2023-02-25 PROCEDURE — 2580000003 HC RX 258: Performed by: PHYSICIAN ASSISTANT

## 2023-02-25 PROCEDURE — 81001 URINALYSIS AUTO W/SCOPE: CPT

## 2023-02-25 PROCEDURE — 86140 C-REACTIVE PROTEIN: CPT

## 2023-02-25 PROCEDURE — 96361 HYDRATE IV INFUSION ADD-ON: CPT

## 2023-02-25 RX ORDER — 0.9 % SODIUM CHLORIDE 0.9 %
1000 INTRAVENOUS SOLUTION INTRAVENOUS ONCE
Status: COMPLETED | OUTPATIENT
Start: 2023-02-25 | End: 2023-02-25

## 2023-02-25 RX ORDER — FENTANYL CITRATE 50 UG/ML
50 INJECTION, SOLUTION INTRAMUSCULAR; INTRAVENOUS ONCE
Status: COMPLETED | OUTPATIENT
Start: 2023-02-25 | End: 2023-02-25

## 2023-02-25 RX ADMIN — SODIUM CHLORIDE 1000 ML: 9 INJECTION, SOLUTION INTRAVENOUS at 16:48

## 2023-02-25 RX ADMIN — FENTANYL CITRATE 50 MCG: 0.05 INJECTION, SOLUTION INTRAMUSCULAR; INTRAVENOUS at 16:49

## 2023-02-25 ASSESSMENT — PAIN SCALES - GENERAL
PAINLEVEL_OUTOF10: 7
PAINLEVEL_OUTOF10: 10
PAINLEVEL_OUTOF10: 10

## 2023-02-25 ASSESSMENT — PAIN DESCRIPTION - ORIENTATION
ORIENTATION: OTHER (COMMENT)
ORIENTATION: RIGHT

## 2023-02-25 ASSESSMENT — PAIN DESCRIPTION - LOCATION
LOCATION: HIP
LOCATION: GENERALIZED

## 2023-02-25 ASSESSMENT — PAIN - FUNCTIONAL ASSESSMENT: PAIN_FUNCTIONAL_ASSESSMENT: 0-10

## 2023-02-25 ASSESSMENT — PAIN DESCRIPTION - DESCRIPTORS: DESCRIPTORS: ACHING

## 2023-02-26 NOTE — ED PROVIDER NOTES
[facility]  ED  EMERGENCY DEPARTMENT ENCOUNTER        Pt Name: Ivana Saldana  MRN: [id number]  [place] 1970  Date of evaluation: 2/25/2023  Provider: Remi Gill PA-C  PCP: Kenisha Edgar MD  ED Attending: Alaina Bailey DO      KARTHIKEYAN. I have evaluated this patient. My supervising physician was available for consultation. CHIEF COMPLAINT:     Chief Complaint   Patient presents with    Hip Pain     Right hip pain that radiates to groin pain feels like burning, was seen at -PCP- on Wednesday supposed to get -MRI- , chronic headaches from previous CVA, pt is tearful in triage       HISTORY OF PRESENT ILLNESS:      History provided by the patient. No limitations. Ivana Saldana is a 47 y.o. female who arrives to the ED by vehicle. Patient here reporting right low back and right hip pain that is been going on for close to 2 months. She was evaluated in the ED on 1/31/2023 for this and had x-rays of the right hip which were negative. She followed up with primary care yesterday. Based on the ongoing and in fact worsening pain she was ordered MRIs of her lumbar spine and right hip. They were ordered to be done \"ASAP\". Patient called and reports these are scheduled approximately 10 business days out from yesterday. She is here in pain and frustrated that she could not get the MRI sooner. She arrives to the ED with the expectation that she can have these test done here today. Patient reports 10/10 pain. She admits to chronic pain. She is on MS Contin and fentanyl lollipops at home but states they do not help her. On arrival she is upset, tearful, tachycardic, hypertensive. Nursing Notes were reviewed     REVIEW OF SYSTEMS:     Review of Systems  Positives and pertinent negatives as per HPI.       PAST MEDICAL HISTORY:     Past Medical History:   Diagnosis Date    Benign essential tremor     BRCA1 negative     Colon polyp 05/06/2019    Degenerative disc disease     DVT, lower extremity ([address] 1989    Fibromyalgia     Kidney stones     Left-sided trigeminal neuralgia     Malignant neoplasm of thyroid gland ([address] 8/22/2013    Migraines, neuralgic     since stroke 1999 Chronic    PONV (postoperative nausea and vomiting)     Poor venous access     Protein S deficiency ([address]     Pulmonary embolism ([address] 1989    Seizure disorder ([address]     grand mal in 2005 ( childhood febrile seizure also-from a baby to age 14) and also X 1 ~2005 with headache treatment    Stroke ([address] 2/25/2019    Thrombosis of superior sagittal sinus 1999    Unspecified cerebral artery occlusion with cerebral infarction 1999    Vertebral artery occlusion 1999    White coat syndrome with high blood pressure but without hypertension        SURGICAL HISTORY:      Past Surgical History:   Procedure Laterality Date    BREAST BIOPSY      BREAST LUMPECTOMY      x 3    CYSTOSCOPY  01/09/2012    w/ left ureteroscopy, holmium laser.  stone manipulation and left stent insertion    FINGER TRIGGER RELEASE Left 10/31/2019    LEFT THUMB TRIGGER DIGIT RELEASE performed by Shwetha Burns MD at [address] [place] [address] 83 Left 2000    pins later removed    FOOT SURGERY Left 07/10/2017    LARYNX SURGERY      vocal cord nodule    LITHOTRIPSY  12/11    REFRACTIVE SURGERY      THYROIDECTOMY  05/18/2011            CURRENT MEDICATIONS:       Discharge Medication List as of 2/25/2023  5:36 PM        CONTINUE these medications which have NOT CHANGED    Details   DULoxetine (CYMBALTA) 20 MG extended release capsule Take 1 capsule by mouth daily, Disp-30 capsule, R-3Normal      Handicap [facility] Starting Wed 2/22/2023, Disp-1 each, R-0, Print      clonazePAM (KLONOPIN) 1 MG tablet Take 1 tablet my mouth in the morning, take 1.5 tablets by mouth nightly, Disp-75 tablet, R-0Normal      ketorolac (TORADOL) 30 MG/ML injection INJECT ONE MILLILITER INTRAMUSCULARLY TWO TIMES A DAY AS NEEDED FOR PAIN, Disp-12 each, R-0Normal XARELTO 20 MG TABS tablet TAKE ONE TABLET BY MOUTH DAILY, Disp-90 tablet, R-1, DAWNormal      mupirocin (BACTROBAN) 2 % ointment Apply 3 times daily, Disp-90 g, R-5, Normal      clobetasol (TEMOVATE) 0.05 % cream Apply topically 2 times daily. , Disp-180 g, R-5, Normal      losartan (COZAAR) 100 MG tablet TAKE ONE TABLET BY MOUTH DAILY, Disp-90 tablet, R-1Normal      levothyroxine (SYNTHROID) 200 MCG tablet TAKE ONE TABLET BY MOUTH DAILY, Disp-90 tablet, R-1Normal      amLODIPine (NORVASC) 10 MG tablet Take 1 tablet by mouth daily, Disp-90 tablet, R-1Normal      hydroCHLOROthiazide (HYDRODIURIL) 25 MG tablet Take 0.5 tablets by mouth 2 times daily, Disp-90 tablet, R-1Normal      ondansetron (ZOFRAN ODT) 4 MG disintegrating tablet Take 1 tablet by mouth every 8 hours as needed for Nausea, Disp-20 tablet, [id number]      folic acid (FOLVITE) 1 MG tablet Take 1 tablet by mouth in the morning., Disp-90 tablet, R-1Normal      vitamin D (ERGOCALCIFEROL) 1.25 MG (04376 UT) CAPS capsule Take 1 capsule by mouth once a week for 12 doses, Disp-12 capsule, R-1Normal      cyclobenzaprine (FLEXERIL) 10 MG tablet Take 10 mg by mouth 3 times daily as needed for Muscle spasmsHistorical Med      SYRINGE-NEEDLE, DISP, 3 ML (B-D 3CC LUER-ALEX SYR 25GX1\") 25G X 1\" 3 ML MISC Disp-100 each, R-2, NormalUse to inject into the muscle two times a day as needed for pain      carBAMazepine (TEGRETOL XR) 400 MG extended release tablet Take 1 tablet by mouth every 8 hoursHistorical Med      atorvastatin (LIPITOR) 40 MG tablet TAKE ONE TABLET BY MOUTH DAILY, Disp-90 tablet, R-1Normal      Melatonin 10 MG TABS Take by mouthHistorical Med      diphenhydrAMINE (SOMINEX) 25 MG tablet Take 50 mg by mouth nightly as neededHistorical Med      docusate (COLACE, DULCOLAX) 100 MG CAPS Take 100 mg by mouth 2 times daily as needed Historical Med      morphine (MS CONTIN) 15 MG extended release tablet 30 mg 2 times daily.  Historical Med      fentaNYL (ACTIQ) 800 MCG lollipop Take 1 each by mouth 3 times daily as needed. Historical Med      naloxone 4 MG/0.1ML LIQD nasal spray 1 spray by Nasal route as needed for Opioid ReversalHistorical Med      Cyanocobalamin (VITAMIN B-12) 5000 MCG SUBL Take 1 each by mouth every other dayHistorical Med      Cholecalciferol (VITAMIN D3) 2000 units CAPS Take 1 capsule by mouth dailyHistorical Med      Doxylamine Succinate, Sleep, (UNISOM PO) Take by mouthHistorical Med      primidone (MYSOLINE) 250 MG tablet Take 250 mg by mouth 3 times daily Historical Med             ALLERGIES:    Dilaudid [hydromorphone hcl], Buprenorphine, Elavil [amitriptyline], Gabapentin, Gluten meal, Hydromorphone, Morphine, Pregabalin, Propranolol, Topiramate, and Zoloft [sertraline]    FAMILY HISTORY:     @[facility]    SOCIAL HISTORY:       Social History     Socioeconomic History    Marital status: Single   Tobacco Use    Smoking status: Never    Smokeless tobacco: Never   Substance and Sexual Activity    Alcohol use: No    Drug use: No     Social Determinants of Health     Financial Resource Strain: Low Risk     Difficulty of Paying Living Expenses: Not hard at all   Food Insecurity: No Food Insecurity    Worried About Running Out of Food in the Last Year: Never true    Ran Out of Food in the Last Year: Never true   Transportation Needs: Unknown    Lack of Transportation (Non-Medical): No   Housing Stability: Unknown    Unstable Housing in the Last Year: No       SCREENINGS:    Rosaline Coma Scale  Eye Opening: Spontaneous  Best Verbal Response: Oriented  Best Motor Response: Obeys commands  Rosaline Coma Scale Score: 15      CIWA Assessment  BP: (!) 143/86  Heart Rate: 90        PHYSICAL EXAM:       ED Triage Vitals [02/25/23 1500]   BP Temp Temp src Heart Rate Resp SpO2 Height Weight   (!) 188/123 98.6 °F (37 °C) -- (!) 113 18 100 % -- --       Physical Exam    CONSTITUTIONAL: Awake and alert. Cooperative. Well-developed. Well-nourished. Non-toxic.   Initially upset, tearful and in pain  HENT: Normocephalic. Atraumatic. External ears normal, without discharge. No nasal discharge. Oropharynx clear. Mucous membranes moist.  EYES: Conjunctiva non-injected. No scleral icterus. PERRL. NECK: Supple. Normal ROM. CARDIOVASCULAR: RRR. Intact distal pulses. PULMONARY/CHEST WALL: Effort normal. No tachypnea. Lungs clear to ausculation. ABDOMEN: Soft. Nondistended. No tenderness to palpate. MUSKULOSKELETAL: Normal ROM. No acute deformities. No edema. Back: No midline spinous process tenderness to the T or L-spine. There is tenderness to palpate over the right SI joint. No bony tenderness. No crepitus. No overlying skin changes. Right hip: Patient describes tenderness laterally. On exam, no reproducible bony tenderness, supple, crepitus. SKIN: Warm and dry. No rash. No erythema. NEUROLOGICAL: Alert and oriented x 3. GCS 15. CN II-XII grossly intact. Strength is 5/5 in all extremities and sensation is intact. Normal gait.    PSYCHIATRIC: Normal affect      DIAGNOSTICRESULTS:     LABS:    Results for orders placed or performed during the hospital encounter of 02/25/23   CBC with Auto Differential   Result Value Ref Range    WBC 5.2 4.0 - 11.0 K/uL    RBC 4.57 4.00 - 5.20 M/uL    Hemoglobin 13.3 12.0 - 16.0 g/dL    Hematocrit 40.5 36.0 - 48.0 %    MCV 88.6 80.0 - 100.0 fL    MCH 29.1 26.0 - 34.0 pg    MCHC 32.9 31.0 - 36.0 g/dL    RDW 14.8 12.4 - 15.4 %    Platelets 193 052 - 767 K/uL    MPV 8.5 5.0 - 10.5 fL    Neutrophils % 80.3 %    Lymphocytes % 12.3 %    Monocytes % 5.8 %    Eosinophils % 0.9 %    Basophils % 0.7 %    Neutrophils Absolute 4.2 1.7 - 7.7 K/uL    Lymphocytes Absolute 0.6 (L) 1.0 - 5.1 K/uL    Monocytes Absolute 0.3 0.0 - 1.3 K/uL    Eosinophils Absolute 0.0 0.0 - 0.6 K/uL    Basophils Absolute 0.0 0.0 - 0.2 K/uL   CMP w/ Reflex to MG   Result Value Ref Range    Sodium 138 136 - 145 mmol/L    Potassium reflex Magnesium 4.0 3.5 - 5.1 mmol/L Chloride 100 99 - 110 mmol/L    CO2 24 21 - 32 mmol/L    Anion Gap 14 3 - 16    Glucose 86 70 - 99 mg/dL    BUN 13 7 - 20 mg/dL    Creatinine <0.5 (L) 0.6 - 1.1 mg/dL    Est, Glom Filt Rate >60 >60    Calcium 9.8 8.3 - 10.6 mg/dL    Total Protein 7.3 6.4 - 8.2 g/dL    Albumin 4.4 3.4 - 5.0 g/dL    Albumin/Globulin Ratio 1.5 1.1 - 2.2    Total Bilirubin 0.3 0.0 - 1.0 mg/dL    Alkaline Phosphatase 111 40 - 129 U/L    ALT 13 10 - 40 U/L    AST 22 15 - 37 U/L   Urinalysis with Reflex to Culture    Specimen: Urine   Result Value Ref Range    Color, UA Yellow Straw/Yellow    Clarity, UA Clear Clear    Glucose, Ur Negative Negative mg/dL    Bilirubin Urine SMALL (A) Negative    Ketones, Urine 40 (A) Negative mg/dL    Specific Gravity, UA 1.015 1.005 - 1.030    Blood, Urine Negative Negative    pH, UA 8.5 (A) 5.0 - 8.0    Protein, UA TRACE (A) Negative mg/dL    Urobilinogen, Urine 0.2 <2.0 E.U./dL    Nitrite, Urine Negative Negative    Leukocyte Esterase, Urine TRACE (A) Negative    Microscopic Examination YES     Urine Type Other     Urine Reflex to Culture Not Indicated    Urine Drug Screen   Result Value Ref Range    Amphetamine Screen, Urine Neg Negative <1000ng/mL    Barbiturate Screen, Ur POSITIVE (A) Negative <200 ng/mL    Benzodiazepine Screen, Urine POSITIVE (A) Negative <200 ng/mL    Cannabinoid Scrn, Ur Neg Negative <50 ng/mL    Cocaine Metabolite Screen, Urine Neg Negative <300 ng/mL    Opiate Scrn, Ur POSITIVE (A) Negative <300 ng/mL    PCP Screen, Urine Neg Negative <25 ng/mL    Methadone Screen, Urine Neg Negative <300 ng/mL    Oxycodone Urine Neg Negative <100 ng/ml    FENTANYL SCREEN, URINE POSITIVE (A) Negative <5 ng/mL    pH, UA 8.5     Drug Screen Comment: see below    Sedimentation Rate   Result Value Ref Range    Sed Rate 14 0 - 30 mm/Hr   C-Reactive Protein   Result Value Ref Range    CRP 11.5 (H) 0.0 - 5.1 mg/L   Protime-INR   Result Value Ref Range    Protime 12.9 11.7 - 14.5 sec    INR 0.98 0.87 - 1.14   Microscopic Urinalysis   Result Value Ref Range    Hyaline Casts, UA 0-2 0 - 2 /LPF    Mucus, UA 4+ (A) None Seen /LPF    WBC, UA 3-5 0 - 5 /HPF    RBC, UA 3-4 0 - 4 /HPF    Epithelial Cells, UA 2-5 0 - 5 /HPF    Amorphous, UA 1+ /HPF         RADIOLOGY:  All x-ray studies are viewed/reviewed by me. Formal interpretations per the radiologist are as follows:      XR HIP 2-3 VW W PELVIS RIGHT    Result Date: 1/31/2023  EXAMINATION: ONE XRAY VIEW OF THE PELVIS AND TWO XRAY VIEWS RIGHT HIP 1/31/2023 12:56 pm COMPARISON: None. HISTORY: ORDERING SYSTEM PROVIDED HISTORY: pain. no trauma TECHNOLOGIST PROVIDED HISTORY: Reason for exam:->pain. no trauma Reason for Exam: pain in right hip FINDINGS: No fracture or dislocation. No osteonecrosis. Both hip joint spaces are grossly preserved. No acute findings or significant degenerative change         PROCEDURES:   N/A    CRITICAL CARE TIME:       Due to the immediate potential for life-threatening deterioration due to initial tachycardia and hypertension, I spent 31 minutes providing critical care. This time is excluding time spent performing procedures. CONSULTS:  None      EMERGENCY DEPARTMENT COURSE and DIFFERENTIAL DIAGNOSIS/MDM:   Vitals:    Vitals:    02/25/23 1532 02/25/23 1649 02/25/23 1720 02/25/23 1740   BP: (!) 167/128 (!) 139/94  (!) 143/86   Pulse:  89 88 90   Resp:  20 15 18   Temp:       SpO2:  99% 94% 100%       Patient was given the following medications:  Medications   0.9 % sodium chloride bolus (0 mLs IntraVENous Stopped 2/25/23 1748)   fentaNYL (SUBLIMAZE) injection 50 mcg (50 mcg IntraVENous Given 2/25/23 1649)          CC/HPI Summary, DDx, ED course, and Reassessment: Patient here with atraumatic right lower back and right hip pain. Had x-ray of right hip about a month ago that was negative. Is scheduled for outpatient MRI.   On her initial complaint, the amount of pain she was in, her tachycardia and hypertension I considered: ACUTE VERTEBRAL FRACTURE, ABDOMINAL AORTIC ANEURYSM, CAUDA EQUINA SYNDROME, EPIDURAL MASS LESION, SPINAL STENOSIS, OR HERNIATED DISK CAUSING SEVERE STENOSIS, OSTEOMYELITIS, DISCITIS.  I did think that vitals on arrival were likely abnormal given that she was so upset about being unable to get MRIs more quickly and was in so much pain.  However, felt she warranted a bigger work-up with labs to check CBC, inflammatory markers, etc.  Ultimately, patient has a normal white count.  Normal sed rate at 14.  CRP is elevated at 11.5, but I believe is nonspecific as other labs are normal.  Drug screen with many positives of these seem to be prescribed medications.  While here, patient did receive a 50 mcg dose of fentanyl IV and with this she has settled down.  Heart rate has normalized as has blood pressure.  She is feeling better.    Consults: None  Discussion with other professionals (optional): None  Social determinants (optional): None  Chronic conditions: chronic pain  Records reviewed: ED visit and primary care visit    Disposition Considerations: Patient here with low back and right hip pain.  This has been going on for close to 2 months.  She denies IV drug use.  She is not a diabetic.  No cancer or immunosuppression.  She had screening x-ray of the right hip a month ago that was unremarkable.  Screening labs are reassuring as she has no leukocytosis and a normal sed rate.  She is doing much better after a single dose of fentanyl.  Vital signs of normalized.  She is offered a walker.  I do not see an indication for urgent/emergent MRI tonight and she is ultimately excepting of this.  She should call her PCP on Monday for further concerns but continue pain medication at home, as prescribed.  Employed shared decision making.       FINAL IMPRESSION:      1. Right hip pain    2. Pain of right sacroiliac joint          DISPOSITION/PLAN:   DISPOSITION Decision To Discharge      PATIENT REFERRED TO:  Cheryl Weaver MD  [address]            DISCHARGE MEDICATIONS:  Discharge Medication List as of 2/25/2023  5:36 PM                     (Please note thatportions of this note were completed with a voice recognition program.  Efforts were made to edit the dictations, but occasionally words are mis-transcribed.)    Finley Councilman, PA-C (electronicallysigned)              Abdiel [place], [place]  02/25/23 1943

## 2023-02-27 ENCOUNTER — TELEPHONE (OUTPATIENT)
Dept: FAMILY MEDICINE CLINIC | Age: 53
End: 2023-02-27

## 2023-02-27 DIAGNOSIS — M54.41 CHRONIC MIDLINE LOW BACK PAIN WITH RIGHT-SIDED SCIATICA: ICD-10-CM

## 2023-02-27 DIAGNOSIS — R26.2 AMBULATORY DYSFUNCTION: ICD-10-CM

## 2023-02-27 DIAGNOSIS — R29.90 ABNORMAL NEUROLOGICAL EXAM: ICD-10-CM

## 2023-02-27 DIAGNOSIS — M25.551 RIGHT HIP PAIN: Primary | ICD-10-CM

## 2023-02-27 DIAGNOSIS — G89.29 CHRONIC MIDLINE LOW BACK PAIN WITH RIGHT-SIDED SCIATICA: ICD-10-CM

## 2023-02-27 NOTE — TELEPHONE ENCOUNTER
LM for pt to call back  I was going to ask her when to schedule first before scheduling or she can call to schedule if she calls back please let her know

## 2023-02-27 NOTE — TELEPHONE ENCOUNTER
Patient needs her imaging orders changed from ASAP to STAT before they will do them. Patient is in a lot of pain and tried to go to ER on the 25 th. They refused to do any kind of imaging Stating that why would we do that its the weekend and she is very displeased about her experience there. They gave her some fluids and did blood work and sent her away.

## 2023-02-28 ENCOUNTER — HOSPITAL ENCOUNTER (OUTPATIENT)
Dept: MRI IMAGING | Age: 53
Discharge: HOME | End: 2023-02-28
Payer: COMMERCIAL

## 2023-02-28 DIAGNOSIS — M54.41 CHRONIC MIDLINE LOW BACK PAIN WITH RIGHT-SIDED SCIATICA: ICD-10-CM

## 2023-02-28 DIAGNOSIS — G89.29 CHRONIC MIDLINE LOW BACK PAIN WITH RIGHT-SIDED SCIATICA: ICD-10-CM

## 2023-02-28 DIAGNOSIS — R29.90 ABNORMAL NEUROLOGICAL EXAM: ICD-10-CM

## 2023-02-28 DIAGNOSIS — R26.2 AMBULATORY DYSFUNCTION: ICD-10-CM

## 2023-02-28 DIAGNOSIS — M25.551 RIGHT HIP PAIN: ICD-10-CM

## 2023-02-28 PROCEDURE — 73721 MRI JNT OF LWR EXTRE W/O DYE: CPT

## 2023-02-28 PROCEDURE — 72148 MRI LUMBAR SPINE W/O DYE: CPT

## 2023-03-01 ENCOUNTER — TELEPHONE (OUTPATIENT)
Dept: FAMILY MEDICINE CLINIC | Age: 53
End: 2023-03-01

## 2023-03-01 DIAGNOSIS — M48.062 SPINAL STENOSIS OF LUMBAR REGION WITH NEUROGENIC CLAUDICATION: Primary | ICD-10-CM

## 2023-03-01 DIAGNOSIS — Z59.82 TRANSPORTATION UNAVAILABLE: ICD-10-CM

## 2023-03-01 DIAGNOSIS — M16.11 PRIMARY OSTEOARTHRITIS OF RIGHT HIP: ICD-10-CM

## 2023-03-01 DIAGNOSIS — M25.551 RIGHT HIP PAIN: ICD-10-CM

## 2023-03-01 DIAGNOSIS — M24.151 DEGENERATIVE TEAR OF ACETABULAR LABRUM OF RIGHT HIP: ICD-10-CM

## 2023-03-01 PROBLEM — M47.816 SPONDYLOSIS OF LUMBAR SPINE: Status: ACTIVE | Noted: 2023-03-01

## 2023-03-01 PROBLEM — F43.21 COMPLICATED GRIEF: Status: ACTIVE | Noted: 2023-03-01

## 2023-03-01 PROBLEM — G89.29 CHRONIC RIGHT HIP PAIN: Status: ACTIVE | Noted: 2023-03-01

## 2023-03-01 PROBLEM — G62.9 NEUROPATHY: Status: ACTIVE | Noted: 2023-03-01

## 2023-03-01 PROBLEM — F33.2 SEVERE EPISODE OF RECURRENT MAJOR DEPRESSIVE DISORDER, WITHOUT PSYCHOTIC FEATURES (HCC): Status: ACTIVE | Noted: 2023-03-01

## 2023-03-01 PROBLEM — M43.10 RETROLISTHESIS OF VERTEBRAE: Status: ACTIVE | Noted: 2023-03-01

## 2023-03-01 SDOH — ECONOMIC STABILITY - TRANSPORTATION SECURITY: TRANSPORTATION INSECURITY: Z59.82

## 2023-03-01 ASSESSMENT — ENCOUNTER SYMPTOMS
ABDOMINAL PAIN: 0
SHORTNESS OF BREATH: 0
COLOR CHANGE: 1
BACK PAIN: 1
NAUSEA: 0
VOMITING: 0

## 2023-03-01 NOTE — TELEPHONE ENCOUNTER
Pt calling tearful, she is anxious worried and concerned regarding her MRI results pt stated that she lives alone and knows she was told to give us two days to read them but with them being released via [employer] pt is worried and wants to know if these can be looked and and read asap with a phone call with results.  Pt stated that if she dont answer to please leave the results on her VM    Good call back #: [phone]

## 2023-03-01 NOTE — PROGRESS NOTES
MRI of Lumbar spine and MRI of R hip reviewed in detail with pt    Neurosurgery referral and Orthopedic referral numbers given to pt    Encouraged her to call her pain management physician in the morning and update them on findings.  Can fax reports as needed     SW requested again due to lack of transportation

## 2023-03-02 ENCOUNTER — TELEPHONE (OUTPATIENT)
Dept: FAMILY MEDICINE CLINIC | Age: 53
End: 2023-03-02

## 2023-03-02 DIAGNOSIS — M48.061 SPINAL STENOSIS OF LUMBAR REGION WITHOUT NEUROGENIC CLAUDICATION: Primary | ICD-10-CM

## 2023-03-02 NOTE — TELEPHONE ENCOUNTER
Patient called in very stressed about the referrals she got yesterday as they are far away and patient is a little worried about travelling. Patient asked instead of going to [facility] if she would see ortho cinjamison in [facility]. Patient really wants your input if you want her to stay with what you have In there. patient is okay with that just wants something closer but really wants your input.  Please advise

## 2023-03-02 NOTE — TELEPHONE ENCOUNTER
[facility] Social Work Note    Reason for Referral   Transportation, [address] , and Other:  Insurance coverage for services     Plan:    Pt will contact resources provided by TAMIE for transportation to see if she can obtain transportation to appointments. Pt will also contact grief counselor, Ms Loli Reyes at [phone], when she feels ready to obtain these services. Pt will contact SW to inform of updates and about additional need for assistance. Assessment/Summary:    SW contacted patient to follow-up on [facility] referral from PCP to assess current needs. Pt related she had tried to contact [facility] from when TAMIE had given her this resource on last contact but pt never got connected with grief counseling. Pt related she has had many personal health issues since that time and has really not had the time to follow-up but still fels she could use some grief counseling. Pt related she is currently needing assistance with transportation since she will need to see doctors and possibly have surgery in the future. Pt stated she would be able to drive herself to appointments in [facility] but if it is further away she will need assistance. Pt plans to contact TAMIE after looking into the resources provided so can continue to assist her further as needed. Interventions:    TAMIE assisted in providing pt with information about a counselor, Ms Loli Reyes at [phone], with [facility] to obtain grief counseling. TAMIE recommended pt contact her insurance company to see if they can provide transportation. TAMIE recommended pt contact [employer] at [phone] option 2. Tamie provided pt with information about contacting [facility] on Tues/TH from 11am-3pm at [phone] about transportation need.     TAMIE also suggested pt contact her [employer] to see if she can get a volunteer for transportation to her appointments and her insurance company.      Future Appointments   Date Time Provider Department Center   3/7/2023  1:30 PM Hamilton Santillan PA-C AND [facility]

## 2023-03-03 NOTE — TELEPHONE ENCOUNTER
Patient called in and didn't realize the orthopedics and sport medicine was in [place] and patient is okay with that travel. Patient has an appt on tueday with them. It is the [facility] that she wants to know if there is anywhere closer to [place] as she doesn't know the [facility] area and is very stressed about travelling there.  Please advise

## 2023-03-03 NOTE — TELEPHONE ENCOUNTER
Please see the MRI findings in her lumbar spine and previous notes regarding lack of transportation. She is unable to go to [facility]. Are you able/willing to see this sweet pt, or would you know of any closer options for her? Thank you !

## 2023-03-03 NOTE — TELEPHONE ENCOUNTER
Dr. Florina Canavan is willing to see her! Referral placed. Please give her the scheduling information. Thank you.  This is in place of the [facility] referral.

## 2023-03-07 ENCOUNTER — OFFICE VISIT (OUTPATIENT)
Dept: ORTHOPEDIC SURGERY | Age: 53
End: 2023-03-07
Payer: COMMERCIAL

## 2023-03-07 VITALS — HEIGHT: 65 IN | BODY MASS INDEX: 28.66 KG/M2 | WEIGHT: 172 LBS

## 2023-03-07 DIAGNOSIS — M48.061 SPINAL STENOSIS OF LUMBAR REGION, UNSPECIFIED WHETHER NEUROGENIC CLAUDICATION PRESENT: ICD-10-CM

## 2023-03-07 DIAGNOSIS — M16.11 PRIMARY OSTEOARTHRITIS OF RIGHT HIP: ICD-10-CM

## 2023-03-07 DIAGNOSIS — M25.551 CHRONIC RIGHT HIP PAIN: Primary | ICD-10-CM

## 2023-03-07 DIAGNOSIS — G89.29 CHRONIC RIGHT HIP PAIN: Primary | ICD-10-CM

## 2023-03-07 DIAGNOSIS — M51.36 DDD (DEGENERATIVE DISC DISEASE), LUMBAR: ICD-10-CM

## 2023-03-07 PROCEDURE — 99244 OFF/OP CNSLTJ NEW/EST MOD 40: CPT | Performed by: PHYSICIAN ASSISTANT

## 2023-03-07 NOTE — TELEPHONE ENCOUNTER
JABIER sent pt an message through [employer] with another resource for medical and non-medical transportation. JABIER also asked pt to contact SW so can talk about how pt is managing. See message sent tp pt in the chart.

## 2023-03-07 NOTE — PROGRESS NOTES
Hip     Dr Tam Phipps      Date /Time 3/7/2023       1:41 PM EST  Name Sarah Stock             1970   [facility]  MRN [id number]                Chief Complaint   Patient presents with    Hip Pain     Right Hip         History of Present Illness    Sarah Stock is a 52 y.o. female who presents with  right hip pain.    Sent in consultation by Cheryl Weaver MD, .    Injury Mechanism:  none.  Worker's Comp. & legal issues:   none.  Previous Treatments: Ice, Heat, and NSAIDs    Patient presents to the office today for a new problem.  Patient here with a chief complaint of pain.  Patient's right hip has been painful for many years.  Patient's pain is concentrated in her groin and thigh region.  She does have a history of lumbar stenosis also.  She does see a pain management physician.  She takes morphine 30 mg p.o. twice daily and also has a breakthrough fentanyl medication.  She has had no recent injury or trauma.  She does have a history of a stroke.    Past History  Past Medical History:   Diagnosis Date    Benign essential tremor     BRCA1 negative     Colon polyp 05/06/2019    Degenerative disc disease     DVT, lower extremity (HCC) 1989    Fibromyalgia     Kidney stones     Left-sided trigeminal neuralgia     Malignant neoplasm of thyroid gland ([facility]) 8/22/2013    Migraines, neuralgic     since stroke 1999 Chronic    PONV (postoperative nausea and vomiting)     Poor venous access     Protein S deficiency ([facility])     Pulmonary embolism ([facility]) 1989    Seizure disorder ([facility])     grand mal in 2005 ( childhood febrile seizure also-from a baby to age 11) and also X 1 ~2005 with headache treatment    Stroke ([facility]) 2/25/2019    Thrombosis of superior sagittal sinus 1999    Unspecified cerebral artery occlusion with cerebral infarction 1999    Vertebral artery occlusion 1999    White coat syndrome with high blood pressure but without hypertension      Past Surgical History:   Procedure  Laterality Date    BREAST BIOPSY      BREAST LUMPECTOMY      x 3    CYSTOSCOPY  01/09/2012    w/ left ureteroscopy, holmium laser. stone manipulation and left stent insertion    FINGER TRIGGER RELEASE Left 10/31/2019    LEFT THUMB TRIGGER DIGIT RELEASE performed by Per Pozo MD at [address] Left 2000    pins later removed    FOOT SURGERY Left 07/10/2017    LARYNX SURGERY      vocal cord nodule    LITHOTRIPSY  12/11    REFRACTIVE SURGERY      THYROIDECTOMY  05/18/2011          Family History   Problem Relation Age of Onset    Cancer Mother         breast x2, ovarian x1, brain     High Cholesterol Mother     Breast Cancer Mother     Ovarian Cancer Mother     High Cholesterol Father     Other Father     Migraines Sister     Other Sister     Thyroid Disease Maternal Grandmother      Social History     Tobacco Use    Smoking status: Never    Smokeless tobacco: Never   Substance Use Topics    Alcohol use: No      Current Outpatient Medications on File Prior to Visit   Medication Sig Dispense Refill    DULoxetine (CYMBALTA) 20 MG extended release capsule Take 1 capsule by mouth daily 30 capsule 3    Handicap Placard MISC by Does not apply route 1 each 0    clonazePAM (KLONOPIN) 1 MG tablet Take 1 tablet my mouth in the morning, take 1.5 tablets by mouth nightly 75 tablet 0    ketorolac (TORADOL) 30 MG/ML injection INJECT ONE MILLILITER INTRAMUSCULARLY TWO TIMES A DAY AS NEEDED FOR PAIN 12 each 0    XARELTO 20 MG TABS tablet TAKE ONE TABLET BY MOUTH DAILY 90 tablet 1    mupirocin (BACTROBAN) 2 % ointment Apply 3 times daily 90 g 5    clobetasol (TEMOVATE) 0.05 % cream Apply topically 2 times daily.  180 g 5    losartan (COZAAR) 100 MG tablet TAKE ONE TABLET BY MOUTH DAILY 90 tablet 1    levothyroxine (SYNTHROID) 200 MCG tablet TAKE ONE TABLET BY MOUTH DAILY 90 tablet 1    amLODIPine (NORVASC) 10 MG tablet Take 1 tablet by mouth daily 90 tablet 1    hydroCHLOROthiazide (HYDRODIURIL) 25 MG tablet Take 0.5 tablets by mouth 2 times daily 90 tablet 1    ondansetron (ZOFRAN ODT) 4 MG disintegrating tablet Take 1 tablet by mouth every 8 hours as needed for Nausea 20 tablet 0    folic acid (FOLVITE) 1 MG tablet Take 1 tablet by mouth in the morning. 90 tablet 1    vitamin D (ERGOCALCIFEROL) 1.25 MG (90241 UT) CAPS capsule Take 1 capsule by mouth once a week for 12 doses 12 capsule 1    cyclobenzaprine (FLEXERIL) 10 MG tablet Take 10 mg by mouth 3 times daily as needed for Muscle spasms      SYRINGE-NEEDLE, DISP, 3 ML (B-D 3CC LUER-ALEX SYR 25GX1\") 25G X 1\" 3 ML MISC Use to inject into the muscle two times a day as needed for pain 100 each 2    carBAMazepine (TEGRETOL XR) 400 MG extended release tablet Take 1 tablet by mouth every 8 hours      atorvastatin (LIPITOR) 40 MG tablet TAKE ONE TABLET BY MOUTH DAILY 90 tablet 1    Melatonin 10 MG TABS Take by mouth      diphenhydrAMINE (SOMINEX) 25 MG tablet Take 50 mg by mouth nightly as needed      docusate (COLACE, DULCOLAX) 100 MG CAPS Take 100 mg by mouth 2 times daily as needed       morphine (MS CONTIN) 15 MG extended release tablet 30 mg 2 times daily. fentaNYL (ACTIQ) 800 MCG lollipop Take 1 each by mouth 3 times daily as needed. naloxone 4 MG/0.1ML LIQD nasal spray 1 spray by Nasal route as needed for Opioid Reversal      Cyanocobalamin (VITAMIN B-12) 5000 MCG SUBL Take 1 each by mouth every other day      Cholecalciferol (VITAMIN D3) 2000 units CAPS Take 1 capsule by mouth daily      Doxylamine Succinate, Sleep, (UNISOM PO) Take by mouth      primidone (MYSOLINE) 250 MG tablet Take 250 mg by mouth 3 times daily        No current facility-administered medications on file prior to visit.         ASCVD 10-YEAR RISK SCORE  The 10-year ASCVD risk score (Izzy DK, et al., 2019) is: 1.7%    Values used to calculate the score:      Age: 47 years      Sex: Female      Is Non- : No      Diabetic: No Tobacco smoker: No      Systolic Blood Pressure: 011 mmHg      Is BP treated: Yes      HDL Cholesterol: 92 mg/dL      Total Cholesterol: 207 mg/dL   . Review of Systems  10-point ROS is negative other than HPI. Physical Exam  Based off 1997 Exam Criteria  Ht 5' 5\" (1.651 m)   Wt 172 lb (78 kg)   LMP 06/30/2015   BMI 28.62 kg/m²      Constitutional:       General: He is not in acute distress. Appearance: Normal appearance. Cardiovascular:      Rate and Rhythm: Normal rate and regular rhythm. Pulses: Normal pulses. Pulmonary:      Effort: Pulmonary effort is normal. No respiratory distress. Neurological:      Mental Status: He is alert and oriented to person, place, and time. Mental status is at baseline. Musculoskeletal:  Gait:  altered    Skin: Clean and intact.   No open sores or wounds    Lymphatics: No palpable lymph nodes    Spine / Hip Exam:      RIGHT  LEFT    Lumbar Spine Exam  [] All Neg    [] All Neg     Straight leg raise  []  []Not tested   []  []Not tested    Clonus  []  []Not tested   []  []Not tested    Pain with motion  [x]  []Not tested   []  []Not tested    Radiculopathy  [x]  []Not tested   [x]  []Not tested    Paraspinal muscle tenderness  [x] Paraspinal  []Midline   [x] Paraspinal  []Midline   Sensation RIGHT  LEFT    L3  [x] Normal []Decreased    [x] Normal []Decreased   L4  [x] Normal  []Decreased   [x] Normal []Decreased   L5  [x] Normal []Decreased   [x] Normal []Decreased   S1  [x] Normal  []Decreased   [x] Normal []Decreased   Pelvis       Scoliosis  [x] Nml  [] Present     Leg-length discrepency  [x] Equal  [] Right longer   [] Left longer   Range of Motion Active Passive Active Passive   Hip Flexion 120  120    Abduction 50  50    External Rotation @ 90 flex 65  65    Internal Rotation @ 90 flex 20  20           Hip Impingement / Dysplasia  [] All Neg  [] Not tested   [x] All Neg  [] Not tested    Hip impingement test  [x]  []Not tested   []  []Not tested   C-sign  [x]  []Not tested   []  []Not tested    Anterior instability apprehension  []  []Not tested   []  []Not tested    Posterior instability apprehension  []  []Not tested   []  []Not tested    Uncontained Internal rotation  []  []Not tested  []  []Not tested          Abductors  [] All Neg  [] Not tested   [] All Neg  [] Not tested    Medius strength  []  []Not tested   []  []Not tested    Minimum strength  []  []Not tested   []  []Not tested    IT band tendonitis  []  []Not tested   []  []Not tested    Trochanteric tenderness  []  []Not tested  []  []Not tested   Sciatic neuropathic pain  []  []Not tested   []  []Not tested           Post-arthroplasty  [] All Neg  [] Not tested   [] All Neg  [] Not tested    Rectus tendonitis  []  []Not tested   []  []Not tested    Iliopsoas tendonitis       Start-up pain  []  []Not tested   []  []Not tested          Imaging    X-rays were reviewed that were taken previously of the right hip.  AP pelvis and lateral right hip.  They demonstrate no evidence of fractures or dislocations with well-maintained joint space.  Images dated 1/31/2023    I have also personally reviewed x-rays of the lumbar spine.  2 views.  AP and lateral views.  They demonstrate moderate lumbar degenerative disc disease from L2-S1.    MRI was personally reviewed of the right hip.  I do believe there is at least moderate arthritic changes with subchondral marrow edema present.  No evidence of fractures or dislocations.    MRI was also examined of the lumbar spine.  It demonstrates multiple level central canal and foraminal stenosis that are multifactorial and range from mild to severe.      Procedure:  No orders of the defined types were placed in this encounter.      Assessment and Plan  Sarah was seen today for hip pain.    Diagnoses and all orders for this visit:    Chronic right hip pain    Primary osteoarthritis of right hip    Spinal stenosis of lumbar region, unspecified whether neurogenic  claudication present    DDD (degenerative disc disease), lumbar      I do believe the patient has at least some of her pain coming from her hip. It is unclear whether her hip or her lumbar spine is the true cause of majority of her problem. We will have the patient follow-up with Dr. Haider Coleman.  I would like him to entertain the idea of an intra-articular cortisone injection for both diagnostic and therapeutic purposes. She has had rate increased risk of complications from the injection due to her taking Xarelto. I have also agreed with her primary care provider and patient should have a visit with Dr. Yadira Ponce. I discussed with Rosaline Carola that her history, symptoms, signs, and imaging are most consistent with hip arthritis    We reviewed the natural history of these conditions and treatment options ranging from conservative measures (rest, icing, activity modification, physical therapy, pain meds, cortisone injection) to surgical options. In terms of treatment, I recommended continuing with rest, icing, avoidance of painful activities, NSAIDs or pain meds as tolerated, and physical therapy. If these are not effective, cortisone injection can be considered. We discussed surgical options as well, should conservative measures fail. Electronically signed by Marko Lemons PA-C on 3/7/2023 at 1:41 PM  This dictation was generated by voice recognition computer software. Although all attempts are made to edit the dictation for accuracy, there may be errors in the transcription that are not intended.

## 2023-03-08 DIAGNOSIS — L98.9 SKIN LESIONS: ICD-10-CM

## 2023-03-08 DIAGNOSIS — R20.8 PAIN OF SKIN: ICD-10-CM

## 2023-03-09 RX ORDER — KETOROLAC TROMETHAMINE 30 MG/ML
INJECTION, SOLUTION INTRAMUSCULAR; INTRAVENOUS
Qty: 12 EACH | Refills: 0 | Status: SHIPPED | OUTPATIENT
Start: 2023-03-09

## 2023-03-09 NOTE — TELEPHONE ENCOUNTER
.Refill Request     CONFIRM preferrred pharmacy with the patient. If Mail Order Rx - Pend for 90 day refill. Last Seen: Last Seen Department: 2/22/2023  Last Seen by PCP: Visit date not found    Last Written: 2-16-23 12 with 0     If no future appointment scheduled, route STAFF MESSAGE with patient name to the [facility] for scheduling. Next Appointment:   Future Appointments   Date Time Provider Rula Emmanuel   6/14/2023  1:15 PM MD TALYA Simeon  [place] - [place]       Message sent to [address] [place] [address] to schedule appt with patient?   N/A      Requested Prescriptions     Pending Prescriptions Disp Refills    ketorolac (TORADOL) 30 MG/ML injection 12 each 0     Sig: INJECT ONE MILLILITER INTRAMUSCULARLY TWO TIMES A DAY AS NEEDED FOR PAIN

## 2023-03-12 NOTE — TELEPHONE ENCOUNTER
.Refill Request     CONFIRM preferred pharmacy with the patient. If Mail Order Rx - Pend for 90 day refill. Last Seen: Last Seen Department: 2/22/2023  Last Seen by PCP: 2/22/2023    Last Written: 9/1/22 90 with 1     If no future appointment scheduled, route STAFF MESSAGE with patient name to the [facility] for scheduling. Next Appointment:   Future Appointments   Date Time Provider Rula Emmanuel   6/14/2023  1:15 PM MD TALYA Reeder  [place] - [place]D       Message sent to [address] [place] [address] to schedule appt with patient?   N/A      Requested Prescriptions     Pending Prescriptions Disp Refills    levothyroxine (SYNTHROID) 200 MCG tablet 90 tablet 1     Sig: TAKE ONE TABLET BY MOUTH DAILY

## 2023-03-13 RX ORDER — LEVOTHYROXINE SODIUM 0.2 MG/1
TABLET ORAL
Qty: 90 TABLET | Refills: 1 | Status: SHIPPED | OUTPATIENT
Start: 2023-03-13

## 2023-03-16 ENCOUNTER — TELEPHONE (OUTPATIENT)
Dept: FAMILY MEDICINE CLINIC | Age: 53
End: 2023-03-16

## 2023-03-16 NOTE — TELEPHONE ENCOUNTER
SW attempted to reach patient to follow-up on [facility] referral from PCP.  Sw was unable to reach pt and left message asking for a return call to see if resources provided were helpful or if still needs further assistance.

## 2023-03-26 DIAGNOSIS — F41.9 ANXIETY: ICD-10-CM

## 2023-03-27 ENCOUNTER — TELEPHONE (OUTPATIENT)
Dept: ORTHOPEDIC SURGERY | Age: 53
End: 2023-03-27

## 2023-03-27 DIAGNOSIS — R20.8 PAIN OF SKIN: ICD-10-CM

## 2023-03-27 DIAGNOSIS — L98.9 SKIN LESIONS: ICD-10-CM

## 2023-03-27 RX ORDER — CLONAZEPAM 1 MG/1
TABLET ORAL
Qty: 75 TABLET | Refills: 0 | Status: SHIPPED | OUTPATIENT
Start: 2023-03-27 | End: 2023-05-08

## 2023-03-27 RX ORDER — KETOROLAC TROMETHAMINE 30 MG/ML
INJECTION, SOLUTION INTRAMUSCULAR; INTRAVENOUS
Qty: 12 EACH | Refills: 0 | Status: SHIPPED | OUTPATIENT
Start: 2023-03-27

## 2023-03-27 NOTE — TELEPHONE ENCOUNTER
Refill Request - Controlled Substance    CONFIRM preferred pharmacy with the patient. If Mail Order Rx - Pend for 90 day refill. Last Seen Department: 2/22/2023  Last Seen by PCP: 2/22/2023    Last Written: 3/9/23 no refills #12    Last UDS: 7/30/21    Med Agreement Signed On: 2/25/19    If no future appointment scheduled, route STAFF MESSAGE with patient name to the [facility] for scheduling. CONFIRM preferrred pharmacy with the patient. Next Appointment:   Future Appointments   Date Time Provider Rula Emmanuel   6/14/2023  1:15 PM Jayden Goss MD [facility] [place] - [place]       Message sent to [address] [place] [address] to schedule appt with patient?   NO      Requested Prescriptions     Pending Prescriptions Disp Refills    ketorolac (TORADOL) 30 MG/ML injection 12 each 0     Sig: INJECT ONE MILLILITER INTRAMUSCULARLY TWO TIMES A DAY AS NEEDED FOR PAIN

## 2023-03-27 NOTE — TELEPHONE ENCOUNTER
General Question     Subject: REQUESTING A CALL REGARDING A HIP INJ, SHE HAS TO COME OFF HER ZORELTO FOR 3 DAYS. NEEDS TO MAKE SURE SHE CAN GET THE APPT WITH THE INJ.     Patient: Armando Mcnamara"  Contact Number: [phone]

## 2023-03-30 ENCOUNTER — TELEPHONE (OUTPATIENT)
Dept: FAMILY MEDICINE CLINIC | Age: 53
End: 2023-03-30

## 2023-03-30 NOTE — TELEPHONE ENCOUNTER
Roderick Risk \"Elvira\"  P [facility] Support (supporting Dhruv Andino MD) 13 hours ago (6:45 PM)     Dr Moise Natarajan was told to let you know when Dr Mannie England is doing my Hip Injection. It's scheduled for:  Thursday April 06, 2023 at 1:45 PM. Per Dr Luis Castellano, I'm supposed to hold my Xarelto on April 3, 4, 5. I'll follow this plan unless you tell me otherwise.   Thank Narayan Bey

## 2023-03-31 ENCOUNTER — TELEPHONE (OUTPATIENT)
Dept: ORTHOPEDIC SURGERY | Age: 53
End: 2023-03-31

## 2023-03-31 NOTE — TELEPHONE ENCOUNTER
General Question     Subject: patient call and she would like for Georgia Maria L to give her a call back. Please Advise.   Patient Miladis Urias \"Elvira\"  Contact Number: [phone]

## 2023-04-03 ENCOUNTER — TELEPHONE (OUTPATIENT)
Dept: FAMILY MEDICINE CLINIC | Age: 53
End: 2023-04-03

## 2023-04-03 DIAGNOSIS — G89.29 CHRONIC RIGHT HIP PAIN: Primary | ICD-10-CM

## 2023-04-03 DIAGNOSIS — G89.4 CHRONIC PAIN DISORDER: ICD-10-CM

## 2023-04-03 DIAGNOSIS — M25.551 CHRONIC RIGHT HIP PAIN: Primary | ICD-10-CM

## 2023-04-03 RX ORDER — PRIMIDONE 250 MG/1
250 TABLET ORAL 3 TIMES DAILY
Qty: 120 TABLET | Refills: 2 | Status: CANCELLED | OUTPATIENT
Start: 2023-04-03

## 2023-04-03 RX ORDER — ATORVASTATIN CALCIUM 40 MG/1
TABLET, FILM COATED ORAL
Qty: 90 TABLET | Refills: 0 | Status: SHIPPED | OUTPATIENT
Start: 2023-04-03

## 2023-04-03 NOTE — TELEPHONE ENCOUNTER
Patient informed and verbalized good understanding. Patient is aware of referral and will be contacting their office.

## 2023-04-03 NOTE — TELEPHONE ENCOUNTER
Pt called in stating her insurance gave her the incorrect information/provider. That was a provider for weight management. She is wanting to know if we have anyone that knows who she can be referred to for pain management that [facility].  If pt doesn't answer the phone when we contact her please LM

## 2023-04-03 NOTE — TELEPHONE ENCOUNTER
Patient informed, she is ok with you sending in the 2 primidone at this time. And the other medications is Tegretol will you be able to fill that for her?

## 2023-04-03 NOTE — TELEPHONE ENCOUNTER
Pt only has one name after speaking with insurance that she was able to get for pain management. Please call her and let her know if this will work or not.   Linda Tamez MD   [address] [facility]  [phone]

## 2023-04-03 NOTE — TELEPHONE ENCOUNTER
Patient called in and stated she is having pain management problems with the her doctor and [facility] sent her a letter over the weekend stating they are no longer filling that pain management doctors medications for any patient at this time due to the doctor prescribing medications wrong. Patient is going to change pain management doctors as she is worried something is going down with that doctor and doesn't want to go down with him. Patient stated she was worried Dr. Jessica Aguirre wouldn't see her anymore if something happened with her pain management doctor. Patient is going to call her insurance and see what pain management doctor is covered under her insurance and call us back to place a referral to see someone different. Patient stated she has already weaned herself off of all pain meds but is still in a lot of pain and wants to start them back up with her hip and back pain and wants to know if Dr. Jessica Aguirre will refill them until she can get in else where with pain management? Patients pain mangement doctor also prescribed her primidone 250mg tablets take 1 tablet by mouth 4X a day as directed, and petretol XR 400mg take 1 tablet by mouth 3X a day as directed. Patient needs these two medications asap or else her face does numb and as stroke like symptoms without these medications. Please send to [facility] in [place] if possible.

## 2023-04-03 NOTE — TELEPHONE ENCOUNTER
Refill Request     CONFIRM preferred pharmacy with the patient. If Mail Order Rx - Pend for 90 day refill. Last Seen: Last Seen Department: 2/22/2023  Last Seen by PCP: 2/22/2023    Last Written: NA     If no future appointment scheduled:  Review the last OV with PCP and review information for follow-up visit,  Route STAFF MESSAGE with patient name to the [facility] for scheduling with the following information:            -  Timing of next visit           -  Visit type ie Physical, OV, etc           -  Diagnoses/Reason ie. COPD, HTN - Do not use MEDICATION, Follow-up or CHECK UP - Give reason for visit      Next Appointment:   Future Appointments   Date Time Provider Rula Emmanuel   4/6/2023  1:45 PM Linwood Tsai MD [facility]   6/14/2023  1:15 PM Heron Paul MD [facility] [place] - [place]       Message sent to [address] [place] [address] to schedule appt with patient?   NO      Requested Prescriptions     Pending Prescriptions Disp Refills    primidone (MYSOLINE) 250 MG tablet 120 tablet 2     Sig: Take 1 tablet by mouth 3 times daily

## 2023-04-05 ENCOUNTER — PATIENT MESSAGE (OUTPATIENT)
Dept: FAMILY MEDICINE CLINIC | Age: 53
End: 2023-04-05

## 2023-04-05 DIAGNOSIS — I65.02 STENOSIS OF LEFT VERTEBRAL ARTERY: ICD-10-CM

## 2023-04-05 DIAGNOSIS — M51.36 DEGENERATIVE DISC DISEASE, LUMBAR: Primary | ICD-10-CM

## 2023-04-05 DIAGNOSIS — M47.816 SPONDYLOSIS OF LUMBAR SPINE: ICD-10-CM

## 2023-04-05 NOTE — TELEPHONE ENCOUNTER
Spoke with patient regarding referral. Patient states when she called Dr. Dudley Painter office they told her they were a weight , I called and confirmed they are a pain management specialist, but they require OV notes, labs and imaging from previous pain specialist. Since the old specialist is under investigation, I am unable to access records. Spoke with Mary and Isma Salvador, we called [address] pain management specialists because they are in network with her insurance, I explained the situation and they will accept ov notes, imaging and labs from our office. I called to inform patient about sending a new referral, she is very hesitant on driving to that office since she states she has to learn how to drive again and she has a lot of pain to even push the gas pedal. Patient would like to speak with [facility] before deciding.

## 2023-04-05 NOTE — TELEPHONE ENCOUNTER
Spoke with the patient, she is very nervous about driving to [address] [place] [address]. I was able to provide multiple routes to drive to get there, [address] [facility] We spoke for awhile about her fears of driving and what we can do to make it more comfortable. I recommend that she tries a trail drive to the office to see if it's something she can do and this will allow her to know the route the day of her appt. Patient will look into this and has decided to proceed  with an leonora. Referral was placed and number given to patient.

## 2023-04-05 NOTE — TELEPHONE ENCOUNTER
Spoke with the patient, states that she is weaning herself off of her medications from pain management. Will make her an appt with Dr. Benjy Kat for next week to discuss medications.

## 2023-04-06 ENCOUNTER — TELEPHONE (OUTPATIENT)
Dept: ORTHOPEDIC SURGERY | Age: 53
End: 2023-04-06

## 2023-04-06 NOTE — TELEPHONE ENCOUNTER
Non narcotic meds that can help with pain include tylenol and ibuprofen or alleve. Ice and heat can also be helpful for pain especially in joints. Topical agents include voltaren gel and icy hot which can also help with joint and muscle pains.

## 2023-04-06 NOTE — TELEPHONE ENCOUNTER
Informed pt of Arcelia's message above, pt is worried that if she goes to ER they will give her medication but she will have nobody to take her back home. She is wanting to know what else she can take that is not controlled. Please advise.

## 2023-04-06 NOTE — TELEPHONE ENCOUNTER
Pt called in wanting to know if there is anything at all that can be called in for pain? She cannot sleep. She did  the 30 day supply on 3/17, BUT she immediately started tapering off on 3/22 and she does not have that medication anymore, she disposed of it the way the packaging told her to do so. She started to taper off b/c [address] stated the prescribing provider was under investigation.

## 2023-04-07 ENCOUNTER — TELEPHONE (OUTPATIENT)
Dept: FAMILY MEDICINE CLINIC | Age: 53
End: 2023-04-07

## 2023-04-07 DIAGNOSIS — M25.551 CHRONIC RIGHT HIP PAIN: Primary | ICD-10-CM

## 2023-04-07 DIAGNOSIS — G89.29 CHRONIC RIGHT HIP PAIN: Primary | ICD-10-CM

## 2023-04-07 RX ORDER — METHYLPREDNISOLONE 4 MG/1
TABLET ORAL
Qty: 1 KIT | Refills: 0 | Status: SHIPPED | OUTPATIENT
Start: 2023-04-07 | End: 2023-04-13

## 2023-04-07 NOTE — TELEPHONE ENCOUNTER
On call page received from patient. Patient reports she was unable to get her scheduled hip injection yesterday and is having significant pain. Patient has been using a heating pad and diclofenac gel to the site. Also reports chronic skin lesion pain that is acting up. Discussed problem with patient. Patient agreeable to steroid pack . Discussed risks/benefits/ side effects of steroid. Patient verbalized understanding. Patient has been using heating pad and diclofenac gel at home. Recommend continuing with this treatment. Patient is scheduled to follow-up with PCP next week.

## 2023-04-07 NOTE — PROGRESS NOTES
Patient requests steroid pack for chronic skin lesion pain and chronic hip pain. Was scheduled for a hp injection but this was cancelled and patient reports severe pain. Discussed risks/benefits/side effects of the medication. She verbalized understanding.

## 2023-04-13 ENCOUNTER — APPOINTMENT (OUTPATIENT)
Dept: GENERAL RADIOLOGY | Age: 53
DRG: 378 | End: 2023-04-13
Payer: COMMERCIAL

## 2023-04-13 ENCOUNTER — HOSPITAL ENCOUNTER (INPATIENT)
Age: 53
LOS: 7 days | Discharge: HOME | DRG: 378 | End: 2023-04-21
Attending: EMERGENCY MEDICINE | Admitting: INTERNAL MEDICINE
Payer: COMMERCIAL

## 2023-04-13 ENCOUNTER — APPOINTMENT (OUTPATIENT)
Dept: CT IMAGING | Age: 53
DRG: 378 | End: 2023-04-13
Payer: COMMERCIAL

## 2023-04-13 DIAGNOSIS — R06.09 EXERTIONAL DYSPNEA: ICD-10-CM

## 2023-04-13 DIAGNOSIS — D64.9 ACUTE ANEMIA: Primary | ICD-10-CM

## 2023-04-13 DIAGNOSIS — M16.11 PRIMARY OSTEOARTHRITIS OF RIGHT HIP: ICD-10-CM

## 2023-04-13 DIAGNOSIS — K25.9 MULTIPLE GASTRIC ULCERS: ICD-10-CM

## 2023-04-13 DIAGNOSIS — K92.2 UPPER GI BLEED: ICD-10-CM

## 2023-04-13 DIAGNOSIS — D64.9 NORMOCYTIC ANEMIA: ICD-10-CM

## 2023-04-13 PROBLEM — R10.9 ABDOMINAL PAIN: Status: ACTIVE | Noted: 2023-04-13

## 2023-04-13 LAB
ABO + RH BLD: NORMAL
ALBUMIN SERPL-MCNC: 4.4 G/DL (ref 3.4–5)
ALBUMIN/GLOB SERPL: 2.6 {RATIO} (ref 1.1–2.2)
ALP SERPL-CCNC: 86 U/L (ref 40–129)
ALT SERPL-CCNC: 19 U/L (ref 10–40)
ANION GAP SERPL CALCULATED.3IONS-SCNC: 0 MMOL/L (ref 3–16)
AST SERPL-CCNC: 21 U/L (ref 15–37)
BASOPHILS # BLD: 0.1 K/UL (ref 0–0.2)
BASOPHILS NFR BLD: 0.8 %
BILIRUB SERPL-MCNC: 0.3 MG/DL (ref 0–1)
BLD GP AB SCN SERPL QL: NORMAL
BUN SERPL-MCNC: 53 MG/DL (ref 7–20)
CALCIUM SERPL-MCNC: 9.1 MG/DL (ref 8.3–10.6)
CHLORIDE SERPL-SCNC: 104 MMOL/L (ref 99–110)
CO2 SERPL-SCNC: 39 MMOL/L (ref 21–32)
CREAT SERPL-MCNC: 0.6 MG/DL (ref 0.6–1.1)
D DIMER: 0.51 UG/ML FEU (ref 0–0.6)
DEPRECATED RDW RBC AUTO: 14.4 % (ref 12.4–15.4)
EKG ATRIAL RATE: 116 BPM
EKG DIAGNOSIS: NORMAL
EKG P AXIS: 39 DEGREES
EKG P-R INTERVAL: 120 MS
EKG Q-T INTERVAL: 310 MS
EKG QRS DURATION: 72 MS
EKG QTC CALCULATION (BAZETT): 430 MS
EKG R AXIS: 20 DEGREES
EKG T AXIS: 44 DEGREES
EKG VENTRICULAR RATE: 116 BPM
EOSINOPHIL # BLD: 0 K/UL (ref 0–0.6)
EOSINOPHIL NFR BLD: 0.1 %
GFR SERPLBLD CREATININE-BSD FMLA CKD-EPI: >60 ML/MIN/{1.73_M2}
GLUCOSE SERPL-MCNC: 131 MG/DL (ref 70–99)
HCT VFR BLD AUTO: 32.1 % (ref 36–48)
HEMOCCULT SP1 STL QL: NORMAL
HGB BLD-MCNC: 11 G/DL (ref 12–16)
IMMATURE RETIC FRACT: 0.38 (ref 0.21–0.37)
LYMPHOCYTES # BLD: 1.2 K/UL (ref 1–5.1)
LYMPHOCYTES NFR BLD: 10.7 %
MCH RBC QN AUTO: 30.4 PG (ref 26–34)
MCHC RBC AUTO-ENTMCNC: 34.2 G/DL (ref 31–36)
MCV RBC AUTO: 89 FL (ref 80–100)
MONOCYTES # BLD: 0.4 K/UL (ref 0–1.3)
MONOCYTES NFR BLD: 3.3 %
NEUTROPHILS # BLD: 9.2 K/UL (ref 1.7–7.7)
NEUTROPHILS NFR BLD: 85.1 %
NT-PROBNP SERPL-MCNC: 314 PG/ML (ref 0–124)
PLATELET # BLD AUTO: 263 K/UL (ref 135–450)
PMV BLD AUTO: 8.7 FL (ref 5–10.5)
POTASSIUM SERPL-SCNC: 4.9 MMOL/L (ref 3.5–5.1)
PROT SERPL-MCNC: 6.1 G/DL (ref 6.4–8.2)
RBC # BLD AUTO: 3.6 M/UL (ref 4–5.2)
RETICS # AUTO: 0.05 M/UL (ref 0.02–0.1)
RETICS/RBC NFR AUTO: 1.41 % (ref 0.5–2.18)
SODIUM SERPL-SCNC: 143 MMOL/L (ref 136–145)
TROPONIN T SERPL-MCNC: <0.01 NG/ML
WBC # BLD AUTO: 10.8 K/UL (ref 4–11)

## 2023-04-13 PROCEDURE — 86850 RBC ANTIBODY SCREEN: CPT

## 2023-04-13 PROCEDURE — 82784 ASSAY IGA/IGD/IGG/IGM EACH: CPT

## 2023-04-13 PROCEDURE — 85379 FIBRIN DEGRADATION QUANT: CPT

## 2023-04-13 PROCEDURE — 96376 TX/PRO/DX INJ SAME DRUG ADON: CPT

## 2023-04-13 PROCEDURE — 6370000000 HC RX 637 (ALT 250 FOR IP): Performed by: EMERGENCY MEDICINE

## 2023-04-13 PROCEDURE — 96374 THER/PROPH/DIAG INJ IV PUSH: CPT

## 2023-04-13 PROCEDURE — 82746 ASSAY OF FOLIC ACID SERUM: CPT

## 2023-04-13 PROCEDURE — 84238 ASSAY NONENDOCRINE RECEPTOR: CPT

## 2023-04-13 PROCEDURE — 86901 BLOOD TYPING SEROLOGIC RH(D): CPT

## 2023-04-13 PROCEDURE — 83540 ASSAY OF IRON: CPT

## 2023-04-13 PROCEDURE — 93010 ELECTROCARDIOGRAM REPORT: CPT | Performed by: INTERNAL MEDICINE

## 2023-04-13 PROCEDURE — G0378 HOSPITAL OBSERVATION PER HR: HCPCS

## 2023-04-13 PROCEDURE — 85025 COMPLETE CBC W/AUTO DIFF WBC: CPT

## 2023-04-13 PROCEDURE — 93005 ELECTROCARDIOGRAM TRACING: CPT | Performed by: EMERGENCY MEDICINE

## 2023-04-13 PROCEDURE — 82728 ASSAY OF FERRITIN: CPT

## 2023-04-13 PROCEDURE — 99285 EMERGENCY DEPT VISIT HI MDM: CPT

## 2023-04-13 PROCEDURE — 2580000003 HC RX 258: Performed by: EMERGENCY MEDICINE

## 2023-04-13 PROCEDURE — 96361 HYDRATE IV INFUSION ADD-ON: CPT

## 2023-04-13 PROCEDURE — 6360000002 HC RX W HCPCS: Performed by: EMERGENCY MEDICINE

## 2023-04-13 PROCEDURE — 71046 X-RAY EXAM CHEST 2 VIEWS: CPT

## 2023-04-13 PROCEDURE — 36415 COLL VENOUS BLD VENIPUNCTURE: CPT

## 2023-04-13 PROCEDURE — 86900 BLOOD TYPING SEROLOGIC ABO: CPT

## 2023-04-13 PROCEDURE — 82607 VITAMIN B-12: CPT

## 2023-04-13 PROCEDURE — 80053 COMPREHEN METABOLIC PANEL: CPT

## 2023-04-13 PROCEDURE — 83550 IRON BINDING TEST: CPT

## 2023-04-13 PROCEDURE — 84484 ASSAY OF TROPONIN QUANT: CPT

## 2023-04-13 PROCEDURE — 83516 IMMUNOASSAY NONANTIBODY: CPT

## 2023-04-13 PROCEDURE — 83880 ASSAY OF NATRIURETIC PEPTIDE: CPT

## 2023-04-13 PROCEDURE — 6360000004 HC RX CONTRAST MEDICATION: Performed by: EMERGENCY MEDICINE

## 2023-04-13 PROCEDURE — 82270 OCCULT BLOOD FECES: CPT

## 2023-04-13 PROCEDURE — 85045 AUTOMATED RETICULOCYTE COUNT: CPT

## 2023-04-13 PROCEDURE — 74174 CTA ABD&PLVS W/CONTRAST: CPT

## 2023-04-13 PROCEDURE — 96375 TX/PRO/DX INJ NEW DRUG ADDON: CPT

## 2023-04-13 RX ORDER — 0.9 % SODIUM CHLORIDE 0.9 %
1000 INTRAVENOUS SOLUTION INTRAVENOUS ONCE
Status: COMPLETED | OUTPATIENT
Start: 2023-04-13 | End: 2023-04-13

## 2023-04-13 RX ORDER — CLONAZEPAM 1 MG/1
1 TABLET ORAL ONCE
Status: COMPLETED | OUTPATIENT
Start: 2023-04-13 | End: 2023-04-13

## 2023-04-13 RX ORDER — FENTANYL CITRATE 50 UG/ML
75 INJECTION, SOLUTION INTRAMUSCULAR; INTRAVENOUS ONCE
Status: COMPLETED | OUTPATIENT
Start: 2023-04-13 | End: 2023-04-13

## 2023-04-13 RX ORDER — CARBAMAZEPINE 100 MG/1
400 TABLET, EXTENDED RELEASE ORAL 2 TIMES DAILY
Status: DISCONTINUED | OUTPATIENT
Start: 2023-04-13 | End: 2023-04-14 | Stop reason: SDUPTHER

## 2023-04-13 RX ORDER — KETOROLAC TROMETHAMINE 30 MG/ML
30 INJECTION, SOLUTION INTRAMUSCULAR; INTRAVENOUS ONCE
Status: COMPLETED | OUTPATIENT
Start: 2023-04-13 | End: 2023-04-13

## 2023-04-13 RX ADMIN — FENTANYL CITRATE 75 MCG: 50 INJECTION, SOLUTION INTRAMUSCULAR; INTRAVENOUS at 14:00

## 2023-04-13 RX ADMIN — SODIUM CHLORIDE 1000 ML: 9 INJECTION, SOLUTION INTRAVENOUS at 13:59

## 2023-04-13 RX ADMIN — CARBAMAZEPINE 400 MG: 100 TABLET, EXTENDED RELEASE ORAL at 21:50

## 2023-04-13 RX ADMIN — FENTANYL CITRATE 75 MCG: 50 INJECTION, SOLUTION INTRAMUSCULAR; INTRAVENOUS at 19:51

## 2023-04-13 RX ADMIN — IOPAMIDOL 75 ML: 755 INJECTION, SOLUTION INTRAVENOUS at 14:37

## 2023-04-13 RX ADMIN — CLONAZEPAM 1 MG: 1 TABLET ORAL at 19:51

## 2023-04-13 RX ADMIN — KETOROLAC TROMETHAMINE 30 MG: 30 INJECTION, SOLUTION INTRAMUSCULAR at 21:50

## 2023-04-13 ASSESSMENT — ENCOUNTER SYMPTOMS
NAUSEA: 1
RHINORRHEA: 0
BACK PAIN: 0
VOMITING: 0
COUGH: 0
DIARRHEA: 0
SHORTNESS OF BREATH: 1
ABDOMINAL PAIN: 1
STRIDOR: 0
CHEST TIGHTNESS: 1

## 2023-04-13 ASSESSMENT — PAIN SCALES - GENERAL
PAINLEVEL_OUTOF10: 7
PAINLEVEL_OUTOF10: 5
PAINLEVEL_OUTOF10: 5
PAINLEVEL_OUTOF10: 10

## 2023-04-13 ASSESSMENT — PAIN DESCRIPTION - LOCATION
LOCATION: HIP
LOCATION: HIP

## 2023-04-13 ASSESSMENT — PAIN - FUNCTIONAL ASSESSMENT: PAIN_FUNCTIONAL_ASSESSMENT: 0-10

## 2023-04-13 ASSESSMENT — PAIN DESCRIPTION - ORIENTATION: ORIENTATION: RIGHT

## 2023-04-14 PROBLEM — K25.9 MULTIPLE GASTRIC ULCERS: Status: ACTIVE | Noted: 2023-04-14

## 2023-04-14 PROBLEM — R06.02 SHORTNESS OF BREATH: Status: ACTIVE | Noted: 2023-04-14

## 2023-04-14 PROBLEM — R00.0 SINUS TACHYCARDIA: Status: ACTIVE | Noted: 2023-04-14

## 2023-04-14 PROBLEM — D64.9 ANEMIA: Status: ACTIVE | Noted: 2023-04-14

## 2023-04-14 PROBLEM — I95.9 HYPOTENSION: Status: ACTIVE | Noted: 2023-04-14

## 2023-04-14 PROBLEM — E03.9 ACQUIRED HYPOTHYROIDISM: Status: ACTIVE | Noted: 2023-04-14

## 2023-04-14 LAB
BASOPHILS # BLD: 0 K/UL (ref 0–0.2)
BASOPHILS NFR BLD: 0.7 %
BILIRUB UR QL STRIP.AUTO: NEGATIVE
CLARITY UR: CLEAR
COLOR UR: YELLOW
DEPRECATED RDW RBC AUTO: 14.9 % (ref 12.4–15.4)
EOSINOPHIL # BLD: 0.1 K/UL (ref 0–0.6)
EOSINOPHIL NFR BLD: 1.5 %
FERRITIN SERPL IA-MCNC: 111.4 NG/ML (ref 15–150)
FOLATE SERPL-MCNC: 4.19 NG/ML (ref 4.78–24.2)
GLUCOSE UR STRIP.AUTO-MCNC: NEGATIVE MG/DL
HCG UR QL: NEGATIVE
HCT VFR BLD AUTO: 25.3 % (ref 36–48)
HGB BLD-MCNC: 8.6 G/DL (ref 12–16)
HGB UR QL STRIP.AUTO: NEGATIVE
IRON SATN MFR SERPL: ABNORMAL % (ref 15–50)
IRON SERPL-MCNC: 245 UG/DL (ref 37–145)
KETONES UR STRIP.AUTO-MCNC: NEGATIVE MG/DL
LEUKOCYTE ESTERASE UR QL STRIP.AUTO: NEGATIVE
LV EF: 58 %
LVEF MODALITY: NORMAL
LYMPHOCYTES # BLD: 2 K/UL (ref 1–5.1)
LYMPHOCYTES NFR BLD: 33.1 %
MCH RBC QN AUTO: 30.4 PG (ref 26–34)
MCHC RBC AUTO-ENTMCNC: 34.1 G/DL (ref 31–36)
MCV RBC AUTO: 89.3 FL (ref 80–100)
MONOCYTES # BLD: 0.3 K/UL (ref 0–1.3)
MONOCYTES NFR BLD: 5.6 %
NEUTROPHILS # BLD: 3.5 K/UL (ref 1.7–7.7)
NEUTROPHILS NFR BLD: 59.1 %
NITRITE UR QL STRIP.AUTO: NEGATIVE
PH UR STRIP.AUTO: 5 [PH] (ref 5–8)
PLATELET # BLD AUTO: 163 K/UL (ref 135–450)
PMV BLD AUTO: 8.8 FL (ref 5–10.5)
PROT UR STRIP.AUTO-MCNC: NEGATIVE MG/DL
RBC # BLD AUTO: 2.83 M/UL (ref 4–5.2)
SP GR UR STRIP.AUTO: 1.01 (ref 1–1.03)
TIBC SERPL-MCNC: ABNORMAL UG/DL (ref 260–445)
TROPONIN T SERPL-MCNC: <0.01 NG/ML
TROPONIN T SERPL-MCNC: <0.01 NG/ML
UA COMPLETE W REFLEX CULTURE PNL UR: NORMAL
UA DIPSTICK W REFLEX MICRO PNL UR: NORMAL
URN SPEC COLLECT METH UR: NORMAL
UROBILINOGEN UR STRIP-ACNC: 0.2 E.U./DL
VIT B12 SERPL-MCNC: 272 PG/ML (ref 211–911)
WBC # BLD AUTO: 5.9 K/UL (ref 4–11)

## 2023-04-14 PROCEDURE — 84443 ASSAY THYROID STIM HORMONE: CPT

## 2023-04-14 PROCEDURE — 96375 TX/PRO/DX INJ NEW DRUG ADDON: CPT

## 2023-04-14 PROCEDURE — 88341 IMHCHEM/IMCYTCHM EA ADD ANTB: CPT

## 2023-04-14 PROCEDURE — 81003 URINALYSIS AUTO W/O SCOPE: CPT

## 2023-04-14 PROCEDURE — 3700000000 HC ANESTHESIA ATTENDED CARE: Performed by: INTERNAL MEDICINE

## 2023-04-14 PROCEDURE — 85025 COMPLETE CBC W/AUTO DIFF WBC: CPT

## 2023-04-14 PROCEDURE — 2709999900 HC NON-CHARGEABLE SUPPLY: Performed by: INTERNAL MEDICINE

## 2023-04-14 PROCEDURE — 6370000000 HC RX 637 (ALT 250 FOR IP): Performed by: INTERNAL MEDICINE

## 2023-04-14 PROCEDURE — 0DB68ZX EXCISION OF STOMACH, VIA NATURAL OR ARTIFICIAL OPENING ENDOSCOPIC, DIAGNOSTIC: ICD-10-PCS | Performed by: INTERNAL MEDICINE

## 2023-04-14 PROCEDURE — 6360000002 HC RX W HCPCS: Performed by: INTERNAL MEDICINE

## 2023-04-14 PROCEDURE — 1200000000 HC SEMI PRIVATE

## 2023-04-14 PROCEDURE — 84484 ASSAY OF TROPONIN QUANT: CPT

## 2023-04-14 PROCEDURE — 88342 IMHCHEM/IMCYTCHM 1ST ANTB: CPT

## 2023-04-14 PROCEDURE — 7100000010 HC PHASE II RECOVERY - FIRST 15 MIN: Performed by: INTERNAL MEDICINE

## 2023-04-14 PROCEDURE — 3609012400 HC EGD TRANSORAL BIOPSY SINGLE/MULTIPLE: Performed by: INTERNAL MEDICINE

## 2023-04-14 PROCEDURE — C8929 TTE W OR WO FOL WCON,DOPPLER: HCPCS

## 2023-04-14 PROCEDURE — 84703 CHORIONIC GONADOTROPIN ASSAY: CPT

## 2023-04-14 PROCEDURE — 88305 TISSUE EXAM BY PATHOLOGIST: CPT

## 2023-04-14 PROCEDURE — 2580000003 HC RX 258: Performed by: INTERNAL MEDICINE

## 2023-04-14 PROCEDURE — 6360000004 HC RX CONTRAST MEDICATION: Performed by: INTERNAL MEDICINE

## 2023-04-14 PROCEDURE — 7100000011 HC PHASE II RECOVERY - ADDTL 15 MIN: Performed by: INTERNAL MEDICINE

## 2023-04-14 PROCEDURE — C9113 INJ PANTOPRAZOLE SODIUM, VIA: HCPCS | Performed by: INTERNAL MEDICINE

## 2023-04-14 PROCEDURE — 84439 ASSAY OF FREE THYROXINE: CPT

## 2023-04-14 PROCEDURE — 36415 COLL VENOUS BLD VENIPUNCTURE: CPT

## 2023-04-14 RX ORDER — PRIMIDONE 250 MG/1
250 TABLET ORAL 3 TIMES DAILY
Status: DISCONTINUED | OUTPATIENT
Start: 2023-04-14 | End: 2023-04-21 | Stop reason: HOSPADM

## 2023-04-14 RX ORDER — SODIUM CHLORIDE 9 MG/ML
INJECTION, SOLUTION INTRAVENOUS PRN
Status: DISCONTINUED | OUTPATIENT
Start: 2023-04-14 | End: 2023-04-18 | Stop reason: SDUPTHER

## 2023-04-14 RX ORDER — DULOXETIN HYDROCHLORIDE 20 MG/1
20 CAPSULE, DELAYED RELEASE ORAL DAILY
Status: DISCONTINUED | OUTPATIENT
Start: 2023-04-14 | End: 2023-04-21 | Stop reason: HOSPADM

## 2023-04-14 RX ORDER — MORPHINE SULFATE 30 MG/1
30 TABLET, FILM COATED, EXTENDED RELEASE ORAL 2 TIMES DAILY
Status: DISCONTINUED | OUTPATIENT
Start: 2023-04-14 | End: 2023-04-21 | Stop reason: HOSPADM

## 2023-04-14 RX ORDER — ACETAMINOPHEN 650 MG/1
650 SUPPOSITORY RECTAL EVERY 4 HOURS PRN
Status: DISCONTINUED | OUTPATIENT
Start: 2023-04-14 | End: 2023-04-14

## 2023-04-14 RX ORDER — SODIUM CHLORIDE 0.9 % (FLUSH) 0.9 %
5-40 SYRINGE (ML) INJECTION EVERY 12 HOURS SCHEDULED
Status: CANCELLED | OUTPATIENT
Start: 2023-04-14

## 2023-04-14 RX ORDER — SODIUM CHLORIDE 0.9 % (FLUSH) 0.9 %
10 SYRINGE (ML) INJECTION EVERY 12 HOURS SCHEDULED
Status: DISCONTINUED | OUTPATIENT
Start: 2023-04-14 | End: 2023-04-21 | Stop reason: HOSPADM

## 2023-04-14 RX ORDER — MEPERIDINE HYDROCHLORIDE 50 MG/ML
12.5 INJECTION INTRAMUSCULAR; INTRAVENOUS; SUBCUTANEOUS EVERY 5 MIN PRN
Status: CANCELLED | OUTPATIENT
Start: 2023-04-14

## 2023-04-14 RX ORDER — SODIUM CHLORIDE 0.9 % (FLUSH) 0.9 %
5-40 SYRINGE (ML) INJECTION PRN
Status: CANCELLED | OUTPATIENT
Start: 2023-04-14

## 2023-04-14 RX ORDER — ONDANSETRON 2 MG/ML
4 INJECTION INTRAMUSCULAR; INTRAVENOUS EVERY 4 HOURS PRN
Status: DISCONTINUED | OUTPATIENT
Start: 2023-04-14 | End: 2023-04-14 | Stop reason: SDUPTHER

## 2023-04-14 RX ORDER — PANTOPRAZOLE SODIUM 40 MG/10ML
40 INJECTION, POWDER, LYOPHILIZED, FOR SOLUTION INTRAVENOUS 2 TIMES DAILY
Status: DISCONTINUED | OUTPATIENT
Start: 2023-04-14 | End: 2023-04-17

## 2023-04-14 RX ORDER — LEVOTHYROXINE SODIUM 0.1 MG/1
200 TABLET ORAL
Status: DISCONTINUED | OUTPATIENT
Start: 2023-04-14 | End: 2023-04-21 | Stop reason: HOSPADM

## 2023-04-14 RX ORDER — SODIUM CHLORIDE 9 MG/ML
INJECTION, SOLUTION INTRAVENOUS CONTINUOUS
Status: DISCONTINUED | OUTPATIENT
Start: 2023-04-14 | End: 2023-04-15 | Stop reason: ALTCHOICE

## 2023-04-14 RX ORDER — OXYCODONE HYDROCHLORIDE 5 MG/1
5 TABLET ORAL PRN
Status: CANCELLED | OUTPATIENT
Start: 2023-04-14 | End: 2023-04-14

## 2023-04-14 RX ORDER — ONDANSETRON 2 MG/ML
4 INJECTION INTRAMUSCULAR; INTRAVENOUS EVERY 4 HOURS PRN
Status: DISCONTINUED | OUTPATIENT
Start: 2023-04-14 | End: 2023-04-21 | Stop reason: HOSPADM

## 2023-04-14 RX ORDER — ONDANSETRON 2 MG/ML
4 INJECTION INTRAMUSCULAR; INTRAVENOUS
Status: CANCELLED | OUTPATIENT
Start: 2023-04-14 | End: 2023-04-15

## 2023-04-14 RX ORDER — DIPHENHYDRAMINE HCL 25 MG
50 TABLET ORAL NIGHTLY PRN
Status: DISCONTINUED | OUTPATIENT
Start: 2023-04-14 | End: 2023-04-21 | Stop reason: HOSPADM

## 2023-04-14 RX ORDER — OXYCODONE HYDROCHLORIDE 5 MG/1
10 TABLET ORAL PRN
Status: CANCELLED | OUTPATIENT
Start: 2023-04-14 | End: 2023-04-14

## 2023-04-14 RX ORDER — ACETAMINOPHEN 325 MG/1
650 TABLET ORAL EVERY 4 HOURS PRN
Status: DISCONTINUED | OUTPATIENT
Start: 2023-04-14 | End: 2023-04-21 | Stop reason: HOSPADM

## 2023-04-14 RX ORDER — SODIUM CHLORIDE 0.9 % (FLUSH) 0.9 %
10 SYRINGE (ML) INJECTION PRN
Status: DISCONTINUED | OUTPATIENT
Start: 2023-04-14 | End: 2023-04-18 | Stop reason: SDUPTHER

## 2023-04-14 RX ORDER — ATORVASTATIN CALCIUM 40 MG/1
40 TABLET, FILM COATED ORAL NIGHTLY
Status: DISCONTINUED | OUTPATIENT
Start: 2023-04-14 | End: 2023-04-21 | Stop reason: HOSPADM

## 2023-04-14 RX ORDER — ACETAMINOPHEN 650 MG/1
650 SUPPOSITORY RECTAL EVERY 4 HOURS PRN
Status: DISCONTINUED | OUTPATIENT
Start: 2023-04-14 | End: 2023-04-21 | Stop reason: HOSPADM

## 2023-04-14 RX ORDER — DIPHENHYDRAMINE HYDROCHLORIDE 50 MG/ML
12.5 INJECTION INTRAMUSCULAR; INTRAVENOUS
Status: CANCELLED | OUTPATIENT
Start: 2023-04-14 | End: 2023-04-15

## 2023-04-14 RX ORDER — CYCLOBENZAPRINE HCL 10 MG
10 TABLET ORAL 3 TIMES DAILY PRN
Status: DISCONTINUED | OUTPATIENT
Start: 2023-04-14 | End: 2023-04-21 | Stop reason: HOSPADM

## 2023-04-14 RX ORDER — CARBAMAZEPINE 200 MG/1
400 TABLET, EXTENDED RELEASE ORAL 3 TIMES DAILY
Status: DISCONTINUED | OUTPATIENT
Start: 2023-04-14 | End: 2023-04-21 | Stop reason: HOSPADM

## 2023-04-14 RX ORDER — FOLIC ACID 1 MG/1
1 TABLET ORAL DAILY
Status: DISCONTINUED | OUTPATIENT
Start: 2023-04-14 | End: 2023-04-21 | Stop reason: HOSPADM

## 2023-04-14 RX ORDER — MECOBALAMIN 5000 MCG
10 TABLET,DISINTEGRATING ORAL NIGHTLY PRN
Status: DISCONTINUED | OUTPATIENT
Start: 2023-04-14 | End: 2023-04-21 | Stop reason: HOSPADM

## 2023-04-14 RX ORDER — FENTANYL CITRATE 400 UG/1
800 LOZENGE ORAL; TRANSMUCOSAL 3 TIMES DAILY PRN
Status: DISCONTINUED | OUTPATIENT
Start: 2023-04-14 | End: 2023-04-14 | Stop reason: RX

## 2023-04-14 RX ORDER — SODIUM CHLORIDE 9 MG/ML
25 INJECTION, SOLUTION INTRAVENOUS PRN
Status: CANCELLED | OUTPATIENT
Start: 2023-04-14

## 2023-04-14 RX ORDER — ACETAMINOPHEN 325 MG/1
650 TABLET ORAL EVERY 4 HOURS PRN
Status: DISCONTINUED | OUTPATIENT
Start: 2023-04-14 | End: 2023-04-14

## 2023-04-14 RX ORDER — SODIUM CHLORIDE 9 MG/ML
INJECTION, SOLUTION INTRAVENOUS PRN
Status: DISCONTINUED | OUTPATIENT
Start: 2023-04-14 | End: 2023-04-21 | Stop reason: HOSPADM

## 2023-04-14 RX ORDER — POLYETHYLENE GLYCOL 3350 17 G/17G
17 POWDER, FOR SOLUTION ORAL DAILY PRN
Status: DISCONTINUED | OUTPATIENT
Start: 2023-04-14 | End: 2023-04-14 | Stop reason: SDUPTHER

## 2023-04-14 RX ORDER — CLONAZEPAM 1 MG/1
1 TABLET ORAL DAILY
Status: DISCONTINUED | OUTPATIENT
Start: 2023-04-14 | End: 2023-04-21 | Stop reason: HOSPADM

## 2023-04-14 RX ORDER — POLYETHYLENE GLYCOL 3350 17 G/17G
17 POWDER, FOR SOLUTION ORAL DAILY PRN
Status: DISCONTINUED | OUTPATIENT
Start: 2023-04-14 | End: 2023-04-21 | Stop reason: HOSPADM

## 2023-04-14 RX ADMIN — SODIUM CHLORIDE: 9 INJECTION, SOLUTION INTRAVENOUS at 21:40

## 2023-04-14 RX ADMIN — FOLIC ACID 1 MG: 1 TABLET ORAL at 08:46

## 2023-04-14 RX ADMIN — CARBAMAZEPINE 400 MG: 200 TABLET, EXTENDED RELEASE ORAL at 18:50

## 2023-04-14 RX ADMIN — ATORVASTATIN CALCIUM 40 MG: 40 TABLET, FILM COATED ORAL at 21:42

## 2023-04-14 RX ADMIN — MORPHINE SULFATE 30 MG: 30 TABLET, FILM COATED, EXTENDED RELEASE ORAL at 08:46

## 2023-04-14 RX ADMIN — CLONAZEPAM 1.5 MG: 1 TABLET ORAL at 21:41

## 2023-04-14 RX ADMIN — CARBAMAZEPINE 400 MG: 200 TABLET, EXTENDED RELEASE ORAL at 10:04

## 2023-04-14 RX ADMIN — SODIUM CHLORIDE, PRESERVATIVE FREE 10 ML: 5 INJECTION INTRAVENOUS at 08:48

## 2023-04-14 RX ADMIN — CLONAZEPAM 1 MG: 1 TABLET ORAL at 08:46

## 2023-04-14 RX ADMIN — PRIMIDONE 250 MG: 250 TABLET ORAL at 08:47

## 2023-04-14 RX ADMIN — Medication 10 MG: at 21:42

## 2023-04-14 RX ADMIN — DULOXETINE HYDROCHLORIDE 20 MG: 20 CAPSULE, DELAYED RELEASE ORAL at 10:05

## 2023-04-14 RX ADMIN — PRIMIDONE 250 MG: 250 TABLET ORAL at 21:41

## 2023-04-14 RX ADMIN — CYCLOBENZAPRINE 10 MG: 10 TABLET, FILM COATED ORAL at 21:42

## 2023-04-14 RX ADMIN — MORPHINE SULFATE 30 MG: 30 TABLET, FILM COATED, EXTENDED RELEASE ORAL at 03:22

## 2023-04-14 RX ADMIN — LEVOTHYROXINE SODIUM 200 MCG: 0.1 TABLET ORAL at 06:58

## 2023-04-14 RX ADMIN — SODIUM CHLORIDE: 9 INJECTION, SOLUTION INTRAVENOUS at 03:03

## 2023-04-14 RX ADMIN — MORPHINE SULFATE 30 MG: 30 TABLET, FILM COATED, EXTENDED RELEASE ORAL at 21:42

## 2023-04-14 RX ADMIN — CARBAMAZEPINE 400 MG: 200 TABLET, EXTENDED RELEASE ORAL at 21:52

## 2023-04-14 RX ADMIN — PANTOPRAZOLE SODIUM 40 MG: 40 INJECTION, POWDER, LYOPHILIZED, FOR SOLUTION INTRAVENOUS at 21:42

## 2023-04-14 RX ADMIN — SODIUM CHLORIDE, PRESERVATIVE FREE 10 ML: 5 INJECTION INTRAVENOUS at 22:52

## 2023-04-14 RX ADMIN — ONDANSETRON 4 MG: 2 INJECTION INTRAMUSCULAR; INTRAVENOUS at 06:58

## 2023-04-14 RX ADMIN — PERFLUTREN 1.5 ML: 6.52 INJECTION, SUSPENSION INTRAVENOUS at 08:10

## 2023-04-14 ASSESSMENT — PAIN DESCRIPTION - ORIENTATION
ORIENTATION: LEFT
ORIENTATION: MID;RIGHT

## 2023-04-14 ASSESSMENT — PAIN SCALES - GENERAL
PAINLEVEL_OUTOF10: 8
PAINLEVEL_OUTOF10: 8
PAINLEVEL_OUTOF10: 7
PAINLEVEL_OUTOF10: 10
PAINLEVEL_OUTOF10: 10
PAINLEVEL_OUTOF10: 8

## 2023-04-14 ASSESSMENT — PAIN DESCRIPTION - DESCRIPTORS
DESCRIPTORS: STABBING;SHARP;BURNING
DESCRIPTORS: ACHING;SORE

## 2023-04-14 ASSESSMENT — PAIN - FUNCTIONAL ASSESSMENT
PAIN_FUNCTIONAL_ASSESSMENT: PREVENTS OR INTERFERES WITH ALL ACTIVE AND SOME PASSIVE ACTIVITIES
PAIN_FUNCTIONAL_ASSESSMENT: PREVENTS OR INTERFERES SOME ACTIVE ACTIVITIES AND ADLS

## 2023-04-14 ASSESSMENT — PAIN DESCRIPTION - LOCATION
LOCATION: BACK;LEG
LOCATION: HEAD;HIP;ABDOMEN
LOCATION: HEAD;HIP;ABDOMEN
LOCATION: HIP;KNEE

## 2023-04-15 LAB
ANION GAP SERPL CALCULATED.3IONS-SCNC: 4 MMOL/L (ref 3–16)
BASOPHILS # BLD: 0 K/UL (ref 0–0.2)
BASOPHILS NFR BLD: 0.7 %
BUN SERPL-MCNC: 9 MG/DL (ref 7–20)
CALCIUM SERPL-MCNC: 8.2 MG/DL (ref 8.3–10.6)
CHLORIDE SERPL-SCNC: 110 MMOL/L (ref 99–110)
CO2 SERPL-SCNC: 29 MMOL/L (ref 21–32)
CREAT SERPL-MCNC: <0.5 MG/DL (ref 0.6–1.1)
DEPRECATED RDW RBC AUTO: 14.6 % (ref 12.4–15.4)
EOSINOPHIL # BLD: 0.2 K/UL (ref 0–0.6)
EOSINOPHIL NFR BLD: 4.2 %
GFR SERPLBLD CREATININE-BSD FMLA CKD-EPI: >60 ML/MIN/{1.73_M2}
GLUCOSE SERPL-MCNC: 88 MG/DL (ref 70–99)
HCT VFR BLD AUTO: 22 % (ref 36–48)
HCT VFR BLD AUTO: 24.5 % (ref 36–48)
HGB BLD-MCNC: 7.6 G/DL (ref 12–16)
HGB BLD-MCNC: 8.4 G/DL (ref 12–16)
LYMPHOCYTES # BLD: 1.3 K/UL (ref 1–5.1)
LYMPHOCYTES NFR BLD: 35.6 %
MCH RBC QN AUTO: 30.7 PG (ref 26–34)
MCHC RBC AUTO-ENTMCNC: 34.5 G/DL (ref 31–36)
MCV RBC AUTO: 89 FL (ref 80–100)
MONOCYTES # BLD: 0.3 K/UL (ref 0–1.3)
MONOCYTES NFR BLD: 7.1 %
NEUTROPHILS # BLD: 2 K/UL (ref 1.7–7.7)
NEUTROPHILS NFR BLD: 52.4 %
PLATELET # BLD AUTO: 123 K/UL (ref 135–450)
PMV BLD AUTO: 8.7 FL (ref 5–10.5)
POTASSIUM SERPL-SCNC: 4.3 MMOL/L (ref 3.5–5.1)
RBC # BLD AUTO: 2.47 M/UL (ref 4–5.2)
SODIUM SERPL-SCNC: 143 MMOL/L (ref 136–145)
STFR SERPL-SCNC: 1.7 MG/L (ref 1.9–4.4)
WBC # BLD AUTO: 3.8 K/UL (ref 4–11)

## 2023-04-15 PROCEDURE — 6360000002 HC RX W HCPCS: Performed by: REGISTERED NURSE

## 2023-04-15 PROCEDURE — 36415 COLL VENOUS BLD VENIPUNCTURE: CPT

## 2023-04-15 PROCEDURE — 80048 BASIC METABOLIC PNL TOTAL CA: CPT

## 2023-04-15 PROCEDURE — C9113 INJ PANTOPRAZOLE SODIUM, VIA: HCPCS | Performed by: INTERNAL MEDICINE

## 2023-04-15 PROCEDURE — 97530 THERAPEUTIC ACTIVITIES: CPT

## 2023-04-15 PROCEDURE — 85025 COMPLETE CBC W/AUTO DIFF WBC: CPT

## 2023-04-15 PROCEDURE — 2580000003 HC RX 258: Performed by: INTERNAL MEDICINE

## 2023-04-15 PROCEDURE — 1200000000 HC SEMI PRIVATE

## 2023-04-15 PROCEDURE — 6370000000 HC RX 637 (ALT 250 FOR IP): Performed by: INTERNAL MEDICINE

## 2023-04-15 PROCEDURE — 97535 SELF CARE MNGMENT TRAINING: CPT

## 2023-04-15 PROCEDURE — 97116 GAIT TRAINING THERAPY: CPT

## 2023-04-15 PROCEDURE — 2580000003 HC RX 258: Performed by: REGISTERED NURSE

## 2023-04-15 PROCEDURE — 6360000002 HC RX W HCPCS: Performed by: INTERNAL MEDICINE

## 2023-04-15 PROCEDURE — 6370000000 HC RX 637 (ALT 250 FOR IP): Performed by: REGISTERED NURSE

## 2023-04-15 PROCEDURE — 6370000000 HC RX 637 (ALT 250 FOR IP): Performed by: NURSE PRACTITIONER

## 2023-04-15 PROCEDURE — 85014 HEMATOCRIT: CPT

## 2023-04-15 PROCEDURE — 85018 HEMOGLOBIN: CPT

## 2023-04-15 PROCEDURE — 97162 PT EVAL MOD COMPLEX 30 MIN: CPT

## 2023-04-15 PROCEDURE — 97166 OT EVAL MOD COMPLEX 45 MIN: CPT

## 2023-04-15 RX ORDER — SUCRALFATE 1 G/1
1 TABLET ORAL
Status: DISCONTINUED | OUTPATIENT
Start: 2023-04-15 | End: 2023-04-21 | Stop reason: HOSPADM

## 2023-04-15 RX ORDER — TRAMADOL HYDROCHLORIDE 50 MG/1
50 TABLET ORAL ONCE
Status: COMPLETED | OUTPATIENT
Start: 2023-04-15 | End: 2023-04-15

## 2023-04-15 RX ADMIN — DULOXETINE HYDROCHLORIDE 20 MG: 20 CAPSULE, DELAYED RELEASE ORAL at 10:05

## 2023-04-15 RX ADMIN — PRIMIDONE 250 MG: 250 TABLET ORAL at 11:25

## 2023-04-15 RX ADMIN — PRIMIDONE 250 MG: 250 TABLET ORAL at 21:47

## 2023-04-15 RX ADMIN — CARBAMAZEPINE 400 MG: 200 TABLET, EXTENDED RELEASE ORAL at 10:05

## 2023-04-15 RX ADMIN — SODIUM CHLORIDE, PRESERVATIVE FREE 10 ML: 5 INJECTION INTRAVENOUS at 21:52

## 2023-04-15 RX ADMIN — PRIMIDONE 250 MG: 250 TABLET ORAL at 15:01

## 2023-04-15 RX ADMIN — ATORVASTATIN CALCIUM 40 MG: 40 TABLET, FILM COATED ORAL at 21:47

## 2023-04-15 RX ADMIN — SODIUM CHLORIDE, PRESERVATIVE FREE 10 ML: 5 INJECTION INTRAVENOUS at 10:09

## 2023-04-15 RX ADMIN — ACETAMINOPHEN 650 MG: 325 TABLET ORAL at 03:29

## 2023-04-15 RX ADMIN — Medication 10 MG: at 21:48

## 2023-04-15 RX ADMIN — CARBAMAZEPINE 400 MG: 200 TABLET, EXTENDED RELEASE ORAL at 21:48

## 2023-04-15 RX ADMIN — PANTOPRAZOLE SODIUM 40 MG: 40 INJECTION, POWDER, LYOPHILIZED, FOR SOLUTION INTRAVENOUS at 21:48

## 2023-04-15 RX ADMIN — ACETAMINOPHEN 650 MG: 325 TABLET ORAL at 19:06

## 2023-04-15 RX ADMIN — SUCRALFATE 1 G: 1 TABLET ORAL at 11:25

## 2023-04-15 RX ADMIN — CARBAMAZEPINE 400 MG: 200 TABLET, EXTENDED RELEASE ORAL at 15:02

## 2023-04-15 RX ADMIN — LEVOTHYROXINE SODIUM 200 MCG: 0.1 TABLET ORAL at 06:44

## 2023-04-15 RX ADMIN — MORPHINE SULFATE 30 MG: 30 TABLET, FILM COATED, EXTENDED RELEASE ORAL at 10:05

## 2023-04-15 RX ADMIN — PANTOPRAZOLE SODIUM 40 MG: 40 INJECTION, POWDER, LYOPHILIZED, FOR SOLUTION INTRAVENOUS at 10:05

## 2023-04-15 RX ADMIN — FOLIC ACID 1 MG: 1 TABLET ORAL at 10:05

## 2023-04-15 RX ADMIN — CLONAZEPAM 1.5 MG: 1 TABLET ORAL at 21:47

## 2023-04-15 RX ADMIN — SODIUM CHLORIDE, PRESERVATIVE FREE 10 ML: 5 INJECTION INTRAVENOUS at 10:05

## 2023-04-15 RX ADMIN — SUCRALFATE 1 G: 1 TABLET ORAL at 19:14

## 2023-04-15 RX ADMIN — MORPHINE SULFATE 30 MG: 30 TABLET, FILM COATED, EXTENDED RELEASE ORAL at 21:47

## 2023-04-15 RX ADMIN — CYCLOBENZAPRINE 10 MG: 10 TABLET, FILM COATED ORAL at 15:01

## 2023-04-15 RX ADMIN — TRAMADOL HYDROCHLORIDE 50 MG: 50 TABLET, COATED ORAL at 21:47

## 2023-04-15 RX ADMIN — CLONAZEPAM 1 MG: 1 TABLET ORAL at 10:06

## 2023-04-15 RX ADMIN — IRON SUCROSE 200 MG: 20 INJECTION, SOLUTION INTRAVENOUS at 11:36

## 2023-04-15 ASSESSMENT — PAIN DESCRIPTION - PAIN TYPE: TYPE: CHRONIC PAIN

## 2023-04-15 ASSESSMENT — PAIN SCALES - GENERAL
PAINLEVEL_OUTOF10: 10

## 2023-04-15 ASSESSMENT — PAIN DESCRIPTION - LOCATION: LOCATION: LEG

## 2023-04-15 ASSESSMENT — PAIN - FUNCTIONAL ASSESSMENT: PAIN_FUNCTIONAL_ASSESSMENT: PREVENTS OR INTERFERES SOME ACTIVE ACTIVITIES AND ADLS

## 2023-04-15 ASSESSMENT — PAIN DESCRIPTION - ORIENTATION: ORIENTATION: RIGHT

## 2023-04-15 ASSESSMENT — PAIN DESCRIPTION - DESCRIPTORS: DESCRIPTORS: ACHING

## 2023-04-16 LAB
ANION GAP SERPL CALCULATED.3IONS-SCNC: 4 MMOL/L (ref 3–16)
BASOPHILS # BLD: 0 K/UL (ref 0–0.2)
BASOPHILS NFR BLD: 1.1 %
BUN SERPL-MCNC: 10 MG/DL (ref 7–20)
CALCIUM SERPL-MCNC: 8.6 MG/DL (ref 8.3–10.6)
CHLORIDE SERPL-SCNC: 105 MMOL/L (ref 99–110)
CO2 SERPL-SCNC: 28 MMOL/L (ref 21–32)
CREAT SERPL-MCNC: <0.5 MG/DL (ref 0.6–1.1)
DEPRECATED RDW RBC AUTO: 14.6 % (ref 12.4–15.4)
EOSINOPHIL # BLD: 0.2 K/UL (ref 0–0.6)
EOSINOPHIL NFR BLD: 5.2 %
GFR SERPLBLD CREATININE-BSD FMLA CKD-EPI: >60 ML/MIN/{1.73_M2}
GLUCOSE SERPL-MCNC: 94 MG/DL (ref 70–99)
HCT VFR BLD AUTO: 22.9 % (ref 36–48)
HGB BLD-MCNC: 7.9 G/DL (ref 12–16)
IGA SERPL-MCNC: 109 MG/DL (ref 70–400)
LYMPHOCYTES # BLD: 1.4 K/UL (ref 1–5.1)
LYMPHOCYTES NFR BLD: 41.4 %
MCH RBC QN AUTO: 30.6 PG (ref 26–34)
MCHC RBC AUTO-ENTMCNC: 34.6 G/DL (ref 31–36)
MCV RBC AUTO: 88.4 FL (ref 80–100)
MONOCYTES # BLD: 0.2 K/UL (ref 0–1.3)
MONOCYTES NFR BLD: 6.7 %
NEUTROPHILS # BLD: 1.5 K/UL (ref 1.7–7.7)
NEUTROPHILS NFR BLD: 45.6 %
PLATELET # BLD AUTO: 141 K/UL (ref 135–450)
PMV BLD AUTO: 9.1 FL (ref 5–10.5)
POTASSIUM SERPL-SCNC: 4.4 MMOL/L (ref 3.5–5.1)
RBC # BLD AUTO: 2.59 M/UL (ref 4–5.2)
SODIUM SERPL-SCNC: 137 MMOL/L (ref 136–145)
T4 FREE SERPL-MCNC: 1.4 NG/DL (ref 0.9–1.8)
TISSUE TRANSGLUTAMINASE IGA: 3.3 U/ML (ref 0–14)
TSH SERPL DL<=0.005 MIU/L-ACNC: 0.02 UIU/ML (ref 0.27–4.2)
WBC # BLD AUTO: 3.3 K/UL (ref 4–11)

## 2023-04-16 PROCEDURE — 2580000003 HC RX 258: Performed by: REGISTERED NURSE

## 2023-04-16 PROCEDURE — 2580000003 HC RX 258: Performed by: INTERNAL MEDICINE

## 2023-04-16 PROCEDURE — 80048 BASIC METABOLIC PNL TOTAL CA: CPT

## 2023-04-16 PROCEDURE — 85025 COMPLETE CBC W/AUTO DIFF WBC: CPT

## 2023-04-16 PROCEDURE — 6370000000 HC RX 637 (ALT 250 FOR IP): Performed by: INTERNAL MEDICINE

## 2023-04-16 PROCEDURE — 6360000002 HC RX W HCPCS: Performed by: REGISTERED NURSE

## 2023-04-16 PROCEDURE — 1200000000 HC SEMI PRIVATE

## 2023-04-16 PROCEDURE — 6370000000 HC RX 637 (ALT 250 FOR IP): Performed by: NURSE PRACTITIONER

## 2023-04-16 PROCEDURE — C9113 INJ PANTOPRAZOLE SODIUM, VIA: HCPCS | Performed by: INTERNAL MEDICINE

## 2023-04-16 PROCEDURE — 6360000002 HC RX W HCPCS: Performed by: INTERNAL MEDICINE

## 2023-04-16 PROCEDURE — 36415 COLL VENOUS BLD VENIPUNCTURE: CPT

## 2023-04-16 PROCEDURE — 6370000000 HC RX 637 (ALT 250 FOR IP): Performed by: REGISTERED NURSE

## 2023-04-16 PROCEDURE — 99223 1ST HOSP IP/OBS HIGH 75: CPT | Performed by: STUDENT IN AN ORGANIZED HEALTH CARE EDUCATION/TRAINING PROGRAM

## 2023-04-16 RX ORDER — TRAMADOL HYDROCHLORIDE 50 MG/1
50 TABLET ORAL EVERY 6 HOURS PRN
Status: COMPLETED | OUTPATIENT
Start: 2023-04-16 | End: 2023-04-16

## 2023-04-16 RX ADMIN — SUCRALFATE 1 G: 1 TABLET ORAL at 05:53

## 2023-04-16 RX ADMIN — LEVOTHYROXINE SODIUM 200 MCG: 0.1 TABLET ORAL at 05:53

## 2023-04-16 RX ADMIN — CLONAZEPAM 1.5 MG: 1 TABLET ORAL at 20:22

## 2023-04-16 RX ADMIN — SODIUM CHLORIDE, PRESERVATIVE FREE 10 ML: 5 INJECTION INTRAVENOUS at 22:35

## 2023-04-16 RX ADMIN — TRAMADOL HYDROCHLORIDE 50 MG: 50 TABLET, COATED ORAL at 13:50

## 2023-04-16 RX ADMIN — DULOXETINE HYDROCHLORIDE 20 MG: 20 CAPSULE, DELAYED RELEASE ORAL at 10:07

## 2023-04-16 RX ADMIN — CARBAMAZEPINE 400 MG: 200 TABLET, EXTENDED RELEASE ORAL at 20:22

## 2023-04-16 RX ADMIN — Medication 10 MG: at 20:24

## 2023-04-16 RX ADMIN — SUCRALFATE 1 G: 1 TABLET ORAL at 10:08

## 2023-04-16 RX ADMIN — Medication 10 ML: at 09:35

## 2023-04-16 RX ADMIN — ATORVASTATIN CALCIUM 40 MG: 40 TABLET, FILM COATED ORAL at 20:23

## 2023-04-16 RX ADMIN — TRAMADOL HYDROCHLORIDE 50 MG: 50 TABLET, COATED ORAL at 20:23

## 2023-04-16 RX ADMIN — POLYETHYLENE GLYCOL 3350 17 G: 17 POWDER, FOR SOLUTION ORAL at 02:07

## 2023-04-16 RX ADMIN — CLONAZEPAM 1 MG: 1 TABLET ORAL at 09:13

## 2023-04-16 RX ADMIN — FOLIC ACID 1 MG: 1 TABLET ORAL at 09:13

## 2023-04-16 RX ADMIN — PRIMIDONE 250 MG: 250 TABLET ORAL at 20:44

## 2023-04-16 RX ADMIN — PANTOPRAZOLE SODIUM 40 MG: 40 INJECTION, POWDER, LYOPHILIZED, FOR SOLUTION INTRAVENOUS at 20:24

## 2023-04-16 RX ADMIN — CARBAMAZEPINE 400 MG: 200 TABLET, EXTENDED RELEASE ORAL at 09:17

## 2023-04-16 RX ADMIN — SODIUM CHLORIDE, PRESERVATIVE FREE 10 ML: 5 INJECTION INTRAVENOUS at 09:31

## 2023-04-16 RX ADMIN — PANTOPRAZOLE SODIUM 40 MG: 40 INJECTION, POWDER, LYOPHILIZED, FOR SOLUTION INTRAVENOUS at 09:17

## 2023-04-16 RX ADMIN — CARBAMAZEPINE 400 MG: 200 TABLET, EXTENDED RELEASE ORAL at 15:48

## 2023-04-16 RX ADMIN — MORPHINE SULFATE 30 MG: 30 TABLET, FILM COATED, EXTENDED RELEASE ORAL at 09:14

## 2023-04-16 RX ADMIN — IRON SUCROSE 200 MG: 20 INJECTION, SOLUTION INTRAVENOUS at 10:16

## 2023-04-16 RX ADMIN — PRIMIDONE 250 MG: 250 TABLET ORAL at 13:51

## 2023-04-16 RX ADMIN — MORPHINE SULFATE 30 MG: 30 TABLET, FILM COATED, EXTENDED RELEASE ORAL at 20:22

## 2023-04-16 RX ADMIN — SUCRALFATE 1 G: 1 TABLET ORAL at 17:12

## 2023-04-16 RX ADMIN — SUCRALFATE 1 G: 1 TABLET ORAL at 20:23

## 2023-04-16 ASSESSMENT — PAIN DESCRIPTION - LOCATION
LOCATION: LEG;HIP
LOCATION: HIP
LOCATION: HIP;BACK
LOCATION: BACK;HIP

## 2023-04-16 ASSESSMENT — PAIN DESCRIPTION - DESCRIPTORS
DESCRIPTORS: ACHING
DESCRIPTORS: ACHING
DESCRIPTORS: STABBING

## 2023-04-16 ASSESSMENT — PAIN DESCRIPTION - ORIENTATION
ORIENTATION: OTHER (COMMENT)
ORIENTATION: RIGHT

## 2023-04-16 ASSESSMENT — PAIN SCALES - GENERAL
PAINLEVEL_OUTOF10: 10
PAINLEVEL_OUTOF10: 10
PAINLEVEL_OUTOF10: 6
PAINLEVEL_OUTOF10: 10

## 2023-04-17 ENCOUNTER — APPOINTMENT (OUTPATIENT)
Dept: GENERAL RADIOLOGY | Age: 53
DRG: 378 | End: 2023-04-17
Payer: COMMERCIAL

## 2023-04-17 ENCOUNTER — ANESTHESIA EVENT (OUTPATIENT)
Dept: OPERATING ROOM | Age: 53
End: 2023-04-17
Payer: COMMERCIAL

## 2023-04-17 ENCOUNTER — ANESTHESIA (OUTPATIENT)
Dept: OPERATING ROOM | Age: 53
End: 2023-04-17
Payer: COMMERCIAL

## 2023-04-17 PROBLEM — M16.11 PRIMARY OSTEOARTHRITIS OF RIGHT HIP: Status: ACTIVE | Noted: 2023-04-17

## 2023-04-17 LAB
ANION GAP SERPL CALCULATED.3IONS-SCNC: 4 MMOL/L (ref 3–16)
BUN SERPL-MCNC: 9 MG/DL (ref 7–20)
CALCIUM SERPL-MCNC: 8.7 MG/DL (ref 8.3–10.6)
CHLORIDE SERPL-SCNC: 102 MMOL/L (ref 99–110)
CO2 SERPL-SCNC: 31 MMOL/L (ref 21–32)
CREAT SERPL-MCNC: <0.5 MG/DL (ref 0.6–1.1)
DEPRECATED RDW RBC AUTO: 14.8 % (ref 12.4–15.4)
GFR SERPLBLD CREATININE-BSD FMLA CKD-EPI: >60 ML/MIN/{1.73_M2}
GLUCOSE SERPL-MCNC: 99 MG/DL (ref 70–99)
HCT VFR BLD AUTO: 22.9 % (ref 36–48)
HGB BLD-MCNC: 7.9 G/DL (ref 12–16)
MCH RBC QN AUTO: 30.8 PG (ref 26–34)
MCHC RBC AUTO-ENTMCNC: 34.4 G/DL (ref 31–36)
MCV RBC AUTO: 89.5 FL (ref 80–100)
PLATELET # BLD AUTO: 151 K/UL (ref 135–450)
PMV BLD AUTO: 8.6 FL (ref 5–10.5)
POTASSIUM SERPL-SCNC: 4.1 MMOL/L (ref 3.5–5.1)
RBC # BLD AUTO: 2.56 M/UL (ref 4–5.2)
SODIUM SERPL-SCNC: 137 MMOL/L (ref 136–145)
WBC # BLD AUTO: 3.9 K/UL (ref 4–11)

## 2023-04-17 PROCEDURE — 85027 COMPLETE CBC AUTOMATED: CPT

## 2023-04-17 PROCEDURE — 80048 BASIC METABOLIC PNL TOTAL CA: CPT

## 2023-04-17 PROCEDURE — 7100000011 HC PHASE II RECOVERY - ADDTL 15 MIN: Performed by: ORTHOPAEDIC SURGERY

## 2023-04-17 PROCEDURE — 36415 COLL VENOUS BLD VENIPUNCTURE: CPT

## 2023-04-17 PROCEDURE — 6360000002 HC RX W HCPCS: Performed by: ORTHOPAEDIC SURGERY

## 2023-04-17 PROCEDURE — 3600000003 HC SURGERY LEVEL 3 BASE: Performed by: ORTHOPAEDIC SURGERY

## 2023-04-17 PROCEDURE — 73501 X-RAY EXAM HIP UNI 1 VIEW: CPT

## 2023-04-17 PROCEDURE — 2580000003 HC RX 258: Performed by: INTERNAL MEDICINE

## 2023-04-17 PROCEDURE — 6360000002 HC RX W HCPCS

## 2023-04-17 PROCEDURE — 2580000003 HC RX 258: Performed by: ANESTHESIOLOGY

## 2023-04-17 PROCEDURE — 3700000000 HC ANESTHESIA ATTENDED CARE: Performed by: ORTHOPAEDIC SURGERY

## 2023-04-17 PROCEDURE — 2500000003 HC RX 250 WO HCPCS: Performed by: ORTHOPAEDIC SURGERY

## 2023-04-17 PROCEDURE — 2580000003 HC RX 258: Performed by: REGISTERED NURSE

## 2023-04-17 PROCEDURE — 3700000001 HC ADD 15 MINUTES (ANESTHESIA): Performed by: ORTHOPAEDIC SURGERY

## 2023-04-17 PROCEDURE — 73502 X-RAY EXAM HIP UNI 2-3 VIEWS: CPT

## 2023-04-17 PROCEDURE — 6370000000 HC RX 637 (ALT 250 FOR IP): Performed by: NURSE PRACTITIONER

## 2023-04-17 PROCEDURE — 2709999900 HC NON-CHARGEABLE SUPPLY: Performed by: ORTHOPAEDIC SURGERY

## 2023-04-17 PROCEDURE — 6360000002 HC RX W HCPCS: Performed by: INTERNAL MEDICINE

## 2023-04-17 PROCEDURE — 77002 NEEDLE LOCALIZATION BY XRAY: CPT | Performed by: ORTHOPAEDIC SURGERY

## 2023-04-17 PROCEDURE — 6370000000 HC RX 637 (ALT 250 FOR IP): Performed by: REGISTERED NURSE

## 2023-04-17 PROCEDURE — 3E0U33Z INTRODUCTION OF ANTI-INFLAMMATORY INTO JOINTS, PERCUTANEOUS APPROACH: ICD-10-PCS | Performed by: ORTHOPAEDIC SURGERY

## 2023-04-17 PROCEDURE — 99233 SBSQ HOSP IP/OBS HIGH 50: CPT | Performed by: ORTHOPAEDIC SURGERY

## 2023-04-17 PROCEDURE — 6370000000 HC RX 637 (ALT 250 FOR IP): Performed by: INTERNAL MEDICINE

## 2023-04-17 PROCEDURE — 7100000010 HC PHASE II RECOVERY - FIRST 15 MIN: Performed by: ORTHOPAEDIC SURGERY

## 2023-04-17 PROCEDURE — 20610 DRAIN/INJ JOINT/BURSA W/O US: CPT | Performed by: ORTHOPAEDIC SURGERY

## 2023-04-17 PROCEDURE — 3209999900 FLUORO FOR SURGICAL PROCEDURES

## 2023-04-17 PROCEDURE — C9113 INJ PANTOPRAZOLE SODIUM, VIA: HCPCS | Performed by: INTERNAL MEDICINE

## 2023-04-17 PROCEDURE — 3600000013 HC SURGERY LEVEL 3 ADDTL 15MIN: Performed by: ORTHOPAEDIC SURGERY

## 2023-04-17 PROCEDURE — 6360000004 HC RX CONTRAST MEDICATION: Performed by: ORTHOPAEDIC SURGERY

## 2023-04-17 PROCEDURE — 6360000002 HC RX W HCPCS: Performed by: REGISTERED NURSE

## 2023-04-17 PROCEDURE — 1200000000 HC SEMI PRIVATE

## 2023-04-17 RX ORDER — TRAMADOL HYDROCHLORIDE 50 MG/1
50 TABLET ORAL EVERY 6 HOURS PRN
Status: DISCONTINUED | OUTPATIENT
Start: 2023-04-17 | End: 2023-04-17

## 2023-04-17 RX ORDER — OXYCODONE HYDROCHLORIDE 5 MG/1
5 TABLET ORAL PRN
Status: DISCONTINUED | OUTPATIENT
Start: 2023-04-17 | End: 2023-04-17 | Stop reason: HOSPADM

## 2023-04-17 RX ORDER — ONDANSETRON 2 MG/ML
4 INJECTION INTRAMUSCULAR; INTRAVENOUS
Status: DISCONTINUED | OUTPATIENT
Start: 2023-04-17 | End: 2023-04-17 | Stop reason: HOSPADM

## 2023-04-17 RX ORDER — APREPITANT 40 MG/1
CAPSULE ORAL
Status: COMPLETED
Start: 2023-04-17 | End: 2023-04-17

## 2023-04-17 RX ORDER — PROPOFOL 10 MG/ML
INJECTION, EMULSION INTRAVENOUS PRN
Status: DISCONTINUED | OUTPATIENT
Start: 2023-04-17 | End: 2023-04-17 | Stop reason: SDUPTHER

## 2023-04-17 RX ORDER — SODIUM CHLORIDE 9 MG/ML
INJECTION, SOLUTION INTRAVENOUS PRN
Status: DISCONTINUED | OUTPATIENT
Start: 2023-04-17 | End: 2023-04-17 | Stop reason: HOSPADM

## 2023-04-17 RX ORDER — MEPERIDINE HYDROCHLORIDE 50 MG/ML
12.5 INJECTION INTRAMUSCULAR; INTRAVENOUS; SUBCUTANEOUS EVERY 5 MIN PRN
Status: DISCONTINUED | OUTPATIENT
Start: 2023-04-17 | End: 2023-04-17 | Stop reason: HOSPADM

## 2023-04-17 RX ORDER — SODIUM CHLORIDE 0.9 % (FLUSH) 0.9 %
5-40 SYRINGE (ML) INJECTION EVERY 12 HOURS SCHEDULED
Status: DISCONTINUED | OUTPATIENT
Start: 2023-04-17 | End: 2023-04-17 | Stop reason: HOSPADM

## 2023-04-17 RX ORDER — TRAMADOL HYDROCHLORIDE 50 MG/1
50 TABLET ORAL EVERY 6 HOURS PRN
Status: COMPLETED | OUTPATIENT
Start: 2023-04-17 | End: 2023-04-17

## 2023-04-17 RX ORDER — OXYCODONE HYDROCHLORIDE 5 MG/1
10 TABLET ORAL PRN
Status: DISCONTINUED | OUTPATIENT
Start: 2023-04-17 | End: 2023-04-17 | Stop reason: HOSPADM

## 2023-04-17 RX ORDER — SODIUM CHLORIDE 0.9 % (FLUSH) 0.9 %
5-40 SYRINGE (ML) INJECTION PRN
Status: DISCONTINUED | OUTPATIENT
Start: 2023-04-17 | End: 2023-04-17 | Stop reason: HOSPADM

## 2023-04-17 RX ORDER — SODIUM CHLORIDE, SODIUM LACTATE, POTASSIUM CHLORIDE, CALCIUM CHLORIDE 600; 310; 30; 20 MG/100ML; MG/100ML; MG/100ML; MG/100ML
INJECTION, SOLUTION INTRAVENOUS CONTINUOUS
Status: DISCONTINUED | OUTPATIENT
Start: 2023-04-17 | End: 2023-04-17 | Stop reason: HOSPADM

## 2023-04-17 RX ORDER — TRAMADOL HYDROCHLORIDE 50 MG/1
50 TABLET ORAL EVERY 6 HOURS PRN
Status: DISCONTINUED | OUTPATIENT
Start: 2023-04-17 | End: 2023-04-21 | Stop reason: HOSPADM

## 2023-04-17 RX ORDER — FENTANYL CITRATE 50 UG/ML
25 INJECTION, SOLUTION INTRAMUSCULAR; INTRAVENOUS EVERY 5 MIN PRN
Status: DISCONTINUED | OUTPATIENT
Start: 2023-04-17 | End: 2023-04-17 | Stop reason: HOSPADM

## 2023-04-17 RX ORDER — APREPITANT 40 MG/1
40 CAPSULE ORAL ONCE
Status: COMPLETED | OUTPATIENT
Start: 2023-04-17 | End: 2023-04-17

## 2023-04-17 RX ORDER — DIPHENHYDRAMINE HYDROCHLORIDE 50 MG/ML
12.5 INJECTION INTRAMUSCULAR; INTRAVENOUS
Status: DISCONTINUED | OUTPATIENT
Start: 2023-04-17 | End: 2023-04-17 | Stop reason: HOSPADM

## 2023-04-17 RX ORDER — PANTOPRAZOLE SODIUM 40 MG/1
40 TABLET, DELAYED RELEASE ORAL
Status: DISCONTINUED | OUTPATIENT
Start: 2023-04-17 | End: 2023-04-21 | Stop reason: HOSPADM

## 2023-04-17 RX ADMIN — PANTOPRAZOLE SODIUM 40 MG: 40 INJECTION, POWDER, LYOPHILIZED, FOR SOLUTION INTRAVENOUS at 10:28

## 2023-04-17 RX ADMIN — CARBAMAZEPINE 400 MG: 200 TABLET, EXTENDED RELEASE ORAL at 16:06

## 2023-04-17 RX ADMIN — PRIMIDONE 250 MG: 250 TABLET ORAL at 21:29

## 2023-04-17 RX ADMIN — SODIUM CHLORIDE, PRESERVATIVE FREE 10 ML: 5 INJECTION INTRAVENOUS at 10:29

## 2023-04-17 RX ADMIN — CLONAZEPAM 1 MG: 1 TABLET ORAL at 10:28

## 2023-04-17 RX ADMIN — TRAMADOL HYDROCHLORIDE 50 MG: 50 TABLET, COATED ORAL at 02:35

## 2023-04-17 RX ADMIN — PANTOPRAZOLE SODIUM 40 MG: 40 TABLET, DELAYED RELEASE ORAL at 16:30

## 2023-04-17 RX ADMIN — CARBAMAZEPINE 400 MG: 200 TABLET, EXTENDED RELEASE ORAL at 21:29

## 2023-04-17 RX ADMIN — ATORVASTATIN CALCIUM 40 MG: 40 TABLET, FILM COATED ORAL at 21:27

## 2023-04-17 RX ADMIN — LEVOTHYROXINE SODIUM 200 MCG: 0.1 TABLET ORAL at 05:02

## 2023-04-17 RX ADMIN — MORPHINE SULFATE 30 MG: 30 TABLET, FILM COATED, EXTENDED RELEASE ORAL at 09:58

## 2023-04-17 RX ADMIN — SODIUM CHLORIDE, PRESERVATIVE FREE 10 ML: 5 INJECTION INTRAVENOUS at 10:30

## 2023-04-17 RX ADMIN — TRAMADOL HYDROCHLORIDE 50 MG: 50 TABLET, COATED ORAL at 17:59

## 2023-04-17 RX ADMIN — CLONAZEPAM 1.5 MG: 1 TABLET ORAL at 21:27

## 2023-04-17 RX ADMIN — PRIMIDONE 250 MG: 250 TABLET ORAL at 10:56

## 2023-04-17 RX ADMIN — TRAMADOL HYDROCHLORIDE 50 MG: 50 TABLET, COATED ORAL at 09:53

## 2023-04-17 RX ADMIN — CARBAMAZEPINE 400 MG: 200 TABLET, EXTENDED RELEASE ORAL at 09:53

## 2023-04-17 RX ADMIN — Medication 10 MG: at 21:26

## 2023-04-17 RX ADMIN — SODIUM CHLORIDE, POTASSIUM CHLORIDE, SODIUM LACTATE AND CALCIUM CHLORIDE: 600; 310; 30; 20 INJECTION, SOLUTION INTRAVENOUS at 12:30

## 2023-04-17 RX ADMIN — PRIMIDONE 250 MG: 250 TABLET ORAL at 16:07

## 2023-04-17 RX ADMIN — DULOXETINE HYDROCHLORIDE 20 MG: 20 CAPSULE, DELAYED RELEASE ORAL at 09:53

## 2023-04-17 RX ADMIN — SODIUM CHLORIDE, PRESERVATIVE FREE 10 ML: 5 INJECTION INTRAVENOUS at 21:32

## 2023-04-17 RX ADMIN — PROPOFOL 50 MG: 10 INJECTION, EMULSION INTRAVENOUS at 13:59

## 2023-04-17 RX ADMIN — IRON SUCROSE 200 MG: 20 INJECTION, SOLUTION INTRAVENOUS at 10:52

## 2023-04-17 RX ADMIN — SUCRALFATE 1 G: 1 TABLET ORAL at 10:00

## 2023-04-17 RX ADMIN — SUCRALFATE 1 G: 1 TABLET ORAL at 17:23

## 2023-04-17 RX ADMIN — APREPITANT 40 MG: 40 CAPSULE ORAL at 12:11

## 2023-04-17 RX ADMIN — RIVAROXABAN 20 MG: 20 TABLET, FILM COATED ORAL at 17:23

## 2023-04-17 RX ADMIN — MORPHINE SULFATE 30 MG: 30 TABLET, FILM COATED, EXTENDED RELEASE ORAL at 21:28

## 2023-04-17 RX ADMIN — SUCRALFATE 1 G: 1 TABLET ORAL at 21:27

## 2023-04-17 RX ADMIN — FOLIC ACID 1 MG: 1 TABLET ORAL at 09:59

## 2023-04-17 ASSESSMENT — PAIN DESCRIPTION - LOCATION
LOCATION: HIP;BACK
LOCATION: LEG;HIP
LOCATION: HIP;BACK
LOCATION: BACK;HIP;HEAD

## 2023-04-17 ASSESSMENT — PAIN DESCRIPTION - ORIENTATION
ORIENTATION: OTHER (COMMENT)
ORIENTATION: OTHER (COMMENT)
ORIENTATION: RIGHT
ORIENTATION: RIGHT;LOWER

## 2023-04-17 ASSESSMENT — PAIN SCALES - GENERAL
PAINLEVEL_OUTOF10: 10
PAINLEVEL_OUTOF10: 4
PAINLEVEL_OUTOF10: 10
PAINLEVEL_OUTOF10: 4
PAINLEVEL_OUTOF10: 4
PAINLEVEL_OUTOF10: 8
PAINLEVEL_OUTOF10: 10
PAINLEVEL_OUTOF10: 4

## 2023-04-17 ASSESSMENT — PAIN DESCRIPTION - DESCRIPTORS
DESCRIPTORS: ACHING
DESCRIPTORS: BURNING;THROBBING;ACHING
DESCRIPTORS: ACHING

## 2023-04-17 ASSESSMENT — PAIN - FUNCTIONAL ASSESSMENT: PAIN_FUNCTIONAL_ASSESSMENT: ACTIVITIES ARE NOT PREVENTED

## 2023-04-17 ASSESSMENT — ENCOUNTER SYMPTOMS: SHORTNESS OF BREATH: 1

## 2023-04-17 NOTE — ANESTHESIA POSTPROCEDURE EVALUATION
4.1                 04/17/2023 07:20 AM        CL                       102                 04/17/2023 07:20 AM        CO2                      31                  04/17/2023 07:20 AM        BUN                      9                   04/17/2023 07:20 AM        CREATININE               <0.5 (L)            04/17/2023 07:20 AM        GLUCOSE                  99                  04/17/2023 07:20 AM   COAGS  Lab Results       Component                Value               Date/Time                  PROTIME                  12.9                02/25/2023 04:10 PM        INR                      0.98                02/25/2023 04:10 PM        APTT                     29.6                01/09/2012 09:58 AM     Intake & Output:  @21KTGH@    Nausea & Vomiting:  No    Level of Consciousness:  Awake    Pain Assessment:  Adequate analgesia    Anesthesia Complications:  No apparent anesthetic complications    SUMMARY      Vital signs stable  OK to discharge from Stage I post anesthesia care.   Care transferred from Anesthesiology department on discharge from perioperative area

## 2023-04-17 NOTE — CONSULTS
DVT/PE, using anticoagulation until just a few days ago  Gastric ulcers  Multiple pain syndrome    -Plan for cortisone injection today  -I described that I do not believe her pain will be complete resolved after the cortisone shot. The pain that she feels even with minor touching of the leg, knee, foot, would not be improved as a result of the hip cortisone shot. She understands and wants to proceed. In addition, I specifically reiterated the risk of rapid progressive arthritis as result of cortisone injection, even with collapse of the hip, as well as additional risk for AVN following injection, in addition to the infection risk. Electronically signed by Gabbi Saucedo MD on 4/17/2023 at 10:32 AM  This dictation was generated by voice recognition computer software. Although all attempts are made to edit the dictation for accuracy, there may be errors in the transcription that are not intended.

## 2023-04-17 NOTE — ANESTHESIA PRE PROCEDURE
Take by mouth    Historical Provider, MD   primidone (MYSOLINE) 250 MG tablet Take 1 tablet by mouth 3 times daily    Historical Provider, MD       Current medications:    Current Facility-Administered Medications   Medication Dose Route Frequency Provider Last Rate Last Admin    pantoprazole (PROTONIX) tablet 40 mg  40 mg Oral BID AC Rajesh Obrien MD        sucralfate (CARAFATE) tablet 1 g  1 g Oral 4x Daily AC & HS Luzmaria Long, APRN - CNP   1 g at 04/17/23 1000    primidone (MYSOLINE) tablet 250 mg  250 mg Oral TID Fidelia Berman MD   250 mg at 04/17/23 1056    melatonin disintegrating tablet 10 mg  10 mg Oral Nightly PRN Fidelia Berman MD   10 mg at 04/16/23 2024    levothyroxine (SYNTHROID) tablet 200 mcg  200 mcg Oral QAM AC Fidelia Berman MD   200 mcg at 78/58/86 9951    folic acid (FOLVITE) tablet 1 mg  1 mg Oral Daily Fidelia Berman MD   1 mg at 04/17/23 0959    DULoxetine (CYMBALTA) extended release capsule 20 mg  20 mg Oral Daily Fidelia Berman MD   20 mg at 04/17/23 0953    diphenhydrAMINE (BENADRYL) tablet 50 mg  50 mg Oral Nightly PRN Fidelia Berman MD        cyclobenzaprine (FLEXERIL) tablet 10 mg  10 mg Oral TID PRN Fidelia Berman MD   10 mg at 04/15/23 1501    clonazePAM (KLONOPIN) tablet 1 mg  1 mg Oral Daily Fidelia Berman MD   1 mg at 04/17/23 1028    And    clonazePAM (KLONOPIN) tablet 1.5 mg  1.5 mg Oral Nightly Fidelia Berman MD   1.5 mg at 04/16/23 2022    carBAMazepine (TEGRETOL XR) extended release tablet 400 mg  400 mg Oral TID Fidelia Berman MD   400 mg at 04/17/23 0953    atorvastatin (LIPITOR) tablet 40 mg  40 mg Oral Nightly Fidelia Berman MD   40 mg at 04/16/23 2023    morphine (MS CONTIN) extended release tablet 30 mg  30 mg Oral BID Fidelia Berman MD   30 mg at 04/17/23 0958    sodium chloride flush 0.9 % injection 10 mL  10 mL IntraVENous 2 times per day Fidelia Berman MD   10 mL at 04/17/23 1029    sodium chloride flush 0.9 % injection

## 2023-04-17 NOTE — CARE COORDINATION
Chart reviewed day 3. Care managed by ortho, GI and IM. Plan for Right Hip injection today. H&H 7.9/22.9. Referral pending [facility]. Call to check status, waiting return call. Francisco Leslie RN  [facility] is unable to accept patient due to insurance provider.  Francisco Leslie RN

## 2023-04-17 NOTE — OP NOTE
Orthopaedic Surgery  Operative Report      Patient Name:  Usama Gerber  Patient :  1970  MRN: [id number]    Date: 23     Pre-operative Diagnosis:   M16.11 Right hip primary osteoarthritis    Post-operative Diagnosis:    Same    Procedure: RIGHT   right hip fluoroscopic guided cortisone injection    Surgeon:  Surgeon(s) and Role:     * Ravi Buckley MD - Primary    Assistant: Circulator: Dyan Farooq RN  Radiology Technologist: Joi Salinas  Scrub Person First: Tiarra Keys  Circulator Assist: Jude Jean    Anesthesia: MAC sedation    Estimated blood loss: Minimal    Specimens: * No specimens in log *    Complications: None    Drains: None    Condition: Stable    Implants: * No implants in log *    Findings:   -Right hip osteoarthritis    Indications:   Patient is a 34-year-old female who has a significant number of medical conditions mentioned in the consult note before this. We needed to perform this injection under a controlled setting while inpatient. She understood the risks and wanted proceed. Procedure Details:   Right Hip Cortisone Injection - Fluoro-guided CPT: 07999  Consent was obtained after discussion of the risks, benefits, alternatives, including, but not limited to bleeding, pain, infection, skin disruption or discoloration. Laterality was confirmed (timeout). The hip was prepped with alcohol. Low-dose fluoroscopy was used to visualize an AP hip image. 22-gauge spinal needle was used. Fluoro-guided needle localization to the hip joint was performed. Trace amount of contrast material was injected to confirm intra-articular needle placement. A formulation of 2cc of Celestone, 1cc of 1% lidocaine, 1cc of 0.25% sensoricaine was injected into the hip. The site was cleaned and dressed with a band aid. She tolerated this well and there were no complications.          Electronically signed by Ravi Buckley MD on 2023 at 2:32 PM

## 2023-04-18 LAB
ALBUMIN SERPL-MCNC: 4.1 G/DL (ref 3.4–5)
ANION GAP SERPL CALCULATED.3IONS-SCNC: 10 MMOL/L (ref 3–16)
BUN SERPL-MCNC: 11 MG/DL (ref 7–20)
CALCIUM SERPL-MCNC: 8.8 MG/DL (ref 8.3–10.6)
CHLORIDE SERPL-SCNC: 99 MMOL/L (ref 99–110)
CO2 SERPL-SCNC: 27 MMOL/L (ref 21–32)
CREAT SERPL-MCNC: 0.6 MG/DL (ref 0.6–1.1)
DEPRECATED RDW RBC AUTO: 14.9 % (ref 12.4–15.4)
GFR SERPLBLD CREATININE-BSD FMLA CKD-EPI: >60 ML/MIN/{1.73_M2}
GLUCOSE SERPL-MCNC: 81 MG/DL (ref 70–99)
HCT VFR BLD AUTO: 26.1 % (ref 36–48)
HGB BLD-MCNC: 8.9 G/DL (ref 12–16)
MCH RBC QN AUTO: 30.4 PG (ref 26–34)
MCHC RBC AUTO-ENTMCNC: 33.9 G/DL (ref 31–36)
MCV RBC AUTO: 89.5 FL (ref 80–100)
PHOSPHATE SERPL-MCNC: 3.6 MG/DL (ref 2.5–4.9)
PLATELET # BLD AUTO: 221 K/UL (ref 135–450)
PMV BLD AUTO: 9 FL (ref 5–10.5)
POTASSIUM SERPL-SCNC: 4.3 MMOL/L (ref 3.5–5.1)
RBC # BLD AUTO: 2.91 M/UL (ref 4–5.2)
SODIUM SERPL-SCNC: 136 MMOL/L (ref 136–145)
WBC # BLD AUTO: 4.9 K/UL (ref 4–11)

## 2023-04-18 PROCEDURE — 36415 COLL VENOUS BLD VENIPUNCTURE: CPT

## 2023-04-18 PROCEDURE — 97530 THERAPEUTIC ACTIVITIES: CPT

## 2023-04-18 PROCEDURE — 6370000000 HC RX 637 (ALT 250 FOR IP): Performed by: INTERNAL MEDICINE

## 2023-04-18 PROCEDURE — 2580000003 HC RX 258: Performed by: INTERNAL MEDICINE

## 2023-04-18 PROCEDURE — 97116 GAIT TRAINING THERAPY: CPT

## 2023-04-18 PROCEDURE — 97110 THERAPEUTIC EXERCISES: CPT

## 2023-04-18 PROCEDURE — 1200000000 HC SEMI PRIVATE

## 2023-04-18 PROCEDURE — 6370000000 HC RX 637 (ALT 250 FOR IP): Performed by: REGISTERED NURSE

## 2023-04-18 PROCEDURE — 80069 RENAL FUNCTION PANEL: CPT

## 2023-04-18 PROCEDURE — 85027 COMPLETE CBC AUTOMATED: CPT

## 2023-04-18 RX ORDER — POLYETHYLENE GLYCOL 3350 17 G/17G
17 POWDER, FOR SOLUTION ORAL DAILY
Status: DISCONTINUED | OUTPATIENT
Start: 2023-04-18 | End: 2023-04-21 | Stop reason: HOSPADM

## 2023-04-18 RX ORDER — BISACODYL 5 MG/1
10 TABLET, DELAYED RELEASE ORAL ONCE
Status: COMPLETED | OUTPATIENT
Start: 2023-04-18 | End: 2023-04-18

## 2023-04-18 RX ADMIN — MORPHINE SULFATE 30 MG: 30 TABLET, FILM COATED, EXTENDED RELEASE ORAL at 21:30

## 2023-04-18 RX ADMIN — PRIMIDONE 250 MG: 250 TABLET ORAL at 21:30

## 2023-04-18 RX ADMIN — RIVAROXABAN 20 MG: 20 TABLET, FILM COATED ORAL at 18:43

## 2023-04-18 RX ADMIN — CLONAZEPAM 1.5 MG: 1 TABLET ORAL at 21:30

## 2023-04-18 RX ADMIN — SODIUM CHLORIDE, PRESERVATIVE FREE 10 ML: 5 INJECTION INTRAVENOUS at 13:19

## 2023-04-18 RX ADMIN — Medication 10 MG: at 21:27

## 2023-04-18 RX ADMIN — PRIMIDONE 250 MG: 250 TABLET ORAL at 18:43

## 2023-04-18 RX ADMIN — PANTOPRAZOLE SODIUM 40 MG: 40 TABLET, DELAYED RELEASE ORAL at 18:43

## 2023-04-18 RX ADMIN — SODIUM CHLORIDE, PRESERVATIVE FREE 10 ML: 5 INJECTION INTRAVENOUS at 21:32

## 2023-04-18 RX ADMIN — SUCRALFATE 1 G: 1 TABLET ORAL at 06:04

## 2023-04-18 RX ADMIN — POLYETHYLENE GLYCOL 3350 17 G: 17 POWDER, FOR SOLUTION ORAL at 18:44

## 2023-04-18 RX ADMIN — LEVOTHYROXINE SODIUM 200 MCG: 0.1 TABLET ORAL at 06:04

## 2023-04-18 RX ADMIN — PRIMIDONE 250 MG: 250 TABLET ORAL at 13:18

## 2023-04-18 RX ADMIN — POLYETHYLENE GLYCOL 3350 17 G: 17 POWDER, FOR SOLUTION ORAL at 03:11

## 2023-04-18 RX ADMIN — DULOXETINE HYDROCHLORIDE 20 MG: 20 CAPSULE, DELAYED RELEASE ORAL at 09:51

## 2023-04-18 RX ADMIN — CARBAMAZEPINE 400 MG: 200 TABLET, EXTENDED RELEASE ORAL at 21:28

## 2023-04-18 RX ADMIN — TRAMADOL HYDROCHLORIDE 50 MG: 50 TABLET, COATED ORAL at 03:11

## 2023-04-18 RX ADMIN — ATORVASTATIN CALCIUM 40 MG: 40 TABLET, FILM COATED ORAL at 21:29

## 2023-04-18 RX ADMIN — SUCRALFATE 1 G: 1 TABLET ORAL at 21:29

## 2023-04-18 RX ADMIN — CLONAZEPAM 1 MG: 1 TABLET ORAL at 09:51

## 2023-04-18 RX ADMIN — CARBAMAZEPINE 400 MG: 200 TABLET, EXTENDED RELEASE ORAL at 18:44

## 2023-04-18 RX ADMIN — MORPHINE SULFATE 30 MG: 30 TABLET, FILM COATED, EXTENDED RELEASE ORAL at 09:51

## 2023-04-18 RX ADMIN — TRAMADOL HYDROCHLORIDE 50 MG: 50 TABLET, COATED ORAL at 09:58

## 2023-04-18 RX ADMIN — BISACODYL 10 MG: 5 TABLET, COATED ORAL at 18:43

## 2023-04-18 RX ADMIN — CARBAMAZEPINE 400 MG: 200 TABLET, EXTENDED RELEASE ORAL at 13:18

## 2023-04-18 RX ADMIN — FOLIC ACID 1 MG: 1 TABLET ORAL at 09:51

## 2023-04-18 RX ADMIN — TRAMADOL HYDROCHLORIDE 50 MG: 50 TABLET, COATED ORAL at 18:43

## 2023-04-18 RX ADMIN — PANTOPRAZOLE SODIUM 40 MG: 40 TABLET, DELAYED RELEASE ORAL at 09:51

## 2023-04-18 ASSESSMENT — PAIN DESCRIPTION - DESCRIPTORS
DESCRIPTORS: ACHING

## 2023-04-18 ASSESSMENT — PAIN DESCRIPTION - ORIENTATION
ORIENTATION: RIGHT
ORIENTATION: LOWER;RIGHT
ORIENTATION: LOWER;RIGHT

## 2023-04-18 ASSESSMENT — PAIN - FUNCTIONAL ASSESSMENT: PAIN_FUNCTIONAL_ASSESSMENT: ACTIVITIES ARE NOT PREVENTED

## 2023-04-18 ASSESSMENT — PAIN SCALES - GENERAL
PAINLEVEL_OUTOF10: 7
PAINLEVEL_OUTOF10: 9
PAINLEVEL_OUTOF10: 8
PAINLEVEL_OUTOF10: 9
PAINLEVEL_OUTOF10: 8
PAINLEVEL_OUTOF10: 9

## 2023-04-18 ASSESSMENT — PAIN DESCRIPTION - PAIN TYPE
TYPE: CHRONIC PAIN

## 2023-04-18 ASSESSMENT — PAIN DESCRIPTION - LOCATION: LOCATION: HIP;BACK;HEAD

## 2023-04-18 NOTE — CARE COORDINATION
Chart reviewed spoke with patient. Updated that [facility] cannot accept her. Discussed other options. [facility], no. [facility], no  [facility], no  [facility], no  [facility], no   Ok with referral to [employer]. Done. Waiting determination vs home. [employer] is currently no covering [address].  Jono Arriaga RN

## 2023-04-19 LAB
ALBUMIN SERPL-MCNC: 3.3 G/DL (ref 3.4–5)
ANION GAP SERPL CALCULATED.3IONS-SCNC: 8 MMOL/L (ref 3–16)
BUN SERPL-MCNC: 11 MG/DL (ref 7–20)
CALCIUM SERPL-MCNC: 8.5 MG/DL (ref 8.3–10.6)
CHLORIDE SERPL-SCNC: 102 MMOL/L (ref 99–110)
CO2 SERPL-SCNC: 27 MMOL/L (ref 21–32)
CREAT SERPL-MCNC: <0.5 MG/DL (ref 0.6–1.1)
DEPRECATED RDW RBC AUTO: 15.9 % (ref 12.4–15.4)
GFR SERPLBLD CREATININE-BSD FMLA CKD-EPI: >60 ML/MIN/{1.73_M2}
GLUCOSE SERPL-MCNC: 80 MG/DL (ref 70–99)
HCT VFR BLD AUTO: 24.2 % (ref 36–48)
HEMOCCULT SP1 STL QL: ABNORMAL
HGB BLD-MCNC: 8.1 G/DL (ref 12–16)
MCH RBC QN AUTO: 30.6 PG (ref 26–34)
MCHC RBC AUTO-ENTMCNC: 33.7 G/DL (ref 31–36)
MCV RBC AUTO: 90.8 FL (ref 80–100)
PHOSPHATE SERPL-MCNC: 3.6 MG/DL (ref 2.5–4.9)
PLATELET # BLD AUTO: 190 K/UL (ref 135–450)
PMV BLD AUTO: 8.9 FL (ref 5–10.5)
POTASSIUM SERPL-SCNC: 4.5 MMOL/L (ref 3.5–5.1)
RBC # BLD AUTO: 2.66 M/UL (ref 4–5.2)
SODIUM SERPL-SCNC: 137 MMOL/L (ref 136–145)
WBC # BLD AUTO: 4.9 K/UL (ref 4–11)

## 2023-04-19 PROCEDURE — 2580000003 HC RX 258: Performed by: INTERNAL MEDICINE

## 2023-04-19 PROCEDURE — 1200000000 HC SEMI PRIVATE

## 2023-04-19 PROCEDURE — 6370000000 HC RX 637 (ALT 250 FOR IP): Performed by: INTERNAL MEDICINE

## 2023-04-19 PROCEDURE — 6370000000 HC RX 637 (ALT 250 FOR IP): Performed by: REGISTERED NURSE

## 2023-04-19 PROCEDURE — 36415 COLL VENOUS BLD VENIPUNCTURE: CPT

## 2023-04-19 PROCEDURE — 80069 RENAL FUNCTION PANEL: CPT

## 2023-04-19 PROCEDURE — 82270 OCCULT BLOOD FECES: CPT

## 2023-04-19 PROCEDURE — 85027 COMPLETE CBC AUTOMATED: CPT

## 2023-04-19 RX ADMIN — PANTOPRAZOLE SODIUM 40 MG: 40 TABLET, DELAYED RELEASE ORAL at 16:40

## 2023-04-19 RX ADMIN — DULOXETINE HYDROCHLORIDE 20 MG: 20 CAPSULE, DELAYED RELEASE ORAL at 10:04

## 2023-04-19 RX ADMIN — RIVAROXABAN 20 MG: 20 TABLET, FILM COATED ORAL at 16:40

## 2023-04-19 RX ADMIN — TRAMADOL HYDROCHLORIDE 50 MG: 50 TABLET, COATED ORAL at 13:24

## 2023-04-19 RX ADMIN — PRIMIDONE 250 MG: 250 TABLET ORAL at 16:41

## 2023-04-19 RX ADMIN — MORPHINE SULFATE 30 MG: 30 TABLET, FILM COATED, EXTENDED RELEASE ORAL at 21:08

## 2023-04-19 RX ADMIN — CARBAMAZEPINE 400 MG: 200 TABLET, EXTENDED RELEASE ORAL at 16:41

## 2023-04-19 RX ADMIN — TRAMADOL HYDROCHLORIDE 50 MG: 50 TABLET, COATED ORAL at 22:31

## 2023-04-19 RX ADMIN — MORPHINE SULFATE 30 MG: 30 TABLET, FILM COATED, EXTENDED RELEASE ORAL at 10:03

## 2023-04-19 RX ADMIN — SUCRALFATE 1 G: 1 TABLET ORAL at 16:41

## 2023-04-19 RX ADMIN — SUCRALFATE 1 G: 1 TABLET ORAL at 21:08

## 2023-04-19 RX ADMIN — POLYETHYLENE GLYCOL 3350 17 G: 17 POWDER, FOR SOLUTION ORAL at 10:02

## 2023-04-19 RX ADMIN — SUCRALFATE 1 G: 1 TABLET ORAL at 06:55

## 2023-04-19 RX ADMIN — FOLIC ACID 1 MG: 1 TABLET ORAL at 10:03

## 2023-04-19 RX ADMIN — PRIMIDONE 250 MG: 250 TABLET ORAL at 10:04

## 2023-04-19 RX ADMIN — CARBAMAZEPINE 400 MG: 200 TABLET, EXTENDED RELEASE ORAL at 10:04

## 2023-04-19 RX ADMIN — ATORVASTATIN CALCIUM 40 MG: 40 TABLET, FILM COATED ORAL at 21:08

## 2023-04-19 RX ADMIN — CLONAZEPAM 1 MG: 1 TABLET ORAL at 10:03

## 2023-04-19 RX ADMIN — PRIMIDONE 250 MG: 250 TABLET ORAL at 21:05

## 2023-04-19 RX ADMIN — TRAMADOL HYDROCHLORIDE 50 MG: 50 TABLET, COATED ORAL at 00:43

## 2023-04-19 RX ADMIN — CARBAMAZEPINE 400 MG: 200 TABLET, EXTENDED RELEASE ORAL at 21:06

## 2023-04-19 RX ADMIN — DIPHENHYDRAMINE HYDROCHLORIDE 50 MG: 25 TABLET ORAL at 23:39

## 2023-04-19 RX ADMIN — SODIUM CHLORIDE, PRESERVATIVE FREE 10 ML: 5 INJECTION INTRAVENOUS at 10:02

## 2023-04-19 RX ADMIN — LEVOTHYROXINE SODIUM 200 MCG: 0.1 TABLET ORAL at 06:55

## 2023-04-19 RX ADMIN — TRAMADOL HYDROCHLORIDE 50 MG: 50 TABLET, COATED ORAL at 06:55

## 2023-04-19 RX ADMIN — CLONAZEPAM 1.5 MG: 1 TABLET ORAL at 21:08

## 2023-04-19 RX ADMIN — PANTOPRAZOLE SODIUM 40 MG: 40 TABLET, DELAYED RELEASE ORAL at 10:03

## 2023-04-19 RX ADMIN — CYCLOBENZAPRINE 10 MG: 10 TABLET, FILM COATED ORAL at 02:11

## 2023-04-19 ASSESSMENT — PAIN - FUNCTIONAL ASSESSMENT
PAIN_FUNCTIONAL_ASSESSMENT: ACTIVITIES ARE NOT PREVENTED

## 2023-04-19 ASSESSMENT — PAIN DESCRIPTION - LOCATION
LOCATION: ABDOMEN;LEG
LOCATION: ABDOMEN;HIP;HEAD
LOCATION: ABDOMEN;HEAD;HIP
LOCATION: ABDOMEN;BACK;HEAD;HIP
LOCATION: BACK;HIP;ABDOMEN

## 2023-04-19 ASSESSMENT — PAIN DESCRIPTION - DESCRIPTORS
DESCRIPTORS: ACHING
DESCRIPTORS: ACHING;BURNING
DESCRIPTORS: ACHING
DESCRIPTORS: ACHING
DESCRIPTORS: ACHING;BURNING

## 2023-04-19 ASSESSMENT — PAIN DESCRIPTION - ORIENTATION
ORIENTATION: LEFT
ORIENTATION: RIGHT

## 2023-04-19 ASSESSMENT — PAIN SCALES - GENERAL
PAINLEVEL_OUTOF10: 8
PAINLEVEL_OUTOF10: 7
PAINLEVEL_OUTOF10: 9
PAINLEVEL_OUTOF10: 10
PAINLEVEL_OUTOF10: 9
PAINLEVEL_OUTOF10: 7
PAINLEVEL_OUTOF10: 10
PAINLEVEL_OUTOF10: 7

## 2023-04-19 NOTE — CARE COORDINATION
Chart reviewed day 5. Spoke with Molly with [employer] pre cert remains pending. CM spoke with Mohan Acharya [phone], stated no official [facility] has been assigned this patient as yet due to [employer] status, aware seeking one time contract with [employer]. Stated shewould forward request to her manager. Aimee Marie RN

## 2023-04-20 LAB
BASOPHILS # BLD: 0 K/UL (ref 0–0.2)
BASOPHILS NFR BLD: 0.8 %
DEPRECATED RDW RBC AUTO: 15.9 % (ref 12.4–15.4)
EOSINOPHIL # BLD: 0.1 K/UL (ref 0–0.6)
EOSINOPHIL NFR BLD: 3.3 %
HCT VFR BLD AUTO: 24.9 % (ref 36–48)
HGB BLD-MCNC: 8.3 G/DL (ref 12–16)
LYMPHOCYTES # BLD: 1.4 K/UL (ref 1–5.1)
LYMPHOCYTES NFR BLD: 31.5 %
MCH RBC QN AUTO: 30.6 PG (ref 26–34)
MCHC RBC AUTO-ENTMCNC: 33.5 G/DL (ref 31–36)
MCV RBC AUTO: 91.5 FL (ref 80–100)
MONOCYTES # BLD: 0.5 K/UL (ref 0–1.3)
MONOCYTES NFR BLD: 11.5 %
NEUTROPHILS # BLD: 2.3 K/UL (ref 1.7–7.7)
NEUTROPHILS NFR BLD: 52.9 %
PLATELET # BLD AUTO: 200 K/UL (ref 135–450)
PMV BLD AUTO: 8.6 FL (ref 5–10.5)
RBC # BLD AUTO: 2.72 M/UL (ref 4–5.2)
WBC # BLD AUTO: 4.4 K/UL (ref 4–11)

## 2023-04-20 PROCEDURE — 85025 COMPLETE CBC W/AUTO DIFF WBC: CPT

## 2023-04-20 PROCEDURE — 97530 THERAPEUTIC ACTIVITIES: CPT

## 2023-04-20 PROCEDURE — 97535 SELF CARE MNGMENT TRAINING: CPT

## 2023-04-20 PROCEDURE — 6370000000 HC RX 637 (ALT 250 FOR IP): Performed by: REGISTERED NURSE

## 2023-04-20 PROCEDURE — 6370000000 HC RX 637 (ALT 250 FOR IP): Performed by: INTERNAL MEDICINE

## 2023-04-20 PROCEDURE — 97116 GAIT TRAINING THERAPY: CPT

## 2023-04-20 PROCEDURE — 2580000003 HC RX 258: Performed by: INTERNAL MEDICINE

## 2023-04-20 PROCEDURE — 1200000000 HC SEMI PRIVATE

## 2023-04-20 PROCEDURE — 97110 THERAPEUTIC EXERCISES: CPT

## 2023-04-20 PROCEDURE — 36415 COLL VENOUS BLD VENIPUNCTURE: CPT

## 2023-04-20 PROCEDURE — 6360000002 HC RX W HCPCS: Performed by: INTERNAL MEDICINE

## 2023-04-20 RX ORDER — FERROUS SULFATE 325(65) MG
325 TABLET ORAL 2 TIMES DAILY WITH MEALS
Status: DISCONTINUED | OUTPATIENT
Start: 2023-04-20 | End: 2023-04-21 | Stop reason: HOSPADM

## 2023-04-20 RX ORDER — FUROSEMIDE 10 MG/ML
40 INJECTION INTRAMUSCULAR; INTRAVENOUS ONCE
Status: COMPLETED | OUTPATIENT
Start: 2023-04-20 | End: 2023-04-20

## 2023-04-20 RX ADMIN — CARBAMAZEPINE 400 MG: 200 TABLET, EXTENDED RELEASE ORAL at 09:44

## 2023-04-20 RX ADMIN — SUCRALFATE 1 G: 1 TABLET ORAL at 16:30

## 2023-04-20 RX ADMIN — TRAMADOL HYDROCHLORIDE 50 MG: 50 TABLET, COATED ORAL at 04:43

## 2023-04-20 RX ADMIN — FOLIC ACID 1 MG: 1 TABLET ORAL at 08:43

## 2023-04-20 RX ADMIN — PRIMIDONE 250 MG: 250 TABLET ORAL at 15:33

## 2023-04-20 RX ADMIN — PRIMIDONE 250 MG: 250 TABLET ORAL at 09:44

## 2023-04-20 RX ADMIN — ATORVASTATIN CALCIUM 40 MG: 40 TABLET, FILM COATED ORAL at 22:10

## 2023-04-20 RX ADMIN — CYCLOBENZAPRINE 10 MG: 10 TABLET, FILM COATED ORAL at 15:34

## 2023-04-20 RX ADMIN — CARBAMAZEPINE 400 MG: 200 TABLET, EXTENDED RELEASE ORAL at 15:33

## 2023-04-20 RX ADMIN — PRIMIDONE 250 MG: 250 TABLET ORAL at 22:10

## 2023-04-20 RX ADMIN — SUCRALFATE 1 G: 1 TABLET ORAL at 22:10

## 2023-04-20 RX ADMIN — Medication 10 MG: at 22:09

## 2023-04-20 RX ADMIN — SUCRALFATE 1 G: 1 TABLET ORAL at 11:09

## 2023-04-20 RX ADMIN — POLYETHYLENE GLYCOL 3350 17 G: 17 POWDER, FOR SOLUTION ORAL at 09:21

## 2023-04-20 RX ADMIN — PANTOPRAZOLE SODIUM 40 MG: 40 TABLET, DELAYED RELEASE ORAL at 16:30

## 2023-04-20 RX ADMIN — LEVOTHYROXINE SODIUM 200 MCG: 0.1 TABLET ORAL at 05:59

## 2023-04-20 RX ADMIN — MORPHINE SULFATE 30 MG: 30 TABLET, FILM COATED, EXTENDED RELEASE ORAL at 09:20

## 2023-04-20 RX ADMIN — CLONAZEPAM 1 MG: 1 TABLET ORAL at 08:44

## 2023-04-20 RX ADMIN — MORPHINE SULFATE 30 MG: 30 TABLET, FILM COATED, EXTENDED RELEASE ORAL at 22:10

## 2023-04-20 RX ADMIN — RIVAROXABAN 20 MG: 20 TABLET, FILM COATED ORAL at 17:53

## 2023-04-20 RX ADMIN — TRAMADOL HYDROCHLORIDE 50 MG: 50 TABLET, COATED ORAL at 11:08

## 2023-04-20 RX ADMIN — CARBAMAZEPINE 400 MG: 200 TABLET, EXTENDED RELEASE ORAL at 22:10

## 2023-04-20 RX ADMIN — PANTOPRAZOLE SODIUM 40 MG: 40 TABLET, DELAYED RELEASE ORAL at 08:43

## 2023-04-20 RX ADMIN — FUROSEMIDE 40 MG: 10 INJECTION, SOLUTION INTRAMUSCULAR; INTRAVENOUS at 18:35

## 2023-04-20 RX ADMIN — FERROUS SULFATE TAB 325 MG (65 MG ELEMENTAL FE) 325 MG: 325 (65 FE) TAB at 15:33

## 2023-04-20 RX ADMIN — SODIUM CHLORIDE, PRESERVATIVE FREE 10 ML: 5 INJECTION INTRAVENOUS at 22:08

## 2023-04-20 RX ADMIN — DULOXETINE HYDROCHLORIDE 20 MG: 20 CAPSULE, DELAYED RELEASE ORAL at 09:44

## 2023-04-20 RX ADMIN — CLONAZEPAM 1.5 MG: 1 TABLET ORAL at 22:10

## 2023-04-20 RX ADMIN — SUCRALFATE 1 G: 1 TABLET ORAL at 05:59

## 2023-04-20 RX ADMIN — TRAMADOL HYDROCHLORIDE 50 MG: 50 TABLET, COATED ORAL at 17:52

## 2023-04-20 ASSESSMENT — PAIN SCALES - GENERAL
PAINLEVEL_OUTOF10: 7
PAINLEVEL_OUTOF10: 7
PAINLEVEL_OUTOF10: 10
PAINLEVEL_OUTOF10: 8
PAINLEVEL_OUTOF10: 8
PAINLEVEL_OUTOF10: 9

## 2023-04-20 ASSESSMENT — PAIN DESCRIPTION - DESCRIPTORS
DESCRIPTORS: ACHING
DESCRIPTORS: ACHING
DESCRIPTORS: ACHING;BURNING;STABBING
DESCRIPTORS: ACHING
DESCRIPTORS: ACHING
DESCRIPTORS: STABBING;ACHING;DISCOMFORT

## 2023-04-20 ASSESSMENT — PAIN DESCRIPTION - ORIENTATION
ORIENTATION: RIGHT;LOWER
ORIENTATION: RIGHT

## 2023-04-20 ASSESSMENT — PAIN DESCRIPTION - LOCATION
LOCATION: HIP;LEG;GROIN
LOCATION: LEG;HEAD;BACK
LOCATION: BACK;HIP;LEG
LOCATION: HIP;BACK;LEG
LOCATION: GROIN;HIP
LOCATION: HIP

## 2023-04-20 ASSESSMENT — PAIN - FUNCTIONAL ASSESSMENT
PAIN_FUNCTIONAL_ASSESSMENT: PREVENTS OR INTERFERES SOME ACTIVE ACTIVITIES AND ADLS
PAIN_FUNCTIONAL_ASSESSMENT: ACTIVITIES ARE NOT PREVENTED

## 2023-04-20 ASSESSMENT — PAIN DESCRIPTION - PAIN TYPE: TYPE: CHRONIC PAIN

## 2023-04-20 NOTE — CARE COORDINATION
Chart reviewed spoke with patient. Discussed [employer] cert pending, with notation of 6000.00 OOP expense. Discussed conversation with [facility] and they are not covering [address] at this time. Discussed possibility of OP therapy that would be covered by insurance. Stated may be open to that if arranged by her own PCP and can arrange transportation. Patient did work with therapy today and plans on working with steps tomorrow with therapy. Spoke with therapy, Bibiana Johnston, will get pt on schedule for early in am. Requested she speak with her sister today to arrange for transportation home tomorrow if that is plan. Will continue to follow.  Sintia Fleming RN

## 2023-04-21 VITALS
DIASTOLIC BLOOD PRESSURE: 78 MMHG | HEIGHT: 65 IN | RESPIRATION RATE: 20 BRPM | WEIGHT: 160 LBS | TEMPERATURE: 97.6 F | SYSTOLIC BLOOD PRESSURE: 116 MMHG | BODY MASS INDEX: 26.66 KG/M2 | HEART RATE: 98 BPM | OXYGEN SATURATION: 99 %

## 2023-04-21 LAB
ALBUMIN SERPL-MCNC: 3.4 G/DL (ref 3.4–5)
ANION GAP SERPL CALCULATED.3IONS-SCNC: 10 MMOL/L (ref 3–16)
BUN SERPL-MCNC: 13 MG/DL (ref 7–20)
CALCIUM SERPL-MCNC: 8.8 MG/DL (ref 8.3–10.6)
CHLORIDE SERPL-SCNC: 100 MMOL/L (ref 99–110)
CO2 SERPL-SCNC: 26 MMOL/L (ref 21–32)
CREAT SERPL-MCNC: <0.5 MG/DL (ref 0.6–1.1)
DEPRECATED RDW RBC AUTO: 17.1 % (ref 12.4–15.4)
GFR SERPLBLD CREATININE-BSD FMLA CKD-EPI: >60 ML/MIN/{1.73_M2}
GLUCOSE SERPL-MCNC: 90 MG/DL (ref 70–99)
HCT VFR BLD AUTO: 29.7 % (ref 36–48)
HGB BLD-MCNC: 10 G/DL (ref 12–16)
MCH RBC QN AUTO: 31 PG (ref 26–34)
MCHC RBC AUTO-ENTMCNC: 33.7 G/DL (ref 31–36)
MCV RBC AUTO: 92 FL (ref 80–100)
PHOSPHATE SERPL-MCNC: 3.9 MG/DL (ref 2.5–4.9)
PLATELET # BLD AUTO: 200 K/UL (ref 135–450)
PMV BLD AUTO: 8.6 FL (ref 5–10.5)
POTASSIUM SERPL-SCNC: 4.4 MMOL/L (ref 3.5–5.1)
RBC # BLD AUTO: 3.23 M/UL (ref 4–5.2)
SODIUM SERPL-SCNC: 136 MMOL/L (ref 136–145)
WBC # BLD AUTO: 5.2 K/UL (ref 4–11)

## 2023-04-21 PROCEDURE — 6370000000 HC RX 637 (ALT 250 FOR IP): Performed by: REGISTERED NURSE

## 2023-04-21 PROCEDURE — 6370000000 HC RX 637 (ALT 250 FOR IP): Performed by: INTERNAL MEDICINE

## 2023-04-21 PROCEDURE — 97110 THERAPEUTIC EXERCISES: CPT

## 2023-04-21 PROCEDURE — 85027 COMPLETE CBC AUTOMATED: CPT

## 2023-04-21 PROCEDURE — 2580000003 HC RX 258: Performed by: INTERNAL MEDICINE

## 2023-04-21 PROCEDURE — 80069 RENAL FUNCTION PANEL: CPT

## 2023-04-21 PROCEDURE — 97530 THERAPEUTIC ACTIVITIES: CPT

## 2023-04-21 PROCEDURE — 36415 COLL VENOUS BLD VENIPUNCTURE: CPT

## 2023-04-21 PROCEDURE — 97116 GAIT TRAINING THERAPY: CPT

## 2023-04-21 RX ORDER — MORPHINE SULFATE 15 MG/1
15 TABLET, FILM COATED, EXTENDED RELEASE ORAL 2 TIMES DAILY
Qty: 14 TABLET | Refills: 0 | Status: SHIPPED | OUTPATIENT
Start: 2023-04-21 | End: 2023-04-28

## 2023-04-21 RX ORDER — FERROUS SULFATE 325(65) MG
325 TABLET ORAL 2 TIMES DAILY WITH MEALS
Qty: 30 TABLET | Refills: 3 | Status: SHIPPED | OUTPATIENT
Start: 2023-04-21 | End: 2023-05-21

## 2023-04-21 RX ORDER — PANTOPRAZOLE SODIUM 40 MG/1
40 TABLET, DELAYED RELEASE ORAL
Qty: 60 TABLET | Refills: 0 | Status: SHIPPED | OUTPATIENT
Start: 2023-04-21 | End: 2023-05-21

## 2023-04-21 RX ORDER — TRAMADOL HYDROCHLORIDE 50 MG/1
50 TABLET ORAL EVERY 6 HOURS PRN
Qty: 28 TABLET | Refills: 0 | Status: SHIPPED | OUTPATIENT
Start: 2023-04-21 | End: 2023-04-28

## 2023-04-21 RX ORDER — SUCRALFATE 1 G/1
1 TABLET ORAL
Qty: 120 TABLET | Refills: 0 | Status: SHIPPED | OUTPATIENT
Start: 2023-04-21 | End: 2023-05-21

## 2023-04-21 RX ADMIN — PRIMIDONE 250 MG: 250 TABLET ORAL at 14:42

## 2023-04-21 RX ADMIN — TRAMADOL HYDROCHLORIDE 50 MG: 50 TABLET, COATED ORAL at 12:12

## 2023-04-21 RX ADMIN — CARBAMAZEPINE 400 MG: 200 TABLET, EXTENDED RELEASE ORAL at 09:20

## 2023-04-21 RX ADMIN — CLONAZEPAM 1 MG: 1 TABLET ORAL at 09:50

## 2023-04-21 RX ADMIN — FERROUS SULFATE TAB 325 MG (65 MG ELEMENTAL FE) 325 MG: 325 (65 FE) TAB at 16:13

## 2023-04-21 RX ADMIN — PRIMIDONE 250 MG: 250 TABLET ORAL at 09:41

## 2023-04-21 RX ADMIN — SUCRALFATE 1 G: 1 TABLET ORAL at 16:13

## 2023-04-21 RX ADMIN — FOLIC ACID 1 MG: 1 TABLET ORAL at 09:20

## 2023-04-21 RX ADMIN — RIVAROXABAN 20 MG: 20 TABLET, FILM COATED ORAL at 17:22

## 2023-04-21 RX ADMIN — FERROUS SULFATE TAB 325 MG (65 MG ELEMENTAL FE) 325 MG: 325 (65 FE) TAB at 08:33

## 2023-04-21 RX ADMIN — SUCRALFATE 1 G: 1 TABLET ORAL at 06:05

## 2023-04-21 RX ADMIN — LEVOTHYROXINE SODIUM 200 MCG: 0.1 TABLET ORAL at 06:04

## 2023-04-21 RX ADMIN — POLYETHYLENE GLYCOL 3350 17 G: 17 POWDER, FOR SOLUTION ORAL at 09:24

## 2023-04-21 RX ADMIN — TRAMADOL HYDROCHLORIDE 50 MG: 50 TABLET, COATED ORAL at 04:07

## 2023-04-21 RX ADMIN — CARBAMAZEPINE 400 MG: 200 TABLET, EXTENDED RELEASE ORAL at 14:42

## 2023-04-21 RX ADMIN — SUCRALFATE 1 G: 1 TABLET ORAL at 12:13

## 2023-04-21 RX ADMIN — MORPHINE SULFATE 30 MG: 30 TABLET, FILM COATED, EXTENDED RELEASE ORAL at 09:20

## 2023-04-21 RX ADMIN — DULOXETINE HYDROCHLORIDE 20 MG: 20 CAPSULE, DELAYED RELEASE ORAL at 09:20

## 2023-04-21 RX ADMIN — SODIUM CHLORIDE, PRESERVATIVE FREE 10 ML: 5 INJECTION INTRAVENOUS at 12:05

## 2023-04-21 RX ADMIN — PANTOPRAZOLE SODIUM 40 MG: 40 TABLET, DELAYED RELEASE ORAL at 16:13

## 2023-04-21 RX ADMIN — PANTOPRAZOLE SODIUM 40 MG: 40 TABLET, DELAYED RELEASE ORAL at 08:33

## 2023-04-21 ASSESSMENT — PAIN DESCRIPTION - ORIENTATION
ORIENTATION: RIGHT

## 2023-04-21 ASSESSMENT — PAIN - FUNCTIONAL ASSESSMENT
PAIN_FUNCTIONAL_ASSESSMENT: PREVENTS OR INTERFERES SOME ACTIVE ACTIVITIES AND ADLS

## 2023-04-21 ASSESSMENT — PAIN DESCRIPTION - LOCATION
LOCATION: BACK;HEAD;LEG
LOCATION: HIP

## 2023-04-21 ASSESSMENT — PAIN SCALES - GENERAL
PAINLEVEL_OUTOF10: 9
PAINLEVEL_OUTOF10: 10
PAINLEVEL_OUTOF10: 9

## 2023-04-21 ASSESSMENT — PAIN DESCRIPTION - DESCRIPTORS
DESCRIPTORS: ACHING;SHARP;THROBBING
DESCRIPTORS: ACHING

## 2023-04-21 NOTE — PROGRESS NOTES
04/17/23 1318   Encounter Summary   Encounter Overview/Reason  Spiritual/Emotional Needs; Rituals, Rites and Sacraments   Service Provided For: Patient   Referral/Consult From: Nurse   Support System Friends/neighbors   Last Encounter  04/17/23  (Emotional distress, Pt talking abt her health and the care she is receiving, tearful, worried abt outcome of tests, Received Holy Communion, prayer, grieving loss of dad and mom)   Complexity of Encounter High   Begin Time 0955   End Time  1100   Total Time Calculated 65 min   Encounter    Type Follow up   Spiritual/Emotional needs   Type Emotional Distress   Rituals, Rites and Sacraments   Type Muslim Communion  (Pt received Holy Communion)   Grief, Loss, and Adjustments   Type Grief and loss   Assessment/Intervention/Outcome   Assessment Concerns with suffering; Complicated grieving; Anxious; Fearful; Interrupted family processes;Questioning meaning and purpose;Sad;Tearful   Intervention Active listening;Confronted/Challenged; Discussed illness injury and its impact; Discussed meaning/purpose;Discussed relationship with God;Explored/Affirmed feelings, thoughts, concerns;Explored Coping Skills/Resources;Nurtured Hope;Prayer (assurance of)/[place];Sustaining Presence/Ministry of presence   Outcome Engaged in conversation;Expressed Gratitude;Expressed feelings of Dhara, Peace and/or Love;Encouraged; Connection/Belonging;Peace; Receptive
04/20/23 1438   Encounter Summary   Encounter Overview/Reason  Spiritual/Emotional Needs   Service Provided For: Patient   Referral/Consult From: Nurse   Support System Family members;Friends/neighbors   Last Encounter  04/20/23   Begin Time 1415   End Time  1435   Total Time Calculated 20 min   Spiritual/Emotional needs   Type Spiritual Support   Assessment/Intervention/Outcome   Assessment Calm   Intervention Active listening;Sustaining Presence/Ministry of presence;Prayer (assurance of)/[place]   Outcome Expressed Gratitude        encountered pt as a consult for emotional distress. Pt did not really want to talk much. Seemed in a good mood. Asked for prayer.  offered prayer and then pt talked a little more. Appreciative of all the chaplains that have attended to her during her week-long stay and appreciative of receiving holy communion. These visits have brought comfort.
04/21/23 1229   Encounter Summary   Encounter Overview/Reason  Spiritual/Emotional Needs   Service Provided For: Patient   Referral/Consult From: [facility]   Support System Friends/neighbors; Family members   Last Encounter  04/21/23  (Follow up visit, pt reflecting on resources around her that can help her feel better and meet her goals)   Complexity of Encounter Moderate   Begin Time 1100   End Time  1141   Total Time Calculated 41 min   Spiritual/Emotional needs   Type Emotional Distress   Assessment/Intervention/Outcome   Assessment Concerns with suffering; Hopeful;Peaceful;Questioning meaning and purpose   Intervention Confronted/Challenged; Active listening;Discussed illness injury and its impact; Explored/Affirmed feelings, thoughts, concerns;Nurtured Hope   Outcome Expressed Gratitude;Expressed feelings, needs, and concerns;Engaged in conversation;Expressed feelings of Dhara, Peace and/or Love;Encouraged;Peace
Arrived from inpatient hospital room via [facility] [employer] [facility] accompanied by transport staff  Pt alert and oriented x4  Calm/appropriate  VSS  Iv site assessed without evidence of infiltration on arrival  Respiration  easy unlabored - auscultated -clear bilaterally anterior and posterior fields  Abd soft nondistended : BS+x4 quadrants : no nausea   Oxygen tank checked for  adequate volume before transport
Attempted to see pt this afternoon, but pt declined stating that she \"didn't want to decline but was just so tired\". Reports 8/10 pain in R hip, back, and head.      Александр Guadalupe, OTR/L
Bandaid right hip cdi, ppp, foot warm and good cms all toes.
Hospitalist Progress Note      PCP: Holly Reyes MD    Date of Admission: 4/13/2023    Chief Complaint: Pt c/o shortness of breath, right hip pain (chronic) low back pain (chronic)    Subjective: no new c/o. Medications:  Reviewed    Infusion Medications    sodium chloride       Scheduled Medications    polyethylene glycol  17 g Oral Daily    pantoprazole  40 mg Oral BID AC    rivaroxaban  20 mg Oral Daily    sucralfate  1 g Oral 4x Daily AC & HS    primidone  250 mg Oral TID    levothyroxine  200 mcg Oral QAM AC    folic acid  1 mg Oral Daily    DULoxetine  20 mg Oral Daily    clonazePAM  1 mg Oral Daily    And    clonazePAM  1.5 mg Oral Nightly    carBAMazepine  400 mg Oral TID    atorvastatin  40 mg Oral Nightly    morphine  30 mg Oral BID    sodium chloride flush  10 mL IntraVENous 2 times per day    sodium chloride flush  10 mL IntraVENous 2 times per day     PRN Meds: traMADol, melatonin, diphenhydrAMINE, cyclobenzaprine, sodium chloride, polyethylene glycol, acetaminophen **OR** acetaminophen, ondansetron      Intake/Output Summary (Last 24 hours) at 4/20/2023 0916  Last data filed at 4/20/2023 0553  Gross per 24 hour   Intake --   Output 1900 ml   Net -1900 ml         Physical Exam Performed:    /68   Pulse 100   Temp 97.7 °F (36.5 °C) (Axillary)   Resp 20   Ht 5' 5\" (1.651 m)   Wt 160 lb 3.2 oz (72.7 kg)   LMP 06/30/2015   SpO2 98%   BMI 26.66 kg/m²     General appearance: No apparent distress, appears stated age and cooperative. HEENT: Pupils equal, round, and reactive to light. Conjunctivae/corneas clear. Neck: Supple, with full range of motion. No jugular venous distention. Trachea midline. Respiratory:  Normal respiratory effort. Clear to auscultation, bilaterally without Rales/Wheezes/Rhonchi. Cardiovascular: Regular rate and rhythm with normal S1/S2 without murmurs, rubs or gallops.   Abdomen: Soft, non-tender, non-distended with normal bowel
Hospitalist Progress Note      PCP: Josue Norris MD    Date of Admission: 4/13/2023    Chief Complaint: Pt c/o shortness of breath, right hip pain (chronic) low back pain (chronic)    Subjective: no new c/o. Medications:  Reviewed    Infusion Medications    sodium chloride      sodium chloride       Scheduled Medications    pantoprazole  40 mg Oral BID AC    sucralfate  1 g Oral 4x Daily AC & HS    primidone  250 mg Oral TID    levothyroxine  200 mcg Oral QAM AC    folic acid  1 mg Oral Daily    DULoxetine  20 mg Oral Daily    clonazePAM  1 mg Oral Daily    And    clonazePAM  1.5 mg Oral Nightly    carBAMazepine  400 mg Oral TID    atorvastatin  40 mg Oral Nightly    morphine  30 mg Oral BID    sodium chloride flush  10 mL IntraVENous 2 times per day    sodium chloride flush  10 mL IntraVENous 2 times per day     PRN Meds: melatonin, diphenhydrAMINE, cyclobenzaprine, sodium chloride flush, sodium chloride, polyethylene glycol, acetaminophen **OR** acetaminophen, sodium chloride flush, sodium chloride, ondansetron      Intake/Output Summary (Last 24 hours) at 4/17/2023 1148  Last data filed at 4/17/2023 0504  Gross per 24 hour   Intake 120 ml   Output 1500 ml   Net -1380 ml       Physical Exam Performed:    /75   Pulse 86   Temp 97.9 °F (36.6 °C) (Oral)   Resp 18   Ht 5' 5\" (1.651 m)   Wt 166 lb (75.3 kg)   LMP 06/30/2015   SpO2 95%   BMI 27.62 kg/m²     General appearance: No apparent distress, appears stated age and cooperative. HEENT: Pupils equal, round, and reactive to light. Conjunctivae/corneas clear. Neck: Supple, with full range of motion. No jugular venous distention. Trachea midline. Respiratory:  Normal respiratory effort. Clear to auscultation, bilaterally without Rales/Wheezes/Rhonchi. Cardiovascular: Regular rate and rhythm with normal S1/S2 without murmurs, rubs or gallops. Abdomen: Soft, non-tender, non-distended with normal bowel sounds.   Musculoskeletal: No
Patient received from O.R., awake and oriented, VSS, right leg to knee pain continuous, same as pre op.
Perfect serve was sent to Dr. Noam Carolina to inform him that patient is worried about her hemoglobin of 8.1 yesterday and wanted to have it checked this morning as she will be probably leaving today. Pt just want to have an assurance that her hemoglobin will not go further low when she leaves the hospital.     Dr. Noam Carolina is okay to order another set of CBC for the patent.
Perfect serve was sent to ZARIA Marsh to inform her that patient's IV is already red and swollen. 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 ZARIA Marsh responded that I may remove it for now and just put another IV line when needed.
Physical Therapy        Pt off floor for procedure. Will continue to follow per POC.        Thierry Mendoza, PT, DPT
Physical Therapy  Pt refusing therapy for today. Educated pt in purpose of PT. Was unable to get from pt her preferred time to do therapy tomorrow. Will continue to follow.   Gilles Mackey PTA
Progress Note    Patient Akiko Saleh  MRN: [id number]  YOB: 1970 Age: 46 y.o. Sex: female  Room: [address] [place] [address]       Admitting Physician: Lanny Godoy MD   Date of Admission: 4/13/2023  1:00 PM   Primary Care Physician: Bartolo Canales MD     Subjective:  Akiko Saleh was seen and examined. We are following for GI bleed. -- No acute events overnight  -- Denies abdominal pain or nausea/vomiting  -- No overt GI bleeding. No BM since admission    ROS:  Constitutional: Denies fever, no change in appetite  Respiratory: Denies cough or shortness of breath  Cardiovascular: Denies chest pain or edema    Objective:  Vital Signs:   Vitals:    04/17/23 0501   BP: 99/66   Pulse:    Resp: 17   Temp: 98.3 °F (36.8 °C)   SpO2:          Physical Exam:  Constitutional: Alert and oriented x 4. No acute distress. Respiratory: Respirations nonlabored, no crepitus  GI: Abdomen nondistended, soft, and nontender. Neurological: No focal deficits noted. No asterixis.     Intake/Output:    Intake/Output Summary (Last 24 hours) at 4/17/2023 0810  Last data filed at 4/17/2023 0504  Gross per 24 hour   Intake 120 ml   Output 1500 ml   Net -1380 ml        Current Medications:  Current Facility-Administered Medications   Medication Dose Route Frequency Provider Last Rate Last Admin    traMADol (ULTRAM) tablet 50 mg  50 mg Oral Q6H PRN John Babcock APRN - CNP   50 mg at 04/17/23 0235    iron sucrose (VENOFER) 200 mg in sodium chloride 0.9 % 100 mL IVPB  200 mg IntraVENous Q24H Symone Sherwood APRN - CNP   Stopped at 04/16/23 1030    sucralfate (CARAFATE) tablet 1 g  1 g Oral 4x Daily AC & HS Luzmaria Alves APRN - CNP   1 g at 04/16/23 2023    primidone (MYSOLINE) tablet 250 mg  250 mg Oral TID Taqueria Kraft MD   250 mg at 04/16/23 2044    melatonin disintegrating tablet 10 mg  10 mg Oral Nightly PRN Taqueria Kraft MD   10 mg at 04/16/23 2024    levothyroxine (SYNTHROID) tablet 200 mcg  200 mcg Oral
Pt a/o. VSS. Shift assessment updated and documented. Patient alert and orientated , vitals checked and stable, due medication given , had tramadol for pain this morning.   Npo for MRI
Pt alert and orientated. Call light within reach. No complaints at this time.  Maura Richter RN
Pt alert and orientated. Call light within reach. No complaints at this time. Pt assisted to the bathroom. Zeke Hernández RN
Pt verbalized understanding of discharge instructions. Pt taken to vehicle by wheel chair.  PCA took pt out and is waiting for ride to get to hospital
Received patient on bed, alert and oriented. On scheduled and PRN analgesia for pain management. Maintained a calm and comfortable environment. Shift assessment updated and documented.
Received patient on bed, alert and oriented. Vitals are stable. On regular analgesia for pain management. Patient is just a little bit worried of going home as she wants to be observed in the hospital a few days after her cortisone treatment. She also wants to take all her medications at 9pm and not be disturbed at midnight. She will call me once she's awake to have her vitals checked. Maintained a calm and comfortable environment. Shift assessment updated and documented.
Received patient on bed, alert and oriented. Vitals are stable. On scheduled and PRN analgesia for pain management. Maintained a calm and comfortable environment. Shift assessment updated and documented.
Right hip bandaid remains cdi, with good cms and ppp right foot.
Shift assessment completed. Pt A&O, VSS on RA. Pt c/o 10/10 pain in R hip, scheduled medication given per [facility] & heat packs applied to affected area. Denies any further needs at this time. Bed locked and in lowest position. Call light & bedside table are within reach.
Shift assessment completed. Pt A&O, VSS on RA. Pt c/o pain in R hip/leg, PRN medication given per MAR. Pt to NPO after midnight, verbalizes understand. Denies any further needs at this time. Bed locked and in lowest position. Call light & bedside table are within reach.
To floor via stretcher. States comfortable. VSS.
Interventions:   Food and/or Nutrient Delivery: Continue Current Diet  Nutrition Education/Counseling: Education completed  Coordination of Nutrition Care: Continue to monitor while inpatient       Goals:  Previous Goal Met: Progressing toward Goal(s)  Goals: PO intake 50% or greater, prior to discharge       Nutrition Monitoring and Evaluation:   Behavioral-Environmental Outcomes: None Identified  Food/Nutrient Intake Outcomes: Food and Nutrient Intake, Supplement Intake  Physical Signs/Symptoms Outcomes: Weight, Nutrition Focused Physical Findings    Discharge Planning:    Continue current diet     Delicia Gamez RD, LD  Contact: [phone]
Within Normal Limits  Orientation Level: Oriented X4  Cognition  Overall Cognitive Status: WFL  Cognition Comment: speech tangential, pt anticipates pain and perseverates on ailments     Objective   Vitals  BP: 114/83  BP Location: Left upper arm  MAP (Calculated): 93  Bed Mobility Training  Bed Mobility Training: No (In chair at start and end of session)  Balance  Sitting: Intact  Standing: With support (With RW)  Standing - Static: Constant support;Good  Standing - Dynamic: Constant support; Fair  Transfer Training  Transfer Training: Yes  Interventions: Safety awareness training;Verbal cues (Cues for hand placement when using RW)  Sit to Stand: Stand-by assistance; Adaptive equipment  Stand to Sit: Stand-by assistance; Adaptive equipment  Toilet Transfer: Stand-by assistance; Adaptive equipment  Gait Training  Gait Training: Yes  Gait  Overall Level of Assistance: Contact-guard assistance; Additional time; Adaptive equipment (With RW)  Interventions: Verbal cues; Safety awareness training;Weight shifting training/pressure relief; Tactile cues (Cues for RW managment and sequencing)  Base of Support: Shift to left  Speed/Carol: Slow;Pace decreased (< 100 feet/min)  Step Length: Right shortened  Stance: Right decreased  Gait Abnormalities: Decreased step clearance; Step to gait  Distance (ft): 75 Feet  Assistive Device: Gait belt;Walker, rolling     PT Exercises  Exercise Treatment: Pt performed seated BLE x12 reps ankle pumps, LAQ  (min A for R LE), marches (min A for R LE). Increased time to complete due to pain     Safety Devices  Type of Devices: Call light within reach; Chair alarm in place; Left in chair;Patient at risk for falls;Gait belt;Nurse notified  Restraints  Restraints Initially in Place: No     [facility] 6 Clicks Inpatient Mobility:  AM-PAC Basic Mobility - Inpatient   How much help is needed turning from your back to your side while in a flat bed without using bedrails?: None  How much help is needed moving from
in chair     Subjective    Subjective  Subjective: Pt in bed at start of session, agreeable to PT  Pain: Reports 9/10 headache, back pain, and R hip/groin pain, RN in room and giving pt pain medication. Provided heat and repositioning at end of session  Orientation  Overall Orientation Status: Within Normal Limits  Orientation Level: Oriented X4     Objective   Vitals  Heart Rate: 93  BP: 106/72  BP Location: Right upper arm  MAP (Calculated): 83  SpO2: 100 %  O2 Device: None (Room air)  Bed Mobility Training  Bed Mobility Training: Yes  Supine to Sit: Modified independent (HOB elevated)  Sit to Supine: Other (comment) (In chair at end of session)  Scooting: Modified independent  Balance  Sitting: Intact  Standing: With support (With quad cane)  Standing - Static: Constant support;Good  Standing - Dynamic: Constant support; Fair  Transfer Training  Transfer Training: Yes  Sit to Stand: Supervision; Adaptive equipment (From EOB, chair, and WC)  Stand to Sit: Supervision; Adaptive equipment  Gait Training  Gait Training: Yes  Gait  Overall Level of Assistance: Stand-by assistance  Interventions: Verbal cues; Safety awareness training;Weight shifting training/pressure relief; Tactile cues (cues for sequencing with quad cane)  Base of Support: Shift to left  Speed/Carol: Slow;Pace decreased (< 100 feet/min)  Step Length: Right shortened;Left shortened  Stance: Right decreased  Gait Abnormalities: Decreased step clearance; Step to gait; Antalgic  Distance (ft): 75 Feet  Assistive Device: Walker, rolling;Cane, quad (cues for correct sequencing up and down stairs)  Rail Use: Both  Stairs - Level of Assistance: Stand-by assistance  Number of Stairs Trained: 4     PT Exercises  Exercise Treatment: Reviewed all exericses on print out     Safety Devices  Type of Devices: Call light within reach; Chair alarm in place; Left in chair;Patient at risk for falls;Gait belt;Nurse notified  Restraints  Restraints Initially in Place: No
normal bowel sounds. Musculoskeletal: No clubbing, cyanosis or edema bilaterally. Full range of motion without deformity. Skin: Skin color, texture, turgor normal.  No rashes or lesions. Neurologic:  Neurovascularly intact without any focal sensory/motor deficits. Cranial nerves: II-XII intact, grossly non-focal.  Psychiatric: Alert and oriented, thought content appropriate, normal insight  Capillary Refill: Brisk,< 3 seconds   Peripheral Pulses: +2 palpable, equal bilaterally       Labs:   Recent Labs     04/19/23  0702 04/20/23  0655 04/21/23  0622   WBC 4.9 4.4 5.2   HGB 8.1* 8.3* 10.0*   HCT 24.2* 24.9* 29.7*    200 200       Recent Labs     04/19/23  0702 04/21/23  0622    136   K 4.5 4.4    100   CO2 27 26   BUN 11 13   CREATININE <0.5* <0.5*   CALCIUM 8.5 8.8   PHOS 3.6 3.9       No results for input(s): AST, ALT, BILIDIR, BILITOT, ALKPHOS in the last 72 hours. No results for input(s): INR in the last 72 hours. No results for input(s): Simuel Eth in the last 72 hours. Urinalysis:      Lab Results   Component Value Date/Time    NITRU Negative 04/14/2023 12:08 PM    [address] 3-5 02/25/2023 03:50 PM    RBCUA 3-4 02/25/2023 03:50 PM    BLOODU Negative 04/14/2023 12:08 PM    SPECGRAV 1.015 04/14/2023 12:08 PM    GLUCOSEU Negative 04/14/2023 12:08 PM    GLUCOSEU NEGATIVE 01/03/2012 02:32 PM       Consults:    IP CONSULT TO GI  IP CONSULT TO SOCIAL WORK  IP CONSULT TO SPIRITUAL SERVICES  IP CONSULT TO SPIRITUAL SERVICES  IP CONSULT TO ORTHOPEDIC SURGERY  IP CONSULT TO SPIRITUAL SERVICES      Assessment/Plan:      Shortness of breath - subjective with minimal exertion and no evidence of hypoxia - likely due to anxiety. Oxygen saturations have been in the high 90's-100%. CTA of the chest, abdomen and pelvis was essentially unremarkable w/out evidence of PE. No ischemic changes on EKG, no elevation in Troponin.   Echocardiogram - EF 55-60%, stable     Anemia - likely 2nd to ALMA,
02/25/2023 13.3 falling to 11.0 on 04/13/2023. 7.6 today. Stool negative for occult blood. BUN elevated to 39. Gastroenterology consulted. Held Xarelto pending GI evaluation. Given her h/o Protein S deficiency and h/o multiple blood clots, restarted post Ortho injection. . EGD-showed multiple gastric antral ulcers, carafate/PPI, Venofer. PPI BID at discharge and OK to resume Xarelto     Abdominal pain - CTA of the abdomen unremarkable. GI consulted     Sinus tachycardia, mild Hypotension on arrival in the ER - Improved with IV fluids. Etiology unclear. Holding Losartan, Norvasc and HCTZ initially. Right hip pain - Chronic. She sees Ortho as an outpatient. She states that she was to receive a hip injection, she held her Xarelto for several days, but the injection was canceled due to diarrhea. She has since restarted Xarelto. Requested Ortho evaluation - consulted and appreciated. Low back pain - continue home medications. Anxiety - known dx of severe anxiety - likely contributory to entire clinical picture. Supportive care. DVT Prophylaxis: IPC     Recent Labs     04/17/23  0720 04/18/23  0654 04/19/23  0702    221 190       Diet: ADULT DIET; Regular; Gluten Free  Code Status: Full Code      PT/OT Eval Status: seen w/ recs for SNF    Dispo - Medically stable for discharge Wed 19 April pending clinical course, subspecialty recs and placement decision - wants to discharge to home but this is unrealistic.        Ant Blakely MD
100 feet/min)  Step Length: Right shortened  Stance: Right decreased  Gait Abnormalities: Decreased step clearance; Step to gait (no LOB or instability observed, pt demoed step to gait with SW)  Distance (ft): 50 Feet  Assistive Device: Gait belt;Walker     PT Exercises  Exercise Treatment: Pt performed BLE x15 reps ankle pumps, heel slides, SLR, quad sets     Safety Devices  Type of Devices: Call light within reach; Chair alarm in place; Left in chair;Patient at risk for falls;Gait belt;Nurse notified       Goals  Short Term Goals  Time Frame for Short Term Goals: one week 4/22 unless otherwise indicated-- 4/18 All goals continue  Short Term Goal 1: pt will transfer supine to and from sit with ind by 4/18-- extend goal to 4/20  Short Term Goal 2: pt will transfer sit to and from stand mod ind  Short Term Goal 3: pt will ambulate 75ft with RW with mod ind  Short Term Goal 4: pt will negotiate 6 steps with B rails with sup to enter home  Patient Goals   Patient Goals : pt indecisive    Education  Patient Education  Education Given To: Patient  Education Provided: Role of Therapy;Plan of Care;Home Exercise Program;Equipment;Transfer Training  Education Provided Comments: safety with mobility, safety with walker, d/c recommendations, BLE HEP handout  Education Method: Verbal  Barriers to Learning: None  Education Outcome: Verbalized understanding;Demonstrated understanding    Therapy Time   Individual Concurrent Group Co-treatment   Time In 1326         Time Out 1419         Minutes 53         Timed Code Treatment Minutes: 53 Minutes     If pt is unable to be seen after this session, please let this note serve as discharge summary. Please see case management note for discharge disposition. Thank you.    Tiffanie Alba,SPT
11.0 on 04/13/2023. 7.6 today. Stool negative for occult blood. BUN elevated to 39. Gastroenterology consulted. Held Xarelto pending GI evaluation. Given her h/o Protein S deficiency and h/o multiple blood clots, restarted post Ortho injection. . EGD-showed multiple gastric antral ulcers, carafate/PPI, Venofer. PPI BID at discharge and OK to resume Xarelto     Abdominal pain - CTA of the abdomen unremarkable. GI consulted     Sinus tachycardia, mild Hypotension on arrival in the ER - Improved with IV fluids. Etiology unclear. Holding Losartan, Norvasc and HCTZ initially. Right hip pain - Chronic. She sees Ortho as an outpatient. She states that she was to receive a hip injection, she held her Xarelto for several days, but the injection was canceled due to diarrhea. She has since restarted Xarelto. Requested Ortho evaluation - consulted and appreciated. Low back pain - continue home medications. DVT Prophylaxis: IPC     Recent Labs     04/16/23  0530 04/17/23  0720 04/18/23  0654    151 221       Diet: ADULT DIET; Regular; Gluten Free  Code Status: Full Code      PT/OT Eval Status: seen w/ recs for SNF    Dispo - Patient is likely to remain in-house at least until Tues/Wed 18/19 April pending clinical course, subspecialty recs and placement decision - wants to discharge to home.        Tucker Dong MD
Joseph Gan    If pt is unable to be seen after this session, please let this note serve as discharge summary. Please see case management note for discharge disposition. Thank you.

## 2023-04-21 NOTE — CARE COORDINATION
Chart reviewed day 7. D/c order noted. This admission, patient would only let CM make SNF referrals to [facility] (declined) and [facility]. [facility] can clinically admit however are out of network with insurance, Idalia Kaur remains pending however facility noted that patient would have OOP expense of $6000.00. Patient stated cannot afford that. Per [address] not covering [facility], patient is aware. Working with therapy and added in step therapy today. Has been provided home exercise plan and can offer OP therapy that would be covered by insurance. Provided copy of RX for therapy. Patient states waiting for sister to call to give  time.  Joe Posey RN

## 2023-04-22 NOTE — PLAN OF CARE
Problem: Nutrition Deficit:  Goal: Optimize nutritional status  Outcome: Progressing  Flowsheets (Taken 4/17/2023 0305)  Nutrient intake appropriate for improving, restoring, or maintaining nutritional needs:   Assess nutritional status and recommend course of action   Monitor oral intake, labs, and treatment plans
Problem: Safety - Adult  Goal: Free from fall injury  4/18/2023 0228 by Maximiliano Barreto RN  Outcome: Progressing  Flowsheets (Taken 4/18/2023 0228)  Free From Fall Injury: Daisy Lewis family/caregiver on patient safety  Note: Calls for assistance     Problem: Pain  Goal: Verbalizes/displays adequate comfort level or baseline comfort level  4/18/2023 0228 by Maxmiiliano Barreto RN  Outcome: Progressing  Flowsheets (Taken 4/18/2023 0228)  Verbalizes/displays adequate comfort level or baseline comfort level:   Encourage patient to monitor pain and request assistance   Assess pain using appropriate pain scale   Administer analgesics based on type and severity of pain and evaluate response   Implement non-pharmacological measures as appropriate and evaluate response  Note: Pain controlled
Problem: Safety - Adult  Goal: Free from fall injury  4/19/2023 2148 by Quita Perez RN  Outcome: Progressing  Flowsheets (Taken 4/19/2023 2148)  Free From Fall Injury: Instruct family/caregiver on patient safety  Note: Patient calls for assistance. Problem: Pain  Goal: Verbalizes/displays adequate comfort level or baseline comfort level  4/19/2023 2148 by Quita Perez RN  Outcome: Progressing  Flowsheets (Taken 4/19/2023 2148)  Verbalizes/displays adequate comfort level or baseline comfort level:   Encourage patient to monitor pain and request assistance   Assess pain using appropriate pain scale   Administer analgesics based on type and severity of pain and evaluate response   Implement non-pharmacological measures as appropriate and evaluate response  Note: On scheduled and PRN analgesia for pain management. Problem: Skin/Tissue Integrity  Goal: Absence of new skin breakdown  Description: 1. Monitor for areas of redness and/or skin breakdown  2. Assess vascular access sites hourly  3. Every 4-6 hours minimum:  Change oxygen saturation probe site  4. Every 4-6 hours:  If on nasal continuous positive airway pressure, respiratory therapy assess nares and determine need for appliance change or resting period.   4/19/2023 2148 by Quita Perez RN  Outcome: Progressing  Note: Pt walks to the bathroom back into bed with walker
Problem: Safety - Adult  Goal: Free from fall injury  4/20/2023 2003 by Dior Boyle RN  Outcome: Progressing  4/20/2023 1903 by Dior Boyle RN  Outcome: Progressing     Problem: Pain  Goal: Verbalizes/displays adequate comfort level or baseline comfort level  Outcome: Progressing
Problem: Safety - Adult  Goal: Free from fall injury  4/20/2023 2224 by Bree Verdugo RN  Outcome: Progressing  Flowsheets (Taken 4/20/2023 2224)  Free From Fall Injury: Instruct family/caregiver on patient safety      Problem: Pain  Goal: Verbalizes/displays adequate comfort level or baseline comfort level  4/20/2023 2224 by Bree Verdugo RN  Flowsheets (Taken 4/20/2023 2224)  Verbalizes/displays adequate comfort level or baseline comfort level: Implement non-pharmacological measures as appropriate and evaluate response  Heat packs applied to affected areas.
Problem: Safety - Adult  Goal: Free from fall injury  Outcome: Progressing     Problem: ABCDS Injury Assessment  Goal: Absence of physical injury  Outcome: Progressing     Problem: Pain  Goal: Verbalizes/displays adequate comfort level or baseline comfort level  Outcome: Progressing     Problem: Nutrition Deficit:  Goal: Optimize nutritional status  Outcome: Progressing     Problem: Skin/Tissue Integrity  Goal: Absence of new skin breakdown  Description: 1. Monitor for areas of redness and/or skin breakdown  2. Assess vascular access sites hourly  3. Every 4-6 hours minimum:  Change oxygen saturation probe site  4. Every 4-6 hours:  If on nasal continuous positive airway pressure, respiratory therapy assess nares and determine need for appliance change or resting period.   Outcome: Progressing
Problem: Safety - Adult  Goal: Free from fall injury  Outcome: Progressing     Problem: Pain  Goal: Verbalizes/displays adequate comfort level or baseline comfort level  Outcome: Progressing
Problem: Safety - Adult  Goal: Free from fall injury  Outcome: Progressing  Flowsheets (Taken 4/19/2023 0124)  Free From Fall Injury: Instruct family/caregiver on patient safety  Note: Patient calls for assistance. Problem: Pain  Goal: Verbalizes/displays adequate comfort level or baseline comfort level  Outcome: Progressing  Flowsheets (Taken 4/19/2023 0124)  Verbalizes/displays adequate comfort level or baseline comfort level:   Encourage patient to monitor pain and request assistance   Assess pain using appropriate pain scale   Administer analgesics based on type and severity of pain and evaluate response   Implement non-pharmacological measures as appropriate and evaluate response  Note: On scheduled and PRN analgesia for pain management.
change or resting period.   Outcome: Progressing

## 2023-04-22 NOTE — DISCHARGE SUMMARY
Details   morphine (MS CONTIN) 15 MG extended release tablet Take 1 tablet by mouth 2 times daily for 7 days. Max Daily Amount: 30 mg, Disp-14 tablet, R-0Print                Details   atorvastatin (LIPITOR) 40 MG tablet TAKE ONE TABLET BY MOUTH DAILY, Disp-90 tablet, R-0Normal      clonazePAM (KLONOPIN) 1 MG tablet Take 1 tablet my mouth in the morning, take 1.5 tablets by mouth nightly, Disp-75 tablet, R-0Normal      levothyroxine (SYNTHROID) 200 MCG tablet TAKE ONE TABLET BY MOUTH DAILY, Disp-90 tablet, R-1Normal      DULoxetine (CYMBALTA) 20 MG extended release capsule Take 1 capsule by mouth daily, Disp-30 capsule, R-3Normal      Handicap Placard MISC Starting Wed 2/22/2023, Disp-1 each, R-0, Print      XARELTO 20 MG TABS tablet TAKE ONE TABLET BY MOUTH DAILY, Disp-90 tablet, R-1, DAWNormal      ondansetron (ZOFRAN ODT) 4 MG disintegrating tablet Take 1 tablet by mouth every 8 hours as needed for Nausea, Disp-20 tablet, R-0Normal      vitamin D (ERGOCALCIFEROL) 1.25 MG (38483 UT) CAPS capsule Take 1 capsule by mouth once a week for 12 doses, Disp-12 capsule, R-1Normal      SYRINGE-NEEDLE, DISP, 3 ML (B-D 3CC LUER-ALEX SYR 25GX1\") 25G X 1\" 3 ML MISC Disp-100 each, R-2, NormalUse to inject into the muscle two times a day as needed for pain      carBAMazepine (TEGRETOL XR) 400 MG extended release tablet Take 1 tablet by mouth in the morning and 1 tablet at noon and 1 tablet in the evening. Historical Med      Melatonin 10 MG TABS Take by mouthHistorical Med      diphenhydrAMINE (SOMINEX) 25 MG tablet Take 2 tablets by mouth nightly as neededHistorical Med      Doxylamine Succinate, Sleep, (UNISOM PO) Take by mouthHistorical Med      primidone (MYSOLINE) 250 MG tablet Take 1 tablet by mouth 3 times dailyHistorical Med             Time Spent on discharge is more than 22 minutes in the examination, evaluation, counseling and review of medications and discharge plan.       Signed:    Oneal Wallace,

## 2023-04-24 ENCOUNTER — TELEPHONE (OUTPATIENT)
Dept: FAMILY MEDICINE CLINIC | Age: 53
End: 2023-04-24

## 2023-04-24 NOTE — TELEPHONE ENCOUNTER
Care Transitions Initial Follow Up Call    Outreach made within 2 business days of discharge: Yes    Patient: Mohamud Zuleta Patient : 1970   MRN: [id number]  Reason for Admission: There are no discharge diagnoses documented for the most recent discharge. Discharge Date: 23       Spoke with: PT scheduled with PCP    Discharge department/facility: [facility] Patient Contact:  Was patient able to fill all prescriptions: Yes  Was patient instructed to bring all medications to the follow-up visit: Yes  Is patient taking all medications as directed in the discharge summary?  Yes  Does patient understand their discharge instructions: Yes  Does patient have questions or concerns that need addressed prior to 7-14 day follow up office visit: no    Scheduled appointment with PCP within 7-14 days    Follow Up  Future Appointments   Date Time Provider Rula Emmanuel   5/3/2023 11:30 AM Willis Halsted, MD [facility] [place] - [place]   2023  1:15 PM Willis Halsted, MD [facility] [place] [facility] Marcha Blazer, LPN

## 2023-04-24 NOTE — TELEPHONE ENCOUNTER
Care Transitions Initial Follow Up Call    Outreach made within 2 business days of discharge: Yes    Patient: Efraín Tyson Patient : 1970   MRN: [id number]  Reason for Admission: There are no discharge diagnoses documented for the most recent discharge. Discharge Date: 23       Spoke with: Attempted to reach the PT for post hospital follow up, VM left to return call to schedule. Discharge department/facility: [facility] Interactive Patient Contact:  Was patient able to fill all prescriptions: Yes  Was patient instructed to bring all medications to the follow-up visit: Yes  Is patient taking all medications as directed in the discharge summary?  Yes  Does patient understand their discharge instructions: Yes  Does patient have questions or concerns that need addressed prior to 7-14 day follow up office visit: no    Scheduled appointment with PCP within 7-14 days    Follow Up  Future Appointments   Date Time Provider Rula Emmanuel   2023  1:15 PM MD Shahriar Lira LPN

## 2023-05-03 ENCOUNTER — OFFICE VISIT (OUTPATIENT)
Dept: FAMILY MEDICINE CLINIC | Age: 53
End: 2023-05-03
Payer: COMMERCIAL

## 2023-05-03 VITALS
WEIGHT: 155.6 LBS | BODY MASS INDEX: 25.92 KG/M2 | DIASTOLIC BLOOD PRESSURE: 78 MMHG | HEART RATE: 112 BPM | SYSTOLIC BLOOD PRESSURE: 125 MMHG | HEIGHT: 65 IN | TEMPERATURE: 98.2 F | OXYGEN SATURATION: 98 %

## 2023-05-03 DIAGNOSIS — G62.9 NEUROPATHY: ICD-10-CM

## 2023-05-03 DIAGNOSIS — M51.36 DDD (DEGENERATIVE DISC DISEASE), LUMBAR: ICD-10-CM

## 2023-05-03 DIAGNOSIS — R10.31 CHRONIC PAIN OF RIGHT INGUINAL REGION: ICD-10-CM

## 2023-05-03 DIAGNOSIS — G89.29 CHRONIC PAIN OF RIGHT INGUINAL REGION: ICD-10-CM

## 2023-05-03 DIAGNOSIS — D50.0 IRON DEFICIENCY ANEMIA DUE TO CHRONIC BLOOD LOSS: Primary | ICD-10-CM

## 2023-05-03 DIAGNOSIS — F33.2 SEVERE EPISODE OF RECURRENT MAJOR DEPRESSIVE DISORDER, WITHOUT PSYCHOTIC FEATURES (HCC): ICD-10-CM

## 2023-05-03 DIAGNOSIS — F43.21 COMPLICATED GRIEF: ICD-10-CM

## 2023-05-03 DIAGNOSIS — G89.29 CHRONIC RIGHT HIP PAIN: ICD-10-CM

## 2023-05-03 DIAGNOSIS — M43.10 RETROLISTHESIS OF VERTEBRAE: ICD-10-CM

## 2023-05-03 DIAGNOSIS — M47.816 SPONDYLOSIS OF LUMBAR SPINE: ICD-10-CM

## 2023-05-03 DIAGNOSIS — M25.551 CHRONIC RIGHT HIP PAIN: ICD-10-CM

## 2023-05-03 PROCEDURE — 99214 OFFICE O/P EST MOD 30 MIN: CPT | Performed by: FAMILY MEDICINE

## 2023-05-03 PROCEDURE — 3078F DIAST BP <80 MM HG: CPT | Performed by: FAMILY MEDICINE

## 2023-05-03 PROCEDURE — 3074F SYST BP LT 130 MM HG: CPT | Performed by: FAMILY MEDICINE

## 2023-05-03 RX ORDER — TRAMADOL HYDROCHLORIDE 50 MG/1
50 TABLET ORAL DAILY PRN
Qty: 30 TABLET | Refills: 1 | Status: SHIPPED | OUTPATIENT
Start: 2023-05-03 | End: 2023-06-02

## 2023-05-03 RX ORDER — MORPHINE SULFATE 15 MG/1
15 TABLET ORAL EVERY 4 HOURS PRN
COMMUNITY
End: 2023-05-03

## 2023-05-03 RX ORDER — DULOXETIN HYDROCHLORIDE 30 MG/1
30 CAPSULE, DELAYED RELEASE ORAL DAILY
Qty: 30 CAPSULE | Refills: 1 | Status: SHIPPED | OUTPATIENT
Start: 2023-05-03

## 2023-05-03 RX ORDER — TRAMADOL HYDROCHLORIDE 50 MG/1
50 TABLET ORAL EVERY 6 HOURS PRN
COMMUNITY
End: 2023-05-03 | Stop reason: SDUPTHER

## 2023-05-08 DIAGNOSIS — F41.9 ANXIETY: ICD-10-CM

## 2023-05-08 RX ORDER — CLONAZEPAM 1 MG/1
TABLET ORAL
Qty: 75 TABLET | Refills: 0 | Status: SHIPPED | OUTPATIENT
Start: 2023-05-08 | End: 2023-06-08

## 2023-05-08 NOTE — TELEPHONE ENCOUNTER
Refill Request - Controlled Substance    CONFIRM preferred pharmacy with the patient. If Mail Order Rx - Pend for 90 day refill. Last Seen Department: 5/3/2023    Last Seen by PCP: 5/3/2023    Last Written: 3/27/2023 75 wih 0 refills    Last UDS: 2/25/2023    Med Agreement Signed On: 2/26/2019    If no future appointment scheduled:  Review the last OV with PCP and review information for follow-up visit,  Route STAFF MESSAGE with patient name to the [facility] for scheduling with the following information:            -  Timing of next visit           -  Visit type ie Physical, OV, etc           -  Diagnoses/Reason ie. COPD, HTN - Do not use MEDICATION, Follow-up or CHECK UP - Give reason for visit        Next Appointment:   Future Appointments   Date Time Provider Rula Emmanuel   6/14/2023  1:15 PM [facility]  [place] [facility] [place]       Message sent to [address] [place] [address] to schedule appt with patient?   NO      Requested Prescriptions     Pending Prescriptions Disp Refills    clonazePAM (KLONOPIN) 1 MG tablet [Pharmacy Med Name: clonazePAM 1 MG TABLET] 75 tablet      Sig: TAKE ONE TABLET BY MOUTH EVERY MORNING TAKE 1 AND 1/2 TABLET BY MOUTH NIGHTLY

## 2023-05-12 ENCOUNTER — TELEPHONE (OUTPATIENT)
Dept: FAMILY MEDICINE CLINIC | Age: 53
End: 2023-05-12

## 2023-05-12 ENCOUNTER — HOSPITAL ENCOUNTER (OUTPATIENT)
Age: 53
Discharge: HOME | End: 2023-05-12
Payer: COMMERCIAL

## 2023-05-12 ENCOUNTER — HOSPITAL ENCOUNTER (OUTPATIENT)
Dept: GENERAL RADIOLOGY | Age: 53
Discharge: HOME | End: 2023-05-12
Payer: COMMERCIAL

## 2023-05-12 ENCOUNTER — OFFICE VISIT (OUTPATIENT)
Dept: FAMILY MEDICINE CLINIC | Age: 53
End: 2023-05-12
Payer: COMMERCIAL

## 2023-05-12 VITALS
HEART RATE: 97 BPM | TEMPERATURE: 97.8 F | BODY MASS INDEX: 25.33 KG/M2 | DIASTOLIC BLOOD PRESSURE: 100 MMHG | OXYGEN SATURATION: 97 % | WEIGHT: 152 LBS | SYSTOLIC BLOOD PRESSURE: 160 MMHG | HEIGHT: 65 IN

## 2023-05-12 DIAGNOSIS — D64.9 ANEMIA, UNSPECIFIED TYPE: ICD-10-CM

## 2023-05-12 DIAGNOSIS — R06.02 SHORTNESS OF BREATH: ICD-10-CM

## 2023-05-12 DIAGNOSIS — R06.02 SHORTNESS OF BREATH: Primary | ICD-10-CM

## 2023-05-12 DIAGNOSIS — D50.0 IRON DEFICIENCY ANEMIA DUE TO CHRONIC BLOOD LOSS: ICD-10-CM

## 2023-05-12 LAB
D DIMER: 0.64 UG/ML FEU (ref 0–0.6)
FERRITIN SERPL IA-MCNC: 141.7 NG/ML (ref 15–150)
IRON SATN MFR SERPL: 37 % (ref 15–50)
IRON SERPL-MCNC: 104 UG/DL (ref 37–145)
REASON FOR REJECTION: NORMAL
REJECTED TEST: NORMAL
TIBC SERPL-MCNC: 284 UG/DL (ref 260–445)
TROPONIN, HIGH SENSITIVITY: 12 NG/L (ref 0–14)

## 2023-05-12 PROCEDURE — 3080F DIAST BP >= 90 MM HG: CPT | Performed by: NURSE PRACTITIONER

## 2023-05-12 PROCEDURE — 93000 ELECTROCARDIOGRAM COMPLETE: CPT | Performed by: NURSE PRACTITIONER

## 2023-05-12 PROCEDURE — 71046 X-RAY EXAM CHEST 2 VIEWS: CPT

## 2023-05-12 PROCEDURE — 99214 OFFICE O/P EST MOD 30 MIN: CPT | Performed by: NURSE PRACTITIONER

## 2023-05-12 PROCEDURE — 3077F SYST BP >= 140 MM HG: CPT | Performed by: NURSE PRACTITIONER

## 2023-05-12 ASSESSMENT — ENCOUNTER SYMPTOMS
WHEEZING: 0
SORE THROAT: 0
SHORTNESS OF BREATH: 1
SINUS PAIN: 0
ANAL BLEEDING: 0
NAUSEA: 0
BLOOD IN STOOL: 0
SINUS PRESSURE: 0
CHOKING: 0
RHINORRHEA: 0
COUGH: 0
VOMITING: 0
DIARRHEA: 0

## 2023-05-12 NOTE — PROGRESS NOTES
Mavis Spears (:  1970) is a 46 y.o. female,Established patient, here for evaluation of the following chief complaint(s):  Shortness of Breath (Sxs 2023)         ASSESSMENT/PLAN:  1. Shortness of breath  -     COVID-19  -     CBC with Auto Differential; Future  -     XR CHEST STANDARD (2 VW); Future  -     Troponin; Future  -     EKG 12 Lead - Clinic Performed  -     D-Dimer, Quantitative; Future  2. Anemia, unspecified type  -     CBC with Auto Differential; Future    -No obvious signs of bleeding, will get stat CBC to check counts. -EKG reviewed, no acute findings, NSR  -CXR pending.   -Sats are 97% in  office and patient reports 99% at home, discussed that if stays below 90% this is concerning and she should go to ER, she verbalized understanding   -Discussed other alarm symptoms   -Notify office if symptoms worsen or do not improve       Return if symptoms worsen or fail to improve. Subjective   SUBJECTIVE/OBJECTIVE:  Was seen recently by PCP for hospital follow up after discharge from [facility] where she was admitted for anemia and shortness of breath, was diagnosed with ulcers  Since that visit with PCP reports shortness of breath,dizziness x2 episodes,   Spoke with on call provider overnight, she was given option to go to Er for further workup or have her pending labs drawn,   Denies any blood in urine or stool,  Denies, dark, tarry stools, States they are dark but she is on iron supplement   Is still taking Xarelto   Home oxygen saturation is 99%           Review of Systems   Constitutional:  Negative for chills and fever. HENT:  Negative for congestion, ear pain, postnasal drip, rhinorrhea, sinus pressure, sinus pain and sore throat. Respiratory:  Positive for shortness of breath. Negative for cough, choking and wheezing. Cardiovascular:  Negative for chest pain, palpitations and leg swelling.    Gastrointestinal:  Negative for anal bleeding, blood in stool, diarrhea, nausea

## 2023-05-13 ENCOUNTER — HOSPITAL ENCOUNTER (EMERGENCY)
Age: 53
Discharge: HOME | End: 2023-05-13
Attending: EMERGENCY MEDICINE
Payer: COMMERCIAL

## 2023-05-13 ENCOUNTER — APPOINTMENT (OUTPATIENT)
Dept: CT IMAGING | Age: 53
End: 2023-05-13
Payer: COMMERCIAL

## 2023-05-13 VITALS
SYSTOLIC BLOOD PRESSURE: 122 MMHG | HEIGHT: 65 IN | OXYGEN SATURATION: 98 % | RESPIRATION RATE: 16 BRPM | WEIGHT: 152 LBS | BODY MASS INDEX: 25.33 KG/M2 | TEMPERATURE: 97.8 F | DIASTOLIC BLOOD PRESSURE: 75 MMHG | HEART RATE: 88 BPM

## 2023-05-13 DIAGNOSIS — R06.02 SHORTNESS OF BREATH: Primary | ICD-10-CM

## 2023-05-13 LAB
ALBUMIN SERPL-MCNC: 4.7 G/DL (ref 3.4–5)
ALBUMIN/GLOB SERPL: 2.2 {RATIO} (ref 1.1–2.2)
ALP SERPL-CCNC: 111 U/L (ref 40–129)
ALT SERPL-CCNC: 21 U/L (ref 10–40)
ANION GAP SERPL CALCULATED.3IONS-SCNC: 14 MMOL/L (ref 3–16)
ANTI-XA UNFRAC HEPARIN: 0.58 IU/ML (ref 0.3–0.7)
AST SERPL-CCNC: 21 U/L (ref 15–37)
BASOPHILS # BLD: 0.1 K/UL (ref 0–0.2)
BASOPHILS NFR BLD: 1 %
BILIRUB SERPL-MCNC: <0.2 MG/DL (ref 0–1)
BUN SERPL-MCNC: 11 MG/DL (ref 7–20)
CALCIUM SERPL-MCNC: 9.5 MG/DL (ref 8.3–10.6)
CHLORIDE SERPL-SCNC: 103 MMOL/L (ref 99–110)
CO2 SERPL-SCNC: 22 MMOL/L (ref 21–32)
CREAT SERPL-MCNC: 0.7 MG/DL (ref 0.6–1.1)
DEPRECATED RDW RBC AUTO: 15.4 % (ref 12.4–15.4)
EKG ATRIAL RATE: 77 BPM
EKG DIAGNOSIS: NORMAL
EKG P AXIS: 58 DEGREES
EKG P-R INTERVAL: 150 MS
EKG Q-T INTERVAL: 388 MS
EKG QRS DURATION: 78 MS
EKG QTC CALCULATION (BAZETT): 439 MS
EKG R AXIS: 4 DEGREES
EKG T AXIS: 31 DEGREES
EKG VENTRICULAR RATE: 77 BPM
EOSINOPHIL # BLD: 0.1 K/UL (ref 0–0.6)
EOSINOPHIL NFR BLD: 1.7 %
GFR SERPLBLD CREATININE-BSD FMLA CKD-EPI: >60 ML/MIN/{1.73_M2}
GLUCOSE SERPL-MCNC: 123 MG/DL (ref 70–99)
HCT VFR BLD AUTO: 43.1 % (ref 36–48)
HGB BLD-MCNC: 14.5 G/DL (ref 12–16)
LYMPHOCYTES # BLD: 1.4 K/UL (ref 1–5.1)
LYMPHOCYTES NFR BLD: 25.8 %
MCH RBC QN AUTO: 31.2 PG (ref 26–34)
MCHC RBC AUTO-ENTMCNC: 33.6 G/DL (ref 31–36)
MCV RBC AUTO: 92.9 FL (ref 80–100)
MONOCYTES # BLD: 0.4 K/UL (ref 0–1.3)
MONOCYTES NFR BLD: 8.1 %
NEUTROPHILS # BLD: 3.4 K/UL (ref 1.7–7.7)
NEUTROPHILS NFR BLD: 63.4 %
PLATELET # BLD AUTO: 233 K/UL (ref 135–450)
PMV BLD AUTO: 9.5 FL (ref 5–10.5)
POTASSIUM SERPL-SCNC: 4 MMOL/L (ref 3.5–5.1)
PROT SERPL-MCNC: 6.8 G/DL (ref 6.4–8.2)
RBC # BLD AUTO: 4.64 M/UL (ref 4–5.2)
SARS-COV-2 RNA RESP QL NAA+PROBE: NOT DETECTED
SODIUM SERPL-SCNC: 139 MMOL/L (ref 136–145)
TROPONIN, HIGH SENSITIVITY: 8 NG/L (ref 0–14)
WBC # BLD AUTO: 5.3 K/UL (ref 4–11)

## 2023-05-13 PROCEDURE — 99285 EMERGENCY DEPT VISIT HI MDM: CPT

## 2023-05-13 PROCEDURE — 71260 CT THORAX DX C+: CPT

## 2023-05-13 PROCEDURE — 85025 COMPLETE CBC W/AUTO DIFF WBC: CPT

## 2023-05-13 PROCEDURE — 80053 COMPREHEN METABOLIC PANEL: CPT

## 2023-05-13 PROCEDURE — 84484 ASSAY OF TROPONIN QUANT: CPT

## 2023-05-13 PROCEDURE — 6360000004 HC RX CONTRAST MEDICATION: Performed by: EMERGENCY MEDICINE

## 2023-05-13 PROCEDURE — 93010 ELECTROCARDIOGRAM REPORT: CPT | Performed by: INTERNAL MEDICINE

## 2023-05-13 PROCEDURE — 85520 HEPARIN ASSAY: CPT

## 2023-05-13 PROCEDURE — 93005 ELECTROCARDIOGRAM TRACING: CPT | Performed by: EMERGENCY MEDICINE

## 2023-05-13 RX ADMIN — IOPAMIDOL 75 ML: 755 INJECTION, SOLUTION INTRAVENOUS at 05:05

## 2023-05-13 ASSESSMENT — PAIN - FUNCTIONAL ASSESSMENT: PAIN_FUNCTIONAL_ASSESSMENT: NONE - DENIES PAIN

## 2023-05-13 NOTE — DISCHARGE INSTRUCTIONS
Please follow up with your PCP on Monday for further evaluation and treatment. If persistent or worsening symptoms, or if you have any concerns, return to ED immediately.

## 2023-05-13 NOTE — ED PROVIDER NOTES
[facility]  ED    EMERGENCY DEPARTMENT ENCOUNTER        Patient Name: Charisma Kennedy  MRN: [id number]  [place] 1970  Date of evaluation: 5/13/2023  PCP: Lita Liz MD  Note Started: 4:45 AM EDT 5/13/23    CHIEF COMPLAINT       Shortness of Breath (SOB since Monday. Thinks she might be anemic. Has been hospitalized in the past for anemia.)      HISTORY OF PRESENT ILLNESS: 1 or more Elements       Charisma Kennedy is a 46 y.o. female emergency department for evaluation of shortness of breath. Patient reports having shortness of breath, lightheadedness, that feels similar to her last hospitalization back in April. Says she was found to be anemic at that time. Says she was started on iron transfusion. She followed up with her primary care doctor Monday. Since following up with her PCP, she started having similar symptoms. She had labs that were obtained yesterday. She reviewed the labs. The blood count was hemolyzed. She was told that she will have to return on Monday to get redrawn. Says her symptoms were getting worse so came in for evaluation. Reports having history of PEs and is on anticoagulants. Has not missed any doses. Reports having worsening exertional shortness of breath. No other complaints, modifying factors or associated symptoms. History obtained by the patient unless stated otherwise as above on HPI. No limitations unless specified as above on HPI.      Past medical history:   Past Medical History:   Diagnosis Date    Benign essential tremor     BRCA1 negative     Colon polyp 05/06/2019    Degenerative disc disease     DVT, lower extremity (HCC) 1989    Fibromyalgia     Kidney stones     Left-sided trigeminal neuralgia     Malignant neoplasm of thyroid gland ([address] 8/22/2013    Migraines, neuralgic     since stroke 1999 Chronic    PONV (postoperative nausea and vomiting)     Poor venous access     Protein S deficiency ([address]     Pulmonary embolism ([address]

## 2023-05-15 RX ORDER — FERROUS SULFATE 325(65) MG
325 TABLET ORAL 2 TIMES DAILY WITH MEALS
Qty: 30 TABLET | Refills: 3 | Status: SHIPPED | OUTPATIENT
Start: 2023-05-15 | End: 2023-06-14

## 2023-05-15 RX ORDER — SUCRALFATE 1 G/1
1 TABLET ORAL
Qty: 120 TABLET | Refills: 0 | Status: SHIPPED | OUTPATIENT
Start: 2023-05-15 | End: 2023-06-14

## 2023-05-15 RX ORDER — PANTOPRAZOLE SODIUM 40 MG/1
40 TABLET, DELAYED RELEASE ORAL
Qty: 60 TABLET | Refills: 0 | Status: SHIPPED | OUTPATIENT
Start: 2023-05-15 | End: 2023-06-14

## 2023-05-15 NOTE — TELEPHONE ENCOUNTER
Refill Request     CONFIRM preferrred pharmacy with the patient. If Mail Order Rx - Pend for 90 day refill. Last Seen: Last Seen Department: 5/12/2023  Last Seen by PCP: 5/3/2023    Last Written: 4/21/23 0 refills     If no future appointment scheduled, route STAFF MESSAGE with patient name to the [facility] for scheduling. Next Appointment:   Future Appointments   Date Time Provider Rula Emmanuel   6/14/2023  1:15 PM MD TALYA Montoya  [place] - [place]       Message sent to [address] [place] [address] to schedule appt with patient?   NO      Requested Prescriptions     Pending Prescriptions Disp Refills    ferrous sulfate (IRON 325) 325 (65 Fe) MG tablet 30 tablet 3     Sig: Take 1 tablet by mouth 2 times daily (with meals)    pantoprazole (PROTONIX) 40 MG tablet 60 tablet 0     Sig: Take 1 tablet by mouth 2 times daily (before meals)    sucralfate (CARAFATE) 1 GM tablet 120 tablet 0     Sig: Take 1 tablet by mouth 4 times daily (before meals and nightly)

## 2023-05-17 ENCOUNTER — TELEPHONE (OUTPATIENT)
Dept: FAMILY MEDICINE CLINIC | Age: 53
End: 2023-05-17

## 2023-05-24 ASSESSMENT — ENCOUNTER SYMPTOMS
NAUSEA: 0
COLOR CHANGE: 0
BACK PAIN: 1
SHORTNESS OF BREATH: 0
VOMITING: 0
ABDOMINAL PAIN: 0

## 2023-06-01 ENCOUNTER — OFFICE VISIT (OUTPATIENT)
Dept: ORTHOPEDIC SURGERY | Age: 53
End: 2023-06-01

## 2023-06-01 VITALS — HEIGHT: 65 IN | BODY MASS INDEX: 25.33 KG/M2 | WEIGHT: 152 LBS

## 2023-06-01 DIAGNOSIS — Z01.818 PRE-OP TESTING: ICD-10-CM

## 2023-06-01 DIAGNOSIS — M25.561 RIGHT KNEE PAIN, UNSPECIFIED CHRONICITY: Primary | ICD-10-CM

## 2023-06-01 DIAGNOSIS — M16.11 PRIMARY OSTEOARTHRITIS OF RIGHT HIP: ICD-10-CM

## 2023-06-01 NOTE — PROGRESS NOTES
Hip     Dr Eliseo Hamilton      Date /Time 6/1/2023       1:41 PM EST  Name Symone Damon             1970   Location  [facility]  MRN [id number]                Chief Complaint   Patient presents with    Follow-up     Right Hip         History of Present Illness    Symone Damon is a 46 y.o. female who presents with  right hip pain. Injury Mechanism:  none. Worker's Comp. & legal issues:   none. Previous Treatments: Ice, Heat, and NSAIDs    Patient presents to the office to [facility] for a concerning her right hip. She did have an intra-articular cortisone injection done by myself while in the hospital on 4/17/2023. Patient has a complicated medical history. This includes but not limited to multiple ulcers. , Protein S deficiency, history of DVT, and psychological pathology. The injection did help. Patient is also complaining of right knee pain. No injury or trauma    Previous history: Patient presents to the office today for a new problem. Patient here with a chief complaint of pain. Patient's right hip has been painful for many years. Patient's pain is concentrated in her groin and thigh region. She does have a history of lumbar stenosis also. She does see a pain management physician. She takes morphine 30 mg p.o. twice daily and also has a breakthrough fentanyl medication. She has had no recent injury or trauma. She does have a history of a stroke.     Past History  Past Medical History:   Diagnosis Date    Benign essential tremor     BRCA1 negative     Colon polyp 05/06/2019    Degenerative disc disease     DVT, lower extremity (HCC) 1989    Fibromyalgia     Kidney stones     Left-sided trigeminal neuralgia     Malignant neoplasm of thyroid gland ([address] 8/22/2013    Migraines, neuralgic     since stroke 1999 Chronic    PONV (postoperative nausea and vomiting)     Poor venous access     Protein S deficiency ([address]     Pulmonary embolism ([address] 1989    Seizure disorder ([address]     grand

## 2023-06-02 DIAGNOSIS — M16.11 PRIMARY OSTEOARTHRITIS OF RIGHT HIP: Primary | ICD-10-CM

## 2023-06-05 DIAGNOSIS — D68.59 HYPERCOAGULABLE STATE (HCC): ICD-10-CM

## 2023-06-05 DIAGNOSIS — D68.59 PROTEIN S DEFICIENCY (HCC): ICD-10-CM

## 2023-06-05 DIAGNOSIS — F41.9 ANXIETY: ICD-10-CM

## 2023-06-05 DIAGNOSIS — Z86.73 HISTORY OF STROKE: ICD-10-CM

## 2023-06-05 RX ORDER — CLONAZEPAM 1 MG/1
TABLET ORAL
Qty: 75 TABLET | Refills: 2 | Status: SHIPPED | OUTPATIENT
Start: 2023-06-05 | End: 2023-07-24

## 2023-06-05 RX ORDER — PRIMIDONE 250 MG/1
TABLET ORAL
Qty: 120 TABLET | Refills: 0 | OUTPATIENT
Start: 2023-06-05

## 2023-06-05 RX ORDER — PANTOPRAZOLE SODIUM 40 MG/1
40 TABLET, DELAYED RELEASE ORAL
Qty: 60 TABLET | Refills: 0 | Status: SHIPPED | OUTPATIENT
Start: 2023-06-05 | End: 2023-07-16

## 2023-06-05 RX ORDER — ERGOCALCIFEROL 1.25 MG/1
50000 CAPSULE ORAL WEEKLY
Qty: 12 CAPSULE | Refills: 1 | Status: SHIPPED | OUTPATIENT
Start: 2023-06-05 | End: 2023-08-22

## 2023-06-05 RX ORDER — RIVAROXABAN 20 MG/1
TABLET, FILM COATED ORAL
Qty: 90 TABLET | Refills: 1 | Status: SHIPPED | OUTPATIENT
Start: 2023-06-05

## 2023-06-05 NOTE — TELEPHONE ENCOUNTER
Refill Request     CONFIRM preferred pharmacy with the patient. If Mail Order Rx - Pend for 90 day refill. Last Seen: Last Seen Department: 5/12/2023  Last Seen by PCP: 5/12/2023    Last Written:     If no future appointment scheduled:  Review the last OV with PCP and review information for follow-up visit,  Route STAFF MESSAGE with patient name to the [facility] for scheduling with the following information:            -  Timing of next visit           -  Visit type ie Physical, OV, etc           -  Diagnoses/Reason ie. COPD, HTN - Do not use MEDICATION, Follow-up or CHECK UP - Give reason for visit      Next Appointment:   Future Appointments   Date Time Provider [address]   6/14/2023  1:15 PM Deanna Infante MD [facility] [place] - [place]   8/10/2023 11:15 AM Radha Summers MD AND [facility]       Message sent to [address] [place] [address] to schedule appt with patient?   NO      Requested Prescriptions     Pending Prescriptions Disp Refills    primidone (MYSOLINE) 250 MG tablet [Pharmacy Med Name: PRIMIDONE 250 MG TABLET] 120 tablet      Sig: TAKE ONE TABLET BY MOUTH FOUR TIMES A DAY AS DIRECTED

## 2023-06-05 NOTE — TELEPHONE ENCOUNTER
Refill Request     CONFIRM preferred pharmacy with the patient. If Mail Order Rx - Pend for 90 day refill. Last Seen: Last Seen Department: 5/12/2023  Last Seen by PCP: 5/3/2023    Last Written:   Ashok Wolff 90 tablet 1 refills   Vit D-08/10/2022 12 capsule 1 refills     If no future appointment scheduled:  Review the last OV with PCP and review information for follow-up visit,  Route STAFF MESSAGE with patient name to the [facility] for scheduling with the following information:            -  Timing of next visit           -  Visit type ie Physical, OV, etc           -  Diagnoses/Reason ie. COPD, HTN - Do not use MEDICATION, Follow-up or CHECK UP - Give reason for visit      Next Appointment:   Future Appointments   Date Time Provider Rula Emmanuel   6/14/2023  1:15 PM MD TALYA Moreau  [place] - [place]   8/10/2023 11:15 AM Nigel Kaur MD [facility]       Message sent to [address] [place] [address] to schedule appt with patient?   NO      Requested Prescriptions     Pending Prescriptions Disp Refills    vitamin D (ERGOCALCIFEROL) 1.25 MG (12442 UT) CAPS capsule 12 capsule 1     Sig: Take 1 capsule by mouth once a week for 12 doses    XARELTO 20 MG TABS tablet 90 tablet 1     Sig: TAKE ONE TABLET BY MOUTH DAILY

## 2023-06-05 NOTE — TELEPHONE ENCOUNTER
Refill Request     CONFIRM preferred pharmacy with the patient. If Mail Order Rx - Pend for 90 day refill. Last Seen: Last Seen Department: 5/12/2023  Last Seen by PCP: 5/3/2023    Last Written: 4/4/2022     If no future appointment scheduled:  Review the last OV with PCP and review information for follow-up visit,  Route STAFF MESSAGE with patient name to the [facility] for scheduling with the following information:            -  Timing of next visit           -  Visit type ie Physical, OV, etc           -  Diagnoses/Reason ie. COPD, HTN - Do not use MEDICATION, Follow-up or CHECK UP - Give reason for visit      Next Appointment:   Future Appointments   Date Time Provider Rula Emmanuel   6/14/2023  1:15 PM MD TALYA Reeder - [place]   7/3/2023 10:00 AM Paco Muñiz, PT [facility] AND PT Jovana Alexander   7/13/2023  9:30 AM DO TALYA Espinoza - [place]   8/10/2023 11:15 AM Tu Inman MD AND [facility][1]       Message sent to [address] [place] [address] to schedule appt with patient? NO      Requested Prescriptions     Pending Prescriptions Disp Refills    carBAMazepine (TEGRETOL XR) 400 MG extended release tablet 60 tablet      Sig: Take 1 tablet by mouth in the morning and 1 tablet at noon and 1 tablet in the evening.

## 2023-06-05 NOTE — TELEPHONE ENCOUNTER
Refill Request - Controlled Substance    CONFIRM preferred pharmacy with the patient. If Mail Order Rx - Pend for 90 day refill. Last Seen Department: 5/12/2023  Last Seen by PCP: 5/3/2023    Last Written:   Klonopin-05/08/2023 75 tablet 0 refills   Protonix-05/15/2023 60 tablet 0 refills       Last UDS: 07/30/2021    Med Agreement Signed On: 02/26/2019    If no future appointment scheduled:  Review the last OV with PCP and review information for follow-up visit,  Route STAFF MESSAGE with patient name to the [facility] for scheduling with the following information:            -  Timing of next visit           -  Visit type ie Physical, OV, etc           -  Diagnoses/Reason ie. COPD, HTN - Do not use MEDICATION, Follow-up or CHECK UP - Give reason for visit        Next Appointment:   Future Appointments   Date Time Provider [address] [place] [address]   6/14/2023  1:15 PM Sharri Dean MD [facility] [place] - DYD   8/10/2023 11:15 AM Derek Lam MD AND [employer]       Message sent to [address] [place] [address] to schedule appt with patient?   NO      Requested Prescriptions     Pending Prescriptions Disp Refills    clonazePAM (KLONOPIN) 1 MG tablet 75 tablet 0     Sig: TAKE ONE TABLET BY MOUTH EVERY MORNING TAKE 1 AND 1/2 TABLET BY MOUTH NIGHTLY    pantoprazole (PROTONIX) 40 MG tablet 60 tablet 0     Sig: Take 1 tablet by mouth 2 times daily (before meals)

## 2023-06-06 RX ORDER — CARBAMAZEPINE 400 MG/1
400 TABLET, EXTENDED RELEASE ORAL EVERY 8 HOURS
Qty: 60 TABLET | Refills: 0 | Status: SHIPPED | OUTPATIENT
Start: 2023-06-06

## 2023-06-08 ENCOUNTER — TELEPHONE (OUTPATIENT)
Dept: FAMILY MEDICINE CLINIC | Age: 53
End: 2023-06-08

## 2023-06-09 NOTE — TELEPHONE ENCOUNTER
Patient called on call provider requesting antibiotic for URI symptoms. Patient denied any shortness of breath or other concerning symptoms. Patient requesting \"z-pack\". Patient advised that she would need an appointment to determine appropriateness of antibiotics. Advised patient to call back office in AM to see about scheduling an appointment in person or with a virtualist for further evaluation. Patient verbalized understanding and stated \" I will just wait it out until I see Dr. Blaze Soto on [facility]". Again offered virtual appointment option and patient stated her laptop isn't working.   Again suggested calling during office hours and patient verbalized understanding

## 2023-06-14 ENCOUNTER — TELEPHONE (OUTPATIENT)
Dept: ORTHOPEDIC SURGERY | Age: 53
End: 2023-06-14

## 2023-06-14 PROBLEM — G25.0 ESSENTIAL TREMOR: Status: ACTIVE | Noted: 2023-06-14

## 2023-06-26 ENCOUNTER — PATIENT MESSAGE (OUTPATIENT)
Dept: FAMILY MEDICINE CLINIC | Age: 53
End: 2023-06-26

## 2023-06-28 RX ORDER — PRIMIDONE 250 MG/1
250 TABLET ORAL 3 TIMES DAILY
Qty: 120 TABLET | Refills: 1 | Status: SHIPPED | OUTPATIENT
Start: 2023-06-28

## 2023-07-03 ENCOUNTER — HOSPITAL ENCOUNTER (OUTPATIENT)
Dept: PHYSICAL THERAPY | Age: 53
Setting detail: THERAPIES SERIES
Discharge: HOME | End: 2023-07-03
Payer: COMMERCIAL

## 2023-07-03 PROCEDURE — 97110 THERAPEUTIC EXERCISES: CPT | Performed by: PHYSICAL THERAPIST

## 2023-07-03 PROCEDURE — 97162 PT EVAL MOD COMPLEX 30 MIN: CPT | Performed by: PHYSICAL THERAPIST

## 2023-07-03 RX ORDER — ATORVASTATIN CALCIUM 40 MG/1
TABLET, FILM COATED ORAL
Qty: 90 TABLET | Refills: 1 | Status: SHIPPED | OUTPATIENT
Start: 2023-07-03

## 2023-07-03 NOTE — FLOWSHEET NOTE
[address] [facility] and [address], [address] [place] [address]  Phone: [phone]  Fax [phone]    Physical Therapy Daily Treatment Note  Date:  7/3/2023    Patient Name:  Willem Kirby    :  1970  MRN: [id number]  Restrictions/Precautions:    Referring Physician:  Dr. Vincent Chau                                            Evaluation Date: 7/3/2023                                           Medical Diagnosis Information:   M16.11 (ICD-10-CM) - Primary osteoarthritis of right hip / Prehab THR  Treatment DX:  Right Hip pain M25.551 / Difficulty walking R26.2                                            Insurance information:  [employer] of care signed (Y/N):     Date of Patient follow up with Physician:     G-Code (if applicable):      Date G-Code Applied:   7/3/23   LEFS 88%    Progress Note: [x]  Yes  []  No  Next due by: Visit #10       Latex Allergy:  [x]NO      []YES  Preferred Language for Healthcare:   [x]English       []other:       Visit # Insurance Allowable Auth Required   In-person 1 auth []  Yes []  No    Telehealth   []  Yes []  No    Total        Pain level:  6-1010     SUBJECTIVE:  See eval    OBJECTIVE: See eval  Observation:   Test measurements:      RESTRICTIONS/PRECAUTIONS: Protein S deficiency - takes Xalreto. Hx of Stroke/ TIA, Thyroid cancer Hx, IBS/ Gluten allergy, Stomach ulcers, Anxiety/ depression, Anemic, Anxiety / depression.          Exercises/Interventions:     Therapeutic Ex Sets/reps Notes        Quad set 5 x 5\"  HEP   Glute set 5 x 5\"  HEP   Hip abd / add isos Ball/ GTB 10 x 5\"  HEP   Bridges 5 x  HEP   LAQ 10 x  HEP                                      Patient education  X 8 minutes Surgery education, PT POC and post operative POC, post op restrictions/ care, ADLs, rehab vs home, etc.              Manual Intervention                                   NMR re-education

## 2023-07-06 DIAGNOSIS — M47.816 SPONDYLOSIS OF LUMBAR SPINE: ICD-10-CM

## 2023-07-06 DIAGNOSIS — G89.29 CHRONIC PAIN OF RIGHT INGUINAL REGION: ICD-10-CM

## 2023-07-06 DIAGNOSIS — G89.29 CHRONIC RIGHT HIP PAIN: ICD-10-CM

## 2023-07-06 DIAGNOSIS — M43.10 RETROLISTHESIS OF VERTEBRAE: ICD-10-CM

## 2023-07-06 DIAGNOSIS — M25.551 CHRONIC RIGHT HIP PAIN: ICD-10-CM

## 2023-07-06 DIAGNOSIS — R10.31 CHRONIC PAIN OF RIGHT INGUINAL REGION: ICD-10-CM

## 2023-07-06 DIAGNOSIS — M51.36 DDD (DEGENERATIVE DISC DISEASE), LUMBAR: ICD-10-CM

## 2023-07-06 RX ORDER — TRAMADOL HYDROCHLORIDE 50 MG/1
50 TABLET ORAL DAILY PRN
Qty: 30 TABLET | Refills: 0 | Status: SHIPPED | OUTPATIENT
Start: 2023-07-06 | End: 2023-08-05

## 2023-07-06 NOTE — TELEPHONE ENCOUNTER
Refill Request - Controlled Substance    CONFIRM preferred pharmacy with the patient. If Mail Order Rx - Pend for 90 day refill. Last Seen Department: 6/14/2023  Last Seen by PCP: 6/14/2023    Last Written: 6/14/23 0 refill    Last UDS: 7/30/21    Med Agreement Signed On: 2/26/2019  PHARMACY CALLED AND STATES THE SCRIPT NEEDS A START DATE OF 7/11/23    If no future appointment scheduled:  Review the last OV with PCP and review information for follow-up visit,  Route STAFF MESSAGE with patient name to the [facility] for scheduling with the following information:            -  Timing of next visit           -  Visit type ie Physical, OV, etc           -  Diagnoses/Reason ie. COPD, HTN - Do not use MEDICATION, Follow-up or CHECK UP - Give reason for visit        Next Appointment:   Future Appointments   Date Time Provider [address]   7/13/2023  9:30 AM DO TALYA Bragg  [place] - [place]YD   8/10/2023 11:15 AM Jennifer Abarca MD AND [facility]TH[facility]       Message sent to [address] [place] [address] to schedule appt with patient? NO      Requested Prescriptions     Pending Prescriptions Disp Refills    traMADol (ULTRAM) 50 MG tablet 30 tablet 0     Sig: Take 1 tablet by mouth daily as needed for Pain for up to 30 days.  Max Daily Amount: 50 mg

## 2023-07-09 ENCOUNTER — HOSPITAL ENCOUNTER (OUTPATIENT)
Age: 53
Discharge: HOME | End: 2023-07-09
Payer: COMMERCIAL

## 2023-07-09 DIAGNOSIS — Z01.818 PRE-OP TESTING: ICD-10-CM

## 2023-07-09 DIAGNOSIS — M16.11 PRIMARY OSTEOARTHRITIS OF RIGHT HIP: ICD-10-CM

## 2023-07-09 DIAGNOSIS — Z80.41 FAMILY HISTORY OF OVARIAN CANCER: ICD-10-CM

## 2023-07-09 DIAGNOSIS — G62.9 NEUROPATHY: ICD-10-CM

## 2023-07-09 LAB
BILIRUB UR QL STRIP.AUTO: NEGATIVE
CLARITY UR: CLEAR
COLOR UR: YELLOW
GLUCOSE UR STRIP.AUTO-MCNC: NEGATIVE MG/DL
HGB UR QL STRIP.AUTO: NEGATIVE
KETONES UR STRIP.AUTO-MCNC: NEGATIVE MG/DL
LEUKOCYTE ESTERASE UR QL STRIP.AUTO: NEGATIVE
NITRITE UR QL STRIP.AUTO: NEGATIVE
PH UR STRIP.AUTO: 7.5 [PH] (ref 5–8)
PROT UR STRIP.AUTO-MCNC: NEGATIVE MG/DL
SP GR UR STRIP.AUTO: 1.02 (ref 1–1.03)
UA DIPSTICK W REFLEX MICRO PNL UR: NORMAL
URN SPEC COLLECT METH UR: NORMAL
UROBILINOGEN UR STRIP-ACNC: 0.2 E.U./DL

## 2023-07-09 PROCEDURE — 82607 VITAMIN B-12: CPT

## 2023-07-09 PROCEDURE — 82746 ASSAY OF FOLIC ACID SERUM: CPT

## 2023-07-09 PROCEDURE — 83036 HEMOGLOBIN GLYCOSYLATED A1C: CPT

## 2023-07-09 PROCEDURE — 84439 ASSAY OF FREE THYROXINE: CPT

## 2023-07-09 PROCEDURE — 81003 URINALYSIS AUTO W/O SCOPE: CPT

## 2023-07-09 PROCEDURE — 82040 ASSAY OF SERUM ALBUMIN: CPT

## 2023-07-09 PROCEDURE — 86304 IMMUNOASSAY TUMOR CA 125: CPT

## 2023-07-09 PROCEDURE — 82306 VITAMIN D 25 HYDROXY: CPT

## 2023-07-09 PROCEDURE — 87081 CULTURE SCREEN ONLY: CPT

## 2023-07-09 PROCEDURE — 84443 ASSAY THYROID STIM HORMONE: CPT

## 2023-07-09 PROCEDURE — 85025 COMPLETE CBC W/AUTO DIFF WBC: CPT

## 2023-07-09 PROCEDURE — 87086 URINE CULTURE/COLONY COUNT: CPT

## 2023-07-09 PROCEDURE — 80048 BASIC METABOLIC PNL TOTAL CA: CPT

## 2023-07-09 PROCEDURE — 36415 COLL VENOUS BLD VENIPUNCTURE: CPT

## 2023-07-09 PROCEDURE — 84466 ASSAY OF TRANSFERRIN: CPT

## 2023-07-10 ENCOUNTER — TELEPHONE (OUTPATIENT)
Dept: FAMILY MEDICINE CLINIC | Age: 53
End: 2023-07-10

## 2023-07-10 DIAGNOSIS — E03.9 HYPOTHYROIDISM, UNSPECIFIED TYPE: Primary | ICD-10-CM

## 2023-07-10 LAB
25(OH)D3 SERPL-MCNC: 43.5 NG/ML
ALBUMIN SERPL-MCNC: 4.1 G/DL (ref 3.4–5)
ANION GAP SERPL CALCULATED.3IONS-SCNC: 11 MMOL/L (ref 3–16)
BACTERIA UR CULT: NORMAL
BASOPHILS # BLD: 0.1 K/UL (ref 0–0.2)
BASOPHILS NFR BLD: 0.9 %
BUN SERPL-MCNC: 12 MG/DL (ref 7–20)
CALCIUM SERPL-MCNC: 9.1 MG/DL (ref 8.3–10.6)
CANCER AG125 SERPL-ACNC: 7.6 U/ML (ref 0–35)
CHLORIDE SERPL-SCNC: 102 MMOL/L (ref 99–110)
CO2 SERPL-SCNC: 22 MMOL/L (ref 21–32)
CREAT SERPL-MCNC: <0.5 MG/DL (ref 0.6–1.1)
DEPRECATED RDW RBC AUTO: 13.3 % (ref 12.4–15.4)
EOSINOPHIL # BLD: 0.1 K/UL (ref 0–0.6)
EOSINOPHIL NFR BLD: 1.9 %
EST. AVERAGE GLUCOSE BLD GHB EST-MCNC: 96.8 MG/DL
FOLATE SERPL-MCNC: >20 NG/ML (ref 4.78–24.2)
GFR SERPLBLD CREATININE-BSD FMLA CKD-EPI: >60 ML/MIN/{1.73_M2}
GLUCOSE SERPL-MCNC: 94 MG/DL (ref 70–99)
HBA1C MFR BLD: 5 %
HCT VFR BLD AUTO: 44.2 % (ref 36–48)
HGB BLD-MCNC: 15.2 G/DL (ref 12–16)
LYMPHOCYTES # BLD: 1.4 K/UL (ref 1–5.1)
LYMPHOCYTES NFR BLD: 20.4 %
MCH RBC QN AUTO: 30.6 PG (ref 26–34)
MCHC RBC AUTO-ENTMCNC: 34.5 G/DL (ref 31–36)
MCV RBC AUTO: 88.8 FL (ref 80–100)
MONOCYTES # BLD: 0.6 K/UL (ref 0–1.3)
MONOCYTES NFR BLD: 8.5 %
NEUTROPHILS # BLD: 4.7 K/UL (ref 1.7–7.7)
NEUTROPHILS NFR BLD: 68.3 %
PLATELET # BLD AUTO: 138 K/UL (ref 135–450)
PLATELET BLD QL SMEAR: ADEQUATE
PMV BLD AUTO: 10.2 FL (ref 5–10.5)
POTASSIUM SERPL-SCNC: 4.5 MMOL/L (ref 3.5–5.1)
RBC # BLD AUTO: 4.98 M/UL (ref 4–5.2)
SLIDE REVIEW: NORMAL
SODIUM SERPL-SCNC: 135 MMOL/L (ref 136–145)
T4 FREE SERPL-MCNC: 1.9 NG/DL (ref 0.9–1.8)
TRANSFERRIN SERPL-MCNC: 291 MG/DL (ref 200–360)
TSH SERPL DL<=0.005 MIU/L-ACNC: 0.05 UIU/ML (ref 0.27–4.2)
VIT B12 SERPL-MCNC: 452 PG/ML (ref 211–911)
WBC # BLD AUTO: 6.9 K/UL (ref 4–11)

## 2023-07-10 RX ORDER — LEVOTHYROXINE SODIUM 175 UG/1
175 TABLET ORAL DAILY
Qty: 30 TABLET | Refills: 1 | Status: SHIPPED | OUTPATIENT
Start: 2023-07-10

## 2023-07-10 NOTE — TELEPHONE ENCOUNTER
Labs reviewed   All are normal/negative/stable with the following exceptions:     Her thyroid testing was abnormal. Her TSH was low and her T4 was high, meaning her thyroid is overactive. This can lead to things like increased anxiety, higher blood pressure and heart rates, weight loss, etc.  I would recommend decreasing her Synthroid from 200 mcg daily to 175 mcg daily. I will send this to her pharmacy. Labs will need to be rechecked in 5 weeks to make sure the new dosage is appropriate. Her nasal MRSA screen is still pending.      Her Ca 125 that she requested was lower than her previous level!!    Please let her know

## 2023-07-11 DIAGNOSIS — R10.31 CHRONIC PAIN OF RIGHT INGUINAL REGION: ICD-10-CM

## 2023-07-11 DIAGNOSIS — M51.36 DDD (DEGENERATIVE DISC DISEASE), LUMBAR: ICD-10-CM

## 2023-07-11 DIAGNOSIS — G62.9 NEUROPATHY: ICD-10-CM

## 2023-07-11 DIAGNOSIS — G89.29 CHRONIC PAIN OF RIGHT INGUINAL REGION: ICD-10-CM

## 2023-07-11 DIAGNOSIS — F33.2 SEVERE EPISODE OF RECURRENT MAJOR DEPRESSIVE DISORDER, WITHOUT PSYCHOTIC FEATURES (HCC): ICD-10-CM

## 2023-07-11 DIAGNOSIS — M47.816 SPONDYLOSIS OF LUMBAR SPINE: ICD-10-CM

## 2023-07-11 DIAGNOSIS — M43.10 RETROLISTHESIS OF VERTEBRAE: ICD-10-CM

## 2023-07-11 DIAGNOSIS — M25.551 CHRONIC RIGHT HIP PAIN: ICD-10-CM

## 2023-07-11 DIAGNOSIS — F43.21 COMPLICATED GRIEF: ICD-10-CM

## 2023-07-11 DIAGNOSIS — G89.29 CHRONIC RIGHT HIP PAIN: ICD-10-CM

## 2023-07-11 NOTE — TELEPHONE ENCOUNTER
Refill Request     CONFIRM preferred pharmacy with the patient. If Mail Order Rx - Pend for 90 day refill. Last Seen: Last Seen Department: 6/14/2023  Last Seen by PCP: 6/14/2023    Last Written: 5/03/2023, #30, 1 refill    If no future appointment scheduled:  Review the last OV with PCP and review information for follow-up visit,  Route STAFF MESSAGE with patient name to the [facility] for scheduling with the following information:            -  Timing of next visit           -  Visit type ie Physical, OV, etc           -  Diagnoses/Reason ie. COPD, HTN - Do not use MEDICATION, Follow-up or CHECK UP - Give reason for visit      Next Appointment:   Future Appointments   Date Time Provider [facility] [address]   7/13/2023  9:30 AM DO TALYA Zapien  [place] - DYALBERTO   8/10/2023 11:15 AM Grady Chacko MD AND ORTHO [employer]       Message sent to [address] [place] [address] to schedule appt with patient?   N/A      Requested Prescriptions     Pending Prescriptions Disp Refills    DULoxetine (CYMBALTA) 30 MG extended release capsule [Pharmacy Med Name: DULoxetine HCL DR 30 MG CAPSULE] 30 capsule 1     Sig: TAKE ONE CAPSULE BY MOUTH DAILY

## 2023-07-12 LAB — MRSA SPEC QL CULT: NORMAL

## 2023-07-12 RX ORDER — DULOXETIN HYDROCHLORIDE 30 MG/1
CAPSULE, DELAYED RELEASE ORAL
Qty: 30 CAPSULE | Refills: 1 | OUTPATIENT
Start: 2023-07-12

## 2023-07-12 RX ORDER — DULOXETIN HYDROCHLORIDE 30 MG/1
30 CAPSULE, DELAYED RELEASE ORAL DAILY
Qty: 90 CAPSULE | Refills: 1 | Status: SHIPPED | OUTPATIENT
Start: 2023-07-12

## 2023-07-12 NOTE — TELEPHONE ENCOUNTER
She is establishing with you tomorrow. Would you like to refill this so future refills return to you? I don't mind either way.  Thanks

## 2023-07-13 ENCOUNTER — TELEPHONE (OUTPATIENT)
Dept: ORTHOPEDIC SURGERY | Age: 53
End: 2023-07-13

## 2023-07-13 ENCOUNTER — OFFICE VISIT (OUTPATIENT)
Dept: FAMILY MEDICINE CLINIC | Age: 53
End: 2023-07-13
Payer: COMMERCIAL

## 2023-07-13 VITALS
WEIGHT: 171 LBS | OXYGEN SATURATION: 100 % | HEART RATE: 76 BPM | DIASTOLIC BLOOD PRESSURE: 74 MMHG | SYSTOLIC BLOOD PRESSURE: 134 MMHG | HEIGHT: 65 IN | BODY MASS INDEX: 28.49 KG/M2

## 2023-07-13 DIAGNOSIS — M16.11 PRIMARY OSTEOARTHRITIS OF RIGHT HIP: ICD-10-CM

## 2023-07-13 DIAGNOSIS — Z01.818 PRE-OP TESTING: Primary | ICD-10-CM

## 2023-07-13 PROCEDURE — 3078F DIAST BP <80 MM HG: CPT | Performed by: STUDENT IN AN ORGANIZED HEALTH CARE EDUCATION/TRAINING PROGRAM

## 2023-07-13 PROCEDURE — 99214 OFFICE O/P EST MOD 30 MIN: CPT | Performed by: STUDENT IN AN ORGANIZED HEALTH CARE EDUCATION/TRAINING PROGRAM

## 2023-07-13 PROCEDURE — 3075F SYST BP GE 130 - 139MM HG: CPT | Performed by: STUDENT IN AN ORGANIZED HEALTH CARE EDUCATION/TRAINING PROGRAM

## 2023-07-13 NOTE — TELEPHONE ENCOUNTER
NA    Date of Vascular Appt:     Hematology/Oncology Consult Ordered:  NA    Date of Hematology/Oncology Appt:     Final Recommendation For DVT Prophylaxis:   -Smoking history or use of estrogen-         BMI Greater than 40 at time of scheduling?: No       Additional Medical Concerns:         Discharge Disposition Information:     Attended Pre-Hab Program: yes     Anticipated Discharge Disposition:  Home with no PT    Who will be with patient at home following discharge?   Pt lives alone- unsure if sister will help pt after the surgery       Equipment pt already has:  [facility] on first or second floor: first   Bathroom on first or second floor: first   Weight bearing status: full   Pre-op ambulatory status: none   Number of entry steps: 6 steps into condo   Caregiver assistance: won't have full time care    Pt plan to DC same day: no   [facility] preference: JULIANNA Kwok RN  7/13/2023

## 2023-07-13 NOTE — PROGRESS NOTES
Preoperative Evaluation    Subjective:   Karley Frias is a 46 y.o. y.o.  female who presents to the office today for a preoperative consultation. Chief Complaint   Patient presents with    Pre-op Exam     RIGHT TOTAL HIP ARTHROPLASTY MINIMALLY INVASIVE DIRECT ANTERIOR  [place] Done, 7/24, Dr. Pat valladares        Planned anesthesia is General.   The patient has the following known anesthesia issues: nausea/vomiting   Patient has a bleeding risk of : no recent abnormal bleeding   Patient does not have objection to receiving blood products if needed. Denies any steroid use in the past 6 mo    Review of Systems   Pertinent items are noted in HPI.      Denies fevers, chills, diaphoresis, CP, SOB, dysphagia, abd pain, urinary complaints, new edema, rashes, cyanosis    Past Medical History:   Diagnosis Date    Benign essential tremor     BRCA1 negative     Colon polyp 05/06/2019    Degenerative disc disease     DVT, lower extremity ([address]) 1989    Fibromyalgia     Kidney stones     Left-sided trigeminal neuralgia     Malignant neoplasm of thyroid gland ([address]) 8/22/2013    Migraines, neuralgic     since stroke 1999 Chronic    PONV (postoperative nausea and vomiting)     Poor venous access     Protein S deficiency ([address])     Pulmonary embolism ([address]) 1989    Seizure disorder ([address])     grand mal in 2005 ( childhood febrile seizure also-from a baby to age 14) and also X 1 ~2005 with headache treatment    Stroke ([address]) 2/25/2019    Thrombosis of superior sagittal sinus 1999    Unspecified cerebral artery occlusion with cerebral infarction 1999    Vertebral artery occlusion 1999    White coat syndrome with high blood pressure but without hypertension        Past Surgical History:   Procedure Laterality Date    ARTHROGRAPHY Right 4/17/2023    RIGHT HIP ARTHROGRAM WITH CORTISONE INJECTION performed by Alonzo Mcdowell MD at [address] [place] [address]      x 3    CYSTOSCOPY  01/09/2012

## 2023-07-15 DIAGNOSIS — K21.00 GASTROESOPHAGEAL REFLUX DISEASE WITH ESOPHAGITIS, UNSPECIFIED WHETHER HEMORRHAGE: Primary | ICD-10-CM

## 2023-07-15 NOTE — TELEPHONE ENCOUNTER
Refill Request     CONFIRM preferred pharmacy with the patient. If Mail Order Rx - Pend for 90 day refill. Last Seen: Last Seen Department: 7/13/2023  Last Seen by PCP: 6/14/2023    Last Written: 6/5/2023 Pantoprazole  5/15/2023 Sucralfate     If no future appointment scheduled:  Review the last OV with PCP and review information for follow-up visit,  Route STAFF MESSAGE with patient name to the [facility] for scheduling with the following information:            -  Timing of next visit           -  Visit type ie Physical, OV, etc           -  Diagnoses/Reason ie. COPD, HTN - Do not use MEDICATION, Follow-up or CHECK UP - Give reason for visit      Next Appointment:   Future Appointments   Date Time Provider [facility] [address]   8/10/2023 11:15 AM Miracle Williamson MD AND KEHINDE SALVADOR   8/31/2023  9:30 AM DO TALYA Osborne - DYD       Message sent to [address] [place] [address] to schedule appt with patient?   NO      Requested Prescriptions     Pending Prescriptions Disp Refills    pantoprazole (PROTONIX) 40 MG tablet [Pharmacy Med Name: PANTOPRAZOLE SOD DR 40 MG TAB] 60 tablet 0     Sig: TAKE 1 TABLET BY MOUTH TWICE DAILY BEFORE MEALS    sucralfate (CARAFATE) 1 GM tablet [Pharmacy Med Name: SUCRALFATE 1 GM TABLET] 120 tablet 0     Sig: TAKE ONE TABLET BY MOUTH FOUR TIMES A DAY (BEFORE MEALS AND NIGHTLY)

## 2023-07-16 RX ORDER — PANTOPRAZOLE SODIUM 40 MG/1
TABLET, DELAYED RELEASE ORAL
Qty: 60 TABLET | Refills: 0 | Status: SHIPPED | OUTPATIENT
Start: 2023-07-16

## 2023-07-16 RX ORDER — SUCRALFATE 1 G/1
TABLET ORAL
Qty: 120 TABLET | Refills: 0 | Status: SHIPPED | OUTPATIENT
Start: 2023-07-16

## 2023-07-17 ENCOUNTER — TELEPHONE (OUTPATIENT)
Dept: FAMILY MEDICINE CLINIC | Age: 53
End: 2023-07-17

## 2023-07-17 RX ORDER — FERROUS SULFATE 325(65) MG
325 TABLET ORAL 2 TIMES DAILY WITH MEALS
Qty: 180 TABLET | Refills: 1 | Status: SHIPPED | OUTPATIENT
Start: 2023-07-17 | End: 2024-01-13

## 2023-07-17 NOTE — TELEPHONE ENCOUNTER
.Refill Request     CONFIRM preferred pharmacy with the patient. If Mail Order Rx - Pend for 90 day refill. Last Seen: Last Seen Department: 7/13/2023  Last Seen by PCP: 6/14/2023    Last Written: 6-14-23 180 with 1     If no future appointment scheduled:  Review the last OV with PCP and review information for follow-up visit,  Route STAFF MESSAGE with patient name to the [facility] for scheduling with the following information:            -  Timing of next visit           -  Visit type ie Physical, OV, etc           -  Diagnoses/Reason ie. COPD, HTN - Do not use MEDICATION, Follow-up or CHECK UP - Give reason for visit      Next Appointment:   Future Appointments   Date Time Provider [facility] [address]   8/10/2023 11:15 AM Kristina Llanes MD AND ORTHO [employer]   8/31/2023  9:30 AM Brenda Pinto, DO TALYA Kate - DYD       Message sent to [address] [place] [address] to schedule appt with patient?   N/A      Requested Prescriptions     Pending Prescriptions Disp Refills    ferrous sulfate (IRON 325) 325 (65 Fe) MG tablet 180 tablet 1     Sig: Take 1 tablet by mouth 2 times daily (with meals)

## 2023-07-17 NOTE — TELEPHONE ENCOUNTER
[address]  (jesse Osborn) called the office stating that there is a drug interaction between Carbamazepine and Xarelto. The Carbamazepine lessens the effectiveness of Xarelto. Please call Melchor Osborn [phone] back with approval to fill script.

## 2023-07-18 ENCOUNTER — TELEPHONE (OUTPATIENT)
Dept: ADMINISTRATIVE | Age: 53
End: 2023-07-18

## 2023-07-18 RX ORDER — CETIRIZINE HYDROCHLORIDE 10 MG/1
10 TABLET ORAL DAILY
COMMUNITY

## 2023-07-18 NOTE — TELEPHONE ENCOUNTER
Submitted PA for Pantoprazole Sodium 40MG dr tablets  [facility]: [id number] STATUS: PENDING. Follow up done daily; if no response in three days we will refax for status check. If another three days goes by with no response we will call the insurance for status.

## 2023-07-21 ENCOUNTER — TELEPHONE (OUTPATIENT)
Dept: ORTHOPEDIC SURGERY | Age: 53
End: 2023-07-21

## 2023-07-21 ENCOUNTER — TELEPHONE (OUTPATIENT)
Dept: FAMILY MEDICINE CLINIC | Age: 53
End: 2023-07-21

## 2023-07-21 ENCOUNTER — ANESTHESIA EVENT (OUTPATIENT)
Dept: OPERATING ROOM | Age: 53
DRG: 470 | End: 2023-07-21
Payer: COMMERCIAL

## 2023-07-21 NOTE — TELEPHONE ENCOUNTER
Surgery 07/24/2023  A 11:30 am   ARRIVAL 9:30 am      Please call back to confirm  [phone]      jm

## 2023-07-21 NOTE — TELEPHONE ENCOUNTER
Pt will call back after Monday after her knee surgery and see how she is doing and call us back from there. Pt stated she doesn't have any family to help after her surgery so she may end up in a skilled nursing facility will call back about new patient appt.  Wants to keep it for now

## 2023-07-21 NOTE — TELEPHONE ENCOUNTER
----- Message from Brain Short sent at 7/21/2023 10:07 AM EDT -----  Subject: Appointment Request    Reason for Call: New Patient/New to Provider Appointment needed: New   Patient Request Appointment    QUESTIONS    Reason for appointment request? No appointments available during search     Additional Information for Provider? pt is having surgery ad doesn't   believe she will not have any transportation at the time of the appt,   would like it to be a little further out please follow up   ---------------------------------------------------------------------------  --------------  Valente Marine Colin  [phone]; OK to leave message on voicemail  ---------------------------------------------------------------------------  --------------  SCRIPT ANSWERS

## 2023-07-24 ENCOUNTER — APPOINTMENT (OUTPATIENT)
Dept: GENERAL RADIOLOGY | Age: 53
DRG: 470 | End: 2023-07-24
Attending: ORTHOPAEDIC SURGERY
Payer: COMMERCIAL

## 2023-07-24 ENCOUNTER — HOSPITAL ENCOUNTER (INPATIENT)
Age: 53
LOS: 1 days | Discharge: HOME | DRG: 470 | End: 2023-07-26
Attending: ORTHOPAEDIC SURGERY | Admitting: ORTHOPAEDIC SURGERY
Payer: COMMERCIAL

## 2023-07-24 ENCOUNTER — ANESTHESIA (OUTPATIENT)
Dept: OPERATING ROOM | Age: 53
DRG: 470 | End: 2023-07-24
Payer: COMMERCIAL

## 2023-07-24 DIAGNOSIS — D68.59 PROTEIN S DEFICIENCY (HCC): ICD-10-CM

## 2023-07-24 DIAGNOSIS — D68.59 HYPERCOAGULABLE STATE (HCC): ICD-10-CM

## 2023-07-24 DIAGNOSIS — M16.11 PRIMARY OSTEOARTHRITIS OF RIGHT HIP: ICD-10-CM

## 2023-07-24 DIAGNOSIS — Z96.641 S/P TOTAL RIGHT HIP ARTHROPLASTY: ICD-10-CM

## 2023-07-24 DIAGNOSIS — Z86.73 HISTORY OF STROKE: ICD-10-CM

## 2023-07-24 DIAGNOSIS — M16.11 PRIMARY LOCALIZED OSTEOARTHRITIS OF RIGHT HIP: Primary | ICD-10-CM

## 2023-07-24 LAB
ABO + RH BLD: NORMAL
BLD GP AB SCN SERPL QL: NORMAL
GLUCOSE BLD-MCNC: 125 MG/DL (ref 70–99)
GLUCOSE BLD-MCNC: 85 MG/DL (ref 70–99)
PERFORMED ON: ABNORMAL
PERFORMED ON: NORMAL

## 2023-07-24 PROCEDURE — 2500000003 HC RX 250 WO HCPCS: Performed by: PHYSICIAN ASSISTANT

## 2023-07-24 PROCEDURE — 2700000000 HC OXYGEN THERAPY PER DAY

## 2023-07-24 PROCEDURE — 2709999900 HC NON-CHARGEABLE SUPPLY: Performed by: ORTHOPAEDIC SURGERY

## 2023-07-24 PROCEDURE — 86900 BLOOD TYPING SEROLOGIC ABO: CPT

## 2023-07-24 PROCEDURE — 2580000003 HC RX 258: Performed by: ORTHOPAEDIC SURGERY

## 2023-07-24 PROCEDURE — G0378 HOSPITAL OBSERVATION PER HR: HCPCS

## 2023-07-24 PROCEDURE — 6370000000 HC RX 637 (ALT 250 FOR IP): Performed by: PHYSICIAN ASSISTANT

## 2023-07-24 PROCEDURE — 2580000003 HC RX 258: Performed by: NURSE ANESTHETIST, CERTIFIED REGISTERED

## 2023-07-24 PROCEDURE — 6360000002 HC RX W HCPCS: Performed by: ANESTHESIOLOGY

## 2023-07-24 PROCEDURE — C1776 JOINT DEVICE (IMPLANTABLE): HCPCS | Performed by: ORTHOPAEDIC SURGERY

## 2023-07-24 PROCEDURE — C9399 UNCLASSIFIED DRUGS OR BIOLOG: HCPCS | Performed by: NURSE ANESTHETIST, CERTIFIED REGISTERED

## 2023-07-24 PROCEDURE — 7100000000 HC PACU RECOVERY - FIRST 15 MIN: Performed by: ORTHOPAEDIC SURGERY

## 2023-07-24 PROCEDURE — 3700000000 HC ANESTHESIA ATTENDED CARE: Performed by: ORTHOPAEDIC SURGERY

## 2023-07-24 PROCEDURE — C9290 INJ, BUPIVACAINE LIPOSOME: HCPCS | Performed by: ORTHOPAEDIC SURGERY

## 2023-07-24 PROCEDURE — 86901 BLOOD TYPING SEROLOGIC RH(D): CPT

## 2023-07-24 PROCEDURE — 2500000003 HC RX 250 WO HCPCS: Performed by: NURSE ANESTHETIST, CERTIFIED REGISTERED

## 2023-07-24 PROCEDURE — 2580000003 HC RX 258: Performed by: PHYSICIAN ASSISTANT

## 2023-07-24 PROCEDURE — 86850 RBC ANTIBODY SCREEN: CPT

## 2023-07-24 PROCEDURE — 7100000001 HC PACU RECOVERY - ADDTL 15 MIN: Performed by: ORTHOPAEDIC SURGERY

## 2023-07-24 PROCEDURE — 2720000010 HC SURG SUPPLY STERILE: Performed by: ORTHOPAEDIC SURGERY

## 2023-07-24 PROCEDURE — 6370000000 HC RX 637 (ALT 250 FOR IP): Performed by: NURSE PRACTITIONER

## 2023-07-24 PROCEDURE — 6360000002 HC RX W HCPCS: Performed by: ORTHOPAEDIC SURGERY

## 2023-07-24 PROCEDURE — 73501 X-RAY EXAM HIP UNI 1 VIEW: CPT

## 2023-07-24 PROCEDURE — 3700000001 HC ADD 15 MINUTES (ANESTHESIA): Performed by: ORTHOPAEDIC SURGERY

## 2023-07-24 PROCEDURE — 3600000015 HC SURGERY LEVEL 5 ADDTL 15MIN: Performed by: ORTHOPAEDIC SURGERY

## 2023-07-24 PROCEDURE — 6370000000 HC RX 637 (ALT 250 FOR IP): Performed by: ANESTHESIOLOGY

## 2023-07-24 PROCEDURE — 6360000002 HC RX W HCPCS: Performed by: PHYSICIAN ASSISTANT

## 2023-07-24 PROCEDURE — 3600000005 HC SURGERY LEVEL 5 BASE: Performed by: ORTHOPAEDIC SURGERY

## 2023-07-24 PROCEDURE — 94761 N-INVAS EAR/PLS OXIMETRY MLT: CPT

## 2023-07-24 PROCEDURE — 0SR902Z REPLACEMENT OF RIGHT HIP JOINT WITH METAL ON POLYETHYLENE SYNTHETIC SUBSTITUTE, OPEN APPROACH: ICD-10-PCS | Performed by: ORTHOPAEDIC SURGERY

## 2023-07-24 PROCEDURE — 6360000002 HC RX W HCPCS: Performed by: NURSE ANESTHETIST, CERTIFIED REGISTERED

## 2023-07-24 PROCEDURE — 73502 X-RAY EXAM HIP UNI 2-3 VIEWS: CPT

## 2023-07-24 PROCEDURE — 2500000003 HC RX 250 WO HCPCS

## 2023-07-24 PROCEDURE — 27130 TOTAL HIP ARTHROPLASTY: CPT | Performed by: ORTHOPAEDIC SURGERY

## 2023-07-24 DEVICE — SHELL G7 OSSEOTI 3 HOLE 48MM C: Type: IMPLANTABLE DEVICE | Site: HIP | Status: FUNCTIONAL

## 2023-07-24 DEVICE — IMPLANTABLE DEVICE
Type: IMPLANTABLE DEVICE | Site: HIP | Status: FUNCTIONAL
Brand: AVENIR COMPLETE™

## 2023-07-24 DEVICE — BEARING TIB 28X38 MM HIP VIVACIT-E: Type: IMPLANTABLE DEVICE | Site: HIP | Status: FUNCTIONAL

## 2023-07-24 DEVICE — BIOLOX® DELTA HEAD, 12/14, 28 X -3.5
Type: IMPLANTABLE DEVICE | Site: HIP | Status: FUNCTIONAL
Brand: BIOLOX® DELTA

## 2023-07-24 DEVICE — G7 DUAL MOBILITY LINER 38MM C: Type: IMPLANTABLE DEVICE | Site: HIP | Status: FUNCTIONAL

## 2023-07-24 RX ORDER — ONDANSETRON 2 MG/ML
4 INJECTION INTRAMUSCULAR; INTRAVENOUS EVERY 30 MIN PRN
Status: DISCONTINUED | OUTPATIENT
Start: 2023-07-24 | End: 2023-07-24 | Stop reason: HOSPADM

## 2023-07-24 RX ORDER — ROCURONIUM BROMIDE 10 MG/ML
INJECTION, SOLUTION INTRAVENOUS PRN
Status: DISCONTINUED | OUTPATIENT
Start: 2023-07-24 | End: 2023-07-24 | Stop reason: SDUPTHER

## 2023-07-24 RX ORDER — INSULIN LISPRO 100 [IU]/ML
0.08 INJECTION, SOLUTION INTRAVENOUS; SUBCUTANEOUS
Status: DISCONTINUED | OUTPATIENT
Start: 2023-07-25 | End: 2023-07-26 | Stop reason: HOSPADM

## 2023-07-24 RX ORDER — GLYCOPYRROLATE 1 MG/5 ML
SYRINGE (ML) INTRAVENOUS PRN
Status: DISCONTINUED | OUTPATIENT
Start: 2023-07-24 | End: 2023-07-24 | Stop reason: SDUPTHER

## 2023-07-24 RX ORDER — OXYCODONE HYDROCHLORIDE AND ACETAMINOPHEN 5; 325 MG/1; MG/1
1 TABLET ORAL EVERY 6 HOURS PRN
Qty: 28 TABLET | Refills: 0 | Status: SHIPPED | OUTPATIENT
Start: 2023-07-24 | End: 2023-07-26 | Stop reason: HOSPADM

## 2023-07-24 RX ORDER — SODIUM CHLORIDE 9 MG/ML
INJECTION, SOLUTION INTRAVENOUS PRN
Status: DISCONTINUED | OUTPATIENT
Start: 2023-07-24 | End: 2023-07-24 | Stop reason: HOSPADM

## 2023-07-24 RX ORDER — FOLIC ACID 1 MG/1
1 TABLET ORAL DAILY
Status: DISCONTINUED | OUTPATIENT
Start: 2023-07-25 | End: 2023-07-25

## 2023-07-24 RX ORDER — RIVAROXABAN 20 MG/1
20 TABLET, FILM COATED ORAL DAILY
Qty: 30 TABLET | Refills: 0 | Status: SHIPPED | OUTPATIENT
Start: 2023-07-24

## 2023-07-24 RX ORDER — DEXAMETHASONE SODIUM PHOSPHATE 4 MG/ML
INJECTION, SOLUTION INTRA-ARTICULAR; INTRALESIONAL; INTRAMUSCULAR; INTRAVENOUS; SOFT TISSUE PRN
Status: DISCONTINUED | OUTPATIENT
Start: 2023-07-24 | End: 2023-07-24 | Stop reason: SDUPTHER

## 2023-07-24 RX ORDER — CARBAMAZEPINE 200 MG/1
400 TABLET, EXTENDED RELEASE ORAL EVERY 8 HOURS
Status: DISCONTINUED | OUTPATIENT
Start: 2023-07-24 | End: 2023-07-26 | Stop reason: HOSPADM

## 2023-07-24 RX ORDER — KETAMINE HCL IN NACL, ISO-OSM 100MG/10ML
SYRINGE (ML) INJECTION PRN
Status: DISCONTINUED | OUTPATIENT
Start: 2023-07-24 | End: 2023-07-24 | Stop reason: SDUPTHER

## 2023-07-24 RX ORDER — SODIUM CHLORIDE 0.9 % (FLUSH) 0.9 %
5-40 SYRINGE (ML) INJECTION EVERY 12 HOURS SCHEDULED
Status: DISCONTINUED | OUTPATIENT
Start: 2023-07-24 | End: 2023-07-24 | Stop reason: HOSPADM

## 2023-07-24 RX ORDER — ASPIRIN 81 MG/1
81 TABLET ORAL 2 TIMES DAILY
Status: DISCONTINUED | OUTPATIENT
Start: 2023-07-25 | End: 2023-07-25

## 2023-07-24 RX ORDER — OXYCODONE HYDROCHLORIDE 5 MG/1
5 TABLET ORAL EVERY 4 HOURS PRN
Status: DISCONTINUED | OUTPATIENT
Start: 2023-07-24 | End: 2023-07-26 | Stop reason: HOSPADM

## 2023-07-24 RX ORDER — ACETAMINOPHEN 500 MG
1000 TABLET ORAL ONCE
Status: COMPLETED | OUTPATIENT
Start: 2023-07-24 | End: 2023-07-24

## 2023-07-24 RX ORDER — MIDAZOLAM HYDROCHLORIDE 1 MG/ML
INJECTION INTRAMUSCULAR; INTRAVENOUS PRN
Status: DISCONTINUED | OUTPATIENT
Start: 2023-07-24 | End: 2023-07-24 | Stop reason: SDUPTHER

## 2023-07-24 RX ORDER — HYDROMORPHONE HYDROCHLORIDE 1 MG/ML
0.5 INJECTION, SOLUTION INTRAMUSCULAR; INTRAVENOUS; SUBCUTANEOUS
Status: DISCONTINUED | OUTPATIENT
Start: 2023-07-24 | End: 2023-07-25

## 2023-07-24 RX ORDER — ONDANSETRON 2 MG/ML
INJECTION INTRAMUSCULAR; INTRAVENOUS PRN
Status: DISCONTINUED | OUTPATIENT
Start: 2023-07-24 | End: 2023-07-24 | Stop reason: SDUPTHER

## 2023-07-24 RX ORDER — SODIUM CHLORIDE 0.9 % (FLUSH) 0.9 %
5-40 SYRINGE (ML) INJECTION EVERY 12 HOURS SCHEDULED
Status: DISCONTINUED | OUTPATIENT
Start: 2023-07-24 | End: 2023-07-26 | Stop reason: HOSPADM

## 2023-07-24 RX ORDER — PHENYLEPHRINE HCL IN 0.9% NACL 1 MG/10 ML
SYRINGE (ML) INTRAVENOUS PRN
Status: DISCONTINUED | OUTPATIENT
Start: 2023-07-24 | End: 2023-07-24 | Stop reason: SDUPTHER

## 2023-07-24 RX ORDER — CEFAZOLIN SODIUM IN 0.9 % NACL 2 G/100 ML
2000 PLASTIC BAG, INJECTION (ML) INTRAVENOUS
Status: COMPLETED | OUTPATIENT
Start: 2023-07-24 | End: 2023-07-24

## 2023-07-24 RX ORDER — MAGNESIUM SULFATE IN WATER 40 MG/ML
2000 INJECTION, SOLUTION INTRAVENOUS ONCE
Status: COMPLETED | OUTPATIENT
Start: 2023-07-24 | End: 2023-07-24

## 2023-07-24 RX ORDER — PRIMIDONE 250 MG/1
250 TABLET ORAL 2 TIMES DAILY
Status: DISCONTINUED | OUTPATIENT
Start: 2023-07-24 | End: 2023-07-26 | Stop reason: HOSPADM

## 2023-07-24 RX ORDER — SODIUM CHLORIDE, SODIUM LACTATE, POTASSIUM CHLORIDE, CALCIUM CHLORIDE 600; 310; 30; 20 MG/100ML; MG/100ML; MG/100ML; MG/100ML
INJECTION, SOLUTION INTRAVENOUS CONTINUOUS
Status: DISCONTINUED | OUTPATIENT
Start: 2023-07-24 | End: 2023-07-26 | Stop reason: HOSPADM

## 2023-07-24 RX ORDER — INSULIN LISPRO 100 [IU]/ML
0-4 INJECTION, SOLUTION INTRAVENOUS; SUBCUTANEOUS NIGHTLY
Status: DISCONTINUED | OUTPATIENT
Start: 2023-07-24 | End: 2023-07-26 | Stop reason: HOSPADM

## 2023-07-24 RX ORDER — FERROUS SULFATE 325(65) MG
325 TABLET ORAL 2 TIMES DAILY WITH MEALS
Status: DISCONTINUED | OUTPATIENT
Start: 2023-07-25 | End: 2023-07-26 | Stop reason: HOSPADM

## 2023-07-24 RX ORDER — ATORVASTATIN CALCIUM 40 MG/1
40 TABLET, FILM COATED ORAL NIGHTLY
Status: DISCONTINUED | OUTPATIENT
Start: 2023-07-24 | End: 2023-07-26 | Stop reason: HOSPADM

## 2023-07-24 RX ORDER — ONDANSETRON 2 MG/ML
4 INJECTION INTRAMUSCULAR; INTRAVENOUS EVERY 6 HOURS PRN
Status: DISCONTINUED | OUTPATIENT
Start: 2023-07-24 | End: 2023-07-26 | Stop reason: HOSPADM

## 2023-07-24 RX ORDER — CEPHALEXIN 500 MG/1
500 CAPSULE ORAL 4 TIMES DAILY
Qty: 4 CAPSULE | Refills: 0 | Status: SHIPPED | OUTPATIENT
Start: 2023-07-24 | End: 2023-07-26 | Stop reason: HOSPADM

## 2023-07-24 RX ORDER — SODIUM CHLORIDE, SODIUM LACTATE, POTASSIUM CHLORIDE, CALCIUM CHLORIDE 600; 310; 30; 20 MG/100ML; MG/100ML; MG/100ML; MG/100ML
INJECTION, SOLUTION INTRAVENOUS CONTINUOUS PRN
Status: DISCONTINUED | OUTPATIENT
Start: 2023-07-24 | End: 2023-07-24 | Stop reason: SDUPTHER

## 2023-07-24 RX ORDER — SODIUM CHLORIDE 0.9 % (FLUSH) 0.9 %
5-40 SYRINGE (ML) INJECTION PRN
Status: DISCONTINUED | OUTPATIENT
Start: 2023-07-24 | End: 2023-07-24 | Stop reason: HOSPADM

## 2023-07-24 RX ORDER — SCOLOPAMINE TRANSDERMAL SYSTEM 1 MG/1
1 PATCH, EXTENDED RELEASE TRANSDERMAL
Status: DISCONTINUED | OUTPATIENT
Start: 2023-07-24 | End: 2023-07-26 | Stop reason: HOSPADM

## 2023-07-24 RX ORDER — DULOXETIN HYDROCHLORIDE 30 MG/1
30 CAPSULE, DELAYED RELEASE ORAL DAILY
Status: DISCONTINUED | OUTPATIENT
Start: 2023-07-25 | End: 2023-07-26 | Stop reason: HOSPADM

## 2023-07-24 RX ORDER — ONDANSETRON 4 MG/1
4 TABLET, ORALLY DISINTEGRATING ORAL EVERY 8 HOURS PRN
Status: DISCONTINUED | OUTPATIENT
Start: 2023-07-24 | End: 2023-07-25

## 2023-07-24 RX ORDER — PROPOFOL 10 MG/ML
INJECTION, EMULSION INTRAVENOUS PRN
Status: DISCONTINUED | OUTPATIENT
Start: 2023-07-24 | End: 2023-07-24 | Stop reason: SDUPTHER

## 2023-07-24 RX ORDER — ACETAMINOPHEN 325 MG/1
650 TABLET ORAL EVERY 6 HOURS
Status: DISCONTINUED | OUTPATIENT
Start: 2023-07-24 | End: 2023-07-26 | Stop reason: HOSPADM

## 2023-07-24 RX ORDER — VANCOMYCIN HYDROCHLORIDE 1 G/20ML
INJECTION, POWDER, LYOPHILIZED, FOR SOLUTION INTRAVENOUS PRN
Status: DISCONTINUED | OUTPATIENT
Start: 2023-07-24 | End: 2023-07-24 | Stop reason: ALTCHOICE

## 2023-07-24 RX ORDER — OXYCODONE HYDROCHLORIDE 5 MG/1
5 TABLET ORAL PRN
Status: DISCONTINUED | OUTPATIENT
Start: 2023-07-24 | End: 2023-07-24 | Stop reason: HOSPADM

## 2023-07-24 RX ORDER — POLYETHYLENE GLYCOL 3350 17 G/17G
17 POWDER, FOR SOLUTION ORAL DAILY
Status: DISCONTINUED | OUTPATIENT
Start: 2023-07-24 | End: 2023-07-26 | Stop reason: HOSPADM

## 2023-07-24 RX ORDER — SENNOSIDES A AND B 8.6 MG/1
1 TABLET, FILM COATED ORAL 2 TIMES DAILY PRN
Status: DISCONTINUED | OUTPATIENT
Start: 2023-07-24 | End: 2023-07-26 | Stop reason: HOSPADM

## 2023-07-24 RX ORDER — FENTANYL CITRATE 50 UG/ML
50 INJECTION, SOLUTION INTRAMUSCULAR; INTRAVENOUS EVERY 5 MIN PRN
Status: COMPLETED | OUTPATIENT
Start: 2023-07-24 | End: 2023-07-24

## 2023-07-24 RX ORDER — CLONAZEPAM 0.5 MG/1
0.5 TABLET ORAL ONCE
Status: COMPLETED | OUTPATIENT
Start: 2023-07-24 | End: 2023-07-24

## 2023-07-24 RX ORDER — LABETALOL HYDROCHLORIDE 5 MG/ML
10 INJECTION, SOLUTION INTRAVENOUS
Status: COMPLETED | OUTPATIENT
Start: 2023-07-24 | End: 2023-07-24

## 2023-07-24 RX ORDER — PROPRANOLOL HYDROCHLORIDE 80 MG/1
80 CAPSULE, EXTENDED RELEASE ORAL DAILY
Status: DISCONTINUED | OUTPATIENT
Start: 2023-07-25 | End: 2023-07-26 | Stop reason: HOSPADM

## 2023-07-24 RX ORDER — LIDOCAINE HYDROCHLORIDE 10 MG/ML
1 INJECTION, SOLUTION EPIDURAL; INFILTRATION; INTRACAUDAL; PERINEURAL
Status: DISCONTINUED | OUTPATIENT
Start: 2023-07-24 | End: 2023-07-24 | Stop reason: HOSPADM

## 2023-07-24 RX ORDER — MELOXICAM 7.5 MG/1
7.5 TABLET ORAL DAILY
Status: DISCONTINUED | OUTPATIENT
Start: 2023-07-24 | End: 2023-07-25

## 2023-07-24 RX ORDER — LIDOCAINE HYDROCHLORIDE 20 MG/ML
INJECTION, SOLUTION INFILTRATION; PERINEURAL PRN
Status: DISCONTINUED | OUTPATIENT
Start: 2023-07-24 | End: 2023-07-24 | Stop reason: SDUPTHER

## 2023-07-24 RX ORDER — HYDROMORPHONE HYDROCHLORIDE 1 MG/ML
0.25 INJECTION, SOLUTION INTRAMUSCULAR; INTRAVENOUS; SUBCUTANEOUS
Status: DISCONTINUED | OUTPATIENT
Start: 2023-07-24 | End: 2023-07-25

## 2023-07-24 RX ORDER — SODIUM CHLORIDE, SODIUM LACTATE, POTASSIUM CHLORIDE, CALCIUM CHLORIDE 600; 310; 30; 20 MG/100ML; MG/100ML; MG/100ML; MG/100ML
INJECTION, SOLUTION INTRAVENOUS CONTINUOUS
Status: DISCONTINUED | OUTPATIENT
Start: 2023-07-24 | End: 2023-07-24 | Stop reason: HOSPADM

## 2023-07-24 RX ORDER — SODIUM CHLORIDE 9 MG/ML
INJECTION, SOLUTION INTRAVENOUS PRN
Status: DISCONTINUED | OUTPATIENT
Start: 2023-07-24 | End: 2023-07-26 | Stop reason: HOSPADM

## 2023-07-24 RX ORDER — CELECOXIB 100 MG/1
400 CAPSULE ORAL ONCE
Status: COMPLETED | OUTPATIENT
Start: 2023-07-24 | End: 2023-07-24

## 2023-07-24 RX ORDER — INSULIN LISPRO 100 [IU]/ML
0-8 INJECTION, SOLUTION INTRAVENOUS; SUBCUTANEOUS
Status: DISCONTINUED | OUTPATIENT
Start: 2023-07-25 | End: 2023-07-26 | Stop reason: HOSPADM

## 2023-07-24 RX ORDER — SODIUM CHLORIDE 0.9 % (FLUSH) 0.9 %
5-40 SYRINGE (ML) INJECTION PRN
Status: DISCONTINUED | OUTPATIENT
Start: 2023-07-24 | End: 2023-07-26 | Stop reason: HOSPADM

## 2023-07-24 RX ORDER — ONDANSETRON 4 MG/1
4 TABLET, FILM COATED ORAL 3 TIMES DAILY PRN
Qty: 15 TABLET | Refills: 0 | Status: SHIPPED | OUTPATIENT
Start: 2023-07-24

## 2023-07-24 RX ORDER — CEFAZOLIN SODIUM IN 0.9 % NACL 2 G/100 ML
2000 PLASTIC BAG, INJECTION (ML) INTRAVENOUS EVERY 8 HOURS
Status: COMPLETED | OUTPATIENT
Start: 2023-07-24 | End: 2023-07-25

## 2023-07-24 RX ORDER — CETIRIZINE HYDROCHLORIDE 10 MG/1
10 TABLET ORAL DAILY
Status: DISCONTINUED | OUTPATIENT
Start: 2023-07-25 | End: 2023-07-26 | Stop reason: HOSPADM

## 2023-07-24 RX ORDER — OXYCODONE HYDROCHLORIDE 5 MG/1
10 TABLET ORAL PRN
Status: DISCONTINUED | OUTPATIENT
Start: 2023-07-24 | End: 2023-07-24 | Stop reason: HOSPADM

## 2023-07-24 RX ORDER — PANTOPRAZOLE SODIUM 40 MG/1
40 TABLET, DELAYED RELEASE ORAL
Status: DISCONTINUED | OUTPATIENT
Start: 2023-07-25 | End: 2023-07-26 | Stop reason: HOSPADM

## 2023-07-24 RX ORDER — MIDAZOLAM HYDROCHLORIDE 1 MG/ML
1 INJECTION INTRAMUSCULAR; INTRAVENOUS EVERY 5 MIN PRN
Status: DISCONTINUED | OUTPATIENT
Start: 2023-07-24 | End: 2023-07-25

## 2023-07-24 RX ORDER — OXYCODONE HYDROCHLORIDE 5 MG/1
10 TABLET ORAL EVERY 4 HOURS PRN
Status: DISCONTINUED | OUTPATIENT
Start: 2023-07-24 | End: 2023-07-26 | Stop reason: HOSPADM

## 2023-07-24 RX ORDER — ERGOCALCIFEROL 1.25 MG/1
50000 CAPSULE ORAL WEEKLY
Status: DISCONTINUED | OUTPATIENT
Start: 2023-07-24 | End: 2023-07-26 | Stop reason: HOSPADM

## 2023-07-24 RX ORDER — DEXTROSE MONOHYDRATE 100 MG/ML
INJECTION, SOLUTION INTRAVENOUS CONTINUOUS PRN
Status: DISCONTINUED | OUTPATIENT
Start: 2023-07-24 | End: 2023-07-26 | Stop reason: HOSPADM

## 2023-07-24 RX ORDER — MIDAZOLAM HYDROCHLORIDE 1 MG/ML
2 INJECTION INTRAMUSCULAR; INTRAVENOUS ONCE
Status: COMPLETED | OUTPATIENT
Start: 2023-07-24 | End: 2023-07-24

## 2023-07-24 RX ORDER — FENTANYL CITRATE 50 UG/ML
INJECTION, SOLUTION INTRAMUSCULAR; INTRAVENOUS PRN
Status: DISCONTINUED | OUTPATIENT
Start: 2023-07-24 | End: 2023-07-24 | Stop reason: SDUPTHER

## 2023-07-24 RX ORDER — ONDANSETRON 4 MG/1
4 TABLET, ORALLY DISINTEGRATING ORAL EVERY 8 HOURS PRN
Status: DISCONTINUED | OUTPATIENT
Start: 2023-07-24 | End: 2023-07-26 | Stop reason: HOSPADM

## 2023-07-24 RX ADMIN — MAGNESIUM SULFATE IN WATER 2000 MG: 40 INJECTION, SOLUTION INTRAVENOUS at 11:43

## 2023-07-24 RX ADMIN — SUGAMMADEX 200 MG: 100 INJECTION, SOLUTION INTRAVENOUS at 13:07

## 2023-07-24 RX ADMIN — MIDAZOLAM 1 MG: 1 INJECTION INTRAMUSCULAR; INTRAVENOUS at 14:42

## 2023-07-24 RX ADMIN — PROPOFOL 200 MG: 10 INJECTION, EMULSION INTRAVENOUS at 11:34

## 2023-07-24 RX ADMIN — POLYETHYLENE GLYCOL 3350 17 G: 17 POWDER, FOR SOLUTION ORAL at 18:49

## 2023-07-24 RX ADMIN — PROPOFOL 40 MG: 10 INJECTION, EMULSION INTRAVENOUS at 13:26

## 2023-07-24 RX ADMIN — Medication 150 MCG: at 12:40

## 2023-07-24 RX ADMIN — ONDANSETRON 4 MG: 2 INJECTION INTRAMUSCULAR; INTRAVENOUS at 12:00

## 2023-07-24 RX ADMIN — FENTANYL CITRATE 50 MCG: 50 INJECTION, SOLUTION INTRAMUSCULAR; INTRAVENOUS at 14:27

## 2023-07-24 RX ADMIN — ATORVASTATIN CALCIUM 40 MG: 40 TABLET, FILM COATED ORAL at 23:16

## 2023-07-24 RX ADMIN — ACETAMINOPHEN 1000 MG: 500 TABLET ORAL at 10:39

## 2023-07-24 RX ADMIN — Medication 100 MCG: at 12:00

## 2023-07-24 RX ADMIN — CLONAZEPAM 0.5 MG: 0.5 TABLET ORAL at 23:15

## 2023-07-24 RX ADMIN — Medication 150 MCG: at 12:07

## 2023-07-24 RX ADMIN — CELECOXIB 400 MG: 100 CAPSULE ORAL at 10:40

## 2023-07-24 RX ADMIN — MIDAZOLAM 1 MG: 1 INJECTION INTRAMUSCULAR; INTRAVENOUS at 11:10

## 2023-07-24 RX ADMIN — Medication 150 MCG: at 13:01

## 2023-07-24 RX ADMIN — TRANEXAMIC ACID 1000 MG: 100 INJECTION, SOLUTION INTRAVENOUS at 11:53

## 2023-07-24 RX ADMIN — OXYCODONE HYDROCHLORIDE 10 MG: 5 TABLET ORAL at 15:17

## 2023-07-24 RX ADMIN — FENTANYL CITRATE 50 MCG: 50 INJECTION, SOLUTION INTRAMUSCULAR; INTRAVENOUS at 13:10

## 2023-07-24 RX ADMIN — DEXAMETHASONE SODIUM PHOSPHATE 8 MG: 4 INJECTION, SOLUTION INTRAMUSCULAR; INTRAVENOUS at 12:00

## 2023-07-24 RX ADMIN — FENTANYL CITRATE 50 MCG: 50 INJECTION, SOLUTION INTRAMUSCULAR; INTRAVENOUS at 12:28

## 2023-07-24 RX ADMIN — OXYCODONE HYDROCHLORIDE 10 MG: 5 TABLET ORAL at 18:51

## 2023-07-24 RX ADMIN — ONDANSETRON 4 MG: 2 INJECTION INTRAMUSCULAR; INTRAVENOUS at 13:41

## 2023-07-24 RX ADMIN — ROCURONIUM BROMIDE 20 MG: 50 INJECTION, SOLUTION INTRAVENOUS at 12:21

## 2023-07-24 RX ADMIN — SODIUM CHLORIDE, POTASSIUM CHLORIDE, SODIUM LACTATE AND CALCIUM CHLORIDE: 600; 310; 30; 20 INJECTION, SOLUTION INTRAVENOUS at 18:47

## 2023-07-24 RX ADMIN — CARBAMAZEPINE 400 MG: 200 TABLET, EXTENDED RELEASE ORAL at 23:15

## 2023-07-24 RX ADMIN — SODIUM CHLORIDE, SODIUM LACTATE, POTASSIUM CHLORIDE, AND CALCIUM CHLORIDE: .6; .31; .03; .02 INJECTION, SOLUTION INTRAVENOUS at 12:13

## 2023-07-24 RX ADMIN — MIDAZOLAM 1 MG: 1 INJECTION INTRAMUSCULAR; INTRAVENOUS at 11:23

## 2023-07-24 RX ADMIN — Medication 20 MG: at 12:13

## 2023-07-24 RX ADMIN — LABETALOL HYDROCHLORIDE 10 MG: 5 INJECTION INTRAVENOUS at 17:22

## 2023-07-24 RX ADMIN — FENTANYL CITRATE 100 MCG: 50 INJECTION, SOLUTION INTRAMUSCULAR; INTRAVENOUS at 11:34

## 2023-07-24 RX ADMIN — LIDOCAINE HYDROCHLORIDE 60 MG: 20 INJECTION, SOLUTION INFILTRATION; PERINEURAL at 11:34

## 2023-07-24 RX ADMIN — FENTANYL CITRATE 50 MCG: 50 INJECTION, SOLUTION INTRAMUSCULAR; INTRAVENOUS at 13:41

## 2023-07-24 RX ADMIN — FENTANYL CITRATE 50 MCG: 50 INJECTION, SOLUTION INTRAMUSCULAR; INTRAVENOUS at 13:56

## 2023-07-24 RX ADMIN — Medication 2000 MG: at 11:32

## 2023-07-24 RX ADMIN — ROCURONIUM BROMIDE 50 MG: 50 INJECTION, SOLUTION INTRAVENOUS at 11:34

## 2023-07-24 RX ADMIN — Medication 0.2 MG: at 11:51

## 2023-07-24 RX ADMIN — PRIMIDONE 250 MG: 250 TABLET ORAL at 23:21

## 2023-07-24 RX ADMIN — Medication 0.4 MG: at 11:48

## 2023-07-24 RX ADMIN — Medication 100 MCG: at 13:21

## 2023-07-24 RX ADMIN — MELOXICAM 7.5 MG: 7.5 TABLET ORAL at 18:50

## 2023-07-24 RX ADMIN — LABETALOL HYDROCHLORIDE 10 MG: 5 INJECTION INTRAVENOUS at 14:15

## 2023-07-24 RX ADMIN — Medication 150 MCG: at 11:48

## 2023-07-24 RX ADMIN — SODIUM CHLORIDE, SODIUM LACTATE, POTASSIUM CHLORIDE, AND CALCIUM CHLORIDE: .6; .31; .03; .02 INJECTION, SOLUTION INTRAVENOUS at 11:32

## 2023-07-24 RX ADMIN — MIDAZOLAM 2 MG: 1 INJECTION INTRAMUSCULAR; INTRAVENOUS at 11:31

## 2023-07-24 RX ADMIN — Medication 2000 MG: at 21:30

## 2023-07-24 RX ADMIN — HYDROMORPHONE HYDROCHLORIDE 0.5 MG: 1 INJECTION, SOLUTION INTRAMUSCULAR; INTRAVENOUS; SUBCUTANEOUS at 22:36

## 2023-07-24 RX ADMIN — FENTANYL CITRATE 50 MCG: 50 INJECTION, SOLUTION INTRAMUSCULAR; INTRAVENOUS at 13:37

## 2023-07-24 RX ADMIN — OXYCODONE HYDROCHLORIDE 10 MG: 5 TABLET ORAL at 23:55

## 2023-07-24 RX ADMIN — ACETAMINOPHEN 650 MG: 325 TABLET ORAL at 18:50

## 2023-07-24 RX ADMIN — Medication 100 MCG: at 13:12

## 2023-07-24 RX ADMIN — Medication 150 MCG: at 11:53

## 2023-07-24 ASSESSMENT — PAIN SCALES - GENERAL
PAINLEVEL_OUTOF10: 10
PAINLEVEL_OUTOF10: 9
PAINLEVEL_OUTOF10: 10
PAINLEVEL_OUTOF10: 9
PAINLEVEL_OUTOF10: 10

## 2023-07-24 ASSESSMENT — PAIN DESCRIPTION - DESCRIPTORS
DESCRIPTORS: ACHING
DESCRIPTORS: SHARP
DESCRIPTORS: ACHING;BURNING
DESCRIPTORS: ACHING;BURNING;SHOOTING;STABBING;THROBBING
DESCRIPTORS: SHARP

## 2023-07-24 ASSESSMENT — PAIN DESCRIPTION - ORIENTATION
ORIENTATION: RIGHT

## 2023-07-24 ASSESSMENT — PAIN - FUNCTIONAL ASSESSMENT: PAIN_FUNCTIONAL_ASSESSMENT: 0-10

## 2023-07-24 ASSESSMENT — PAIN DESCRIPTION - LOCATION
LOCATION: LEG
LOCATION: LEG
LOCATION: HIP

## 2023-07-24 NOTE — H&P
Update History & Physical     The patient's History and Physical of 7/13/2023 was reviewed with the patient and I examined the patient. There was no change. The surgical site was confirmed by the patient and me. Plan: The risks, benefits, expected outcome, and alternative to the recommended procedure have been discussed with the patient / family. Patient understands and wants to proceed with the procedure.       Electronically signed by Vilma Grijalva MD on 7/24/2023 at 10:37 AM

## 2023-07-24 NOTE — OP NOTE
preoperative conservative treatment options. The activities of daily living have been affected quite a bit. Patient wanted to regain mobility and be active as possible. Patient understood the risk benefits and alternatives in detail and wanted to proceed with the above operation. Procedure Details:   I marked the surgical site of the right hip for surgery. He was taken back to the operating theater laid supine the table the bony prominences well-padded. General anesthesia was induced. We transferred the patient to the operating table, Sweet Grass bed. X-ray was acquired marking the right hip as the preoperative templating hip. Antibiotics 2 g of Ancef were given. MRSA swab testing was performed preop, and no additional antibiotics were required. Tranexamic acid 1 g was given at start. We began with a direct anterior approach of the hip. Crystal-Mayers interval was taken. We incised the tensor fascia kaylene. We bluntly developed a plane between that and the rectus. Lateral edge of the rectus fascia was incised the muscle belly was retracted medially. The underlying fascia was also incised. Underlying vessels lateral circumflex were tied off on each end with silk suture. Electro Bovie cautery was then used to incise through the capsule. A femoral neck osteotomy was performed based on preoperative template. Using a corkscrew device we removed the existing head ball. We then placed retractors around the acetabulum. I cleaned out the pelvic tissue as well as the existing labrum. Sequential reaming was then performed. We stopped at the appropriately sized reamer and impacted the real acetabular shell. X-ray confirmed that the socket was in acceptable position based off of a good AP pelvis x-ray. Dual mobility liner was used due to stiff, arthritic lower lumbar spine. We then turned our attention to the femoral exposure.   The [place] table femoral elevating hook was then placed just inside the

## 2023-07-24 NOTE — DISCHARGE INSTRUCTIONS
or below the incision site (toes). Any rash appears, increased  or new onset nausea/vomiting occur. This may indicate a reaction to a medication. Phone # [phone]  Follow up with Dr. Ritchie Vega or Cathleen Lal PA-C at scheduled appointment time. Please continue to use your Incentive Spirometer at home every hour while awake.    Discharge Medications    Narcotic Pain Medications    ____ Hydrocodone/acetaminophen 5/325mg 1 tab every 4 hours as needed for pain  __x__ Oxycodone 5mg 1-2 tabs every 6 hours as needed for pain  ____ Tramadol 50mg 1 tab every 4 hours as needed for pain      If Medrol Dosepak and either Meloxicam or Celebrex is prescribed complete the Medrol Dosepak before starting Meloxicam or Celebrex    Anti-coagulation Medication  __x__Restart home dose of Xarelto on 7/26/2023  ____ Eliquis 2.5mg 1 tab twice per day  ____ Lovenox 40mg once per day  ____ Lovenox 30mg twice per day      Nausea/Vomiting Medications    __x__ Ondansetron 4 mg 1 tab up to 4 times daily as needed  ____ Promethazine 25mg 1 tab up to 3 times daily as needed

## 2023-07-24 NOTE — ANESTHESIA POSTPROCEDURE EVALUATION
Department of Anesthesiology  Postprocedure Note    Patient: Alpa Mao  MRN: [id number]  YOB: 1970  Date of evaluation: 7/24/2023      Procedure Summary     Date: 07/24/23 Room / Location: [facility] OR 05 / [address] [place] [address]    Anesthesia Start: 1132 Anesthesia Stop: 1577    Procedure: RIGHT TOTAL HIP ARTHROPLASTY MINIMALLY INVASIVE DIRECT ANTERIOR     Savannah Dong (Right: Hip) Diagnosis:       Primary osteoarthritis of right hip      (RIGHT HIP PRIMARY OSTEOARTHRITIS)    Surgeons: Miracle Williamson MD Responsible Provider: Bharati Barakat MD    Anesthesia Type: general ASA Status: 3          Anesthesia Type: No value filed.     Chester Phase I: Chester Score: 9    Chester Phase II:        Anesthesia Post Evaluation    Patient location during evaluation: PACU  Level of consciousness: awake and alert  Airway patency: patent  Nausea & Vomiting: no vomiting  Complications: no  Cardiovascular status: blood pressure returned to baseline  Respiratory status: acceptable  Hydration status: stable

## 2023-07-25 PROBLEM — Z96.641 S/P TOTAL RIGHT HIP ARTHROPLASTY: Status: ACTIVE | Noted: 2023-07-25

## 2023-07-25 LAB
ANION GAP SERPL CALCULATED.3IONS-SCNC: 11 MMOL/L (ref 3–16)
BASOPHILS # BLD: 0 K/UL (ref 0–0.2)
BASOPHILS NFR BLD: 0.4 %
BUN SERPL-MCNC: 10 MG/DL (ref 7–20)
CALCIUM SERPL-MCNC: 8.6 MG/DL (ref 8.3–10.6)
CHLORIDE SERPL-SCNC: 102 MMOL/L (ref 99–110)
CO2 SERPL-SCNC: 24 MMOL/L (ref 21–32)
CREAT SERPL-MCNC: 0.6 MG/DL (ref 0.6–1.1)
DEPRECATED RDW RBC AUTO: 13.6 % (ref 12.4–15.4)
EOSINOPHIL # BLD: 0.1 K/UL (ref 0–0.6)
EOSINOPHIL NFR BLD: 1.2 %
GFR SERPLBLD CREATININE-BSD FMLA CKD-EPI: >60 ML/MIN/{1.73_M2}
GLUCOSE BLD-MCNC: 100 MG/DL (ref 70–99)
GLUCOSE BLD-MCNC: 145 MG/DL (ref 70–99)
GLUCOSE BLD-MCNC: 181 MG/DL (ref 70–99)
GLUCOSE BLD-MCNC: 94 MG/DL (ref 70–99)
GLUCOSE BLD-MCNC: 97 MG/DL (ref 70–99)
GLUCOSE SERPL-MCNC: 92 MG/DL (ref 70–99)
HCT VFR BLD AUTO: 37.9 % (ref 36–48)
HGB BLD-MCNC: 13 G/DL (ref 12–16)
LYMPHOCYTES # BLD: 1.6 K/UL (ref 1–5.1)
LYMPHOCYTES NFR BLD: 23.7 %
MCH RBC QN AUTO: 30.9 PG (ref 26–34)
MCHC RBC AUTO-ENTMCNC: 34.4 G/DL (ref 31–36)
MCV RBC AUTO: 89.9 FL (ref 80–100)
MONOCYTES # BLD: 0.7 K/UL (ref 0–1.3)
MONOCYTES NFR BLD: 10.6 %
NEUTROPHILS # BLD: 4.4 K/UL (ref 1.7–7.7)
NEUTROPHILS NFR BLD: 64.1 %
PERFORMED ON: ABNORMAL
PERFORMED ON: NORMAL
PERFORMED ON: NORMAL
PLATELET # BLD AUTO: 150 K/UL (ref 135–450)
PMV BLD AUTO: 8.9 FL (ref 5–10.5)
POTASSIUM SERPL-SCNC: 4 MMOL/L (ref 3.5–5.1)
RBC # BLD AUTO: 4.21 M/UL (ref 4–5.2)
SODIUM SERPL-SCNC: 137 MMOL/L (ref 136–145)
WBC # BLD AUTO: 6.8 K/UL (ref 4–11)

## 2023-07-25 PROCEDURE — 85025 COMPLETE CBC W/AUTO DIFF WBC: CPT

## 2023-07-25 PROCEDURE — G0378 HOSPITAL OBSERVATION PER HR: HCPCS

## 2023-07-25 PROCEDURE — 97116 GAIT TRAINING THERAPY: CPT

## 2023-07-25 PROCEDURE — 99024 POSTOP FOLLOW-UP VISIT: CPT | Performed by: SPECIALIST/TECHNOLOGIST

## 2023-07-25 PROCEDURE — 6360000002 HC RX W HCPCS: Performed by: SPECIALIST/TECHNOLOGIST

## 2023-07-25 PROCEDURE — 6360000002 HC RX W HCPCS: Performed by: PHYSICIAN ASSISTANT

## 2023-07-25 PROCEDURE — 96376 TX/PRO/DX INJ SAME DRUG ADON: CPT

## 2023-07-25 PROCEDURE — 97530 THERAPEUTIC ACTIVITIES: CPT

## 2023-07-25 PROCEDURE — 97535 SELF CARE MNGMENT TRAINING: CPT

## 2023-07-25 PROCEDURE — 36415 COLL VENOUS BLD VENIPUNCTURE: CPT

## 2023-07-25 PROCEDURE — 6360000002 HC RX W HCPCS: Performed by: NURSE PRACTITIONER

## 2023-07-25 PROCEDURE — 1200000000 HC SEMI PRIVATE

## 2023-07-25 PROCEDURE — 6370000000 HC RX 637 (ALT 250 FOR IP): Performed by: NURSE PRACTITIONER

## 2023-07-25 PROCEDURE — 97165 OT EVAL LOW COMPLEX 30 MIN: CPT

## 2023-07-25 PROCEDURE — 96374 THER/PROPH/DIAG INJ IV PUSH: CPT

## 2023-07-25 PROCEDURE — APPNB45 APP NON BILLABLE 31-45 MINUTES: Performed by: SPECIALIST/TECHNOLOGIST

## 2023-07-25 PROCEDURE — 6370000000 HC RX 637 (ALT 250 FOR IP): Performed by: SPECIALIST/TECHNOLOGIST

## 2023-07-25 PROCEDURE — 80048 BASIC METABOLIC PNL TOTAL CA: CPT

## 2023-07-25 PROCEDURE — 97162 PT EVAL MOD COMPLEX 30 MIN: CPT

## 2023-07-25 PROCEDURE — 6370000000 HC RX 637 (ALT 250 FOR IP): Performed by: PHYSICIAN ASSISTANT

## 2023-07-25 PROCEDURE — 2580000003 HC RX 258: Performed by: PHYSICIAN ASSISTANT

## 2023-07-25 PROCEDURE — 97110 THERAPEUTIC EXERCISES: CPT

## 2023-07-25 RX ORDER — HYDROMORPHONE HYDROCHLORIDE 1 MG/ML
0.25 INJECTION, SOLUTION INTRAMUSCULAR; INTRAVENOUS; SUBCUTANEOUS
Status: DISCONTINUED | OUTPATIENT
Start: 2023-07-25 | End: 2023-07-26

## 2023-07-25 RX ORDER — CELECOXIB 100 MG/1
200 CAPSULE ORAL 2 TIMES DAILY
Status: DISCONTINUED | OUTPATIENT
Start: 2023-07-25 | End: 2023-07-26

## 2023-07-25 RX ORDER — HYDROMORPHONE HYDROCHLORIDE 1 MG/ML
0.5 INJECTION, SOLUTION INTRAMUSCULAR; INTRAVENOUS; SUBCUTANEOUS ONCE
Status: COMPLETED | OUTPATIENT
Start: 2023-07-25 | End: 2023-07-25

## 2023-07-25 RX ORDER — SUCRALFATE 1 G/1
1 TABLET ORAL
Status: DISCONTINUED | OUTPATIENT
Start: 2023-07-25 | End: 2023-07-26 | Stop reason: HOSPADM

## 2023-07-25 RX ORDER — CLONAZEPAM 1 MG/1
1 TABLET ORAL DAILY
Status: DISCONTINUED | OUTPATIENT
Start: 2023-07-25 | End: 2023-07-26 | Stop reason: HOSPADM

## 2023-07-25 RX ORDER — METHOCARBAMOL 750 MG/1
750 TABLET, FILM COATED ORAL 4 TIMES DAILY
Status: DISCONTINUED | OUTPATIENT
Start: 2023-07-25 | End: 2023-07-26 | Stop reason: HOSPADM

## 2023-07-25 RX ADMIN — CARBAMAZEPINE 400 MG: 200 TABLET, EXTENDED RELEASE ORAL at 08:35

## 2023-07-25 RX ADMIN — PANTOPRAZOLE SODIUM 40 MG: 40 TABLET, DELAYED RELEASE ORAL at 15:50

## 2023-07-25 RX ADMIN — CELECOXIB 200 MG: 100 CAPSULE ORAL at 19:56

## 2023-07-25 RX ADMIN — OXYCODONE HYDROCHLORIDE 10 MG: 5 TABLET ORAL at 11:06

## 2023-07-25 RX ADMIN — CETIRIZINE HYDROCHLORIDE 10 MG: 10 TABLET, FILM COATED ORAL at 08:16

## 2023-07-25 RX ADMIN — FOLIC ACID 1 MG: 1 TABLET ORAL at 08:16

## 2023-07-25 RX ADMIN — MELOXICAM 7.5 MG: 7.5 TABLET ORAL at 08:15

## 2023-07-25 RX ADMIN — ACETAMINOPHEN 650 MG: 325 TABLET ORAL at 12:26

## 2023-07-25 RX ADMIN — CLONAZEPAM 1 MG: 1 TABLET ORAL at 09:05

## 2023-07-25 RX ADMIN — CARBAMAZEPINE 400 MG: 200 TABLET, EXTENDED RELEASE ORAL at 23:21

## 2023-07-25 RX ADMIN — Medication 2000 MG: at 04:57

## 2023-07-25 RX ADMIN — HYDROMORPHONE HYDROCHLORIDE 0.25 MG: 1 INJECTION, SOLUTION INTRAMUSCULAR; INTRAVENOUS; SUBCUTANEOUS at 18:04

## 2023-07-25 RX ADMIN — OXYCODONE HYDROCHLORIDE 10 MG: 5 TABLET ORAL at 15:47

## 2023-07-25 RX ADMIN — METHOCARBAMOL 750 MG: 750 TABLET ORAL at 12:26

## 2023-07-25 RX ADMIN — ASPIRIN 81 MG: 81 TABLET, COATED ORAL at 08:15

## 2023-07-25 RX ADMIN — HYDROMORPHONE HYDROCHLORIDE 0.5 MG: 1 INJECTION, SOLUTION INTRAMUSCULAR; INTRAVENOUS; SUBCUTANEOUS at 14:23

## 2023-07-25 RX ADMIN — HYDROMORPHONE HYDROCHLORIDE 0.25 MG: 1 INJECTION, SOLUTION INTRAMUSCULAR; INTRAVENOUS; SUBCUTANEOUS at 21:32

## 2023-07-25 RX ADMIN — Medication 10 ML: at 19:57

## 2023-07-25 RX ADMIN — Medication 10 ML: at 08:13

## 2023-07-25 RX ADMIN — SUCRALFATE 1 G: 1 TABLET ORAL at 19:55

## 2023-07-25 RX ADMIN — SODIUM CHLORIDE, POTASSIUM CHLORIDE, SODIUM LACTATE AND CALCIUM CHLORIDE: 600; 310; 30; 20 INJECTION, SOLUTION INTRAVENOUS at 03:30

## 2023-07-25 RX ADMIN — SUCRALFATE 1 G: 1 TABLET ORAL at 11:01

## 2023-07-25 RX ADMIN — RIVAROXABAN 20 MG: 20 TABLET, FILM COATED ORAL at 17:58

## 2023-07-25 RX ADMIN — LEVOTHYROXINE SODIUM 175 MCG: 0.03 TABLET ORAL at 05:09

## 2023-07-25 RX ADMIN — SUCRALFATE 1 G: 1 TABLET ORAL at 17:58

## 2023-07-25 RX ADMIN — OXYCODONE HYDROCHLORIDE 10 MG: 5 TABLET ORAL at 05:10

## 2023-07-25 RX ADMIN — ERGOCALCIFEROL 50000 UNITS: 1.25 CAPSULE ORAL at 03:20

## 2023-07-25 RX ADMIN — DULOXETINE HYDROCHLORIDE 30 MG: 30 CAPSULE, DELAYED RELEASE ORAL at 08:16

## 2023-07-25 RX ADMIN — CARBAMAZEPINE 400 MG: 200 TABLET, EXTENDED RELEASE ORAL at 15:50

## 2023-07-25 RX ADMIN — ONDANSETRON 4 MG: 4 TABLET, ORALLY DISINTEGRATING ORAL at 13:36

## 2023-07-25 RX ADMIN — HYDROMORPHONE HYDROCHLORIDE 0.5 MG: 1 INJECTION, SOLUTION INTRAMUSCULAR; INTRAVENOUS; SUBCUTANEOUS at 03:09

## 2023-07-25 RX ADMIN — FERROUS SULFATE TAB 325 MG (65 MG ELEMENTAL FE) 325 MG: 325 (65 FE) TAB at 08:15

## 2023-07-25 RX ADMIN — HYDROMORPHONE HYDROCHLORIDE 0.5 MG: 1 INJECTION, SOLUTION INTRAMUSCULAR; INTRAVENOUS; SUBCUTANEOUS at 08:12

## 2023-07-25 RX ADMIN — HYDROMORPHONE HYDROCHLORIDE 0.25 MG: 1 INJECTION, SOLUTION INTRAMUSCULAR; INTRAVENOUS; SUBCUTANEOUS at 12:28

## 2023-07-25 RX ADMIN — Medication 10 ML: at 18:04

## 2023-07-25 RX ADMIN — FERROUS SULFATE TAB 325 MG (65 MG ELEMENTAL FE) 325 MG: 325 (65 FE) TAB at 17:58

## 2023-07-25 RX ADMIN — METHOCARBAMOL 750 MG: 750 TABLET ORAL at 19:57

## 2023-07-25 RX ADMIN — ACETAMINOPHEN 650 MG: 325 TABLET ORAL at 17:58

## 2023-07-25 RX ADMIN — HYDROMORPHONE HYDROCHLORIDE 0.25 MG: 1 INJECTION, SOLUTION INTRAMUSCULAR; INTRAVENOUS; SUBCUTANEOUS at 23:23

## 2023-07-25 RX ADMIN — PANTOPRAZOLE SODIUM 40 MG: 40 TABLET, DELAYED RELEASE ORAL at 05:09

## 2023-07-25 RX ADMIN — OXYCODONE HYDROCHLORIDE 10 MG: 5 TABLET ORAL at 19:55

## 2023-07-25 RX ADMIN — PRIMIDONE 250 MG: 250 TABLET ORAL at 21:38

## 2023-07-25 RX ADMIN — ACETAMINOPHEN 650 MG: 325 TABLET ORAL at 05:10

## 2023-07-25 RX ADMIN — CLONAZEPAM 1.5 MG: 1 TABLET ORAL at 21:31

## 2023-07-25 RX ADMIN — PRIMIDONE 250 MG: 250 TABLET ORAL at 11:01

## 2023-07-25 RX ADMIN — CELECOXIB 200 MG: 100 CAPSULE ORAL at 12:26

## 2023-07-25 RX ADMIN — ATORVASTATIN CALCIUM 40 MG: 40 TABLET, FILM COATED ORAL at 19:56

## 2023-07-25 RX ADMIN — Medication 10 ML: at 12:29

## 2023-07-25 RX ADMIN — POLYETHYLENE GLYCOL 3350 17 G: 17 POWDER, FOR SOLUTION ORAL at 08:15

## 2023-07-25 RX ADMIN — PROPRANOLOL HYDROCHLORIDE 80 MG: 80 CAPSULE, EXTENDED RELEASE ORAL at 08:35

## 2023-07-25 ASSESSMENT — PAIN DESCRIPTION - ORIENTATION
ORIENTATION: RIGHT

## 2023-07-25 ASSESSMENT — PAIN SCALES - GENERAL
PAINLEVEL_OUTOF10: 10
PAINLEVEL_OUTOF10: 7
PAINLEVEL_OUTOF10: 8
PAINLEVEL_OUTOF10: 8
PAINLEVEL_OUTOF10: 10
PAINLEVEL_OUTOF10: 7
PAINLEVEL_OUTOF10: 9
PAINLEVEL_OUTOF10: 10
PAINLEVEL_OUTOF10: 9
PAINLEVEL_OUTOF10: 7
PAINLEVEL_OUTOF10: 0
PAINLEVEL_OUTOF10: 8
PAINLEVEL_OUTOF10: 10
PAINLEVEL_OUTOF10: 7
PAINLEVEL_OUTOF10: 8
PAINLEVEL_OUTOF10: 9
PAINLEVEL_OUTOF10: 7

## 2023-07-25 ASSESSMENT — PAIN DESCRIPTION - ONSET
ONSET: ON-GOING

## 2023-07-25 ASSESSMENT — PAIN DESCRIPTION - DESCRIPTORS
DESCRIPTORS: BURNING;STABBING;THROBBING
DESCRIPTORS: BURNING;STABBING
DESCRIPTORS: ACHING
DESCRIPTORS: BURNING;STABBING
DESCRIPTORS: BURNING;STABBING
DESCRIPTORS: ACHING;SORE
DESCRIPTORS: BURNING;STABBING;THROBBING
DESCRIPTORS: ACHING;SORE
DESCRIPTORS: BURNING;STABBING
DESCRIPTORS: ACHING
DESCRIPTORS: BURNING;STABBING

## 2023-07-25 ASSESSMENT — PAIN DESCRIPTION - LOCATION
LOCATION: HIP

## 2023-07-25 ASSESSMENT — PAIN DESCRIPTION - FREQUENCY
FREQUENCY: CONTINUOUS

## 2023-07-25 ASSESSMENT — PAIN DESCRIPTION - PAIN TYPE
TYPE: CHRONIC PAIN
TYPE: CHRONIC PAIN
TYPE: SURGICAL PAIN
TYPE: CHRONIC PAIN;SURGICAL PAIN
TYPE: CHRONIC PAIN;SURGICAL PAIN
TYPE: SURGICAL PAIN

## 2023-07-25 ASSESSMENT — PAIN - FUNCTIONAL ASSESSMENT
PAIN_FUNCTIONAL_ASSESSMENT: ACTIVITIES ARE NOT PREVENTED
PAIN_FUNCTIONAL_ASSESSMENT: ACTIVITIES ARE NOT PREVENTED

## 2023-07-25 NOTE — PLAN OF CARE
Problem: Chronic Conditions and Co-morbidities  Goal: Patient's chronic conditions and co-morbidity symptoms are monitored and maintained or improved  Outcome: Progressing     Problem: Pain  Goal: Verbalizes/displays adequate comfort level or baseline comfort level  Outcome: Progressing  Pt scoring pain on 0-10 scale. Pain medications given per MAR. Pt instructed to call out when pain level increasing. Call light within reach. Problem: ABCDS Injury Assessment  Goal: Absence of physical injury  Outcome: Progressing     Problem: Safety - Adult  Goal: Free from fall injury  Outcome: Progressing  Bed in lowest position, wheels locked, 2/4 side rails up, nonskid footwear on. Bed/ chair check alarm in place, call light within reach. Pt instructed to call out when needing assistance. Pt stated understanding.       Problem: Nutrition Deficit:  Goal: Optimize nutritional status  Outcome: Progressing

## 2023-07-25 NOTE — CARE COORDINATION
Case Management Assessment  Initial Evaluation    Date/Time of Evaluation: 7/25/2023 2:06 PM  Assessment Completed by: Rhoda Smith RN    If patient is discharged prior to next notation, then this note serves as note for discharge by case management. Patient Name: Amadeo Cruz                   YOB: 1970  Diagnosis: Primary osteoarthritis of right hip [M16.11]  Primary localized osteoarthritis of right hip [M16.11]  S/P total right hip arthroplasty [Z96.641]                   Date / Time: 7/24/2023  9:30 AM    Patient Admission Status: Inpatient   Readmission Risk (Low < 19, Mod (19-27), High > 27): Readmission Risk Score: 15    Current PCP: Van Noonan, DO  PCP verified by CM? Yes    Chart Reviewed: Yes      History Provided by: Patient  Patient Orientation: Alert and Oriented    Patient Cognition: Alert    Hospitalization in the last 30 days (Readmission):  No    If yes, Readmission Assessment in CM Navigator will be completed. Advance Directives:      Code Status: Full Code   Patient's Primary Decision Maker is: Patient Declined (Legal Next of Kin Remains as Decision Maker)    Primary Decision MakerMmicky BeatrizUNC Health Wayne - [phone]    Discharge Planning:    Patient lives with: Alone Type of Home: Apartment  Primary Care Giver: Self  Patient Support Systems include: Family Members   Current Financial resources: None  Current community resources: None  Current services prior to admission: Home Care            Current DME:              Type of Home Care services:  None    ADLS  Prior functional level: Independent in ADLs/IADLs  Current functional level:      PT AM-PAC: 17 /24  OT AM-PAC: 16 /24    Family can provide assistance at DC: No  Would you like Case Management to discuss the discharge plan with any other family members/significant others, and if so, who?  No  Plans to Return to Present Housing: Unknown at present  Other Identified Issues/Barriers to RETURNING to current housing:

## 2023-07-25 NOTE — CONSULTS
Consult Placed     Who: OVIDIOA Hospitalist  Date:  Time:     Electronically signed by Anne Crews on 7/25/2023 at 7:59 AM

## 2023-07-26 ENCOUNTER — CLINICAL DOCUMENTATION (OUTPATIENT)
Dept: SPIRITUAL SERVICES | Age: 53
End: 2023-07-26

## 2023-07-26 VITALS
TEMPERATURE: 99.1 F | DIASTOLIC BLOOD PRESSURE: 79 MMHG | WEIGHT: 171 LBS | HEIGHT: 64 IN | HEART RATE: 107 BPM | BODY MASS INDEX: 29.19 KG/M2 | OXYGEN SATURATION: 97 % | SYSTOLIC BLOOD PRESSURE: 130 MMHG | RESPIRATION RATE: 16 BRPM

## 2023-07-26 LAB
BASOPHILS # BLD: 0 K/UL (ref 0–0.2)
BASOPHILS NFR BLD: 0.5 %
DEPRECATED RDW RBC AUTO: 14 % (ref 12.4–15.4)
EOSINOPHIL # BLD: 0.1 K/UL (ref 0–0.6)
EOSINOPHIL NFR BLD: 1.5 %
FERRITIN SERPL IA-MCNC: 171.9 NG/ML (ref 15–150)
GLUCOSE BLD-MCNC: 106 MG/DL (ref 70–99)
GLUCOSE BLD-MCNC: 113 MG/DL (ref 70–99)
HCT VFR BLD AUTO: 35.5 % (ref 36–48)
HGB BLD-MCNC: 12.5 G/DL (ref 12–16)
IRON SATN MFR SERPL: 10 % (ref 15–50)
IRON SERPL-MCNC: 26 UG/DL (ref 37–145)
LYMPHOCYTES # BLD: 1.2 K/UL (ref 1–5.1)
LYMPHOCYTES NFR BLD: 17 %
MCH RBC QN AUTO: 31 PG (ref 26–34)
MCHC RBC AUTO-ENTMCNC: 35.1 G/DL (ref 31–36)
MCV RBC AUTO: 88.5 FL (ref 80–100)
MONOCYTES # BLD: 0.9 K/UL (ref 0–1.3)
MONOCYTES NFR BLD: 12.4 %
NEUTROPHILS # BLD: 4.8 K/UL (ref 1.7–7.7)
NEUTROPHILS NFR BLD: 68.6 %
PERFORMED ON: ABNORMAL
PERFORMED ON: ABNORMAL
PLATELET # BLD AUTO: 125 K/UL (ref 135–450)
PMV BLD AUTO: 8.7 FL (ref 5–10.5)
RBC # BLD AUTO: 4.02 M/UL (ref 4–5.2)
T4 FREE SERPL-MCNC: 2 NG/DL (ref 0.9–1.8)
TIBC SERPL-MCNC: 269 UG/DL (ref 260–445)
TSH SERPL DL<=0.005 MIU/L-ACNC: 0.13 UIU/ML (ref 0.27–4.2)
WBC # BLD AUTO: 7 K/UL (ref 4–11)

## 2023-07-26 PROCEDURE — 99024 POSTOP FOLLOW-UP VISIT: CPT | Performed by: SPECIALIST/TECHNOLOGIST

## 2023-07-26 PROCEDURE — 2580000003 HC RX 258: Performed by: PHYSICIAN ASSISTANT

## 2023-07-26 PROCEDURE — 84443 ASSAY THYROID STIM HORMONE: CPT

## 2023-07-26 PROCEDURE — 6370000000 HC RX 637 (ALT 250 FOR IP): Performed by: PHYSICIAN ASSISTANT

## 2023-07-26 PROCEDURE — 6370000000 HC RX 637 (ALT 250 FOR IP): Performed by: SPECIALIST/TECHNOLOGIST

## 2023-07-26 PROCEDURE — 36415 COLL VENOUS BLD VENIPUNCTURE: CPT

## 2023-07-26 PROCEDURE — APPNB45 APP NON BILLABLE 31-45 MINUTES: Performed by: SPECIALIST/TECHNOLOGIST

## 2023-07-26 PROCEDURE — 97110 THERAPEUTIC EXERCISES: CPT

## 2023-07-26 PROCEDURE — 6370000000 HC RX 637 (ALT 250 FOR IP): Performed by: NURSE PRACTITIONER

## 2023-07-26 PROCEDURE — 97116 GAIT TRAINING THERAPY: CPT

## 2023-07-26 PROCEDURE — 6360000002 HC RX W HCPCS: Performed by: SPECIALIST/TECHNOLOGIST

## 2023-07-26 PROCEDURE — 97530 THERAPEUTIC ACTIVITIES: CPT

## 2023-07-26 PROCEDURE — 97535 SELF CARE MNGMENT TRAINING: CPT

## 2023-07-26 PROCEDURE — 83550 IRON BINDING TEST: CPT

## 2023-07-26 PROCEDURE — 85025 COMPLETE CBC W/AUTO DIFF WBC: CPT

## 2023-07-26 PROCEDURE — 82728 ASSAY OF FERRITIN: CPT

## 2023-07-26 PROCEDURE — 84439 ASSAY OF FREE THYROXINE: CPT

## 2023-07-26 PROCEDURE — 83540 ASSAY OF IRON: CPT

## 2023-07-26 RX ORDER — OXYCODONE HYDROCHLORIDE 5 MG/1
5-10 TABLET ORAL EVERY 6 HOURS PRN
Qty: 42 TABLET | Refills: 0 | Status: SHIPPED | OUTPATIENT
Start: 2023-07-26 | End: 2023-08-02

## 2023-07-26 RX ORDER — CELECOXIB 100 MG/1
200 CAPSULE ORAL 2 TIMES DAILY
Status: DISCONTINUED | OUTPATIENT
Start: 2023-07-26 | End: 2023-07-26 | Stop reason: HOSPADM

## 2023-07-26 RX ORDER — ACETAMINOPHEN 325 MG/1
650 TABLET ORAL EVERY 6 HOURS
Qty: 120 TABLET | Refills: 3 | Status: SHIPPED | OUTPATIENT
Start: 2023-07-26

## 2023-07-26 RX ORDER — CELECOXIB 200 MG/1
200 CAPSULE ORAL 2 TIMES DAILY
Qty: 60 CAPSULE | Refills: 3 | Status: SHIPPED | OUTPATIENT
Start: 2023-07-26

## 2023-07-26 RX ORDER — METHOCARBAMOL 750 MG/1
750 TABLET, FILM COATED ORAL 4 TIMES DAILY
Qty: 40 TABLET | Refills: 0 | Status: SHIPPED | OUTPATIENT
Start: 2023-07-26 | End: 2023-08-05

## 2023-07-26 RX ORDER — POLYETHYLENE GLYCOL 3350 17 G/17G
17 POWDER, FOR SOLUTION ORAL DAILY
Qty: 10 PACKET | Refills: 0 | Status: SHIPPED | OUTPATIENT
Start: 2023-07-27 | End: 2023-08-06

## 2023-07-26 RX ORDER — KETOROLAC TROMETHAMINE 30 MG/ML
15 INJECTION, SOLUTION INTRAMUSCULAR; INTRAVENOUS EVERY 6 HOURS PRN
Status: DISCONTINUED | OUTPATIENT
Start: 2023-07-26 | End: 2023-07-26

## 2023-07-26 RX ADMIN — OXYCODONE HYDROCHLORIDE 10 MG: 5 TABLET ORAL at 00:35

## 2023-07-26 RX ADMIN — HYDROMORPHONE HYDROCHLORIDE 0.25 MG: 1 INJECTION, SOLUTION INTRAMUSCULAR; INTRAVENOUS; SUBCUTANEOUS at 02:29

## 2023-07-26 RX ADMIN — POLYETHYLENE GLYCOL 3350 17 G: 17 POWDER, FOR SOLUTION ORAL at 09:13

## 2023-07-26 RX ADMIN — OXYCODONE HYDROCHLORIDE 10 MG: 5 TABLET ORAL at 05:33

## 2023-07-26 RX ADMIN — METHOCARBAMOL 750 MG: 750 TABLET ORAL at 12:24

## 2023-07-26 RX ADMIN — SUCRALFATE 1 G: 1 TABLET ORAL at 12:24

## 2023-07-26 RX ADMIN — Medication 10 ML: at 09:20

## 2023-07-26 RX ADMIN — LEVOTHYROXINE SODIUM 175 MCG: 0.03 TABLET ORAL at 05:33

## 2023-07-26 RX ADMIN — METHOCARBAMOL 750 MG: 750 TABLET ORAL at 09:14

## 2023-07-26 RX ADMIN — PANTOPRAZOLE SODIUM 40 MG: 40 TABLET, DELAYED RELEASE ORAL at 05:32

## 2023-07-26 RX ADMIN — OXYCODONE HYDROCHLORIDE 5 MG: 5 TABLET ORAL at 12:27

## 2023-07-26 RX ADMIN — FERROUS SULFATE TAB 325 MG (65 MG ELEMENTAL FE) 325 MG: 325 (65 FE) TAB at 09:14

## 2023-07-26 RX ADMIN — SUCRALFATE 1 G: 1 TABLET ORAL at 05:32

## 2023-07-26 RX ADMIN — CARBAMAZEPINE 400 MG: 200 TABLET, EXTENDED RELEASE ORAL at 15:31

## 2023-07-26 RX ADMIN — CETIRIZINE HYDROCHLORIDE 10 MG: 10 TABLET, FILM COATED ORAL at 09:14

## 2023-07-26 RX ADMIN — ACETAMINOPHEN 650 MG: 325 TABLET ORAL at 12:24

## 2023-07-26 RX ADMIN — PRIMIDONE 250 MG: 250 TABLET ORAL at 09:14

## 2023-07-26 RX ADMIN — CELECOXIB 200 MG: 100 CAPSULE ORAL at 09:14

## 2023-07-26 RX ADMIN — CLONAZEPAM 1 MG: 1 TABLET ORAL at 09:14

## 2023-07-26 RX ADMIN — CARBAMAZEPINE 400 MG: 200 TABLET, EXTENDED RELEASE ORAL at 09:13

## 2023-07-26 RX ADMIN — DULOXETINE HYDROCHLORIDE 30 MG: 30 CAPSULE, DELAYED RELEASE ORAL at 09:14

## 2023-07-26 RX ADMIN — HYDROMORPHONE HYDROCHLORIDE 0.25 MG: 1 INJECTION, SOLUTION INTRAMUSCULAR; INTRAVENOUS; SUBCUTANEOUS at 09:18

## 2023-07-26 RX ADMIN — PROPRANOLOL HYDROCHLORIDE 80 MG: 80 CAPSULE, EXTENDED RELEASE ORAL at 09:13

## 2023-07-26 ASSESSMENT — PAIN DESCRIPTION - LOCATION
LOCATION: KNEE;LEG
LOCATION: KNEE;LEG
LOCATION: HIP
LOCATION: HIP

## 2023-07-26 ASSESSMENT — PAIN DESCRIPTION - ORIENTATION
ORIENTATION: RIGHT

## 2023-07-26 ASSESSMENT — PAIN SCALES - GENERAL
PAINLEVEL_OUTOF10: 7
PAINLEVEL_OUTOF10: 7
PAINLEVEL_OUTOF10: 6
PAINLEVEL_OUTOF10: 8
PAINLEVEL_OUTOF10: 8
PAINLEVEL_OUTOF10: 6
PAINLEVEL_OUTOF10: 6

## 2023-07-26 NOTE — CARE COORDINATION
Writer notes DC, patient will do OP delayed therap, and has all needed DME. No other needs from DCP.     Bere Mccarty RN

## 2023-07-26 NOTE — CARE COORDINATION
[facility] out of network    Patient has no preference in agency    Referral sent to [facility] awaiting response from [address] notified to cancel referral    KASSIDY Santana CTN  [facility] [phone]

## 2023-07-26 NOTE — FLOWSHEET NOTE
07/26/23 1118   Encounter Summary   Encounter Overview/Reason  Attempted Encounter   Service Provided For: Patient not available   Referral/Consult From: Other    Last Encounter    (7/26 attempted call OP, left VM)   Complexity of Encounter Low   Begin Time 1115   End Time  1119   Total Time Calculated 4 min

## 2023-07-26 NOTE — CARE COORDINATION
CASE MANAGEMENT DISCHARGE SUMMARY      Discharge to: Home w/ delayed OP therapy    Precertification completed: N/A  Hospital Exemption Notification (HENS) completed: N/A    IMM given: (date) N/A    New Durable Medical Equipment ordered/agency: N/A    Transportation:    Family/car: Personal       Confirmed discharge plan with: Karlene ROGERS     Patient: yes       RN, name: Damian Dalton    Note: Discharging nurse to complete LEIGHA, reconcile AVS, and place final copy with patient's discharge packet. RN to ensure that written prescriptions for  Level II medications are sent with patient to the facility as per protocol.       Chitra Campa RN

## 2023-07-26 NOTE — CARE COORDINATION
Writer reviewed chart, patient did well with therapy today, [facility], and [facility] are unable to accept. Writer will go bedside with patient and discuss [address], and possible transport home. DCP will continue to follow.      Terell Appiah RN

## 2023-07-26 NOTE — PLAN OF CARE
Problem: Chronic Conditions and Co-morbidities  Goal: Patient's chronic conditions and co-morbidity symptoms are monitored and maintained or improved  Outcome: Completed     Problem: Discharge Planning  Goal: Discharge to home or other facility with appropriate resources  Outcome: Completed     Problem: Pain  Goal: Verbalizes/displays adequate comfort level or baseline comfort level  Outcome: Completed     Problem: ABCDS Injury Assessment  Goal: Absence of physical injury  Outcome: Completed     Problem: Safety - Adult  Goal: Free from fall injury  Outcome: Completed     Problem: Nutrition Deficit:  Goal: Optimize nutritional status  Outcome: Completed

## 2023-07-26 NOTE — DISCHARGE SUMMARY
Department of Orthopedic Surgery  Physician Assistant   Discharge Summary      The Osiel Herring is a 46 y.o. female underwent total hip replacement procedure without complication. Osiel Herring was admitted to the floor following their recovery in the PACU.      Discharge Diagnosis  right Total Hip Arthroplasty    Current Inpatient Medications    Current Facility-Administered Medications: celecoxib (CELEBREX) capsule 200 mg, 200 mg, Oral, BID  clonazePAM (KLONOPIN) tablet 1 mg, 1 mg, Oral, Daily  clonazePAM (KLONOPIN) tablet 1.5 mg, 1.5 mg, Oral, Nightly  sucralfate (CARAFATE) tablet 1 g, 1 g, Oral, 4x Daily AC & HS  rivaroxaban (XARELTO) tablet 20 mg, 20 mg, Oral, Daily  methocarbamol (ROBAXIN) tablet 750 mg, 750 mg, Oral, 4x Daily  scopolamine (TRANSDERM-SCOP) transdermal patch 1 patch, 1 patch, TransDERmal, Q72H  lactated ringers IV soln infusion, , IntraVENous, Continuous  sodium chloride flush 0.9 % injection 5-40 mL, 5-40 mL, IntraVENous, 2 times per day  sodium chloride flush 0.9 % injection 5-40 mL, 5-40 mL, IntraVENous, PRN  0.9 % sodium chloride infusion, , IntraVENous, PRN  acetaminophen (TYLENOL) tablet 650 mg, 650 mg, Oral, Q6H  oxyCODONE (ROXICODONE) immediate release tablet 5 mg, 5 mg, Oral, Q4H PRN **OR** oxyCODONE (ROXICODONE) immediate release tablet 10 mg, 10 mg, Oral, Q4H PRN  polyethylene glycol (GLYCOLAX) packet 17 g, 17 g, Oral, Daily  senna (SENOKOT) tablet 8.6 mg, 1 tablet, Oral, BID PRN  ondansetron (ZOFRAN-ODT) disintegrating tablet 4 mg, 4 mg, Oral, Q8H PRN **OR** ondansetron (ZOFRAN) injection 4 mg, 4 mg, IntraVENous, Q6H PRN  insulin NPH (HumuLIN N;NovoLIN N) injection vial 10 Units, 0.125 Units/kg, SubCUTAneous, BID AC  insulin lispro (HUMALOG) injection vial 6 Units, 0.08 Units/kg, SubCUTAneous, TID WC  insulin lispro (HUMALOG) injection vial 0-8 Units, 0-8 Units, SubCUTAneous, TID WC  insulin lispro (HUMALOG) injection vial 0-4 Units, 0-4 Units, SubCUTAneous,

## 2023-07-27 ENCOUNTER — TELEPHONE (OUTPATIENT)
Dept: FAMILY MEDICINE CLINIC | Age: 53
End: 2023-07-27

## 2023-07-27 NOTE — TELEPHONE ENCOUNTER
Patient has been informed of her lab results. She had questions about the Iron and Ferritin. She wants to know if she needs to keep taking her iron supplement still and if she should increase her dose and what she can do about the Ferritin? Patient states that she was also supposed to have a BMP done when she was in the hospital, but it looks like the d/c that. Should she have had one?

## 2023-07-27 NOTE — TELEPHONE ENCOUNTER
Care Transitions Initial Follow Up Call    Outreach made within 2 business days of discharge: Yes    Patient: Kelly Huitron Patient : 1970   MRN: [id number]  Reason for Admission: There are no discharge diagnoses documented for the most recent discharge. Discharge Date: 23       Spoke with: Patient being followed by ortho. No TCM required. Discharge department/facility: [facility]    TCM Interactive Patient Contact:  Was patient able to fill all prescriptions: Yes  Was patient instructed to bring all medications to the follow-up visit: Yes  Is patient taking all medications as directed in the discharge summary?  Yes  Does patient understand their discharge instructions: Yes  Does patient have questions or concerns that need addressed prior to 7-14 day follow up office visit: no    Scheduled appointment with PCP within 7-14 days    Follow Up  Future Appointments   Date Time Provider [address] [place] [address]   8/10/2023 11:15 AM William Worthy MD [facility]   2023  9:30 AM DO TALYA Gomez LPN

## 2023-07-28 ENCOUNTER — TELEPHONE (OUTPATIENT)
Dept: ORTHOPEDIC SURGERY | Age: 53
End: 2023-07-28

## 2023-07-28 NOTE — TELEPHONE ENCOUNTER
Spoke with pt, very tearful. States pain is unbearable. States the hospital was in a hurry to get out of there. [place] rushed. Concerned that BMP results were cancelled due to her discharging. Discussed and educating pt on pain control options. Can take 1-2 jeni- pt has only been using one. Talked pt through dressing change and compression wrap. States knee and thigh is really swollen. Also emailed instructions to pt. Incision status: No drainage or redness    Edema/Swelling/Teds: Has been icing hip area. Pain level and status: Not managing well. Discussed options and adding tylenol that was prescribed. Use of pain medications: Using just 1 jeni every 6 hours. Just started tylenol today. Using celebrex and robaxin. Blood thinner: xarelto- home dosage- no issues. Bowels: Miralax- moved this morning. Home Care Agency active: NA    Outpatient therapy: NA    Do you have all of your medications: Yes    Changes in medications: no    Instructed pt to call Nurse Navigator or surgeon's office with any questions or concerns.      Follow up appointments:    Future Appointments   Date Time Provider [address] [place] [address]   8/10/2023 11:15 AM Po Davidson MD [facility]   8/31/2023  9:30 AM DO TALYA Rodriguez

## 2023-07-31 ENCOUNTER — TELEPHONE (OUTPATIENT)
Dept: ORTHOPEDIC SURGERY | Age: 53
End: 2023-07-31

## 2023-07-31 DIAGNOSIS — M16.11 PRIMARY OSTEOARTHRITIS OF RIGHT HIP: Primary | ICD-10-CM

## 2023-07-31 RX ORDER — METHYLPREDNISOLONE 4 MG/1
TABLET ORAL
Qty: 1 KIT | Refills: 0 | Status: SHIPPED | OUTPATIENT
Start: 2023-07-31

## 2023-07-31 NOTE — TELEPHONE ENCOUNTER
Other ?? PT REQUESTS THAT SOMEONE FROM THE OFFICE CALL HER [phone] TO DISCUSS THE AMOUNT OF EXTREME PAIN SHE IS IN. SHE IS INQUIRING INTO SOMETHING TO BOOST WHAT SHE IS ON LIKE A STEROID?

## 2023-08-02 ENCOUNTER — TELEPHONE (OUTPATIENT)
Dept: ORTHOPEDIC SURGERY | Age: 53
End: 2023-08-02

## 2023-08-03 NOTE — TELEPHONE ENCOUNTER
Attempted to contact pt, unable to leave a voicemail with purpose and call back number. Signed off from pt. Instructed pt to call RN or Surgeon's office with any issues or concerns.     Rajat Wolff  Orthopedic Nurse Navigator  Phone number: [phone]    Follow up appointments:    Future Appointments   Date Time Provider [address]   8/10/2023 11:15 AM Arianne Perry MD [facility]   8/31/2023  9:30 AM DO TALYA Fernandez

## 2023-08-03 NOTE — PROGRESS NOTES
8/30/2021    This is a 45 y.o. female who presents for  Chief Complaint   Patient presents with    1 Month Follow-Up    Depression    Anxiety       HPI:     HTN:  Taking medication(s) daily  Checking Bps at home? Yes, scanned into media, much improved  Has situational variance with increased anxiety or poor sleep   No new AEs to the medication(s)  No acute concerning Sxs: No CP, SOB, palpitations, dizziness, HA, etc.     Anxiety/Moods:  Requested to wean zoloft at her last visit   Feels better with weaning, currently taking 50 mg daily  No SI/HI       Past Medical History:   Diagnosis Date    Benign essential tremor     BRCA1 negative     Colon polyp 05/06/2019    Degenerative disc disease     DVT, lower extremity ([address] 1989    Fibromyalgia     Kidney stones     Left-sided trigeminal neuralgia     Malignant neoplasm of thyroid gland ([address] 8/22/2013    Migraines, neuralgic     since stroke 1999 Chronic    PONV (postoperative nausea and vomiting)     Poor venous access     Protein S deficiency ([address]     Pulmonary embolism ([address] 1989    Seizure disorder ([address]     grand mal in 2005 ( childhood febrile seizure also-from a baby to age 14) and also X 1 ~2005 with headache treatment    Stroke ([address] 2/25/2019    Thrombosis of superior sagittal sinus 1999    Unspecified cerebral artery occlusion with cerebral infarction 1999    Vertebral artery occlusion 1999    White coat syndrome with high blood pressure but without hypertension        Past Surgical History:   Procedure Laterality Date    BREAST BIOPSY      BREAST LUMPECTOMY      x 3    CYSTOSCOPY  01/09/2012    w/ left ureteroscopy, holmium laser.  stone manipulation and left stent insertion    FINGER TRIGGER RELEASE Left 10/31/2019    LEFT THUMB TRIGGER DIGIT RELEASE performed by Darrell Doty MD at [address] Left 2000    pins later removed    FOOT SURGERY Left 07/10/2017    LARYNX SURGERY vocal cord nodule    LITHOTRIPSY  12/11    REFRACTIVE SURGERY      THYROIDECTOMY  05/18/2011            Social History     Socioeconomic History    Marital status: Single     Spouse name: Not on file    Number of children: Not on file    Years of education: Not on file    Highest education level: Not on file   Occupational History    Not on file   Tobacco Use    Smoking status: Never Smoker    Smokeless tobacco: Never Used   Vaping Use    Vaping Use: Never assessed   Substance and Sexual Activity    Alcohol use: No    Drug use: No    Sexual activity: Not on file   Other Topics Concern    Not on file   Social History Narrative    Not on file     Social Determinants of Health     Financial Resource Strain:     Difficulty of Paying Living Expenses:    Food Insecurity:     Worried About Running Out of Food in the Last Year:     [address] in the Last Year:    Transportation Needs:     Lack of Transportation (Medical):      Lack of Transportation (Non-Medical):    Physical Activity:     Days of Exercise per Week:     Minutes of Exercise per Session:    Stress:     Feeling of Stress :    Social Connections:     Frequency of Communication with Friends and Family:     Frequency of Social Gatherings with Friends and Family:     Attends Hindu Services:     Active Member of Clubs or Organizations:     Attends Club or Organization Meetings:     Marital Status:    Intimate Partner Violence:     Fear of Current or Ex-Partner:     Emotionally Abused:     Physically Abused:     Sexually Abused:        Family History   Problem Relation Age of Onset    Cancer Mother         breast x2, ovarian x1, brain     High Cholesterol Mother     Breast Cancer Mother     Ovarian Cancer Mother     High Cholesterol Father     Other Father     Migraines Sister     Other Sister     Thyroid Disease Maternal Grandmother        Current Outpatient Medications   Medication Sig Dispense Refill    sertraline (ZOLOFT) 25 MG tablet Take 1 tablet by mouth daily 7 tablet 0    clonazePAM (KLONOPIN) 0.5 MG tablet Take one tablet by mouth twice a day as needed for anxiety 60 tablet 0    ketorolac (TORADOL) 30 MG/ML injection INJECT 1 MILLILITER INTRAMUSCULARLY TWO TIMES A DAY AS NEEDED FOR PAIN 12 vial 1    mupirocin (BACTROBAN) 2 % ointment Apply 3 times daily 90 g 5    Melatonin 10 MG TABS Take by mouth      hydroCHLOROthiazide (HYDRODIURIL) 25 MG tablet Take 0.5 tablets by mouth daily 15 tablet 0    diphenhydrAMINE (SOMINEX) 25 MG tablet Take 50 mg by mouth nightly as needed      docusate (COLACE, DULCOLAX) 100 MG CAPS Take 100 mg by mouth 2 times daily      morphine (MS CONTIN) 15 MG extended release tablet       levothyroxine (SYNTHROID) 200 MCG tablet TAKE ONE TABLET BY MOUTH DAILY 90 tablet 1    hydrOXYzine (ATARAX) 25 MG tablet TAKE ONE TO TWO TABLETS BY MOUTH ONCE NIGHTLY 60 tablet 3    amLODIPine (NORVASC) 10 MG tablet TAKE ONE TABLET BY MOUTH DAILY 90 tablet 1    losartan (COZAAR) 100 MG tablet TAKE ONE TABLET BY MOUTH DAILY 90 tablet 1    XARELTO 20 MG TABS tablet TAKE ONE TABLET BY MOUTH DAILY 30 tablet 3    SYRINGE-NEEDLE, DISP, 3 ML (B-D 3CC LUER-ALEX SYR 25GX1\") 25G X 1\" 3 ML MISC Use to inject into the muscle two times a day as needed for pain 100 each 2    atorvastatin (LIPITOR) 40 MG tablet TAKE ONE TABLET BY MOUTH DAILY 90 tablet 1    fentaNYL (ACTIQ) 800 MCG lollipop Take 1 each by mouth 3 times daily as needed.       naloxone (NARCAN) 4 MG/0.1ML LIQD nasal spray 1 spray by Nasal route as needed for Opioid Reversal      Cyanocobalamin (VITAMIN B-12) 5000 MCG SUBL Take 1 each by mouth every other day      Cholecalciferol (VITAMIN D3) 2000 units CAPS Take 1 capsule by mouth daily      Doxylamine Succinate, Sleep, (UNISOM PO) Take by mouth      carBAMazepine (TEGRETOL XR) 200 MG extended release tablet Take 400 mg by mouth every 8 hours       primidone (MYSOLINE) 250 MG tablet Take 250 mg by mouth 3 times daily        No current facility-administered medications for this visit. Immunization History   Administered Date(s) Administered    Influenza, Quadv, IM, PF (6 mo and older Fluzone, Flulaval, Fluarix, and 3 yrs and older Afluria) 09/19/2019, 10/14/2020    Tdap (Boostrix, Adacel) 04/04/2019    Zoster Recombinant (Shingrix) 02/05/2020, 09/25/2020       Allergies   Allergen Reactions    Dilaudid [Hydromorphone Hcl] Shortness Of Breath, Itching and Other (See Comments)     Wheezing    Buprenorphine Itching, Nausea And Vomiting and Other (See Comments)     Tingling in lips    Duloxetine Hcl Other (See Comments)     Personality changes and depression    Gabapentin     Gluten Meal      Celiac disease    Hydromorphone Itching and Other (See Comments)    Morphine Itching    Pregabalin      Lyrica (personality changes)     Propranolol Hives    Topiramate Other (See Comments)    Zoloft [Sertraline]        Review of Systems   Constitutional: Negative for activity change, appetite change, chills, diaphoresis, fatigue, fever and unexpected weight change. Eyes: Negative for visual disturbance. Respiratory: Negative for chest tightness and shortness of breath. Cardiovascular: Negative for chest pain, palpitations and leg swelling. Gastrointestinal: Negative for abdominal pain, nausea and vomiting. Genitourinary: Negative for decreased urine volume. Musculoskeletal: Negative for arthralgias and back pain. Skin: Negative for color change. Neurological: Negative for dizziness, tremors, seizures, weakness, light-headedness, numbness and headaches. Psychiatric/Behavioral: Negative for agitation, behavioral problems, confusion, decreased concentration, dysphoric mood, hallucinations, self-injury, sleep disturbance and suicidal ideas. The patient is not nervous/anxious and is not hyperactive.         BP (!) 124/90   Pulse 130   Ht 5' 4.84\" (1.647 m)   Wt 187 lb 6.4 oz (85 kg)   LMP 06/30/2015   SpO2 98%   BMI 31.34 kg/m²     Physical Exam  Vitals and nursing note reviewed. Constitutional:       General: She is not in acute distress. Appearance: She is well-developed. She is not diaphoretic. HENT:      Head: Normocephalic and atraumatic. Eyes:      Conjunctiva/sclera: Conjunctivae normal.      Pupils: Pupils are equal, round, and reactive to light. Neck:      Vascular: No JVD. Cardiovascular:      Rate and Rhythm: Normal rate and regular rhythm. Heart sounds: Normal heart sounds. No murmur heard. No friction rub. No gallop. Pulmonary:      Effort: Pulmonary effort is normal. No respiratory distress. Breath sounds: Normal breath sounds. No wheezing or rales. Chest:      Chest wall: No tenderness. Abdominal:      Palpations: Abdomen is soft. Tenderness: There is no abdominal tenderness. Musculoskeletal:         General: Normal range of motion. Cervical back: Normal range of motion and neck supple. Skin:     General: Skin is warm and dry. Capillary Refill: Capillary refill takes 2 to 3 seconds. Findings: No erythema. Neurological:      Mental Status: She is alert and oriented to person, place, and time. Psychiatric:         Behavior: Behavior normal.         Thought Content: Thought content normal.         Judgment: Judgment normal.         Plan  1. Essential hypertension  Much improved. C/w Losartan 100 mg, Norvasc 10 mg, HCTZ 12.5 mg  Added situational instructions  For persistent BP >140/90 after initial medications, may take additional 12.5 mg dose of HCTZ. Return in 1 mo    2. Anxiety  Per pt request, c/w Zoloft wean. 50 mg x1 week, then 25 mg x 1 week  - clonazePAM (KLONOPIN) 0.5 MG tablet; Take one tablet by mouth twice a day as needed for anxiety  Dispense: 60 tablet;  Refill: 0        While assessing care for this patient, I have reviewed all pertinent lab work/imaging/ specialist notes and care in reference to those problems addressed above in detail. Appropriate medical decision making was based on this. Please note that portions of this note may have been completed with a voice recognition program. Efforts were made to edit the dictations but occasionally words are mis-transcribed. Return in about 4 weeks (around 9/27/2021) for follow up blood pressure, 30 minute visit. Include Pregnancy/Lactation Warning?: No

## 2023-08-10 ENCOUNTER — OFFICE VISIT (OUTPATIENT)
Dept: ORTHOPEDIC SURGERY | Age: 53
End: 2023-08-10

## 2023-08-10 VITALS — BODY MASS INDEX: 29.19 KG/M2 | WEIGHT: 171 LBS | HEIGHT: 64 IN

## 2023-08-10 DIAGNOSIS — Z96.641 S/P TOTAL RIGHT HIP ARTHROPLASTY: Primary | ICD-10-CM

## 2023-08-10 PROCEDURE — 99024 POSTOP FOLLOW-UP VISIT: CPT | Performed by: ORTHOPAEDIC SURGERY

## 2023-08-10 NOTE — PROGRESS NOTES
Dr Jennifer Abarca      Date /Time 8/10/2023       11:35 AM EDT  Name Cheo Gresham             1970   Location  [facility]  MRN [id number]                Chief Complaint   Patient presents with    Follow-up     1st PO Right DANIELITO  07/24/2023        History of Present Illness      Cheo Gresham is a 46 y.o. female is here for post-op visit after RIGHT  [address] Total Hip Arthroplasty anterior      Patient presents the office today for postoperative visit for above-mentioned surgery. Patient doing well. Patient denies any fever, chills, or drainage. Pain controlled. Physical Exam    Based off 1997 Exam Criteria    [facility] 06/30/2015      Constitutional:       General: He is not in acute distress. Appearance: Normal appearance. RIGHT Hip: incision clean, intact, healing appropriately. No surrounding  erythema or fluctuance. Neuro intact distal. No evidence of DVT. Imaging       Right Hip: [facility]  Radiographs: AP pelvis, lateral, and false profile were ordered today and reviewed. They demonstrate a total hip arthroplasty in good position. No evidence of loosening or periprosthetic fracture. Assessment and Plan    Diagnoses and all orders for this visit:    S/P total right hip arthroplasty        Patient doing well. Patient will start physical therapy next week. Once the patient starts therapy they can slowly wean off supportive devices. Follow-up in 3 months or sooner if problems arise. Electronically signed by Jennifer Abarca MD on 8/10/2023 at 11:35 AM  This dictation was generated by voice recognition computer software. Although all attempts are made to edit the dictation for accuracy, there may be errors in the transcription that are not intended.

## 2023-08-18 DIAGNOSIS — K21.00 GASTROESOPHAGEAL REFLUX DISEASE WITH ESOPHAGITIS, UNSPECIFIED WHETHER HEMORRHAGE: ICD-10-CM

## 2023-08-18 NOTE — TELEPHONE ENCOUNTER
Refill Request     CONFIRM preferred pharmacy with the patient. If Mail Order Rx - Pend for 90 day refill. Last Seen: Last Seen Department: 7/13/2023  Last Seen by PCP: 7/13/2023    Last Written: 7/21/23 60 tabs 0 refill    If no future appointment scheduled:  Review the last OV with PCP and review information for follow-up visit,  Route STAFF MESSAGE with patient name to the [facility] for scheduling with the following information:            -  Timing of next visit           -  Visit type ie Physical, OV, etc           -  Diagnoses/Reason ie. COPD, HTN - Do not use MEDICATION, Follow-up or CHECK UP - Give reason for visit      Next Appointment:   Future Appointments   Date Time Provider [address]   8/25/2023 11:00 AM Niurka Hughes PT [facility] AND PT Hank Yadav   8/28/2023 11:00 AM Niurka Hughes PT [facility] AND PT Hank Yadav   8/31/2023  9:30 AM DO TALYA Angelo  [place] [facility]   9/1/2023 12:30 PM Niurka Hughes PT [facility] AND PT Hank Yadav   10/24/2023 10:15 AM Reynold Holstein, MD AND [facility]       Message sent to [facility] to schedule appt with patient?   NO      Requested Prescriptions     Pending Prescriptions Disp Refills    sucralfate (CARAFATE) 1 GM tablet 120 tablet 0     Sig: TAKE ONE TABLET BY MOUTH FOUR TIMES A DAY (BEFORE MEALS AND NIGHTLY)    pantoprazole (PROTONIX) 40 MG tablet 60 tablet 0     Sig: Take 1 tablet by mouth 2 times daily (before meals)

## 2023-08-20 RX ORDER — PANTOPRAZOLE SODIUM 40 MG/1
40 TABLET, DELAYED RELEASE ORAL
Qty: 60 TABLET | Refills: 0 | Status: SHIPPED | OUTPATIENT
Start: 2023-08-20

## 2023-08-20 RX ORDER — SUCRALFATE 1 G/1
TABLET ORAL
Qty: 120 TABLET | Refills: 0 | Status: SHIPPED | OUTPATIENT
Start: 2023-08-20

## 2023-08-22 ENCOUNTER — APPOINTMENT (OUTPATIENT)
Dept: PHYSICAL THERAPY | Age: 53
End: 2023-08-22
Payer: COMMERCIAL

## 2023-08-22 ENCOUNTER — TELEPHONE (OUTPATIENT)
Dept: FAMILY MEDICINE CLINIC | Age: 53
End: 2023-08-22

## 2023-08-23 ENCOUNTER — CLINICAL DOCUMENTATION (OUTPATIENT)
Dept: SPIRITUAL SERVICES | Age: 53
End: 2023-08-23

## 2023-08-23 NOTE — TELEPHONE ENCOUNTER
The script for this medication has been DISCONTINUED. clobetasol (TEMOVATE) 0.05 % cream [[id number]]  DISCONTINUED    Order Details  Dose, Route, Frequency: As Directed   Dispense Quantity: 180 g Refills: 5    Note to Pharmacy: Please give patient three 60 gram tubes to equal 180 gram tube. Sig: Apply topically 2 times daily. Patient not taking: Reported on 4/14/2023         Start Date: 10/25/22 End Date: 04/21/23   Discontinued by: Chanda Patel MD on 4/21/2023 11:52   Reason: Stop Taking at Discharge         Written Date: 10/25/22 Expiration Date: 10/25/23       Diagnosis Association: Prurigo nodularis (L28.1)   Please advise. Thank you.

## 2023-08-25 ENCOUNTER — HOSPITAL ENCOUNTER (OUTPATIENT)
Dept: PHYSICAL THERAPY | Age: 53
Setting detail: THERAPIES SERIES
Discharge: HOME | End: 2023-08-25
Payer: COMMERCIAL

## 2023-08-25 ENCOUNTER — TELEPHONE (OUTPATIENT)
Dept: FAMILY MEDICINE CLINIC | Age: 53
End: 2023-08-25

## 2023-08-25 DIAGNOSIS — K21.00 GASTROESOPHAGEAL REFLUX DISEASE WITH ESOPHAGITIS, UNSPECIFIED WHETHER HEMORRHAGE: ICD-10-CM

## 2023-08-25 DIAGNOSIS — M25.551 CHRONIC RIGHT HIP PAIN: ICD-10-CM

## 2023-08-25 DIAGNOSIS — M43.10 RETROLISTHESIS OF VERTEBRAE: ICD-10-CM

## 2023-08-25 DIAGNOSIS — G89.29 CHRONIC PAIN OF RIGHT INGUINAL REGION: ICD-10-CM

## 2023-08-25 DIAGNOSIS — M47.816 SPONDYLOSIS OF LUMBAR SPINE: ICD-10-CM

## 2023-08-25 DIAGNOSIS — M51.36 DDD (DEGENERATIVE DISC DISEASE), LUMBAR: ICD-10-CM

## 2023-08-25 DIAGNOSIS — R10.31 CHRONIC PAIN OF RIGHT INGUINAL REGION: ICD-10-CM

## 2023-08-25 DIAGNOSIS — G89.29 CHRONIC RIGHT HIP PAIN: ICD-10-CM

## 2023-08-25 PROCEDURE — 97161 PT EVAL LOW COMPLEX 20 MIN: CPT | Performed by: PHYSICAL THERAPIST

## 2023-08-25 PROCEDURE — 97530 THERAPEUTIC ACTIVITIES: CPT | Performed by: PHYSICAL THERAPIST

## 2023-08-25 PROCEDURE — 97110 THERAPEUTIC EXERCISES: CPT | Performed by: PHYSICAL THERAPIST

## 2023-08-25 RX ORDER — TRAMADOL HYDROCHLORIDE 50 MG/1
50 TABLET ORAL DAILY PRN
Qty: 30 TABLET | Refills: 0 | Status: SHIPPED | OUTPATIENT
Start: 2023-08-25 | End: 2023-09-24

## 2023-08-25 RX ORDER — CLOBETASOL PROPIONATE 0.5 MG/G
OINTMENT TOPICAL
Qty: 180 G | Refills: 0 | Status: CANCELLED | OUTPATIENT
Start: 2023-08-25

## 2023-08-25 RX ORDER — PANTOPRAZOLE SODIUM 40 MG/1
40 TABLET, DELAYED RELEASE ORAL
Qty: 60 TABLET | Refills: 0 | Status: SHIPPED | OUTPATIENT
Start: 2023-08-25

## 2023-08-25 NOTE — TELEPHONE ENCOUNTER
Dr. Milla Chne, can you sign the new script so the PA department can complete PA or let me know and I will be happy to complete.

## 2023-08-25 NOTE — FLOWSHEET NOTE
[address] [place] [address], [address] [place] [address]  Phone: [phone]  Fax [phone]    Physical Therapy Treatment Note/ Progress Report:           Date:  2023    Patient Name:  Opal Griffin    :  1970  MRN: [id number]  Restrictions/Precautions:    Medical/Treatment Diagnosis Information:  S/P total right hip arthroplasty [Z96.641]  No diagnosis found.   Insurance/Certification information:  PT Insurance Information: [facility] [id number] [id number] [address]  Physician Information:  Vinod Zavala MD  Has the plan of care been signed (Y/N):        []  Yes  [x]  No     Date of Patient follow up with Physician: ***      Is this a Progress Report:     []  Yes  [x]  No        If Yes:  Date Range for reporting period:  Beginning***  Ending***    Progress report will be due (10 Rx or 30 days whichever is less): ***       Recertification will be due (POC Duration  / 90 days whichever is less): ***      Visit # Insurance Allowable Auth Required   In-person 2*** *** []  Yes []  No    Telehealth *** *** []  Yes []  No    Total          Functional Scale: ***    Date assessed:  ***      Therapy Diagnosis/Practice Pattern:***      Number of Comorbidities:  []0     []1-2    []3+***    Latex Allergy:  [x]NO      []YES  Preferred Language for Healthcare:   [x]English       []other:      Pain level:  ***/10     SUBJECTIVE:  See eval    OBJECTIVE: See eval  Observation:   Test measurements:      RESTRICTIONS/PRECAUTIONS: ***    Exercises/Interventions:     Therapeutic Ex (53374) Sets/sec Reps Notes/CUES                                                                           Manual Intervention [id number])                                          NMR re-education (97855)   CUES NEEDED                                                         Therapeutic Activity (13372)

## 2023-08-25 NOTE — THERAPY EVALUATION
walking     Type: [x]Constant   []Intermittent  [x]Radiating [x]Localized []other:     Numbness/Tingling: R lat thigh and groin    Occupation/School:disabled    Living Status/Prior Level of Function: Independent with ADLs and IADLs, pt. Lives in apt. Style condo with 6 steps and with long walk to car, or 3 sets of stairs to get to car. (Has been having a friend help with laundry and cleaning). OBJECTIVE:     ROM LEFT RIGHT   HIP Flex  90   HIP Abd  15   HIP Ext     HIP IR     HIP ER     Knee ext     Knee Flex     Ankle PF     Ankle DF     Ankle In     Ankle Ev     Hamstring flex  -17   Strength  LEFT RIGHT   HIP Flexors 5 4-   HIP Abductors     HIP Ext     Hip ER     Knee EXT (quad) 5 4+   Knee Flex (HS) 5 4+   Ankle DF 5 5   Ankle PF 5 5   Ankle Inv 5 5   Ankle EV 5 5        Circumference  Mid apex  7 cm prox             Reflexes/Sensation: ***   [x]Dermatomes/Myotomes intact    []Reflexes equal and normal bilaterally   []Other:    Joint mobility: ***   []Normal    []Hypo   []Hyper    Palpation: ***    Functional Mobility/Transfers: ***    Posture: ***    Bandages/Dressings/Incisions: ***    Gait: (include devices/WB status) ***    Orthopedic Special Tests: ***                       [x] Patient history, allergies, meds reviewed. Medical chart reviewed. See intake form. Review Of Systems (ROS):  [x]Performed Review of systems (Integumentary, CardioPulmonary, Neurological) by intake and observation. Intake form has been scanned into medical record. Patient has been instructed to contact their primary care physician regarding ROS issues if not already being addressed at this time.       Co-morbidities/Complexities (which will affect course of rehabilitation): ***  []None           Arthritic conditions   []Rheumatoid arthritis (M05.9)  []Osteoarthritis (M19.91)   Cardiovascular conditions   []Hypertension (I10)  []Hyperlipidemia (E78.5)  []Angina pectoris (I20)  []Atherosclerosis (I70)   Musculoskeletal

## 2023-08-25 NOTE — TELEPHONE ENCOUNTER
Refill Request     CONFIRM preferred pharmacy with the patient. If Mail Order Rx - Pend for 90 day refill. Last Seen: Last Seen Department: 7/13/2023  Last Seen by PCP: 7/13/2023    Last Written:   pantoprazole-  08/20/2023 60 tablets 0 refill   Tramadol- 07/06/2023 30 tablets 0 refills     If no future appointment scheduled:  Review the last OV with PCP and review information for follow-up visit,  Route STAFF MESSAGE with patient name to the [facility] for scheduling with the following information:            -  Timing of next visit           -  Visit type ie Physical, OV, etc           -  Diagnoses/Reason ie. COPD, HTN - Do not use MEDICATION, Follow-up or CHECK UP - Give reason for visit      Next Appointment:   Future Appointments   Date Time Provider [address]   8/28/2023 11:00 AM Fidelia Salinas, PT [facility] AND PT Zach Peña   9/1/2023 12:30 PM Fidelia Salinas, PT [facility] AND PT Wenceslao SHARPE   9/18/2023 10:00 AM DO TALYA Kothari  [place] - [place]D   10/24/2023 10:15 AM Vinod Zavala MD AND ORTHO [facility]       Message sent to [address] [place] [address] to schedule appt with patient? NO      Requested Prescriptions     Pending Prescriptions Disp Refills    pantoprazole (PROTONIX) 40 MG tablet 60 tablet 0     Sig: Take 1 tablet by mouth 2 times daily (before meals)    traMADol (ULTRAM) 50 MG tablet 30 tablet 0     Sig: Take 1 tablet by mouth daily as needed for Pain for up to 30 days.  Max Daily Amount: 50 mg

## 2023-08-28 ENCOUNTER — HOSPITAL ENCOUNTER (OUTPATIENT)
Dept: PHYSICAL THERAPY | Age: 53
Setting detail: THERAPIES SERIES
Discharge: HOME | End: 2023-08-28
Payer: COMMERCIAL

## 2023-08-28 NOTE — FLOWSHEET NOTE
[address] [place] [address]        Physical Therapy  Cancellation/No-show Note  Patient Name:  Etienne Manzano  :  1970   Date:  2023  Cancelled visits to date: 1  No-shows to date: 0    For today's appointment patient:  [x]  Cancelled  []  Rescheduled appointment  []  No-show     Reason given by patient:  [x]  Patient ill  []  Conflicting appointment  []  No transportation    []  Conflict with work  []  No reason given  []  Other:     Comments:      Electronically signed by:  Bernard Lacey PT, [facility], [facility] 6760

## 2023-09-01 ENCOUNTER — HOSPITAL ENCOUNTER (OUTPATIENT)
Dept: PHYSICAL THERAPY | Age: 53
Setting detail: THERAPIES SERIES
Discharge: HOME | End: 2023-09-01

## 2023-09-01 NOTE — FLOWSHEET NOTE
[address] [facility], [address] [place] [address]  [address] [place] [address], [address] [place] [address], [address] [place] [address]        Physical Therapy  Cancellation/No-show Note  Patient Name:  Marcelo Arteaga  :  1970   Date:  2023  Cancelled visits to date: 2  No-shows to date: 0    For today's appointment patient:  [x]  Cancelled  []  Rescheduled appointment  []  No-show     Reason given by patient:  [x]  Patient ill  []  Conflicting appointment  []  No transportation    []  Conflict with work  []  No reason given  []  Other:     Comments:  pt. Spoke with Lynne Mosley re medical issues and plan to try and have pt. Call on day of to schedule when she is having a good day.     Electronically signed by:  Juvenal Gonzales PT, [facility] 3060

## 2023-09-05 ENCOUNTER — TELEPHONE (OUTPATIENT)
Dept: ADMINISTRATIVE | Age: 53
End: 2023-09-05

## 2023-09-05 NOTE — TELEPHONE ENCOUNTER
Submitted PA for traMADol HCl 50MG tablets  [facility] Key: [id number] STATUS: PENDING. Follow up done daily; if no response in three days we will refax for status check. If another three days goes by with no response we will call the insurance for status.

## 2023-09-06 NOTE — TELEPHONE ENCOUNTER
Approved. Approved for TRAMADOL HCL Tablet 50MG, quantity up to 30 per 30 days, under the pharmacy benefit. The drug has been approved from 09/05/2023 to 12/04/2023. Generic or biosimilar substitution may be required when available and preferred on the formulary. Please notify patient. Thank you.

## 2023-09-08 ENCOUNTER — HOSPITAL ENCOUNTER (OUTPATIENT)
Age: 53
Setting detail: SPECIMEN
Discharge: HOME | End: 2023-09-08
Payer: COMMERCIAL

## 2023-09-08 PROCEDURE — 83993 ASSAY FOR CALPROTECTIN FECAL: CPT

## 2023-09-08 PROCEDURE — 87328 CRYPTOSPORIDIUM AG IA: CPT

## 2023-09-08 PROCEDURE — 87506 IADNA-DNA/RNA PROBE TQ 6-11: CPT

## 2023-09-08 PROCEDURE — 87336 ENTAMOEB HIST DISPR AG IA: CPT

## 2023-09-09 LAB
CRYPTOSP AG STL QL IA: NORMAL
E HISTOLYT AG STL QL IA: NORMAL
G LAMBLIA AG STL QL IA: NORMAL

## 2023-09-10 LAB — GI PATHOGENS PNL STL NAA+PROBE: NORMAL

## 2023-09-11 LAB — CALPROTECTIN STL-MCNT: 30 UG/G

## 2023-09-17 DIAGNOSIS — K21.00 GASTROESOPHAGEAL REFLUX DISEASE WITH ESOPHAGITIS, UNSPECIFIED WHETHER HEMORRHAGE: ICD-10-CM

## 2023-09-18 NOTE — TELEPHONE ENCOUNTER
Refill Request     CONFIRM preferred pharmacy with the patient. If Mail Order Rx - Pend for 90 day refill. Last Seen: Last Seen Department: 7/13/2023  Last Seen by PCP: Visit date not found    Last Written: 8/20/2023, #120, 0 refills    If no future appointment scheduled:  Review the last OV with PCP and review information for follow-up visit,  Route STAFF MESSAGE with patient name to the [facility] for scheduling with the following information:            -  Timing of next visit           -  Visit type ie Physical, OV, etc           -  Diagnoses/Reason ie. COPD, HTN - Do not use MEDICATION, Follow-up or CHECK UP - Give reason for visit      Next Appointment:   Future Appointments   Date Time Provider [address]   9/18/2023 10:00 AM DO TALYA Zapien  [place] - DYD   10/24/2023 10:15 AM Grady Chacko MD AND ORTHO [place]O [place]/Ae sent to  to schedule appt with patient?   N/A      Requested Prescriptions     Pending Prescriptions Disp Refills    sucralfate (CARAFATE) 1 GM tablet [Pharmacy Med Name: SUCRALFATE 1 GM TABLET] 120 tablet 0     Sig: TAKE 1 TABLET BY MOUTH 4 TIMES A DAY BEFORE MEALS AND NIGHTLY

## 2023-09-19 RX ORDER — SUCRALFATE 1 G/1
TABLET ORAL
Qty: 120 TABLET | Refills: 0 | Status: SHIPPED | OUTPATIENT
Start: 2023-09-19

## 2023-09-20 ENCOUNTER — TELEPHONE (OUTPATIENT)
Dept: ADMINISTRATIVE | Age: 53
End: 2023-09-20

## 2023-09-20 DIAGNOSIS — E03.9 HYPOTHYROIDISM, UNSPECIFIED TYPE: ICD-10-CM

## 2023-09-20 DIAGNOSIS — E03.9 ACQUIRED HYPOTHYROIDISM: Primary | ICD-10-CM

## 2023-09-20 RX ORDER — LEVOTHYROXINE SODIUM 175 UG/1
175 TABLET ORAL DAILY
Qty: 30 TABLET | Refills: 1 | Status: SHIPPED | OUTPATIENT
Start: 2023-09-20

## 2023-09-20 RX ORDER — FOLIC ACID 1 MG/1
1 TABLET ORAL DAILY
Qty: 30 TABLET | Refills: 0 | Status: SHIPPED | OUTPATIENT
Start: 2023-09-20

## 2023-09-20 NOTE — TELEPHONE ENCOUNTER
Submitted PA for Pantoprazole Sodium 40MG dr tablets  Via [facility] Key: [id number] STATUS: PENDING. Follow up done daily; if no response in three days we will refax for status check. If another three days goes by with no response we will call the insurance for status.

## 2023-09-20 NOTE — TELEPHONE ENCOUNTER
Patient needs to have repeat TSH drawn to make sure new lower dose of synthroid is appropriate.  Also still needs to reschedule establish care visit

## 2023-09-20 NOTE — TELEPHONE ENCOUNTER
Refill Request     CONFIRM preferred pharmacy with the patient. If Mail Order Rx - Pend for 90 day refill. Last Seen: Last Seen Department: 7/13/2023  Last Seen by PCP: 7/13/2023    Last Written:   Synthroid-07/10/2023 30 tab 1 refills   Folic NGCB-45/75/0999    If no future appointment scheduled:  Review the last OV with PCP and review information for follow-up visit,  Route STAFF MESSAGE with patient name to the [facility] for scheduling with the following information:            -  Timing of next visit           -  Visit type ie Physical, OV, etc           -  Diagnoses/Reason ie. COPD, HTN - Do not use MEDICATION, Follow-up or CHECK UP - Give reason for visit      Next Appointment:   Future Appointments   Date Time Provider [address]   10/24/2023 10:15 AM Diana Ahmadi MD AND ORTHO [facility]       Message sent to [address] [place] [address] to schedule appt with patient?   N/A      Requested Prescriptions     Pending Prescriptions Disp Refills    levothyroxine (SYNTHROID) 175 MCG tablet 30 tablet 1     Sig: Take 1 tablet by mouth daily

## 2023-09-22 ENCOUNTER — TELEPHONE (OUTPATIENT)
Dept: FAMILY MEDICINE CLINIC | Age: 53
End: 2023-09-22

## 2023-09-22 ENCOUNTER — CLINICAL DOCUMENTATION (OUTPATIENT)
Dept: SPIRITUAL SERVICES | Age: 53
End: 2023-09-22

## 2023-09-22 NOTE — TELEPHONE ENCOUNTER
Patient called in about her diarrhea. She sent you a [employer] message on 9/17/23. She states she spoke to the GI and they wont send her anything in because they need one more sample for the c diff but she is having a hard time collecting the sample because she can never make it to the bathroom in time. She was told by a PA at the GI office that she shouldn't take imodium because they need her to release everything if it is C Diff. She said she cant go anywhere because of the diarrhea, she said he cant go anywhere at all and she wants someone to send her something in for it. She said its really effecting her life since its been going on over 6 weeks.  Please advise

## 2023-09-22 NOTE — FLOWSHEET NOTE
09/22/23 1535   Encounter Summary   Encounter Overview/Reason  Spiritual/Emotional Needs   Service Provided For: Patient   Referral/Consult From: Rounding   Last Encounter    (9/22 follow up  call, sppt and prayer)   Complexity of Encounter Moderate   Begin Time 1510   End Time  1536   Total Time Calculated 26 min

## 2023-09-22 NOTE — TELEPHONE ENCOUNTER
Patient informed   She said she was planning on getting labs done before the appt with you in October

## 2023-09-22 NOTE — TELEPHONE ENCOUNTER
Since I have never evaluated patient for this condition, I can unfortunately not give much guidance.  I would recommend following up with GI or she can schedule an appointment with me to discuss

## 2023-09-22 NOTE — TELEPHONE ENCOUNTER
Patient is very frustrated on the phone, she states \"Minal what the heck I could not leave my home in order to make it to my appointment the other because diarrhea was just coming out, I just had hip surgery and use a walker, and I have a almost run to the restroom or I will not make it, I do not want to fall. \" \" The stool sample was not runny enough to be tested. I cant take Pepto bismol I have stomach ulcers. And I have anxiety induced with diarrhea and Im starting to get anxiety\" \" I have ordered Gatorade and Have been drinking those\"     Patient is asking for something to be sent in to help her with the diarrhea. Patient is on the verge of going to the ER for the past couple of days. Patient did apologize for being frustrated, I let her know that she is okay, no worries. I let her know to stay hydrated, eat meals full of fibe; popcorn, beans broccoli,berries, and to lets us know if she becomes lethargic. Patient deny a virtual appointment with a virtualist for this afternoon.

## 2023-09-28 NOTE — TELEPHONE ENCOUNTER
3rd attempt got messge that the phone number is temporarily unavailable.  Could not LMOM     Sending pt Yi De message

## 2023-09-28 NOTE — TELEPHONE ENCOUNTER
Patient called the office back to schedule virtual visit with Dr. Tay Arias, no openings next week, please advise where to schedule.

## 2023-10-02 ENCOUNTER — TELEMEDICINE (OUTPATIENT)
Dept: FAMILY MEDICINE CLINIC | Age: 53
End: 2023-10-02
Payer: COMMERCIAL

## 2023-10-02 DIAGNOSIS — K58.0 IRRITABLE BOWEL SYNDROME WITH DIARRHEA: Primary | ICD-10-CM

## 2023-10-02 DIAGNOSIS — F41.9 ANXIETY: ICD-10-CM

## 2023-10-02 DIAGNOSIS — F33.1 MODERATE EPISODE OF RECURRENT MAJOR DEPRESSIVE DISORDER (HCC): ICD-10-CM

## 2023-10-02 DIAGNOSIS — K90.0 CD (CELIAC DISEASE): ICD-10-CM

## 2023-10-02 PROCEDURE — 99214 OFFICE O/P EST MOD 30 MIN: CPT | Performed by: STUDENT IN AN ORGANIZED HEALTH CARE EDUCATION/TRAINING PROGRAM

## 2023-10-02 RX ORDER — DIPHENOXYLATE HYDROCHLORIDE AND ATROPINE SULFATE 2.5; .025 MG/1; MG/1
1 TABLET ORAL 4 TIMES DAILY PRN
Qty: 30 TABLET | Refills: 0 | Status: SHIPPED | OUTPATIENT
Start: 2023-10-02 | End: 2023-10-12

## 2023-10-02 NOTE — PROGRESS NOTES
Tiana Haile, was evaluated through a synchronous (real-time) audio-video encounter. The patient (or guardian if applicable) is aware that this is a billable service, which includes applicable co-pays. This Virtual Visit was conducted with patient's (and/or legal guardian's) consent. Patient identification was verified, and a caregiver was present when appropriate. The patient was located at Home: Shauna Morris [address] [place] [id number]  Provider was located at Facility (Appt Dept): [facility]  [facility],  [facility]      Tiana Haile (:  1970) is a Established patient, presenting virtually for evaluation of the following:    Assessment & Plan   Below is the assessment and plan developed based on review of pertinent history, physical exam, labs, studies, and medications. 1. Irritable bowel syndrome with diarrhea  -     diphenoxylate-atropine (LOMOTIL) 2.5-0.025 MG per tablet; Take 1 tablet by mouth 4 times daily as needed for Diarrhea for up to 10 days. Max Daily Amount: 4 tablets, Disp-30 tablet, R-0Normal  2. Moderate episode of recurrent major depressive disorder (HCC)  3. CD (celiac disease)  4. Anxiety  Discussed anxiety and IBS concerns. Discussed that worsening anxiety is also worsening her frequency of diarrhea. Highly encouraged to follow up with GI and will start lomotil in the interim given negative infectious work up. Will follow up in office in 2 weeks. No follow-ups on file. Subjective   HPI    Had hip replacement on . Has had diarrhea since this time. 5-10 episodes of diarrhea a day. Will wake her up from sleep with feeling that she needs to go. Will sometimes have loose stool, soup consistency, or watery. Will sometimes have 2 Bms within 30 minutes. Has not been leaving her home because of concern for fecal urgency. Wearing depends due to this. Almost always has sensation that she needs to go.  Will have episodes of feces when passing

## 2023-10-02 NOTE — TELEPHONE ENCOUNTER
Refill Request     CONFIRM preferred pharmacy with the patient. If Mail Order Rx - Pend for 90 day refill. Last Seen: Last Seen Department: 7/13/2023    Last Seen by PCP: 10/2/23    Last Written: 6/14/23 30 capsule 2 refills    If no future appointment scheduled:  Review the last OV with PCP and review information for follow-up visit,  Route STAFF MESSAGE with patient name to the [facility] for scheduling with the following information:            -  Timing of next visit           -  Visit type ie Physical, OV, etc           -  Diagnoses/Reason ie. COPD, HTN - Do not use MEDICATION, Follow-up or CHECK UP - Give reason for visit      Next Appointment: 10/16/23  Future Appointments   Date Time Provider [address]   10/2/2023  4:30 PM [address] [place] [address] [place] - D[place]D   10/16/2023 11:00 AM Dileep Pinto [facility] [place] - D[place]   10/24/2023 10:15 AM Vikram Zurita MD AND ORTHO [facility]       Message sent to  to schedule appt with patient?   NO      Requested Prescriptions     Pending Prescriptions Disp Refills    propranolol (INDERAL LA) 80 MG extended release capsule 30 capsule 2     Sig: Take 1 capsule by mouth daily

## 2023-10-04 RX ORDER — PROPRANOLOL HYDROCHLORIDE 80 MG/1
80 CAPSULE, EXTENDED RELEASE ORAL DAILY
Qty: 90 CAPSULE | Refills: 1 | Status: SHIPPED | OUTPATIENT
Start: 2023-10-04 | End: 2023-10-27

## 2023-10-05 DIAGNOSIS — F41.9 ANXIETY: ICD-10-CM

## 2023-10-05 PROBLEM — Z86.0100 HX OF COLONIC POLYP: Status: RESOLVED | Noted: 2019-05-06 | Resolved: 2023-10-05

## 2023-10-05 PROBLEM — L73.8 BACTERIAL FOLLICULITIS: Status: RESOLVED | Noted: 2019-09-29 | Resolved: 2023-10-05

## 2023-10-05 PROBLEM — R06.02 SHORTNESS OF BREATH: Status: RESOLVED | Noted: 2023-04-14 | Resolved: 2023-10-05

## 2023-10-05 PROBLEM — Z86.010 HX OF COLONIC POLYP: Status: RESOLVED | Noted: 2019-05-06 | Resolved: 2023-10-05

## 2023-10-05 PROBLEM — I95.9 HYPOTENSION: Status: RESOLVED | Noted: 2023-04-14 | Resolved: 2023-10-05

## 2023-10-05 NOTE — TELEPHONE ENCOUNTER
Refill Request     CONFIRM preferred pharmacy with the patient. If Mail Order Rx - Pend for 90 day refill. Last Seen: Last Seen Department: 10/2/2023  Last Seen by PCP: 10/2/2023    Last Written: 06/28/2023 120 tab 1 refills    If no future appointment scheduled:  Review the last OV with PCP and review information for follow-up visit,  Route STAFF MESSAGE with patient name to the [facility] for scheduling with the following information:            -  Timing of next visit           -  Visit type ie Physical, OV, etc           -  Diagnoses/Reason ie. COPD, HTN - Do not use MEDICATION, Follow-up or CHECK UP - Give reason for visit      Next Appointment:   Future Appointments   Date Time Provider [address] [place] [address]   10/16/2023 11:00 AM Ford Pinto DO [place]  [place] - D[place]   10/24/2023 10:15 AM William Worthy MD AND ORTHO [facility]       Message sent to [address] [place] [address] to schedule appt with patient?   NO      Requested Prescriptions     Pending Prescriptions Disp Refills    primidone (MYSOLINE) 250 MG tablet 120 tablet 1     Sig: Take 1 tablet by mouth 3 times daily

## 2023-10-06 ENCOUNTER — TELEPHONE (OUTPATIENT)
Dept: ADMINISTRATIVE | Age: 53
End: 2023-10-06

## 2023-10-06 RX ORDER — CLONAZEPAM 1 MG/1
TABLET ORAL
Qty: 75 TABLET | Refills: 2 | Status: SHIPPED | OUTPATIENT
Start: 2023-10-06 | End: 2024-02-01

## 2023-10-06 RX ORDER — PRIMIDONE 250 MG/1
250 TABLET ORAL 3 TIMES DAILY
Qty: 120 TABLET | Refills: 1 | Status: SHIPPED | OUTPATIENT
Start: 2023-10-06

## 2023-10-06 NOTE — TELEPHONE ENCOUNTER
Submitted PA for clonazePAM 1MG tablets  Via [facility] Key: [id number] STATUS: PENDING. Follow up done daily; if no response in three days we will refax for status check. If another three days goes by with no response we will call the insurance for status.

## 2023-10-08 DIAGNOSIS — M43.10 RETROLISTHESIS OF VERTEBRAE: ICD-10-CM

## 2023-10-08 DIAGNOSIS — F33.2 SEVERE EPISODE OF RECURRENT MAJOR DEPRESSIVE DISORDER, WITHOUT PSYCHOTIC FEATURES (HCC): ICD-10-CM

## 2023-10-08 DIAGNOSIS — M47.816 SPONDYLOSIS OF LUMBAR SPINE: ICD-10-CM

## 2023-10-08 DIAGNOSIS — G62.9 NEUROPATHY: ICD-10-CM

## 2023-10-08 DIAGNOSIS — G89.29 CHRONIC RIGHT HIP PAIN: ICD-10-CM

## 2023-10-08 DIAGNOSIS — R10.31 CHRONIC PAIN OF RIGHT INGUINAL REGION: ICD-10-CM

## 2023-10-08 DIAGNOSIS — G89.29 CHRONIC PAIN OF RIGHT INGUINAL REGION: ICD-10-CM

## 2023-10-08 DIAGNOSIS — M51.36 DDD (DEGENERATIVE DISC DISEASE), LUMBAR: ICD-10-CM

## 2023-10-08 DIAGNOSIS — F43.21 COMPLICATED GRIEF: ICD-10-CM

## 2023-10-08 DIAGNOSIS — M25.551 CHRONIC RIGHT HIP PAIN: ICD-10-CM

## 2023-10-08 NOTE — TELEPHONE ENCOUNTER
Refill Request     CONFIRM preferred pharmacy with the patient. If Mail Order Rx - Pend for 90 day refill. Last Seen: Last Seen Department: 10/2/2023  Last Seen by PCP: 10/2/2023    Last Written: 7/12/2023    If no future appointment scheduled:  Review the last OV with PCP and review information for follow-up visit,  Route STAFF MESSAGE with patient name to the [facility] for scheduling with the following information:            -  Timing of next visit           -  Visit type ie Physical, OV, etc           -  Diagnoses/Reason ie. COPD, HTN - Do not use MEDICATION, Follow-up or CHECK UP - Give reason for visit      Next Appointment:   Future Appointments   Date Time Provider [address]   10/16/2023 11:00 AM Jamshid Pinto, DO [place]  [place] - D[place]   10/24/2023 10:15 AM Arianne Perry MD AND ORTHO [facility]       Message sent to [address] [place] [address] to schedule appt with patient?   NO      Requested Prescriptions     Pending Prescriptions Disp Refills    DULoxetine (CYMBALTA) 30 MG extended release capsule 90 capsule 1     Sig: Take 1 capsule by mouth daily

## 2023-10-09 RX ORDER — DULOXETIN HYDROCHLORIDE 30 MG/1
30 CAPSULE, DELAYED RELEASE ORAL DAILY
Qty: 90 CAPSULE | Refills: 1 | Status: SHIPPED | OUTPATIENT
Start: 2023-10-09

## 2023-10-09 NOTE — TELEPHONE ENCOUNTER
The medication was DENIED; DENIAL letter uploaded to MEDIA. If you want an APPEAL; please note in this encounter what new information you would like to APPEAL with. Once complete route back to PA POOL. If this requires a response please respond to the pool ( P [phone]). Thank you please advise patient.

## 2023-10-09 NOTE — TELEPHONE ENCOUNTER
This information was indicated in the original PA. What other information do you have that I could use for grounds of an appeal? I also had added that she tried and failed pregabalin and gabapentin. Any info you can give me would help. They are being rather difficult.      Thank you

## 2023-10-13 NOTE — TELEPHONE ENCOUNTER
Appeal letter for clonazepam submitted via fax to [facility] via fax. Status PENDING.

## 2023-10-16 ENCOUNTER — OFFICE VISIT (OUTPATIENT)
Dept: FAMILY MEDICINE CLINIC | Age: 53
End: 2023-10-16

## 2023-10-16 VITALS
BODY MASS INDEX: 34.23 KG/M2 | TEMPERATURE: 97.9 F | OXYGEN SATURATION: 97 % | WEIGHT: 199.4 LBS | HEART RATE: 81 BPM | DIASTOLIC BLOOD PRESSURE: 70 MMHG | SYSTOLIC BLOOD PRESSURE: 128 MMHG

## 2023-10-16 DIAGNOSIS — M25.551 CHRONIC RIGHT HIP PAIN: ICD-10-CM

## 2023-10-16 DIAGNOSIS — E03.9 ACQUIRED HYPOTHYROIDISM: ICD-10-CM

## 2023-10-16 DIAGNOSIS — F43.21 COMPLICATED GRIEF: ICD-10-CM

## 2023-10-16 DIAGNOSIS — D68.59 HEREDITARY PROTEIN S DEFICIENCY (HCC): Chronic | ICD-10-CM

## 2023-10-16 DIAGNOSIS — Z86.73 HX OF STROKE ASSOCIATED WITH BLOOD CLOTTING TENDENCY: ICD-10-CM

## 2023-10-16 DIAGNOSIS — E55.9 VITAMIN D DEFICIENCY: ICD-10-CM

## 2023-10-16 DIAGNOSIS — D50.0 BLOOD LOSS ANEMIA: ICD-10-CM

## 2023-10-16 DIAGNOSIS — F33.2 SEVERE EPISODE OF RECURRENT MAJOR DEPRESSIVE DISORDER, WITHOUT PSYCHOTIC FEATURES (HCC): ICD-10-CM

## 2023-10-16 DIAGNOSIS — G25.0 ESSENTIAL TREMOR: ICD-10-CM

## 2023-10-16 DIAGNOSIS — G89.29 CHRONIC RIGHT HIP PAIN: ICD-10-CM

## 2023-10-16 DIAGNOSIS — Z79.899 HIGH RISK MEDICATION USE: ICD-10-CM

## 2023-10-16 DIAGNOSIS — Z00.00 ENCOUNTER FOR WELL ADULT EXAM WITHOUT ABNORMAL FINDINGS: Primary | ICD-10-CM

## 2023-10-16 DIAGNOSIS — Z13.220 SCREENING FOR HYPERLIPIDEMIA: ICD-10-CM

## 2023-10-16 DIAGNOSIS — M51.36 DDD (DEGENERATIVE DISC DISEASE), LUMBAR: ICD-10-CM

## 2023-10-16 DIAGNOSIS — G40.909 NONINTRACTABLE EPILEPSY WITHOUT STATUS EPILEPTICUS, UNSPECIFIED EPILEPSY TYPE (HCC): ICD-10-CM

## 2023-10-16 DIAGNOSIS — M47.816 SPONDYLOSIS OF LUMBAR SPINE: ICD-10-CM

## 2023-10-16 DIAGNOSIS — G62.9 NEUROPATHY: ICD-10-CM

## 2023-10-16 DIAGNOSIS — F41.9 ANXIETY: ICD-10-CM

## 2023-10-16 DIAGNOSIS — M43.10 RETROLISTHESIS OF VERTEBRAE: ICD-10-CM

## 2023-10-16 DIAGNOSIS — G50.0 TRIGEMINAL NEURALGIA: ICD-10-CM

## 2023-10-16 DIAGNOSIS — Z23 NEED FOR INFLUENZA VACCINATION: ICD-10-CM

## 2023-10-16 RX ORDER — CARBAMAZEPINE 400 MG/1
400 TABLET, EXTENDED RELEASE ORAL EVERY 8 HOURS
Qty: 270 TABLET | Refills: 1 | Status: SHIPPED | OUTPATIENT
Start: 2023-10-16

## 2023-10-16 RX ORDER — ATORVASTATIN CALCIUM 40 MG/1
40 TABLET, FILM COATED ORAL DAILY
Qty: 90 TABLET | Refills: 1 | Status: SHIPPED | OUTPATIENT
Start: 2023-10-16

## 2023-10-16 RX ORDER — DULOXETIN HYDROCHLORIDE 60 MG/1
60 CAPSULE, DELAYED RELEASE ORAL DAILY
Qty: 90 CAPSULE | Refills: 1 | Status: SHIPPED | OUTPATIENT
Start: 2023-10-16

## 2023-10-16 RX ORDER — MECLIZINE HCL 12.5 MG/1
12.5 TABLET ORAL AS NEEDED
COMMUNITY
Start: 2023-09-17

## 2023-10-16 ASSESSMENT — ANXIETY QUESTIONNAIRES
3. WORRYING TOO MUCH ABOUT DIFFERENT THINGS: 2
2. NOT BEING ABLE TO STOP OR CONTROL WORRYING: 2
5. BEING SO RESTLESS THAT IT IS HARD TO SIT STILL: 0
7. FEELING AFRAID AS IF SOMETHING AWFUL MIGHT HAPPEN: 2
1. FEELING NERVOUS, ANXIOUS, OR ON EDGE: 2
IF YOU CHECKED OFF ANY PROBLEMS ON THIS QUESTIONNAIRE, HOW DIFFICULT HAVE THESE PROBLEMS MADE IT FOR YOU TO DO YOUR WORK, TAKE CARE OF THINGS AT HOME, OR GET ALONG WITH OTHER PEOPLE: NOT DIFFICULT AT ALL
6. BECOMING EASILY ANNOYED OR IRRITABLE: 1
4. TROUBLE RELAXING: 1
GAD7 TOTAL SCORE: 10

## 2023-10-16 ASSESSMENT — PATIENT HEALTH QUESTIONNAIRE - PHQ9
SUM OF ALL RESPONSES TO PHQ QUESTIONS 1-9: 12
2. FEELING DOWN, DEPRESSED OR HOPELESS: 1
1. LITTLE INTEREST OR PLEASURE IN DOING THINGS: 2
SUM OF ALL RESPONSES TO PHQ9 QUESTIONS 1 & 2: 3
10. IF YOU CHECKED OFF ANY PROBLEMS, HOW DIFFICULT HAVE THESE PROBLEMS MADE IT FOR YOU TO DO YOUR WORK, TAKE CARE OF THINGS AT HOME, OR GET ALONG WITH OTHER PEOPLE: 3
SUM OF ALL RESPONSES TO PHQ QUESTIONS 1-9: 12
8. MOVING OR SPEAKING SO SLOWLY THAT OTHER PEOPLE COULD HAVE NOTICED. OR THE OPPOSITE, BEING SO FIGETY OR RESTLESS THAT YOU HAVE BEEN MOVING AROUND A LOT MORE THAN USUAL: 0
4. FEELING TIRED OR HAVING LITTLE ENERGY: 3
9. THOUGHTS THAT YOU WOULD BE BETTER OFF DEAD, OR OF HURTING YOURSELF: 0
6. FEELING BAD ABOUT YOURSELF - OR THAT YOU ARE A FAILURE OR HAVE LET YOURSELF OR YOUR FAMILY DOWN: 0
5. POOR APPETITE OR OVEREATING: 2
7. TROUBLE CONCENTRATING ON THINGS, SUCH AS READING THE NEWSPAPER OR WATCHING TELEVISION: 1
SUM OF ALL RESPONSES TO PHQ QUESTIONS 1-9: 12
3. TROUBLE FALLING OR STAYING ASLEEP: 3
SUM OF ALL RESPONSES TO PHQ QUESTIONS 1-9: 12

## 2023-10-16 ASSESSMENT — ENCOUNTER SYMPTOMS
CONSTIPATION: 0
VOMITING: 0
NAUSEA: 0
SHORTNESS OF BREATH: 0
DIARRHEA: 0

## 2023-10-16 NOTE — PROGRESS NOTES
Influenza, FLUCELVAX, (age 10 mo+), IM, Preservative Free, 0.5 mL  16. Nonintractable epilepsy without status epilepticus, unspecified epilepsy type ([address])  -     carBAMazepine (TEGRETOL XR) 400 MG extended release tablet; Take 1 tablet by mouth in the morning and 1 tablet at noon and 1 tablet in the evening., Disp-270 tablet, R-1Normal  17. Trigeminal neuralgia  -     carBAMazepine (TEGRETOL XR) 400 MG extended release tablet; Take 1 tablet by mouth in the morning and 1 tablet at noon and 1 tablet in the evening., Disp-270 tablet, R-1Normal  18. Hx of stroke associated with blood clotting tendency  -     atorvastatin (LIPITOR) 40 MG tablet; Take 1 tablet by mouth daily, Disp-90 tablet, R-1Normal  19. Anxiety  20. Essential tremor  Will refill previous medications although will stop celebrex given gastric ulcer history. May also consider stopping prorpranolol in the future given reported lack of improvement on essential tremor. Recehck lab work as above and may stop iron supplement if anemia resolved after ulcer bleed. Will increase cymbalta to see if this helps both IBS-D, chornic pain, and anxiety. Continue klonopin. Reviewed preventative health recommendations including vaccinations.        Personalized Preventive Plan   Current Health Maintenance Status  Immunization History   Administered Date(s) Administered    Influenza, FLUARIX, FLULAVAL, FLUZONE (age 10 mo+) AND AFLURIA, (age 1 y+), PF, 0.5mL 09/19/2019, 10/14/2020    Influenza, FLUCELVAX, (age 10 mo+), MDCK, PF, 0.5mL 10/26/2022, 10/16/2023    TDaP, ADACEL (age 6y-58y), BOOSTRIX (age 10y+), IM, 0.5mL 04/04/2019    Zoster Recombinant (Shingrix) 07/23/2019, 02/05/2020, 02/17/2020, 09/25/2020        Health Maintenance   Topic Date Due    Hepatitis B vaccine (1 of 3 - 3-dose series) Never done    COVID-19 Vaccine (1) Never done    Pneumococcal 0-64 years Vaccine (1 - PCV) Never done    Breast cancer screen  07/13/2022    Lipids  08/09/2023    Depression Monitoring

## 2023-10-16 NOTE — TELEPHONE ENCOUNTER
I received this christian back from the plan but honestly have no idea what it means. It looks like the attached form I am sending has to be sighed by the patient. I have not come across a PA like this before. Sorry this is turning out so difficult. The appeal we sent apparently was not legit because it was not done their way? You will have to print this out and have the patient come in and sign it and then complete the rest and send in to the given fax numbers. Please feel free to reach out to me via phone or teams. My phone # is [phone]. Sorry Dr. Sara Ji.

## 2023-10-17 PROBLEM — G89.29 CHRONIC RIGHT HIP PAIN: Status: RESOLVED | Noted: 2023-03-01 | Resolved: 2023-10-17

## 2023-10-17 PROBLEM — Z86.711 HISTORY OF PULMONARY EMBOLUS (PE): Status: RESOLVED | Noted: 2019-10-17 | Resolved: 2023-10-17

## 2023-10-17 PROBLEM — L91.0 HYPERTROPHIC SCAR: Status: RESOLVED | Noted: 2019-09-29 | Resolved: 2023-10-17

## 2023-10-17 PROBLEM — Z86.718 HISTORY OF DVT (DEEP VEIN THROMBOSIS): Status: RESOLVED | Noted: 2019-10-17 | Resolved: 2023-10-17

## 2023-10-17 PROBLEM — R00.0 SINUS TACHYCARDIA: Status: RESOLVED | Noted: 2023-04-14 | Resolved: 2023-10-17

## 2023-10-17 PROBLEM — Z87.898 HISTORY OF SEIZURES: Status: RESOLVED | Noted: 2019-10-17 | Resolved: 2023-10-17

## 2023-10-17 PROBLEM — Z85.850 HISTORY OF THYROID CANCER: Status: RESOLVED | Noted: 2021-08-30 | Resolved: 2023-10-17

## 2023-10-17 PROBLEM — Z86.61 H/O VIRAL MENINGITIS: Status: RESOLVED | Noted: 2019-10-17 | Resolved: 2023-10-17

## 2023-10-17 PROBLEM — F33.1 MODERATE EPISODE OF RECURRENT MAJOR DEPRESSIVE DISORDER (HCC): Status: RESOLVED | Noted: 2019-09-19 | Resolved: 2023-10-17

## 2023-10-17 PROBLEM — M25.551 CHRONIC RIGHT HIP PAIN: Status: RESOLVED | Noted: 2023-03-01 | Resolved: 2023-10-17

## 2023-10-17 PROBLEM — M16.11 PRIMARY LOCALIZED OSTEOARTHRITIS OF RIGHT HIP: Status: RESOLVED | Noted: 2023-07-24 | Resolved: 2023-10-17

## 2023-10-17 PROBLEM — R10.9 ABDOMINAL PAIN: Status: RESOLVED | Noted: 2023-04-13 | Resolved: 2023-10-17

## 2023-10-17 PROBLEM — F41.1 GAD (GENERALIZED ANXIETY DISORDER): Status: RESOLVED | Noted: 2020-04-02 | Resolved: 2023-10-17

## 2023-10-17 PROBLEM — D64.9 ANEMIA: Status: RESOLVED | Noted: 2023-04-14 | Resolved: 2023-10-17

## 2023-10-17 PROBLEM — M19.079 ARTHRITIS OF MIDFOOT: Status: RESOLVED | Noted: 2017-05-10 | Resolved: 2023-10-17

## 2023-10-17 PROBLEM — E03.9 ACQUIRED HYPOTHYROIDISM: Status: RESOLVED | Noted: 2023-04-14 | Resolved: 2023-10-17

## 2023-10-17 PROBLEM — Z86.73 H/O RECURRENT TRANSIENT ISCHEMIC ATTACKS: Status: RESOLVED | Noted: 2019-10-17 | Resolved: 2023-10-17

## 2023-10-20 ENCOUNTER — PATIENT MESSAGE (OUTPATIENT)
Dept: FAMILY MEDICINE CLINIC | Age: 53
End: 2023-10-20

## 2023-10-20 DIAGNOSIS — K58.0 IRRITABLE BOWEL SYNDROME WITH DIARRHEA: Primary | ICD-10-CM

## 2023-10-23 RX ORDER — DICYCLOMINE HYDROCHLORIDE 10 MG/1
10 CAPSULE ORAL 4 TIMES DAILY
Qty: 120 CAPSULE | Refills: 0 | Status: SHIPPED | OUTPATIENT
Start: 2023-10-23

## 2023-10-23 RX ORDER — DIPHENOXYLATE HYDROCHLORIDE AND ATROPINE SULFATE 2.5; .025 MG/1; MG/1
1 TABLET ORAL 4 TIMES DAILY PRN
Qty: 90 TABLET | Refills: 0 | Status: SHIPPED | OUTPATIENT
Start: 2023-10-23 | End: 2023-11-22

## 2023-10-24 ENCOUNTER — OFFICE VISIT (OUTPATIENT)
Dept: ORTHOPEDIC SURGERY | Age: 53
End: 2023-10-24
Payer: COMMERCIAL

## 2023-10-24 VITALS — HEIGHT: 64 IN | BODY MASS INDEX: 33.97 KG/M2 | WEIGHT: 199 LBS

## 2023-10-24 DIAGNOSIS — Z96.641 S/P TOTAL RIGHT HIP ARTHROPLASTY: Primary | ICD-10-CM

## 2023-10-24 PROCEDURE — 99213 OFFICE O/P EST LOW 20 MIN: CPT | Performed by: PHYSICIAN ASSISTANT

## 2023-10-27 ENCOUNTER — TELEPHONE (OUTPATIENT)
Dept: FAMILY MEDICINE CLINIC | Age: 53
End: 2023-10-27

## 2023-10-27 ENCOUNTER — TELEMEDICINE (OUTPATIENT)
Dept: PRIMARY CARE CLINIC | Age: 53
End: 2023-10-27
Payer: COMMERCIAL

## 2023-10-27 DIAGNOSIS — Z20.822 SUSPECTED COVID-19 VIRUS INFECTION: ICD-10-CM

## 2023-10-27 DIAGNOSIS — U07.1 COVID: Primary | ICD-10-CM

## 2023-10-27 DIAGNOSIS — J06.9 VIRAL UPPER RESPIRATORY TRACT INFECTION: ICD-10-CM

## 2023-10-27 DIAGNOSIS — J30.2 SEASONAL ALLERGIC RHINITIS, UNSPECIFIED TRIGGER: ICD-10-CM

## 2023-10-27 PROCEDURE — 99213 OFFICE O/P EST LOW 20 MIN: CPT | Performed by: NURSE PRACTITIONER

## 2023-10-27 RX ORDER — DEXTROMETHORPHAN HYDROBROMIDE AND PROMETHAZINE HYDROCHLORIDE 15; 6.25 MG/5ML; MG/5ML
5 SYRUP ORAL 3 TIMES DAILY PRN
Qty: 110 ML | Refills: 0 | Status: SHIPPED | OUTPATIENT
Start: 2023-10-27 | End: 2023-10-27

## 2023-10-27 RX ORDER — ALBUTEROL SULFATE 90 UG/1
2 AEROSOL, METERED RESPIRATORY (INHALATION) EVERY 6 HOURS PRN
Qty: 18 G | Refills: 3 | Status: SHIPPED | OUTPATIENT
Start: 2023-10-27 | End: 2023-10-27

## 2023-10-27 RX ORDER — DEXTROMETHORPHAN HYDROBROMIDE AND PROMETHAZINE HYDROCHLORIDE 15; 6.25 MG/5ML; MG/5ML
5 SYRUP ORAL 3 TIMES DAILY PRN
Qty: 110 ML | Refills: 0 | Status: SHIPPED | OUTPATIENT
Start: 2023-10-27 | End: 2023-11-03

## 2023-10-27 RX ORDER — ALBUTEROL SULFATE 90 UG/1
2 AEROSOL, METERED RESPIRATORY (INHALATION) EVERY 6 HOURS PRN
Qty: 18 G | Refills: 3 | Status: SHIPPED | OUTPATIENT
Start: 2023-10-27

## 2023-10-27 RX ORDER — GUAIFENESIN 600 MG/1
600 TABLET, EXTENDED RELEASE ORAL 2 TIMES DAILY
Qty: 20 TABLET | Refills: 0 | Status: SHIPPED | OUTPATIENT
Start: 2023-10-27 | End: 2023-10-27

## 2023-10-27 RX ORDER — CETIRIZINE HYDROCHLORIDE 10 MG/1
10 TABLET ORAL DAILY
Qty: 30 TABLET | Refills: 3 | Status: SHIPPED | OUTPATIENT
Start: 2023-10-27

## 2023-10-27 RX ORDER — GUAIFENESIN 600 MG/1
600 TABLET, EXTENDED RELEASE ORAL 2 TIMES DAILY
Qty: 20 TABLET | Refills: 0 | Status: SHIPPED | OUTPATIENT
Start: 2023-10-27 | End: 2023-11-06

## 2023-10-27 RX ORDER — BENZONATATE 200 MG/1
200 CAPSULE ORAL 3 TIMES DAILY PRN
Qty: 30 CAPSULE | Refills: 0 | Status: SHIPPED | OUTPATIENT
Start: 2023-10-27 | End: 2023-11-03

## 2023-10-27 RX ORDER — BENZONATATE 200 MG/1
200 CAPSULE ORAL 3 TIMES DAILY PRN
Qty: 30 CAPSULE | Refills: 0 | Status: SHIPPED | OUTPATIENT
Start: 2023-10-27 | End: 2023-10-27

## 2023-10-27 ASSESSMENT — ENCOUNTER SYMPTOMS
COUGH: 1
SORE THROAT: 1
CHEST TIGHTNESS: 1
VOICE CHANGE: 1
WHEEZING: 0
GASTROINTESTINAL NEGATIVE: 1
SHORTNESS OF BREATH: 0
SINUS PRESSURE: 1

## 2023-10-27 NOTE — TELEPHONE ENCOUNTER
Armin Hernandez, You are such a blessing to me today. Thank you for sharing that information. My patient is not feeling well and S/P hip surgery and she is not able to go into the store and I wanted her to be able to pick this up all at once and they only had 4 pills left, but they did have it in stock over the counter, so they made arrangements to bring it out to her car and they were able to take it OTC and give it to her. I appreciate you letting me know so we could help her.  Thanks, Dieter Norris

## 2023-10-27 NOTE — PROGRESS NOTES
[facility] Virtualist Encounter Note       Chief Complaint   Patient presents with    Concern For COVID-19    URI    Fever    Cough    Congestion       HPI:    Karley Frias (:  1970) has requested an audio/video evaluation for the following concern(s): This is an established patient of Dr. Paradise Subramanian. This is the first time I am seeing the patient today. She requested this visit for sore throat, fever, headache, and body aches. Symptoms started Tuesday evening into Wednesday morning (3 days). She has Ortho follow up appt from hip surgery on Tuesday and stated she felt fine. Symptoms include: Fever highest 102, sore throat, cough, congestion, fatigue, and CP with coughing so much and forcefully. She has had COVID a couple years ago and was treated with Monoclonal antibodies. She does live alone and has no family in the area to help her and she is in a new home and does not have neighbors to help and is hard for her to get around/drive due to recent post op. She has taken Tylenol but was not sure with her other medication what she could take. Suspect COVID and will have her test at home. Home COVID is positive. She is candidate for Molnupiravir. Review of Systems   Constitutional:  Positive for chills, fatigue and fever. HENT:  Positive for congestion, postnasal drip, sinus pressure, sore throat and voice change. Respiratory:  Positive for cough and chest tightness. Negative for shortness of breath and wheezing. Cardiovascular:  Positive for chest pain (Burning with cough). Negative for palpitations. Gastrointestinal: Negative. Genitourinary: Negative. Musculoskeletal:  Positive for myalgias. Neurological:  Positive for headaches. Prior to Visit Medications    Medication Sig Taking?  Authorizing Provider   cetirizine (ZYRTEC) 10 MG tablet Take 1 tablet by mouth daily Yes Gilson Olson, APRN - CNP   COVID-19 Antigen Test KIT 1 kit by In Vitro route

## 2023-10-27 NOTE — TELEPHONE ENCOUNTER
Patient called the office stating that the Mucinex is out of stock at [facility]. Are you able to send in another medication as she can't afford them financially OTC.

## 2023-10-27 NOTE — PATIENT INSTRUCTIONS
Your recent COVID test was positive. You are a candidate for Molnupiravir. Allergies, Medications, and labs reviewed. Currently, the CDC recommends staying at home in self isolation for at least 5 days. After that, if you are without a fever and symptoms are improving, you may go out, but only if wearing a mask for an additional 5 days. Thankfully, most people recover uneventfully. The mainstay of treatment for most people remains focused on symptom control, isolating and watching for signs of worsening illness. You should drink plenty of fluids, eat when you are able, and rest as much as possible. Recommend treating symptoms with OTC medications: Flonase for congestions, Mucinex for Phlegm, Robitussin DM or Delsym for cough, Tylenol for fever/body aches. Cepacol lozenges and saltwater gargles for sore throat. Recommend Vitamin D, C and Zinc.    We do not prescribe antibiotics for viral illnesses; however, we can treat secondary infections should the need arise. Please seek more urgent medical attention if you develop any of the following:  Chest pain  Shortness of breath  Bluish lips or face  Severe headache   Severe dizziness or passing out  New confusion  Inability to stay awake  Extreme weakness    If you have a pulse oximeter, check it at least daily. Seek urgent medical attention if it drops to 92% or below. Drink plenty of fluids and rest.  Practice good hand hygiene. May use Cepacol lozenges, or chloraseptic spray. May sleep with humidifier as needed. Gargle with warm salt water 3-4 times a day to help with sore throat. Warm tea with honey. You can use ice, warm chicken noodle soup, soft foods, popsicles and cough drops to help soothe your throat. May take Tylenol as needed for fever/pain. Do not eat or drink after anyone. Do not share utensils. Cover your mouth and nose when you cough or sneeze. Follow up with PCP in 3-4 days if not improving or sooner if worsening.   Please

## 2023-10-27 NOTE — TELEPHONE ENCOUNTER
Patient called back stating that the pharmacist is going to retreive the mucinex   from the shelf for her so you do not have to sent anything else in for her. Thank you.

## 2023-11-03 ENCOUNTER — CLINICAL DOCUMENTATION (OUTPATIENT)
Dept: SPIRITUAL SERVICES | Age: 53
End: 2023-11-03

## 2023-11-03 NOTE — FLOWSHEET NOTE
11/03/23 1407   Encounter Summary   Encounter Overview/Reason  Attempted Encounter   Service Provided For: Patient not available   Referral/Consult From: Kai   Last Encounter    (11/3 attempted  call, no answer)   Complexity of Encounter Low   Begin Time 1400   End Time  1405   Total Time Calculated 5 min

## 2023-11-07 DIAGNOSIS — G89.29 CHRONIC PAIN OF RIGHT INGUINAL REGION: ICD-10-CM

## 2023-11-07 DIAGNOSIS — M51.36 DDD (DEGENERATIVE DISC DISEASE), LUMBAR: ICD-10-CM

## 2023-11-07 DIAGNOSIS — M47.816 SPONDYLOSIS OF LUMBAR SPINE: ICD-10-CM

## 2023-11-07 DIAGNOSIS — G89.29 CHRONIC RIGHT HIP PAIN: ICD-10-CM

## 2023-11-07 DIAGNOSIS — A09 DIARRHEA OF INFECTIOUS ORIGIN: Primary | ICD-10-CM

## 2023-11-07 DIAGNOSIS — R10.31 CHRONIC PAIN OF RIGHT INGUINAL REGION: ICD-10-CM

## 2023-11-07 DIAGNOSIS — M43.10 RETROLISTHESIS OF VERTEBRAE: ICD-10-CM

## 2023-11-07 DIAGNOSIS — M25.551 CHRONIC RIGHT HIP PAIN: ICD-10-CM

## 2023-11-07 NOTE — TELEPHONE ENCOUNTER
Refill Request - Controlled Substance    CONFIRM preferred pharmacy with the patient.    If Mail Order Rx - Pend for 90 day refill.        Last Seen Department: 10/16/2023  Last Seen by PCP: 10/16/2023    Last Written: 8/25/2023    Last UDS: 2/25/2023    Med Agreement Signed On: 2/26/2019    If no future appointment scheduled:  Review the last OV with PCP and review information for follow-up visit,  Route STAFF MESSAGE with patient name to the  Pool for scheduling with the following information:            -  Timing of next visit           -  Visit type ie Physical, OV, etc           -  Diagnoses/Reason ie. COPD, HTN - Do not use MEDICATION, Follow-up or CHECK UP - Give reason for visit        Next Appointment:   Future Appointments   Date Time Provider Department Center   11/20/2023  4:00 PM Reji Pinto DO [facility] [place] [facility]       Message sent to  to schedule appt with patient?  NO      Requested Prescriptions     Pending Prescriptions Disp Refills    traMADol (ULTRAM) 50 MG tablet [Pharmacy Med Name: traMADol HCL 50MG TABLET] 30 tablet 0     Sig: TAKE 1 TABLET BY MOUTH DAILY AS NEEDED FOR PAIN. MAX OF 1 TABLET PER DAY. REDUCE DOSES TAKEN AS PAIN BECOMES MANAGEABLE.

## 2023-11-08 RX ORDER — TRAMADOL HYDROCHLORIDE 50 MG/1
TABLET ORAL
Qty: 30 TABLET | Refills: 0 | Status: SHIPPED | OUTPATIENT
Start: 2023-11-08 | End: 2023-12-08

## 2023-11-13 DIAGNOSIS — E55.9 VITAMIN D DEFICIENCY: ICD-10-CM

## 2023-11-13 DIAGNOSIS — D68.59 HEREDITARY PROTEIN S DEFICIENCY (HCC): Chronic | ICD-10-CM

## 2023-11-13 DIAGNOSIS — Z79.899 HIGH RISK MEDICATION USE: ICD-10-CM

## 2023-11-13 DIAGNOSIS — Z13.220 SCREENING FOR HYPERLIPIDEMIA: ICD-10-CM

## 2023-11-13 DIAGNOSIS — E03.9 ACQUIRED HYPOTHYROIDISM: ICD-10-CM

## 2023-11-13 DIAGNOSIS — A09 DIARRHEA OF INFECTIOUS ORIGIN: ICD-10-CM

## 2023-11-13 DIAGNOSIS — D50.0 BLOOD LOSS ANEMIA: ICD-10-CM

## 2023-11-14 LAB
25(OH)D3 SERPL-MCNC: 68.5 NG/ML
ALBUMIN SERPL-MCNC: 4.7 G/DL (ref 3.4–5)
ALBUMIN/GLOB SERPL: 2.2 {RATIO} (ref 1.1–2.2)
ALP SERPL-CCNC: 125 U/L (ref 40–129)
ALT SERPL-CCNC: 22 U/L (ref 10–40)
ANION GAP SERPL CALCULATED.3IONS-SCNC: 12 MMOL/L (ref 3–16)
AST SERPL-CCNC: 23 U/L (ref 15–37)
BASOPHILS # BLD: 0 K/UL (ref 0–0.2)
BASOPHILS NFR BLD: 0.7 %
BILIRUB SERPL-MCNC: 0.3 MG/DL (ref 0–1)
BUN SERPL-MCNC: 13 MG/DL (ref 7–20)
CALCIUM SERPL-MCNC: 9.6 MG/DL (ref 8.3–10.6)
CARBAMAZEPINE DOSE: NORMAL MG
CARBAMAZEPINE SERPL-MCNC: 8.1 UG/ML (ref 4–12)
CHLORIDE SERPL-SCNC: 102 MMOL/L (ref 99–110)
CHOLEST SERPL-MCNC: 195 MG/DL (ref 0–199)
CO2 SERPL-SCNC: 23 MMOL/L (ref 21–32)
CREAT SERPL-MCNC: 0.7 MG/DL (ref 0.6–1.1)
DEPRECATED RDW RBC AUTO: 14.4 % (ref 12.4–15.4)
EOSINOPHIL # BLD: 0.1 K/UL (ref 0–0.6)
EOSINOPHIL NFR BLD: 2.2 %
GFR SERPLBLD CREATININE-BSD FMLA CKD-EPI: >60 ML/MIN/{1.73_M2}
GLUCOSE SERPL-MCNC: 85 MG/DL (ref 70–99)
HCT VFR BLD AUTO: 43.4 % (ref 36–48)
HDLC SERPL-MCNC: 95 MG/DL (ref 40–60)
HGB BLD-MCNC: 14.9 G/DL (ref 12–16)
LDLC SERPL CALC-MCNC: 66 MG/DL
LYMPHOCYTES # BLD: 1.6 K/UL (ref 1–5.1)
LYMPHOCYTES NFR BLD: 29 %
MCH RBC QN AUTO: 30.2 PG (ref 26–34)
MCHC RBC AUTO-ENTMCNC: 34.3 G/DL (ref 31–36)
MCV RBC AUTO: 88.1 FL (ref 80–100)
MONOCYTES # BLD: 0.5 K/UL (ref 0–1.3)
MONOCYTES NFR BLD: 8.5 %
NEUTROPHILS # BLD: 3.3 K/UL (ref 1.7–7.7)
NEUTROPHILS NFR BLD: 59.6 %
PLATELET # BLD AUTO: 238 K/UL (ref 135–450)
PMV BLD AUTO: 9.2 FL (ref 5–10.5)
POTASSIUM SERPL-SCNC: 4.3 MMOL/L (ref 3.5–5.1)
PROT SERPL-MCNC: 6.8 G/DL (ref 6.4–8.2)
RBC # BLD AUTO: 4.92 M/UL (ref 4–5.2)
SODIUM SERPL-SCNC: 137 MMOL/L (ref 136–145)
TRIGL SERPL-MCNC: 168 MG/DL (ref 0–150)
TSH SERPL DL<=0.005 MIU/L-ACNC: 0.27 UIU/ML (ref 0.27–4.2)
VLDLC SERPL CALC-MCNC: 34 MG/DL
WBC # BLD AUTO: 5.5 K/UL (ref 4–11)

## 2023-11-20 ENCOUNTER — TELEMEDICINE (OUTPATIENT)
Dept: FAMILY MEDICINE CLINIC | Age: 53
End: 2023-11-20
Payer: COMMERCIAL

## 2023-11-20 DIAGNOSIS — E55.9 VITAMIN D DEFICIENCY: Primary | ICD-10-CM

## 2023-11-20 DIAGNOSIS — F33.2 SEVERE EPISODE OF RECURRENT MAJOR DEPRESSIVE DISORDER, WITHOUT PSYCHOTIC FEATURES (HCC): ICD-10-CM

## 2023-11-20 PROCEDURE — 99213 OFFICE O/P EST LOW 20 MIN: CPT | Performed by: STUDENT IN AN ORGANIZED HEALTH CARE EDUCATION/TRAINING PROGRAM

## 2023-11-20 RX ORDER — VITAMIN B COMPLEX
1000 TABLET ORAL DAILY
Qty: 90 TABLET | Refills: 3 | Status: SHIPPED | OUTPATIENT
Start: 2023-11-20

## 2023-11-20 NOTE — PROGRESS NOTES
Amadeo Cruz, was evaluated through a synchronous (real-time) audio-video encounter. The patient (or guardian if applicable) is aware that this is a billable service, which includes applicable co-pays. This Virtual Visit was conducted with patient's (and/or legal guardian's) consent. Patient identification was verified, and a caregiver was present when appropriate. The patient was located at Home: [address]; [place] [address] [address]  Provider was located at Facility (Appt Dept): [facility]  [facility],  [facility]      Amadeo Cruz (:  1970) is a Established patient, presenting virtually for evaluation of the following:    Assessment & Plan   Below is the assessment and plan developed based on review of pertinent history, physical exam, labs, studies, and medications. 1. Vitamin D deficiency  -     Vitamin D (CHOLECALCIFEROL) 25 MCG (1000 UT) TABS tablet; Take 1 tablet by mouth daily, Disp-90 tablet, R-3Labeling may look different. 25 mcg=1000 Units. Please double check dosages. Normal  2. Severe episode of recurrent major depressive disorder, without psychotic features ([address])  Patient has improved with increased dose of Cymbalta. Will continue Cymbalta and Klonopin. Will also decrease vitamin D to 1000 units daily from 50,000 unit repletion. No follow-ups on file. Subjective   HPI    Following up on increased dose of cymbalta  Continuing klonopin  Feels that mood has improved    Has followed up with GI and planning for unsedated colonoscopy  Reports that she has found a friend to go with her and would like to have sedated EGD/Colonoscopy. Will reach out to GI to discuss    Would like to decrease Vit D.  Currently taking 50k a day  Review of Systems       Objective   Patient-Reported Vitals  No data recorded       Physical Exam             --Van Noonan DO

## 2023-11-27 DIAGNOSIS — E03.9 HYPOTHYROIDISM, UNSPECIFIED TYPE: ICD-10-CM

## 2023-11-27 DIAGNOSIS — K58.0 IRRITABLE BOWEL SYNDROME WITH DIARRHEA: ICD-10-CM

## 2023-11-27 RX ORDER — DIPHENOXYLATE HYDROCHLORIDE AND ATROPINE SULFATE 2.5; .025 MG/1; MG/1
TABLET ORAL
Qty: 90 TABLET | Refills: 0 | Status: SHIPPED | OUTPATIENT
Start: 2023-11-27 | End: 2023-12-27

## 2023-11-27 RX ORDER — DICYCLOMINE HYDROCHLORIDE 10 MG/1
CAPSULE ORAL
Qty: 120 CAPSULE | Refills: 0 | Status: SHIPPED | OUTPATIENT
Start: 2023-11-27

## 2023-11-27 RX ORDER — LEVOTHYROXINE SODIUM 175 UG/1
175 TABLET ORAL DAILY
Qty: 90 TABLET | Refills: 1 | Status: SHIPPED | OUTPATIENT
Start: 2023-11-27

## 2023-11-27 NOTE — TELEPHONE ENCOUNTER
Refill Request - Controlled Substance    CONFIRM preferred pharmacy with the patient. If Mail Order Rx - Pend for 90 day refill. Last Seen Department: 11/20/2023    Last Seen by PCP: 11/20/2023    Last Written:     Last UDS: 2/25/23    Med Agreement Signed On: 2/26/19     If no future appointment scheduled:  Review the last OV with PCP and review information for follow-up visit,  Route STAFF MESSAGE with patient name to the [facility] for scheduling with the following information:            -  Timing of next visit           -  Visit type ie Physical, OV, etc           -  Diagnoses/Reason ie. COPD, HTN - Do not use MEDICATION, Follow-up or CHECK UP - Give reason for visit        Next Appointment: 2/27/24  Future Appointments   Date Time Provider [address]   2/27/2024 10:30 AM Sea Pinto DO [facility]  [place] [facility]       Message sent to [address] [place] [address] to schedule appt with patient?   NO      Requested Prescriptions     Pending Prescriptions Disp Refills    levothyroxine (SYNTHROID) 175 MCG tablet [Pharmacy Med Name: LEVOTHYROXINE 175 MCG TABLET] 30 tablet 1     Sig: TAKE 1 TABLET BY MOUTH DAILY    dicyclomine (BENTYL) 10 MG capsule [Pharmacy Med Name: DICYCLOMINE 10 MG CAPSULE] 120 capsule 0     Sig: TAKE 1 CAPSULE BY MOUTH 4 TIMES A DAY    diphenoxylate-atropine (LOMOTIL) 2.5-0.025 MG per tablet [Pharmacy Med Name: DIPHENOXYLATE-ATROP 2.5-0.025 MG TB] 90 tablet      Sig: TAKE 1 TABLET BY MOUTH 4 TIMES A DAY AS NEEDED FOR DIARRHEA FOR UP TO 30 DAYS; MAX OF 4 TABLETS PER DAY

## 2023-11-28 ENCOUNTER — CLINICAL DOCUMENTATION (OUTPATIENT)
Dept: SPIRITUAL SERVICES | Age: 53
End: 2023-11-28

## 2023-12-05 NOTE — PROGRESS NOTES
Algernon Go    Age 46 y.o.    female    1970    MRN [id number]    12/13/2023  Arrival Time_____________  OR Time____________[address]     Procedure(s):  COLONOSCOPY  ESOPHAGOGASTRODUODENOSCOPY                      General    Surgeon(s):  Rancho Kerns, MD       Phone [phone] ([place])     [facility]  [facility]         Work  _____________________________________________________________________  _____________________________________________________________________  _____________________________________________________________________  _____________________________________________________________________  _____________________________________________________________________    PCP _____________________________ Phone_________________     H&P  ________________  Bringing      Chart              Epic      DOS      Called________  EKG ________________   Bringing      Chart              Epic      DOS      Called________  LABS________________   Bringing     Chart              Epic      DOS      Called________  Cardiac Clearance ______ Bringing      Chart              Epic      DOS      Called________  Pulmonary Clearance____ Bringing      Chart              Epic      DOS      Called________    Cardiologist________________________ Phone___________________________  Pulmonologist_______________________Phone___________________________    ? Advance Directives   ? [facility] concerns / Waiver on Chart            PAT Communications________________  ? Pre-op Instructions Given [address]          _________________________________  ? Directions to Surgery Center                          _________________________________  ? Transportation Home_______________      __________________________________  ?  Crutches/Walker__________________        __________________________________    ________Pre-op Orders   _______Transcribed    _______Wt.  ________Pharmacy          _______SCD       ______TED Alexa Kaplan

## 2023-12-07 NOTE — PROGRESS NOTES
Date and time of surgery :12/13/23              Arrival Time:  0630     Bring Picture ID and insurance card. Please wear simple, loose fitting clothing to the hospital.   [place] not bring valuables (money, credit cards, checkbooks, etc.)   Do not wear any makeup (including  eye makeup) and no nail polish or artificial nails on your fingers or toes. DO NOT wear any jewelry or piercings on day of surgery. All body piercing jewelry must be removed. If you have dentures, they will be removed before going to the OR; we will provide you a container. If you wear contact lenses or glasses, they will be removed; please bring a case for them. Shower the evening before or morning of surgery with antibacterial soap. Nothing to eat or drink after midnight the day before surgery. You may brush your teeth and gargle the morning of surgery. DO NOT SWALLOW WATER. Do not take any morning meds the day of your surgery. Aspirin, Ibuprofen, Advil, Naproxen, Vitamin E and other Anti-inflammatory products and supplements should be stopped for 5 -7days before surgery or as directed by your physician. Do not smoke or drink any alcoholic beverages 24 hours prior to surgery. This includes NA Beer. Refrain from the usage of any recreational drugs, including non-prescribed prescription drugs. You MUST plan for a responsible adult to stay on site while you are here and take you home after your surgery. You will not be allowed to leave alone or drive yourself home. It is strongly suggested someone stay with you the first 24 hrs. Your surgery will be cancelled if you do not have a ride home. If you  have a Living Will and Durable Power of  for Healthcare, please bring in a copy. Notify your Surgeon if you develop any illness between now and time of surgery.  Cough, cold, fever, sore throat, nausea, vomiting, etc.  Please notify your surgeon if you experience dizziness, shortness of breath or blurred vision between now & the

## 2023-12-13 ENCOUNTER — HOSPITAL ENCOUNTER (OUTPATIENT)
Age: 53
Setting detail: OUTPATIENT SURGERY
Discharge: HOME | End: 2023-12-13
Attending: INTERNAL MEDICINE | Admitting: INTERNAL MEDICINE
Payer: COMMERCIAL

## 2023-12-13 ENCOUNTER — ANESTHESIA (OUTPATIENT)
Dept: ENDOSCOPY | Age: 53
End: 2023-12-13
Payer: COMMERCIAL

## 2023-12-13 ENCOUNTER — ANESTHESIA EVENT (OUTPATIENT)
Dept: ENDOSCOPY | Age: 53
End: 2023-12-13
Payer: COMMERCIAL

## 2023-12-13 VITALS
DIASTOLIC BLOOD PRESSURE: 77 MMHG | RESPIRATION RATE: 16 BRPM | HEIGHT: 64 IN | TEMPERATURE: 97.4 F | HEART RATE: 82 BPM | BODY MASS INDEX: 30.37 KG/M2 | WEIGHT: 177.91 LBS | SYSTOLIC BLOOD PRESSURE: 124 MMHG | OXYGEN SATURATION: 100 %

## 2023-12-13 DIAGNOSIS — Z87.19 HISTORY OF CELIAC DISEASE: ICD-10-CM

## 2023-12-13 DIAGNOSIS — R19.7 DIARRHEA, UNSPECIFIED TYPE: ICD-10-CM

## 2023-12-13 PROCEDURE — 2500000003 HC RX 250 WO HCPCS: Performed by: NURSE ANESTHETIST, CERTIFIED REGISTERED

## 2023-12-13 PROCEDURE — 7100000010 HC PHASE II RECOVERY - FIRST 15 MIN: Performed by: INTERNAL MEDICINE

## 2023-12-13 PROCEDURE — 88305 TISSUE EXAM BY PATHOLOGIST: CPT

## 2023-12-13 PROCEDURE — 3700000001 HC ADD 15 MINUTES (ANESTHESIA): Performed by: INTERNAL MEDICINE

## 2023-12-13 PROCEDURE — 2580000003 HC RX 258: Performed by: ANESTHESIOLOGY

## 2023-12-13 PROCEDURE — 3700000000 HC ANESTHESIA ATTENDED CARE: Performed by: INTERNAL MEDICINE

## 2023-12-13 PROCEDURE — 3609010300 HC COLONOSCOPY W/BIOPSY SINGLE/MULTIPLE: Performed by: INTERNAL MEDICINE

## 2023-12-13 PROCEDURE — 3609012400 HC EGD TRANSORAL BIOPSY SINGLE/MULTIPLE: Performed by: INTERNAL MEDICINE

## 2023-12-13 PROCEDURE — 2709999900 HC NON-CHARGEABLE SUPPLY: Performed by: INTERNAL MEDICINE

## 2023-12-13 PROCEDURE — 6360000002 HC RX W HCPCS: Performed by: NURSE ANESTHETIST, CERTIFIED REGISTERED

## 2023-12-13 PROCEDURE — 7100000011 HC PHASE II RECOVERY - ADDTL 15 MIN: Performed by: INTERNAL MEDICINE

## 2023-12-13 RX ORDER — SODIUM CHLORIDE 0.9 % (FLUSH) 0.9 %
5-40 SYRINGE (ML) INJECTION EVERY 12 HOURS SCHEDULED
Status: DISCONTINUED | OUTPATIENT
Start: 2023-12-13 | End: 2023-12-13 | Stop reason: HOSPADM

## 2023-12-13 RX ORDER — PROPOFOL 10 MG/ML
INJECTION, EMULSION INTRAVENOUS PRN
Status: DISCONTINUED | OUTPATIENT
Start: 2023-12-13 | End: 2023-12-13 | Stop reason: SDUPTHER

## 2023-12-13 RX ORDER — SODIUM CHLORIDE 0.9 % (FLUSH) 0.9 %
5-40 SYRINGE (ML) INJECTION PRN
Status: DISCONTINUED | OUTPATIENT
Start: 2023-12-13 | End: 2023-12-13 | Stop reason: HOSPADM

## 2023-12-13 RX ORDER — SODIUM CHLORIDE, SODIUM LACTATE, POTASSIUM CHLORIDE, CALCIUM CHLORIDE 600; 310; 30; 20 MG/100ML; MG/100ML; MG/100ML; MG/100ML
INJECTION, SOLUTION INTRAVENOUS CONTINUOUS
Status: DISCONTINUED | OUTPATIENT
Start: 2023-12-13 | End: 2023-12-13 | Stop reason: HOSPADM

## 2023-12-13 RX ORDER — SODIUM CHLORIDE 9 MG/ML
INJECTION, SOLUTION INTRAVENOUS PRN
Status: DISCONTINUED | OUTPATIENT
Start: 2023-12-13 | End: 2023-12-13 | Stop reason: HOSPADM

## 2023-12-13 RX ORDER — LIDOCAINE HYDROCHLORIDE 20 MG/ML
INJECTION, SOLUTION INFILTRATION; PERINEURAL PRN
Status: DISCONTINUED | OUTPATIENT
Start: 2023-12-13 | End: 2023-12-13 | Stop reason: SDUPTHER

## 2023-12-13 RX ORDER — LIDOCAINE HYDROCHLORIDE 10 MG/ML
1 INJECTION, SOLUTION EPIDURAL; INFILTRATION; INTRACAUDAL; PERINEURAL
Status: DISCONTINUED | OUTPATIENT
Start: 2023-12-13 | End: 2023-12-13 | Stop reason: HOSPADM

## 2023-12-13 RX ADMIN — PROPOFOL 100 MG: 10 INJECTION, EMULSION INTRAVENOUS at 07:51

## 2023-12-13 RX ADMIN — PROPOFOL 100 MG: 10 INJECTION, EMULSION INTRAVENOUS at 07:45

## 2023-12-13 RX ADMIN — PROPOFOL 100 MG: 10 INJECTION, EMULSION INTRAVENOUS at 07:36

## 2023-12-13 RX ADMIN — PROPOFOL 30 MG: 10 INJECTION, EMULSION INTRAVENOUS at 07:55

## 2023-12-13 RX ADMIN — SODIUM CHLORIDE, SODIUM LACTATE, POTASSIUM CHLORIDE, AND CALCIUM CHLORIDE: .6; .31; .03; .02 INJECTION, SOLUTION INTRAVENOUS at 07:15

## 2023-12-13 RX ADMIN — PROPOFOL 100 MG: 10 INJECTION, EMULSION INTRAVENOUS at 07:38

## 2023-12-13 RX ADMIN — PROPOFOL 30 MG: 10 INJECTION, EMULSION INTRAVENOUS at 07:58

## 2023-12-13 RX ADMIN — LIDOCAINE HYDROCHLORIDE 60 MG: 20 INJECTION, SOLUTION INFILTRATION; PERINEURAL at 07:36

## 2023-12-13 RX ADMIN — PROPOFOL 100 MG: 10 INJECTION, EMULSION INTRAVENOUS at 07:49

## 2023-12-13 ASSESSMENT — PAIN - FUNCTIONAL ASSESSMENT: PAIN_FUNCTIONAL_ASSESSMENT: 0-10

## 2023-12-13 ASSESSMENT — PAIN DESCRIPTION - DESCRIPTORS: DESCRIPTORS: DISCOMFORT

## 2023-12-13 ASSESSMENT — LIFESTYLE VARIABLES: SMOKING_STATUS: 0

## 2023-12-13 NOTE — ANESTHESIA PRE PROCEDURE
Department of Anesthesiology  Preprocedure Note       Name:  Acosta Estevez   Age:  46 y.o.  :  1970                                          MRN:  [id number]         Date:  2023      Surgeon: Janet Montero):  Mayela Peres MD    Procedure: Procedure(s):  COLONOSCOPY  ESOPHAGOGASTRODUODENOSCOPY    Medications prior to admission:   Prior to Admission medications    Medication Sig Start Date End Date Taking? Authorizing Provider   levothyroxine (SYNTHROID) 175 MCG tablet TAKE 1 TABLET BY MOUTH DAILY 23   Schklar, Leann Hamman, DO   dicyclomine (BENTYL) 10 MG capsule TAKE 1 CAPSULE BY MOUTH 4 TIMES A DAY 23   Schklar, Leann Hamman, DO   diphenoxylate-atropine (LOMOTIL) 2.5-0.025 MG per tablet TAKE 1 TABLET BY MOUTH 4 TIMES A DAY AS NEEDED FOR DIARRHEA FOR UP TO 30 DAYS; MAX OF 4 TABLETS PER DAY 23  Schklar, Leann Hamman, DO   Vitamin D (CHOLECALCIFEROL) 25 MCG (1000 UT) TABS tablet Take 1 tablet by mouth daily 23   Schklar, Leann Hamman, DO   cetirizine (ZYRTEC) 10 MG tablet Take 1 tablet by mouth daily 10/27/23   NATHAN Argueta CNP   albuterol sulfate HFA (PROVENTIL HFA) 108 (90 Base) MCG/ACT inhaler Inhale 2 puffs into the lungs every 6 hours as needed for Wheezing or Shortness of Breath (Bronchospasm) 10/27/23   Serina Olson APRN - CNP   pantoprazole (PROTONIX) 40 MG tablet TAKE 1 TABLET BY MOUTH TWICE A DAY (BEFORE MEALS) 10/22/23   Nilson Mejia DO   meclizine (ANTIVERT) 12.5 MG tablet Take 1 tablet by mouth as needed 23   Provider, MD Fernando   DULoxetine (CYMBALTA) 60 MG extended release capsule Take 1 capsule by mouth daily 10/16/23   Schklar, Leann Hamman, DO   atorvastatin (LIPITOR) 40 MG tablet Take 1 tablet by mouth daily 10/16/23   Schklar, Leann Hamman, DO   carBAMazepine (TEGRETOL XR) 400 MG extended release tablet Take 1 tablet by mouth in the morning and 1 tablet at noon and 1 tablet in the evening.  10/16/23   Schklar, Leann Hamman, DO   primidone (MYSOLINE) 250 MG

## 2023-12-13 NOTE — DISCHARGE INSTRUCTIONS
Xarelto tomorrow 12/14. You may be receiving a follow up phone call to ask about your care. Colonoscopy: What to Expect at [address]  After you have a colonoscopy, you will stay at the clinic for 1 to 2 hours until the medicines wear off. Then you can go home. But you will need to arrange for a ride. Your doctor will tell you when you can eat and do your other usual activities. Your doctor will talk to you about when you will need your next colonoscopy. Your doctor can help you decide how often you need to be checked. This will depend on the results of your test and your risk for colorectal cancer. After the test, you may be bloated or have gas pains. You may need to pass gas. If a biopsy was done or a polyp was removed, you may have streaks of blood in your stool (feces) for a few days. Problems such as heavy rectal bleeding may not occur until several weeks after the test. This isn't common. But it can happen after polyps are removed. This care sheet gives you a general idea about how long it will take for you to recover. But each person recovers at a different pace. Follow the steps below to get better as quickly as possible. How can you care for yourself at home? Activity    Rest when you feel tired. You can do your normal activities when it feels okay to do so. Diet    Follow your doctor's directions for eating. Unless your doctor has told you not to, drink plenty of fluids. This helps to replace the fluids that were lost during the colon prep. Do not drink alcohol. Medicines    Your doctor will tell you if and when you can restart your medicines. He or she will also give you instructions about taking any new medicines. If you take blood thinners, such as warfarin (Coumadin), clopidogrel (Plavix), or aspirin, be sure to talk to your doctor. He or she will tell you if and when to start taking those medicines again.  Make sure that you understand exactly what your

## 2023-12-13 NOTE — H&P
[place]   Pre-operative History and Physical    Patient: Etienne Manzano  : 1970  Acct#:     HISTORY OF PRESENT ILLNESS:    The patient is a 46 y.o. female who presents for diarrhea hx of celiac disease     Indications: diarrhea history of celiac disease     Past Medical History:        Diagnosis Date    Arthritis of midfoot 5/10/2017    Benign essential tremor     BRCA1 negative     Colon polyp 2019    Degenerative disc disease     DVT, lower extremity ([address])     Fibromyalgia     H/O viral meningitis 10/17/2019    History of DVT (deep vein thrombosis) 10/17/2019    History of pulmonary embolus (PE) 10/17/2019    History of thyroid cancer 2021    Kidney stones     Left-sided trigeminal neuralgia     Malignant neoplasm of thyroid gland ([address]) 2013    Migraines, neuralgic     since stroke  Chronic    PONV (postoperative nausea and vomiting)     Poor venous access     Protein S deficiency ([address])     Pulmonary embolism ([address])     Seizure disorder ([address])     grand mal in [address] [place] [address] ( childhood febrile seizure also-from a baby to age 14) and also X 1 ~2005 with headache treatment    Stroke ([address]) 2019    Thrombosis of superior sagittal sinus     Unspecified cerebral artery occlusion with cerebral infarction     Vertebral artery occlusion     White coat syndrome with high blood pressure but without hypertension       Past Surgical History:        Procedure Laterality Date    ARTHROGRAPHY Right 2023    RIGHT HIP ARTHROGRAM WITH CORTISONE INJECTION performed by Arron Carreon MD at [facility] LUMPECTOMY      x 3    CYSTOSCOPY  2012    w/ left ureteroscopy, holmium laser.  stone manipulation and left stent insertion    FINGER TRIGGER RELEASE Left 10/31/2019    LEFT THUMB TRIGGER DIGIT RELEASE performed by Richelle Jasso MD at [address] [place] [address]

## 2023-12-13 NOTE — ANESTHESIA POSTPROCEDURE EVALUATION
Department of Anesthesiology  Postprocedure Note    Patient: Akil Crews  MRN: [id number]  YOB: 1970  Date of evaluation: 12/13/2023    Procedure Summary       Date: 12/13/23 Room / Location: [facility] 01 / [address] [place] [address]    Anesthesia Start: 0730 Anesthesia Stop: Janina Montague    Procedures:       COLONOSCOPY      EGD BIOPSY Diagnosis:       Diarrhea, unspecified type      History of celiac disease      (Diarrhea, unspecified type [R19.7])      (History of celiac disease [Z87.19])    Surgeons: Delvis Patel MD Responsible Provider: Indigo Michaels DO    Anesthesia Type: general ASA Status: 3            Anesthesia Type: No value filed. Chester Phase I: Chester Score: 10    Chester Phase II: Chester Score: 9    Anesthesia Post Evaluation    Patient location during evaluation: PACU  Patient participation: complete - patient participated  Level of consciousness: awake and alert  Pain score: 0  Airway patency: patent  Nausea & Vomiting: no nausea and no vomiting  Cardiovascular status: blood pressure returned to baseline and hemodynamically stable  Respiratory status: acceptable and room air  Hydration status: euvolemic    No notable events documented.

## 2023-12-13 NOTE — PROCEDURES
Endoscopy Note    Patient: Willem Kirby  : 1970      Procedure: Esophagogastroduodenoscopy with biopsies    Date:  2023     Surgeon:  Chioma Caban MD     Referring Physician:  Ochoa Recio DO      History:      INDICATION: History of celiac disease, history of gastric ulcers     ASA: 3  SEDATION: MAC         Operative Surgeon: Chioma Caban MD  Scope Type: Gastroscope      Preoperative Diagnosis: History of celiac disease, history of gastric ulcers  Postoperative Diagnosis: Deformity antrum, gastritis,    Procedure Performed: EGD    Procedure Details:    With the patient in left lateral position the endoscope was passed through the hypopharynx into the esophagus. The scope as then passed through the esophagus to the second portion of duodenum. All visualized portions were carefully inspected. The gastric air was suctioned and the scope as removed. Patient tolerated the procedure well. Findings and maneuvers are discussed below. Complications:  None  Estimated Blood Loss: minimal to none    Post Operative Findings:   Esophagus: Irregular Z-line otherwise minimal reflux changes with thickening. No significant esophagitis. Stomach: There was deformity in the gastric antrum with circumferential erythema and nodularity which was biopsied. No large ulceration seen a few small erosions. Biopsies were taken to rule out H. pylori  Duodenum: Normal-appearing villous structure biopsies taken to assess for villous blunting    Plan: Continue acid suppression mostly chronic changes of the upper GI tract from prior peptic ulcer disease. Signed By: MD Chioma Salmon MD,   [facility]  [phone]    Please note that some or all of this record was generated using voice recognition software. If there are any questions about the content of this document, please contact the author as some errors in translation may have occurred.        Colonoscopy Procedure

## 2023-12-13 NOTE — PROGRESS NOTES
Friend to bedside updated with no questions. Discharge instructions reviewed with patient and responsible adult. Discharge instructions given with no additional questions. Patient to be discharged home with belongings.

## 2023-12-15 NOTE — TELEPHONE ENCOUNTER
Refill Request     CONFIRM preferred pharmacy with the patient. If Mail Order Rx - Pend for 90 day refill. Last Seen: Last Seen Department: 11/20/2023  Last Seen by PCP: 11/20/2023    Last Written: 9/20/2023 30 tab 0 refills    If no future appointment scheduled:  Review the last OV with PCP and review information for follow-up visit,  Route STAFF MESSAGE with patient name to the [facility] for scheduling with the following information:            -  Timing of next visit           -  Visit type ie Physical, OV, etc           -  Diagnoses/Reason ie. COPD, HTN - Do not use MEDICATION, Follow-up or CHECK UP - Give reason for visit      Next Appointment:   Future Appointments   Date Time Provider [facility] [address]   2/27/2024 10:30 AM Brenda Pinto DO [facility]  [place] [facility] [place]       Message sent to [address] [place] [address] to schedule appt with patient?   NO      Requested Prescriptions     Pending Prescriptions Disp Refills    folic acid (FOLVITE) 1 MG tablet [Pharmacy Med Name: FOLIC ACID 1 MG TABLET] 30 tablet 0     Sig: TAKE 1 TABLET BY MOUTH DAILY

## 2023-12-16 RX ORDER — FOLIC ACID 1 MG/1
1 TABLET ORAL DAILY
Qty: 30 TABLET | Refills: 0 | Status: SHIPPED | OUTPATIENT
Start: 2023-12-16

## 2023-12-28 DIAGNOSIS — G89.4 CHRONIC PAIN DISORDER: Primary | ICD-10-CM

## 2023-12-28 DIAGNOSIS — K21.00 GASTROESOPHAGEAL REFLUX DISEASE WITH ESOPHAGITIS, UNSPECIFIED WHETHER HEMORRHAGE: ICD-10-CM

## 2023-12-28 DIAGNOSIS — K58.0 IRRITABLE BOWEL SYNDROME WITH DIARRHEA: ICD-10-CM

## 2023-12-28 RX ORDER — DICYCLOMINE HYDROCHLORIDE 10 MG/1
CAPSULE ORAL
Qty: 120 CAPSULE | Refills: 0 | Status: SHIPPED | OUTPATIENT
Start: 2023-12-28

## 2023-12-28 RX ORDER — TRAMADOL HYDROCHLORIDE 50 MG/1
50 TABLET ORAL EVERY 8 HOURS PRN
Qty: 30 TABLET | Refills: 0 | Status: SHIPPED | OUTPATIENT
Start: 2023-12-28 | End: 2024-01-27

## 2023-12-28 RX ORDER — DIPHENOXYLATE HYDROCHLORIDE AND ATROPINE SULFATE 2.5; .025 MG/1; MG/1
TABLET ORAL
Qty: 90 TABLET | Refills: 0 | Status: SHIPPED | OUTPATIENT
Start: 2023-12-28 | End: 2024-01-27

## 2023-12-28 RX ORDER — PANTOPRAZOLE SODIUM 40 MG/1
TABLET, DELAYED RELEASE ORAL
Qty: 60 TABLET | Refills: 1 | Status: SHIPPED | OUTPATIENT
Start: 2023-12-28

## 2023-12-28 NOTE — TELEPHONE ENCOUNTER
Refill Request     CONFIRM preferred pharmacy with the patient. If Mail Order Rx - Pend for 90 day refill. Last Seen: Last Seen Department: 11/20/2023  Last Seen by PCP: 11/20/2023    Last Written: 10/22/23 60 tabs 1 refill     If no future appointment scheduled:  Review the last OV with PCP and review information for follow-up visit,  Route STAFF MESSAGE with patient name to the [facility] for scheduling with the following information:            -  Timing of next visit           -  Visit type ie Physical, OV, etc           -  Diagnoses/Reason ie. COPD, HTN - Do not use MEDICATION, Follow-up or CHECK UP - Give reason for visit      Next Appointment:   Future Appointments   Date Time Provider [facility] [address]   2/27/2024 10:30 AM Vesna Pinto DO [facility] [place] [facility] [place]       Message sent to [address] [place] [address] to schedule appt with patient?   NO      Requested Prescriptions     Pending Prescriptions Disp Refills    pantoprazole (PROTONIX) 40 MG tablet 60 tablet 1     Sig: TAKE 1 TABLET BY MOUTH TWICE A DAY (BEFORE MEALS)

## 2023-12-28 NOTE — TELEPHONE ENCOUNTER
Refill Request     CONFIRM preferred pharmacy with the patient. If Mail Order Rx - Pend for 90 day refill. Last Seen: Last Seen Department: 11/20/2023  Last Seen by PCP: 11/20/2023    Last Written:   Tramadol-11/08/2023 30 tab 0 refills   Lomotil-11/27/2023 90 tab 0 refills   Bentyl-11/27/2023 120 cap 0 refills     If no future appointment scheduled:  Review the last OV with PCP and review information for follow-up visit,  Route STAFF MESSAGE with patient name to the [facility] for scheduling with the following information:            -  Timing of next visit           -  Visit type ie Physical, OV, etc           -  Diagnoses/Reason ie. COPD, HTN - Do not use MEDICATION, Follow-up or CHECK UP - Give reason for visit      Next Appointment:   Future Appointments   Date Time Provider [address]   2/27/2024 10:30 AM Gera Pinto DO [facility]  [employer]       Message sent to [address] [place] [address] to schedule appt with patient? NO      Requested Prescriptions     Pending Prescriptions Disp Refills    traMADol (ULTRAM) 50 MG tablet [Pharmacy Med Name: traMADol HCL 50MG TABLET] 30 tablet      Sig: TAKE 1 TABLET BY MOUTH DAILY AS NEEDED FOR PAIN. REDUCE DOSES TAKEN AS PAIN BECOMES MANAGEABLE.    dicyclomine (BENTYL) 10 MG capsule [Pharmacy Med Name: DICYCLOMINE 10 MG CAPSULE] 120 capsule 0     Sig: TAKE 1 CAPSULE BY MOUTH 4 TIMES A DAY    diphenoxylate-atropine (LOMOTIL) 2.5-0.025 MG per tablet [Pharmacy Med Name: DIPHENOXYLATE-ATROP 2.5-0.025 MG TB] 90 tablet      Sig: TAKE 1 TABLET BY MOUTH 4 TIMES A DAY AS NEEDED FOR DIARRHEA FOR UP TO 30 DAYS. MAX OF 4 TABLETS PER DAY.

## 2023-12-30 NOTE — TELEPHONE ENCOUNTER
Refill Request     CONFIRM preferred pharmacy with the patient. If Mail Order Rx - Pend for 90 day refill. Last Seen: Last Seen Department: 11/20/2023  Last Seen by PCP: 11/20/2023    Last Written: 10/6/2023 120 tab 1 refills    If no future appointment scheduled:  Review the last OV with PCP and review information for follow-up visit,  Route STAFF MESSAGE with patient name to the [facility] for scheduling with the following information:            -  Timing of next visit           -  Visit type ie Physical, OV, etc           -  Diagnoses/Reason ie. COPD, HTN - Do not use MEDICATION, Follow-up or CHECK UP - Give reason for visit      Next Appointment:   Future Appointments   Date Time Provider [facility] [address]   2/27/2024 10:30 AM Schklar, Cam Duane, DO [facility]  [place] [facility] [place]       Message sent to [address] [place] [address] to schedule appt with patient?   NO      Requested Prescriptions     Pending Prescriptions Disp Refills    primidone (MYSOLINE) 250 MG tablet [Pharmacy Med Name: PRIMIDONE 250 MG TABLET] 120 tablet 1     Sig: TAKE ONE TABLET BY MOUTH THREE TIMES A DAY

## 2023-12-31 DIAGNOSIS — G89.4 CHRONIC PAIN DISORDER: ICD-10-CM

## 2023-12-31 RX ORDER — PRIMIDONE 250 MG/1
250 TABLET ORAL 3 TIMES DAILY
Qty: 120 TABLET | Refills: 1 | Status: SHIPPED | OUTPATIENT
Start: 2023-12-31

## 2023-12-31 NOTE — TELEPHONE ENCOUNTER
Refill Request     CONFIRM preferred pharmacy with the patient.    If Mail Order Rx - Pend for 90 day refill.      Last Seen: Last Seen Department: 11/20/2023  Last Seen by PCP: 11/20/2023    Last Written: 12/28/2023 30 tab 0 refills    If no future appointment scheduled:  Review the last OV with PCP and review information for follow-up visit,  Route STAFF MESSAGE with patient name to the  Pool for scheduling with the following information:            -  Timing of next visit           -  Visit type ie Physical, OV, etc           -  Diagnoses/Reason ie. COPD, HTN - Do not use MEDICATION, Follow-up or CHECK UP - Give reason for visit      Next Appointment:   Future Appointments   Date Time Provider Department Center   2/27/2024 10:30 AM Reji Pinto, DO [facility] [place] [facility]       Message sent to  to schedule appt with patient?  NO      Requested Prescriptions     Pending Prescriptions Disp Refills    traMADol (ULTRAM) 50 MG tablet [Pharmacy Med Name: traMADol HCL 50MG TABLET] 30 tablet      Sig: TAKE ONE TABLET BY MOUTH EVERY 8 HOURS AS NEEDED FOR PAIN. MAX DAILY AMOUNT: 3 TABLETS. REDUCE DOSES TAKEN AS PAIN BECOMES MANAGEABLE

## 2024-01-02 DIAGNOSIS — D68.59 PROTEIN S DEFICIENCY (HCC): ICD-10-CM

## 2024-01-02 DIAGNOSIS — Z86.73 HISTORY OF STROKE: ICD-10-CM

## 2024-01-02 DIAGNOSIS — D68.59 HYPERCOAGULABLE STATE (HCC): ICD-10-CM

## 2024-01-02 RX ORDER — TRAMADOL HYDROCHLORIDE 50 MG/1
TABLET ORAL
Qty: 30 TABLET | Refills: 0 | OUTPATIENT
Start: 2024-01-02 | End: 2024-02-01

## 2024-01-02 RX ORDER — RIVAROXABAN 20 MG/1
20 TABLET, FILM COATED ORAL DAILY
Qty: 90 TABLET | Refills: 3 | Status: SHIPPED | OUTPATIENT
Start: 2024-01-02

## 2024-01-02 NOTE — TELEPHONE ENCOUNTER
Refill Request     CONFIRM preferred pharmacy with the patient.    If Mail Order Rx - Pend for 90 day refill.      Last Seen: Last Seen Department: 11/20/2023  Last Seen by PCP: 11/20/2023    Last Written: 7/24/2023 previously refilled by Simone Santillan PA-c    If no future appointment scheduled:  Review the last OV with PCP and review information for follow-up visit,  Route STAFF MESSAGE with patient name to the  Pool for scheduling with the following information:            -  Timing of next visit           -  Visit type ie Physical, OV, etc           -  Diagnoses/Reason ie. COPD, HTN - Do not use MEDICATION, Follow-up or CHECK UP - Give reason for visit      Next Appointment:   Future Appointments   Date Time Provider Department Center   2/27/2024 10:30 AM Reji Pinto DO [facility] [place] [facility]       Message sent to  to schedule appt with patient?  NO      Requested Prescriptions     Pending Prescriptions Disp Refills    XARELTO 20 MG TABS tablet 90 tablet 0     Sig: Take 1 tablet by mouth daily TAKE ONE TABLET BY MOUTH DAILY

## 2024-01-19 DIAGNOSIS — K58.0 IRRITABLE BOWEL SYNDROME WITH DIARRHEA: ICD-10-CM

## 2024-01-19 NOTE — TELEPHONE ENCOUNTER
Refill Request     CONFIRM preferred pharmacy with the patient.    If Mail Order Rx - Pend for 90 day refill.      Last Seen: Last Seen Department: 11/20/2023  Last Seen by PCP: 11/20/2023    Last Written: 12/28/2023 90 tab 0 refills    If no future appointment scheduled:  Review the last OV with PCP and review information for follow-up visit,  Route STAFF MESSAGE with patient name to the  Pool for scheduling with the following information:            -  Timing of next visit           -  Visit type ie Physical, OV, etc           -  Diagnoses/Reason ie. COPD, HTN - Do not use MEDICATION, Follow-up or CHECK UP - Give reason for visit      Next Appointment:   Future Appointments   Date Time Provider Department Center   2/27/2024 10:30 AM Reji Pinto, DO [facility] [place] [facility] [place]       Message sent to  to schedule appt with patient?  NO      Requested Prescriptions     Pending Prescriptions Disp Refills    diphenoxylate-atropine (LOMOTIL) 2.5-0.025 MG per tablet [Pharmacy Med Name: DIPHENOXYLATE-ATROP 2.5-0.025 MG TB] 90 tablet      Sig: TAKE 1 TABLET BY MOUTH 4 TIMES A DAY AS NEEDED FOR DIARRHEA. MAX OF 4 TABLETS PER DAY

## 2024-01-20 RX ORDER — DIPHENOXYLATE HYDROCHLORIDE AND ATROPINE SULFATE 2.5; .025 MG/1; MG/1
TABLET ORAL
Qty: 90 TABLET | Refills: 0 | OUTPATIENT
Start: 2024-01-20 | End: 2024-02-19

## 2024-01-21 NOTE — TELEPHONE ENCOUNTER
Spoke with pharmacy they stated the last Rx that was sent to them for this  was sent by you on 12/28/2023 and picked up on 12/30/2023.

## 2024-01-28 DIAGNOSIS — K58.0 IRRITABLE BOWEL SYNDROME WITH DIARRHEA: ICD-10-CM

## 2024-01-28 DIAGNOSIS — G89.4 CHRONIC PAIN DISORDER: ICD-10-CM

## 2024-01-28 NOTE — TELEPHONE ENCOUNTER
Refill Request - Controlled Substance    CONFIRM preferred pharmacy with the patient.    If Mail Order Rx - Pend for 90 day refill.        Last Seen Department: 11/20/2023  Last Seen by PCP: 11/20/2023    Last Written: 12/28/23 30 tabs 0 refills     12/28/23 90 tabs 0 refills     Last UDS: 7/30/21    Med Agreement Signed On: 2/26/19    If no future appointment scheduled:  Review the last OV with PCP and review information for follow-up visit,  Route STAFF MESSAGE with patient name to the  Pool for scheduling with the following information:            -  Timing of next visit           -  Visit type ie Physical, OV, etc           -  Diagnoses/Reason ie. COPD, HTN - Do not use MEDICATION, Follow-up or CHECK UP - Give reason for visit        Next Appointment:   Future Appointments   Date Time Provider Department Center   2/27/2024 10:30 AM Reji Pinto DO [facility] [place] [facility]       Message sent to  to schedule appt with patient?  NO      Requested Prescriptions     Pending Prescriptions Disp Refills    diphenoxylate-atropine (LOMOTIL) 2.5-0.025 MG per tablet [Pharmacy Med Name: DIPHENOXYLATE-ATROP 2.5-0.025 MG TB] 90 tablet 0     Sig: TAKE 1 TABLET BY MOUTH 4 TIMES A DAY AS NEEDED FOR DIARRHEA. MAX OF 4 TABLETS PER DAY    traMADol (ULTRAM) 50 MG tablet [Pharmacy Med Name: traMADol HCL 50MG TABLET] 30 tablet 0     Sig: TAKE ONE TABLET BY MOUTH EVERY 8 HOURS AS NEEDED FOR PAIN. MAX DAILY AMOUNT: 3 TABLETS. REDUCE DOSES TAKEN AS PAIN BECOMES MANAGEABLE

## 2024-01-29 DIAGNOSIS — F41.9 ANXIETY: ICD-10-CM

## 2024-01-29 RX ORDER — TRAMADOL HYDROCHLORIDE 50 MG/1
TABLET ORAL
Qty: 30 TABLET | Refills: 0 | Status: SHIPPED | OUTPATIENT
Start: 2024-01-29 | End: 2024-02-28

## 2024-01-29 RX ORDER — CLONAZEPAM 1 MG/1
TABLET ORAL
Qty: 75 TABLET | Refills: 0 | Status: SHIPPED | OUTPATIENT
Start: 2024-01-29 | End: 2024-02-28

## 2024-01-29 RX ORDER — DIPHENOXYLATE HYDROCHLORIDE AND ATROPINE SULFATE 2.5; .025 MG/1; MG/1
TABLET ORAL
Qty: 90 TABLET | Refills: 0 | Status: SHIPPED | OUTPATIENT
Start: 2024-01-29 | End: 2024-05-29

## 2024-01-29 NOTE — TELEPHONE ENCOUNTER
Refill Request - Controlled Substance    CONFIRM preferred pharmacy with the patient.    If Mail Order Rx - Pend for 90 day refill.        Last Seen Department: 11/20/2023  Last Seen by PCP: 11/20/2023    Last Written: 10/06/2023 75 tab 2 refills     Last UDS: 07/30/2021    Med Agreement Signed On: 02/26/2019  If no future appointment scheduled:  Review the last OV with PCP and review information for follow-up visit,  Route STAFF MESSAGE with patient name to the  Pool for scheduling with the following information:            -  Timing of next visit           -  Visit type ie Physical, OV, etc           -  Diagnoses/Reason ie. COPD, HTN - Do not use MEDICATION, Follow-up or CHECK UP - Give reason for visit        Next Appointment:   Future Appointments   Date Time Provider Department Center   2/27/2024 10:30 AM Reji Pinto DO [facility] [place] [facility]       Message sent to  to schedule appt with patient?  NO      Requested Prescriptions     Pending Prescriptions Disp Refills    clonazePAM (KLONOPIN) 1 MG tablet [Pharmacy Med Name: clonazePAM 1 MG TABLET] 75 tablet      Sig: TAKE ONE TABLET BY MOUTH EVERY MORNING AND TAKE 1 AND 1/2 TABLET BY MOUTH ONCE NIGHTLY

## 2024-01-31 DIAGNOSIS — K58.0 IRRITABLE BOWEL SYNDROME WITH DIARRHEA: ICD-10-CM

## 2024-01-31 RX ORDER — DICYCLOMINE HYDROCHLORIDE 10 MG/1
CAPSULE ORAL
Qty: 120 CAPSULE | Refills: 0 | Status: SHIPPED | OUTPATIENT
Start: 2024-01-31

## 2024-01-31 NOTE — TELEPHONE ENCOUNTER
Refill Request     CONFIRM preferred pharmacy with the patient.    If Mail Order Rx - Pend for 90 day refill.      Last Seen: Last Seen Department: 11/20/2023  Last Seen by PCP: 11/20/2023    Last Written: 12/28/2023 120 cap 0 refills     If no future appointment scheduled:  Review the last OV with PCP and review information for follow-up visit,  Route STAFF MESSAGE with patient name to the  Pool for scheduling with the following information:            -  Timing of next visit           -  Visit type ie Physical, OV, etc           -  Diagnoses/Reason ie. COPD, HTN - Do not use MEDICATION, Follow-up or CHECK UP - Give reason for visit      Next Appointment:   Future Appointments   Date Time Provider Department Center   2/27/2024 10:30 AM Reji Pinto, DO [facility] [place] [facility] [place]       Message sent to  to schedule appt with patient?  NO      Requested Prescriptions     Pending Prescriptions Disp Refills    dicyclomine (BENTYL) 10 MG capsule 120 capsule 0     Sig: TAKE 1 CAPSULE BY MOUTH 4 TIMES A DAY

## 2024-02-05 NOTE — TELEPHONE ENCOUNTER
Refill Request     CONFIRM preferred pharmacy with the patient.    If Mail Order Rx - Pend for 90 day refill.      Last Seen: Last Seen Department: 11/20/2023  Last Seen by PCP: 11/20/2023    Last Written: 12/16/23 30 tabs 0 refills     If no future appointment scheduled:  Review the last OV with PCP and review information for follow-up visit,  Route STAFF MESSAGE with patient name to the  Pool for scheduling with the following information:            -  Timing of next visit           -  Visit type ie Physical, OV, etc           -  Diagnoses/Reason ie. COPD, HTN - Do not use MEDICATION, Follow-up or CHECK UP - Give reason for visit      Next Appointment:   Future Appointments   Date Time Provider Department Center   2/27/2024 10:30 AM Reji Pinto, DO [facility] [place] [facility]       Message sent to  to schedule appt with patient?  NO      Requested Prescriptions     Pending Prescriptions Disp Refills    folic acid (FOLVITE) 1 MG tablet 30 tablet 0     Sig: Take 1 tablet by mouth daily

## 2024-02-07 RX ORDER — FOLIC ACID 1 MG/1
1 TABLET ORAL DAILY
Qty: 90 TABLET | Refills: 0 | Status: SHIPPED | OUTPATIENT
Start: 2024-02-07

## 2024-02-27 ENCOUNTER — OFFICE VISIT (OUTPATIENT)
Dept: FAMILY MEDICINE CLINIC | Age: 54
End: 2024-02-27
Payer: COMMERCIAL

## 2024-02-27 VITALS
OXYGEN SATURATION: 97 % | HEART RATE: 96 BPM | BODY MASS INDEX: 35.34 KG/M2 | SYSTOLIC BLOOD PRESSURE: 130 MMHG | DIASTOLIC BLOOD PRESSURE: 76 MMHG | HEIGHT: 64 IN | WEIGHT: 207 LBS

## 2024-02-27 DIAGNOSIS — M47.816 SPONDYLOSIS OF LUMBAR SPINE: ICD-10-CM

## 2024-02-27 DIAGNOSIS — M43.10 RETROLISTHESIS OF VERTEBRAE: ICD-10-CM

## 2024-02-27 DIAGNOSIS — G62.9 NEUROPATHY: ICD-10-CM

## 2024-02-27 DIAGNOSIS — G89.29 CHRONIC RIGHT HIP PAIN: ICD-10-CM

## 2024-02-27 DIAGNOSIS — F33.2 SEVERE EPISODE OF RECURRENT MAJOR DEPRESSIVE DISORDER, WITHOUT PSYCHOTIC FEATURES (HCC): ICD-10-CM

## 2024-02-27 DIAGNOSIS — M51.36 DDD (DEGENERATIVE DISC DISEASE), LUMBAR: ICD-10-CM

## 2024-02-27 DIAGNOSIS — F43.21 COMPLICATED GRIEF: ICD-10-CM

## 2024-02-27 DIAGNOSIS — M25.551 CHRONIC RIGHT HIP PAIN: ICD-10-CM

## 2024-02-27 PROCEDURE — 3075F SYST BP GE 130 - 139MM HG: CPT | Performed by: STUDENT IN AN ORGANIZED HEALTH CARE EDUCATION/TRAINING PROGRAM

## 2024-02-27 PROCEDURE — 3078F DIAST BP <80 MM HG: CPT | Performed by: STUDENT IN AN ORGANIZED HEALTH CARE EDUCATION/TRAINING PROGRAM

## 2024-02-27 PROCEDURE — 99214 OFFICE O/P EST MOD 30 MIN: CPT | Performed by: STUDENT IN AN ORGANIZED HEALTH CARE EDUCATION/TRAINING PROGRAM

## 2024-02-27 RX ORDER — DULOXETIN HYDROCHLORIDE 30 MG/1
90 CAPSULE, DELAYED RELEASE ORAL DAILY
Qty: 270 CAPSULE | Refills: 1 | Status: SHIPPED | OUTPATIENT
Start: 2024-02-27

## 2024-02-27 ASSESSMENT — PATIENT HEALTH QUESTIONNAIRE - PHQ9
7. TROUBLE CONCENTRATING ON THINGS, SUCH AS READING THE NEWSPAPER OR WATCHING TELEVISION: 1
8. MOVING OR SPEAKING SO SLOWLY THAT OTHER PEOPLE COULD HAVE NOTICED. OR THE OPPOSITE, BEING SO FIGETY OR RESTLESS THAT YOU HAVE BEEN MOVING AROUND A LOT MORE THAN USUAL: 0
5. POOR APPETITE OR OVEREATING: 2
6. FEELING BAD ABOUT YOURSELF - OR THAT YOU ARE A FAILURE OR HAVE LET YOURSELF OR YOUR FAMILY DOWN: 1
SUM OF ALL RESPONSES TO PHQ QUESTIONS 1-9: 13
3. TROUBLE FALLING OR STAYING ASLEEP: 3
2. FEELING DOWN, DEPRESSED OR HOPELESS: 2
1. LITTLE INTEREST OR PLEASURE IN DOING THINGS: 2
9. THOUGHTS THAT YOU WOULD BE BETTER OFF DEAD, OR OF HURTING YOURSELF: 0
SUM OF ALL RESPONSES TO PHQ9 QUESTIONS 1 & 2: 4
SUM OF ALL RESPONSES TO PHQ QUESTIONS 1-9: 13
4. FEELING TIRED OR HAVING LITTLE ENERGY: 2
10. IF YOU CHECKED OFF ANY PROBLEMS, HOW DIFFICULT HAVE THESE PROBLEMS MADE IT FOR YOU TO DO YOUR WORK, TAKE CARE OF THINGS AT HOME, OR GET ALONG WITH OTHER PEOPLE: 1
SUM OF ALL RESPONSES TO PHQ QUESTIONS 1-9: 13
SUM OF ALL RESPONSES TO PHQ QUESTIONS 1-9: 13

## 2024-02-27 ASSESSMENT — ANXIETY QUESTIONNAIRES
IF YOU CHECKED OFF ANY PROBLEMS ON THIS QUESTIONNAIRE, HOW DIFFICULT HAVE THESE PROBLEMS MADE IT FOR YOU TO DO YOUR WORK, TAKE CARE OF THINGS AT HOME, OR GET ALONG WITH OTHER PEOPLE: VERY DIFFICULT
5. BEING SO RESTLESS THAT IT IS HARD TO SIT STILL: 0
7. FEELING AFRAID AS IF SOMETHING AWFUL MIGHT HAPPEN: 1
2. NOT BEING ABLE TO STOP OR CONTROL WORRYING: 2
GAD7 TOTAL SCORE: 7
3. WORRYING TOO MUCH ABOUT DIFFERENT THINGS: 1
1. FEELING NERVOUS, ANXIOUS, OR ON EDGE: 2
4. TROUBLE RELAXING: 1
6. BECOMING EASILY ANNOYED OR IRRITABLE: 0

## 2024-02-27 NOTE — PROGRESS NOTES
Patient: Sarah Stock is a 53 y.o. female who presents today with the following Chief Complaint(s):  Chief Complaint   Patient presents with    Follow-up     Vit d          HPI    MDD  Cymablta and klonopin    Chronic diarrhea  Colonoscopy with poor prep  Recommend repeat in 1 year  Limitol and bentyl with still having chronic diarrhea  Current Outpatient Medications   Medication Sig Dispense Refill    DULoxetine (CYMBALTA) 30 MG extended release capsule Take 3 capsules by mouth daily 270 capsule 1    Handicap Placard MISC by Does not apply route 02/27/24 1 each 0    folic acid (FOLVITE) 1 MG tablet Take 1 tablet by mouth daily 90 tablet 0    dicyclomine (BENTYL) 10 MG capsule TAKE 1 CAPSULE BY MOUTH 4 TIMES A  capsule 0    diphenoxylate-atropine (LOMOTIL) 2.5-0.025 MG per tablet TAKE 1 TABLET BY MOUTH 4 TIMES A DAY AS NEEDED FOR DIARRHEA. MAX OF 4 TABLETS PER DAY 90 tablet 0    clonazePAM (KLONOPIN) 1 MG tablet TAKE ONE TABLET BY MOUTH EVERY MORNING AND TAKE 1 AND 1/2 TABLET BY MOUTH ONCE NIGHTLY 75 tablet 0    XARELTO 20 MG TABS tablet Take 1 tablet by mouth daily TAKE ONE TABLET BY MOUTH DAILY 90 tablet 3    primidone (MYSOLINE) 250 MG tablet TAKE ONE TABLET BY MOUTH THREE TIMES A  tablet 1    pantoprazole (PROTONIX) 40 MG tablet TAKE 1 TABLET BY MOUTH TWICE A DAY (BEFORE MEALS) 60 tablet 1    levothyroxine (SYNTHROID) 175 MCG tablet TAKE 1 TABLET BY MOUTH DAILY 90 tablet 1    Vitamin D (CHOLECALCIFEROL) 25 MCG (1000 UT) TABS tablet Take 1 tablet by mouth daily 90 tablet 3    cetirizine (ZYRTEC) 10 MG tablet Take 1 tablet by mouth daily 30 tablet 3    meclizine (ANTIVERT) 12.5 MG tablet Take 1 tablet by mouth as needed      atorvastatin (LIPITOR) 40 MG tablet Take 1 tablet by mouth daily 90 tablet 1    carBAMazepine (TEGRETOL XR) 400 MG extended release tablet Take 1 tablet by mouth in the morning and 1 tablet at noon and 1 tablet in the evening. 270 tablet 1    ondansetron (ZOFRAN) 4 MG tablet

## 2024-03-04 ENCOUNTER — TELEPHONE (OUTPATIENT)
Dept: FAMILY MEDICINE CLINIC | Age: 54
End: 2024-03-04

## 2024-03-04 NOTE — TELEPHONE ENCOUNTER
Submitted PA for DULoxetine HCl 30MG dr capsules   Via CMM Key: [id number]  STATUS: PENDING.    Follow up done daily; if no decision with in three days we will refax.  If another three days goes by with no decision will call the insurance for status.'

## 2024-03-04 NOTE — TELEPHONE ENCOUNTER
Patient sent [facility] message requesting PA for cymbalta 30 mg, 3 po daily for total of 90 mg.    Please advise.

## 2024-03-05 NOTE — TELEPHONE ENCOUNTER
Cymbalta can be uptitrated to a maximum of 120mg daily per peer reviewed recommendations. Patient has been slowly uptitrated to current dose of 60 mg which has not resolved symptoms. Attempting to increase to 90mg dose. Please follow up on PA

## 2024-03-05 NOTE — TELEPHONE ENCOUNTER
This plan will not allow me to resubmit another PA so I will need to appeal. When submitting this appeal is this basically all I will need to say or would you advise adding more information with it and if so what information?    Thank you for your help

## 2024-03-06 NOTE — TELEPHONE ENCOUNTER
Submitted appeal letter for Cymbalta via fax to [facility] along with chart notes. To [phone]. Status PENDING.

## 2024-03-07 DIAGNOSIS — K58.0 IRRITABLE BOWEL SYNDROME WITH DIARRHEA: ICD-10-CM

## 2024-03-07 NOTE — TELEPHONE ENCOUNTER
Refill Request     CONFIRM preferred pharmacy with the patient.    If Mail Order Rx - Pend for 90 day refill.      Last Seen: Last Seen Department: 2/27/2024  Last Seen by PCP: 5/12/2023    Last Written: 1/31/24    If no future appointment scheduled:  Review the last OV with PCP and review information for follow-up visit,  Route STAFF MESSAGE with patient name to the  Pool for scheduling with the following information:            -  Timing of next visit           -  Visit type ie Physical, OV, etc           -  Diagnoses/Reason ie. COPD, HTN - Do not use MEDICATION, Follow-up or CHECK UP - Give reason for visit      Next Appointment:   Future Appointments   Date Time Provider Department Center   4/15/2024 10:15 AM Reji Pinto, DO [facility]  [place] [facility] [place]       Message sent to  to schedule appt with patient?  NO      Requested Prescriptions     Pending Prescriptions Disp Refills    dicyclomine (BENTYL) 10 MG capsule [Pharmacy Med Name: DICYCLOMINE 10 MG CAPSULE] 120 capsule 0     Sig: TAKE 1 CAPSULE BY MOUTH 4 TIMES A DAY

## 2024-03-08 DIAGNOSIS — F41.9 ANXIETY: ICD-10-CM

## 2024-03-08 RX ORDER — DICYCLOMINE HYDROCHLORIDE 10 MG/1
CAPSULE ORAL
Qty: 120 CAPSULE | Refills: 3 | Status: SHIPPED | OUTPATIENT
Start: 2024-03-08

## 2024-03-08 RX ORDER — CLONAZEPAM 1 MG/1
TABLET ORAL
Qty: 75 TABLET | Refills: 0 | Status: SHIPPED | OUTPATIENT
Start: 2024-03-08 | End: 2024-04-07

## 2024-03-08 NOTE — TELEPHONE ENCOUNTER
Refill Request - Controlled Substance    CONFIRM preferred pharmacy with the patient.    If Mail Order Rx - Pend for 90 day refill.        Last Seen Department: 2/27/2024  Last Seen by PCP: 2/27/2024    Last Written: 01/29/2024 75 tab 0 refills     Last UDS: 07/30/2021    Med Agreement Signed On: 02/26/2019    If no future appointment scheduled:  Review the last OV with PCP and review information for follow-up visit,  Route STAFF MESSAGE with patient name to the  Pool for scheduling with the following information:            -  Timing of next visit           -  Visit type ie Physical, OV, etc           -  Diagnoses/Reason ie. COPD, HTN - Do not use MEDICATION, Follow-up or CHECK UP - Give reason for visit        Next Appointment:   Future Appointments   Date Time Provider Department Center   4/15/2024 10:15 AM Reji Pinto DO [facility] [place] [facility]       Message sent to  to schedule appt with patient?  NO      Requested Prescriptions     Pending Prescriptions Disp Refills    clonazePAM (KLONOPIN) 1 MG tablet 75 tablet 0     Sig: TAKE ONE TABLET BY MOUTH EVERY MORNING AND TAKE 1 AND 1/2 TABLET BY MOUTH ONCE NIGHTLY

## 2024-03-09 ENCOUNTER — PATIENT MESSAGE (OUTPATIENT)
Dept: FAMILY MEDICINE CLINIC | Age: 54
End: 2024-03-09

## 2024-03-09 DIAGNOSIS — G89.4 CHRONIC PAIN DISORDER: ICD-10-CM

## 2024-03-11 NOTE — TELEPHONE ENCOUNTER
Refill Request     CONFIRM preferred pharmacy with the patient.    If Mail Order Rx - Pend for 90 day refill.      Last Seen: Last Seen Department: 2/27/2024  Last Seen by PCP: 2/27/2024    Last Written: 1/29/24 30 tabs 0 refills     If no future appointment scheduled:  Review the last OV with PCP and review information for follow-up visit,  Route STAFF MESSAGE with patient name to the  Pool for scheduling with the following information:            -  Timing of next visit           -  Visit type ie Physical, OV, etc           -  Diagnoses/Reason ie. COPD, HTN - Do not use MEDICATION, Follow-up or CHECK UP - Give reason for visit      Next Appointment:   Future Appointments   Date Time Provider Department Center   4/15/2024 10:15 AM Reji Pinto, DO [facility] [place] [facility]       Message sent to  to schedule appt with patient?  NO      Requested Prescriptions      No prescriptions requested or ordered in this encounter

## 2024-03-11 NOTE — TELEPHONE ENCOUNTER
From: Sarah Stock  To: Dr. Reji Pinto  Sent: 3/9/2024 11:16 AM EST  Subject: Tramadol    May I please get a refill of Qty#30 on my Tramadol?  Thank you-Elvira    Prescription Name:Tramadol Hcl 50mg Tablet  Next Refill Due Date: 03/09/2024  Last Fill Date: 2024-02-05

## 2024-03-12 RX ORDER — TRAMADOL HYDROCHLORIDE 50 MG/1
50 TABLET ORAL DAILY
Qty: 30 TABLET | Refills: 0 | Status: SHIPPED | OUTPATIENT
Start: 2024-03-12 | End: 2024-04-11

## 2024-03-13 NOTE — TELEPHONE ENCOUNTER
This plan requires the member information/signature prior to appeal process. They sent over the information required for the appeal that the patient has to complete and sign. That will then have to be sent in with the appeal letter. I attached the information needed for the patient you'll need to contact her to complete.     Thank you

## 2024-03-15 NOTE — TELEPHONE ENCOUNTER
Pt advised paperwork received placed in PCP mailbox, pt is asking if there is an alternative that she can try she stated that this has been ongoing with no answers, she stated that she is open to other options if you are willing to trial another medication. This has been causing the pt a lot of anxiety    Pt stated that the form can be completed and is done will hand to [employer] [employer]

## 2024-03-18 DIAGNOSIS — K58.0 IRRITABLE BOWEL SYNDROME WITH DIARRHEA: ICD-10-CM

## 2024-03-18 NOTE — TELEPHONE ENCOUNTER
Refill Request - Controlled Substance    CONFIRM preferred pharmacy with the patient.    If Mail Order Rx - Pend for 90 day refill.        Last Seen Department: 2/27/2024  Last Seen by PCP: 2/27/2024    Last Written: 1/24/2024    Last UDS: NA    Med Agreement Signed On: NA    If no future appointment scheduled:  Review the last OV with PCP and review information for follow-up visit,  Route STAFF MESSAGE with patient name to the  Pool for scheduling with the following information:            -  Timing of next visit           -  Visit type ie Physical, OV, etc           -  Diagnoses/Reason ie. COPD, HTN - Do not use MEDICATION, Follow-up or CHECK UP - Give reason for visit        Next Appointment:   Future Appointments   Date Time Provider Department Center   4/15/2024 10:15 AM Reji Pinto DO [facility] [place] [facility] [place]       Message sent to  to schedule appt with patient?  NO      Requested Prescriptions     Pending Prescriptions Disp Refills    diphenoxylate-atropine (LOMOTIL) 2.5-0.025 MG per tablet 90 tablet 0     Sig: TAKE 1 TABLET BY MOUTH 4 TIMES A DAY AS NEEDED FOR DIARRHEA. MAX OF 4 TABLETS PER DAY

## 2024-03-19 NOTE — TELEPHONE ENCOUNTER
Please see if we have received paperwork or follow up to refax. I would continue current dose of cymbalta until able to complete PA

## 2024-03-20 ENCOUNTER — HOSPITAL ENCOUNTER (EMERGENCY)
Age: 54
Discharge: HOME | End: 2024-03-20
Attending: STUDENT IN AN ORGANIZED HEALTH CARE EDUCATION/TRAINING PROGRAM
Payer: COMMERCIAL

## 2024-03-20 VITALS
SYSTOLIC BLOOD PRESSURE: 158 MMHG | HEIGHT: 64 IN | BODY MASS INDEX: 35.34 KG/M2 | RESPIRATION RATE: 18 BRPM | DIASTOLIC BLOOD PRESSURE: 88 MMHG | HEART RATE: 98 BPM | TEMPERATURE: 98.1 F | WEIGHT: 207 LBS | OXYGEN SATURATION: 95 %

## 2024-03-20 DIAGNOSIS — J06.9 VIRAL URI: ICD-10-CM

## 2024-03-20 DIAGNOSIS — F41.1 ANXIETY STATE: Primary | ICD-10-CM

## 2024-03-20 LAB
FLUAV RNA RESP QL NAA+PROBE: NOT DETECTED
FLUBV RNA RESP QL NAA+PROBE: NOT DETECTED
SARS-COV-2 RNA RESP QL NAA+PROBE: NOT DETECTED

## 2024-03-20 PROCEDURE — 6370000000 HC RX 637 (ALT 250 FOR IP): Performed by: STUDENT IN AN ORGANIZED HEALTH CARE EDUCATION/TRAINING PROGRAM

## 2024-03-20 PROCEDURE — 87636 SARSCOV2 & INF A&B AMP PRB: CPT

## 2024-03-20 PROCEDURE — 99283 EMERGENCY DEPT VISIT LOW MDM: CPT

## 2024-03-20 RX ORDER — IPRATROPIUM BROMIDE AND ALBUTEROL SULFATE 2.5; .5 MG/3ML; MG/3ML
1 SOLUTION RESPIRATORY (INHALATION) ONCE
Status: COMPLETED | OUTPATIENT
Start: 2024-03-20 | End: 2024-03-20

## 2024-03-20 RX ORDER — LORAZEPAM 1 MG/1
1 TABLET ORAL ONCE
Status: COMPLETED | OUTPATIENT
Start: 2024-03-20 | End: 2024-03-20

## 2024-03-20 RX ADMIN — IPRATROPIUM BROMIDE AND ALBUTEROL SULFATE 1 DOSE: 2.5; .5 SOLUTION RESPIRATORY (INHALATION) at 06:43

## 2024-03-20 RX ADMIN — LORAZEPAM 1 MG: 1 TABLET ORAL at 06:43

## 2024-03-20 ASSESSMENT — PAIN DESCRIPTION - PAIN TYPE: TYPE: ACUTE PAIN

## 2024-03-20 ASSESSMENT — PAIN DESCRIPTION - DESCRIPTORS: DESCRIPTORS: ACHING

## 2024-03-20 ASSESSMENT — PAIN - FUNCTIONAL ASSESSMENT: PAIN_FUNCTIONAL_ASSESSMENT: 0-10

## 2024-03-20 ASSESSMENT — PAIN DESCRIPTION - FREQUENCY: FREQUENCY: CONTINUOUS

## 2024-03-20 ASSESSMENT — PAIN SCALES - GENERAL: PAINLEVEL_OUTOF10: 8

## 2024-03-20 ASSESSMENT — PAIN DESCRIPTION - LOCATION: LOCATION: ARM;LEG

## 2024-03-20 NOTE — ED PROVIDER NOTES
[facility]  ED     EMERGENCY DEPARTMENT ENCOUNTER         Pt Name: Sarah Stock   MRN: [id number]   Birthdate 1970   Date of evaluation: 3/20/2024   Provider: Ramón Hess MD   PCP: Reji Pinto DO   Note Started: 6:37 AM EDT 3/20/24       Chief Complaint     Shortness of Breath (Patient to the ED for shortness of breath and states she can hear \"wheezing\" but isn't really short of breath. Patient states she also has pain in her arms, legs, and back, but reports she has fibromyalgia. States she also has anxiety and isn't sure if this is what is playing into her symptoms. /)      History of Present Illness     Sarah Stock is a 54 y.o. female who presents with concern for wheezing.  The patient arrives from home she has a variety of long-term conditions including muscle aches in the context of fibromyalgia as well as hip and back pain.  She states that she has generally been her baseline health however this morning noticed some wheezing in her throat perhaps with some low-grade throat pain and this made her quite anxious and given this she presents to the emergency department      I have reviewed the nursing notes and agree unless otherwise noted.    Review of Systems     Positives and pertinent negatives as per HPI.    Past Medical, Surgical, Family, and Social History     She has a past medical history of Arthritis of midfoot, Benign essential tremor, BRCA1 negative, Colon polyp, Degenerative disc disease, DVT, lower extremity (HCC), Fibromyalgia, H/O viral meningitis, History of DVT (deep vein thrombosis), History of pulmonary embolus (PE), History of thyroid cancer, Kidney stones, Left-sided trigeminal neuralgia, Malignant neoplasm of thyroid gland (HCC), Migraines, neuralgic, PONV (postoperative nausea and vomiting), Poor venous access, Protein S deficiency (HCC), Pulmonary embolism (HCC), Seizure disorder (HCC), Stroke (HCC), Thrombosis of superior sagittal sinus,

## 2024-03-20 NOTE — DISCHARGE INSTRUCTIONS
You were evaluated in the emergency department for concern for upper airway wheezing as well as anxiety. Assessments and testing completed during your visit were reassuring and at this time there is no indication for further testing, treatment or admission to the hospital. Given this it is appropriate to discharge you from the emergency department. At the time of discharge we discussed the following:    Please review this visit to the emergency department with your primary doctor for further recommendations including referral to psychiatry for anxiety and also as we discussed please maintain good hydration to assess for improvement in your muscle cramp    Please note that sometimes it is difficult to diagnose a medical condition early in the disease process before the disease is fully manifest. Because of this, should you develop any new or worsening symptoms, you may return at any time to the emergency department for another evaluation. If available you are also recommended to review this visit with your primary care physician or other medical provider in the next 7 days. Thank you for allowing us to care for you today.

## 2024-03-21 ENCOUNTER — TELEPHONE (OUTPATIENT)
Dept: FAMILY MEDICINE CLINIC | Age: 54
End: 2024-03-21

## 2024-03-21 NOTE — TELEPHONE ENCOUNTER
ED Follow Up Call/ Schedule appt   ED: [facility]  Reason: Anxiety, SOB  Date:03/20/2024    Appt scheduled: NA      Comments:   LMOM for pt to return phone call to the office    Future Appointments   Date Time Provider Department Center   4/15/2024 10:15 AM Reji Pinto DO [facility] [place] [facility]

## 2024-03-21 NOTE — TELEPHONE ENCOUNTER
Patient called into nurse triage with complaints of wheezing, shortness of breath, anxiety, medardo horse cramping in bilateral legs, patient very tearful, anxious.  Tried to offer an office appointment today at 4:30pm patient declined, stating that she had no way to get here and  that she was not able to drive.  Patient stated I can't come in I need to go to bed.  Nurse tried to offer the patient a virtual visit and patient stated that she would not be able to do a virtual visit because hopefully she will be asleep. Patient stated that she had not taken some of her medication for anxiety this morning.  Nurse checked the patient's medication orders and advised patient to take those medications while the patient was still on the phone with the nurse.  Nurse then spoke with the patient in great detail on breathing exercises.  Nurse also went over ideas for relaxation and meditation skills.  Patient stated that she had went to [facility] the previous day and had a bad experience, and she was very polite about explaining that they did not treat her nice, or take time to explain care to her that she needed.  Patient provided with patient advocate number for [facility]. Nurse devised a plan with the patient take her medications as ordered, relax, do some deep breathing, meditate.  Patient's breathing and anxiety did seem to improve.  Patient advised to call the office back and to ask for this nurse.  Also nurse went over the possibility of having to go back to the ER if her symptoms progressed or got worse.  Patient educated how to advocate for herself.

## 2024-03-22 ENCOUNTER — TELEPHONE (OUTPATIENT)
Dept: FAMILY MEDICINE CLINIC | Age: 54
End: 2024-03-22

## 2024-03-22 DIAGNOSIS — F33.2 SEVERE EPISODE OF RECURRENT MAJOR DEPRESSIVE DISORDER, WITHOUT PSYCHOTIC FEATURES (HCC): ICD-10-CM

## 2024-03-22 NOTE — TELEPHONE ENCOUNTER
Patient called into nurse triage asking for this nurse.  Patient was anxious and tearful, short of breath.  Patient stated that she was not able to get her dose of cymbalta  for 90mg picked up because insurance coverage issues.  Nurse called pharmacy and spoke with the pharmacist Suzanna.  Nurse went over the medication order with the pharmacist for 30MG take 3 capsules once daily for a total of 90MG.  Pharmacist stated that if we could change the script to take cymbalta 30mg and 60mg 1 daily each for a total of 90mg her insurance would cover the medication.  Nurse gave a verbal for the order.  Nurse also had the pharmacy deliver the medication to the patient.  Patient and nurse developed plan to help get the patient through the day.  Nurse went over breathing exercises, meditation with the patient.  Patient was able to calm down and her breathing improved.  Nurse also gave the patient a resource mental axis point #[phone] that the patient should be able to reach out for mental help.  Nurse advised the patient to call the office back if she had any questions or needed assistance.  Patient verbalized understanding.  Patient will start the increased dose (take medication dose as ordered by provider) and give the medication time to work over the weekend and then check back in with the office Monday.  Patient is scheduled for a VV on 04/15/24, she wants to keep that appointment and if she needs to she will call to schedule an appointment sooner.

## 2024-03-24 RX ORDER — DULOXETIN HYDROCHLORIDE 60 MG/1
CAPSULE, DELAYED RELEASE ORAL
Qty: 90 CAPSULE | Refills: 1
Start: 2024-03-24

## 2024-03-24 RX ORDER — DIPHENOXYLATE HYDROCHLORIDE AND ATROPINE SULFATE 2.5; .025 MG/1; MG/1
TABLET ORAL
Qty: 90 TABLET | Refills: 0 | Status: SHIPPED | OUTPATIENT
Start: 2024-03-24 | End: 2024-04-18

## 2024-03-24 RX ORDER — DULOXETIN HYDROCHLORIDE 30 MG/1
CAPSULE, DELAYED RELEASE ORAL
Qty: 90 CAPSULE | Refills: 1
Start: 2024-03-24

## 2024-04-01 ENCOUNTER — TELEPHONE (OUTPATIENT)
Dept: FAMILY MEDICINE CLINIC | Age: 54
End: 2024-04-01

## 2024-04-01 NOTE — TELEPHONE ENCOUNTER
Nurse checked back in with patient and gave her a few numbers of places to call for psychiatrist.  Patient stated that she would not be able to go to those locations due to traveling, needs something closer.  Nurse told patient that she would continue to look for more resources, and that I also let Dr. Pinto know that she may need a referral for a psychiatrist.  Patient advised that if she needed anything or had questions or concerns to call the office.  Patient verbalized understanding.

## 2024-04-01 NOTE — TELEPHONE ENCOUNTER
Called patient to follow up with her and see how she was doing.  Patient stated that she was having some difficulty with grief, that she had just lost a friend from her child chris.  Nurse talked with patient about calling grief counseling center for some relief.  Also spoke with the patient about new Pyschiatrist  Maren Madrigal that will be coming to our office at the end of April.  Patient is being put on the wait list.   Went over plan for the time being to work on meditation and breathing exercises for anxiety, taking medications as prescribed.  Also spoke with patient about her upcoming appointment on 4/15/24 Virtual Visit.  Patient stated that she would make sure to keep the appointment.  Patient encouraged to call the office if need, or if she had any questions or  concerns.  Patient verbalized understanding.

## 2024-04-01 NOTE — TELEPHONE ENCOUNTER
Pt request call from this RN.  Complimented Karla FLORES on her care of patient.     Pt interested in Psychiatrist.   Nurse team will look for panel providers for pt insurance and let PCP and Patient know.    Pt is agreeable with plan.

## 2024-04-05 NOTE — TELEPHONE ENCOUNTER
Call placed out to patient to follow up on how she is doing.  Patient did not answer her phone, nurse left a message for the patient to return the call to the office.

## 2024-04-09 ENCOUNTER — TELEPHONE (OUTPATIENT)
Dept: FAMILY MEDICINE CLINIC | Age: 54
End: 2024-04-09

## 2024-04-09 NOTE — TELEPHONE ENCOUNTER
Patient returned call to the office.  Nurse spoke with patient and checked in with patient to see how she was doing.  Patient seemed to be in brighter spirits.  Nurse offered patient new resources for psychiatrist, peer support groups, virtual groups for the patient to reach out to.   Nurse also spoke with the patient about interest and hobbies and resources for groups that the patient was interested in. Patient has an upcoming virtual appointment on 4/15/24.  Patient confirmed that she will be at her appointment.  Nurse also advised the patient that if she had any questions or concerns to call the office.  Patient verbalized understanding.

## 2024-04-14 DIAGNOSIS — J30.2 SEASONAL ALLERGIC RHINITIS, UNSPECIFIED TRIGGER: ICD-10-CM

## 2024-04-14 RX ORDER — CETIRIZINE HYDROCHLORIDE 10 MG/1
10 TABLET ORAL DAILY
Qty: 90 TABLET | Refills: 3 | Status: SHIPPED | OUTPATIENT
Start: 2024-04-14

## 2024-04-14 SDOH — ECONOMIC STABILITY: FOOD INSECURITY: WITHIN THE PAST 12 MONTHS, YOU WORRIED THAT YOUR FOOD WOULD RUN OUT BEFORE YOU GOT MONEY TO BUY MORE.: PATIENT DECLINED

## 2024-04-14 SDOH — ECONOMIC STABILITY: FOOD INSECURITY: WITHIN THE PAST 12 MONTHS, THE FOOD YOU BOUGHT JUST DIDN'T LAST AND YOU DIDN'T HAVE MONEY TO GET MORE.: PATIENT DECLINED

## 2024-04-14 SDOH — ECONOMIC STABILITY: TRANSPORTATION INSECURITY
IN THE PAST 12 MONTHS, HAS LACK OF TRANSPORTATION KEPT YOU FROM MEETINGS, WORK, OR FROM GETTING THINGS NEEDED FOR DAILY LIVING?: YES

## 2024-04-14 SDOH — ECONOMIC STABILITY: INCOME INSECURITY: HOW HARD IS IT FOR YOU TO PAY FOR THE VERY BASICS LIKE FOOD, HOUSING, MEDICAL CARE, AND HEATING?: HARD

## 2024-04-15 ENCOUNTER — TELEMEDICINE (OUTPATIENT)
Dept: FAMILY MEDICINE CLINIC | Age: 54
End: 2024-04-15
Payer: COMMERCIAL

## 2024-04-15 DIAGNOSIS — F41.9 ANXIETY: ICD-10-CM

## 2024-04-15 DIAGNOSIS — M48.061 SPINAL STENOSIS OF LUMBAR REGION WITHOUT NEUROGENIC CLAUDICATION: ICD-10-CM

## 2024-04-15 DIAGNOSIS — F33.2 SEVERE EPISODE OF RECURRENT MAJOR DEPRESSIVE DISORDER, WITHOUT PSYCHOTIC FEATURES (HCC): ICD-10-CM

## 2024-04-15 DIAGNOSIS — K58.0 IRRITABLE BOWEL SYNDROME WITH DIARRHEA: Primary | ICD-10-CM

## 2024-04-15 PROCEDURE — 99213 OFFICE O/P EST LOW 20 MIN: CPT | Performed by: STUDENT IN AN ORGANIZED HEALTH CARE EDUCATION/TRAINING PROGRAM

## 2024-04-15 NOTE — PROGRESS NOTES
to severe spinal canal  stenosis at L3-L4.  2. Mild-to-moderate spinal canal stenosis at L2-L3.  No significant spinal  canal stenosis at the remaining levels.    Has walked outside with cane for last two days  Working on increasing frequency    IBS-D  Lomotil is only moderately helpful. PRN  Planning to start activia probiotic daily    Has noticed more shortness of breath recently  Evaluated in ED, anxious    Review of Systems       Objective   Patient-Reported Vitals  No data recorded     Physical Exam             --Reji Pinto, DO

## 2024-04-17 ENCOUNTER — PATIENT MESSAGE (OUTPATIENT)
Dept: FAMILY MEDICINE CLINIC | Age: 54
End: 2024-04-17

## 2024-04-18 ENCOUNTER — CLINICAL DOCUMENTATION (OUTPATIENT)
Dept: SPIRITUAL SERVICES | Age: 54
End: 2024-04-18

## 2024-04-18 NOTE — PROGRESS NOTES
Attempted to follow up with Pt for outpatient  support. No answer on phone. Left voicemail for Pt and will attempt to follow up at a later time.    Bobo Boyle

## 2024-04-21 DIAGNOSIS — F41.9 ANXIETY: ICD-10-CM

## 2024-04-22 RX ORDER — CLONAZEPAM 1 MG/1
TABLET ORAL
Qty: 75 TABLET | Refills: 0 | Status: SHIPPED | OUTPATIENT
Start: 2024-04-22 | End: 2024-05-22

## 2024-05-01 DIAGNOSIS — K21.00 GASTROESOPHAGEAL REFLUX DISEASE WITH ESOPHAGITIS, UNSPECIFIED WHETHER HEMORRHAGE: ICD-10-CM

## 2024-05-01 DIAGNOSIS — G89.4 CHRONIC PAIN DISORDER: ICD-10-CM

## 2024-05-01 NOTE — TELEPHONE ENCOUNTER
Refill Request - Controlled Substance    CONFIRM preferred pharmacy with the patient.    If Mail Order Rx - Pend for 90 day refill.        Last Seen Department: 4/15/2024  Last Seen by PCP: 4/15/2024    Last Written: 3/12/2024    Last UDS: 7/30/2021    Med Agreement Signed On: 2/25/2019    If no future appointment scheduled:  Review the last OV with PCP and review information for follow-up visit,  Route STAFF MESSAGE with patient name to the  Pool for scheduling with the following information:            -  Timing of next visit           -  Visit type ie Physical, OV, etc           -  Diagnoses/Reason ie. COPD, HTN - Do not use MEDICATION, Follow-up or CHECK UP - Give reason for visit        Next Appointment:   Future Appointments   Date Time Provider Department Center   6/24/2024 10:15 AM Reji Pinto DO [facility]       Message sent to  to schedule appt with patient?  NO      Requested Prescriptions     Pending Prescriptions Disp Refills    traMADol (ULTRAM) 50 MG tablet [Pharmacy Med Name: traMADol HCL 50MG TABLET] 30 tablet 0     Sig: Take 1 tablet by mouth daily for 30 days. Max Daily Amount: 50 mg

## 2024-05-01 NOTE — TELEPHONE ENCOUNTER
Refill Request     CONFIRM preferred pharmacy with the patient.    If Mail Order Rx - Pend for 90 day refill.      Last Seen: Last Seen Department: 4/15/2024  Last Seen by PCP: 4/15/2024    Last Written: 12/28/23 60 with 1 refill    If no future appointment scheduled:  Review the last OV with PCP and review information for follow-up visit,  Route STAFF MESSAGE with patient name to the  Pool for scheduling with the following information:            -  Timing of next visit           -  Visit type ie Physical, OV, etc           -  Diagnoses/Reason ie. COPD, HTN - Do not use MEDICATION, Follow-up or CHECK UP - Give reason for visit      Next Appointment:   Future Appointments   Date Time Provider Department Center   6/24/2024 10:15 AM Reji Pinto, DO [facility]       Message sent to  to schedule appt with patient?  NO      Requested Prescriptions     Pending Prescriptions Disp Refills    pantoprazole (PROTONIX) 40 MG tablet [Pharmacy Med Name: PANTOPRAZOLE SOD DR 40 MG TAB] 60 tablet 1     Sig: TAKE 1 TABLET BY MOUTH TWICE A DAY BEFORE A MEAL

## 2024-05-02 RX ORDER — TRAMADOL HYDROCHLORIDE 50 MG/1
50 TABLET ORAL DAILY
Qty: 30 TABLET | Refills: 0 | Status: SHIPPED | OUTPATIENT
Start: 2024-05-02 | End: 2024-06-01

## 2024-05-02 RX ORDER — PANTOPRAZOLE SODIUM 40 MG/1
TABLET, DELAYED RELEASE ORAL
Qty: 60 TABLET | Refills: 1 | Status: SHIPPED | OUTPATIENT
Start: 2024-05-02

## 2024-05-09 ENCOUNTER — TELEPHONE (OUTPATIENT)
Dept: FAMILY MEDICINE CLINIC | Age: 54
End: 2024-05-09

## 2024-05-09 NOTE — TELEPHONE ENCOUNTER
Called the patient to follow up and check in to see how she is doing.  No answer left a message on the patient's machine for her to return the call to the office.

## 2024-05-16 ENCOUNTER — TELEPHONE (OUTPATIENT)
Dept: FAMILY MEDICINE CLINIC | Age: 54
End: 2024-05-16

## 2024-05-16 ENCOUNTER — APPOINTMENT (OUTPATIENT)
Dept: GENERAL RADIOLOGY | Age: 54
End: 2024-05-16
Payer: COMMERCIAL

## 2024-05-16 ENCOUNTER — HOSPITAL ENCOUNTER (OUTPATIENT)
Age: 54
Setting detail: OBSERVATION
LOS: 1 days | Discharge: HOME | End: 2024-05-16
Attending: STUDENT IN AN ORGANIZED HEALTH CARE EDUCATION/TRAINING PROGRAM | Admitting: PSYCHIATRY & NEUROLOGY
Payer: COMMERCIAL

## 2024-05-16 VITALS
DIASTOLIC BLOOD PRESSURE: 97 MMHG | WEIGHT: 200 LBS | BODY MASS INDEX: 33.32 KG/M2 | RESPIRATION RATE: 20 BRPM | HEART RATE: 111 BPM | TEMPERATURE: 97.5 F | SYSTOLIC BLOOD PRESSURE: 145 MMHG | OXYGEN SATURATION: 98 % | HEIGHT: 65 IN

## 2024-05-16 DIAGNOSIS — R45.851 SUICIDAL IDEATION: ICD-10-CM

## 2024-05-16 DIAGNOSIS — F41.1 ANXIETY STATE: ICD-10-CM

## 2024-05-16 DIAGNOSIS — F32.A DEPRESSION, UNSPECIFIED DEPRESSION TYPE: Primary | ICD-10-CM

## 2024-05-16 PROBLEM — F39 MOOD DISORDER (HCC): Status: ACTIVE | Noted: 2024-05-16

## 2024-05-16 LAB
AMPHETAMINES UR QL SCN>1000 NG/ML: ABNORMAL
ANION GAP SERPL CALCULATED.3IONS-SCNC: 17 MMOL/L (ref 3–16)
APAP SERPL-MCNC: <5 UG/ML (ref 10–30)
BARBITURATES UR QL SCN>200 NG/ML: POSITIVE
BASOPHILS # BLD: 0.1 K/UL (ref 0–0.2)
BASOPHILS NFR BLD: 0.8 %
BENZODIAZ UR QL SCN>200 NG/ML: ABNORMAL
BILIRUB UR QL STRIP.AUTO: NEGATIVE
BUN SERPL-MCNC: 6 MG/DL (ref 7–20)
CALCIUM SERPL-MCNC: 9.7 MG/DL (ref 8.3–10.6)
CANNABINOIDS UR QL SCN>50 NG/ML: POSITIVE
CHLORIDE SERPL-SCNC: 100 MMOL/L (ref 99–110)
CLARITY UR: CLEAR
CO2 SERPL-SCNC: 22 MMOL/L (ref 21–32)
COCAINE UR QL SCN: ABNORMAL
COLOR UR: YELLOW
CREAT SERPL-MCNC: 0.7 MG/DL (ref 0.6–1.1)
DEPRECATED RDW RBC AUTO: 14.4 % (ref 12.4–15.4)
DRUG SCREEN COMMENT UR-IMP: ABNORMAL
EOSINOPHIL # BLD: 0.1 K/UL (ref 0–0.6)
EOSINOPHIL NFR BLD: 1.1 %
ETHANOLAMINE SERPL-MCNC: 115 MG/DL (ref 0–0.08)
ETHANOLAMINE SERPL-MCNC: 221 MG/DL (ref 0–0.08)
ETHANOLAMINE SERPL-MCNC: 56 MG/DL (ref 0–0.08)
FENTANYL SCREEN, URINE: ABNORMAL
GFR SERPLBLD CREATININE-BSD FMLA CKD-EPI: >90 ML/MIN/{1.73_M2}
GLUCOSE SERPL-MCNC: 84 MG/DL (ref 70–99)
GLUCOSE UR STRIP.AUTO-MCNC: NEGATIVE MG/DL
HCG SERPL QL: NEGATIVE
HCT VFR BLD AUTO: 47 % (ref 36–48)
HGB BLD-MCNC: 16.2 G/DL (ref 12–16)
HGB UR QL STRIP.AUTO: NEGATIVE
KETONES UR STRIP.AUTO-MCNC: NEGATIVE MG/DL
LEUKOCYTE ESTERASE UR QL STRIP.AUTO: NEGATIVE
LYMPHOCYTES # BLD: 3.4 K/UL (ref 1–5.1)
LYMPHOCYTES NFR BLD: 35.8 %
MCH RBC QN AUTO: 30.3 PG (ref 26–34)
MCHC RBC AUTO-ENTMCNC: 34.6 G/DL (ref 31–36)
MCV RBC AUTO: 87.7 FL (ref 80–100)
METHADONE UR QL SCN>300 NG/ML: ABNORMAL
MONOCYTES # BLD: 0.7 K/UL (ref 0–1.3)
MONOCYTES NFR BLD: 7 %
NEUTROPHILS # BLD: 5.2 K/UL (ref 1.7–7.7)
NEUTROPHILS NFR BLD: 55.3 %
NITRITE UR QL STRIP.AUTO: NEGATIVE
NT-PROBNP SERPL-MCNC: 199 PG/ML (ref 0–124)
OPIATES UR QL SCN>300 NG/ML: ABNORMAL
OXYCODONE UR QL SCN: ABNORMAL
PCP UR QL SCN>25 NG/ML: ABNORMAL
PH UR STRIP.AUTO: 6.5 [PH] (ref 5–8)
PH UR STRIP: 6.5 [PH]
PLATELET # BLD AUTO: 253 K/UL (ref 135–450)
PMV BLD AUTO: 8.1 FL (ref 5–10.5)
POTASSIUM SERPL-SCNC: 3.8 MMOL/L (ref 3.5–5.1)
PROT UR STRIP.AUTO-MCNC: NEGATIVE MG/DL
RBC # BLD AUTO: 5.36 M/UL (ref 4–5.2)
SALICYLATES SERPL-MCNC: <0.3 MG/DL (ref 15–30)
SARS-COV-2 RDRP RESP QL NAA+PROBE: NOT DETECTED
SODIUM SERPL-SCNC: 139 MMOL/L (ref 136–145)
SP GR UR STRIP.AUTO: <=1.005 (ref 1–1.03)
TROPONIN, HIGH SENSITIVITY: 12 NG/L (ref 0–14)
UA COMPLETE W REFLEX CULTURE PNL UR: NORMAL
UA DIPSTICK W REFLEX MICRO PNL UR: NORMAL
URN SPEC COLLECT METH UR: NORMAL
UROBILINOGEN UR STRIP-ACNC: 0.2 E.U./DL
WBC # BLD AUTO: 9.4 K/UL (ref 4–11)

## 2024-05-16 PROCEDURE — 81003 URINALYSIS AUTO W/O SCOPE: CPT

## 2024-05-16 PROCEDURE — 99285 EMERGENCY DEPT VISIT HI MDM: CPT

## 2024-05-16 PROCEDURE — 99223 1ST HOSP IP/OBS HIGH 75: CPT

## 2024-05-16 PROCEDURE — 87635 SARS-COV-2 COVID-19 AMP PRB: CPT

## 2024-05-16 PROCEDURE — 80307 DRUG TEST PRSMV CHEM ANLYZR: CPT

## 2024-05-16 PROCEDURE — 6370000000 HC RX 637 (ALT 250 FOR IP): Performed by: PSYCHIATRY & NEUROLOGY

## 2024-05-16 PROCEDURE — 6370000000 HC RX 637 (ALT 250 FOR IP): Performed by: STUDENT IN AN ORGANIZED HEALTH CARE EDUCATION/TRAINING PROGRAM

## 2024-05-16 PROCEDURE — 84703 CHORIONIC GONADOTROPIN ASSAY: CPT

## 2024-05-16 PROCEDURE — 6370000000 HC RX 637 (ALT 250 FOR IP)

## 2024-05-16 PROCEDURE — 36415 COLL VENOUS BLD VENIPUNCTURE: CPT

## 2024-05-16 PROCEDURE — 82077 ASSAY SPEC XCP UR&BREATH IA: CPT

## 2024-05-16 PROCEDURE — 85025 COMPLETE CBC W/AUTO DIFF WBC: CPT

## 2024-05-16 PROCEDURE — 83880 ASSAY OF NATRIURETIC PEPTIDE: CPT

## 2024-05-16 PROCEDURE — 80179 DRUG ASSAY SALICYLATE: CPT

## 2024-05-16 PROCEDURE — 71045 X-RAY EXAM CHEST 1 VIEW: CPT

## 2024-05-16 PROCEDURE — 84484 ASSAY OF TROPONIN QUANT: CPT

## 2024-05-16 PROCEDURE — 80048 BASIC METABOLIC PNL TOTAL CA: CPT

## 2024-05-16 PROCEDURE — G0378 HOSPITAL OBSERVATION PER HR: HCPCS

## 2024-05-16 PROCEDURE — 80143 DRUG ASSAY ACETAMINOPHEN: CPT

## 2024-05-16 RX ORDER — FOLIC ACID 1 MG/1
1 TABLET ORAL DAILY
Status: DISCONTINUED | OUTPATIENT
Start: 2024-05-16 | End: 2024-05-16 | Stop reason: HOSPADM

## 2024-05-16 RX ORDER — PRIMIDONE 250 MG/1
250 TABLET ORAL 3 TIMES DAILY
Status: DISCONTINUED | OUTPATIENT
Start: 2024-05-16 | End: 2024-05-16 | Stop reason: HOSPADM

## 2024-05-16 RX ORDER — ATORVASTATIN CALCIUM 40 MG/1
40 TABLET, FILM COATED ORAL DAILY
Status: DISCONTINUED | OUTPATIENT
Start: 2024-05-16 | End: 2024-05-16 | Stop reason: HOSPADM

## 2024-05-16 RX ORDER — LORAZEPAM 1 MG/1
1 TABLET ORAL ONCE
Status: COMPLETED | OUTPATIENT
Start: 2024-05-16 | End: 2024-05-16

## 2024-05-16 RX ORDER — TRAMADOL HYDROCHLORIDE 50 MG/1
50 TABLET ORAL ONCE
Status: COMPLETED | OUTPATIENT
Start: 2024-05-16 | End: 2024-05-16

## 2024-05-16 RX ORDER — PANTOPRAZOLE SODIUM 40 MG/1
40 TABLET, DELAYED RELEASE ORAL
Status: DISCONTINUED | OUTPATIENT
Start: 2024-05-16 | End: 2024-05-16 | Stop reason: HOSPADM

## 2024-05-16 RX ORDER — CETIRIZINE HYDROCHLORIDE 10 MG/1
10 TABLET ORAL DAILY
Status: DISCONTINUED | OUTPATIENT
Start: 2024-05-16 | End: 2024-05-16 | Stop reason: HOSPADM

## 2024-05-16 RX ORDER — DICYCLOMINE HYDROCHLORIDE 10 MG/1
10 CAPSULE ORAL 4 TIMES DAILY
Status: DISCONTINUED | OUTPATIENT
Start: 2024-05-16 | End: 2024-05-16 | Stop reason: HOSPADM

## 2024-05-16 RX ORDER — POLYETHYLENE GLYCOL 3350 17 G
2 POWDER IN PACKET (EA) ORAL
Status: DISCONTINUED | OUTPATIENT
Start: 2024-05-16 | End: 2024-05-16 | Stop reason: HOSPADM

## 2024-05-16 RX ORDER — CARBAMAZEPINE 200 MG/1
400 TABLET, EXTENDED RELEASE ORAL EVERY 8 HOURS
Status: DISCONTINUED | OUTPATIENT
Start: 2024-05-16 | End: 2024-05-16 | Stop reason: HOSPADM

## 2024-05-16 RX ORDER — CLONAZEPAM 1 MG/1
1 TABLET ORAL EVERY 12 HOURS
Status: DISCONTINUED | OUTPATIENT
Start: 2024-05-16 | End: 2024-05-16 | Stop reason: HOSPADM

## 2024-05-16 RX ORDER — ACETAMINOPHEN 325 MG/1
650 TABLET ORAL EVERY 8 HOURS PRN
Status: DISCONTINUED | OUTPATIENT
Start: 2024-05-16 | End: 2024-05-16 | Stop reason: HOSPADM

## 2024-05-16 RX ORDER — CARBAMAZEPINE 200 MG/1
400 TABLET, EXTENDED RELEASE ORAL ONCE
Status: COMPLETED | OUTPATIENT
Start: 2024-05-16 | End: 2024-05-16

## 2024-05-16 RX ORDER — TRAMADOL HYDROCHLORIDE 50 MG/1
50 TABLET ORAL DAILY
Status: DISCONTINUED | OUTPATIENT
Start: 2024-05-16 | End: 2024-05-16 | Stop reason: HOSPADM

## 2024-05-16 RX ADMIN — LORAZEPAM 1 MG: 1 TABLET ORAL at 00:58

## 2024-05-16 RX ADMIN — LEVOTHYROXINE SODIUM 175 MCG: 25 TABLET ORAL at 11:05

## 2024-05-16 RX ADMIN — DICYCLOMINE HYDROCHLORIDE 10 MG: 10 CAPSULE ORAL at 11:05

## 2024-05-16 RX ADMIN — METOPROLOL TARTRATE 12.5 MG: 25 TABLET, FILM COATED ORAL at 14:46

## 2024-05-16 RX ADMIN — TRAMADOL HYDROCHLORIDE 50 MG: 50 TABLET ORAL at 02:15

## 2024-05-16 RX ADMIN — LORAZEPAM 1 MG: 1 TABLET ORAL at 09:55

## 2024-05-16 RX ADMIN — LORAZEPAM 1 MG: 1 TABLET ORAL at 06:33

## 2024-05-16 RX ADMIN — CARBAMAZEPINE 400 MG: 200 TABLET, EXTENDED RELEASE ORAL at 01:43

## 2024-05-16 RX ADMIN — DULOXETINE HYDROCHLORIDE 90 MG: 60 CAPSULE, DELAYED RELEASE ORAL at 11:04

## 2024-05-16 RX ADMIN — ATORVASTATIN CALCIUM 40 MG: 40 TABLET, FILM COATED ORAL at 11:05

## 2024-05-16 RX ADMIN — CETIRIZINE HYDROCHLORIDE 10 MG: 10 TABLET ORAL at 11:05

## 2024-05-16 RX ADMIN — CLONAZEPAM 1 MG: 1 TABLET ORAL at 11:05

## 2024-05-16 RX ADMIN — PRIMIDONE 250 MG: 250 TABLET ORAL at 14:53

## 2024-05-16 RX ADMIN — FOLIC ACID 1 MG: 1 TABLET ORAL at 11:05

## 2024-05-16 RX ADMIN — CARBAMAZEPINE 400 MG: 200 TABLET, EXTENDED RELEASE ORAL at 12:36

## 2024-05-16 RX ADMIN — RIVAROXABAN 20 MG: 20 TABLET, FILM COATED ORAL at 11:05

## 2024-05-16 ASSESSMENT — PAIN DESCRIPTION - FREQUENCY
FREQUENCY: INTERMITTENT
FREQUENCY: CONTINUOUS

## 2024-05-16 ASSESSMENT — PAIN DESCRIPTION - ONSET
ONSET: ON-GOING
ONSET: ON-GOING

## 2024-05-16 ASSESSMENT — PAIN SCALES - GENERAL
PAINLEVEL_OUTOF10: 5
PAINLEVEL_OUTOF10: 8
PAINLEVEL_OUTOF10: 8
PAINLEVEL_OUTOF10: 3

## 2024-05-16 ASSESSMENT — PAIN DESCRIPTION - PAIN TYPE
TYPE: CHRONIC PAIN
TYPE: CHRONIC PAIN

## 2024-05-16 ASSESSMENT — LIFESTYLE VARIABLES
HOW OFTEN DO YOU HAVE A DRINK CONTAINING ALCOHOL: MONTHLY OR LESS
HOW MANY STANDARD DRINKS CONTAINING ALCOHOL DO YOU HAVE ON A TYPICAL DAY: 1 OR 2

## 2024-05-16 ASSESSMENT — PAIN DESCRIPTION - DESCRIPTORS: DESCRIPTORS: SHARP;NUMBNESS

## 2024-05-16 ASSESSMENT — PAIN DESCRIPTION - LOCATION
LOCATION: GENERALIZED;HIP
LOCATION: GENERALIZED

## 2024-05-16 ASSESSMENT — PAIN DESCRIPTION - ORIENTATION: ORIENTATION: RIGHT

## 2024-05-16 NOTE — VIRTUAL HEALTH
Sarah Stock  [id number]  1970     Social Work Behavioral Health Crisis Assessment    05/16/24    Chief Complaint: \"Anxiety, depression. I have a lot of pain issues. I drank a lot on an empty stomach. I just wanted someone to talk to and the police asked me if I wanted to come and be evaluated.\"    HPI: Patient is a 54 y.o. White (non-) female who presents for suicidal ideation. Patient presented to the ED on 05/16/24 from home    Past Psychiatric History:  Previous Diagnoses/symptoms: depression  Previous suicide attempts/self-harm: Denies  Inpatient psychiatric hospitalizations: denies  Current outpatient psychiatric provider: none' PCP managed psych medicines   Current therapist: No therapist   Previous psychiatric medication trials: several  Current psychiatric medications: list below; recent increase of Cymbalta  Family Psychiatric History: Denies    Sleep Hours: varies - tosses and turns all night    Sleep concerns: difficulty attaining sleep    Use of sleep medications:  Klonopin, melatonin    Substance Abuse History:  Tobacco: Denies  Alcohol: Endorses occasionally   Marijuana: Endorses medical marijuana card  Stimulant: Denies  Opiates: Denies  Benzodiazepine: Denies  Other illicit drug usage: Denies  History of substance/alcohol abuse treatment: Denies    Social History:  Education: Some college  Living Situation/Interest: alone  Marital/Committed relationship and parenting hx: single  Occupation: [employer] for 12 years  Legal History/Hx of Violence: Denies  Spiritual History: spiritual   Psychological trauma, neglect, or abuse: has had a lot of loss of family and friends, medical problems   Access to guns or other weapons: denies having access to firearms/dangerous weapons     Past Medical History:  Active Ambulatory Problems     Diagnosis Date Noted    Hereditary protein S deficiency (HCC) 05/18/2011    Chronic headache disorder 05/18/2011    CD (celiac disease)

## 2024-05-16 NOTE — PLAN OF CARE
Problem: Depression  Goal: Will be euthymic at discharge  Description: INTERVENTIONS:  1. Administer medication as ordered  2. Provide emotional support via 1:1 interaction with staff  3. Encourage involvement in milieu/groups/activities  4. Monitor for social isolation  Outcome: Progressing     Problem: Anxiety  Goal: Will report anxiety at manageable levels  Description: INTERVENTIONS:  1. Administer medication as ordered  2. Teach and rehearse alternative coping skills  3. Provide emotional support with 1:1 interaction with staff  Outcome: Progressing     Problem: Sleep Disturbance  Goal: Will exhibit normal sleeping pattern  Description: INTERVENTIONS:  1. Administer medication as ordered  2. Decrease environmental stimuli, including noise, as appropriate  3. Discourage social isolation and naps during the day  Outcome: Progressing     Problem: Self Care Deficit  Goal: Return ADL status to a safe level of function  Description: INTERVENTIONS:  1. Administer medication as ordered  2. Assess ADL deficits and provide assistive devices as needed  3. Obtain PT/OT consults as needed  4. Assist and instruct patient to increase activity and self care as tolerated  Outcome: Progressing

## 2024-05-16 NOTE — CARE COORDINATION
SW met w/Pt at bedside to complete their OQ analyst and lifetime CSSR-S. The Pt was friendly and cooperative in answering/discussing the assessment questions.       05/16/24 1147   Suicidal Ideation   Wish to be Dead Lifetime - No   Non-Specific Active Suicidal Thoughts Lifetime - No   Suicidal Behavior Trigger No Selected

## 2024-05-16 NOTE — ED PROVIDER NOTES
[facility] ED     EMERGENCY DEPARTMENT ENCOUNTER         Pt Name: Sarah Stock   MRN: [id number]   Birthdate 1970   Date of evaluation: 5/16/2024   Provider: Ramón Hess MD   PCP: Reji Pinto DO   Note Started: 12:42 AM EDT 5/16/24       Chief Complaint     Psychiatric Evaluation (Pt arrives with [facility] Police and placed on a SOB d/t calling 911 and stating she wouldn't mind if she wasn't here tomorrow according to officer. Pt states \"I dont have any suicidal ideations but I wouldn't care if I wasn't here tomorrow\" Pt denies H/I. Pt states she did have 3 seltzers tonight. )      History of Present Illness     Sarah Stock is a 54 y.o. female who presents with concern for severe anxiety and perhaps some psychiatric decompensation.  The patient has suffered some considerable psychosocial stress related to her family recently and called law enforcement this evening with statements suggesting passive suicidal ideation.  She has made no attempt at suicide.  On my questioning she denies suicidality but again is feeling quite anxious and hopeless.  She was placed on a statement of belief by law enforcement.  She does report drinking alcohol tonight.  Incidentally she also reports that she is been struggling with some wheezing and is fearful of some swelling in her legs.  She has a history of DVT but also a family history of heart failure and she is worried that she is suffering heart failure.      I have reviewed the nursing notes and agree unless otherwise noted.    Review of Systems     Positives and pertinent negatives as per HPI.    Past Medical, Surgical, Family, and Social History     She has a past medical history of Arthritis of midfoot, Benign essential tremor, BRCA1 negative, Colon polyp, Degenerative disc disease, DVT, lower extremity (HCC), Fibromyalgia, H/O viral meningitis, History of DVT (deep vein thrombosis), History of pulmonary embolus (PE), History

## 2024-05-16 NOTE — H&P
Psychiatry History and Physical     Admission Date:    5/16/2024    Identification:    SOI:     CC:    HPI:   ***    Past Psychiatric History:    Previous Diagnoses: ***  PreviousHosp:***  OutpatientTx: ***     Med Trials: ***  Suicidality: ***  History of violence: ***    Past Medical History:  Past Medical History:   Diagnosis Date    Arthritis of midfoot 05/10/2017    Benign essential tremor     BRCA1 negative     Colon polyp 05/06/2019    Degenerative disc disease     DVT, lower extremity (HCC) 1989    Fibromyalgia     H/O viral meningitis 10/17/2019    History of DVT (deep vein thrombosis) 10/17/2019    History of pulmonary embolus (PE) 10/17/2019    History of thyroid cancer 08/30/2021    IBS (irritable bowel syndrome)     Kidney stones     Left-sided trigeminal neuralgia     Malignant neoplasm of thyroid gland (HCC) 08/22/2013    Migraines, neuralgic     since stroke 1999 Chronic    PONV (postoperative nausea and vomiting)     Poor venous access     Protein S deficiency (HCC)     Pulmonary embolism (HCC) 1989    Seizure disorder (HCC)     grand mal in 2005FROM MEDICATION COMBINATION OF ELAVIL, PHENERGAN AND DEMORAL ( childhood febrile seizure also-from a baby to age 11) and also X 1 ~2005 with headache treatment    Stroke ([facility]) 02/25/2019    Thrombosis of superior sagittal sinus 1999    Unspecified cerebral artery occlusion with cerebral infarction 1999    Vertebral artery occlusion 1999    White coat syndrome with high blood pressure but without hypertension        Home Medications:  Prior to Admission medications    Medication Sig Start Date End Date Taking? Authorizing Provider   traMADol (ULTRAM) 50 MG tablet Take 1 tablet by mouth daily for 30 days. Max Daily Amount: 50 mg 5/2/24 6/1/24  Reji Pinto DO   pantoprazole (PROTONIX) 40 MG tablet TAKE 1 TABLET BY MOUTH TWICE A DAY BEFORE A MEAL 5/2/24   Reji Pinto DO   clonazePAM (KLONOPIN) 1 MG tablet TAKE 1 TABLET BY MOUTH EVERY MORNING AND TAKE 
with headache treatment    Stroke (HCC) 02/25/2019    Thrombosis of superior sagittal sinus 1999    Unspecified cerebral artery occlusion with cerebral infarction 1999    Vertebral artery occlusion 1999    White coat syndrome with high blood pressure but without hypertension        Past Surgical History:        Procedure Laterality Date    ARTHROGRAPHY Right 4/17/2023    RIGHT HIP ARTHROGRAM WITH CORTISONE INJECTION performed by Tam Phpips MD at [facility] OR    BREAST BIOPSY      BREAST LUMPECTOMY      x 3    COLONOSCOPY N/A 12/13/2023    COLONOSCOPY performed by Bobo Gonzales MD at [facility] ENDOSCOPY    CYSTOSCOPY  01/09/2012    w/ left ureteroscopy, holmium laser. stone manipulation and left stent insertion    FINGER TRIGGER RELEASE Left 10/31/2019    LEFT THUMB TRIGGER DIGIT RELEASE performed by Mio Hearn MD at [facility] OR    FOOT FRACTURE SURGERY Left 2000    pins later removed    FOOT SURGERY Left 07/10/2017    LARYNX SURGERY      vocal cord nodule    LITHOTRIPSY  12/11    REFRACTIVE SURGERY      THYROIDECTOMY  05/18/2011         TOTAL HIP ARTHROPLASTY Right 7/24/2023    RIGHT TOTAL HIP ARTHROPLASTY MINIMALLY INVASIVE DIRECT ANTERIOR     LAKSHMI BIOMET performed by Tam Phipps MD at [facility] OR    UPPER GASTROINTESTINAL ENDOSCOPY N/A 4/14/2023    EGD BIOPSY performed by Belkis Caceres MD at [facility] ENDOSCOPY    UPPER GASTROINTESTINAL ENDOSCOPY N/A 12/13/2023    EGD BIOPSY performed by Bobo Gonzales MD at [facility] ENDOSCOPY       Medications Prior to Admission:    Prior to Admission medications    Medication Sig Start Date End Date Taking? Authorizing Provider   traMADol (ULTRAM) 50 MG tablet Take 1 tablet by mouth daily for 30 days. Max Daily Amount: 50 mg 5/2/24 6/1/24  Reji Pinto DO   pantoprazole (PROTONIX) 40 MG tablet TAKE 1 TABLET BY MOUTH TWICE A DAY BEFORE A MEAL 5/2/24   Reji Pinto DO   clonazePAM (KLONOPIN) 1 MG tablet TAKE 1 TABLET BY MOUTH EVERY MORNING

## 2024-05-16 NOTE — ED NOTES
Pt changed out of street clothes and placed into hospital safety gown at this time. All belongings removed and placed at the nurses station. Belongings documented in flowsheet.

## 2024-05-16 NOTE — PROGRESS NOTES
initiated visit with Pt. I am familiar with her from outpatient support. Pt shared about struggles and losses.  acknowledged and affirmed Pt's emotions. Prayed with Pt for her care and support.    Bobo Boyle       05/16/24 1447   Encounter Summary   Encounter Overview/Reason Spiritual/Emotional Needs;Behavioral Health   Service Provided For Patient   Referral/Consult From Rounding   Last Encounter    (5/16  visit)   Complexity of Encounter Moderate   Begin Time 1440   End Time  1447   Total Time Calculated 7 min       
4 Eyes Skin Assessment     NAME:  Sarah Stock  YOB: 1970  MEDICAL RECORD NUMBER:  [id number]    The patient is being assessed for  Admission    I agree that at least one RN has performed a thorough Head to Toe Skin Assessment on the patient. ALL assessment sites listed below have been assessed.      Areas assessed by both nurses:    Head, Face, Ears, Shoulders, Back, Chest, Arms, Elbows, Hands, and Legs. Feet and Heels       Pt had area of bright redness noted to lower right back, r/t heat pack applied in ED    Does the Patient have a Wound? No noted wound(s)       Drea Prevention initiated by RN: yes, check skin Qshift d/t 18 drea score  Wound Care Orders initiated by RN: No    Pressure Injury (Stage 3,4, Unstageable, DTI, NWPT, and Complex wounds) if present, place Wound referral order by RN under : No    New Ostomies, if present place, Ostomy referral order under : No     Nurse 1 eSignature: Electronically signed by Khoa Sloan RN on 5/16/24 at 11:42 AM EDT    **SHARE this note so that the co-signing nurse can place an eSignature**    Nurse 2 eSignature: Electronically signed by Celina Allred RN on 5/16/24 at 2:09 PM EDT    
[facility]  Discharge Note    Pt discharged with followings belongings:   Dental Appliances: None  Vision - Corrective Lenses: None  Hearing Aid: None  Jewelry: Necklace  Body Piercings Removed: N/A  Clothing: Shirt, Shorts, Undergarments, Footwear  Other Valuables: Purse, Money, Credit/Debit Card, Keys (220$ in cash)   Valuables returned to patient. Patient educated on aftercare instructions: yes.Patient verbalize understanding of AVS:  yes.    Status EXAM upon discharge:  Mental Status and Behavioral Exam  Normal: No  Level of Assistance: Independent/Self  Facial Expression: Sad, Worried  Affect: Appropriate  Level of Consciousness: Alert  Frequency of Checks: 4 times per hour, close  Mood:Normal: No  Mood: Anxious, Depressed, Helpless, Sad  Motor Activity:Normal: No  Motor Activity: Decreased  Eye Contact: Good  Observed Behavior: Friendly, Cooperative  Sexual Misconduct History: Current - no  Preception: [place] to person, [place] to time, [place] to place, [place] to situation  Attention:Normal: Yes  Thought Processes: Tangential  Thought Content:Normal: No  Thought Content: Preoccupations (pt focused on cat and being d/c)  Depression Symptoms: Appetite change, Change in energy level  Anxiety Symptoms: Generalized  Paula Symptoms: No problems reported or observed.  Hallucinations: None  Delusions: No  Memory:Normal: Yes  Insight and Judgment: No  Insight and Judgment: Poor judgment    Tobacco Screening:  Practical Counseling, on admission, virgilio X, if applicable and completed (first 3 are required if patient doesn't refuse):            ( ) Recognizing danger situations (included triggers and roadblocks)                    ( ) Coping skills (new ways to manage stress,relaxation techniques, changing routine, distraction)                                                           ( ) Basic information about quitting (benefits of quitting, techniques in how to quit, available resources  ( ) Referral for 
Bridge Appointment completed: Reviewed Discharge Instructions with patient.    Patient verbalizes understanding and agreement with the discharge plan using the teachback method.     Vaccinations (virgilio X if applicable and completed):  ( ) Patient states already received influenza vaccine elsewhere  ( ) Patient received influenza vaccine during this hospitalization  ( ) Patient refused influenza vaccine at this time  (x ) Not offered   
Pt HR and BP remains high. Asymptomatic. Pt does report her anxiety has improved but she is still anxious rating it a 4-5 on 0-10 scale. Provider made aware 1 time dose of lopressor ordered and given.   
Pt arrived on unit at 0845 via security and writer. She passes security check point no issues she very anxious and upset about admission. She is A&OX4. Vitals are elevated and patient is sweaty. Pt denies current SI and agrees to communicate with staff if no longer feel safe or in control. She is concerned about having a roommate, not having a hospital bed and her cat at home. She reports a hip replacement and chronic pain related to that. Emotional support provided. Pt does have a walker she uses to ambulate as well. Oriented to room and unit. Paperwork signed patient does want to wait to speak to provider before signing in voluntary.     
Pt states she has just becoming overwhelmed with life. She states she is not happy with her living situation (there is a leak in ceiling that is being fixed but in meantime hole in ceiling and she does not feel safe r/t neighbors) she states she has financial stress, she is lonely at times and grieving the loss of both her parents that passed in 2023 whom she was sole care taker, she has not been sleeping or eating and she has her own health issues she is dealing with including chronic pain. Pt states she was hoping alcohol would help her sleep but she knows it was not a good option. Pt denies current SI/HI/VH/AH and agrees to communicate with staff if no longer feel safe or in control. States she has never had SI but would be okay if she was to pass. Pt does have good support from several friends and brightens when talking about them. She has been eating and drinking well here. She states being in hospital is only causing her more anxiety and she already has OP  services in which she is happy with. Pt states she does have improved mood, discussed coping skills and patient feels confident in applying these as well as reaching out to he support when needed. She has calmed down and remains cooperative and pleasant.     
Tangential  Thought Content:Normal: No  Thought Content: Preoccupations (pt focused on cat and being d/c)  Depression Symptoms: Appetite change, Change in energy level  Anxiety Symptoms: Generalized  Paula Symptoms: No problems reported or observed.  Hallucinations: None  Delusions: No  Memory:Normal: Yes  Insight and Judgment: No  Insight and Judgment: Poor judgment    Tobacco Screening:  Practical Counseling, on admission, virgilio X, if applicable and completed (first 3 are required if patient doesn't refuse):            ( ) Recognizing danger situations (included triggers and roadblocks)                    ( ) Coping skills (new ways to manage stress,relaxation techniques, changing routine, distraction)                                                           ( ) Basic information about quitting (benefits of quitting, techniques in how to quit, available resources  (x ) [facility]                                                                                                                   ( x) Patient refused counseling  (x) Patient has not smoked in the last 30 days    Metabolic Screening:    Lab Results   Component Value Date    LABA1C 5.0 07/09/2023       Lab Results   Component Value Date    CHOL 195 11/13/2023    CHOL 207 (H) 08/09/2022    CHOL 223 (H) 08/27/2021    CHOL 238 (H) 11/05/2020    CHOL 230 (H) 04/04/2019     Lab Results   Component Value Date    TRIG 168 (H) 11/13/2023    TRIG 118 08/09/2022    TRIG 174 (H) 08/27/2021    TRIG 179 (H) 11/05/2020    TRIG 146 04/04/2019     Lab Results   Component Value Date    HDL 95 (H) 11/13/2023    HDL 92 (H) 08/09/2022    HDL 82 (H) 08/27/2021     (H) 11/05/2020    HDL 87 (H) 04/04/2019     No components found for: \"LDLCAL\"  No components found for: \"LABVLDL\"      Body mass index is 33.28 kg/m².    BP Readings from Last 2 Encounters:   05/16/24 (!) 170/105   03/20/24 (!) 158/88       Recliner 
16

## 2024-05-16 NOTE — TELEPHONE ENCOUNTER
Patient called into the office to be scheduled for a hospital follow up appointment.  She is being discharged later today.

## 2024-05-16 NOTE — TRANSITION OF CARE
[place] [address]  [facility] - [phone]   [address], [place] [address]  [facility] - [phone]   [address], [place] [address] (use lower level entrance)  [facility] - [phone]   [address], [place] [address]  [facility] - [phone]   [address], [place] [address]  [facility]  - [phone]  [address] [facility] [address] [place], [place] [address]  [facility] - [phone]   [address] [place] [address]     Discharge Medication List and Instructions:      Medication List        CONTINUE taking these medications      atorvastatin 40 MG tablet  Commonly known as: LIPITOR  Take 1 tablet by mouth daily     carBAMazepine 400 MG extended release tablet  Commonly known as: TEGRETOL XR  Take 1 tablet by mouth in the morning and 1 tablet at noon and 1 tablet in the evening.     cetirizine 10 MG tablet  Commonly known as: ZYRTEC  TAKE 1 TABLET BY MOUTH DAILY     clonazePAM 1 MG tablet  Commonly known as: KLONOPIN  TAKE 1 TABLET BY MOUTH EVERY MORNING AND TAKE 1 AND 1/2 TABLET BY MOUTH ONCE NIGHTLY     dicyclomine 10 MG capsule  Commonly known as: BENTYL  TAKE 1 CAPSULE BY MOUTH 4 TIMES A DAY     * DULoxetine 30 MG extended release capsule  Commonly known as: CYMBALTA  Take 30 mg by mouth daily with one 60mg capsule for a total of 90 mg daily.     * DULoxetine 60 MG extended release capsule  Commonly known as: CYMBALTA  Take one 60mg capsule by mouth daily with one 30 mg capsule for a total of 90 mg daily.     folic acid 1 MG tablet  Commonly known as: FOLVITE  Take 1 tablet by mouth daily     Handicap Placard Misc  by Does not apply route 02/27/24     levothyroxine 175 MCG tablet  Commonly known as: SYNTHROID  TAKE 1 TABLET BY MOUTH DAILY     meclizine 12.5 MG tablet  Commonly known as: ANTIVERT     Melatonin 10 MG Tabs     ondansetron 4 MG disintegrating tablet  Commonly known as: Zofran

## 2024-05-17 ENCOUNTER — TELEPHONE (OUTPATIENT)
Dept: FAMILY MEDICINE CLINIC | Age: 54
End: 2024-05-17

## 2024-05-17 NOTE — TELEPHONE ENCOUNTER
Patient returned call to the nurse.  Patient called in and spoke with the nurse about her upcoming appointment.  Nurse will be meeting with the patient prior to her appointment.

## 2024-05-17 NOTE — TELEPHONE ENCOUNTER
Care Transitions Initial Follow Up Call    Outreach made within 2 business days of discharge: Yes    Patient: Sarah Stock Patient : 1970   MRN: [id number]  Reason for Admission: There are no discharge diagnoses documented for the most recent discharge.  Discharge Date: 24       Spoke with: Sarah    Discharge department/facility: [facility] Patient Contact:  Was patient able to fill all prescriptions: Yes  Was patient instructed to bring all medications to the follow-up visit: Yes  Is patient taking all medications as directed in the discharge summary? Yes  Does patient understand their discharge instructions: Yes  Does patient have questions or concerns that need addressed prior to 7-14 day follow up office visit: no    Scheduled appointment with PCP within 7-14 days    Follow Up  Future Appointments   Date Time Provider Department Center   2024 11:00 AM Reji Pinto DO [facility]   2024 10:15 AM Reji Pinto DO [facility] Michelle Motz, LPN

## 2024-05-23 ENCOUNTER — TELEMEDICINE (OUTPATIENT)
Dept: FAMILY MEDICINE CLINIC | Age: 54
End: 2024-05-23

## 2024-05-23 ENCOUNTER — TELEPHONE (OUTPATIENT)
Dept: FAMILY MEDICINE CLINIC | Age: 54
End: 2024-05-23

## 2024-05-23 DIAGNOSIS — Z09 HOSPITAL DISCHARGE FOLLOW-UP: ICD-10-CM

## 2024-05-23 DIAGNOSIS — Z86.73 HX OF STROKE ASSOCIATED WITH BLOOD CLOTTING TENDENCY: ICD-10-CM

## 2024-05-23 DIAGNOSIS — F33.2 SEVERE EPISODE OF RECURRENT MAJOR DEPRESSIVE DISORDER, WITHOUT PSYCHOTIC FEATURES (HCC): Primary | ICD-10-CM

## 2024-05-23 DIAGNOSIS — F41.9 ANXIETY: ICD-10-CM

## 2024-05-23 RX ORDER — FOLIC ACID 1 MG/1
1 TABLET ORAL DAILY
Qty: 90 TABLET | Refills: 0 | Status: SHIPPED | OUTPATIENT
Start: 2024-05-23

## 2024-05-23 RX ORDER — LORAZEPAM 1 MG/1
1 TABLET ORAL EVERY 8 HOURS PRN
Qty: 5 TABLET | Refills: 0 | Status: SHIPPED | OUTPATIENT
Start: 2024-05-23 | End: 2024-06-22

## 2024-05-23 RX ORDER — ATORVASTATIN CALCIUM 40 MG/1
40 TABLET, FILM COATED ORAL DAILY
Qty: 90 TABLET | Refills: 1 | Status: SHIPPED | OUTPATIENT
Start: 2024-05-23

## 2024-05-23 RX ORDER — PRIMIDONE 250 MG/1
250 TABLET ORAL 3 TIMES DAILY
Qty: 120 TABLET | Refills: 1 | Status: SHIPPED | OUTPATIENT
Start: 2024-05-23

## 2024-05-23 NOTE — TELEPHONE ENCOUNTER
Refill Request     CONFIRM preferred pharmacy with the patient.    If Mail Order Rx - Pend for 90 day refill.      Last Seen: Last Seen Department: 5/23/2024  Last Seen by PCP: 5/23/2024    Last Written: 10/16/2023 Atorvastatin  12/31/2023 Primidone  2/7/2024 Folic Acid     If no future appointment scheduled:  Review the last OV with PCP and review information for follow-up visit,  Route STAFF MESSAGE with patient name to the  Pool for scheduling with the following information:            -  Timing of next visit           -  Visit type ie Physical, OV, etc           -  Diagnoses/Reason ie. COPD, HTN - Do not use MEDICATION, Follow-up or CHECK UP - Give reason for visit      Next Appointment:   Future Appointments   Date Time Provider Department Center   6/24/2024 10:15 AM Reji Pinto DO [facility]       Message sent to  to schedule appt with patient?  NO      Requested Prescriptions     Pending Prescriptions Disp Refills    atorvastatin (LIPITOR) 40 MG tablet [Pharmacy Med Name: ATORVASTATIN 40 MG TABLET] 90 tablet 1     Sig: TAKE 1 TABLET BY MOUTH DAILY    primidone (MYSOLINE) 250 MG tablet [Pharmacy Med Name: PRIMIDONE 250 MG TABLET] 120 tablet 1     Sig: TAKE ONE TABLET BY MOUTH THREE TIMES A DAY    folic acid (FOLVITE) 1 MG tablet [Pharmacy Med Name: FOLIC ACID 1 MG TABLET] 90 tablet 0     Sig: TAKE 1 TABLET BY MOUTH DAILY

## 2024-05-23 NOTE — TELEPHONE ENCOUNTER
Returned call to the patient.  Patient stated that she has a new condo and is very happy and that this is a dream come true.  Patient has a Virtual Visit with her PCP today for hospital follow up.  Patient states that she will be closing on her new condo on 6/5/24.  Patient has very positive outlook and states that I'm only going up.  Patient advised that if she has any questions, concerns, or that she needs someone to talk to, that she is able to call the office and ask for this nurse.  Patient verbalized understanding.

## 2024-05-23 NOTE — PROGRESS NOTES
Post-Discharge Transitional Care  Follow Up      Sarah Stock   YOB: 1970    Date of Office Visit:  5/23/2024  Date of Hospital Admission: 5/16/24  Date of Hospital Discharge: 5/16/24  Risk of hospital readmission (high >=14%. Medium >=10%) :Readmission Risk Score: 13      Care management risk score Rising risk (score 2-5) and Complex Care (Scores >=6): No Risk Score On File     Non face to face  following discharge, date last encounter closed (first attempt may have been earlier): 05/17/2024    Call initiated 2 business days of discharge: Yes    ASSESSMENT/PLAN:   {There are no diagnoses linked to this encounter. (Refresh or delete this SmartLink)}    Medical Decision Making: {[facility]4:78342}  No follow-ups on file.    {Time Documentation Optional:[id number]}       Subjective:   HPI:  Follow up of Hospital problems/diagnosis(es): ***    Inpatient course: Discharge summary reviewed- see chart.    Interval history/Current status: ***    Called 911 due to concerns for passive SI in the setting of alcohol intoxication  increased Cymbalta to 90 mg 1 month ago by me  No further medication changes made during hospitalization    IBS-D  Has improved in general     Planning to establish with [facility]    Patient Active Problem List   Diagnosis    Hereditary protein S deficiency (HCC)    Chronic headache disorder    CD (celiac disease)    Chronic pain disorder    Epilepsy (HCC)    Gastroesophageal reflux disease with esophagitis    Lipoma of left shoulder    Mixed hyperlipidemia    Post-surgical hypothyroidism    Prurigo nodularis    Irritable bowel syndrome with diarrhea    Thrombosis of superior sagittal sinus    Lichen simplex chronicus    Trigger finger of left thumb    History of stroke    Stenosis of left vertebral artery    Psychophysiological insomnia    Vertebral artery occlusion, left    Left atrial enlargement    Essential hypertension    Degenerative disc disease,

## 2024-05-24 ENCOUNTER — TELEPHONE (OUTPATIENT)
Dept: FAMILY MEDICINE CLINIC | Age: 54
End: 2024-05-24

## 2024-05-24 DIAGNOSIS — E03.9 HYPOTHYROIDISM, UNSPECIFIED TYPE: ICD-10-CM

## 2024-05-24 RX ORDER — LEVOTHYROXINE SODIUM 175 UG/1
175 TABLET ORAL DAILY
Qty: 90 TABLET | Refills: 1 | Status: SHIPPED | OUTPATIENT
Start: 2024-05-24

## 2024-05-24 NOTE — TELEPHONE ENCOUNTER
Refill Request     CONFIRM preferred pharmacy with the patient.    If Mail Order Rx - Pend for 90 day refill.      Last Seen: Last Seen Department: 5/23/2024  Last Seen by PCP: 5/23/2024    Last Written: 11/27/23 #90 1 refill    If no future appointment scheduled:  Review the last OV with PCP and review information for follow-up visit,  Route STAFF MESSAGE with patient name to the  Pool for scheduling with the following information:            -  Timing of next visit           -  Visit type ie Physical, OV, etc           -  Diagnoses/Reason ie. COPD, HTN - Do not use MEDICATION, Follow-up or CHECK UP - Give reason for visit      Next Appointment:   Future Appointments   Date Time Provider Department Center   6/24/2024 10:15 AM Reji Pinto, DO [facility]       Message sent to  to schedule appt with patient?  NO      Requested Prescriptions     Pending Prescriptions Disp Refills    levothyroxine (SYNTHROID) 175 MCG tablet 90 tablet 1     Sig: Take 1 tablet by mouth daily

## 2024-05-24 NOTE — PROGRESS NOTES
Post-Discharge Transitional Care  Follow Up      Sarah Stock   YOB: 1970    Date of Office Visit:  5/23/2024  Date of Hospital Admission: 5/16/24  Date of Hospital Discharge: 5/16/24  Risk of hospital readmission (high >=14%. Medium >=10%) :Readmission Risk Score: 13      Care management risk score Rising risk (score 2-5) and Complex Care (Scores >=6): No Risk Score On File     Non face to face  following discharge, date last encounter closed (first attempt may have been earlier): 05/17/2024    Call initiated 2 business days of discharge: Yes    ASSESSMENT/PLAN:   Below is the assessment and plan developed based on review of pertinent history, physical exam, labs, studies, and medications.  Severe episode of recurrent major depressive disorder, without psychotic features (HCC)  Anxiety  -     LORazepam (ATIVAN) 1 MG tablet; Take 1 tablet by mouth every 8 hours as needed for Anxiety for up to 30 days. Max Daily Amount: 3 mg, Disp-5 tablet, R-0Normal  Hospital discharge follow-up  -     AK DISCHARGE MEDS RECONCILED W/ CURRENT OUTPATIENT MED LIST  Patient very forward thinking and exceited about upcoming move. Has significant anxiety surrounding upcoming closing and move. Does feel that ativan usage udring hospitalization was very useful. Discussed risks of ativan use and recommend rare use and will give 5 pills to use for acute panic attacks. Planning to [facility] with [facility] wellness[facility]psychiatry.     Medical Decision Making: moderate complexity  No follow-ups on file.           Subjective:   HPI:  Follow up of Hospital problems/diagnosis(es): As above    Inpatient course: Discharge summary reviewed- see chart.  Interval history/Current status:     Called 911 due to concerns for passive SI in the setting of alcohol intoxication  increased Cymbalta to 90 mg 1 month ago by me  No further medication changes made during hospitalization  Feels that cymbalta has improved mood  Denies an overall

## 2024-05-28 ENCOUNTER — PATIENT MESSAGE (OUTPATIENT)
Dept: FAMILY MEDICINE CLINIC | Age: 54
End: 2024-05-28

## 2024-05-28 DIAGNOSIS — E03.9 HYPOTHYROIDISM, UNSPECIFIED TYPE: ICD-10-CM

## 2024-05-28 NOTE — TELEPHONE ENCOUNTER
From: Sarah Stock  To: Dr. Reji Pinto  Sent: 5/28/2024 1:15 PM EDT  Subject: Primidone    Hi, Dr. Pinto- I left a message last week about coming off the Primidone. The pharmacy has it ready for pick-up, but I didn't get it today when I picked up my Levothyroxine. If you want me on it still, could we go down to 2 tablets a day-a.m. & bedtime? Or if I can come off altogether, do I need to wean off? Here is the original message about it:    Telephone Encounter-MARK CAMACHO LPN at 5/24/2024 9:59 AM  Received call from patient. Patient stated she forgot to discuss with you yesterday that she would like to come off of her primidone. Patient stated it was originally ordered by a neurologist for essential tremors and seizure prevention. Patient stated she hasn't had a seizure in 10-20 years. Patient stated now that she is not working she doesn't mind if she has tremors, stating she wants to try to get off some more of her medications and would like to start with primidone. Patient would like to know if Dr. Pinto thinks it safe for her to come off primidone.

## 2024-05-28 NOTE — TELEPHONE ENCOUNTER
Called patients pharmacy and they did receive levothyroxine that was sent in on 5/24/2024. Please disregard request.

## 2024-05-29 RX ORDER — LEVOTHYROXINE SODIUM 175 UG/1
175 TABLET ORAL DAILY
Qty: 90 TABLET | Refills: 1 | OUTPATIENT
Start: 2024-05-29

## 2024-05-31 DIAGNOSIS — G89.4 CHRONIC PAIN DISORDER: ICD-10-CM

## 2024-05-31 NOTE — TELEPHONE ENCOUNTER
Refill Request     CONFIRM preferred pharmacy with the patient.    If Mail Order Rx - Pend for 90 day refill.      Last Seen: Last Seen Department: 5/23/2024  Last Seen by PCP: 5/23/2024    Last Written: 5/02/2024, #30, 0 refills    If no future appointment scheduled:  Review the last OV with PCP and review information for follow-up visit,  Route STAFF MESSAGE with patient name to the  Pool for scheduling with the following information:            -  Timing of next visit           -  Visit type ie Physical, OV, etc           -  Diagnoses/Reason ie. COPD, HTN - Do not use MEDICATION, Follow-up or CHECK UP - Give reason for visit      Next Appointment:   Future Appointments   Date Time Provider Department Center   6/24/2024 10:15 AM Reji Pinto DO [facility]       Message sent to  to schedule appt with patient?  YES      Requested Prescriptions     Pending Prescriptions Disp Refills    traMADol (ULTRAM) 50 MG tablet [Pharmacy Med Name: traMADol HCL 50MG TABLET] 30 tablet      Sig: TAKE 1 TABLET BY MOUTH DAILY FOR 30 DAYS. REDUCE DOSES TAKEN AS PAIN BECOMES MANAGEABLE.

## 2024-06-01 DIAGNOSIS — F41.9 ANXIETY: ICD-10-CM

## 2024-06-01 NOTE — TELEPHONE ENCOUNTER
Refill Request - Controlled Substance    CONFIRM preferred pharmacy with the patient.    If Mail Order Rx - Pend for 90 day refill.        Last Seen Department: 5/23/2024  Last Seen by PCP: 5/23/2024    Last Written: 4/22/2024    Last UDS: 7/30/2021    Med Agreement Signed On: 2/25/2019    If no future appointment scheduled:  Review the last OV with PCP and review information for follow-up visit,  Route STAFF MESSAGE with patient name to the  Pool for scheduling with the following information:            -  Timing of next visit           -  Visit type ie Physical, OV, etc           -  Diagnoses/Reason ie. COPD, HTN - Do not use MEDICATION, Follow-up or CHECK UP - Give reason for visit        Next Appointment:   Future Appointments   Date Time Provider Department Center   6/24/2024 10:15 AM Reji Pinto DO [facility]       Message sent to  to schedule appt with patient?  NO      Requested Prescriptions     Pending Prescriptions Disp Refills    clonazePAM (KLONOPIN) 1 MG tablet 75 tablet 0     Sig: TAKE 1 TABLET BY MOUTH EVERY MORNING AND TAKE 1 AND 1/2 TABLET BY MOUTH ONCE NIGHTLY

## 2024-06-03 DIAGNOSIS — F41.9 ANXIETY: ICD-10-CM

## 2024-06-03 RX ORDER — CLONAZEPAM 1 MG/1
TABLET ORAL
Qty: 75 TABLET | Refills: 0 | OUTPATIENT
Start: 2024-06-03 | End: 2024-07-01

## 2024-06-03 RX ORDER — CLONAZEPAM 1 MG/1
TABLET ORAL
Qty: 75 TABLET | Refills: 2 | Status: SHIPPED | OUTPATIENT
Start: 2024-06-03 | End: 2024-07-03

## 2024-06-03 NOTE — TELEPHONE ENCOUNTER
Refill Request - Controlled Substance    CONFIRM preferred pharmacy with the patient.    If Mail Order Rx - Pend for 90 day refill.        Last Seen Department: 5/23/2024  Last Seen by PCP: 5/23/2024    Last Written: 04/22/2024 75 tab 0 refills     Last UDS: 07/30/2021    Med Agreement Signed On: 02/26/2019    If no future appointment scheduled:  Review the last OV with PCP and review information for follow-up visit,  Route STAFF MESSAGE with patient name to the  Pool for scheduling with the following information:            -  Timing of next visit           -  Visit type ie Physical, OV, etc           -  Diagnoses/Reason ie. COPD, HTN - Do not use MEDICATION, Follow-up or CHECK UP - Give reason for visit        Next Appointment:   Future Appointments   Date Time Provider Department Center   6/24/2024 10:15 AM Reji Pinto DO [facility]       Message sent to  to schedule appt with patient?  NO      Requested Prescriptions     Pending Prescriptions Disp Refills    clonazePAM (KLONOPIN) 1 MG tablet 75 tablet 0     Sig: TAKE 1 TABLET BY MOUTH EVERY MORNING AND TAKE 1 AND 1/2 TABLET BY MOUTH ONCE NIGHTLY

## 2024-06-05 RX ORDER — TRAMADOL HYDROCHLORIDE 50 MG/1
50 TABLET ORAL EVERY 8 HOURS PRN
Qty: 30 TABLET | Refills: 0 | Status: SHIPPED | OUTPATIENT
Start: 2024-06-05 | End: 2024-07-05

## 2024-06-18 ENCOUNTER — TELEPHONE (OUTPATIENT)
Dept: FAMILY MEDICINE CLINIC | Age: 54
End: 2024-06-18

## 2024-06-18 NOTE — TELEPHONE ENCOUNTER
Called patient to check in and see how she is doing, no answer left the patient a message on her machine for her to return the call to the office.

## 2024-06-24 ENCOUNTER — TELEMEDICINE (OUTPATIENT)
Dept: FAMILY MEDICINE CLINIC | Age: 54
End: 2024-06-24
Payer: COMMERCIAL

## 2024-06-24 DIAGNOSIS — R09.82 POST-NASAL DRAINAGE: Primary | ICD-10-CM

## 2024-06-24 DIAGNOSIS — F33.2 SEVERE EPISODE OF RECURRENT MAJOR DEPRESSIVE DISORDER, WITHOUT PSYCHOTIC FEATURES (HCC): ICD-10-CM

## 2024-06-24 PROCEDURE — 99213 OFFICE O/P EST LOW 20 MIN: CPT | Performed by: STUDENT IN AN ORGANIZED HEALTH CARE EDUCATION/TRAINING PROGRAM

## 2024-06-24 RX ORDER — AZELASTINE 1 MG/ML
1 SPRAY, METERED NASAL 2 TIMES DAILY
Qty: 30 ML | Refills: 5 | Status: SHIPPED | OUTPATIENT
Start: 2024-06-24

## 2024-06-24 NOTE — PROGRESS NOTES
Sarah Stock, was evaluated through a synchronous (real-time) audio-video encounter. The patient (or guardian if applicable) is aware that this is a billable service, which includes applicable co-pays. This Virtual Visit was conducted with patient's (and/or legal guardian's) consent. Patient identification was verified, and a caregiver was present when appropriate.   The patient was located at Home: [address] [place] [id number]  Provider was located at Facility (Appt Dept): [address]  [address]  [place] [address]  Confirm you are appropriately licensed, registered, or certified to deliver care in the state where the patient is located as indicated above. If you are not or unsure, please re-schedule the visit: Yes, I confirm.     Sarah Stock (:  1970) is a Established patient, presenting virtually for evaluation of the following:    Assessment & Plan   Below is the assessment and plan developed based on review of pertinent history, physical exam, labs, studies, and medications.  1. Post-nasal drainage  -     azelastine (ASTELIN) 0.1 % nasal spray; 1 spray by Nasal route 2 times daily Use in each nostril as directed, Disp-30 mL, R-5Normal  2. Severe episode of recurrent major depressive disorder, without psychotic features (HCC)  Doing well on cymbalta and klonopin. Has not required PRN ativan. Mood significantly improved with recent move. Will add astelin for sinus issues.   Return in about 3 months (around 2024).       Subjective   HPI    Has noticed worsening congestion     Primidone    Anxiety/MDD  Cymbalta 90mg  Klonopin 1mg in AM and 1.5mg qPM  Ativan 1mg PRN - has not required any medication  Has been able to move into new apartment ()  Feels she is in the best mood she has been for years. Able to fall asleep easier, now able to sleep a few hours straight at a time.   Some stress still with trying to sell old condo    IBS-D  Has noticed improving stool with

## 2024-06-28 ENCOUNTER — APPOINTMENT (OUTPATIENT)
Dept: GENERAL RADIOLOGY | Age: 54
End: 2024-06-28
Payer: COMMERCIAL

## 2024-06-28 ENCOUNTER — APPOINTMENT (OUTPATIENT)
Dept: CT IMAGING | Age: 54
End: 2024-06-28
Payer: COMMERCIAL

## 2024-06-28 ENCOUNTER — HOSPITAL ENCOUNTER (EMERGENCY)
Age: 54
Discharge: HOME | End: 2024-06-29
Attending: STUDENT IN AN ORGANIZED HEALTH CARE EDUCATION/TRAINING PROGRAM
Payer: COMMERCIAL

## 2024-06-28 DIAGNOSIS — S01.111A LACERATION OF RIGHT EYEBROW, INITIAL ENCOUNTER: ICD-10-CM

## 2024-06-28 DIAGNOSIS — Z23 NEED FOR TETANUS BOOSTER: ICD-10-CM
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
## 2024-06-28 DIAGNOSIS — F10.920 ACUTE ALCOHOLIC INTOXICATION WITHOUT COMPLICATION (HCC): ICD-10-CM

## 2024-06-28 PROCEDURE — 72100 X-RAY EXAM L-S SPINE 2/3 VWS: CPT

## 2024-06-28 PROCEDURE — 12011 RPR F/E/E/N/L/M 2.5 CM/<: CPT

## 2024-06-28 PROCEDURE — 96372 THER/PROPH/DIAG INJ SC/IM: CPT

## 2024-06-28 PROCEDURE — 70450 CT HEAD/BRAIN W/O DYE: CPT

## 2024-06-28 PROCEDURE — 72125 CT NECK SPINE W/O DYE: CPT

## 2024-06-28 PROCEDURE — 99284 EMERGENCY DEPT VISIT MOD MDM: CPT

## 2024-06-28 ASSESSMENT — PAIN SCALES - GENERAL: PAINLEVEL_OUTOF10: 10

## 2024-06-28 ASSESSMENT — PAIN - FUNCTIONAL ASSESSMENT: PAIN_FUNCTIONAL_ASSESSMENT: 0-10

## 2024-06-29 VITALS
TEMPERATURE: 98 F | HEIGHT: 65 IN | WEIGHT: 199.96 LBS | SYSTOLIC BLOOD PRESSURE: 119 MMHG | DIASTOLIC BLOOD PRESSURE: 76 MMHG | HEART RATE: 99 BPM | RESPIRATION RATE: 18 BRPM | OXYGEN SATURATION: 93 % | BODY MASS INDEX: 33.31 KG/M2

## 2024-06-29 PROCEDURE — 6360000002 HC RX W HCPCS: Performed by: STUDENT IN AN ORGANIZED HEALTH CARE EDUCATION/TRAINING PROGRAM

## 2024-06-29 PROCEDURE — 90715 TDAP VACCINE 7 YRS/> IM: CPT | Performed by: STUDENT IN AN ORGANIZED HEALTH CARE EDUCATION/TRAINING PROGRAM

## 2024-06-29 PROCEDURE — 90471 IMMUNIZATION ADMIN: CPT | Performed by: STUDENT IN AN ORGANIZED HEALTH CARE EDUCATION/TRAINING PROGRAM

## 2024-06-29 RX ORDER — KETOROLAC TROMETHAMINE 30 MG/ML
15 INJECTION, SOLUTION INTRAMUSCULAR; INTRAVENOUS ONCE
Status: COMPLETED | OUTPATIENT
Start: 2024-06-29 | End: 2024-06-29

## 2024-06-29 RX ORDER — KETOROLAC TROMETHAMINE 30 MG/ML
15 INJECTION, SOLUTION INTRAMUSCULAR; INTRAVENOUS ONCE
Status: DISCONTINUED | OUTPATIENT
Start: 2024-06-29 | End: 2024-06-29

## 2024-06-29 RX ADMIN — KETOROLAC TROMETHAMINE 15 MG: 30 INJECTION, SOLUTION INTRAMUSCULAR at 01:40

## 2024-06-29 RX ADMIN — TETANUS TOXOID, REDUCED DIPHTHERIA TOXOID AND ACELLULAR PERTUSSIS VACCINE, ADSORBED 0.5 ML: 5; 2.5; 8; 8; 2.5 SUSPENSION INTRAMUSCULAR at 00:13

## 2024-06-29 RX ADMIN — KETOROLAC TROMETHAMINE 15 MG: 30 INJECTION, SOLUTION INTRAMUSCULAR at 00:10

## 2024-06-29 ASSESSMENT — PAIN SCALES - GENERAL
PAINLEVEL_OUTOF10: 10
PAINLEVEL_OUTOF10: 10

## 2024-06-29 NOTE — ED NOTES
Pt arrives from home for a fall. Pt unsure if she passed out of not. Pt states she does take xarelto. Pt lac to forehead with hematoma. Pt states she did drink 5+ etoh drinks tonight. Pt alert and oriented.

## 2024-06-29 NOTE — ED NOTES
Ok for d/c. Stable, by wheelchair. Edu pt and friend at bedside re:wound care and f/u w/ PCP for suture removal. Pt and friend verbalized understanding. Pt left by wheelchair w/ friend to drive and all personal belongings.

## 2024-06-29 NOTE — ED PROVIDER NOTES
[facility]  ED     EMERGENCY DEPARTMENT ENCOUNTER            Pt Name: Sarah Stock   MRN: [id number]   Birthdate 1970   Date of evaluation: 6/28/2024   Provider: Angela Camargo MD   PCP: Reji Pinto DO   Note Started: 9:14 PM EDT 6/28/24          CHIEF COMPLAINT     Chief Complaint   Patient presents with    Fall     Etoh on board.  On thinners for clotting disorder.        HISTORY OF PRESENT ILLNESS:   History from : Patient   Limitations to history : Intoxication     Sarah Stock is a 54 y.o. female who presents complaining of fall, head trauma.  Patient states that she has been under a lot of stress recently because she recently bought a condo but has not sold her old condo and was drinking about 7 seltzers this evening and fell from standing height and hit her head.  She denies loss of consciousness.  Is anticoagulated on Xarelto for history of clotting disorder.  She denies any other injuries or any other complaints or concerns.  Denies dizziness or lightheadedness contributing to her symptoms.  States that it was mechanical fall.  She denies any other complaints or concerns.  Does have a sober ride home.    Nursing Notes were all reviewed and agreed with, or any disagreements were addressed in the HPI.     REVIEW OF SYSTEMS :    Positives and Pertinent negatives as per HPI.      MEDICAL HISTORY   has a past medical history of Arthritis of midfoot (05/10/2017), Benign essential tremor, BRCA1 negative, Colon polyp (05/06/2019), Degenerative disc disease, DVT, lower extremity (HCC) (1989), Fibromyalgia, H/O viral meningitis (10/17/2019), History of DVT (deep vein thrombosis) (10/17/2019), History of pulmonary embolus (PE) (10/17/2019), History of thyroid cancer (08/30/2021), IBS (irritable bowel syndrome), Kidney stones, Left-sided trigeminal neuralgia, Malignant neoplasm of thyroid gland (HCC) (08/22/2013), Migraines, neuralgic, PONV (postoperative nausea and vomiting),  Neurologic exam is reassuring.  Given the fact that she is anticoagulated, CT of the head and C-spine were performed which were negative for acute concerning findings.  X-ray of the lumbar spine was obtained as well given her complaints of low back pain and was negative for acute bony abnormalities.  She did have an approximately 2.5 cm laceration to the right eyebrow which was repaired as detailed in procedure note above.  Patient tolerated procedure without difficulty.  Did request pain medication multiple times and given her negative head CT did give her 2 doses of IM Toradol.  Patient feels well, is able to ambulate without difficulty and has a sober ride home and at this point in time feel that she is appropriate for discharge home with outpatient management.  Given wound care instructions and advised for timing on suture removal.  Given return precautions for the ED.  Patient is comfortable in agreement with plan of care was discharged.       Patient was given the following medications:   Medications   ketorolac (TORADOL) injection 15 mg (has no administration in time range)   tetanus-diphth-acell pertussis (BOOSTRIX) injection 0.5 mL (0.5 mLs IntraMUSCular Given 6/29/24 0013)   ketorolac (TORADOL) injection 15 mg (15 mg IntraMUSCular Given 6/29/24 0010)        CONSULTS:   None   Discussion with Other Professionals: None   {Social Determinants: None   Chronic Conditions:  None    Records Reviewed: NA      Disposition Considerations: Appropriate for outpatient management  I am the Primary Clinician of Record.        FINAL IMPRESSION    1. Fall, initial encounter    2. Laceration of right eyebrow, initial encounter    3. Acute alcoholic intoxication without complication (HCC)    4. Need for tetanus booster           DISPOSITION/PLAN:     Pt discharged home in stable condition.     PATIENT REFERRED TO:   Reji Pinto,   [address] [facility] [address]  [phone]    Schedule an appointment as soon as

## 2024-06-29 NOTE — ED NOTES
Pt requesting pain medication for her head at this time. Pt states tylenol \"won't do anything\". Dr Camargo aware.

## 2024-07-04 DIAGNOSIS — G89.4 CHRONIC PAIN DISORDER: ICD-10-CM

## 2024-07-05 ENCOUNTER — NURSE ONLY (OUTPATIENT)
Dept: FAMILY MEDICINE CLINIC | Age: 54
End: 2024-07-05

## 2024-07-05 ENCOUNTER — TELEPHONE (OUTPATIENT)
Dept: FAMILY MEDICINE CLINIC | Age: 54
End: 2024-07-05

## 2024-07-05 RX ORDER — TRAMADOL HYDROCHLORIDE 50 MG/1
50 TABLET ORAL EVERY 8 HOURS PRN
Qty: 30 TABLET | Refills: 0 | Status: SHIPPED | OUTPATIENT
Start: 2024-07-05 | End: 2024-08-04

## 2024-07-05 NOTE — PROGRESS NOTES
Sutures removed right lateral eyebrow.  Laceration well approximated with no signs or symptoms of infection.  Steri strips applied.  Patient tolerated removal of sutures well.

## 2024-07-05 NOTE — TELEPHONE ENCOUNTER
Refill Request     CONFIRM preferred pharmacy with the patient.    If Mail Order Rx - Pend for 90 day refill.      Last Seen: Last Seen Department: 6/24/2024  Last Seen by PCP: 6/24/2024    Last Written: 6/5/24 30 with no refills     If no future appointment scheduled:  Review the last OV with PCP and review information for follow-up visit,  Route STAFF MESSAGE with patient name to the  Pool for scheduling with the following information:            -  Timing of next visit           -  Visit type ie Physical, OV, etc           -  Diagnoses/Reason ie. COPD, HTN - Do not use MEDICATION, Follow-up or CHECK UP - Give reason for visit      Next Appointment:   Future Appointments   Date Time Provider Department Center   7/5/2024 11:30 AM SCHEDULE, [facility] - [facility]   10/7/2024  1:00 PM [facility]       Message sent to  to schedule appt with patient?  NO      Requested Prescriptions     Pending Prescriptions Disp Refills    traMADol (ULTRAM) 50 MG tablet [Pharmacy Med Name: traMADol HCL 50MG TABLET] 30 tablet      Sig: TAKE ONE TABLET BY MOUTH EVERY 8 HOURS AS NEEDED FOR PAIN FOR UP TO 30 DAYS. REDUCE DOSES TAKEN AS PAIN BECOMES MANAGEABLE. MAXIMUM DAILY AMOUNT OF 150MG

## 2024-07-05 NOTE — TELEPHONE ENCOUNTER
Nurse placed call out to patient for appointment scheduling change request.  No answer left a message for the patient to return the call to the office.

## 2024-07-08 ENCOUNTER — TELEPHONE (OUTPATIENT)
Dept: FAMILY MEDICINE CLINIC | Age: 54
End: 2024-07-08

## 2024-07-08 NOTE — TELEPHONE ENCOUNTER
Nurse called patient to follow up and see how her incision was doing, also to check in and see if we could schedule an appointment with Dr. Madrigla.  Nurse also wanted to check in with the patient to see if she needed any help with financial assistance.  No answer, unable to leave message.  Nurse will try again later to reach out to the patient.

## 2024-07-08 NOTE — TELEPHONE ENCOUNTER
Nurse reached out to patient again, no answer left a message on the patient's machine for her to return the call to the office.

## 2024-07-15 ENCOUNTER — TELEPHONE (OUTPATIENT)
Dept: FAMILY MEDICINE CLINIC | Age: 54
End: 2024-07-15

## 2024-07-26 ENCOUNTER — TELEPHONE (OUTPATIENT)
Dept: FAMILY MEDICINE CLINIC | Age: 54
End: 2024-07-26
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
## 2024-07-29 ENCOUNTER — LAB (OUTPATIENT)
Dept: FAMILY MEDICINE CLINIC | Age: 54
End: 2024-07-29

## 2024-07-29 ENCOUNTER — TELEPHONE (OUTPATIENT)
Dept: FAMILY MEDICINE CLINIC | Age: 54
End: 2024-07-29

## 2024-07-29 VITALS
OXYGEN SATURATION: 98 % | HEART RATE: 98 BPM | RESPIRATION RATE: 22 BRPM | DIASTOLIC BLOOD PRESSURE: 100 MMHG | SYSTOLIC BLOOD PRESSURE: 160 MMHG

## 2024-07-29 NOTE — PROGRESS NOTES
Patient came into the office for a nurse visit for BP check.  Patient was very anxious and shaky.  Manual blood pressure was 160/100, P 98, R 22 O2 sat 98% on room air.  Nurse went over meditation and breathing exercises with the patient.  Patient denies chest pain, shortness of breath, headache, nausea, dizziness.   Nurse scheduled the patient for an office visit for 7/31/24.  Patient advised to arrive 15 minutes early for check in.  Patient advised that if she started having new or increased symptoms to call the office or go to the ED for evaluation.  Patient verbalized understanding.
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
## 2024-07-31 ENCOUNTER — OFFICE VISIT (OUTPATIENT)
Dept: FAMILY MEDICINE CLINIC | Age: 54
End: 2024-07-31
Payer: COMMERCIAL

## 2024-07-31 VITALS
OXYGEN SATURATION: 99 % | BODY MASS INDEX: 32.82 KG/M2 | WEIGHT: 197 LBS | HEIGHT: 65 IN | SYSTOLIC BLOOD PRESSURE: 160 MMHG | DIASTOLIC BLOOD PRESSURE: 92 MMHG | HEART RATE: 110 BPM

## 2024-07-31 DIAGNOSIS — F41.9 ANXIETY: Primary | ICD-10-CM

## 2024-07-31 DIAGNOSIS — E03.9 HYPOTHYROIDISM, UNSPECIFIED TYPE: ICD-10-CM

## 2024-07-31 PROCEDURE — 3077F SYST BP >= 140 MM HG: CPT | Performed by: STUDENT IN AN ORGANIZED HEALTH CARE EDUCATION/TRAINING PROGRAM

## 2024-07-31 PROCEDURE — 99214 OFFICE O/P EST MOD 30 MIN: CPT | Performed by: STUDENT IN AN ORGANIZED HEALTH CARE EDUCATION/TRAINING PROGRAM

## 2024-07-31 PROCEDURE — 3080F DIAST BP >= 90 MM HG: CPT | Performed by: STUDENT IN AN ORGANIZED HEALTH CARE EDUCATION/TRAINING PROGRAM

## 2024-07-31 RX ORDER — ARIPIPRAZOLE 5 MG/1
5 TABLET ORAL DAILY
Qty: 30 TABLET | Refills: 1 | Status: SHIPPED | OUTPATIENT
Start: 2024-07-31

## 2024-07-31 RX ORDER — ONDANSETRON 4 MG/1
4 TABLET, ORALLY DISINTEGRATING ORAL EVERY 8 HOURS PRN
Qty: 20 TABLET | Refills: 0 | Status: SHIPPED | OUTPATIENT
Start: 2024-07-31

## 2024-07-31 ASSESSMENT — PATIENT HEALTH QUESTIONNAIRE - PHQ9
7. TROUBLE CONCENTRATING ON THINGS, SUCH AS READING THE NEWSPAPER OR WATCHING TELEVISION: SEVERAL DAYS
4. FEELING TIRED OR HAVING LITTLE ENERGY: NEARLY EVERY DAY
SUM OF ALL RESPONSES TO PHQ9 QUESTIONS 1 & 2: 4
SUM OF ALL RESPONSES TO PHQ QUESTIONS 1-9: 15
SUM OF ALL RESPONSES TO PHQ QUESTIONS 1-9: 15
10. IF YOU CHECKED OFF ANY PROBLEMS, HOW DIFFICULT HAVE THESE PROBLEMS MADE IT FOR YOU TO DO YOUR WORK, TAKE CARE OF THINGS AT HOME, OR GET ALONG WITH OTHER PEOPLE: VERY DIFFICULT
SUM OF ALL RESPONSES TO PHQ QUESTIONS 1-9: 15
1. LITTLE INTEREST OR PLEASURE IN DOING THINGS: MORE THAN HALF THE DAYS
6. FEELING BAD ABOUT YOURSELF - OR THAT YOU ARE A FAILURE OR HAVE LET YOURSELF OR YOUR FAMILY DOWN: NEARLY EVERY DAY
9. THOUGHTS THAT YOU WOULD BE BETTER OFF DEAD, OR OF HURTING YOURSELF: NOT AT ALL
3. TROUBLE FALLING OR STAYING ASLEEP: SEVERAL DAYS
SUM OF ALL RESPONSES TO PHQ QUESTIONS 1-9: 15
5. POOR APPETITE OR OVEREATING: NEARLY EVERY DAY
8. MOVING OR SPEAKING SO SLOWLY THAT OTHER PEOPLE COULD HAVE NOTICED. OR THE OPPOSITE, BEING SO FIGETY OR RESTLESS THAT YOU HAVE BEEN MOVING AROUND A LOT MORE THAN USUAL: NOT AT ALL
2. FEELING DOWN, DEPRESSED OR HOPELESS: MORE THAN HALF THE DAYS
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
## 2024-08-01 DIAGNOSIS — E03.9 HYPOTHYROIDISM, UNSPECIFIED TYPE: Primary | ICD-10-CM

## 2024-08-01 LAB
T3 SERPL-MCNC: 1.15 NG/ML (ref 0.8–2)
T4 FREE SERPL-MCNC: 1.7 NG/DL (ref 0.9–1.8)
TSH SERPL DL<=0.005 MIU/L-ACNC: 0.02 UIU/ML (ref 0.27–4.2)

## 2024-08-01 RX ORDER — LEVOTHYROXINE SODIUM 0.15 MG/1
150 TABLET ORAL DAILY
Qty: 90 TABLET | Refills: 1 | Status: SHIPPED | OUTPATIENT
Start: 2024-08-01

## 2024-08-04 DIAGNOSIS — G89.4 CHRONIC PAIN DISORDER: ICD-10-CM

## 2024-08-05 RX ORDER — TRAMADOL HYDROCHLORIDE 50 MG/1
50 TABLET ORAL EVERY 8 HOURS PRN
Qty: 30 TABLET | Refills: 0 | Status: SHIPPED | OUTPATIENT
Start: 2024-08-05 | End: 2024-09-05

## 2024-08-05 NOTE — TELEPHONE ENCOUNTER
Refill Request - Controlled Substance    CONFIRM preferred pharmacy with the patient.    If Mail Order Rx - Pend for 90 day refill.        Last Seen Department: 7/31/2024  Last Seen by PCP: 7/31/2024    Last Written: 7/5/24 30 tab 0 refills    Last UDS: 7/30/21    Med Agreement Signed On: 2/26/2019    If no future appointment scheduled:  Review the last OV with PCP and review information for follow-up visit,  Route STAFF MESSAGE with patient name to the  Pool for scheduling with the following information:            -  Timing of next visit           -  Visit type ie Physical, OV, etc           -  Diagnoses/Reason ie. COPD, HTN - Do not use MEDICATION, Follow-up or CHECK UP - Give reason for visit        Next Appointment:   Future Appointments   Date Time Provider Department Center   8/19/2024 10:00 AM [facility]   10/7/2024  1:00 PM Reji Pinto DO [facility] DEP       Message sent to  to schedule appt with patient?  NO      Requested Prescriptions     Pending Prescriptions Disp Refills    traMADol (ULTRAM) 50 MG tablet [Pharmacy Med Name: traMADol HCL 50MG TABLET] 30 tablet      Sig: TAKE ONE TABLET BY MOUTH EVERY 8 HOURS AS NEEDED FOR PAIN FOR UP TO 30 DAYS. MAX DAILY AMOUNT: 3 TABLETS          - - -

## 2024-08-19 ENCOUNTER — TELEMEDICINE (OUTPATIENT)
Dept: PSYCHOLOGY | Age: 54
End: 2024-08-19
Payer: COMMERCIAL

## 2024-08-19 DIAGNOSIS — F41.1 GAD (GENERALIZED ANXIETY DISORDER): Primary | ICD-10-CM

## 2024-08-19 DIAGNOSIS — F33.1 MDD (MAJOR DEPRESSIVE DISORDER), RECURRENT EPISODE, MODERATE (HCC): ICD-10-CM

## 2024-08-19 PROCEDURE — 90832 PSYTX W PT 30 MINUTES: CPT | Performed by: PSYCHOLOGIST

## 2024-08-19 NOTE — PROGRESS NOTES
Behavioral Health Consultation  Maren Madrigal PsyD  Clinical Psychologist  8/19/2024    Name: Sarah Stock  YOB: 1970  PCP: Reji Pinto DO     Time spent with Sarah: 30 minutes  This is her first [facility] appointment.  Reason for Consult:  Depression and anxiety       TELEHEALTH VISIT -- Audio/Visual  Sarah Stock was evaluated through a synchronous (real-time) audio-video encounter (via [employer]). The patient (or guardian, if applicable) is aware that this is a billable service, which includes applicable co-pays. This Virtual Visit was conducted with patient's (and/or legal guardian's) consent.  Patient identification was verified, and a caregiver was present when appropriate. The patient was located in a state where the provider was licensed to provide care.  Conducted a risk-benefit analysis and determined that the patient's presenting problems are consistent with the use of telepsychology. Determined that the patient has sufficient knowledge and skills in the use of technology enabling them to adequately benefit from telepsychology. It was determined that this patient was able to be properly treated without an in-person session. Patient verified that they were currently located at the [place] address that was provided during registration or another address, if noted below.  She provided informed consent for the behavioral health services. Discussed model of service to include the limits of confidentiality (i.e. abuse reporting, suicide intervention, etc.) and short-term intervention focused approach. Patient indicated understanding.     Verified the following information:  Patient's identification: Yes  Patient location: [address] [place] [address]   Patient's call back number: [phone]   Patient's emergency contact's name and number, as well as permission to contact them if needed: Extended Emergency Contact Information  Primary Emergency Contact: XX,XX           kamron,

## 2024-09-05 DIAGNOSIS — F41.9 ANXIETY: ICD-10-CM

## 2024-09-06 RX ORDER — CLONAZEPAM 1 MG/1
TABLET ORAL
Qty: 75 TABLET | Refills: 2 | Status: SHIPPED | OUTPATIENT
Start: 2024-09-06 | End: 2024-12-06

## 2024-09-06 RX ORDER — PRIMIDONE 250 MG/1
250 TABLET ORAL 3 TIMES DAILY
Qty: 120 TABLET | Refills: 1 | Status: SHIPPED | OUTPATIENT
Start: 2024-09-06

## 2024-09-06 NOTE — TELEPHONE ENCOUNTER
Refill Request - Controlled Substance    CONFIRM preferred pharmacy with the patient.    If Mail Order Rx - Pend for 90 day refill.        Last Seen Department: 7/31/2024  Last Seen by PCP: 7/31/2024    Last Written: Klonopin 6/3/24 with 2 refills #75, Primidone 5/23/24 with 1 refill #120    Last UDS: 5/16/24    Med Agreement Signed On: 2/26/19    If no future appointment scheduled:  Review the last OV with PCP and review information for follow-up visit,  Route STAFF MESSAGE with patient name to the  Pool for scheduling with the following information:            -  Timing of next visit           -  Visit type ie Physical, OV, etc           -  Diagnoses/Reason ie. COPD, HTN - Do not use MEDICATION, Follow-up or CHECK UP - Give reason for visit        Next Appointment:   Future Appointments   Date Time Provider Department Center   9/9/2024 11:30 AM [facility]   10/21/2024  1:00 PM Reji Pinto DO [facility] DEP       Message sent to  to schedule appt with patient?  NO      Requested Prescriptions     Pending Prescriptions Disp Refills    primidone (MYSOLINE) 250 MG tablet [Pharmacy Med Name: PRIMIDONE 250 MG TABLET] 120 tablet 1     Sig: TAKE 1 TABLET BY MOUTH 3 TIMES A DAY    clonazePAM (KLONOPIN) 1 MG tablet [Pharmacy Med Name: clonazePAM 1 MG TABLET] 75 tablet      Sig: TAKE 1 TABLET BY MOUTH EVERY MORNING AND TAKE 1 AND 1/2 TABLET BY MOUTH ONCE NIGHTLY

## 2024-09-09 ENCOUNTER — TELEPHONE (OUTPATIENT)
Dept: BEHAVIORAL/MENTAL HEALTH | Age: 54
End: 2024-09-09

## 2024-09-09 ENCOUNTER — TELEMEDICINE (OUTPATIENT)
Dept: PSYCHOLOGY | Age: 54
End: 2024-09-09
Payer: COMMERCIAL

## 2024-09-09 DIAGNOSIS — F33.1 MDD (MAJOR DEPRESSIVE DISORDER), RECURRENT EPISODE, MODERATE (HCC): ICD-10-CM

## 2024-09-09 DIAGNOSIS — F41.1 GAD (GENERALIZED ANXIETY DISORDER): Primary | ICD-10-CM

## 2024-09-09 DIAGNOSIS — G89.4 CHRONIC PAIN DISORDER: ICD-10-CM

## 2024-09-09 PROCEDURE — 90832 PSYTX W PT 30 MINUTES: CPT | Performed by: PSYCHOLOGIST

## 2024-09-09 RX ORDER — TRAMADOL HYDROCHLORIDE 50 MG/1
50 TABLET ORAL EVERY 8 HOURS PRN
Qty: 30 TABLET | Refills: 0 | Status: SHIPPED | OUTPATIENT
Start: 2024-09-09 | End: 2024-10-10

## 2024-09-09 NOTE — TELEPHONE ENCOUNTER
Elvira Gregory asks if she can come off of the Cymbalta. She expresses thinking it's not helping much and doesn't like how much medications she's taking. Denies any safety concerns. Much of her current sx are also grief related and may not change with medications. I will monitor changes in sx.  What do you think?    Thanks,  Maren

## 2024-09-11 ENCOUNTER — TELEPHONE (OUTPATIENT)
Dept: FAMILY MEDICINE CLINIC | Age: 54
End: 2024-09-11

## 2024-09-11 NOTE — TELEPHONE ENCOUNTER
She has had significant and severe anxiety that was worse prior to moving into her apartment. I think if you and her discuss this and she feels strongly she would like to wean then I would do so gradually with monitoring to ensure that symptoms do not worsen. Thank you for seeing her

## 2024-09-21 DIAGNOSIS — G40.909 NONINTRACTABLE EPILEPSY WITHOUT STATUS EPILEPTICUS, UNSPECIFIED EPILEPSY TYPE (HCC): ICD-10-CM

## 2024-09-21 DIAGNOSIS — G50.0 TRIGEMINAL NEURALGIA: ICD-10-CM

## 2024-09-21 DIAGNOSIS — K21.00 GASTROESOPHAGEAL REFLUX DISEASE WITH ESOPHAGITIS, UNSPECIFIED WHETHER HEMORRHAGE: ICD-10-CM

## 2024-09-21 DIAGNOSIS — K58.0 IRRITABLE BOWEL SYNDROME WITH DIARRHEA: ICD-10-CM

## 2024-09-21 DIAGNOSIS — F41.9 ANXIETY: ICD-10-CM

## 2024-09-23 RX ORDER — FOLIC ACID 1 MG/1
1 TABLET ORAL DAILY
Qty: 90 TABLET | Refills: 0 | Status: SHIPPED | OUTPATIENT
Start: 2024-09-23

## 2024-09-23 RX ORDER — PANTOPRAZOLE SODIUM 40 MG/1
TABLET, DELAYED RELEASE ORAL
Qty: 60 TABLET | Refills: 1 | Status: SHIPPED | OUTPATIENT
Start: 2024-09-23

## 2024-09-23 RX ORDER — DIPHENOXYLATE HCL/ATROPINE 2.5-.025MG
TABLET ORAL
Qty: 90 TABLET | Refills: 0 | Status: SHIPPED | OUTPATIENT
Start: 2024-09-23 | End: 2024-10-23

## 2024-09-23 RX ORDER — CARBAMAZEPINE 400 MG/1
TABLET, EXTENDED RELEASE ORAL
Qty: 270 TABLET | Refills: 1 | Status: SHIPPED | OUTPATIENT
Start: 2024-09-23

## 2024-09-23 RX ORDER — LORAZEPAM 1 MG/1
TABLET ORAL
Qty: 5 TABLET | Refills: 0 | Status: SHIPPED | OUTPATIENT
Start: 2024-09-23 | End: 2024-10-23

## 2024-09-23 RX ORDER — DULOXETIN HYDROCHLORIDE 60 MG/1
CAPSULE, DELAYED RELEASE ORAL
Qty: 90 CAPSULE | Refills: 1 | Status: SHIPPED | OUTPATIENT
Start: 2024-09-23

## 2024-09-25 ENCOUNTER — CLINICAL DOCUMENTATION (OUTPATIENT)
Dept: SPIRITUAL SERVICES | Age: 54
End: 2024-09-25

## 2024-09-25 DIAGNOSIS — F33.2 SEVERE EPISODE OF RECURRENT MAJOR DEPRESSIVE DISORDER, WITHOUT PSYCHOTIC FEATURES (HCC): ICD-10-CM

## 2024-09-25 RX ORDER — DULOXETIN HYDROCHLORIDE 30 MG/1
CAPSULE, DELAYED RELEASE ORAL
Qty: 90 CAPSULE | Refills: 1 | Status: SHIPPED | OUTPATIENT
Start: 2024-09-25

## 2024-10-06 DIAGNOSIS — G89.4 CHRONIC PAIN DISORDER: ICD-10-CM

## 2024-10-07 DIAGNOSIS — J30.2 SEASONAL ALLERGIC RHINITIS, UNSPECIFIED TRIGGER: ICD-10-CM

## 2024-10-07 DIAGNOSIS — E03.9 HYPOTHYROIDISM, UNSPECIFIED TYPE: ICD-10-CM

## 2024-10-07 DIAGNOSIS — G89.4 CHRONIC PAIN DISORDER: ICD-10-CM

## 2024-10-07 DIAGNOSIS — F41.9 ANXIETY: ICD-10-CM

## 2024-10-07 NOTE — TELEPHONE ENCOUNTER
Refill Request - Controlled Substance    CONFIRM preferred pharmacy with the patient.    If Mail Order Rx - Pend for 90 day refill.        Last Seen Department: 7/31/2024  Last Seen by PCP: 5/12/2023    Last Written: 9/9/24 30 with no refills      Last UDS: 7/30/21     Med Agreement Signed On: 2/26/19     If no future appointment scheduled:  Review the last OV with PCP and review information for follow-up visit,  Route STAFF MESSAGE with patient name to the  Pool for scheduling with the following information:            -  Timing of next visit           -  Visit type ie Physical, OV, etc           -  Diagnoses/Reason ie. COPD, HTN - Do not use MEDICATION, Follow-up or CHECK UP - Give reason for visit        Next Appointment:   Future Appointments   Date Time Provider Department Center   10/21/2024  1:00 PM Reji Pinto DO [facility] DEP       Message sent to  to schedule appt with patient?  NO      Requested Prescriptions     Pending Prescriptions Disp Refills    traMADol (ULTRAM) 50 MG tablet [Pharmacy Med Name: traMADol HCL 50MG TABLET] 30 tablet      Sig: TAKE ONE TABLET BY MOUTH EVERY 8 HOURS AS NEEDED FOR PAIN. REDUCE DOSES TAKEN AS PAIN BECOMES MANAGEABLE.

## 2024-10-08 RX ORDER — TRAMADOL HYDROCHLORIDE 50 MG/1
TABLET ORAL
Qty: 30 TABLET | OUTPATIENT
Start: 2024-10-08

## 2024-10-08 RX ORDER — LEVOTHYROXINE SODIUM 150 UG/1
150 TABLET ORAL DAILY
Qty: 90 TABLET | Refills: 1 | Status: SHIPPED | OUTPATIENT
Start: 2024-10-08

## 2024-10-08 RX ORDER — CLONAZEPAM 1 MG/1
TABLET ORAL
Qty: 75 TABLET | Refills: 0 | Status: SHIPPED | OUTPATIENT
Start: 2024-10-08 | End: 2025-01-06

## 2024-10-08 RX ORDER — TRAMADOL HYDROCHLORIDE 50 MG/1
50 TABLET ORAL EVERY 8 HOURS PRN
Qty: 30 TABLET | Refills: 0 | Status: SHIPPED | OUTPATIENT
Start: 2024-10-08 | End: 2024-11-08

## 2024-10-08 RX ORDER — CETIRIZINE HYDROCHLORIDE 10 MG/1
10 TABLET ORAL DAILY
Qty: 90 TABLET | Refills: 1 | Status: SHIPPED | OUTPATIENT
Start: 2024-10-08

## 2024-10-08 NOTE — TELEPHONE ENCOUNTER
Refill Request - Controlled Substance    CONFIRM preferred pharmacy with the patient.    If Mail Order Rx - Pend for 90 day refill.        Last Seen Department: 7/31/2024  Last Seen by PCP: 7/31/2024    Last Written: Clonazepam 9/6/24 #75 - 2 refills    Levothyroxine 8/1/24 #90 - 1 refill     Last UDS: 7/30/21    Med Agreement Signed On: 2/26/19    If no future appointment scheduled:  Review the last OV with PCP and review information for follow-up visit,  Route STAFF MESSAGE with patient name to the  Pool for scheduling with the following information:            -  Timing of next visit           -  Visit type ie Physical, OV, etc           -  Diagnoses/Reason ie. COPD, HTN - Do not use MEDICATION, Follow-up or CHECK UP - Give reason for visit        Next Appointment:   Future Appointments   Date Time Provider Department Center   10/21/2024  1:00 PM Reji Pinto DO [facility] DEP       Message sent to  to schedule appt with patient?  NO      Requested Prescriptions     Pending Prescriptions Disp Refills    levothyroxine (SYNTHROID) 150 MCG tablet 90 tablet 1     Sig: Take 1 tablet by mouth daily    clonazePAM (KLONOPIN) 1 MG tablet 75 tablet 2     Sig: TAKE 1 TABLET BY MOUTH EVERY MORNING AND TAKE 1 AND 1/2 TABLET BY MOUTH ONCE NIGHTLY

## 2024-10-08 NOTE — TELEPHONE ENCOUNTER
Refill Request - Controlled Substance    CONFIRM preferred pharmacy with the patient.    If Mail Order Rx - Pend for 90 day refill.        Last Seen Department: 7/31/2024  Last Seen by PCP: 5/12/2023    Last Written: 9/9/24 30 no refill    Last UDS: 5/16/24    Med Agreement Signed On: 2/26/19    If no future appointment scheduled:  Review the last OV with PCP and review information for follow-up visit,  Route STAFF MESSAGE with patient name to the  Pool for scheduling with the following information:            -  Timing of next visit           -  Visit type ie Physical, OV, etc           -  Diagnoses/Reason ie. COPD, HTN - Do not use MEDICATION, Follow-up or CHECK UP - Give reason for visit        Next Appointment:   Future Appointments   Date Time Provider Department Center   10/21/2024  1:00 PM Reji Pinto, DO [facility] DEP       Message sent to  to schedule appt with patient?  NO      Requested Prescriptions     Pending Prescriptions Disp Refills    traMADol (ULTRAM) 50 MG tablet 30 tablet 0     Sig: Take 1 tablet by mouth every 8 hours as needed for Pain for up to 31 days. Max Daily Amount: 150 mg

## 2024-10-21 ENCOUNTER — TELEMEDICINE (OUTPATIENT)
Dept: FAMILY MEDICINE CLINIC | Age: 54
End: 2024-10-21
Payer: COMMERCIAL

## 2024-10-21 DIAGNOSIS — G40.909 NONINTRACTABLE EPILEPSY WITHOUT STATUS EPILEPTICUS, UNSPECIFIED EPILEPSY TYPE (HCC): ICD-10-CM

## 2024-10-21 DIAGNOSIS — M47.816 SPONDYLOSIS OF LUMBAR SPINE: Primary | ICD-10-CM

## 2024-10-21 DIAGNOSIS — I10 ESSENTIAL HYPERTENSION: ICD-10-CM

## 2024-10-21 DIAGNOSIS — F33.2 SEVERE EPISODE OF RECURRENT MAJOR DEPRESSIVE DISORDER, WITHOUT PSYCHOTIC FEATURES (HCC): ICD-10-CM

## 2024-10-21 DIAGNOSIS — F41.9 ANXIETY: ICD-10-CM

## 2024-10-21 DIAGNOSIS — E55.9 VITAMIN D DEFICIENCY: ICD-10-CM

## 2024-10-21 PROCEDURE — 99214 OFFICE O/P EST MOD 30 MIN: CPT | Performed by: STUDENT IN AN ORGANIZED HEALTH CARE EDUCATION/TRAINING PROGRAM

## 2024-10-21 NOTE — ASSESSMENT & PLAN NOTE
Patient continuing on carbamazepine and will check blood level.  Reports no recurrence of seizures or adverse effects of medication    Orders:    Carbamazepine Level, Total; Future

## 2024-10-21 NOTE — ASSESSMENT & PLAN NOTE
Well-controlled on current medications and will continue.  Recheck lab work as below    Orders:    Comprehensive Metabolic Panel; Future    CBC; Future

## 2024-10-21 NOTE — ASSESSMENT & PLAN NOTE
Recommend evaluation by pain specialist.  Referral given to Dr. Hayden.    Orders:    [facility] (Epic) - Gilson Hayden MD, Pain Medicine, [facility]

## 2024-10-21 NOTE — PROGRESS NOTES
Sarah Stock, was evaluated through a synchronous (real-time) audio-video encounter. The patient (or guardian if applicable) is aware that this is a billable service, which includes applicable co-pays. This Virtual Visit was conducted with patient's (and/or legal guardian's) consent. Patient identification was verified, and a caregiver was present when appropriate.   The patient was located at Home: [address] [place] [id number]  Provider was located at Facility (Appt Dept): [address]  [address]  [place] [address]  Confirm you are appropriately licensed, registered, or certified to deliver care in the state where the patient is located as indicated above. If you are not or unsure, please re-schedule the visit: Yes, I confirm.     Sarah Stock (:  1970) is a Established patient, presenting virtually for evaluation of the following:      Below is the assessment and plan developed based on review of pertinent history, physical exam, labs, studies, and medications.     Assessment & Plan  Spondylosis of lumbar spine    Recommend evaluation by pain specialist.  Referral given to Dr. Hayden.    Orders:    [facility] (Epic) - Gilson Hayden MD, Pain Medicine, [facility]    Nonintractable epilepsy without status epilepticus, unspecified epilepsy type (HCC)    Patient continuing on carbamazepine and will check blood level.  Reports no recurrence of seizures or adverse effects of medication    Orders:    Carbamazepine Level, Total; Future    Vitamin D deficiency    Due for recheck.  Taking supplementation    Orders:    Vitamin D 25 Hydroxy; Future    Essential hypertension    Well-controlled on current medications and will continue.  Recheck lab work as below    Orders:    Comprehensive Metabolic Panel; Future    CBC; Future    Severe episode of recurrent major depressive disorder, without psychotic features (HCC)    Reports currently stable on medications.  No reported adverse effects of

## 2024-10-23 PROBLEM — F41.9 ANXIETY: Status: ACTIVE | Noted: 2024-10-23

## 2024-10-27 ENCOUNTER — PATIENT MESSAGE (OUTPATIENT)
Dept: FAMILY MEDICINE CLINIC | Age: 54
End: 2024-10-27

## 2024-10-27 DIAGNOSIS — I65.02 STENOSIS OF LEFT VERTEBRAL ARTERY: Primary | ICD-10-CM

## 2024-10-27 DIAGNOSIS — M47.816 SPONDYLOSIS OF LUMBAR SPINE: ICD-10-CM

## 2024-10-28 DIAGNOSIS — I10 ESSENTIAL HYPERTENSION: ICD-10-CM

## 2024-10-28 DIAGNOSIS — E55.9 VITAMIN D DEFICIENCY: ICD-10-CM

## 2024-10-28 DIAGNOSIS — E03.9 HYPOTHYROIDISM, UNSPECIFIED TYPE: ICD-10-CM

## 2024-10-28 DIAGNOSIS — G40.909 NONINTRACTABLE EPILEPSY WITHOUT STATUS EPILEPTICUS, UNSPECIFIED EPILEPSY TYPE (HCC): ICD-10-CM

## 2024-10-28 LAB
25(OH)D3 SERPL-MCNC: 34.3 NG/ML
ALBUMIN SERPL-MCNC: 4.7 G/DL (ref 3.4–5)
ALBUMIN/GLOB SERPL: 2 {RATIO} (ref 1.1–2.2)
ALP SERPL-CCNC: 122 U/L (ref 40–129)
ALT SERPL-CCNC: 25 U/L (ref 10–40)
ANION GAP SERPL CALCULATED.3IONS-SCNC: 14 MMOL/L (ref 3–16)
AST SERPL-CCNC: 37 U/L (ref 15–37)
BILIRUB SERPL-MCNC: 0.4 MG/DL (ref 0–1)
BUN SERPL-MCNC: 8 MG/DL (ref 7–20)
CALCIUM SERPL-MCNC: 9.4 MG/DL (ref 8.3–10.6)
CARBAMAZEPINE DOSE: NORMAL MG
CARBAMAZEPINE SERPL-MCNC: 6.4 UG/ML (ref 4–12)
CHLORIDE SERPL-SCNC: 101 MMOL/L (ref 99–110)
CO2 SERPL-SCNC: 23 MMOL/L (ref 21–32)
CREAT SERPL-MCNC: 0.7 MG/DL (ref 0.6–1.1)
DEPRECATED RDW RBC AUTO: 14.8 % (ref 12.4–15.4)
GFR SERPLBLD CREATININE-BSD FMLA CKD-EPI: >90 ML/MIN/{1.73_M2}
GLUCOSE SERPL-MCNC: 78 MG/DL (ref 70–99)
HCT VFR BLD AUTO: 46.8 % (ref 36–48)
HGB BLD-MCNC: 15.6 G/DL (ref 12–16)
MCH RBC QN AUTO: 30.8 PG (ref 26–34)
MCHC RBC AUTO-ENTMCNC: 33.3 G/DL (ref 31–36)
MCV RBC AUTO: 92.7 FL (ref 80–100)
PLATELET # BLD AUTO: 217 K/UL (ref 135–450)
PMV BLD AUTO: 9.1 FL (ref 5–10.5)
POTASSIUM SERPL-SCNC: 3.6 MMOL/L (ref 3.5–5.1)
PROT SERPL-MCNC: 7 G/DL (ref 6.4–8.2)
RBC # BLD AUTO: 5.05 M/UL (ref 4–5.2)
SODIUM SERPL-SCNC: 138 MMOL/L (ref 136–145)
TSH SERPL DL<=0.005 MIU/L-ACNC: 0.94 UIU/ML (ref 0.27–4.2)
WBC # BLD AUTO: 7 K/UL (ref 4–11)

## 2024-10-30 NOTE — TELEPHONE ENCOUNTER
New referral given to [facility] spine team since this will likely be covered by insurance   Subjective:     Sherice Hbubard is a 5 m.o. female here with mother. Patient brought in for Fever (100.2 this morning ), Vomiting (This morning once), Diarrhea (X 2 - 3 days ), and Sore Throat (Mom pos strep week ago)      History of Present Illness:  Cough  This is a new problem. The current episode started in the past 7 days. The cough is Wet sounding. Associated symptoms include a fever (100.2 this am). Exacerbated by: mom with strep in the past week.       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 Negative for appetite change.   Respiratory:  Positive for cough.    Gastrointestinal:  Positive for diarrhea (2-3 days) and vomiting (this am).   Genitourinary:  Negative for decreased urine volume.     Objective:     Physical Exam  Constitutional:       General: She is not in acute distress.     Appearance: She is not ill-appearing.   HENT:      Head: Anterior fontanelle is flat.      Right Ear: Tympanic membrane normal.      Left Ear: Tympanic membrane normal.      Nose: Nose normal.      Mouth/Throat:      Mouth: Mucous membranes are moist.      Pharynx: Oropharynx is clear. Posterior oropharyngeal erythema (mild) present. No oropharyngeal exudate.      Comments: Copious clear mucus  Eyes:      Conjunctiva/sclera: Conjunctivae normal.   Cardiovascular:      Rate and Rhythm: Normal rate and regular rhythm.      Heart sounds: No murmur heard.  Pulmonary:      Effort: Pulmonary effort is normal.      Breath sounds: Normal breath sounds. No wheezing or rhonchi.   Musculoskeletal:      Cervical back: Neck supple.   Lymphadenopathy:      Cervical: No cervical adenopathy.   Skin:     General: Skin is warm.      Turgor: Normal.      Coloration: Skin is not pale.      Findings: No rash.   Neurological:      Mental Status: She is alert.        Assessment:     1. Streptococcal pharyngitis    2. Cough, unspecified type    3. Fever, unspecified fever cause    4. Exposure to strep throat        Plan:     Orders Placed This Encounter   Procedures    POCT Strep A, Molecular     Patient positive for strep.  Amoxil prescribed.  Supportive care.

## 2024-11-04 DIAGNOSIS — G89.4 CHRONIC PAIN DISORDER: ICD-10-CM

## 2024-11-05 NOTE — TELEPHONE ENCOUNTER
Refill Request - Controlled Substance    CONFIRM preferred pharmacy with the patient.    If Mail Order Rx - Pend for 90 day refill.        Last Seen Department: 10/21/2024  Last Seen by PCP: Visit date not found    Last Written: 10/08/2024 30 tab 0 refills     Last UDS: 07/30/2021    Med Agreement Signed On: 02/26/2019    If no future appointment scheduled:  Review the last OV with PCP and review information for follow-up visit,  Route STAFF MESSAGE with patient name to the  Pool for scheduling with the following information:            -  Timing of next visit           -  Visit type ie Physical, OV, etc           -  Diagnoses/Reason ie. COPD, HTN - Do not use MEDICATION, Follow-up or CHECK UP - Give reason for visit        Next Appointment:   Future Appointments   Date Time Provider Department Center   1/29/2025 12:30 PM Reji Pinto, DO [facility] DEP       Message sent to  to schedule appt with patient?  NO      Requested Prescriptions     Pending Prescriptions Disp Refills    traMADol (ULTRAM) 50 MG tablet 30 tablet 0     Sig: Take 1 tablet by mouth every 8 hours as needed for Pain for up to 31 days. Max Daily Amount: 150 mg

## 2024-11-10 DIAGNOSIS — K58.0 IRRITABLE BOWEL SYNDROME WITH DIARRHEA: ICD-10-CM

## 2024-11-10 DIAGNOSIS — Z86.73 HX OF STROKE ASSOCIATED WITH BLOOD CLOTTING TENDENCY: ICD-10-CM

## 2024-11-11 NOTE — TELEPHONE ENCOUNTER
Refill Request     CONFIRM preferred pharmacy with the patient.    If Mail Order Rx - Pend for 90 day refill.      Last Seen: Last Seen Department: 10/21/2024  Last Seen by PCP: 10/21/2024    Last Written:   Zofran-7/31/24 20 tab 0 refills  Lomotil-9/23/24 90 tab 0 refills  Lipitor-5/23/24 90 tab 1 refills    If no future appointment scheduled:  Review the last OV with PCP and review information for follow-up visit,  Route STAFF MESSAGE with patient name to the  Pool for scheduling with the following information:            -  Timing of next visit           -  Visit type ie Physical, OV, etc           -  Diagnoses/Reason ie. COPD, HTN - Do not use MEDICATION, Follow-up or CHECK UP - Give reason for visit      Next Appointment:   Future Appointments   Date Time Provider Department Center   1/29/2025 12:30 PM Reji Pinto DO [facility] DEP       Message sent to  to schedule appt with patient?  NO      Requested Prescriptions     Pending Prescriptions Disp Refills    ondansetron (ZOFRAN-ODT) 4 MG disintegrating tablet [Pharmacy Med Name: ONDANSETRON ODT 4 MG TABLET] 20 tablet 0     Sig: PLACE 1 TABLET BY MOUTH EVERY 8 HOURS AS NEEDED FOR NAUSEA    diphenoxylate-atropine (LOMOTIL) 2.5-0.025 MG per tablet [Pharmacy Med Name: DIPHENOXYLATE-ATROP 2.5-0.025 MG TB] 90 tablet      Sig: TAKE 1 TABLET BY MOUTH 4 TIMES A DAY AS NEEDED FOR DIARRHEA. MAX DAILY AMOUNT: 4 TABLETS.    atorvastatin (LIPITOR) 40 MG tablet [Pharmacy Med Name: ATORVASTATIN 40 MG TABLET] 90 tablet 1     Sig: TAKE 1 TABLET BY MOUTH DAILY

## 2024-11-12 RX ORDER — TRAMADOL HYDROCHLORIDE 50 MG/1
50 TABLET ORAL EVERY 8 HOURS PRN
Qty: 30 TABLET | Refills: 0 | Status: SHIPPED | OUTPATIENT
Start: 2024-11-12 | End: 2024-12-13

## 2024-11-12 RX ORDER — ONDANSETRON 4 MG/1
TABLET, ORALLY DISINTEGRATING ORAL
Qty: 20 TABLET | Refills: 0 | Status: SHIPPED | OUTPATIENT
Start: 2024-11-12

## 2024-11-12 RX ORDER — DIPHENOXYLATE HYDROCHLORIDE AND ATROPINE SULFATE 2.5; .025 MG/1; MG/1
TABLET ORAL
Qty: 90 TABLET | Refills: 0 | OUTPATIENT
Start: 2024-11-12 | End: 2024-12-10

## 2024-11-12 RX ORDER — DICYCLOMINE HYDROCHLORIDE 10 MG/1
CAPSULE ORAL
Qty: 120 CAPSULE | Refills: 3 | Status: SHIPPED | OUTPATIENT
Start: 2024-11-12

## 2024-11-12 RX ORDER — ATORVASTATIN CALCIUM 40 MG/1
40 TABLET, FILM COATED ORAL DAILY
Qty: 90 TABLET | Refills: 1 | Status: SHIPPED | OUTPATIENT
Start: 2024-11-12

## 2024-11-13 NOTE — TELEPHONE ENCOUNTER
Patient called back and she states that she is not taking the Lomotil everyday, she only takes it when her bowels/ anxiety acts up. Patient said you can d/c this if needed to, and she will take the Pepto and Dicyclomine when needed.     Patient also wanted to let you know that she will now start sending LOAG messages when she is due for any medications. She was very upset when her Tramadol was Decline last month. She wants to let you know she was not trying to do anything sketchy with the multiple request on it.

## 2024-11-14 ENCOUNTER — PATIENT MESSAGE (OUTPATIENT)
Dept: FAMILY MEDICINE CLINIC | Age: 54
End: 2024-11-14

## 2024-11-15 RX ORDER — PRIMIDONE 250 MG/1
250 TABLET ORAL 3 TIMES DAILY
Qty: 270 TABLET | Refills: 1 | Status: SHIPPED | OUTPATIENT
Start: 2024-11-15

## 2024-11-15 NOTE — TELEPHONE ENCOUNTER
Refill Request     CONFIRM preferred pharmacy with the patient.    If Mail Order Rx - Pend for 90 day refill.      Last Seen: Last Seen Department: 10/21/2024  Last Seen by PCP: 10/21/2024    Last Written: 9/6/2024    If no future appointment scheduled:  Review the last OV with PCP and review information for follow-up visit,  Route STAFF MESSAGE with patient name to the  Pool for scheduling with the following information:            -  Timing of next visit           -  Visit type ie Physical, OV, etc           -  Diagnoses/Reason ie. COPD, HTN - Do not use MEDICATION, Follow-up or CHECK UP - Give reason for visit      Next Appointment:   Future Appointments   Date Time Provider Department Center   1/29/2025 12:30 PM Reji Pinto, DO [facility] DEP       Message sent to  to schedule appt with patient?  NO      Requested Prescriptions     Pending Prescriptions Disp Refills    primidone (MYSOLINE) 250 MG tablet 90 tablet 3     Sig: Take 1 tablet by mouth 3 times daily

## 2024-12-05 ENCOUNTER — PATIENT MESSAGE (OUTPATIENT)
Dept: FAMILY MEDICINE CLINIC | Age: 54
End: 2024-12-05

## 2024-12-05 DIAGNOSIS — F41.9 ANXIETY: ICD-10-CM

## 2024-12-05 DIAGNOSIS — G89.4 CHRONIC PAIN DISORDER: ICD-10-CM

## 2024-12-05 RX ORDER — TRAMADOL HYDROCHLORIDE 50 MG/1
50 TABLET ORAL EVERY 8 HOURS PRN
Qty: 30 TABLET | Refills: 0 | Status: SHIPPED | OUTPATIENT
Start: 2024-12-05 | End: 2025-01-05

## 2024-12-05 RX ORDER — CLONAZEPAM 1 MG/1
TABLET ORAL
Qty: 75 TABLET | Refills: 0 | Status: SHIPPED | OUTPATIENT
Start: 2024-12-05 | End: 2025-03-05

## 2024-12-05 NOTE — TELEPHONE ENCOUNTER
Refill Request - Controlled Substance    CONFIRM preferred pharmacy with the patient.    If Mail Order Rx - Pend for 90 day refill.        Last Seen Department: 10/21/2024  Last Seen by PCP: 10/21/2024    Last Written: tramadol 11/12/2024  Clonazepam 10/8/2024    Last UDS: 7/30/2021    Med Agreement Signed On: 2/26/2019    If no future appointment scheduled:  Review the last OV with PCP and review information for follow-up visit,  Route STAFF MESSAGE with patient name to the  Pool for scheduling with the following information:            -  Timing of next visit           -  Visit type ie Physical, OV, etc           -  Diagnoses/Reason ie. COPD, HTN - Do not use MEDICATION, Follow-up or CHECK UP - Give reason for visit        Next Appointment:   Future Appointments   Date Time Provider Department Center   1/29/2025 12:30 PM Reji Pinto DO [facility] DEP       Message sent to  to schedule appt with patient?  NO      Requested Prescriptions     Pending Prescriptions Disp Refills    clonazePAM (KLONOPIN) 1 MG tablet 75 tablet 0     Sig: TAKE 1 TABLET BY MOUTH EVERY MORNING AND TAKE 1 AND 1/2 TABLET BY MOUTH ONCE NIGHTLY    traMADol (ULTRAM) 50 MG tablet 30 tablet 0     Sig: Take 1 tablet by mouth every 8 hours as needed for Pain for up to 31 days. Max Daily Amount: 150 mg

## 2024-12-09 ENCOUNTER — PATIENT MESSAGE (OUTPATIENT)
Dept: FAMILY MEDICINE CLINIC | Age: 54
End: 2024-12-09

## 2024-12-09 DIAGNOSIS — E55.9 VITAMIN D DEFICIENCY: ICD-10-CM

## 2024-12-10 NOTE — TELEPHONE ENCOUNTER
Refill Request     CONFIRM preferred pharmacy with the patient.    If Mail Order Rx - Pend for 90 day refill.      Last Seen: Last Seen Department: 10/21/2024  Last Seen by PCP: 10/21/2024    Last Written: 11/20/2023    If no future appointment scheduled:  Review the last OV with PCP and review information for follow-up visit,  Route STAFF MESSAGE with patient name to the  Pool for scheduling with the following information:            -  Timing of next visit           -  Visit type ie Physical, OV, etc           -  Diagnoses/Reason ie. COPD, HTN - Do not use MEDICATION, Follow-up or CHECK UP - Give reason for visit      Next Appointment:   Future Appointments   Date Time Provider Department Center   1/29/2025 12:30 PM Reji Pinto, DO [facility] DEP       Message sent to  to schedule appt with patient?  NO      Requested Prescriptions     Pending Prescriptions Disp Refills    Vitamin D (CHOLECALCIFEROL) 25 MCG (1000 UT) TABS tablet 90 tablet 3     Sig: Take 1 tablet by mouth daily

## 2024-12-11 ENCOUNTER — PATIENT MESSAGE (OUTPATIENT)
Dept: FAMILY MEDICINE CLINIC | Age: 54
End: 2024-12-11

## 2024-12-11 DIAGNOSIS — D68.59 HYPERCOAGULABLE STATE (HCC): ICD-10-CM

## 2024-12-11 DIAGNOSIS — D68.59 PROTEIN S DEFICIENCY (HCC): ICD-10-CM

## 2024-12-11 DIAGNOSIS — Z86.73 HISTORY OF STROKE: ICD-10-CM

## 2024-12-11 RX ORDER — VITAMIN B COMPLEX
1000 TABLET ORAL DAILY
Qty: 90 TABLET | Refills: 3 | Status: SHIPPED | OUTPATIENT
Start: 2024-12-11

## 2024-12-12 NOTE — TELEPHONE ENCOUNTER
Refill Request     CONFIRM preferred pharmacy with the patient.    If Mail Order Rx - Pend for 90 day refill.      Last Seen: Last Seen Department: 10/21/2024  Last Seen by PCP: 10/21/2024    Last Written: 1/2/2024    If no future appointment scheduled:  Review the last OV with PCP and review information for follow-up visit,  Route STAFF MESSAGE with patient name to the  Pool for scheduling with the following information:            -  Timing of next visit           -  Visit type ie Physical, OV, etc           -  Diagnoses/Reason ie. COPD, HTN - Do not use MEDICATION, Follow-up or CHECK UP - Give reason for visit      Next Appointment:   Future Appointments   Date Time Provider Department Center   1/13/2025  1:40 PM Desmond Mistry MD [facility]   1/29/2025 12:30 PM Reji Pinto, DO [facility] DEP       Message sent to  to schedule appt with patient?  NO      Requested Prescriptions     Pending Prescriptions Disp Refills    XARELTO 20 MG TABS tablet 90 tablet 3     Sig: Take 1 tablet by mouth daily TAKE ONE TABLET BY MOUTH DAILY

## 2024-12-13 RX ORDER — RIVAROXABAN 20 MG/1
20 TABLET, FILM COATED ORAL DAILY
Qty: 90 TABLET | Refills: 3 | Status: SHIPPED | OUTPATIENT
Start: 2024-12-13

## 2024-12-20 ENCOUNTER — TELEPHONE (OUTPATIENT)
Dept: ORTHOPEDIC SURGERY | Age: 54
End: 2024-12-20

## 2024-12-20 DIAGNOSIS — F41.9 ANXIETY: ICD-10-CM

## 2024-12-20 NOTE — TELEPHONE ENCOUNTER
Called and left message for patient advising we will have to move her appointment from 1/13 at 1:40pm to 1/16 at 1:40pm. Still at the [place] location.  will be in surgery all day on the 13th so he will not be in office. If this change conflicts her schedule at all I gave her my direct number to reach out. Advised if she has any questions to reach out.

## 2024-12-21 NOTE — TELEPHONE ENCOUNTER
Refill Request - Controlled Substance    CONFIRM preferred pharmacy with the patient.    If Mail Order Rx - Pend for 90 day refill.        Last Seen Department: 10/21/2024  Last Seen by PCP: 10/21/2024    Last Written:   Ativan-  Folic-9/23/2024 90 tab 0 refills    Last UDS: 7/30/2021    Med Agreement Signed On: 2/26/2019    If no future appointment scheduled:  Review the last OV with PCP and review information for follow-up visit,  Route STAFF MESSAGE with patient name to the  Pool for scheduling with the following information:            -  Timing of next visit           -  Visit type ie Physical, OV, etc           -  Diagnoses/Reason ie. COPD, HTN - Do not use MEDICATION, Follow-up or CHECK UP - Give reason for visit        Next Appointment:   Future Appointments   Date Time Provider Department Center   1/16/2025  1:40 PM Desmond Mistry MD [facility]   1/29/2025 12:30 PM Reji Pinto, DO [facility] DEP       Message sent to  to schedule appt with patient?  NO      Requested Prescriptions     Pending Prescriptions Disp Refills    folic acid (FOLVITE) 1 MG tablet [Pharmacy Med Name: FOLIC ACID 1 MG TABLET] 90 tablet 0     Sig: TAKE 1 TABLET BY MOUTH DAILY    LORazepam (ATIVAN) 1 MG tablet [Pharmacy Med Name: LORazepam 1 MG TABLET] 5 tablet      Sig: TAKE ONE TABLET BY MOUTH EVERY 8 HOURS AS NEEDED FOR ANXIETY. MAX DAILY AMOUNT: 3 TABLETS

## 2024-12-23 ENCOUNTER — TELEPHONE (OUTPATIENT)
Dept: FAMILY MEDICINE CLINIC | Age: 54
End: 2024-12-23

## 2024-12-23 DIAGNOSIS — F41.9 ANXIETY: ICD-10-CM

## 2024-12-23 RX ORDER — FOLIC ACID 1 MG/1
1 TABLET ORAL DAILY
Qty: 90 TABLET | Refills: 0 | Status: SHIPPED | OUTPATIENT
Start: 2024-12-23

## 2024-12-23 RX ORDER — LORAZEPAM 1 MG/1
TABLET ORAL
Qty: 5 TABLET | Refills: 0 | OUTPATIENT
Start: 2024-12-23 | End: 2025-01-22

## 2024-12-23 NOTE — TELEPHONE ENCOUNTER
Patient called into the office to say Debra Son.  Patient became a little tearful talking about thanksgiving and spending the holiday alone.  Patient stated that she had to take an ativan over the last holiday to get through her anxiety.  Patient stated that she has one ativan left and is worried about getting through the rest of the Holidays and being alone.  Nurse spoke with the patient about making new traditions to carry her through the Holidays, reaching out to friends. Patient is asking if she can get a refill on her ativan.  MARYSOLI, please advise.

## 2024-12-30 DIAGNOSIS — F41.9 ANXIETY: ICD-10-CM

## 2024-12-30 NOTE — TELEPHONE ENCOUNTER
Please advise.  Patient called into the office and asked if you were going to send in the script for her ativan.  Nurse had spoke with you briefly and stated that you would send in a few for her.  Thank you.

## 2024-12-31 RX ORDER — LORAZEPAM 1 MG/1
TABLET ORAL
Qty: 5 TABLET | OUTPATIENT
Start: 2024-12-31 | End: 2025-01-30

## 2024-12-31 RX ORDER — LORAZEPAM 1 MG/1
1 TABLET ORAL EVERY 8 HOURS PRN
Qty: 5 TABLET | Refills: 0 | Status: SHIPPED | OUTPATIENT
Start: 2024-12-31 | End: 2025-01-30

## 2024-12-31 NOTE — PROGRESS NOTES
8/5/2022    This is a 47 y.o. female who presents for  Chief Complaint   Patient presents with    Follow-up     ED-Dizzness  On July 5th started on Cyclobenzaprine,        HPI:     Was visiting her dad in the hospital, had episodes of near falls  Was taken to the ED, CT head imaging reviewed, basic blood work reviewed. + continued concerns with ambulatory dysfunction, LE weakness, \"wobbliness\"   Walking with a cane now, feels she will fall at any minute  + HA, chronic pain  New new medications, has taken 1/2 of a muscle relaxer, no correlation to her Sxs  Intermittent n/t, dizziness     Pain medications are unchanged from pain management  Klonopin: takes 1 tablet at night and 1 daily as needed    HTN:  Taking medication(s) daily  Checking Bps at home?  Intermittently but didn't bring logs  Wide variability from memory   No new AEs to the medication(s)  No acute concerning Sxs: No CP, SOB, palpitations, dizziness, HA, etc.     + intense stress, sleep deprivation  Goes nights without sleeping at all  Will get max 5 hrs at a time  Has been with her dad at the hospital        Past Medical History:   Diagnosis Date    Benign essential tremor     BRCA1 negative     Colon polyp 05/06/2019    Degenerative disc disease     DVT, lower extremity ([address] 1989    Fibromyalgia     Kidney stones     Left-sided trigeminal neuralgia     Malignant neoplasm of thyroid gland ([address] 8/22/2013    Migraines, neuralgic     since stroke 1999 Chronic    PONV (postoperative nausea and vomiting)     Poor venous access     Protein S deficiency ([address]     Pulmonary embolism ([address] 1989    Seizure disorder ([address]     grand mal in 2005 ( childhood febrile seizure also-from a baby to age 14) and also X 1 ~2005 with headache treatment    Stroke ([address] 2/25/2019    Thrombosis of superior sagittal sinus 1999    Unspecified cerebral artery occlusion with cerebral infarction 1999    Vertebral artery occlusion 1999    White coat syndrome with high blood Pt calls in wondering if there is anything else he can take for his sinuses he told urgent care that he's had this cough for 10 days back on 12/26. He was diagnosed with covid on 12/26 and was prescribed an inhaler as well as some eye drops when he saw urgent care that same day. He states he has also taken Mucinex and Dayquil but its no longer \"cutting it.\" Pharmacy is [facility] in [place]    Please advise    pressure but without hypertension        Past Surgical History:   Procedure Laterality Date    BREAST BIOPSY      BREAST LUMPECTOMY      x 3    CYSTOSCOPY  01/09/2012    w/ left ureteroscopy, holmium laser.  stone manipulation and left stent insertion    FINGER TRIGGER RELEASE Left 10/31/2019    LEFT THUMB TRIGGER DIGIT RELEASE performed by Celina Arrieta MD at [address] [place] [address] [address] 2000    pins later removed    FOOT SURGERY Left 07/10/2017    LARYNX SURGERY      vocal cord nodule    LITHOTRIPSY  12/11    REFRACTIVE SURGERY      THYROIDECTOMY  05/18/2011            Social History     Socioeconomic History    Marital status: Single     Spouse name: Not on file    Number of children: Not on file    Years of education: Not on file    Highest education level: Not on file   Occupational History    Not on file   Tobacco Use    Smoking status: Never    Smokeless tobacco: Never   Vaping Use    Vaping Use: Not on file   Substance and Sexual Activity    Alcohol use: No    Drug use: No    Sexual activity: Not on file   Other Topics Concern    Not on file   Social History Narrative    Not on file     Social Determinants of Health     Financial Resource Strain: Not on file   Food Insecurity: Not on file   Transportation Needs: Not on file   Physical Activity: Not on file   Stress: Not on file   Social Connections: Not on file   Intimate Partner Violence: Not on file   Housing Stability: Not on file       Family History   Problem Relation Age of Onset    Cancer Mother         breast x2, ovarian x1, brain     High Cholesterol Mother     Breast Cancer Mother     Ovarian Cancer Mother     High Cholesterol Father     Other Father     Migraines Sister     Other Sister     Thyroid Disease Maternal Grandmother        Current Outpatient Medications   Medication Sig Dispense Refill    cyclobenzaprine (FLEXERIL) 10 MG tablet Take 10 mg by mouth 3 times daily as needed for Muscle spasms      clonazePAM (KLONOPIN) 1 MG tablet Take 1 tablet my mouth in the morning, take 1.5 tablets by mouth nightly 75 tablet 1    ketorolac (TORADOL) 30 MG/ML injection INJECT ONE MILLILITER INTRAMUSCULARLY TWO TIMES A DAY AS NEEDED FOR PAIN 12 each 0    SYRINGE-NEEDLE, DISP, 3 ML (B-D 3CC LUER-ALEX SYR 25GX1\") 25G X 1\" 3 ML MISC Use to inject into the muscle two times a day as needed for pain 100 each 2    carBAMazepine (TEGRETOL XR) 400 MG extended release tablet Take 1 tablet by mouth every 8 hours      losartan (COZAAR) 100 MG tablet TAKE ONE TABLET BY MOUTH DAILY 90 tablet 1    XARELTO 20 MG TABS tablet TAKE ONE TABLET BY MOUTH DAILY 90 tablet 1    levothyroxine (SYNTHROID) 200 MCG tablet TAKE ONE TABLET BY MOUTH DAILY 90 tablet 1    atorvastatin (LIPITOR) 40 MG tablet TAKE ONE TABLET BY MOUTH DAILY 90 tablet 1    hydroCHLOROthiazide (HYDRODIURIL) 25 MG tablet Take 0.5 tablets by mouth 2 times daily 90 tablet 1    amLODIPine (NORVASC) 10 MG tablet Take 1 tablet by mouth daily 90 tablet 1    clobetasol (TEMOVATE) 0.05 % cream Apply topically 2 times daily. 180 g 5    mupirocin (BACTROBAN) 2 % ointment Apply 3 times daily 90 g 5    Melatonin 10 MG TABS Take by mouth      diphenhydrAMINE (SOMINEX) 25 MG tablet Take 50 mg by mouth nightly as needed      docusate (COLACE, DULCOLAX) 100 MG CAPS Take 100 mg by mouth 2 times daily as needed       morphine (MS CONTIN) 15 MG extended release tablet 30 mg 2 times daily. fentaNYL (ACTIQ) 800 MCG lollipop Take 1 each by mouth 3 times daily as needed.       naloxone 4 MG/0.1ML LIQD nasal spray 1 spray by Nasal route as needed for Opioid Reversal      Cyanocobalamin (VITAMIN B-12) 5000 MCG SUBL Take 1 each by mouth every other day      Cholecalciferol (VITAMIN D3) 2000 units CAPS Take 1 capsule by mouth daily      Doxylamine Succinate, Sleep, (UNISOM PO) Take by mouth      primidone (MYSOLINE) 250 MG tablet Take 250 mg by mouth 3 times daily        No current facility-administered medications for this visit. Immunization History   Administered Date(s) Administered    Influenza, Quadv, IM, PF (6 mo and older Fluzone, Flulaval, Fluarix, and 3 yrs and older Afluria) 09/19/2019, 10/14/2020    Tdap (Boostrix, Adacel) 04/04/2019    Zoster Recombinant (Shingrix) 02/05/2020, 02/17/2020, 09/25/2020       Allergies   Allergen Reactions    Dilaudid [Hydromorphone Hcl] Shortness Of Breath, Itching and Other (See Comments)     Wheezing    Buprenorphine Itching, Nausea And Vomiting and Other (See Comments)     Tingling in lips    Duloxetine Hcl Other (See Comments)     Personality changes and depression    Elavil [Amitriptyline]      Seizure with high dose     Gabapentin     Gluten Meal      Celiac disease    Hydromorphone Itching and Other (See Comments)    Morphine Itching     Is able to take MS Contin     Pregabalin      Lyrica (personality changes)     Propranolol Hives    Topiramate Other (See Comments)    Zoloft [Sertraline]        Review of Systems   Constitutional:  Positive for activity change and fatigue. Negative for appetite change, chills, diaphoresis, fever and unexpected weight change. HENT:  Negative for ear pain, hearing loss and trouble swallowing. Eyes:  Negative for visual disturbance. Respiratory:  Negative for cough and shortness of breath. Cardiovascular:  Positive for palpitations. Negative for chest pain and leg swelling. Gastrointestinal:  Negative for abdominal pain, blood in stool, constipation, diarrhea, nausea and vomiting. Genitourinary:  Negative for decreased urine volume, difficulty urinating, dysuria, flank pain, hematuria and urgency. Musculoskeletal:  Positive for arthralgias and myalgias. Negative for back pain. Skin:  Negative for color change and rash. Neurological:  Positive for dizziness, weakness, light-headedness, numbness and headaches. Negative for tremors, seizures, syncope, facial asymmetry and speech difficulty. Psychiatric/Behavioral:  Positive for confusion, decreased concentration, dysphoric mood and sleep disturbance. Negative for agitation, behavioral problems, hallucinations, self-injury and suicidal ideas. The patient is nervous/anxious. The patient is not hyperactive. BP (!) 120/100 (Site: Left Upper Arm, Position: Sitting, Cuff Size: Medium Adult)   Pulse (!) 123   Wt 183 lb (83 kg)   LMP 06/30/2015   SpO2 98%   BMI 30.45 kg/m²     Physical Exam  Vitals and nursing note reviewed. Constitutional:       General: She is not in acute distress. Appearance: She is well-developed. She is not diaphoretic. HENT:      Head: Normocephalic and atraumatic. Eyes:      General: Visual field deficit present. Extraocular Movements: Extraocular movements intact. Conjunctiva/sclera: Conjunctivae normal.      Pupils: Pupils are equal, round, and reactive to light. Neck:      Vascular: No JVD. Cardiovascular:      Rate and Rhythm: Normal rate and regular rhythm. Heart sounds: Normal heart sounds. No murmur heard. No friction rub. No gallop. Pulmonary:      Effort: Pulmonary effort is normal. No respiratory distress. Breath sounds: Normal breath sounds. No wheezing. Abdominal:      Palpations: Abdomen is soft. Musculoskeletal:         General: No swelling. Normal range of motion. Cervical back: Normal range of motion and neck supple. Skin:     General: Skin is warm and dry. Capillary Refill: Capillary refill takes 2 to 3 seconds. Findings: No erythema. Neurological:      Mental Status: She is alert and oriented to person, place, and time. GCS: GCS eye subscore is 4. GCS verbal subscore is 5. GCS motor subscore is 6. Cranial Nerves: No cranial nerve deficit, dysarthria or facial asymmetry. Sensory: No sensory deficit. Motor: Weakness present. No tremor, atrophy, abnormal muscle tone, seizure activity or pronator drift.       Coordination: Coordination abnormal.      Gait: Gait abnormal.      Deep Tendon Reflexes: Reflexes abnormal.      Reflex Scores:       Patellar reflexes are 1+ on the right side and 1+ on the left side. Comments: Abnormal HINTS exam   Psychiatric:         Mood and Affect: Mood normal.         Behavior: Behavior normal.         Thought Content: Thought content normal.         Judgment: Judgment normal.       Plan  1. Abnormal neurological exam  - Hemoglobin A1C; Future  - Magnesium; Future  - Vitamin B12; Future  - Folate; Future  - TSH with Reflex; Future  - MRA HEAD W WO CONTRAST; Future  - MRA NECK W WO CONTRAST; Future    2. Ambulatory dysfunction  - Hemoglobin A1C; Future  - Magnesium; Future  - Vitamin B12; Future  - Folate; Future  - TSH with Reflex; Future  - MRA HEAD W WO CONTRAST; Future  - MRA NECK W WO CONTRAST; Future    3. Weakness  - Hemoglobin A1C; Future  - Magnesium; Future  - Vitamin B12; Future  - Folate; Future  - TSH with Reflex; Future  - MRA HEAD W WO CONTRAST; Future  - MRA NECK W WO CONTRAST; Future    4. Imbalance  - Hemoglobin A1C; Future  - Magnesium; Future  - Vitamin B12; Future  - Folate; Future  - TSH with Reflex; Future  - MRA HEAD W WO CONTRAST; Future  - MRA NECK W WO CONTRAST; Future    5. History of stroke  - Hemoglobin A1C; Future  - Magnesium; Future  - Vitamin B12; Future  - Folate; Future  - TSH with Reflex; Future  - MRA HEAD W WO CONTRAST; Future  - MRA NECK W WO CONTRAST; Future  - LIPID PANEL; Future    6. Intractable chronic post-traumatic headache  - Hemoglobin A1C; Future  - Magnesium; Future  - Vitamin B12; Future  - Folate; Future  - TSH with Reflex; Future  - MRA HEAD W WO CONTRAST; Future  - MRA NECK W WO CONTRAST; Future    7. Vertebral artery occlusion, left  - Hemoglobin A1C; Future  - Magnesium; Future  - Vitamin B12; Future  - Folate; Future  - TSH with Reflex; Future  - MRA HEAD W WO CONTRAST; Future  - MRA NECK W WO CONTRAST; Future    8.  Thrombosis of superior sagittal sinus  - Hemoglobin A1C; Future  - Magnesium; Future  - Vitamin B12; Future  - Folate; Future  - TSH with Reflex; Future  - MRA HEAD W WO CONTRAST; Future  - MRA NECK W WO CONTRAST; Future  - LIPID PANEL; Future    9. Stenosis of left vertebral artery  - Hemoglobin A1C; Future  - Magnesium; Future  - Vitamin B12; Future  - Folate; Future  - TSH with Reflex; Future  - MRA HEAD W WO CONTRAST; Future  - MRA NECK W WO CONTRAST; Future    10. History of seizures  - Hemoglobin A1C; Future  - Magnesium; Future  - Vitamin B12; Future  - Folate; Future  - TSH with Reflex; Future  - MRA HEAD W WO CONTRAST; Future  - MRA NECK W WO CONTRAST; Future    11. Hereditary protein S deficiency (HCC)  - Hemoglobin A1C; Future  - Magnesium; Future  - Vitamin B12; Future  - Folate; Future  - TSH with Reflex; Future  - MRA HEAD W WO CONTRAST; Future  - MRA NECK W WO CONTRAST; Future    12. H/O recurrent transient ischemic attacks  - Hemoglobin A1C; Future  - Magnesium; Future  - Vitamin B12; Future  - Folate; Future  - TSH with Reflex; Future  - MRA HEAD W WO CONTRAST; Future  - MRA NECK W WO CONTRAST; Future    13. H/O viral meningitis  - Hemoglobin A1C; Future  - Magnesium; Future  - Vitamin B12; Future  - Folate; Future  - TSH with Reflex; Future  - MRA HEAD W WO CONTRAST; Future  - MRA NECK W WO CONTRAST; Future    14. Post-surgical hypothyroidism  - Hemoglobin A1C; Future  - Magnesium; Future  - Vitamin B12; Future  - Folate; Future  - TSH with Reflex; Future  - MRA HEAD W WO CONTRAST; Future  - MRA NECK W WO CONTRAST; Future    15. Anxiety  - Hemoglobin A1C; Future  - Magnesium; Future  - Vitamin B12; Future  - Folate; Future  - TSH with Reflex; Future  - clonazePAM (KLONOPIN) 1 MG tablet; Take 1 tablet my mouth in the morning, take 1.5 tablets by mouth nightly  Dispense: 75 tablet; Refill: 1    16. Essential hypertension  - Hemoglobin A1C; Future  - Magnesium; Future  - Vitamin B12; Future  - Folate;  Future  - TSH with Reflex; Future  - MRA HEAD W WO CONTRAST; Future  - MRA NECK W WO CONTRAST; Future    17. Mixed hyperlipidemia  - LIPID PANEL; Future          While assessing care for this patient, I have reviewed all pertinent lab work/imaging/ specialist notes and care in reference to those problems addressed above in detail. Appropriate medical decision making was based on this. Please note that portions of this note may have been completed with a voice recognition program. Efforts were made to edit the dictations but occasionally words are mis-transcribed. Return in about 3 weeks (around 8/26/2022) for 30 minute visit, follow up blood pressure.

## 2024-12-31 NOTE — TELEPHONE ENCOUNTER
Refill Request     CONFIRM preferred pharmacy with the patient.    If Mail Order Rx - Pend for 90 day refill.      Last Seen: Last Seen Department: 10/21/2024  Last Seen by PCP: 10/21/2024    Last Written: 9/23/24 5 with no refills     If no future appointment scheduled:  Review the last OV with PCP and review information for follow-up visit,  Route STAFF MESSAGE with patient name to the  Pool for scheduling with the following information:            -  Timing of next visit           -  Visit type ie Physical, OV, etc           -  Diagnoses/Reason ie. COPD, HTN - Do not use MEDICATION, Follow-up or CHECK UP - Give reason for visit      Next Appointment:   Future Appointments   Date Time Provider Department Center   1/16/2025  1:40 PM Desmond Mistry MD AND ORTHO [facility]   1/29/2025 12:30 PM Reji Pinto, DO BABIN [facility] DEP       Message sent to  to schedule appt with patient?  NO      Requested Prescriptions     Pending Prescriptions Disp Refills    LORazepam (ATIVAN) 1 MG tablet [Pharmacy Med Name: LORazepam 1 MG TABLET] 5 tablet      Sig: TAKE ONE TABLET BY MOUTH EVERY 8 HOURS AS NEEDED FOR ANXIETY. MAX DAILY AMOUNT: 3 TABLETS

## 2025-01-09 ENCOUNTER — PATIENT MESSAGE (OUTPATIENT)
Dept: FAMILY MEDICINE CLINIC | Age: 55
End: 2025-01-09

## 2025-01-09 DIAGNOSIS — G89.4 CHRONIC PAIN DISORDER: ICD-10-CM

## 2025-01-09 DIAGNOSIS — F41.9 ANXIETY: ICD-10-CM

## 2025-01-10 RX ORDER — CLONAZEPAM 1 MG/1
TABLET ORAL
Qty: 75 TABLET | Refills: 2 | Status: SHIPPED | OUTPATIENT
Start: 2025-01-10 | End: 2025-04-10

## 2025-01-10 RX ORDER — TRAMADOL HYDROCHLORIDE 50 MG/1
50 TABLET ORAL EVERY 8 HOURS PRN
Qty: 30 TABLET | Refills: 0 | Status: SHIPPED | OUTPATIENT
Start: 2025-01-10 | End: 2025-02-10

## 2025-01-10 NOTE — TELEPHONE ENCOUNTER
Refill Request - Controlled Substance    CONFIRM preferred pharmacy with the patient.    If Mail Order Rx - Pend for 90 day refill.        Last Seen Department: 10/21/2024  Last Seen by PCP: 10/21/2024    Last Written: 12/5/2024    Last UDS: 7/30/2021    Med Agreement Signed On: 2/26/2019    If no future appointment scheduled:  Review the last OV with PCP and review information for follow-up visit,  Route STAFF MESSAGE with patient name to the  Pool for scheduling with the following information:            -  Timing of next visit           -  Visit type ie Physical, OV, etc           -  Diagnoses/Reason ie. COPD, HTN - Do not use MEDICATION, Follow-up or CHECK UP - Give reason for visit        Next Appointment:   Future Appointments   Date Time Provider Department Center   1/16/2025  1:40 PM Desmond Mistry MD AND ORTHO [facility]   1/29/2025 12:30 PM Reji Pinto, DO BABIN [facility] DEP       Message sent to  to schedule appt with patient?  NO      Requested Prescriptions     Pending Prescriptions Disp Refills    clonazePAM (KLONOPIN) 1 MG tablet 75 tablet 0     Sig: TAKE 1 TABLET BY MOUTH EVERY MORNING AND TAKE 1 AND 1/2 TABLET BY MOUTH ONCE NIGHTLY    traMADol (ULTRAM) 50 MG tablet 30 tablet 0     Sig: Take 1 tablet by mouth every 8 hours as needed for Pain for up to 31 days. Max Daily Amount: 150 mg

## 2025-01-24 ENCOUNTER — TELEPHONE (OUTPATIENT)
Dept: FAMILY MEDICINE CLINIC | Age: 55
End: 2025-01-24

## 2025-01-24 NOTE — TELEPHONE ENCOUNTER
FYI-   Patient called the office to let you know what happen with the Tramadol at the pharmacy.     She only received 21 tabs of Tramadol on 1/10/25 then had to  the other 9 tabs today, 1/24/25. Patient was not sure why the script was split.

## 2025-01-27 SDOH — ECONOMIC STABILITY: TRANSPORTATION INSECURITY
IN THE PAST 12 MONTHS, HAS THE LACK OF TRANSPORTATION KEPT YOU FROM MEDICAL APPOINTMENTS OR FROM GETTING MEDICATIONS?: NO

## 2025-01-27 SDOH — ECONOMIC STABILITY: INCOME INSECURITY: IN THE LAST 12 MONTHS, WAS THERE A TIME WHEN YOU WERE NOT ABLE TO PAY THE MORTGAGE OR RENT ON TIME?: NO

## 2025-01-27 SDOH — ECONOMIC STABILITY: TRANSPORTATION INSECURITY
IN THE PAST 12 MONTHS, HAS LACK OF TRANSPORTATION KEPT YOU FROM MEETINGS, WORK, OR FROM GETTING THINGS NEEDED FOR DAILY LIVING?: NO

## 2025-01-27 SDOH — ECONOMIC STABILITY: FOOD INSECURITY: WITHIN THE PAST 12 MONTHS, THE FOOD YOU BOUGHT JUST DIDN'T LAST AND YOU DIDN'T HAVE MONEY TO GET MORE.: PATIENT DECLINED

## 2025-01-27 SDOH — ECONOMIC STABILITY: FOOD INSECURITY: WITHIN THE PAST 12 MONTHS, YOU WORRIED THAT YOUR FOOD WOULD RUN OUT BEFORE YOU GOT MONEY TO BUY MORE.: PATIENT DECLINED

## 2025-01-27 NOTE — TELEPHONE ENCOUNTER
Called pharmacy and was told that she was Opiate naive, that before she was using a discount card, which was actually costing her more. But this time they ran through the insurance and insurance only allowed for a 7 day scripts, (she never used her insurance before) and then they billed the rest after the 7 days. Patients insurance will now be billed one time a month and she will get the full dose going forward.

## 2025-01-29 ENCOUNTER — TELEMEDICINE (OUTPATIENT)
Dept: FAMILY MEDICINE CLINIC | Age: 55
End: 2025-01-29
Payer: COMMERCIAL

## 2025-01-29 DIAGNOSIS — E53.8 FOLIC ACID DEFICIENCY: ICD-10-CM

## 2025-01-29 DIAGNOSIS — G25.0 ESSENTIAL TREMOR: ICD-10-CM

## 2025-01-29 DIAGNOSIS — G50.0 TRIGEMINAL NEURALGIA: ICD-10-CM

## 2025-01-29 DIAGNOSIS — F41.9 ANXIETY: ICD-10-CM

## 2025-01-29 DIAGNOSIS — D68.59 HYPERCOAGULABLE STATE (HCC): ICD-10-CM

## 2025-01-29 DIAGNOSIS — E78.5 HYPERLIPIDEMIA, UNSPECIFIED HYPERLIPIDEMIA TYPE: ICD-10-CM

## 2025-01-29 DIAGNOSIS — F33.2 SEVERE EPISODE OF RECURRENT MAJOR DEPRESSIVE DISORDER, WITHOUT PSYCHOTIC FEATURES (HCC): Primary | ICD-10-CM

## 2025-01-29 DIAGNOSIS — D68.59 HEREDITARY PROTEIN S DEFICIENCY (HCC): Chronic | ICD-10-CM

## 2025-01-29 DIAGNOSIS — Z12.31 ENCOUNTER FOR SCREENING MAMMOGRAM FOR MALIGNANT NEOPLASM OF BREAST: ICD-10-CM

## 2025-01-29 DIAGNOSIS — E89.0 POST-SURGICAL HYPOTHYROIDISM: ICD-10-CM

## 2025-01-29 PROBLEM — E03.9 HYPOTHYROIDISM: Status: RESOLVED | Noted: 2019-02-25 | Resolved: 2025-01-29

## 2025-01-29 PROCEDURE — 99214 OFFICE O/P EST MOD 30 MIN: CPT | Performed by: STUDENT IN AN ORGANIZED HEALTH CARE EDUCATION/TRAINING PROGRAM

## 2025-01-29 ASSESSMENT — PATIENT HEALTH QUESTIONNAIRE - PHQ9
9. THOUGHTS THAT YOU WOULD BE BETTER OFF DEAD, OR OF HURTING YOURSELF: NOT AT ALL
2. FEELING DOWN, DEPRESSED OR HOPELESS: SEVERAL DAYS
SUM OF ALL RESPONSES TO PHQ QUESTIONS 1-9: 11
6. FEELING BAD ABOUT YOURSELF - OR THAT YOU ARE A FAILURE OR HAVE LET YOURSELF OR YOUR FAMILY DOWN: MORE THAN HALF THE DAYS
10. IF YOU CHECKED OFF ANY PROBLEMS, HOW DIFFICULT HAVE THESE PROBLEMS MADE IT FOR YOU TO DO YOUR WORK, TAKE CARE OF THINGS AT HOME, OR GET ALONG WITH OTHER PEOPLE: VERY DIFFICULT
1. LITTLE INTEREST OR PLEASURE IN DOING THINGS: NEARLY EVERY DAY
SUM OF ALL RESPONSES TO PHQ QUESTIONS 1-9: 11
SUM OF ALL RESPONSES TO PHQ QUESTIONS 1-9: 11
SUM OF ALL RESPONSES TO PHQ9 QUESTIONS 1 & 2: 4
SUM OF ALL RESPONSES TO PHQ QUESTIONS 1-9: 11
7. TROUBLE CONCENTRATING ON THINGS, SUCH AS READING THE NEWSPAPER OR WATCHING TELEVISION: NOT AT ALL
4. FEELING TIRED OR HAVING LITTLE ENERGY: MORE THAN HALF THE DAYS
3. TROUBLE FALLING OR STAYING ASLEEP: SEVERAL DAYS
5. POOR APPETITE OR OVEREATING: MORE THAN HALF THE DAYS
8. MOVING OR SPEAKING SO SLOWLY THAT OTHER PEOPLE COULD HAVE NOTICED. OR THE OPPOSITE, BEING SO FIGETY OR RESTLESS THAT YOU HAVE BEEN MOVING AROUND A LOT MORE THAN USUAL: NOT AT ALL

## 2025-01-29 NOTE — ASSESSMENT & PLAN NOTE
Continue primidone, worsening of symptoms with attempted cessation previously       Has planned follow-up with back and spine team for evaluation of chronic lumbar back pain.  Will continue tramadol until evaluation

## 2025-01-29 NOTE — PROGRESS NOTES
Sarah Stock, was evaluated through a synchronous (real-time) audio-video encounter. The patient (or guardian if applicable) is aware that this is a billable service, which includes applicable co-pays. This Virtual Visit was conducted with patient's (and/or legal guardian's) consent. Patient identification was verified, and a caregiver was present when appropriate.   The patient was located at Home: [address] [place] [id number]  Provider was located at Facility (Appt Dept): [address]  [address]  [place] [address]  Confirm you are appropriately licensed, registered, or certified to deliver care in the state where the patient is located as indicated above. If you are not or unsure, please re-schedule the visit: Yes, I confirm.     Sarah Stock (:  1970) is a Established patient, presenting virtually for evaluation of the following:      Below is the assessment and plan developed based on review of pertinent history, physical exam, labs, studies, and medications.     Assessment & Plan  Severe episode of recurrent major depressive disorder, without psychotic features (HCC)   Chronic, at goal (stable), continue current treatment plan         Trigeminal neuralgia   Chronic, at goal (stable), continue current treatment plan         Hypercoagulable state (HCC)   Chronic, at goal (stable), continue Xarelto         Hereditary protein S deficiency (HCC)   Chronic, at goal (stable), continue current treatment plan         Hyperlipidemia, unspecified hyperlipidemia type   Chronic, at goal (stable), continue current treatment plan    Orders:    Lipid, Fasting; Future    Folic acid deficiency   Chronic, at goal (stable), continue current treatment plan    Orders:    Folate; Future    Anxiety   Chronic, at goal (stable), continue current treatment plan         Post-surgical hypothyroidism   Chronic, at goal (stable), continue current treatment plan    Orders:    TSH reflex to FT4;

## 2025-02-11 ENCOUNTER — PATIENT MESSAGE (OUTPATIENT)
Dept: FAMILY MEDICINE CLINIC | Age: 55
End: 2025-02-11

## 2025-02-11 DIAGNOSIS — G89.4 CHRONIC PAIN DISORDER: ICD-10-CM

## 2025-02-12 RX ORDER — TRAMADOL HYDROCHLORIDE 50 MG/1
50 TABLET ORAL EVERY 8 HOURS PRN
Qty: 30 TABLET | Refills: 0 | Status: SHIPPED | OUTPATIENT
Start: 2025-02-12 | End: 2025-03-15

## 2025-02-12 NOTE — TELEPHONE ENCOUNTER
Refill Request - Controlled Substance    CONFIRM preferred pharmacy with the patient.    If Mail Order Rx - Pend for 90 day refill.        Last Seen Department: 1/29/2025  Last Seen by PCP: 1/29/2025    Last Written: 1/10/2025    Last UDS: 7/30/2021    Med Agreement Signed On: 2/26/2019    If no future appointment scheduled:  Review the last OV with PCP and review information for follow-up visit,  Route STAFF MESSAGE with patient name to the  Pool for scheduling with the following information:            -  Timing of next visit           -  Visit type ie Physical, OV, etc           -  Diagnoses/Reason ie. COPD, HTN - Do not use MEDICATION, Follow-up or CHECK UP - Give reason for visit        Next Appointment:   Future Appointments   Date Time Provider Department Center   6/4/2025 12:00 PM Reji Pinto, DO BABIN [facility] DEP       Message sent to  to schedule appt with patient?  NO      Requested Prescriptions     Pending Prescriptions Disp Refills    traMADol (ULTRAM) 50 MG tablet 30 tablet 0     Sig: Take 1 tablet by mouth every 8 hours as needed for Pain for up to 31 days. Max Daily Amount: 150 mg

## 2025-02-13 ENCOUNTER — OFFICE VISIT (OUTPATIENT)
Dept: ORTHOPEDIC SURGERY | Age: 55
End: 2025-02-13
Payer: COMMERCIAL

## 2025-02-13 VITALS — HEIGHT: 65 IN | WEIGHT: 197 LBS | BODY MASS INDEX: 32.82 KG/M2

## 2025-02-13 DIAGNOSIS — S93.601A SPRAIN OF RIGHT FOOT, INITIAL ENCOUNTER: ICD-10-CM

## 2025-02-13 DIAGNOSIS — M79.671 RIGHT FOOT PAIN: Primary | ICD-10-CM

## 2025-02-13 PROCEDURE — 99213 OFFICE O/P EST LOW 20 MIN: CPT

## 2025-02-13 RX ORDER — METHYLPREDNISOLONE 4 MG/1
TABLET ORAL
Qty: 1 KIT | Refills: 0 | Status: SHIPPED | OUTPATIENT
Start: 2025-02-13 | End: 2025-02-19

## 2025-02-13 NOTE — PROGRESS NOTES
Patient: Sarah Stock    MRN: [id number]  YOB: 1970          Age: 54 y.o.            Sex: female    Subjective     Chief Complaint:  Foot Pain (right)      History of Present Illness:  Sarah Stock is a 54 y.o. female presents with pain of her right foot.  Reports she tripped over her cat a week or so ago and experienced immediate pain over the top of her foot and to some degree on the medial and lateral aspects of her foot not including her ankle.  Cannot recall the exact mechanism of how she injured her foot.  She reports a \"goose egg\" on the top of her foot along with some swelling and bruising at the base of her toes across the top of her foot.  Initially, she was experiencing too much pain to bear weight but with some ice, compression, elevation and range of motion exercises she feels her foot has overall improved and she has been able to walk albeit with some persistent pain.  She does ambulate with a cane but this has been since her right hip replacement.  Reports that she has \"broken both ankles\" and thinks that she has had surgery on the affected foot (right).  I am unable to find surgical intervention performed on this foot however I do see several orthopedic notes and a surgical intervention related to her left foot.       The pain assessment was noted & is as follows:  Pain Assessment  Location of Pain: Foot  Location Modifiers: Right  Severity of Pain: 7  Quality of Pain: Other (Comment)  Duration of Pain: Other (Comment)  Frequency of Pain: Other (Comment)      Medical History  Current Medications:   Current Outpatient Medications   Medication Sig Dispense Refill    methylPREDNISolone (MEDROL, YULI,) 4 MG tablet Take by mouth. 1 kit 0    traMADol (ULTRAM) 50 MG tablet Take 1 tablet by mouth every 8 hours as needed for Pain for up to 31 days. Max Daily Amount: 150 mg 30 tablet 0    clonazePAM (KLONOPIN) 1 MG tablet TAKE 1 TABLET BY MOUTH EVERY MORNING AND TAKE 1 AND 1/2 TABLET BY

## 2025-02-14 ENCOUNTER — TELEPHONE (OUTPATIENT)
Dept: FAMILY MEDICINE CLINIC | Age: 55
End: 2025-02-14

## 2025-02-14 NOTE — TELEPHONE ENCOUNTER
Submitted PA for traMADol HCl 50MG tablets   Via CMM Key: [id number] STATUS: PENDING.    Follow up done daily; if no decision with in three days we will refax.  If another three days goes by with no decision will call the insurance for status.

## 2025-02-17 NOTE — TELEPHONE ENCOUNTER
The medication is APPROVED Approved. Approved for TRAMADOL HCL Tablet 50MG, quantity up to 30 per 10 days, under the pharmacy benefit. The drug has been approved from 02/14/2025 to 05/15/2025. Generic or biosimilar substitution may be required when available and preferred on the formulary. Please note that dispensing of non-maintenance and specialty medications may be limited to a monthly supply  .    If this requires a response please respond to the pool ( P [facility] MEDICATION PRE-AUTH).      Thank you please advise patient.

## 2025-03-16 ENCOUNTER — PATIENT MESSAGE (OUTPATIENT)
Dept: FAMILY MEDICINE CLINIC | Age: 55
End: 2025-03-16

## 2025-03-16 DIAGNOSIS — G89.4 CHRONIC PAIN DISORDER: ICD-10-CM

## 2025-03-17 RX ORDER — TRAMADOL HYDROCHLORIDE 50 MG/1
50 TABLET ORAL EVERY 8 HOURS PRN
Qty: 30 TABLET | Refills: 0 | Status: SHIPPED | OUTPATIENT
Start: 2025-03-17 | End: 2025-04-17

## 2025-03-17 NOTE — TELEPHONE ENCOUNTER
Refill Request - Controlled Substance    CONFIRM preferred pharmacy with the patient.    If Mail Order Rx - Pend for 90 day refill.        Last Seen Department: 1/29/2025  Last Seen by PCP: 1/29/2025    Last Written: 2/12/2025    Last UDS: 7/30/2021    Med Agreement Signed On: 2/26/2019    If no future appointment scheduled:  Review the last OV with PCP and review information for follow-up visit,  Route STAFF MESSAGE with patient name to the  Pool for scheduling with the following information:            -  Timing of next visit           -  Visit type ie Physical, OV, etc           -  Diagnoses/Reason ie. COPD, HTN - Do not use MEDICATION, Follow-up or CHECK UP - Give reason for visit        Next Appointment:   Future Appointments   Date Time Provider Department Center   6/4/2025 12:00 PM Reji Pinto, DO BAB[facility] DEP       Message sent to  to schedule appt with patient?  No      Requested Prescriptions     Pending Prescriptions Disp Refills    traMADol (ULTRAM) 50 MG tablet 30 tablet 0     Sig: Take 1 tablet by mouth every 8 hours as needed for Pain for up to 31 days. Max Daily Amount: 150 mg

## 2025-04-10 ENCOUNTER — TELEPHONE (OUTPATIENT)
Dept: FAMILY MEDICINE CLINIC | Age: 55
End: 2025-04-10

## 2025-04-10 DIAGNOSIS — F41.9 ANXIETY: ICD-10-CM

## 2025-04-10 DIAGNOSIS — F33.2 SEVERE EPISODE OF RECURRENT MAJOR DEPRESSIVE DISORDER, WITHOUT PSYCHOTIC FEATURES (HCC): Primary | ICD-10-CM

## 2025-04-10 NOTE — TELEPHONE ENCOUNTER
Nurse sent a resource list of food pantries and food assistance programs to the patient.  Patient did return call and stated that she was able to connect with a mobile food pantry and that she was going to drive to it tomorrow.   Nurse advised patient if she had any questions or concerns to  contact the office.  Patient verbalized understanding.

## 2025-04-10 NOTE — TELEPHONE ENCOUNTER
Patient called into the office tearful and anxious.  Patient stated that she had very large unexpected expense come up that she had to pay, and then she had a very large repair bills to her home.  Patient stated that she is  concerned about how she will survive and how she will be able to afford food, medication, utilities, and other expenses.  Patient states that she has not applied for social security and dose not know how to go about it or have anyone to help her with it.  Patient has no family that can assist.  Nurse made referral to  [facility].  [facility]ara will be placing a call out to the patient Monday.  Patient also stated that she was so upset that she had to utilized and take her prescribed ativan medication to help her.

## 2025-04-11 NOTE — TELEPHONE ENCOUNTER
Checked in with patient, she was able to go to mobile food pantry and receive assistance.  Patient in good spirit.  Nurse advised patient if she had any needs, questions, or concerns to contact the office.  Patient verbalized understanding.

## 2025-04-14 DIAGNOSIS — F41.9 ANXIETY: ICD-10-CM

## 2025-04-14 DIAGNOSIS — G89.4 CHRONIC PAIN DISORDER: ICD-10-CM

## 2025-04-14 RX ORDER — TRAMADOL HYDROCHLORIDE 50 MG/1
50 TABLET ORAL EVERY 6 HOURS PRN
Qty: 30 TABLET | Refills: 0 | Status: SHIPPED | OUTPATIENT
Start: 2025-05-14 | End: 2025-06-13

## 2025-04-14 RX ORDER — TRAMADOL HYDROCHLORIDE 50 MG/1
50 TABLET ORAL EVERY 6 HOURS PRN
Qty: 30 TABLET | Refills: 0 | Status: SHIPPED | OUTPATIENT
Start: 2025-04-14 | End: 2025-05-14

## 2025-04-14 RX ORDER — CLONAZEPAM 1 MG/1
TABLET ORAL
Qty: 75 TABLET | Refills: 2 | Status: SHIPPED | OUTPATIENT
Start: 2025-04-14 | End: 2025-07-13

## 2025-04-14 RX ORDER — TRAMADOL HYDROCHLORIDE 50 MG/1
50 TABLET ORAL EVERY 6 HOURS PRN
Qty: 30 TABLET | Refills: 0 | Status: SHIPPED | OUTPATIENT
Start: 2025-06-14 | End: 2025-07-14

## 2025-04-14 NOTE — TELEPHONE ENCOUNTER
Refill Request - Controlled Substance    CONFIRM preferred pharmacy with the patient.    If Mail Order Rx - Pend for 90 day refill.        Last Seen Department: 1/29/2025  Last Seen by PCP: 1/29/2025    Last Written: clonazepam 1/10/25 75 with 2 refills     Tramadol 3/17/25 30 with no refills     Last UDS: 5/16/24     Med Agreement Signed On: 2/26/19    If no future appointment scheduled:  Review the last OV with PCP and review information for follow-up visit,  Route STAFF MESSAGE with patient name to the  Pool for scheduling with the following information:            -  Timing of next visit           -  Visit type ie Physical, OV, etc           -  Diagnoses/Reason ie. COPD, HTN - Do not use MEDICATION, Follow-up or CHECK UP - Give reason for visit        Next Appointment:   Future Appointments   Date Time Provider Department Center   4/28/2025 12:30 PM [facility] EG [facility] MAMMO [facility]   6/4/2025 12:00 PM Reji Pinto, DO BABIN [facility] DEP       Message sent to  to schedule appt with patient?  NO      Requested Prescriptions     Pending Prescriptions Disp Refills    traMADol (ULTRAM) 50 MG tablet [Pharmacy Med Name: traMADol HCL 50MG TABLET] 30 tablet      Sig: TAKE 1 TABLET BY MOUTH EVERY 8 HOURS AS NEEDED FOR PAIN FOR UP TO 31 DAYS. MAX DAILY AMOUNT: 3 TABLETS. REDUCE DOSES TAKEN AS APIN BECOMES MANAGEABLE    clonazePAM (KLONOPIN) 1 MG tablet [Pharmacy Med Name: clonazePAM 1 MG TABLET] 75 tablet      Sig: TAKE 1 TABLET BY MOUTH EVERY MORNING AND TAKE 1.5 TABLET BY MOUTH ONCE NIGHTLY

## 2025-04-15 ENCOUNTER — TELEPHONE (OUTPATIENT)
Dept: OTHER | Age: 55
End: 2025-04-15

## 2025-04-15 NOTE — TELEPHONE ENCOUNTER
[facility]--CHW    CHW attempted to make contact with patient. No voicemail, no answer. 7 day deferment.

## 2025-04-15 NOTE — TELEPHONE ENCOUNTER
Patient called into the office and was tearful and  anxious because she missed a phone call from the office from [facility].  Nurse assured the patient that it was ok and that she would be reaching out to her.  Patient was able to be redirected, calmed down and stated that she felt better.  Patient stated that she would also try to return the call to the number on her caller ID.

## 2025-04-16 ENCOUNTER — COMMUNITY OUTREACH (OUTPATIENT)
Dept: OTHER | Age: 55
End: 2025-04-16

## 2025-04-16 NOTE — PROGRESS NOTES
[facility]--CHW    CHW called patient to introduce herself and the partnership program. Patient stated she was happy to hear from CHW and excited to join the program. CHW did ask patient what triggers her anxiety and patient explained that she just had to pay the IRS $30,000 due to her realtor not filling out the proper forms when she recently bought her condo. Patient said she is very concerned about how she is going to live going forward. She used most of her inheritance from her parents. Patient does have income from [facility] [employer] in the amount of $2200 that she receives monthly as well.     Patient and CHW did set up the initial home visit for Thursday, April 24th from 1230--2 pm. CHW explained to patient that the initial visit will be the longest because we do have to fill out necessary paperwork to get her enrolled into the partnership program. Patient said that was fine.

## 2025-04-18 DIAGNOSIS — F33.2 SEVERE EPISODE OF RECURRENT MAJOR DEPRESSIVE DISORDER, WITHOUT PSYCHOTIC FEATURES (HCC): ICD-10-CM

## 2025-04-18 NOTE — TELEPHONE ENCOUNTER
.Refill Request     CONFIRM preferred pharmacy with the patient.    If Mail Order Rx - Pend for 90 day refill.      Last Seen: Last Seen Department: 1/29/2025  Last Seen by PCP: 1/29/2025    Last Written: 9/25/24 90 with 1     If no future appointment scheduled:  Review the last OV with PCP and review information for follow-up visit,  Route STAFF MESSAGE with patient name to the  Pool for scheduling with the following information:            -  Timing of next visit           -  Visit type ie Physical, OV, etc           -  Diagnoses/Reason ie. COPD, HTN - Do not use MEDICATION, Follow-up or CHECK UP - Give reason for visit      Next Appointment:   Future Appointments   Date Time Provider Department Center   4/28/2025 12:30 PM [facility]   6/4/2025 12:00 PM Reji Pinto, DO [facility] DEP       Message sent to  to schedule appt with patient?  NO      Requested Prescriptions     Pending Prescriptions Disp Refills    DULoxetine (CYMBALTA) 30 MG extended release capsule [Pharmacy Med Name: DULoxetine HCL DR 30 MG CAPSULE] 90 capsule 1     Sig: TAKE 1 CAPSULE BY MOUTH DAILY WITH ONE 60MG CAPSULE FOR A TOTAL OF 90MG DAILY

## 2025-04-19 RX ORDER — DULOXETIN HYDROCHLORIDE 30 MG/1
CAPSULE, DELAYED RELEASE ORAL
Qty: 90 CAPSULE | Refills: 3 | Status: SHIPPED | OUTPATIENT
Start: 2025-04-19

## 2025-04-21 ENCOUNTER — HOSPITAL ENCOUNTER (OUTPATIENT)
Dept: WOMENS IMAGING | Age: 55
Discharge: HOME | End: 2025-04-21
Payer: COMMERCIAL

## 2025-04-21 ENCOUNTER — TELEPHONE (OUTPATIENT)
Dept: WOMENS IMAGING | Age: 55
End: 2025-04-21

## 2025-04-21 VITALS — HEIGHT: 64 IN | BODY MASS INDEX: 34.15 KG/M2 | WEIGHT: 200 LBS

## 2025-04-21 DIAGNOSIS — Z12.31 ENCOUNTER FOR SCREENING MAMMOGRAM FOR MALIGNANT NEOPLASM OF BREAST: ICD-10-CM

## 2025-04-21 PROCEDURE — 77063 BREAST TOMOSYNTHESIS BI: CPT

## 2025-04-21 NOTE — TELEPHONE ENCOUNTER
Left message to review results of screening mammogram and schedule follow up.     Mireya Finnegan RN

## 2025-04-22 ENCOUNTER — TELEPHONE (OUTPATIENT)
Dept: FAMILY MEDICINE CLINIC | Age: 55
End: 2025-04-22

## 2025-04-22 DIAGNOSIS — F41.9 ANXIETY: ICD-10-CM

## 2025-04-22 DIAGNOSIS — F41.9 ANXIETY: Primary | ICD-10-CM

## 2025-04-22 NOTE — TELEPHONE ENCOUNTER
Refill Request - Controlled Substance    CONFIRM preferred pharmacy with the patient.    If Mail Order Rx - Pend for 90 day refill.        Last Seen Department: 1/29/2025  Last Seen by PCP: Visit date not found    Last Written: 12/31/24 50 with 0 refills    Last UDS: 07/30/21    Med Agreement Signed On: 02/26/2019    If no future appointment scheduled:  Review the last OV with PCP and review information for follow-up visit,  Route STAFF MESSAGE with patient name to the  Pool for scheduling with the following information:            -  Timing of next visit           -  Visit type ie Physical, OV, etc           -  Diagnoses/Reason ie. COPD, HTN - Do not use MEDICATION, Follow-up or CHECK UP - Give reason for visit        Next Appointment:   Future Appointments   Date Time Provider Department Center   4/29/2025  8:30 AM [facility] EG [facility] MAMMO [facility]   4/29/2025  9:00 AM [facility] EG WC [facility] EG [facility]   6/4/2025 12:00 PM Reji Pinto, DO BABIN [facility] DEP       Message sent to  to schedule appt with patient?  NO      Requested Prescriptions      No prescriptions requested or ordered in this encounter       .

## 2025-04-22 NOTE — TELEPHONE ENCOUNTER
Patient called into the office, patient and is very upset she had a mammogram yesterday 04/21/25 and there  was some concerning results and she will be having a more testing done next Monday.  Can you please pend the order as soon as possible so that we can see if we can get her appointment moved up.  Patient is very upset, tearful, anxious stating that she does not know how she is going to get through until Monday and she very worried.  Patient is also asking for a refill for her ativan to help her deal with the stress and anxiety.   FYI, please advise.

## 2025-04-23 DIAGNOSIS — F41.9 ANXIETY: ICD-10-CM

## 2025-04-23 RX ORDER — LORAZEPAM 1 MG/1
1 TABLET ORAL EVERY 8 HOURS PRN
Qty: 5 TABLET | Refills: 0 | OUTPATIENT
Start: 2025-04-23 | End: 2025-05-23

## 2025-04-23 RX ORDER — LORAZEPAM 1 MG/1
1 TABLET ORAL EVERY 8 HOURS PRN
Qty: 10 TABLET | Refills: 0 | Status: SHIPPED | OUTPATIENT
Start: 2025-04-23 | End: 2025-05-23

## 2025-04-23 NOTE — TELEPHONE ENCOUNTER
Nurse called patient to check in with her and see how she was doing.  Patient still very anxious about testing that she will be having next wee, at the breast center.  Patient asking if the provider can move her appointment up sooner.  The anxiety is having an effect on the patient having diarrhea due to anxiety.  Patient states that she is very nervous.  FYI, please advise.

## 2025-04-23 NOTE — TELEPHONE ENCOUNTER
Patient called the office back to let us know that she will be going to [facility] to have an enhanced mammogram, ultra sound, and maybe an MRI done.  They are working her in on 04/24/25.  FYI.

## 2025-04-23 NOTE — TELEPHONE ENCOUNTER
Refill Request - Controlled Substance    CONFIRM preferred pharmacy with the patient.    If Mail Order Rx - Pend for 90 day refill.        Last Seen Department: 1/29/2025  Last Seen by PCP: Visit date not found    Last Written: 12/31/24    Last UDS: 05/16/24    Med Agreement Signed On: 02/26/19    If no future appointment scheduled:  Review the last OV with PCP and review information for follow-up visit,  Route STAFF MESSAGE with patient name to the  Pool for scheduling with the following information:            -  Timing of next visit           -  Visit type ie Physical, OV, etc           -  Diagnoses/Reason ie. COPD, HTN - Do not use MEDICATION, Follow-up or CHECK UP - Give reason for visit        Next Appointment:   Future Appointments   Date Time Provider Department Center   4/29/2025  8:30 AM [facility] EG [facility] MAMMO [facility]   4/29/2025  9:00 AM [facility] EG WC US [facility] EG [facility]   6/4/2025 12:00 PM Reji Pinto, DO BABIN [facility] DEP       Message sent to  to schedule appt with patient?  NO      Requested Prescriptions      No prescriptions requested or ordered in this encounter

## 2025-04-24 ENCOUNTER — HOSPITAL ENCOUNTER (OUTPATIENT)
Dept: WOMENS IMAGING | Age: 55
Discharge: HOME | End: 2025-04-24
Payer: COMMERCIAL

## 2025-04-24 ENCOUNTER — TELEPHONE (OUTPATIENT)
Dept: OTHER | Age: 55
End: 2025-04-24

## 2025-04-24 ENCOUNTER — TELEPHONE (OUTPATIENT)
Dept: FAMILY MEDICINE CLINIC | Age: 55
End: 2025-04-24

## 2025-04-24 ENCOUNTER — HOSPITAL ENCOUNTER (OUTPATIENT)
Dept: WOMENS IMAGING | Age: 55
End: 2025-04-24
Payer: COMMERCIAL

## 2025-04-24 DIAGNOSIS — R92.8 ABNORMAL FINDING ON BREAST IMAGING: ICD-10-CM

## 2025-04-24 PROCEDURE — G0279 TOMOSYNTHESIS, MAMMO: HCPCS

## 2025-04-24 NOTE — TELEPHONE ENCOUNTER
[facility]--CHW    CHW received a voicemail from patient yesterday stating that she has a schedule conflict with our appointment. She stated her recent mammogram came back abnormal and they have to do additional testing at the same time our scheduled home visit is to take place, so she has to cancel our home visit. Patient said on the voicemail that she is nervous about her test results. She stated she will call back in a couple of days to reschedule. CHW made an attempt to call patient to speak with her, but patient did not answer. CHW will wait a few days to hear back from patient.

## 2025-04-24 NOTE — TELEPHONE ENCOUNTER
Nurse placed a call out to the patient to follow up and see how the patient  was doing.  Patient did not answer, left message for the patient to return the call to the office.

## 2025-04-29 ENCOUNTER — TELEPHONE (OUTPATIENT)
Dept: OTHER | Age: 55
End: 2025-04-29

## 2025-04-29 NOTE — TELEPHONE ENCOUNTER
[facility]--CHW    CHW made an attempt to reach patient, but the line just rang. No voicemail set up, unable to leave message. CHW will try later to reach patient.   not examined

## 2025-04-30 ENCOUNTER — COMMUNITY OUTREACH (OUTPATIENT)
Dept: OTHER | Age: 55
End: 2025-04-30

## 2025-04-30 NOTE — PROGRESS NOTES
[facility]--CHW    CHW returned patient's phone call. Patient was very apologetic about missing our previous scheduled appointment. CHW advised that it was fine, no worries. CHW understands that patient had to have biopsy done with her mammogram.    CHW and patient rescheduled her home visit, enrollment into the program for Tuesday, May 13, at 2 pm.

## 2025-05-02 ENCOUNTER — TELEPHONE (OUTPATIENT)
Dept: ORTHOPEDIC SURGERY | Age: 55
End: 2025-05-02

## 2025-05-02 NOTE — TELEPHONE ENCOUNTER
Question     Subject: DENTAL WORK NEEDED/ANTIBIOTIC  Patient and /or Facility Request: Sarah Stock \"Elvira\"   Contact Number: [phone]       PATIENT REQ A RETURN CALL REGARDING NEEDING AN ANTIBIOTIC FOR POSSIBLE UPCOMING DENTAL WORK. IS THIS FOR LIFE?      PLEASE RETURN CALL TO DISCUSS. SHE WOULD LIKE TO TALK ABOUT BEFORE SHE SCHEDULES WITH ANYONE

## 2025-05-09 ENCOUNTER — TELEPHONE (OUTPATIENT)
Dept: FAMILY MEDICINE CLINIC | Age: 55
End: 2025-05-09

## 2025-05-09 NOTE — TELEPHONE ENCOUNTER
Patient called into the office with concerns about her previous dental visit/consultation.  Patient stated that she had a bad experience with the dentist at the dental office.  Patient was upset and tearful.  Patient stated that she was very nervous got lost before her appointment.  Patient stated that when she got to the office that her blood pressure was high and the dentist would not do any work on her. Nurse and patient talked about  finding a new dentist, having the patient call and speak with her dental insurance company, get a list of more dental offices or dentist that take her insurance, speak with her insurance and let them know that the dentist did not clean her teeth, find out what is all covered by her dental plan,  and devising a plan before she goes to her next dental appointment.  Nurse talked with the patient about calling the dental offices and explaining to them before she goes in to see them that she has a lot of anxiety, and she wants to know what to expect prior to coming in and what she expects from the dentist.  Nurse also spoke with the patient about having someone take her when she goes to the dentist the time.  Patient stated that she would be meeting with Manuel  Tuesday and she will speak with her about it as well.  Nurse advised the patient if she needed anything to contact the office.  Patient verbalized understanding.

## 2025-05-12 DIAGNOSIS — Z86.73 HX OF STROKE ASSOCIATED WITH BLOOD CLOTTING TENDENCY: ICD-10-CM

## 2025-05-12 NOTE — DISCHARGE INSTRUCTIONS
If you have not heard from [address] by 10 am on Monday please call [phone], [address] to schedule a time for the monoclonal antibody infusion. Zofran as needed for nausea/vomiting. Please monitor your oxygen level at least 4 times a day. Please return to the ED if it is less than 90%, you have any new or worsening symptoms.
rollator/independent/needs device

## 2025-05-13 ENCOUNTER — COMMUNITY OUTREACH (OUTPATIENT)
Dept: OTHER | Age: 55
End: 2025-05-13

## 2025-05-13 RX ORDER — DULOXETIN HYDROCHLORIDE 60 MG/1
CAPSULE, DELAYED RELEASE ORAL
Qty: 90 CAPSULE | Refills: 1 | Status: SHIPPED | OUTPATIENT
Start: 2025-05-13

## 2025-05-13 RX ORDER — FOLIC ACID 1 MG/1
1 TABLET ORAL DAILY
Qty: 90 TABLET | Refills: 0 | Status: SHIPPED | OUTPATIENT
Start: 2025-05-13

## 2025-05-13 RX ORDER — ATORVASTATIN CALCIUM 40 MG/1
40 TABLET, FILM COATED ORAL DAILY
Qty: 90 TABLET | Refills: 1 | Status: SHIPPED | OUTPATIENT
Start: 2025-05-13

## 2025-05-13 NOTE — PROGRESS NOTES
[facility]--CHW    CHW called to confirm the home visit which is scheduled for today. Patient confirmed that she is still able to meet this afternoon with CHW.

## 2025-06-09 ENCOUNTER — APPOINTMENT (OUTPATIENT)
Dept: CT IMAGING | Age: 55
End: 2025-06-09
Payer: COMMERCIAL

## 2025-06-09 ENCOUNTER — HOSPITAL ENCOUNTER (INPATIENT)
Age: 55
LOS: 10 days | Discharge: NURSING, REHAB OR LONG-TERM CARE | End: 2025-06-19
Attending: EMERGENCY MEDICINE | Admitting: STUDENT IN AN ORGANIZED HEALTH CARE EDUCATION/TRAINING PROGRAM
Payer: COMMERCIAL

## 2025-06-09 DIAGNOSIS — H53.9 VISION CHANGES: Primary | ICD-10-CM

## 2025-06-09 DIAGNOSIS — G93.40 ACUTE ENCEPHALOPATHY: ICD-10-CM

## 2025-06-09 DIAGNOSIS — G08 THROMBOSIS OF SUPERIOR SAGITTAL SINUS: ICD-10-CM

## 2025-06-09 PROCEDURE — 99285 EMERGENCY DEPT VISIT HI MDM: CPT

## 2025-06-09 PROCEDURE — 85025 COMPLETE CBC W/AUTO DIFF WBC: CPT

## 2025-06-09 PROCEDURE — 80053 COMPREHEN METABOLIC PANEL: CPT

## 2025-06-09 PROCEDURE — 1200000000 HC SEMI PRIVATE

## 2025-06-09 PROCEDURE — 82077 ASSAY SPEC XCP UR&BREATH IA: CPT

## 2025-06-09 PROCEDURE — 83735 ASSAY OF MAGNESIUM: CPT

## 2025-06-09 PROCEDURE — 36415 COLL VENOUS BLD VENIPUNCTURE: CPT

## 2025-06-09 ASSESSMENT — VISUAL ACUITY
OS: 20/50
OD: 20/50
OU: 20/50

## 2025-06-09 ASSESSMENT — LIFESTYLE VARIABLES
HOW OFTEN DO YOU HAVE A DRINK CONTAINING ALCOHOL: 4 OR MORE TIMES A WEEK
HOW MANY STANDARD DRINKS CONTAINING ALCOHOL DO YOU HAVE ON A TYPICAL DAY: 3 OR 4

## 2025-06-10 ENCOUNTER — APPOINTMENT (OUTPATIENT)
Dept: CT IMAGING | Age: 55
End: 2025-06-10
Payer: COMMERCIAL

## 2025-06-10 ENCOUNTER — APPOINTMENT (OUTPATIENT)
Dept: MRI IMAGING | Age: 55
End: 2025-06-10
Payer: COMMERCIAL

## 2025-06-10 PROBLEM — Z86.718 HISTORY OF CEREBRAL VENOUS SINUS THROMBOSIS: Status: ACTIVE | Noted: 2019-10-17

## 2025-06-10 PROBLEM — R29.818 TRANSIENT NEUROLOGICAL SYMPTOMS: Status: ACTIVE | Noted: 2025-06-10

## 2025-06-10 PROBLEM — G08 CEREBRAL VENOUS THROMBOSIS OF SIGMOID SINUS: Status: ACTIVE | Noted: 2025-06-10

## 2025-06-10 LAB
ALBUMIN SERPL-MCNC: 5 G/DL (ref 3.4–5)
ALBUMIN/GLOB SERPL: 2.4 {RATIO} (ref 1.1–2.2)
ALP SERPL-CCNC: 131 U/L (ref 40–129)
ALT SERPL-CCNC: 87 U/L (ref 10–40)
AMPHETAMINES UR QL SCN>1000 NG/ML: ABNORMAL
ANION GAP SERPL CALCULATED.3IONS-SCNC: 14 MMOL/L (ref 3–16)
AST SERPL-CCNC: 99 U/L (ref 15–37)
BARBITURATES UR QL SCN>200 NG/ML: POSITIVE
BASOPHILS # BLD: 0 K/UL (ref 0–0.2)
BASOPHILS NFR BLD: 0.4 %
BENZODIAZ UR QL SCN>200 NG/ML: POSITIVE
BILIRUB SERPL-MCNC: 0.4 MG/DL (ref 0–1)
BUN SERPL-MCNC: 16 MG/DL (ref 7–20)
CALCIUM SERPL-MCNC: 9.5 MG/DL (ref 8.3–10.6)
CANNABINOIDS UR QL SCN>50 NG/ML: POSITIVE
CARBAMAZEPINE DOSE: NORMAL MG
CARBAMAZEPINE SERPL-MCNC: 4.5 UG/ML (ref 4–12)
CHLORIDE SERPL-SCNC: 106 MMOL/L (ref 99–110)
CO2 SERPL-SCNC: 24 MMOL/L (ref 21–32)
COCAINE UR QL SCN: ABNORMAL
CREAT SERPL-MCNC: 1.1 MG/DL (ref 0.6–1.1)
DEPRECATED RDW RBC AUTO: 15.1 % (ref 12.4–15.4)
DRUG SCREEN COMMENT UR-IMP: ABNORMAL
EOSINOPHIL # BLD: 0 K/UL (ref 0–0.6)
EOSINOPHIL NFR BLD: 0.8 %
EST. AVERAGE GLUCOSE BLD GHB EST-MCNC: 114 MG/DL
ETHANOLAMINE SERPL-MCNC: NORMAL MG/DL (ref 0–0.08)
FENTANYL SCREEN, URINE: ABNORMAL
GFR SERPLBLD CREATININE-BSD FMLA CKD-EPI: 59 ML/MIN/{1.73_M2}
GLUCOSE SERPL-MCNC: 80 MG/DL (ref 70–99)
HBA1C MFR BLD: 5.6 %
HCT VFR BLD AUTO: 36.9 % (ref 36–48)
HGB BLD-MCNC: 12.9 G/DL (ref 12–16)
LYMPHOCYTES # BLD: 0.8 K/UL (ref 1–5.1)
LYMPHOCYTES NFR BLD: 28.6 %
MAGNESIUM SERPL-MCNC: 2.29 MG/DL (ref 1.8–2.4)
MCH RBC QN AUTO: 32 PG (ref 26–34)
MCHC RBC AUTO-ENTMCNC: 35 G/DL (ref 31–36)
MCV RBC AUTO: 91.4 FL (ref 80–100)
METHADONE UR QL SCN>300 NG/ML: ABNORMAL
MONOCYTES # BLD: 0.2 K/UL (ref 0–1.3)
MONOCYTES NFR BLD: 6.6 %
NEUTROPHILS # BLD: 1.8 K/UL (ref 1.7–7.7)
NEUTROPHILS NFR BLD: 63.6 %
OPIATES UR QL SCN>300 NG/ML: ABNORMAL
OXYCODONE UR QL SCN: ABNORMAL
PCP UR QL SCN>25 NG/ML: ABNORMAL
PH UR STRIP: 6 [PH]
PLATELET # BLD AUTO: 194 K/UL (ref 135–450)
PMV BLD AUTO: 8.2 FL (ref 5–10.5)
POTASSIUM SERPL-SCNC: 3.5 MMOL/L (ref 3.5–5.1)
PROT SERPL-MCNC: 7.1 G/DL (ref 6.4–8.2)
RBC # BLD AUTO: 4.03 M/UL (ref 4–5.2)
SODIUM SERPL-SCNC: 144 MMOL/L (ref 136–145)
WBC # BLD AUTO: 2.8 K/UL (ref 4–11)

## 2025-06-10 PROCEDURE — 1200000000 HC SEMI PRIVATE

## 2025-06-10 PROCEDURE — 6360000004 HC RX CONTRAST MEDICATION: Performed by: EMERGENCY MEDICINE

## 2025-06-10 PROCEDURE — 70553 MRI BRAIN STEM W/O & W/DYE: CPT

## 2025-06-10 PROCEDURE — A9579 GAD-BASE MR CONTRAST NOS,1ML: HCPCS

## 2025-06-10 PROCEDURE — 6360000004 HC RX CONTRAST MEDICATION

## 2025-06-10 PROCEDURE — 83036 HEMOGLOBIN GLYCOSYLATED A1C: CPT

## 2025-06-10 PROCEDURE — 6370000000 HC RX 637 (ALT 250 FOR IP): Performed by: EMERGENCY MEDICINE

## 2025-06-10 PROCEDURE — 6370000000 HC RX 637 (ALT 250 FOR IP): Performed by: NURSE PRACTITIONER

## 2025-06-10 PROCEDURE — 6370000000 HC RX 637 (ALT 250 FOR IP): Performed by: INTERNAL MEDICINE

## 2025-06-10 PROCEDURE — 80156 ASSAY CARBAMAZEPINE TOTAL: CPT

## 2025-06-10 PROCEDURE — 70486 CT MAXILLOFACIAL W/O DYE: CPT

## 2025-06-10 PROCEDURE — 36415 COLL VENOUS BLD VENIPUNCTURE: CPT

## 2025-06-10 PROCEDURE — 99222 1ST HOSP IP/OBS MODERATE 55: CPT | Performed by: STUDENT IN AN ORGANIZED HEALTH CARE EDUCATION/TRAINING PROGRAM

## 2025-06-10 PROCEDURE — 70496 CT ANGIOGRAPHY HEAD: CPT

## 2025-06-10 PROCEDURE — 80307 DRUG TEST PRSMV CHEM ANLYZR: CPT

## 2025-06-10 PROCEDURE — APPSS45 APP SPLIT SHARED TIME 31-45 MINUTES

## 2025-06-10 PROCEDURE — 2500000003 HC RX 250 WO HCPCS: Performed by: NURSE PRACTITIONER

## 2025-06-10 PROCEDURE — 70546 MR ANGIOGRAPH HEAD W/O&W/DYE: CPT

## 2025-06-10 PROCEDURE — 70450 CT HEAD/BRAIN W/O DYE: CPT

## 2025-06-10 RX ORDER — LEVOTHYROXINE SODIUM 150 UG/1
150 TABLET ORAL
Status: DISCONTINUED | OUTPATIENT
Start: 2025-06-10 | End: 2025-06-19 | Stop reason: HOSPADM

## 2025-06-10 RX ORDER — SODIUM CHLORIDE 0.9 % (FLUSH) 0.9 %
5-40 SYRINGE (ML) INJECTION EVERY 12 HOURS SCHEDULED
Status: DISCONTINUED | OUTPATIENT
Start: 2025-06-10 | End: 2025-06-19 | Stop reason: HOSPADM

## 2025-06-10 RX ORDER — FOLIC ACID 1 MG/1
1 TABLET ORAL DAILY
Status: DISCONTINUED | OUTPATIENT
Start: 2025-06-10 | End: 2025-06-19 | Stop reason: HOSPADM

## 2025-06-10 RX ORDER — SODIUM CHLORIDE 0.9 % (FLUSH) 0.9 %
5-40 SYRINGE (ML) INJECTION PRN
Status: DISCONTINUED | OUTPATIENT
Start: 2025-06-10 | End: 2025-06-19 | Stop reason: HOSPADM

## 2025-06-10 RX ORDER — CLONAZEPAM 1 MG/1
1 TABLET ORAL 2 TIMES DAILY
Status: DISCONTINUED | OUTPATIENT
Start: 2025-06-10 | End: 2025-06-19 | Stop reason: HOSPADM

## 2025-06-10 RX ORDER — DULOXETIN HYDROCHLORIDE 60 MG/1
60 CAPSULE, DELAYED RELEASE ORAL DAILY
Status: DISCONTINUED | OUTPATIENT
Start: 2025-06-10 | End: 2025-06-13

## 2025-06-10 RX ORDER — IOPAMIDOL 755 MG/ML
75 INJECTION, SOLUTION INTRAVASCULAR
Status: COMPLETED | OUTPATIENT
Start: 2025-06-10 | End: 2025-06-10

## 2025-06-10 RX ORDER — PRIMIDONE 250 MG/1
250 TABLET ORAL 3 TIMES DAILY
Status: DISCONTINUED | OUTPATIENT
Start: 2025-06-10 | End: 2025-06-10

## 2025-06-10 RX ORDER — GADOTERIDOL 279.3 MG/ML
18 INJECTION INTRAVENOUS
Status: COMPLETED | OUTPATIENT
Start: 2025-06-10 | End: 2025-06-10

## 2025-06-10 RX ORDER — PRIMIDONE 250 MG/1
250 TABLET ORAL 2 TIMES DAILY
Status: DISCONTINUED | OUTPATIENT
Start: 2025-06-11 | End: 2025-06-11

## 2025-06-10 RX ORDER — DULOXETIN HYDROCHLORIDE 30 MG/1
30 CAPSULE, DELAYED RELEASE ORAL DAILY
Status: DISCONTINUED | OUTPATIENT
Start: 2025-06-10 | End: 2025-06-13

## 2025-06-10 RX ORDER — POLYETHYLENE GLYCOL 3350 17 G/17G
17 POWDER, FOR SOLUTION ORAL DAILY PRN
Status: DISCONTINUED | OUTPATIENT
Start: 2025-06-10 | End: 2025-06-19 | Stop reason: HOSPADM

## 2025-06-10 RX ORDER — ACETAMINOPHEN 650 MG/1
650 SUPPOSITORY RECTAL EVERY 6 HOURS PRN
Status: DISCONTINUED | OUTPATIENT
Start: 2025-06-10 | End: 2025-06-19 | Stop reason: HOSPADM

## 2025-06-10 RX ORDER — PANTOPRAZOLE SODIUM 40 MG/1
40 TABLET, DELAYED RELEASE ORAL
Status: DISCONTINUED | OUTPATIENT
Start: 2025-06-10 | End: 2025-06-19 | Stop reason: HOSPADM

## 2025-06-10 RX ORDER — ACETAMINOPHEN 325 MG/1
650 TABLET ORAL EVERY 6 HOURS PRN
Status: DISCONTINUED | OUTPATIENT
Start: 2025-06-10 | End: 2025-06-19 | Stop reason: HOSPADM

## 2025-06-10 RX ORDER — ATORVASTATIN CALCIUM 40 MG/1
40 TABLET, FILM COATED ORAL DAILY
Status: DISCONTINUED | OUTPATIENT
Start: 2025-06-10 | End: 2025-06-19 | Stop reason: HOSPADM

## 2025-06-10 RX ORDER — TRAMADOL HYDROCHLORIDE 50 MG/1
50 TABLET ORAL ONCE
Status: COMPLETED | OUTPATIENT
Start: 2025-06-10 | End: 2025-06-10

## 2025-06-10 RX ORDER — LORAZEPAM 0.5 MG/1
0.5 TABLET ORAL EVERY 6 HOURS PRN
Status: DISCONTINUED | OUTPATIENT
Start: 2025-06-10 | End: 2025-06-19 | Stop reason: HOSPADM

## 2025-06-10 RX ORDER — PROCHLORPERAZINE EDISYLATE 5 MG/ML
10 INJECTION INTRAMUSCULAR; INTRAVENOUS EVERY 6 HOURS PRN
Status: DISCONTINUED | OUTPATIENT
Start: 2025-06-10 | End: 2025-06-19 | Stop reason: HOSPADM

## 2025-06-10 RX ORDER — CARBAMAZEPINE 200 MG/1
400 TABLET, EXTENDED RELEASE ORAL 3 TIMES DAILY
Status: DISCONTINUED | OUTPATIENT
Start: 2025-06-10 | End: 2025-06-19 | Stop reason: HOSPADM

## 2025-06-10 RX ORDER — SODIUM CHLORIDE 9 MG/ML
INJECTION, SOLUTION INTRAVENOUS PRN
Status: DISCONTINUED | OUTPATIENT
Start: 2025-06-10 | End: 2025-06-19 | Stop reason: HOSPADM

## 2025-06-10 RX ADMIN — PRIMIDONE 250 MG: 250 TABLET ORAL at 20:58

## 2025-06-10 RX ADMIN — IOPAMIDOL 75 ML: 755 INJECTION, SOLUTION INTRAVENOUS at 01:35

## 2025-06-10 RX ADMIN — GADOTERIDOL 18 ML: 279.3 INJECTION, SOLUTION INTRAVENOUS at 13:42

## 2025-06-10 RX ADMIN — TRAMADOL HYDROCHLORIDE 50 MG: 50 TABLET, COATED ORAL at 02:25

## 2025-06-10 RX ADMIN — LEVOTHYROXINE SODIUM 150 MCG: 0.15 TABLET ORAL at 11:32

## 2025-06-10 RX ADMIN — CLONAZEPAM 1 MG: 1 TABLET ORAL at 08:44

## 2025-06-10 RX ADMIN — SODIUM CHLORIDE, PRESERVATIVE FREE 1 ML: 5 INJECTION INTRAVENOUS at 23:55

## 2025-06-10 RX ADMIN — LORAZEPAM 0.5 MG: 0.5 TABLET ORAL at 18:52

## 2025-06-10 RX ADMIN — CARBAMAZEPINE 400 MG: 200 TABLET, EXTENDED RELEASE ORAL at 11:30

## 2025-06-10 RX ADMIN — ATORVASTATIN CALCIUM 40 MG: 40 TABLET, FILM COATED ORAL at 07:50

## 2025-06-10 RX ADMIN — CARBAMAZEPINE 400 MG: 200 TABLET, EXTENDED RELEASE ORAL at 21:05

## 2025-06-10 RX ADMIN — PANTOPRAZOLE SODIUM 40 MG: 40 TABLET, DELAYED RELEASE ORAL at 07:50

## 2025-06-10 RX ADMIN — DULOXETINE HYDROCHLORIDE 30 MG: 30 CAPSULE, DELAYED RELEASE ORAL at 11:29

## 2025-06-10 RX ADMIN — CARBAMAZEPINE 400 MG: 200 TABLET, EXTENDED RELEASE ORAL at 18:55

## 2025-06-10 RX ADMIN — PANTOPRAZOLE SODIUM 40 MG: 40 TABLET, DELAYED RELEASE ORAL at 18:55

## 2025-06-10 RX ADMIN — FOLIC ACID 1 MG: 1 TABLET ORAL at 07:50

## 2025-06-10 RX ADMIN — LORAZEPAM 0.5 MG: 0.5 TABLET ORAL at 08:47

## 2025-06-10 RX ADMIN — CLONAZEPAM 1 MG: 1 TABLET ORAL at 20:38

## 2025-06-10 RX ADMIN — PRIMIDONE 250 MG: 250 TABLET ORAL at 11:31

## 2025-06-10 RX ADMIN — DULOXETINE 60 MG: 60 CAPSULE, DELAYED RELEASE ORAL at 11:29

## 2025-06-10 ASSESSMENT — PAIN - FUNCTIONAL ASSESSMENT: PAIN_FUNCTIONAL_ASSESSMENT: PREVENTS OR INTERFERES SOME ACTIVE ACTIVITIES AND ADLS

## 2025-06-10 ASSESSMENT — PAIN DESCRIPTION - ORIENTATION: ORIENTATION: LOWER

## 2025-06-10 ASSESSMENT — PAIN SCALES - GENERAL
PAINLEVEL_OUTOF10: 0
PAINLEVEL_OUTOF10: 8

## 2025-06-10 ASSESSMENT — PAIN DESCRIPTION - PAIN TYPE: TYPE: CHRONIC PAIN

## 2025-06-10 ASSESSMENT — PAIN DESCRIPTION - LOCATION: LOCATION: BACK

## 2025-06-10 ASSESSMENT — PAIN DESCRIPTION - DESCRIPTORS: DESCRIPTORS: ACHING;SORE

## 2025-06-10 NOTE — CONSULTS
Sarah ALLIE Stock  Inpatient Neurology Consult  [facility] Neurology            Sarah BarajasKee  1970    Date of Service: 6/10/2025    Referring Physician: Bobo Roberto MD      Reason for the consult and CC: Blurred vision    HPI:   The patient is a 55 y.o.  years old female with a prior medical history of DVT, PE, trigeminal neuralgia, protein S deficiency, seizure disorder, CVA who presented to the hospital yesterday with blurred vision.  Patient reports that she has had mild blurred vision for several months.  She said that she noticed it in the evening while watching TV.  However, yesterday it was much worse than usual and she was driving and realized that she could not see clearly enough to drive.  She pulled over and called EMS and came to the hospital.  She also endorses some word finding trouble for several days as well as worsening of her essential tremor which typically only affects her hands, now involves her head as well.In the ED, CTA head/neck revealed irregularity superior sagittal sinus concerning for partial thrombosis.  She does have a history of a prior venous sinus thrombosis in 1989.  She says that she has had a headache every day since 1999.  She describes it as burning/stabbing from the top of her head sometimes radiating down behind her eyes.  Regarding her seizure disorder, she says that she had \"febrile seizures\" as a child and that she had a \"major seizure\" while in the hospital about 15 years ago.  She takes carbamazepine and has not had a seizure since then.       Constitutional:   Vitals:    06/10/25 0909 06/10/25 0944 06/10/25 1202 06/10/25 1415   BP: 124/79  122/75 (!) 137/122   Pulse: 64 82 82 (!) 102   Resp: 18  18 22   Temp:    98.4 °F (36.9 °C)   TempSrc:    Oral   SpO2: 99% 100% 100% 100%         I personally reviewed and updated social history, past medical history, medications, allergy, surgical history, and family history as documented in the patient's electronic health  epileptogenic lesion. No previous EEG available for review. Well controlled for many years with carbamazepine but also prescribed primidone and clonazepam for other indications.     Essential tremor. This is clearly uncontrolled despite using high dose of primidone.  She may require advanced therapy for ET in the future.     History of cerebral venous thrombosis. Drug interaction between carbamazepine and primidone with rivaroxaban reducing efficacy of rivaroxaban. There is evidence to support using carbamazepine with rivaroxaban (no increased risk of stroke) but not both primidone and carbamazepine. Preferably she would not use either medication with rivaroxaban.     RECOMMENDATIONS:    Outpatient EEG    Reduce primidone from 250mg TID to 250mg BID    I will discuss transitioning from primidone to propranolol with patient tomorrow.     Continue Tegretol XR 400mg TID for now. Consider switching to alternative anti seizure medication in the future.     Follow up neurology clinic 3 months.     Neurology will continue to follow.    Electronically signed by Hunter Chan MD on 6/10/2025 at 10:00 PM

## 2025-06-10 NOTE — ED NOTES
Patient having panic attack while walking into the door.  Patient restless, heavily breathing and hysterically crying.   Patient given klonopin. RN reluctant to give patient ativan but patient states she always takes them together and doesn't have any interaction.  Patient placed on SpO2 monitor.

## 2025-06-10 NOTE — ED PROVIDER NOTES
EMERGENCY DEPARTMENT ENCOUNTER     [facility] EMERGENCY DEPARTMENT     Pt Name: Sarah Stock   MRN: [id number]   Birthdate 1970   Date of evaluation: 6/9/2025   Provider: Moni Cheatham MD   PCP: Reji Pinto DO   Note Started: 1:15 AM EDT 6/10/25     CHIEF COMPLAINT:     Chief Complaint   Patient presents with    Loss of Vision     Pt arrives via EMS from the gas station where she pulled over after reporting her vision became blurry. Pt reports she took an edible and 'left to get a six pack of white claws'. Pt reports she had a fall a few days ago and hit her face on the corner of her night table. Appears with a R side black eye. Pt reports pain in head, back, hip.        HISTORY OF PRESENT ILLNESS:  Adult female comes in with complaints that she had intermittent blurry vision.  The patient states that she was going to the gas station to get some drinks when she mentioned to the cord clear that she was having blurry vision so they called the police.  She said that a few days ago she fell twice.  She said she lives alone.  She denies any abuse.  She denies any daily or heavy alcohol use.  She said this evening she took a THC gummy prior to the onset of her symptoms.  She states that her blurry vision has since improved.  She denied any changes to her speech or any weakness numbness difficulty ambulating.  She denied pain for me but triage note states that she had pain in her head her back and her hip.    PHYSICAL EXAM:    ED Triage Vitals [06/09/25 2318]   BP Systolic BP Percentile Diastolic BP Percentile Temp Temp src Pulse Respirations SpO2   125/77 -- -- 98.5 °F (36.9 °C) -- 95 18 100 %      Height Weight         -- --              Physical Exam  Vitals and nursing note reviewed.   Constitutional:       Appearance: She is well-developed. She is not ill-appearing.   HENT:      Head: Normocephalic and atraumatic.      Right Ear: External ear normal.      Left Ear: External ear normal.      MAXILLOFACIAL WO CONTRAST   Final Result   1.  No acute fracture or dislocation.   2.  Degenerative changes are present in the visualized spine.      Electronically signed by Shon Duran MD        No results found.      EMERGENCY DEPARTMENT COURSE and MEDICAL DECISION MAKING:     Vitals:    Vitals:    06/09/25 2354 06/10/25 0252 06/10/25 0338 06/10/25 0357   BP:    106/84   Pulse: 94 84 87    Resp:  19 22    Temp:       SpO2:  97% 100% 99%      Adult female who comes in with temporary blurry vision.  By the time I see her symptoms have resolved.  She has generalized tremors but she said that she has a history of benign essential tremors.  She has no other neurological symptoms and NIHSS is 0.  Symptom started after using THC gummy.  Basic laboratory studies ordered and a CT head without contrast.  Patient had recent trauma I am concerned for intracranial hemorrhage I also ordered a CT maxillofacial without contrast to ensure that she does not have any bony fractures.  No signs of entrapment of the extraocular muscles.  Initial diagnostic workup is unremarkable except for leukocytopenia which would not explain her symptoms.  She also has elevated alk phos ALT and AST but she has no GI complaints.  EtOH is negative.  CT head and CT maxillofacial negative for any acute findings.  As I reviewed the patient's chart she has a history of protein S deficiency and she has a history of venous sinus thrombosis and she has had a stroke in the past.  I order a CTA of the head and neck to evaluate for any vascular occlusion which may have contributed to her symptoms this evening.    Patient reassessed she is now complaining of generalized pain.  She has a history of fibromyalgia and has chronic pain for which she takes 50 mg of Ultram.  Patient is given a dose of this.    I was contacted by the radiologist, the patient has questionable superior sagittal sinus partial thrombosis.  Patient initially stated that her blurry vision

## 2025-06-10 NOTE — ED NOTES
Sarah Stock is a 55 y.o. female admitted for  Principal Problem:    Cerebral venous thrombosis of sigmoid sinus  Resolved Problems:    * No resolved hospital problems. *  .   Patient Home via EMS transportation with   Chief Complaint   Patient presents with    Loss of Vision     Pt [place] via EMS from the gas station where she pulled over after reporting her vision became blurry. Pt reports she took an edible and 'left to get a six pack of white claws'. Pt reports she had a fall a few days ago and hit her face on the corner of her night table. Appears with a R side black eye. Pt reports pain in head, back, hip.   .  Patient is alert and Person, Place, Time, and Situation  Patient's baseline mobility: Baseline Mobility: Walker  Code Status: Full Code   Cardiac Rhythm:       Is patient on baseline Oxygen: no how many Liters:   Abnormal Assessment Findings:   Cloudy vision, chronic anxiety, panic disorder, weakness/ shaking (states she always shakes like that)    Isolation: None      NIH Score:    C-SSRS: Risk of Suicide: No Risk  Bedside swallow:        Active LDA's:   Peripheral IV 06/09/25 Right Antecubital (Active)   Site Assessment Clean, dry & intact 06/09/25 2359   Line Status Blood return noted;Flushed 06/09/25 2359   Phlebitis Assessment No symptoms 06/09/25 2359   Infiltration Assessment 0 06/09/25 2359   Dressing Status New dressing applied;Clean, dry & intact 06/09/25 2359   Dressing Type Transparent 06/09/25 2359     Patient admitted with a aguilar: no If the aguilar is chronic was it exchanged:  Reason for aguilar:   Patient admitted with Central Line:  NA . PICC line placement confirmed: YES OR NO:[id number]}   Reason for Central line:         Family/Caregiver Present no Any Concerns: no   Restraints no  Sitter no         Vitals: MEWS Score: 1    Vitals:    06/10/25 0720 06/10/25 0909 06/10/25 0944 06/10/25 1202   BP: 106/71 124/79  122/75   Pulse: 77 64 82 82   Resp: 16 18  18   Temp:       SpO2: 100% 99%

## 2025-06-10 NOTE — ED NOTES
Pt strongly refused purewick and opted to go to the restroom (walker provided). Also advised patient not to remove her medical contraptions. has been observed taking it off several times.

## 2025-06-10 NOTE — ED NOTES
Report given by EVELINE Lizama to EVELINE Kincaid.  Upon rounding on patient, patient appears very anxious and restless.   Patient continuously asking what the next steps are, patient informed that we are awaiting an inpatient bed assignment. Patient complaining of severe anxiety and panic, Dr. Pardeep rodriguez served regarding potential anxiety medicine.

## 2025-06-10 NOTE — ED NOTES
Patient requesting \"someone from the clergy\" to pray with her.    paged and at bedside at this time.

## 2025-06-10 NOTE — CARE COORDINATION
Case Management Assessment  Initial Evaluation    Date/Time of Evaluation: 6/10/2025 3:57 PM  Assessment Completed by: Caterina Cardoso RN    If patient is discharged prior to next notation, then this note serves as note for discharge by case management.    Patient Name: Sarah Stock                   YOB: 1970  Diagnosis: Thrombosis of superior sagittal sinus [G08]  Vision changes [H53.9]  Cerebral venous thrombosis of sigmoid sinus [G08]                   Date / Time: 6/9/2025 11:08 PM    Patient Admission Status: Inpatient   Readmission Risk (Low < 19, Mod (19-27), High > 27): Readmission Risk Score: 9.8    Current PCP: Reji Pinto, DO  PCP verified by CM? Yes (DO Millie)    Chart Reviewed: Yes      History Provided by: Patient, Medical Record  Patient Orientation: Alert and Oriented    Patient Cognition: Alert    Hospitalization in the last 30 days (Readmission):  No    If yes, Readmission Assessment in CM Navigator will be completed.    Advance Directives:      Code Status: Full Code   Patient's Primary Decision Maker is: Legal Next of Kin    Primary Decision Maker: no,emergency contact - Other - [phone]    Discharge Planning:    Patient lives with: Alone Type of Home: Other (Comment) (Condo)  Primary Care Giver: Self  Patient Support Systems include: None   Current Financial resources: None  Current community resources: None  Current services prior to admission: None            Current DME:              Type of Home Care services:  None    ADLS  Prior functional level: Independent in ADLs/IADLs  Current functional level: Independent in ADLs/IADLs    PT AM-PAC:   /24  OT AM-PAC:   /24    Family can provide assistance at DC: No  Would you like Case Management to discuss the discharge plan with any other family members/significant others, and if so, who? No  Plans to Return to Present Housing: Unknown at present  Other Identified Issues/Barriers to RETURNING to current housing:

## 2025-06-10 NOTE — H&P
[facility] Medicine History & Physical    V 5.1    Date of Admission: 6/9/2025    Date of Service:  Pt seen/examined on 11 June 2025     [x]Admitted to Inpatient with expected LOS greater than two midnights due to medical therapy.  []Placed in Observation status.    Chief Admission Complaint:  Visual changes    Presenting Admission History:      55 y.o. female who presented to [facility] with acute onset blurry vision w/out associated focal neuro deficits after she took an edible and 'left to get a six pack of white claws' .   She reported antecedent mild R facial trauma after contact with the night stand several days prior.     The patient denies any fever/chills or other signs/sxs of systemic illness or identifiable aggravating/alleviating factors.    Assessment/Plan:        Visual Disturbance - of unclear etiology.  CT scan suggested possible Thrombosis of superior sagittal sinus.      Coagulopathy - known Protein S deficiency.     HyperLipidemia - normally controlled on home Statin. Continued.  Follow up w/ PCP outpatient for medication initiation and/or adjustment as needed.      HypoThyroid - clinically euthyroid on oral replacement therapy. Continue, w/ outpatient monitoring as previously arranged.      GERD - without active signs of GI Bleeding and/or symptoms of dysphagia and/or odynophagia. No evidence of active Peptic Ulcer Disease without history of GI Bleed. Controlled on home PPI - continued.        Class I Obesity - 30.0 to 34.9 kg/m2  with Body mass index is 34.31 kg/m². Complicating assessment and treatment. Placing patient at risk for multiple co-morbidities as well as early death and contributing to the patient's presentation.      CXR: I have reviewed the CXR with the following interpretation: N/A  EKG:  I have reviewed the EKG with the following interpretation: N/A    Physical Exam Performed:      General appearance:  No apparent distress, appears stated age and

## 2025-06-10 NOTE — CONSULTS
Consult Placed     Who: Alexandria De Los Santos/ Neurology   Date:  Time:     Electronically signed by Shawnee Saldana on 6/10/2025 at 2:19 PM

## 2025-06-10 NOTE — PROGRESS NOTES
Prayer with Elvira at bedside in ED.  To be admitted, offered support as desired while she's admitted.    Discussed Hopelessness, Helplessness, [place] and supportive community.  Has 1 best friend, Sharri, whom she mentioned in our visit.    Thank you for consulting Spiritual Health    If you would like a 's presence for emotional, spiritual, grief or comfort care,   please dial \"0\" and ask for the  on-call to be paged.    For help with Advanced Care Planning, Power of  for Healthcare or Living Will forms, you may also call us directly:    [phone]) [facility]ra  [phone]) Rigoberto  [phone]) Outpatient    [facility]  [facility]

## 2025-06-11 PROBLEM — F29 PSYCHOSIS (HCC): Status: ACTIVE | Noted: 2025-06-11

## 2025-06-11 PROBLEM — G93.40 ACUTE ENCEPHALOPATHY: Status: ACTIVE | Noted: 2025-06-11

## 2025-06-11 LAB
ANION GAP SERPL CALCULATED.3IONS-SCNC: 12 MMOL/L (ref 3–16)
BASOPHILS # BLD: 0 K/UL (ref 0–0.2)
BASOPHILS NFR BLD: 0.3 %
BUN SERPL-MCNC: 9 MG/DL (ref 7–20)
CALCIUM SERPL-MCNC: 8.7 MG/DL (ref 8.3–10.6)
CHLORIDE SERPL-SCNC: 102 MMOL/L (ref 99–110)
CO2 SERPL-SCNC: 21 MMOL/L (ref 21–32)
CREAT SERPL-MCNC: 0.6 MG/DL (ref 0.6–1.1)
DEPRECATED RDW RBC AUTO: 15 % (ref 12.4–15.4)
EOSINOPHIL # BLD: 0.1 K/UL (ref 0–0.6)
EOSINOPHIL NFR BLD: 3.8 %
GFR SERPLBLD CREATININE-BSD FMLA CKD-EPI: >90 ML/MIN/{1.73_M2}
GLUCOSE SERPL-MCNC: 103 MG/DL (ref 70–99)
HCT VFR BLD AUTO: 33.2 % (ref 36–48)
HGB BLD-MCNC: 11.8 G/DL (ref 12–16)
LYMPHOCYTES # BLD: 1.6 K/UL (ref 1–5.1)
LYMPHOCYTES NFR BLD: 42.8 %
MCH RBC QN AUTO: 32.4 PG (ref 26–34)
MCHC RBC AUTO-ENTMCNC: 35.5 G/DL (ref 31–36)
MCV RBC AUTO: 91.3 FL (ref 80–100)
MONOCYTES # BLD: 0.3 K/UL (ref 0–1.3)
MONOCYTES NFR BLD: 8.2 %
NEUTROPHILS # BLD: 1.7 K/UL (ref 1.7–7.7)
NEUTROPHILS NFR BLD: 44.9 %
PLATELET # BLD AUTO: 156 K/UL (ref 135–450)
PMV BLD AUTO: 8.1 FL (ref 5–10.5)
POTASSIUM SERPL-SCNC: 3.8 MMOL/L (ref 3.5–5.1)
RBC # BLD AUTO: 3.64 M/UL (ref 4–5.2)
SODIUM SERPL-SCNC: 135 MMOL/L (ref 136–145)
WBC # BLD AUTO: 3.7 K/UL (ref 4–11)

## 2025-06-11 PROCEDURE — 6370000000 HC RX 637 (ALT 250 FOR IP): Performed by: NURSE PRACTITIONER

## 2025-06-11 PROCEDURE — 85025 COMPLETE CBC W/AUTO DIFF WBC: CPT

## 2025-06-11 PROCEDURE — 6360000002 HC RX W HCPCS: Performed by: INTERNAL MEDICINE

## 2025-06-11 PROCEDURE — 6370000000 HC RX 637 (ALT 250 FOR IP): Performed by: INTERNAL MEDICINE

## 2025-06-11 PROCEDURE — 80048 BASIC METABOLIC PNL TOTAL CA: CPT

## 2025-06-11 PROCEDURE — 2500000003 HC RX 250 WO HCPCS: Performed by: NURSE PRACTITIONER

## 2025-06-11 PROCEDURE — 36415 COLL VENOUS BLD VENIPUNCTURE: CPT

## 2025-06-11 PROCEDURE — APPSS30 APP SPLIT SHARED TIME 16-30 MINUTES

## 2025-06-11 PROCEDURE — 99232 SBSQ HOSP IP/OBS MODERATE 35: CPT | Performed by: STUDENT IN AN ORGANIZED HEALTH CARE EDUCATION/TRAINING PROGRAM

## 2025-06-11 PROCEDURE — 6370000000 HC RX 637 (ALT 250 FOR IP): Performed by: STUDENT IN AN ORGANIZED HEALTH CARE EDUCATION/TRAINING PROGRAM

## 2025-06-11 PROCEDURE — 1200000000 HC SEMI PRIVATE

## 2025-06-11 PROCEDURE — 6360000002 HC RX W HCPCS: Performed by: NURSE PRACTITIONER

## 2025-06-11 RX ORDER — DIAZEPAM 10 MG/2ML
5 INJECTION, SOLUTION INTRAMUSCULAR; INTRAVENOUS ONCE
Status: DISCONTINUED | OUTPATIENT
Start: 2025-06-11 | End: 2025-06-11

## 2025-06-11 RX ORDER — RISPERIDONE 0.25 MG/1
0.5 TABLET ORAL DAILY
Status: DISCONTINUED | OUTPATIENT
Start: 2025-06-11 | End: 2025-06-19 | Stop reason: HOSPADM

## 2025-06-11 RX ORDER — LORAZEPAM 2 MG/ML
1 INJECTION INTRAMUSCULAR ONCE
Status: COMPLETED | OUTPATIENT
Start: 2025-06-11 | End: 2025-06-11

## 2025-06-11 RX ORDER — PRIMIDONE 50 MG/1
125 TABLET ORAL 2 TIMES DAILY
Status: DISCONTINUED | OUTPATIENT
Start: 2025-06-11 | End: 2025-06-15

## 2025-06-11 RX ORDER — HALOPERIDOL 5 MG/ML
5 INJECTION INTRAMUSCULAR ONCE
Status: COMPLETED | OUTPATIENT
Start: 2025-06-11 | End: 2025-06-11

## 2025-06-11 RX ADMIN — DULOXETINE HYDROCHLORIDE 30 MG: 30 CAPSULE, DELAYED RELEASE ORAL at 08:59

## 2025-06-11 RX ADMIN — PANTOPRAZOLE SODIUM 40 MG: 40 TABLET, DELAYED RELEASE ORAL at 06:45

## 2025-06-11 RX ADMIN — LORAZEPAM 0.5 MG: 0.5 TABLET ORAL at 13:40

## 2025-06-11 RX ADMIN — CARBAMAZEPINE 400 MG: 200 TABLET, EXTENDED RELEASE ORAL at 10:15

## 2025-06-11 RX ADMIN — RIVAROXABAN 20 MG: 20 TABLET, FILM COATED ORAL at 13:58

## 2025-06-11 RX ADMIN — LORAZEPAM 1 MG: 2 INJECTION INTRAMUSCULAR; INTRAVENOUS at 22:21

## 2025-06-11 RX ADMIN — LORAZEPAM 0.5 MG: 0.5 TABLET ORAL at 00:27

## 2025-06-11 RX ADMIN — PRIMIDONE 125 MG: 50 TABLET ORAL at 20:50

## 2025-06-11 RX ADMIN — FOLIC ACID 1 MG: 1 TABLET ORAL at 08:59

## 2025-06-11 RX ADMIN — SODIUM CHLORIDE, PRESERVATIVE FREE 10 ML: 5 INJECTION INTRAVENOUS at 09:00

## 2025-06-11 RX ADMIN — CLONAZEPAM 1 MG: 1 TABLET ORAL at 08:59

## 2025-06-11 RX ADMIN — LORAZEPAM 0.5 MG: 0.5 TABLET ORAL at 06:55

## 2025-06-11 RX ADMIN — SODIUM CHLORIDE, PRESERVATIVE FREE 10 ML: 5 INJECTION INTRAVENOUS at 20:49

## 2025-06-11 RX ADMIN — CARBAMAZEPINE 400 MG: 200 TABLET, EXTENDED RELEASE ORAL at 20:51

## 2025-06-11 RX ADMIN — CLONAZEPAM 1 MG: 1 TABLET ORAL at 20:50

## 2025-06-11 RX ADMIN — DULOXETINE 60 MG: 60 CAPSULE, DELAYED RELEASE ORAL at 09:01

## 2025-06-11 RX ADMIN — LORAZEPAM 1 MG: 2 INJECTION INTRAMUSCULAR; INTRAVENOUS at 18:34

## 2025-06-11 RX ADMIN — ACETAMINOPHEN 650 MG: 325 TABLET ORAL at 13:58

## 2025-06-11 RX ADMIN — PANTOPRAZOLE SODIUM 40 MG: 40 TABLET, DELAYED RELEASE ORAL at 17:02

## 2025-06-11 RX ADMIN — LEVOTHYROXINE SODIUM 150 MCG: 0.15 TABLET ORAL at 06:45

## 2025-06-11 RX ADMIN — CARBAMAZEPINE 400 MG: 200 TABLET, EXTENDED RELEASE ORAL at 15:24

## 2025-06-11 RX ADMIN — HALOPERIDOL LACTATE 5 MG: 5 INJECTION, SOLUTION INTRAMUSCULAR at 22:21

## 2025-06-11 RX ADMIN — ATORVASTATIN CALCIUM 40 MG: 40 TABLET, FILM COATED ORAL at 08:59

## 2025-06-11 RX ADMIN — RISPERIDONE 0.5 MG: 0.25 TABLET, FILM COATED ORAL at 20:50

## 2025-06-11 RX ADMIN — PRIMIDONE 250 MG: 250 TABLET ORAL at 09:05

## 2025-06-11 ASSESSMENT — PAIN SCALES - GENERAL: PAINLEVEL_OUTOF10: 8

## 2025-06-11 ASSESSMENT — PAIN DESCRIPTION - LOCATION: LOCATION: BACK;HEAD

## 2025-06-11 ASSESSMENT — PAIN DESCRIPTION - ORIENTATION: ORIENTATION: RIGHT;LEFT

## 2025-06-11 ASSESSMENT — PAIN DESCRIPTION - DESCRIPTORS: DESCRIPTORS: ACHING

## 2025-06-11 NOTE — PROGRESS NOTES
Sarah Stock  Neurology Follow-up  [facility]    Date of Service: 6/11/2025    Subjective:   CC: Follow up today regarding: Blurred vision     Events noted. Chart and lab reviewed. Reports continued blurred vision, unchanged. Nursing staff reports pt was hallucinating overnight and has been unsteady when ambulating. MRI brain, MRV revealed no acute findings or evidence of venous sinus thrombosis.       ROS : A 10-12 system review obtained and updated today and is unremarkable except as mentioned  in my interval history.     Medication, past medical history, social history, and family history reviewed.    Objective:  Exam:   Constitutional:   Vitals:    06/10/25 2029 06/10/25 2324 06/11/25 0645 06/11/25 0800   BP: (!) 130/90  (!) 133/90 129/89   Pulse: 78   93   Resp: 20   20   Temp: 98.8 °F (37.1 °C)   98.6 °F (37 °C)   TempSrc: Oral   Oral   SpO2: 99%   98%   Weight:  90.7 kg (199 lb 15.3 oz)     Height:  1.626 m (5' 4.02\")         General appearance:  Normal development and appear in no acute distress.   Mental Status:   Oriented to person, place, problem, and time.  Memory: Good immediate recall.  Intact remote memory. Able to follow 2 step commands.   Normal attention span and concentration.  Language: Delayed, effortful speech.  Good fund of Knowledge.   Cranial Nerves:   II: Visual fields: Full. Blurred vision affecting bilateral eyes. Bilateral ptosis. Pupils: equal, round, reactive to light, bilaterally  III,IV,VI: Extra Ocular Movements are intact. No nystagmus.  V: Facial sensation is intact  VII: Facial strength and movements: intact and symmetric. There is a tremor involving the head and jaw.   IX: Normal palatal elevation and shoulder shrug  XII: Tongue movements are normal  Musculoskeletal: 5/5 in all 4 extremities. Good range of motion.  No muscle atrophy. BUE tremor, R>L.  Tone: Normal tone.   Planters: Flexor bilaterally  Coordination: no pronator drift, BUE & BLE dysmetria.

## 2025-06-11 NOTE — CONSULTS
Consult Placed     Who: Adolfo Dickerson,   Date:06/11/25  Time:1920     Electronically signed by Marco Antonio Prasad on 6/11/2025 at 7:20 PM     Dry/Warm

## 2025-06-11 NOTE — PROGRESS NOTES
Patient more delusional  She insists she needs to go back to her hospital room. Stating she is going home. Very unsteady on her feet.  IT took two staff to assist her back to bed due to her refusing to go.  Finally convinced her to return to bed.  It is too soon for ativan.  Informed dr Campos of above asking him  if we can give her something else

## 2025-06-11 NOTE — PROGRESS NOTES
Patient was very restless throughout the night and trying to get out of bed countless times. She has avasys on now and being checked on every now and then.

## 2025-06-11 NOTE — CARE COORDINATION
Writer reviewed chart and spoke with RN, patient lives home alone. EEG ordered.     Caterina Cardoso RN
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
## 2025-06-11 NOTE — PLAN OF CARE
Problem: Chronic Conditions and Co-morbidities  Goal: Patient's chronic conditions and co-morbidity symptoms are monitored and maintained or improved  Outcome: Progressing     Problem: Discharge Planning  Goal: Discharge to home or other facility with appropriate resources  Outcome: Progressing     Problem: Pain  Goal: Verbalizes/displays adequate comfort level or baseline comfort level  Outcome: Progressing  Pt scoring pain on 0-10 scale. Pain medications given per MAR. Pt instructed to call out when pain level increasing. Call light within reach.      Problem: Safety - Adult  Goal: Free from fall injury  Outcome: Progressing  Bed in lowest position, wheels locked, 2/4 side rails up, nonskid footwear on. Bed/ chair check alarm in place, call light within reach. Pt instructed to call out when needing assistance. Pt stated understanding.     Problem: ABCDS Injury Assessment  Goal: Absence of physical injury  Outcome: Progressing     Problem: Risk for Elopement  Goal: Patient will not exit the unit/facility without proper excort  Outcome: Progressing  Flowsheets (Taken 6/10/2025 1500 by Milagros Leger)  Nursing Interventions for Elopement Risk: Reduce environmental triggers

## 2025-06-12 PROBLEM — F18.90: Status: ACTIVE | Noted: 2025-06-12

## 2025-06-12 PROBLEM — G92.8 TOXIC METABOLIC ENCEPHALOPATHY: Status: ACTIVE | Noted: 2025-06-11

## 2025-06-12 LAB
ALBUMIN SERPL-MCNC: 3.9 G/DL (ref 3.4–5)
ANION GAP SERPL CALCULATED.3IONS-SCNC: 13 MMOL/L (ref 3–16)
BUN SERPL-MCNC: 6 MG/DL (ref 7–20)
CALCIUM SERPL-MCNC: 8.5 MG/DL (ref 8.3–10.6)
CHLORIDE SERPL-SCNC: 103 MMOL/L (ref 99–110)
CO2 SERPL-SCNC: 23 MMOL/L (ref 21–32)
CREAT SERPL-MCNC: 0.7 MG/DL (ref 0.6–1.1)
CRP SERPL-MCNC: 16.6 MG/L (ref 0–5.1)
DEPRECATED RDW RBC AUTO: 14.9 % (ref 12.4–15.4)
ERYTHROCYTE [SEDIMENTATION RATE] IN BLOOD BY WESTERGREN METHOD: <0 MM/HR (ref 0–30)
GFR SERPLBLD CREATININE-BSD FMLA CKD-EPI: >90 ML/MIN/{1.73_M2}
GLUCOSE SERPL-MCNC: 86 MG/DL (ref 70–99)
HCT VFR BLD AUTO: 33.4 % (ref 36–48)
HGB BLD-MCNC: 11.8 G/DL (ref 12–16)
MCH RBC QN AUTO: 32.6 PG (ref 26–34)
MCHC RBC AUTO-ENTMCNC: 35.2 G/DL (ref 31–36)
MCV RBC AUTO: 92.7 FL (ref 80–100)
PHOSPHATE SERPL-MCNC: 3.5 MG/DL (ref 2.5–4.9)
PLATELET # BLD AUTO: 136 K/UL (ref 135–450)
PMV BLD AUTO: 7.9 FL (ref 5–10.5)
POTASSIUM SERPL-SCNC: 3.5 MMOL/L (ref 3.5–5.1)
RBC # BLD AUTO: 3.6 M/UL (ref 4–5.2)
SODIUM SERPL-SCNC: 139 MMOL/L (ref 136–145)
THYROGLOB AB SERPL IA-ACNC: <15 IU/ML
THYROPEROXIDASE AB SERPL IA-ACNC: <9 IU/ML
WBC # BLD AUTO: 3 K/UL (ref 4–11)

## 2025-06-12 PROCEDURE — 95816 EEG AWAKE AND DROWSY: CPT

## 2025-06-12 PROCEDURE — APPSS30 APP SPLIT SHARED TIME 16-30 MINUTES

## 2025-06-12 PROCEDURE — 85652 RBC SED RATE AUTOMATED: CPT

## 2025-06-12 PROCEDURE — 86038 ANTINUCLEAR ANTIBODIES: CPT

## 2025-06-12 PROCEDURE — 86140 C-REACTIVE PROTEIN: CPT

## 2025-06-12 PROCEDURE — 6370000000 HC RX 637 (ALT 250 FOR IP): Performed by: NURSE PRACTITIONER

## 2025-06-12 PROCEDURE — 6370000000 HC RX 637 (ALT 250 FOR IP): Performed by: INTERNAL MEDICINE

## 2025-06-12 PROCEDURE — 86376 MICROSOMAL ANTIBODY EACH: CPT

## 2025-06-12 PROCEDURE — 36415 COLL VENOUS BLD VENIPUNCTURE: CPT

## 2025-06-12 PROCEDURE — 99232 SBSQ HOSP IP/OBS MODERATE 35: CPT | Performed by: STUDENT IN AN ORGANIZED HEALTH CARE EDUCATION/TRAINING PROGRAM

## 2025-06-12 PROCEDURE — 1200000000 HC SEMI PRIVATE

## 2025-06-12 PROCEDURE — 6370000000 HC RX 637 (ALT 250 FOR IP): Performed by: STUDENT IN AN ORGANIZED HEALTH CARE EDUCATION/TRAINING PROGRAM

## 2025-06-12 PROCEDURE — 84182 PROTEIN WESTERN BLOT TEST: CPT

## 2025-06-12 PROCEDURE — 86255 FLUORESCENT ANTIBODY SCREEN: CPT

## 2025-06-12 PROCEDURE — 80069 RENAL FUNCTION PANEL: CPT

## 2025-06-12 PROCEDURE — 86800 THYROGLOBULIN ANTIBODY: CPT

## 2025-06-12 PROCEDURE — 86341 ISLET CELL ANTIBODY: CPT

## 2025-06-12 PROCEDURE — 2500000003 HC RX 250 WO HCPCS: Performed by: NURSE PRACTITIONER

## 2025-06-12 PROCEDURE — 95819 EEG AWAKE AND ASLEEP: CPT | Performed by: NEUROMUSCULOSKELETAL MEDICINE & OMM

## 2025-06-12 PROCEDURE — 85027 COMPLETE CBC AUTOMATED: CPT

## 2025-06-12 RX ORDER — SODIUM CHLORIDE 0.9 % (FLUSH) 0.9 %
5-40 SYRINGE (ML) INJECTION EVERY 12 HOURS SCHEDULED
Status: DISCONTINUED | OUTPATIENT
Start: 2025-06-12 | End: 2025-06-19 | Stop reason: HOSPADM

## 2025-06-12 RX ORDER — SODIUM CHLORIDE 9 MG/ML
INJECTION, SOLUTION INTRAVENOUS PRN
Status: DISCONTINUED | OUTPATIENT
Start: 2025-06-12 | End: 2025-06-19 | Stop reason: HOSPADM

## 2025-06-12 RX ORDER — SODIUM CHLORIDE 0.9 % (FLUSH) 0.9 %
5-40 SYRINGE (ML) INJECTION PRN
Status: DISCONTINUED | OUTPATIENT
Start: 2025-06-12 | End: 2025-06-19 | Stop reason: HOSPADM

## 2025-06-12 RX ADMIN — CARBAMAZEPINE 400 MG: 200 TABLET, EXTENDED RELEASE ORAL at 20:08

## 2025-06-12 RX ADMIN — ACETAMINOPHEN 650 MG: 325 TABLET ORAL at 09:04

## 2025-06-12 RX ADMIN — CARBAMAZEPINE 400 MG: 200 TABLET, EXTENDED RELEASE ORAL at 15:38

## 2025-06-12 RX ADMIN — CLONAZEPAM 1 MG: 1 TABLET ORAL at 20:09

## 2025-06-12 RX ADMIN — LORAZEPAM 0.5 MG: 0.5 TABLET ORAL at 09:04

## 2025-06-12 RX ADMIN — PRIMIDONE 125 MG: 50 TABLET ORAL at 20:08

## 2025-06-12 RX ADMIN — RIVAROXABAN 20 MG: 20 TABLET, FILM COATED ORAL at 09:04

## 2025-06-12 RX ADMIN — DULOXETINE HYDROCHLORIDE 30 MG: 30 CAPSULE, DELAYED RELEASE ORAL at 09:03

## 2025-06-12 RX ADMIN — LEVOTHYROXINE SODIUM 150 MCG: 0.15 TABLET ORAL at 05:17

## 2025-06-12 RX ADMIN — ATORVASTATIN CALCIUM 40 MG: 40 TABLET, FILM COATED ORAL at 09:04

## 2025-06-12 RX ADMIN — RISPERIDONE 0.5 MG: 0.25 TABLET, FILM COATED ORAL at 09:04

## 2025-06-12 RX ADMIN — FOLIC ACID 1 MG: 1 TABLET ORAL at 09:04

## 2025-06-12 RX ADMIN — PRIMIDONE 125 MG: 50 TABLET ORAL at 09:04

## 2025-06-12 RX ADMIN — SODIUM CHLORIDE, PRESERVATIVE FREE 10 ML: 5 INJECTION INTRAVENOUS at 20:13

## 2025-06-12 RX ADMIN — PANTOPRAZOLE SODIUM 40 MG: 40 TABLET, DELAYED RELEASE ORAL at 15:38

## 2025-06-12 RX ADMIN — PANTOPRAZOLE SODIUM 40 MG: 40 TABLET, DELAYED RELEASE ORAL at 05:17

## 2025-06-12 RX ADMIN — CLONAZEPAM 1 MG: 1 TABLET ORAL at 09:04

## 2025-06-12 RX ADMIN — DULOXETINE 60 MG: 60 CAPSULE, DELAYED RELEASE ORAL at 09:04

## 2025-06-12 RX ADMIN — CARBAMAZEPINE 400 MG: 200 TABLET, EXTENDED RELEASE ORAL at 09:04

## 2025-06-12 RX ADMIN — SODIUM CHLORIDE, PRESERVATIVE FREE 10 ML: 5 INJECTION INTRAVENOUS at 09:05

## 2025-06-12 ASSESSMENT — PAIN - FUNCTIONAL ASSESSMENT: PAIN_FUNCTIONAL_ASSESSMENT: PREVENTS OR INTERFERES SOME ACTIVE ACTIVITIES AND ADLS

## 2025-06-12 ASSESSMENT — PAIN SCALES - GENERAL: PAINLEVEL_OUTOF10: 5

## 2025-06-12 ASSESSMENT — PAIN DESCRIPTION - DESCRIPTORS: DESCRIPTORS: ACHING

## 2025-06-12 ASSESSMENT — PAIN DESCRIPTION - ORIENTATION: ORIENTATION: LOWER

## 2025-06-12 ASSESSMENT — PAIN DESCRIPTION - LOCATION: LOCATION: HEAD;BACK

## 2025-06-12 ASSESSMENT — PAIN DESCRIPTION - PAIN TYPE: TYPE: CHRONIC PAIN

## 2025-06-12 NOTE — PLAN OF CARE
Problem: Chronic Conditions and Co-morbidities  Goal: Patient's chronic conditions and co-morbidity symptoms are monitored and maintained or improved  6/12/2025 1033 by Roma Bragg RN  Outcome: Progressing     Problem: Discharge Planning  Goal: Discharge to home or other facility with appropriate resources  6/12/2025 1033 by Roma Bragg RN  Outcome: Progressing     Problem: Pain  Goal: Verbalizes/displays adequate comfort level or baseline comfort level  6/12/2025 1033 by Roma Bragg RN  Outcome: Progressing     Problem: Safety - Adult  Goal: Free from fall injury  6/12/2025 1033 by Roma Bragg RN  Outcome: Progressing     Problem: ABCDS Injury Assessment  Goal: Absence of physical injury  6/12/2025 1033 by Roma Bragg RN  Outcome: Progressing     Problem: Risk for Elopement  Goal: Patient will not exit the unit/facility without proper excort  6/12/2025 1033 by Roma Bragg RN  Outcome: Progressing     Problem: Seizure Precautions  Goal: Remains free of injury related to seizures activity  6/12/2025 1033 by Roma Bragg RN  Outcome: Progressing     Problem: Safety - Medical Restraint  Goal: Remains free of injury from restraints (Restraint for Interference with Medical Device)  Description: INTERVENTIONS:1. Determine that other, less restrictive measures have been tried or would not be effective before applying the restraint2. Evaluate the patient's condition at the time of restraint application3. Inform patient/family regarding the reason for restraint4. Q2H: Monitor safety, psychosocial status, comfort, nutrition and hydration  6/12/2025 1033 by Roma Bragg RN  Outcome: Progressing

## 2025-06-12 NOTE — CONSULTS
Consult Placed     Who: TUSHAR CHAMPION  Date:06/12/25  Time:1534     Electronically signed by Marco Antonio Prasad on 6/12/2025 at 3:33 PM

## 2025-06-12 NOTE — PROCEDURES
PROCEDURE NOTE  Date: 6/12/2025   Name: Sarah Stock  YOB: 1970    EEG awake and asleep    Date/Time: 6/12/2025 4:10 PM    Performed by: Kamari Bates MD  Authorized by: Hunter Chan MD  Comments: Duration: 26 minutes.    History: 54 y/o male patient with Acute encephalopathy-- Pt increasingly agitated and confused since admission. MRI brain w wo contrast no acute findings. LP ordered to rule out autoimmune encephalitis.  History of seizure-- controlled, on carbamazepine.    Technical Description:    This electroencephalogram was performed using the standard protocol for this laboratory.  Electrodes were applied in the international 10/20 system using paste. Multiple digital montage arrangements were utilized and 16 channel tracing were recorded with two additional channel for eye movement monitoring and one for EKG.     Findings: Normal electrical background activity consistent with alpha rhythm in the posterior head regions   Patient became drowsy and fell to a light sleep state with appropriate change of the background rhythm.    There was no asymmetry or focal slowing or epileptiform discharges.    Photic stimulation and hyperventilation procedures were performed without additional input       Impression:     Normal EEG during the awake, drowsy and sleep state.  No epileptiform discharges or other abnormalities seen      Kamari Bates MD  Neurology

## 2025-06-12 NOTE — PROGRESS NOTES
Sarah Stock  Neurology Follow-up  [facility]    Date of Service: 6/12/2025    Subjective:   CC: Follow up today regarding: Blurred vision     Events noted. Chart and lab reviewed. Pt restless, confused on exam. Talking about being unable to buy her car keys and buying bananas. At the time of my exam she is in restraints and attempting to climb out of bed. Pt has repeatedly denied daily alcohol use and recreation drug use, except for marijuana.       ROS : A 10-12 system review obtained and updated today and is unremarkable except as mentioned  in my interval history.     Medication, past medical history, social history, and family history reviewed.    Objective:  Exam:   Constitutional:   Vitals:    06/11/25 0800 06/11/25 2048 06/12/25 0754 06/12/25 0900   BP: 129/89 (!) 106/92 98/71 123/71   Pulse: 93 81 76 93   Resp: 20 18 18 18   Temp: 98.6 °F (37 °C) 98.6 °F (37 °C) 98.6 °F (37 °C)    TempSrc: Oral Oral Oral    SpO2: 98% (!) 89% 98%    Weight:       Height:         General appearance: Restless, disoriented.  Mental Status:   Oriented to person, place (with choices, initially states \"family services\"), and time only. Incorrectly recalls why she came to hospital.   Memory: Poor recall  Poor attention span and concentration.  Language: Delayed, effortful speech.   Poor fund of Knowledge.   Cranial Nerves:   II:   Pupils: equal, round, reactive to light  III,IV,VI: Extra Ocular Movements are intact. No nystagmus  V: Facial sensation is intact  VII: Facial strength and movements: intact and symmetric  XII: Tongue movements are normal  Musculoskeletal: 5/5 in all 4 extremities.   Reflexes: 2+ patellar, 2+ biceps  Coordination: no pronator drift, BUE & BLE dysmetria.   Sensation: normal to light touch in both arms and legs.        Data:  LABS:   Lab Results   Component Value Date/Time     06/12/2025 05:31 AM    K 3.5 06/12/2025 05:31 AM    K 3.8 06/11/2025 05:31 AM     06/12/2025 05:31 AM

## 2025-06-12 NOTE — PROGRESS NOTES
Patient friend just called this writer and informed that she had stopped in the patients home to check on the cats and stated that she believes patient has been doing nitrous oxide because she had tanks lying around all over the house. She stated she wanted us to be aware, due to patients altered mental status.

## 2025-06-12 NOTE — PROGRESS NOTES
Spiritual Health History and Assessment/Progress Note  [facility]    Rituals, Rites and Sacraments, Spiritual/Emotional Needs, Loneliness/Social Isolation, Emotional distress, Adjustment to illness, Life Adjustments, Grief and loss,      Name: Sarah Stock MRN: [id number]    Age: 55 y.o.     Sex: female   Language: English   Anglican: Yazidi   Cerebral venous thrombosis of sigmoid sinus     Date: 6/12/2025            Total Time Calculated: (P) 41 min              Spiritual Assessment began in [facility] - MED SURG/ORTHO        Referral/Consult From: Other    Encounter Overview/Reason: Rituals, Rites and Sacraments, Spiritual/Emotional Needs, Loneliness/Social Isolation  Service Provided For: Patient    [place], Belief, Meaning:   Patient identifies as spiritual, is connected with a shawnee tradition or spiritual practice, and has beliefs or practices that help with coping during difficult times  Family/Friends No family/friends present      Importance and Influence:  Patient has spiritual/personal beliefs that influence decisions regarding their health  Family/Friends No family/friends present    Community:  Patient is connected with a spiritual community and expresses feelings of isolation: disconnected from family/friends  Family/Friends No family/friends present    Assessment and Plan of Care:     Patient Interventions include: Facilitated expression of thoughts and feelings and Provided sacramental/Holiness ritual  Family/Friends Interventions include: No family/friends present    Patient Plan of Care: Spiritual Care available upon further referral  Family/Friends Plan of Care: No family/friends present    Electronically signed by Chaplain Saturnino on 6/12/2025 at 7:57 PM

## 2025-06-12 NOTE — PROGRESS NOTES
Pt restless, agitated and having to be redirected constantly. One time dose of ativan and haldol given per ZARIA Booth at 1021    Pt still climbing out of bed, agitated and uncooperative. RN paged for restraints. Pt placed in 3 point restraints

## 2025-06-12 NOTE — PROGRESS NOTES
[facility] Medicine Progress Note  V 5.17      Date of Admission: 6/9/2025    Hospital Day: 4      Chief Admission Complaint:  \"Visual Changes\"    Subjective:  no new c/o.     Presenting Admission History:       \"55 y.o. female who presented to [facility] with acute onset blurry vision w/out associated focal neuro deficits after she took an edible and 'left to get a six pack of white claws' .   She reported antecedent mild R facial trauma after contact with the night stand several days prior.      The patient denies any fever/chills or other signs/sxs of systemic illness or identifiable aggravating/alleviating factors\".    Assessment/Plan:      Visual Disturbance - of unclear etiology.  Initially suspected possible dural venous thrombosis - MRI/MRV negative and ruled out.     Coagulopathy - known Proteins S deficiency, w/ hx DVT/PE and other thrombotic processes.  Anticoagulated at baseline on Xarelto - resumed.     Psychosis - w/ hallucinations.  Of unclear etiology.  Psychiatry consult ORDERED and pending.     HyperLipidemia - normally controlled on home Statin. Continued.  Follow up w/ PCP outpatient for medication initiation and/or adjustment as needed.      HypoThyroid - clinically euthyroid on oral replacement therapy. Continue, w/ outpatient monitoring as previously arranged.      GERD - without active signs of GI Bleeding and/or symptoms of dysphagia and/or odynophagia. No evidence of active Peptic Ulcer Disease without history of GI Bleed. Controlled on home PPI - continued.        Class I Obesity - 30.0 to 34.9 kg/m2  with Body mass index is 34.31 kg/m². Complicating assessment and treatment. Placing patient at risk for multiple co-morbidities as well as early death and contributing to the patient's presentation.      Ongoing threat to life and/or bodily function without ongoing treatment due to:  Psychosis - not safe independently    Consults:      IP CONSULT TO HOSPITALIST  IP CONSULT

## 2025-06-12 NOTE — ADT AUTH CERT
Utilization Reviews       06/11/25  by Bairon Mcclain RN   Last updated by Bairon Mcclain RN on 6/11/2025 1524     Review Status Created By   In Primary Bairon Mcclain RN       Review Type   --      Criteria Review   DATE: 06/11/2025  TYPE OF BED: ACUTE     RELEVANT BASELINES: (lab values, vitals, o2 amount/delivery, etc.)  On RA  Use walker    She consume PO XARELTO, TEGRETOL XR, KLONOPIN and CYMBALTA     PERTINENT UPDATES:     Pt continues with PO TEGRETOL, MYSOLINE and XARELTO    VITALS:    Temp: 98.6  RR: 20  Pulse: 93  BP: 133/90, 129/89  O2Sat: 98% ON RA  Pain level: 8/10     ABNL/PERTINENT LABS/RADIOLOGY/DIAGNOSTIC STUDIES:     Sodium: 135 (L)  Glucose: 103 (H)  WBC: 3.7 (L)  RBC: 3.64 (L)  Hemoglobin Quant: 11.8 (L)  Hematocrit: 33.2 (L)     PHYSICAL EXAM:    General appearance:  Normal development and appear in no acute distress.   Mental Status:   Oriented to person, place, problem, and time.  Memory: Good immediate recall.  Intact remote memory. Able to follow 2 step commands.   Normal attention span and concentration.  Language: Delayed, effortful speech.  Good fund of Knowledge.   Cranial Nerves:   II: Visual fields: Full. Blurred vision affecting bilateral eyes. Bilateral ptosis. Pupils: equal, round, reactive to light, bilaterally  III,IV,VI: Extra Ocular Movements are intact. No nystagmus.  V: Facial sensation is intact  VII: Facial strength and movements: intact and symmetric. There is a tremor involving the head and jaw.   IX: Normal palatal elevation and shoulder shrug  XII: Tongue movements are normal  Musculoskeletal: 5/5 in all 4 extremities. Good range of motion.  No muscle atrophy. BUE tremor, R>L.  Tone: Normal tone.   Planters: Flexor bilaterally  Coordination: no pronator drift, BUE & BLE dysmetria.   Sensation: Full sensation to light touch in all extremities     MD CONSULTS/ASSESSMENT AND PLAN:     Neurology:     Impression:  Acutely worsened blurred vision-- MRI brain, MRV revealed  Severe episode of recurrent major depressive disorder, she is in [facility], due to several medical problems that she suffer, even inpatient  she had a panic attack and a hysterically crying.  [×] Other Indication: affect cerebellar function and vision      6/11/2025 10:58 AM          -- 6/11/2025 10:58 AM by Bairon Mcclain RN --              Neurology refer a interation between primidone, clonazepam, carbamazepine cannabis as a cause of the patient issue.

## 2025-06-12 NOTE — PROGRESS NOTES
Spoke with primary nurse about lumbar puncture, per nurse she is still confused and has no one to sign consent and will not likely be able to follow commands for testing. Will follow up tomorrow

## 2025-06-12 NOTE — PLAN OF CARE
Problem: Chronic Conditions and Co-morbidities  Goal: Patient's chronic conditions and co-morbidity symptoms are monitored and maintained or improved  Outcome: Progressing     Problem: Discharge Planning  Goal: Discharge to home or other facility with appropriate resources  Outcome: Progressing     Problem: Pain  Goal: Verbalizes/displays adequate comfort level or baseline comfort level  Outcome: Progressing     Problem: Safety - Adult  Goal: Free from fall injury  Outcome: Progressing     Problem: ABCDS Injury Assessment  Goal: Absence of physical injury  Outcome: Progressing     Problem: Risk for Elopement  Goal: Patient will not exit the unit/facility without proper excort  Outcome: Progressing     Problem: Seizure Precautions  Goal: Remains free of injury related to seizures activity  Outcome: Progressing     Problem: Safety - Medical Restraint  Goal: Remains free of injury from restraints (Restraint for Interference with Medical Device)  Description: INTERVENTIONS:1. Determine that other, less restrictive measures have been tried or would not be effective before applying the restraint2. Evaluate the patient's condition at the time of restraint application3. Inform patient/family regarding the reason for restraint4. Q2H: Monitor safety, psychosocial status, comfort, nutrition and hydration  Outcome: Progressing

## 2025-06-13 LAB
ALBUMIN SERPL-MCNC: 4.1 G/DL (ref 3.4–5)
ANA SER QL IA: NEGATIVE
ANION GAP SERPL CALCULATED.3IONS-SCNC: 8 MMOL/L (ref 3–16)
BUN SERPL-MCNC: 6 MG/DL (ref 7–20)
CALCIUM SERPL-MCNC: 8.9 MG/DL (ref 8.3–10.6)
CHLORIDE SERPL-SCNC: 105 MMOL/L (ref 99–110)
CO2 SERPL-SCNC: 28 MMOL/L (ref 21–32)
CREAT SERPL-MCNC: 0.6 MG/DL (ref 0.6–1.1)
DEPRECATED RDW RBC AUTO: 14.9 % (ref 12.4–15.4)
FOLATE SERPL-MCNC: 15.2 NG/ML (ref 4.78–24.2)
GFR SERPLBLD CREATININE-BSD FMLA CKD-EPI: >90 ML/MIN/{1.73_M2}
GLUCOSE SERPL-MCNC: 92 MG/DL (ref 70–99)
HCT VFR BLD AUTO: 31.2 % (ref 36–48)
HGB BLD-MCNC: 11 G/DL (ref 12–16)
MCH RBC QN AUTO: 32.4 PG (ref 26–34)
MCHC RBC AUTO-ENTMCNC: 35.1 G/DL (ref 31–36)
MCV RBC AUTO: 92.3 FL (ref 80–100)
PHOSPHATE SERPL-MCNC: 3.5 MG/DL (ref 2.5–4.9)
PLATELET # BLD AUTO: 161 K/UL (ref 135–450)
PMV BLD AUTO: 7.7 FL (ref 5–10.5)
POTASSIUM SERPL-SCNC: 4.9 MMOL/L (ref 3.5–5.1)
RBC # BLD AUTO: 3.38 M/UL (ref 4–5.2)
SODIUM SERPL-SCNC: 141 MMOL/L (ref 136–145)
VIT B12 SERPL-MCNC: <150 PG/ML (ref 211–911)
WBC # BLD AUTO: 3.1 K/UL (ref 4–11)

## 2025-06-13 PROCEDURE — 6360000002 HC RX W HCPCS: Performed by: STUDENT IN AN ORGANIZED HEALTH CARE EDUCATION/TRAINING PROGRAM

## 2025-06-13 PROCEDURE — 6370000000 HC RX 637 (ALT 250 FOR IP): Performed by: STUDENT IN AN ORGANIZED HEALTH CARE EDUCATION/TRAINING PROGRAM

## 2025-06-13 PROCEDURE — 82746 ASSAY OF FOLIC ACID SERUM: CPT

## 2025-06-13 PROCEDURE — 1200000000 HC SEMI PRIVATE

## 2025-06-13 PROCEDURE — APPSS30 APP SPLIT SHARED TIME 16-30 MINUTES

## 2025-06-13 PROCEDURE — 6370000000 HC RX 637 (ALT 250 FOR IP): Performed by: REGISTERED NURSE

## 2025-06-13 PROCEDURE — 83921 ORGANIC ACID SINGLE QUANT: CPT

## 2025-06-13 PROCEDURE — 82607 VITAMIN B-12: CPT

## 2025-06-13 PROCEDURE — 2500000003 HC RX 250 WO HCPCS: Performed by: STUDENT IN AN ORGANIZED HEALTH CARE EDUCATION/TRAINING PROGRAM

## 2025-06-13 PROCEDURE — 80069 RENAL FUNCTION PANEL: CPT

## 2025-06-13 PROCEDURE — 99254 IP/OBS CNSLTJ NEW/EST MOD 60: CPT | Performed by: REGISTERED NURSE

## 2025-06-13 PROCEDURE — 6370000000 HC RX 637 (ALT 250 FOR IP): Performed by: INTERNAL MEDICINE

## 2025-06-13 PROCEDURE — 6370000000 HC RX 637 (ALT 250 FOR IP): Performed by: NURSE PRACTITIONER

## 2025-06-13 PROCEDURE — 85027 COMPLETE CBC AUTOMATED: CPT

## 2025-06-13 PROCEDURE — 99232 SBSQ HOSP IP/OBS MODERATE 35: CPT | Performed by: STUDENT IN AN ORGANIZED HEALTH CARE EDUCATION/TRAINING PROGRAM

## 2025-06-13 PROCEDURE — 36415 COLL VENOUS BLD VENIPUNCTURE: CPT

## 2025-06-13 RX ORDER — DULOXETIN HYDROCHLORIDE 60 MG/1
60 CAPSULE, DELAYED RELEASE ORAL 2 TIMES DAILY
Status: DISCONTINUED | OUTPATIENT
Start: 2025-06-13 | End: 2025-06-19 | Stop reason: HOSPADM

## 2025-06-13 RX ORDER — CYANOCOBALAMIN 1000 UG/ML
1000 INJECTION, SOLUTION INTRAMUSCULAR; SUBCUTANEOUS
Status: DISCONTINUED | OUTPATIENT
Start: 2025-06-13 | End: 2025-06-19 | Stop reason: HOSPADM

## 2025-06-13 RX ADMIN — PRIMIDONE 125 MG: 50 TABLET ORAL at 10:03

## 2025-06-13 RX ADMIN — RISPERIDONE 0.5 MG: 0.25 TABLET, FILM COATED ORAL at 10:03

## 2025-06-13 RX ADMIN — DULOXETINE 60 MG: 60 CAPSULE, DELAYED RELEASE ORAL at 20:13

## 2025-06-13 RX ADMIN — DULOXETINE HYDROCHLORIDE 30 MG: 30 CAPSULE, DELAYED RELEASE ORAL at 10:03

## 2025-06-13 RX ADMIN — CYANOCOBALAMIN 1000 MCG: 1000 INJECTION, SOLUTION INTRAMUSCULAR; SUBCUTANEOUS at 13:29

## 2025-06-13 RX ADMIN — DULOXETINE 60 MG: 60 CAPSULE, DELAYED RELEASE ORAL at 10:02

## 2025-06-13 RX ADMIN — ATORVASTATIN CALCIUM 40 MG: 40 TABLET, FILM COATED ORAL at 10:03

## 2025-06-13 RX ADMIN — PANTOPRAZOLE SODIUM 40 MG: 40 TABLET, DELAYED RELEASE ORAL at 05:22

## 2025-06-13 RX ADMIN — PRIMIDONE 125 MG: 50 TABLET ORAL at 20:14

## 2025-06-13 RX ADMIN — SODIUM CHLORIDE, PRESERVATIVE FREE 10 ML: 5 INJECTION INTRAVENOUS at 10:03

## 2025-06-13 RX ADMIN — CARBAMAZEPINE 400 MG: 200 TABLET, EXTENDED RELEASE ORAL at 14:36

## 2025-06-13 RX ADMIN — CARBAMAZEPINE 400 MG: 200 TABLET, EXTENDED RELEASE ORAL at 10:02

## 2025-06-13 RX ADMIN — FOLIC ACID 1 MG: 1 TABLET ORAL at 10:02

## 2025-06-13 RX ADMIN — LEVOTHYROXINE SODIUM 150 MCG: 0.15 TABLET ORAL at 05:22

## 2025-06-13 RX ADMIN — PANTOPRAZOLE SODIUM 40 MG: 40 TABLET, DELAYED RELEASE ORAL at 17:52

## 2025-06-13 RX ADMIN — CLONAZEPAM 1 MG: 1 TABLET ORAL at 10:02

## 2025-06-13 RX ADMIN — RIVAROXABAN 20 MG: 20 TABLET, FILM COATED ORAL at 10:03

## 2025-06-13 RX ADMIN — CARBAMAZEPINE 400 MG: 200 TABLET, EXTENDED RELEASE ORAL at 20:13

## 2025-06-13 RX ADMIN — CLONAZEPAM 1 MG: 1 TABLET ORAL at 20:14

## 2025-06-13 ASSESSMENT — PAIN SCALES - GENERAL: PAINLEVEL_OUTOF10: 0

## 2025-06-13 NOTE — PLAN OF CARE
Problem: Safety - Adult  Goal: Free from fall injury  6/12/2025 2313 by Malina Flood, RN  Outcome: Progressing  6/12/2025 1033 by Roma Bragg, RN  Outcome: Progressing

## 2025-06-13 NOTE — CONSULTS
by Samia Morales MD    EKG: none done during this admission     Assessment:   This is a 55 y.o female with past psych hx depression, anxiety presented with blurry vision. Patient having episodes of visual hallucinations, agitation, restlessness, confusion, unable to follow commands and uncooperative at times. Contributing factors could be use of polysubstance as her UDS +ve for Barbiturates, Benzo ( rx'd) and cannabinoid use. Patient continues to have confusion and intermittent agitation.      psychiatric impression/ Dx     Visual hallucination     2.  Generalized anxiety disorder     3. Depression, unspecified     RECOMMENDATIONS:   Psychiatric:     #  Risperidone 0.5 daily started on 6/11/25 for hallucination. Consider switching to Seroquel 25 mg nightly and Seroquel 12.5 mg TID PRN  for agitation. Patient has chronic tremors and use of strong DA blocking agents may worsen tremors. Increase Cymbalta 30/60 mg daily to 60 mg twice daily.     # OARRS reviewed. Patient takes Klonopin 1.5 mg and Tramadol HCL 50 mg daily. Recommend adjusting Klonopin to home dose ( Klonopin 0.5 mg in AM  and 1 mg at HS or 1.5 mg at HS). Please, wean off Ativan PRN. Patient will continue her care in a community under PCP where she has been doing for a while.     # Labs, notes and diagnostic studies reviewed. MRI of brain, CTA head/neck, EEG results  no acute findings. LP pending to rule out any source of infection. UA can be considered if confusion continues.      Dispo: Does not require IP psych admission.     Thank you for this consult, please call the psychiatry consult line for further questions. Sign off at this time    London Dickson, CIARA, PMHNP-BC, CNP  [facility]/ psychiatry

## 2025-06-13 NOTE — CARE COORDINATION
Writer reviewed chart, and spoke with RN, and MD patient saw psych today and med changes being done or recommended by psych. LP ordered unsure if able to get consent from patient.     Caterina Cardoso RN

## 2025-06-13 NOTE — PROGRESS NOTES
Taking over care being the primary nurse at this time. Patient is laying in bed with her eyes open and talking towards the ceiling. Avasure is in the room. Call light in reach. Bed alarm activated for safety.

## 2025-06-13 NOTE — PROGRESS NOTES
Sarah ALLIE Stock  Neurology Follow-up  [facility] Neurology    Date of Service: 6/13/2025    Subjective:   CC: Follow up today regarding: Blurred vision     Events noted. Chart and lab reviewed. Mental status somewhat improved today, she is now oriented. It was discovered that pt is abusing nitrous oxide at home.       ROS : A 10-12 system review obtained and updated today and is unremarkable except as mentioned  in my interval history.     Medication, past medical history, social history, and family history reviewed.    Objective:  Exam:   Constitutional:   Vitals:    06/11/25 2048 06/12/25 0754 06/12/25 0900 06/12/25 2006   BP: (!) 106/92 98/71 123/71 (!) 136/91   Pulse: 81 76 93 69   Resp: 18 18 18 18   Temp: 98.6 °F (37 °C) 98.6 °F (37 °C)  97.7 °F (36.5 °C)   TempSrc: Oral Oral  Oral   SpO2: (!) 89% 98%  97%   Weight:       Height:           General appearance:  Normal development and appear in no acute distress.   Mental Status:   Oriented to person, place, problem, and time.  Memory: Poor recall. Difficulty following 2 step commands.   Normal attention span and concentration.  Language: Delayed, effortful speech.  Poor fund of Knowledge.   Cranial Nerves:   II: Visual fields: Full. Blurred vision affecting bilateral eyes. Bilateral ptosis. Pupils: equal, round, reactive to light, bilaterally  III,IV,VI: Extra Ocular Movements are intact. No nystagmus.  V: Facial sensation is intact  VII: Facial strength and movements: intact and symmetric. There is a tremor involving the head and jaw.   IX: Normal palatal elevation and shoulder shrug  XII: Tongue movements are normal  Musculoskeletal: 5/5 in all 4 extremities. Good range of motion.  No muscle atrophy. BUE tremor, R>L.  Tone: Normal tone.   Planters: Flexor bilaterally  Coordination: no pronator drift, BUE & BLE dysmetria.   Sensation: Full sensation to light touch in all extremities         Data:  LABS:   Lab Results   Component Value Date/Time

## 2025-06-13 NOTE — PLAN OF CARE
Problem: Chronic Conditions and Co-morbidities  Goal: Patient's chronic conditions and co-morbidity symptoms are monitored and maintained or improved  Outcome: Progressing  Flowsheets (Taken 6/13/2025 1048)  Care Plan - Patient's Chronic Conditions and Co-Morbidity Symptoms are Monitored and Maintained or Improved:   Monitor and assess patient's chronic conditions and comorbid symptoms for stability, deterioration, or improvement   Collaborate with multidisciplinary team to address chronic and comorbid conditions and prevent exacerbation or deterioration   Update acute care plan with appropriate goals if chronic or comorbid symptoms are exacerbated and prevent overall improvement and discharge     Problem: Discharge Planning  Goal: Discharge to home or other facility with appropriate resources  Outcome: Progressing  Flowsheets (Taken 6/13/2025 1048)  Discharge to home or other facility with appropriate resources:   Identify barriers to discharge with patient and caregiver   Arrange for needed discharge resources and transportation as appropriate   Identify discharge learning needs (meds, wound care, etc)   Arrange for interpreters to assist at discharge as needed   Refer to discharge planning if patient needs post-hospital services based on physician order or complex needs related to functional status, cognitive ability or social support system     Problem: Pain  Goal: Verbalizes/displays adequate comfort level or baseline comfort level  Outcome: Progressing  Flowsheets (Taken 6/13/2025 1048)  Verbalizes/displays adequate comfort level or baseline comfort level:   Encourage patient to monitor pain and request assistance   Assess pain using appropriate pain scale   Notify Licensed Independent Practitioner if interventions unsuccessful or patient reports new pain   Consider cultural and social influences on pain and pain management   Implement non-pharmacological measures as appropriate and evaluate response    Administer analgesics based on type and severity of pain and evaluate response     Problem: Safety - Adult  Goal: Free from fall injury  6/13/2025 1048 by Priscilla Jose RN  Outcome: Progressing  Flowsheets (Taken 6/13/2025 1048)  Free From Fall Injury: Instruct family/caregiver on patient safety     Problem: ABCDS Injury Assessment  Goal: Absence of physical injury  Outcome: Progressing  Flowsheets (Taken 6/13/2025 1048)  Absence of Physical Injury: Implement safety measures based on patient assessment     Problem: Risk for Elopement  Goal: Patient will not exit the unit/facility without proper excort  Outcome: Progressing  Flowsheets (Taken 6/13/2025 1048)  Nursing Interventions for Elopement Risk:   Assist with personal care needs such as toileting, eating, dressing, as needed to reduce the risk of wandering   Collaborate with treatment team for drug withdrawal symptoms treatment   Make sure patient has all necessary personal care items   Communicate to physician the risk for elopement   Escort with two staff members if patient must leave the unit   Communicate/escalate to nursing supervisor the risk of elopement   Communicate/escalate to /other team member the risk of elopement   Communicate/escalate to charge nurse the risk of elopement   Collaborate with treatment team for nicotine replacement   Collaborate with family members/caregivers to mitigate the elopement risk     Problem: Seizure Precautions  Goal: Remains free of injury related to seizures activity  Outcome: Progressing  Flowsheets (Taken 6/13/2025 1048)  Remains free of injury related to seizure activity:   Maintain airway, patient safety  and administer oxygen as ordered   Monitor patient for seizure activity, document and report duration and description of seizure to Licensed Independent Practitioner   Instruct patient/family to call for assistance with activity based on assessment   Seizure pads on all 4 side rails   Instruct

## 2025-06-13 NOTE — PROGRESS NOTES
[facility] Medicine Progress Note  V 5.17      Date of Admission: 6/9/2025    Hospital Day: 5       Chief Admission Complaint:  \"Visual Changes\"     Subjective:  no new c/o.      Presenting Admission History:        \"55 y.o. female who presented to [facility] with acute onset blurry vision w/out associated focal neuro deficits after she took an edible and 'left to get a six pack of white claws' .   She reported antecedent mild R facial trauma after contact with the night stand several days prior.      The patient denies any fever/chills or other signs/sxs of systemic illness or identifiable aggravating/alleviating factors\".     Assessment/Plan:        Visual Disturbance - of unclear etiology.  Initially suspected possible dural venous thrombosis - MRI/MRV negative and ruled out.     Coagulopathy - known Proteins S deficiency, w/ hx DVT/PE and other thrombotic processes.  Anticoagulated at baseline on Xarelto - resumed.     Psychosis - w/ hallucinations.  Of unclear etiology.  Psychiatry consult ORDERED and pending.     HyperLipidemia - normally controlled on home Statin. Continued.  Follow up w/ PCP outpatient for medication initiation and/or adjustment as needed.      HypoThyroid - clinically euthyroid on oral replacement therapy. Continue, w/ outpatient monitoring as previously arranged.      GERD - without active signs of GI Bleeding and/or symptoms of dysphagia and/or odynophagia. No evidence of active Peptic Ulcer Disease without history of GI Bleed. Controlled on home PPI - continued.        Class I Obesity - 30.0 to 34.9 kg/m2  with Body mass index is 34.31 kg/m². Complicating assessment and treatment. Placing patient at risk for multiple co-morbidities as well as early death and contributing to the patient's presentation.      Ongoing threat to life and/or bodily function without ongoing treatment due to:  still not independently safe.     Consults:      IP CONSULT TO HOSPITALIST  DODIE

## 2025-06-14 LAB
ALBUMIN SERPL-MCNC: 3.9 G/DL (ref 3.4–5)
ANION GAP SERPL CALCULATED.3IONS-SCNC: 9 MMOL/L (ref 3–16)
BUN SERPL-MCNC: 9 MG/DL (ref 7–20)
CALCIUM SERPL-MCNC: 8.8 MG/DL (ref 8.3–10.6)
CHLORIDE SERPL-SCNC: 106 MMOL/L (ref 99–110)
CO2 SERPL-SCNC: 26 MMOL/L (ref 21–32)
CREAT SERPL-MCNC: 0.6 MG/DL (ref 0.6–1.1)
DEPRECATED RDW RBC AUTO: 15.8 % (ref 12.4–15.4)
GFR SERPLBLD CREATININE-BSD FMLA CKD-EPI: >90 ML/MIN/{1.73_M2}
GLUCOSE SERPL-MCNC: 99 MG/DL (ref 70–99)
HCT VFR BLD AUTO: 38.2 % (ref 36–48)
HGB BLD-MCNC: 12.7 G/DL (ref 12–16)
MCH RBC QN AUTO: 31.7 PG (ref 26–34)
MCHC RBC AUTO-ENTMCNC: 33.2 G/DL (ref 31–36)
MCV RBC AUTO: 95.6 FL (ref 80–100)
PHOSPHATE SERPL-MCNC: 3.2 MG/DL (ref 2.5–4.9)
PLATELET # BLD AUTO: 164 K/UL (ref 135–450)
PMV BLD AUTO: 7.9 FL (ref 5–10.5)
POTASSIUM SERPL-SCNC: 4.1 MMOL/L (ref 3.5–5.1)
RBC # BLD AUTO: 3.99 M/UL (ref 4–5.2)
SODIUM SERPL-SCNC: 141 MMOL/L (ref 136–145)
WBC # BLD AUTO: 3.2 K/UL (ref 4–11)

## 2025-06-14 PROCEDURE — 6370000000 HC RX 637 (ALT 250 FOR IP): Performed by: NURSE PRACTITIONER

## 2025-06-14 PROCEDURE — 36415 COLL VENOUS BLD VENIPUNCTURE: CPT

## 2025-06-14 PROCEDURE — 6370000000 HC RX 637 (ALT 250 FOR IP): Performed by: STUDENT IN AN ORGANIZED HEALTH CARE EDUCATION/TRAINING PROGRAM

## 2025-06-14 PROCEDURE — 2500000003 HC RX 250 WO HCPCS: Performed by: STUDENT IN AN ORGANIZED HEALTH CARE EDUCATION/TRAINING PROGRAM

## 2025-06-14 PROCEDURE — 1200000000 HC SEMI PRIVATE

## 2025-06-14 PROCEDURE — 6370000000 HC RX 637 (ALT 250 FOR IP): Performed by: REGISTERED NURSE

## 2025-06-14 PROCEDURE — 85027 COMPLETE CBC AUTOMATED: CPT

## 2025-06-14 PROCEDURE — 2500000003 HC RX 250 WO HCPCS: Performed by: NURSE PRACTITIONER

## 2025-06-14 PROCEDURE — 80069 RENAL FUNCTION PANEL: CPT

## 2025-06-14 PROCEDURE — 6370000000 HC RX 637 (ALT 250 FOR IP): Performed by: INTERNAL MEDICINE

## 2025-06-14 RX ADMIN — DULOXETINE 60 MG: 60 CAPSULE, DELAYED RELEASE ORAL at 09:35

## 2025-06-14 RX ADMIN — CLONAZEPAM 1 MG: 1 TABLET ORAL at 09:17

## 2025-06-14 RX ADMIN — LEVOTHYROXINE SODIUM 150 MCG: 0.15 TABLET ORAL at 05:05

## 2025-06-14 RX ADMIN — FOLIC ACID 1 MG: 1 TABLET ORAL at 09:16

## 2025-06-14 RX ADMIN — PRIMIDONE 125 MG: 50 TABLET ORAL at 20:07

## 2025-06-14 RX ADMIN — LORAZEPAM 0.5 MG: 0.5 TABLET ORAL at 09:23

## 2025-06-14 RX ADMIN — ATORVASTATIN CALCIUM 40 MG: 40 TABLET, FILM COATED ORAL at 09:17

## 2025-06-14 RX ADMIN — PANTOPRAZOLE SODIUM 40 MG: 40 TABLET, DELAYED RELEASE ORAL at 16:16

## 2025-06-14 RX ADMIN — CARBAMAZEPINE 400 MG: 200 TABLET, EXTENDED RELEASE ORAL at 16:16

## 2025-06-14 RX ADMIN — CARBAMAZEPINE 400 MG: 200 TABLET, EXTENDED RELEASE ORAL at 20:07

## 2025-06-14 RX ADMIN — CLONAZEPAM 1 MG: 1 TABLET ORAL at 20:07

## 2025-06-14 RX ADMIN — SODIUM CHLORIDE, PRESERVATIVE FREE 10 ML: 5 INJECTION INTRAVENOUS at 09:17

## 2025-06-14 RX ADMIN — PANTOPRAZOLE SODIUM 40 MG: 40 TABLET, DELAYED RELEASE ORAL at 05:05

## 2025-06-14 RX ADMIN — ACETAMINOPHEN 650 MG: 325 TABLET ORAL at 09:22

## 2025-06-14 RX ADMIN — DULOXETINE 60 MG: 60 CAPSULE, DELAYED RELEASE ORAL at 20:07

## 2025-06-14 RX ADMIN — CARBAMAZEPINE 400 MG: 200 TABLET, EXTENDED RELEASE ORAL at 09:16

## 2025-06-14 RX ADMIN — RIVAROXABAN 20 MG: 20 TABLET, FILM COATED ORAL at 09:16

## 2025-06-14 RX ADMIN — SODIUM CHLORIDE, PRESERVATIVE FREE 10 ML: 5 INJECTION INTRAVENOUS at 09:18

## 2025-06-14 RX ADMIN — RISPERIDONE 0.5 MG: 0.25 TABLET, FILM COATED ORAL at 09:16

## 2025-06-14 RX ADMIN — ACETAMINOPHEN 650 MG: 325 TABLET ORAL at 16:15

## 2025-06-14 RX ADMIN — PRIMIDONE 125 MG: 50 TABLET ORAL at 09:16

## 2025-06-14 ASSESSMENT — PAIN SCALES - WONG BAKER: WONGBAKER_NUMERICALRESPONSE: HURTS LITTLE MORE

## 2025-06-14 ASSESSMENT — PAIN DESCRIPTION - LOCATION
LOCATION: GENERALIZED
LOCATION: HEAD;HIP;BACK

## 2025-06-14 ASSESSMENT — PAIN - FUNCTIONAL ASSESSMENT
PAIN_FUNCTIONAL_ASSESSMENT: INTOLERABLE, UNABLE TO DO ANY ACTIVE OR PASSIVE ACTIVITIES
PAIN_FUNCTIONAL_ASSESSMENT: PREVENTS OR INTERFERES WITH MANY ACTIVE NOT PASSIVE ACTIVITIES

## 2025-06-14 ASSESSMENT — PAIN DESCRIPTION - DESCRIPTORS
DESCRIPTORS: ACHING
DESCRIPTORS: DISCOMFORT

## 2025-06-14 ASSESSMENT — PAIN DESCRIPTION - FREQUENCY: FREQUENCY: CONTINUOUS

## 2025-06-14 ASSESSMENT — PAIN DESCRIPTION - ORIENTATION: ORIENTATION: LOWER

## 2025-06-14 ASSESSMENT — PAIN DESCRIPTION - ONSET: ONSET: ON-GOING

## 2025-06-14 ASSESSMENT — PAIN SCALES - GENERAL: PAINLEVEL_OUTOF10: 8

## 2025-06-14 ASSESSMENT — PAIN DESCRIPTION - PAIN TYPE: TYPE: CHRONIC PAIN

## 2025-06-14 NOTE — PROGRESS NOTES
[facility] Medicine Progress Note  V 5.17      Date of Admission: 6/9/2025    Hospital Day: 6       Chief Admission Complaint:  \"Visual Changes\"     Subjective:  no new c/o.      Presenting Admission History:        \"55 y.o. female who presented to [facility] with acute onset blurry vision w/out associated focal neuro deficits after she took an edible and 'left to get a six pack of white claws' .   She reported antecedent mild R facial trauma after contact with the night stand several days prior.      The patient denies any fever/chills or other signs/sxs of systemic illness or identifiable aggravating/alleviating factors\".     Assessment/Plan:      Post-concussive syndrome - patient Aox3 and describes location of her prior stroke territory perfectly using neuroanatomical terms, she complains of general brain fog, indeed she sustained radiographically negative head trauma recently, well within the window for post-concussive syndrome    Visual Disturbance - of unclear etiology.  Initially suspected possible dural venous thrombosis - MRI/MRV negative and ruled out.     Coagulopathy - known Proteins S deficiency, w/ hx DVT/PE and other thrombotic processes.  Anticoagulated at baseline on Xarelto - resumed.     Psychosis - w/ hallucinations.  Of unclear etiology.  Psychiatry consult ORDERED and pending.     HyperLipidemia - normally controlled on home Statin. Continued.  Follow up w/ PCP outpatient for medication initiation and/or adjustment as needed.      HypoThyroid - clinically euthyroid on oral replacement therapy. Continue, w/ outpatient monitoring as previously arranged.      GERD - without active signs of GI Bleeding and/or symptoms of dysphagia and/or odynophagia. No evidence of active Peptic Ulcer Disease without history of GI Bleed. Controlled on home PPI - continued.        Class I Obesity - 30.0 to 34.9 kg/m2  with Body mass index is 34.31 kg/m². Complicating assessment and treatment.  significance    [x] Appropriate follow-up labs were ordered  [] Collateral history obtained     [] Independent Interpretation of tests (any 1)    [] Telemetry (Rhythm Strip) personally reviewed and interpreted        [] Imaging personally reviewed and interpreted     [x] Discussion (any 1)  [x] Multi-Disciplinary Rounds with Case Management  [] Discussed management of the case with           Labs:  Personally reviewed on 6/14/2025 and interpreted for clinical significance as documented above.     Recent Labs     06/12/25  0531 06/13/25  0551 06/14/25  0609   WBC 3.0* 3.1* 3.2*   HGB 11.8* 11.0* 12.7   HCT 33.4* 31.2* 38.2    161 164     Recent Labs     06/12/25  0531 06/13/25  0551 06/14/25  0609    141 141   K 3.5 4.9 4.1    105 106   CO2 23 28 26   BUN 6* 6* 9   CREATININE 0.7 0.6 0.6   CALCIUM 8.5 8.9 8.8   PHOS 3.5 3.5 3.2     No results for input(s): \"PROBNP\", \"TROPHS\" in the last 72 hours.  No results for input(s): \"LABA1C\" in the last 72 hours.    No results for input(s): \"AST\", \"ALT\", \"BILIDIR\", \"BILITOT\", \"ALKPHOS\" in the last 72 hours.    No results for input(s): \"INR\", \"LACTA\", \"TSH\" in the last 72 hours.    Urine Cultures:   Lab Results   Component Value Date/Time    LABURIN  07/09/2023 11:25 AM     <10,000 CFU/ml mixed skin/urogenital sunday. No further workup     Blood Cultures: No results found for: \"BC\"  No results found for: \"BLOODCULT2\"  Organism:   Lab Results   Component Value Date/Time    ORG Staphylococcus coagulase-negative 07/17/2019 02:39 PM         Fernando Khan MD

## 2025-06-14 NOTE — PLAN OF CARE
Problem: Safety - Medical Restraint  Goal: Remains free of injury from restraints (Restraint for Interference with Medical Device)  Description: INTERVENTIONS:1. Determine that other, less restrictive measures have been tried or would not be effective before applying the restraint2. Evaluate the patient's condition at the time of restraint application3. Inform patient/family regarding the reason for restraint4. Q2H: Monitor safety, psychosocial status, comfort, nutrition and hydration  Outcome: Completed     Problem: Chronic Conditions and Co-morbidities  Goal: Patient's chronic conditions and co-morbidity symptoms are monitored and maintained or improved  6/14/2025 1932 by Tian Martin LPN  Outcome: Progressing  6/14/2025 1126 by Aliyah Ivory RN  Outcome: Progressing  Flowsheets  Taken 6/14/2025 1126  Care Plan - Patient's Chronic Conditions and Co-Morbidity Symptoms are Monitored and Maintained or Improved:   Monitor and assess patient's chronic conditions and comorbid symptoms for stability, deterioration, or improvement   Collaborate with multidisciplinary team to address chronic and comorbid conditions and prevent exacerbation or deterioration  Taken 6/14/2025 0900  Care Plan - Patient's Chronic Conditions and Co-Morbidity Symptoms are Monitored and Maintained or Improved: Monitor and assess patient's chronic conditions and comorbid symptoms for stability, deterioration, or improvement     Problem: Pain  Goal: Verbalizes/displays adequate comfort level or baseline comfort level  Outcome: Progressing  Flowsheets (Taken 6/14/2025 0900 by Aliyah Ivory, RN)  Verbalizes/displays adequate comfort level or baseline comfort level: Encourage patient to monitor pain and request assistance     Problem: Safety - Adult  Goal: Free from fall injury  6/14/2025 1932 by Tian Martin LPN  Outcome: Progressing  6/14/2025 1126 by Aliyah Ivory, RN  Outcome: Progressing  Flowsheets (Taken 6/14/2025 1126)  Free From Fall

## 2025-06-14 NOTE — PLAN OF CARE
Problem: Chronic Conditions and Co-morbidities  Goal: Patient's chronic conditions and co-morbidity symptoms are monitored and maintained or improved  Outcome: Progressing  Flowsheets  Taken 6/14/2025 1126  Care Plan - Patient's Chronic Conditions and Co-Morbidity Symptoms are Monitored and Maintained or Improved:   Monitor and assess patient's chronic conditions and comorbid symptoms for stability, deterioration, or improvement   Collaborate with multidisciplinary team to address chronic and comorbid conditions and prevent exacerbation or deterioration  Taken 6/14/2025 0900  Care Plan - Patient's Chronic Conditions and Co-Morbidity Symptoms are Monitored and Maintained or Improved: Monitor and assess patient's chronic conditions and comorbid symptoms for stability, deterioration, or improvement     Problem: Discharge Planning  Goal: Discharge to home or other facility with appropriate resources  Outcome: Progressing    Problem: Safety - Adult  Goal: Free from fall injury  Outcome: Progressing  Flowsheets (Taken 6/14/2025 1126)  Free From Fall Injury: Instruct family/caregiver on patient safety     Problem: Risk for Elopement  Goal: Patient will not exit the unit/facility without proper excort  Recent Flowsheet Documentation  Taken 6/14/2025 0900 by Aliyah Ivory, RN  Nursing Interventions for Elopement Risk: Assist with personal care needs such as toileting, eating, dressing, as needed to reduce the risk of wandering     Discharge to home or other facility with appropriate resources:   Identify barriers to discharge with patient and caregiver   Identify discharge learning needs (meds, wound care, etc)   Arrange for needed discharge resources and transportation as appropriate

## 2025-06-14 NOTE — PROGRESS NOTES
Pt support system state pt tremors are baseline, but worse with anxiety. But pt confusion is increased and speech is decreased from baseline.

## 2025-06-14 NOTE — PLAN OF CARE
Problem: Chronic Conditions and Co-morbidities  Goal: Patient's chronic conditions and co-morbidity symptoms are monitored and maintained or improved  6/13/2025 2005 by Tian Martin LPN  Outcome: Progressing  6/13/2025 1048 by Priscilla Jose, RN  Outcome: Progressing  Flowsheets (Taken 6/13/2025 1048)  Care Plan - Patient's Chronic Conditions and Co-Morbidity Symptoms are Monitored and Maintained or Improved:   Monitor and assess patient's chronic conditions and comorbid symptoms for stability, deterioration, or improvement   Collaborate with multidisciplinary team to address chronic and comorbid conditions and prevent exacerbation or deterioration   Update acute care plan with appropriate goals if chronic or comorbid symptoms are exacerbated and prevent overall improvement and discharge     Problem: Discharge Planning  Goal: Discharge to home or other facility with appropriate resources  6/13/2025 2005 by Tian Martin LPN  Outcome: Progressing  6/13/2025 1048 by Priscilla Jose, RN  Outcome: Progressing  Flowsheets (Taken 6/13/2025 1048)  Discharge to home or other facility with appropriate resources:   Identify barriers to discharge with patient and caregiver   Arrange for needed discharge resources and transportation as appropriate   Identify discharge learning needs (meds, wound care, etc)   Arrange for interpreters to assist at discharge as needed   Refer to discharge planning if patient needs post-hospital services based on physician order or complex needs related to functional status, cognitive ability or social support system     Problem: Pain  Goal: Verbalizes/displays adequate comfort level or baseline comfort level  6/13/2025 2005 by Tian Martin LPN  Outcome: Progressing  6/13/2025 1048 by Priscilla Jose, RN  Outcome: Progressing  Flowsheets (Taken 6/13/2025 1048)  Verbalizes/displays adequate comfort level or baseline comfort level:   Encourage patient to monitor pain and

## 2025-06-15 PROCEDURE — 97116 GAIT TRAINING THERAPY: CPT

## 2025-06-15 PROCEDURE — 97110 THERAPEUTIC EXERCISES: CPT

## 2025-06-15 PROCEDURE — 6370000000 HC RX 637 (ALT 250 FOR IP): Performed by: REGISTERED NURSE

## 2025-06-15 PROCEDURE — 97162 PT EVAL MOD COMPLEX 30 MIN: CPT

## 2025-06-15 PROCEDURE — 1200000000 HC SEMI PRIVATE

## 2025-06-15 PROCEDURE — 97530 THERAPEUTIC ACTIVITIES: CPT

## 2025-06-15 PROCEDURE — 97166 OT EVAL MOD COMPLEX 45 MIN: CPT

## 2025-06-15 PROCEDURE — 6370000000 HC RX 637 (ALT 250 FOR IP): Performed by: INTERNAL MEDICINE

## 2025-06-15 PROCEDURE — 6370000000 HC RX 637 (ALT 250 FOR IP): Performed by: NURSE PRACTITIONER

## 2025-06-15 PROCEDURE — 6370000000 HC RX 637 (ALT 250 FOR IP): Performed by: SURGERY

## 2025-06-15 PROCEDURE — 6370000000 HC RX 637 (ALT 250 FOR IP): Performed by: STUDENT IN AN ORGANIZED HEALTH CARE EDUCATION/TRAINING PROGRAM

## 2025-06-15 PROCEDURE — 2500000003 HC RX 250 WO HCPCS: Performed by: STUDENT IN AN ORGANIZED HEALTH CARE EDUCATION/TRAINING PROGRAM

## 2025-06-15 RX ORDER — PRIMIDONE 250 MG/1
250 TABLET ORAL 2 TIMES DAILY
Status: DISCONTINUED | OUTPATIENT
Start: 2025-06-15 | End: 2025-06-19 | Stop reason: HOSPADM

## 2025-06-15 RX ORDER — PRIMIDONE 250 MG/1
250 TABLET ORAL 2 TIMES DAILY
Qty: 270 TABLET | Refills: 1 | Status: SHIPPED | OUTPATIENT
Start: 2025-06-15

## 2025-06-15 RX ADMIN — FOLIC ACID 1 MG: 1 TABLET ORAL at 09:21

## 2025-06-15 RX ADMIN — PANTOPRAZOLE SODIUM 40 MG: 40 TABLET, DELAYED RELEASE ORAL at 05:00

## 2025-06-15 RX ADMIN — ACETAMINOPHEN 650 MG: 325 TABLET ORAL at 09:21

## 2025-06-15 RX ADMIN — CARBAMAZEPINE 400 MG: 200 TABLET, EXTENDED RELEASE ORAL at 09:28

## 2025-06-15 RX ADMIN — RISPERIDONE 0.5 MG: 0.25 TABLET, FILM COATED ORAL at 09:21

## 2025-06-15 RX ADMIN — CLONAZEPAM 1 MG: 1 TABLET ORAL at 20:12

## 2025-06-15 RX ADMIN — CARBAMAZEPINE 400 MG: 200 TABLET, EXTENDED RELEASE ORAL at 20:12

## 2025-06-15 RX ADMIN — CLONAZEPAM 1 MG: 1 TABLET ORAL at 09:21

## 2025-06-15 RX ADMIN — SODIUM CHLORIDE, PRESERVATIVE FREE 10 ML: 5 INJECTION INTRAVENOUS at 09:21

## 2025-06-15 RX ADMIN — CARBAMAZEPINE 400 MG: 200 TABLET, EXTENDED RELEASE ORAL at 17:22

## 2025-06-15 RX ADMIN — DULOXETINE 60 MG: 60 CAPSULE, DELAYED RELEASE ORAL at 09:21

## 2025-06-15 RX ADMIN — DULOXETINE 60 MG: 60 CAPSULE, DELAYED RELEASE ORAL at 20:12

## 2025-06-15 RX ADMIN — ATORVASTATIN CALCIUM 40 MG: 40 TABLET, FILM COATED ORAL at 09:27

## 2025-06-15 RX ADMIN — LEVOTHYROXINE SODIUM 150 MCG: 0.15 TABLET ORAL at 05:00

## 2025-06-15 RX ADMIN — RIVAROXABAN 20 MG: 20 TABLET, FILM COATED ORAL at 09:20

## 2025-06-15 RX ADMIN — PANTOPRAZOLE SODIUM 40 MG: 40 TABLET, DELAYED RELEASE ORAL at 16:09

## 2025-06-15 RX ADMIN — PRIMIDONE 250 MG: 250 TABLET ORAL at 20:12

## 2025-06-15 ASSESSMENT — PAIN DESCRIPTION - DESCRIPTORS
DESCRIPTORS: DISCOMFORT
DESCRIPTORS: DISCOMFORT

## 2025-06-15 ASSESSMENT — PAIN SCALES - GENERAL
PAINLEVEL_OUTOF10: 4
PAINLEVEL_OUTOF10: 6

## 2025-06-15 ASSESSMENT — PAIN DESCRIPTION - ONSET: ONSET: ON-GOING

## 2025-06-15 ASSESSMENT — PAIN DESCRIPTION - PAIN TYPE: TYPE: CHRONIC PAIN

## 2025-06-15 ASSESSMENT — PAIN DESCRIPTION - LOCATION: LOCATION: GENERALIZED

## 2025-06-15 ASSESSMENT — PAIN SCALES - WONG BAKER: WONGBAKER_NUMERICALRESPONSE: HURTS LITTLE MORE

## 2025-06-15 ASSESSMENT — PAIN - FUNCTIONAL ASSESSMENT: PAIN_FUNCTIONAL_ASSESSMENT: PREVENTS OR INTERFERES SOME ACTIVE ACTIVITIES AND ADLS

## 2025-06-15 ASSESSMENT — PAIN DESCRIPTION - FREQUENCY: FREQUENCY: CONTINUOUS

## 2025-06-15 NOTE — PLAN OF CARE
Problem: Discharge Planning  Goal: Discharge to home or other facility with appropriate resources  Recent Flowsheet Documentation  Taken 6/15/2025 1920 by Tian Martin LPN  Discharge to home or other facility with appropriate resources:   Identify barriers to discharge with patient and caregiver   Arrange for needed discharge resources and transportation as appropriate   Identify discharge learning needs (meds, wound care, etc)  6/15/2025 1906 by Tian Martin LPN  Outcome: Not Progressing

## 2025-06-15 NOTE — PROGRESS NOTES
Occupational Therapy  Facility/Department: [place] [address] - MED SURG/ORTHO  Occupational Therapy Initial Assessment    Name: Sarah Stock  : 1970  MRN: [id number]  Date of Service: 6/15/2025    Discharge Recommendations:  IP Rehab  Therapy discharge recommendations take into account each patient's current medical complexities and are subject to input/oversight from the patient's healthcare team.   Barriers to Home Discharge:      [x] Unable to transfer, ambulate, or propel wheelchair household distances without assist   [x] Limited available assist at home upon discharge      [x] Poor cognition/safety awareness for d/c to home alone      [x] Patient is at risk for falls due to: tremors, decreased Left visual attention, BLE weakness      If pt is unable to be seen after this session, please let this note serve as discharge summary.  Please see case management note for discharge disposition.  Thank you.           Patient Diagnosis(es): The primary encounter diagnosis was Vision changes. Diagnoses of Thrombosis of superior sagittal sinus and Acute encephalopathy were also pertinent to this visit.  Past Medical History:  has a past medical history of Arthritis of midfoot, Benign essential tremor, BRCA1 negative, Colon polyp, Degenerative disc disease, DVT, lower extremity (HCC), Fibromyalgia, H/O viral meningitis, History of DVT (deep vein thrombosis), History of pulmonary embolus (PE), History of thyroid cancer, IBS (irritable bowel syndrome), Kidney stones, Left-sided trigeminal neuralgia, Malignant neoplasm of thyroid gland (HCC), Migraines, neuralgic, PONV (postoperative nausea and vomiting), Poor venous access, Protein S deficiency, Pulmonary embolism (HCC), Seizure disorder (HCC), Stroke (HCC), Thrombosis of superior sagittal sinus, Unspecified cerebral artery occlusion with cerebral infarction, Vertebral artery occlusion, and White coat syndrome with high blood pressure but without hypertension.  Past

## 2025-06-15 NOTE — PLAN OF CARE
Problem: Chronic Conditions and Co-morbidities  Goal: Patient's chronic conditions and co-morbidity symptoms are monitored and maintained or improved  Outcome: Progressing     Problem: Pain  Goal: Verbalizes/displays adequate comfort level or baseline comfort level  Outcome: Progressing     Problem: Safety - Adult  Goal: Free from fall injury  Outcome: Progressing     Problem: ABCDS Injury Assessment  Goal: Absence of physical injury  Outcome: Progressing     Problem: Risk for Elopement  Goal: Patient will not exit the unit/facility without proper excort  Outcome: Progressing     Problem: Seizure Precautions  Goal: Remains free of injury related to seizures activity  Outcome: Progressing     Problem: Skin/Tissue Integrity  Goal: Skin integrity remains intact  Description: 1.  Monitor for areas of redness and/or skin breakdown2.  Assess vascular access sites hourly3.  Every 4-6 hours minimum:  Change oxygen saturation probe site4.  Every 4-6 hours:  If on nasal continuous positive airway pressure, respiratory therapy assess nares and determine need for appliance change or resting period  Outcome: Progressing     Problem: Discharge Planning  Goal: Discharge to home or other facility with appropriate resources  Outcome: Not Progressing

## 2025-06-15 NOTE — PROGRESS NOTES
Physical Therapy  Facility/Department: [place] [address] - MED SURG/ORTHO  Physical Therapy Initial Assessment    Name: Sarah Stock  : 1970  MRN: [id number]  Date of Service: 6/15/2025    Discharge Recommendations:  IP Rehab   PT Equipment Recommendations  Equipment Needed: No  Other: defer to next level of care.      Therapy discharge recommendations take into account each patient's current medical complexities and are subject to input/oversight from the patient's healthcare team.   Barriers to Home Discharge:   [] Steps to access home entry or bed/bath:   [x] Unable to transfer, ambulate, or propel wheelchair household distances without assist   [] Unable to perform ADLs without assist   [x] Limited available assist at home upon discharge    [x] Patient or family requests d/c to post-acute facility    [x] Poor cognition/safety awareness for d/c to home alone    [] Unable to maintain ordered weight bearing status    [] Patient with salient signs of long-standing immobility   [x] Other: pt at increased risk for falls.     Patient Diagnosis(es): The primary encounter diagnosis was Vision changes. Diagnoses of Thrombosis of superior sagittal sinus and Acute encephalopathy were also pertinent to this visit.  Past Medical History:  has a past medical history of Arthritis of midfoot, Benign essential tremor, BRCA1 negative, Colon polyp, Degenerative disc disease, DVT, lower extremity (HCC), Fibromyalgia, H/O viral meningitis, History of DVT (deep vein thrombosis), History of pulmonary embolus (PE), History of thyroid cancer, IBS (irritable bowel syndrome), Kidney stones, Left-sided trigeminal neuralgia, Malignant neoplasm of thyroid gland (HCC), Migraines, neuralgic, PONV (postoperative nausea and vomiting), Poor venous access, Protein S deficiency, Pulmonary embolism (HCC), Seizure disorder (HCC), Stroke (HCC), Thrombosis of superior sagittal sinus, Unspecified cerebral artery occlusion with cerebral infarction,

## 2025-06-16 PROCEDURE — 1200000000 HC SEMI PRIVATE

## 2025-06-16 PROCEDURE — 2500000003 HC RX 250 WO HCPCS: Performed by: STUDENT IN AN ORGANIZED HEALTH CARE EDUCATION/TRAINING PROGRAM

## 2025-06-16 PROCEDURE — 6370000000 HC RX 637 (ALT 250 FOR IP): Performed by: REGISTERED NURSE

## 2025-06-16 PROCEDURE — 97116 GAIT TRAINING THERAPY: CPT

## 2025-06-16 PROCEDURE — 97110 THERAPEUTIC EXERCISES: CPT

## 2025-06-16 PROCEDURE — 6370000000 HC RX 637 (ALT 250 FOR IP): Performed by: NURSE PRACTITIONER

## 2025-06-16 PROCEDURE — 6370000000 HC RX 637 (ALT 250 FOR IP): Performed by: STUDENT IN AN ORGANIZED HEALTH CARE EDUCATION/TRAINING PROGRAM

## 2025-06-16 PROCEDURE — 6370000000 HC RX 637 (ALT 250 FOR IP): Performed by: SURGERY

## 2025-06-16 PROCEDURE — 2500000003 HC RX 250 WO HCPCS: Performed by: NURSE PRACTITIONER

## 2025-06-16 PROCEDURE — 6370000000 HC RX 637 (ALT 250 FOR IP): Performed by: INTERNAL MEDICINE

## 2025-06-16 PROCEDURE — 97530 THERAPEUTIC ACTIVITIES: CPT

## 2025-06-16 RX ADMIN — FOLIC ACID 1 MG: 1 TABLET ORAL at 09:57

## 2025-06-16 RX ADMIN — PRIMIDONE 250 MG: 250 TABLET ORAL at 09:57

## 2025-06-16 RX ADMIN — CARBAMAZEPINE 400 MG: 200 TABLET, EXTENDED RELEASE ORAL at 16:15

## 2025-06-16 RX ADMIN — DULOXETINE 60 MG: 60 CAPSULE, DELAYED RELEASE ORAL at 09:57

## 2025-06-16 RX ADMIN — PANTOPRAZOLE SODIUM 40 MG: 40 TABLET, DELAYED RELEASE ORAL at 05:49

## 2025-06-16 RX ADMIN — SODIUM CHLORIDE, PRESERVATIVE FREE 10 ML: 5 INJECTION INTRAVENOUS at 19:39

## 2025-06-16 RX ADMIN — ATORVASTATIN CALCIUM 40 MG: 40 TABLET, FILM COATED ORAL at 09:57

## 2025-06-16 RX ADMIN — CLONAZEPAM 1 MG: 1 TABLET ORAL at 19:39

## 2025-06-16 RX ADMIN — DULOXETINE 60 MG: 60 CAPSULE, DELAYED RELEASE ORAL at 19:39

## 2025-06-16 RX ADMIN — CARBAMAZEPINE 400 MG: 200 TABLET, EXTENDED RELEASE ORAL at 09:57

## 2025-06-16 RX ADMIN — CARBAMAZEPINE 400 MG: 200 TABLET, EXTENDED RELEASE ORAL at 19:39

## 2025-06-16 RX ADMIN — CLONAZEPAM 1 MG: 1 TABLET ORAL at 09:57

## 2025-06-16 RX ADMIN — RISPERIDONE 0.5 MG: 0.25 TABLET, FILM COATED ORAL at 09:57

## 2025-06-16 RX ADMIN — SODIUM CHLORIDE, PRESERVATIVE FREE 10 ML: 5 INJECTION INTRAVENOUS at 09:58

## 2025-06-16 RX ADMIN — PANTOPRAZOLE SODIUM 40 MG: 40 TABLET, DELAYED RELEASE ORAL at 16:15

## 2025-06-16 RX ADMIN — RIVAROXABAN 20 MG: 20 TABLET, FILM COATED ORAL at 09:57

## 2025-06-16 RX ADMIN — PRIMIDONE 250 MG: 250 TABLET ORAL at 19:39

## 2025-06-16 RX ADMIN — LEVOTHYROXINE SODIUM 150 MCG: 0.15 TABLET ORAL at 05:49

## 2025-06-16 ASSESSMENT — PAIN DESCRIPTION - DESCRIPTORS
DESCRIPTORS: DISCOMFORT;ACHING
DESCRIPTORS: ACHING;DISCOMFORT
DESCRIPTORS: DISCOMFORT;ACHING

## 2025-06-16 ASSESSMENT — PAIN DESCRIPTION - LOCATION
LOCATION: GENERALIZED
LOCATION: HEAD
LOCATION: GENERALIZED

## 2025-06-16 ASSESSMENT — PAIN DESCRIPTION - ONSET
ONSET: ON-GOING
ONSET: ON-GOING

## 2025-06-16 ASSESSMENT — PAIN DESCRIPTION - FREQUENCY
FREQUENCY: CONTINUOUS
FREQUENCY: CONTINUOUS

## 2025-06-16 ASSESSMENT — PAIN DESCRIPTION - PAIN TYPE
TYPE: ACUTE PAIN;CHRONIC PAIN
TYPE: ACUTE PAIN

## 2025-06-16 ASSESSMENT — PAIN SCALES - GENERAL
PAINLEVEL_OUTOF10: 2
PAINLEVEL_OUTOF10: 2
PAINLEVEL_OUTOF10: 9

## 2025-06-16 NOTE — PROGRESS NOTES
Physical Therapy  Facility/Department: [facility] - MED SURG/ORTHO  Daily Treatment Note  NAME: Sarah Stock  : 1970  MRN: [id number]    Date of Service: 2025    Discharge Recommendations:  IP Rehab   PT Equipment Recommendations  Equipment Needed: No  Other: defer to next level of care.    Therapy discharge recommendations take into account each patient's current medical complexities and are subject to input/oversight from the patient's healthcare team.   Barriers to Home Discharge:   [] Steps to access home entry or bed/bath:   [x] Unable to transfer, ambulate, or propel wheelchair household distances without assist   [] Unable to perform ADLs without assist   [x] Limited available assist at home upon discharge    [x] Patient or family requests d/c to post-acute facility    [x] Poor cognition/safety awareness for d/c to home alone    [] Unable to maintain ordered weight bearing status    [] Patient with salient signs of long-standing immobility   [x] Other: pt at increased risk for falls.     Patient Diagnosis(es): The primary encounter diagnosis was Vision changes. Diagnoses of Thrombosis of superior sagittal sinus and Acute encephalopathy were also pertinent to this visit.    Assessment  Assessment: Pt seen at bedside for PT follow up, diffiuclt to progress alone due to cognition and tremors.  Pt performed bed mobility SBA and transfers min A with RW.  Pt tremulous and nervous limiting amb to 10' again.  Pt performed BLE exs well nedding cues and demonstration.  Required A to use cell phone trying to make a phone call by typing phone number in to text message then once assited to call a friend she left a voicemail adding puctuation \"period\" like she was doing a voice text.  Pt is recommended for con't skilled PT and IPR at D/C.  Activity Tolerance: Patient tolerated evaluation without incident;Patient tolerated treatment well  Equipment Needed: No  Other: defer to next level of  care.    Plan  Physical Therapy Plan  General Plan: 3-5 times per week  Current Treatment Recommendations: Strengthening;Balance training;Functional mobility training;Transfer training;Endurance training;Gait training;Stair training;Neuromuscular re-education;Home exercise program;Safety education & training;Equipment evaluation, education, & procurement;Patient/Caregiver education & training;Therapeutic activities;Co-Treatment    Restrictions  Restrictions/Precautions  Restrictions/Precautions: General Precautions, Fall Risk, Seizure  Position Activity Restriction  Other Position/Activity Restrictions: IV, tele, VSC,     Subjective   Subjective  Subjective: Pt in bed at start of session, agreeable to PT  Pain: \"yes, eyes, head, hip, back 6/10\"    Objective  Vitals  Pulse: (!) 113  BP: 122/83  BP Location: Left upper arm  MAP (Calculated): 96  SpO2: 94 %  Bed Mobility Training  Bed Mobility Training: Yes  Interventions: Verbal cues  Rolling: Independent  Supine to Sit: Stand by assistance  Sit to Supine: Unable to assess  Scooting: Stand by assistance  Balance  Sitting: Intact  Standing: Impaired  Standing - Static: Constant support;Fair (RW)  Standing - Dynamic: Constant support;Fair (RW)  Transfer Training  Transfer Training: Yes  Interventions: Verbal cues;Safety awareness training  Sit to Stand: Contact guard assistance (RW)  Stand to Sit: Minimal assistance (RW)  Bed to Chair: Minimal assistance;Contact guard assistance (RW)  Gait  Gait Training: Yes  Overall Level of Assistance: Minimal assistance  Distance (ft): 10 Feet  Assistive Device: Gait belt;Walker, rolling  Interventions: Safety awareness training;Tactile cues;Verbal cues  Base of Support: Narrowed  Speed/Carol: Pace decreased (< 100 feet/min);Shuffled;Slow  Step Length: Right shortened;Left shortened     PT Exercises  Exercise Treatment: BLE exs in chair: LEONEL MICHAEL, souleymane trinh, GS X 10 -15     Safety Devices  Type of Devices: Call light

## 2025-06-16 NOTE — CARE COORDINATION
Writer reviewed chart and spoke with RN, and DO patient is appropriate for ARU, they started cert this day.     Caterina Cardoso RN

## 2025-06-16 NOTE — CONSULTS
Patient: Sarah Stock  [id number]  Date: 6/16/2025      Chief Complaint: blurred vision     History of Present Illness/Hospital Course:  Sarah Stock is a 55 year old female with a past medical history significant for DDD, DVT and PE, protein S deficiency, fibromyalgia, thyroid cancer, essential tremor, IBS, left sided trigeminal neuralgia, migraines, seizure disorder, stroke, and obesity who presented to [facility] on 6/10/25 with acute onset blurry vision. Per report she had taken an edible prior to symptoms onset. She was also noted to have recent facial trauma after hitting her head on a night stand. CT head was negative for acute process. CT maxillofacial was negative for fracture. CTA head and neck showed irregularity in the superior sagittal sinus concerning for partial thrombosis of the superior sagittal sinus. MRI brain was negative for acute process. Neurology was consulted. MRV Head W and WO contrast was negative for dural venous thrombosis. EEG 6/12 was normal. Patient's friend notified team that patient has nitrous oxide tanks all over her house. Patient confirmed that she has been using nitrous oxide by inflating a balloon and inhaling the gas. Patient's encephalopathy, gait imbalance, and hazy vision are suspected to be due to nitrous oxide intoxication. She is being treated with IM cyanocobalamin. Psychiatry was consulted for psychosis and hallucinations and adjusted her regimen. She continues to have functional deficits below her baseline. Today Elvira is seen without family present. She notes having headache and light/sound sensitivity. She is noted to have right periorbital ecchymoses. She reports being independent at baseline. She reports occasionally using a cane. She typically lives alone, but reports that her friend has been staying with her and will be able to help her on discharge. She lives in a single level house with a level entry. She used to work for the [employer]

## 2025-06-16 NOTE — PLAN OF CARE
Problem: Chronic Conditions and Co-morbidities  Goal: Patient's chronic conditions and co-morbidity symptoms are monitored and maintained or improved  Outcome: Progressing     Problem: Discharge Planning  Goal: Discharge to home or other facility with appropriate resources  Outcome: Progressing     Problem: Pain  Goal: Verbalizes/displays adequate comfort level or baseline comfort level  Outcome: Progressing     Problem: Safety - Adult  Goal: Free from fall injury  Outcome: Progressing     Problem: ABCDS Injury Assessment  Goal: Absence of physical injury  Outcome: Progressing     Problem: Risk for Elopement  Goal: Patient will not exit the unit/facility without proper excort  Outcome: Progressing  Flowsheets (Taken 6/16/2025 0953 by Beti Sidhu RN)  Nursing Interventions for Elopement Risk: Assist with personal care needs such as toileting, eating, dressing, as needed to reduce the risk of wandering     Problem: Seizure Precautions  Goal: Remains free of injury related to seizures activity  Outcome: Progressing     Problem: Skin/Tissue Integrity  Goal: Skin integrity remains intact  Description: 1.  Monitor for areas of redness and/or skin breakdown2.  Assess vascular access sites hourly3.  Every 4-6 hours minimum:  Change oxygen saturation probe site4.  Every 4-6 hours:  If on nasal continuous positive airway pressure, respiratory therapy assess nares and determine need for appliance change or resting period  Outcome: Progressing

## 2025-06-16 NOTE — CARE COORDINATION
[facility] - Acute Rehab Unit   After review, this patient is felt to be:       []  Appropriate for Acute Inpatient Rehab    [x]  Appropriate for Acute Inpatient Rehab Pending Insurance Authorization    []  Not appropriate for Acute Inpatient Rehab    []  Referral received and ARU reviewing patient; Evaluation ongoing.      Precert initiated 6/16 for ARU admission. Pt in agreement per  conversation with pt this AM. Ref#: [id number] .  Will notify DCP with further updates. Thank you for the referral.    Tab Estrada MPH, RRT  ARU Admissions Supervisor-[facility]  (P)[phone]  (F)[phone]   Electronically signed by Tab Estrada on 6/16/2025 at 12:50 PM

## 2025-06-17 LAB — METHYLMALONATE SERPL-SCNC: 0.58 UMOL/L (ref 0–0.4)

## 2025-06-17 PROCEDURE — 97110 THERAPEUTIC EXERCISES: CPT

## 2025-06-17 PROCEDURE — 6370000000 HC RX 637 (ALT 250 FOR IP): Performed by: SURGERY

## 2025-06-17 PROCEDURE — 97530 THERAPEUTIC ACTIVITIES: CPT

## 2025-06-17 PROCEDURE — 97116 GAIT TRAINING THERAPY: CPT

## 2025-06-17 PROCEDURE — 2500000003 HC RX 250 WO HCPCS: Performed by: STUDENT IN AN ORGANIZED HEALTH CARE EDUCATION/TRAINING PROGRAM

## 2025-06-17 PROCEDURE — 97535 SELF CARE MNGMENT TRAINING: CPT

## 2025-06-17 PROCEDURE — 6370000000 HC RX 637 (ALT 250 FOR IP): Performed by: NURSE PRACTITIONER

## 2025-06-17 PROCEDURE — 6370000000 HC RX 637 (ALT 250 FOR IP): Performed by: INTERNAL MEDICINE

## 2025-06-17 PROCEDURE — 6370000000 HC RX 637 (ALT 250 FOR IP): Performed by: STUDENT IN AN ORGANIZED HEALTH CARE EDUCATION/TRAINING PROGRAM

## 2025-06-17 PROCEDURE — 2500000003 HC RX 250 WO HCPCS: Performed by: NURSE PRACTITIONER

## 2025-06-17 PROCEDURE — 6370000000 HC RX 637 (ALT 250 FOR IP): Performed by: REGISTERED NURSE

## 2025-06-17 PROCEDURE — 1200000000 HC SEMI PRIVATE

## 2025-06-17 RX ORDER — TRAMADOL HYDROCHLORIDE 50 MG/1
50 TABLET ORAL 2 TIMES DAILY PRN
Status: DISCONTINUED | OUTPATIENT
Start: 2025-06-17 | End: 2025-06-19 | Stop reason: HOSPADM

## 2025-06-17 RX ORDER — GUAIFENESIN/DEXTROMETHORPHAN 100-10MG/5
5 SYRUP ORAL EVERY 4 HOURS PRN
Status: DISCONTINUED | OUTPATIENT
Start: 2025-06-17 | End: 2025-06-19 | Stop reason: HOSPADM

## 2025-06-17 RX ADMIN — CLONAZEPAM 1 MG: 1 TABLET ORAL at 20:41

## 2025-06-17 RX ADMIN — PANTOPRAZOLE SODIUM 40 MG: 40 TABLET, DELAYED RELEASE ORAL at 16:02

## 2025-06-17 RX ADMIN — GUAIFENESIN AND DEXTROMETHORPHAN 5 ML: 100; 10 SYRUP ORAL at 22:08

## 2025-06-17 RX ADMIN — CARBAMAZEPINE 400 MG: 200 TABLET, EXTENDED RELEASE ORAL at 15:55

## 2025-06-17 RX ADMIN — SODIUM CHLORIDE, PRESERVATIVE FREE 10 ML: 5 INJECTION INTRAVENOUS at 08:30

## 2025-06-17 RX ADMIN — SODIUM CHLORIDE, PRESERVATIVE FREE 10 ML: 5 INJECTION INTRAVENOUS at 20:43

## 2025-06-17 RX ADMIN — PRIMIDONE 250 MG: 250 TABLET ORAL at 08:29

## 2025-06-17 RX ADMIN — CLONAZEPAM 1 MG: 1 TABLET ORAL at 08:30

## 2025-06-17 RX ADMIN — GUAIFENESIN AND DEXTROMETHORPHAN 5 ML: 100; 10 SYRUP ORAL at 11:47

## 2025-06-17 RX ADMIN — SODIUM CHLORIDE, PRESERVATIVE FREE 10 ML: 5 INJECTION INTRAVENOUS at 20:44

## 2025-06-17 RX ADMIN — CARBAMAZEPINE 400 MG: 200 TABLET, EXTENDED RELEASE ORAL at 20:41

## 2025-06-17 RX ADMIN — DULOXETINE 60 MG: 60 CAPSULE, DELAYED RELEASE ORAL at 08:30

## 2025-06-17 RX ADMIN — PANTOPRAZOLE SODIUM 40 MG: 40 TABLET, DELAYED RELEASE ORAL at 05:32

## 2025-06-17 RX ADMIN — LEVOTHYROXINE SODIUM 150 MCG: 0.15 TABLET ORAL at 05:32

## 2025-06-17 RX ADMIN — TRAMADOL HYDROCHLORIDE 50 MG: 50 TABLET, COATED ORAL at 20:41

## 2025-06-17 RX ADMIN — ACETAMINOPHEN 650 MG: 325 TABLET ORAL at 08:29

## 2025-06-17 RX ADMIN — PRIMIDONE 250 MG: 250 TABLET ORAL at 20:41

## 2025-06-17 RX ADMIN — ATORVASTATIN CALCIUM 40 MG: 40 TABLET, FILM COATED ORAL at 08:30

## 2025-06-17 RX ADMIN — DULOXETINE 60 MG: 60 CAPSULE, DELAYED RELEASE ORAL at 20:41

## 2025-06-17 RX ADMIN — RISPERIDONE 0.5 MG: 0.25 TABLET, FILM COATED ORAL at 08:30

## 2025-06-17 RX ADMIN — RIVAROXABAN 20 MG: 20 TABLET, FILM COATED ORAL at 08:30

## 2025-06-17 RX ADMIN — FOLIC ACID 1 MG: 1 TABLET ORAL at 08:30

## 2025-06-17 RX ADMIN — CARBAMAZEPINE 400 MG: 200 TABLET, EXTENDED RELEASE ORAL at 08:29

## 2025-06-17 ASSESSMENT — PAIN DESCRIPTION - LOCATION: LOCATION: HEAD;GENERALIZED

## 2025-06-17 ASSESSMENT — PAIN DESCRIPTION - FREQUENCY: FREQUENCY: CONTINUOUS

## 2025-06-17 ASSESSMENT — PAIN SCALES - GENERAL
PAINLEVEL_OUTOF10: 8
PAINLEVEL_OUTOF10: 7

## 2025-06-17 ASSESSMENT — PAIN DESCRIPTION - ONSET: ONSET: ON-GOING

## 2025-06-17 ASSESSMENT — PAIN DESCRIPTION - PAIN TYPE: TYPE: ACUTE PAIN

## 2025-06-17 ASSESSMENT — PAIN DESCRIPTION - DESCRIPTORS: DESCRIPTORS: ACHING;DISCOMFORT

## 2025-06-17 NOTE — PLAN OF CARE
Problem: Chronic Conditions and Co-morbidities  Goal: Patient's chronic conditions and co-morbidity symptoms are monitored and maintained or improved  Outcome: Progressing  Flowsheets (Taken 6/16/2025 2100 by Tian Martin LPN)  Care Plan - Patient's Chronic Conditions and Co-Morbidity Symptoms are Monitored and Maintained or Improved:   Monitor and assess patient's chronic conditions and comorbid symptoms for stability, deterioration, or improvement   Collaborate with multidisciplinary team to address chronic and comorbid conditions and prevent exacerbation or deterioration   Update acute care plan with appropriate goals if chronic or comorbid symptoms are exacerbated and prevent overall improvement and discharge     Problem: Discharge Planning  Goal: Discharge to home or other facility with appropriate resources  Outcome: Progressing  Flowsheets (Taken 6/16/2025 2100 by Tian Martin LPN)  Discharge to home or other facility with appropriate resources:   Identify barriers to discharge with patient and caregiver   Arrange for needed discharge resources and transportation as appropriate   Identify discharge learning needs (meds, wound care, etc)     Problem: Pain  Goal: Verbalizes/displays adequate comfort level or baseline comfort level  Outcome: Progressing  Flowsheets (Taken 6/17/2025 1046)  Verbalizes/displays adequate comfort level or baseline comfort level: Assess pain using appropriate pain scale     Problem: Safety - Adult  Goal: Free from fall injury  Outcome: Progressing     Problem: ABCDS Injury Assessment  Goal: Absence of physical injury  Outcome: Progressing     Problem: Risk for Elopement  Goal: Patient will not exit the unit/facility without proper excort  Outcome: Progressing  Flowsheets (Taken 6/16/2025 2100 by Tian Martin LPN)  Nursing Interventions for Elopement Risk: Assist with personal care needs such as toileting, eating, dressing, as needed to reduce the risk of wandering

## 2025-06-17 NOTE — PROGRESS NOTES
Nutrition Assessment     Type and Reason for Visit: LOS    Nutrition Recommendations/Plan:   Diet: Continue Regular diet.     Malnutrition Assessment:  Malnutrition Status: No malnutrition    Nutrition Assessment:  LOS. Pt with no significant PMH who presented with visual changes, post concussive syndrome. Pt eating has been improving over LOS. No recent wt loss noted. D/C planning underway.    Nutrition Related Findings:   nonpitting BUE edema; BM 6/14; Wound Type: None    Current Nutrition Therapies:    ADULT DIET; Regular    Anthropometric Measures:  Height: 162.6 cm (5' 4.02\")  Current Body Wt: 90.7 kg (199 lb 15.3 oz)   BMI: 34.3        Nutrition Diagnosis:   No nutrition diagnosis at this time    Nutrition Interventions:   Food and/or Nutrient Delivery: Continue Current Diet  Nutrition Education/Counseling: Education/Counseling not indicated  Coordination of Nutrition Care: Continue to monitor while inpatient       Discharge Planning:    No discharge needs at this time     Becca Caceres RD, LD  Contact: [phone]

## 2025-06-17 NOTE — PROGRESS NOTES
[facility] Medicine Progress Note  V 5.17      Date of Admission: 6/9/2025    Hospital Day: 9      Chief Admission Complaint: Vision changes    Subjective: Patient is in hospital bed awake and alert.  No new issues or complaints today.  Patient states he feels \"not good \".  No new pain.  Patient states she feels confused, however at her baseline confusion.  No shortness of breath.  No nausea or vomiting.  Still hallucinating.  Eating okay.  Going bathroom okay.  Alert and oriented x 3.    Presenting Admission History:       \"55 y.o. female who presented to [facility] with acute onset blurry vision w/out associated focal neuro deficits after she took an edible and 'left to get a six pack of white claws' .   She reported antecedent mild R facial trauma after contact with the night stand several days prior.      The patient denies any fever/chills or other signs/sxs of systemic illness or identifiable aggravating/alleviating factors\".    Assessment/Plan:      Post-concussive syndrome - patient Aox3 and describes location of her prior stroke territory perfectly using neuroanatomical terms, she complains of general brain fog, indeed she sustained radiographically negative head trauma recently, well within the window for post-concussive syndrome     Visual Disturbance - of unclear etiology.  Initially suspected possible dural venous thrombosis - MRI/MRV negative and ruled out.      Coagulopathy - known Proteins S deficiency, w/ hx DVT/PE and other thrombotic processes.  Anticoagulated at baseline on Xarelto - resumed.      Psychosis - w/ hallucinations.  Of unclear etiology.  Psychiatry consult ORDERED and pending.      HyperLipidemia - normally controlled on home Statin. Continued.  Follow up w/ PCP outpatient for medication initiation and/or adjustment as needed.       HypoThyroid - clinically euthyroid on oral replacement therapy. Continue, w/ outpatient monitoring as previously arranged.      normal  Musculoskeletal:  No edema  Neurologic:  Neurovascularly intact without focal sensory/motor deficits.  Psychiatric:  Alert and oriented    BP (!) 145/92   Pulse 92   Temp 99 °F (37.2 °C) (Oral)   Resp 18   Ht 1.626 m (5' 4.02\")   Wt 90.7 kg (199 lb 15.3 oz)   LMP 06/30/2015   SpO2 92%   BMI 34.31 kg/m²     Telemetry:      Personally reviewed and interpreted telemetry (Rhythm Strip) on 6/17/2025.  Patient is currently NOT ON tele.    Diet: ADULT DIET; Regular    DVT Prophylaxis: Xarelto    Code status: Full Code    PT/OT Eval Status: Seen w/ recs for Acute Rehab    Multi-Disciplinary Rounds with Case Management completed on 6/17/2025 with the following recs:     Anticipated Discharge Location: Acute Rehab     Anticipated Discharge Day/Date:  tomorrow    Barriers to Discharge: Placement decision    Likely rate limiting factor: placement    --------------------------------------------------    [facility] (any 2 required for High level billing)    Moderate       Labs:  Personally reviewed on 6/17/2025 and interpreted for clinical significance as documented above.     No results for input(s): \"WBC\", \"HGB\", \"HCT\", \"PLT\" in the last 72 hours.    No results for input(s): \"NA\", \"K\", \"CL\", \"CO2\", \"BUN\", \"CREATININE\", \"CALCIUM\", \"MG\", \"PHOS\" in the last 72 hours.    No results for input(s): \"PROBNP\", \"TROPHS\" in the last 72 hours.  No results for input(s): \"LABA1C\" in the last 72 hours.  No results for input(s): \"AST\", \"ALT\", \"BILIDIR\", \"BILITOT\", \"ALKPHOS\" in the last 72 hours.  No results for input(s): \"INR\", \"LACTA\", \"TSH\" in the last 72 hours.    Urine Cultures:   Lab Results   Component Value Date/Time    LABURIN  07/09/2023 11:25 AM     <10,000 CFU/ml mixed skin/urogenital sunday. No further workup     Blood Cultures: No results found for: \"BC\"  No results found for: \"BLOODCULT2\"  Organism:   Lab Results   Component Value Date/Time    ORG Staphylococcus coagulase-negative 07/17/2019 02:39 PM         Tuan JEFF

## 2025-06-17 NOTE — PROGRESS NOTES
therapies and is interested in ARU.     Interval History:  No acute events overnight. Today Elvira is seen without family present. She reports continued confusion. Discussed that temoert is pending for ARU.      has a past medical history of Arthritis of midfoot, Benign essential tremor, BRCA1 negative, Colon polyp, Degenerative disc disease, DVT, lower extremity (HCC), Fibromyalgia, H/O viral meningitis, History of DVT (deep vein thrombosis), History of pulmonary embolus (PE), History of thyroid cancer, IBS (irritable bowel syndrome), Kidney stones, Left-sided trigeminal neuralgia, Malignant neoplasm of thyroid gland (HCC), Migraines, neuralgic, PONV (postoperative nausea and vomiting), Poor venous access, Protein S deficiency, Pulmonary embolism (HCC), Seizure disorder (HCC), Stroke (HCC), Thrombosis of superior sagittal sinus, Unspecified cerebral artery occlusion with cerebral infarction, Vertebral artery occlusion, and White coat syndrome with high blood pressure but without hypertension.     has a past surgical history that includes Thyroidectomy (05/18/2011); Lithotripsy (12/11); Cystoscopy (01/09/2012); Foot surgery (Left, 07/10/2017); Foot fracture surgery (Left, 2000); Breast lumpectomy; Refractive surgery; Larynx surgery; Finger trigger release (Left, 10/31/2019); Breast biopsy; Upper gastrointestinal endoscopy (N/A, 4/14/2023); arthrography (Right, 4/17/2023); Total hip arthroplasty (Right, 7/24/2023); Colonoscopy (N/A, 12/13/2023); and Upper gastrointestinal endoscopy (N/A, 12/13/2023).     reports that she has never smoked. She has never used smokeless tobacco. She reports current alcohol use of about 7.0 standard drinks of alcohol per week. She reports current drug use. Frequency: 5.00 times per week. Drug: Marijuana (Weed).    family history includes Breast Cancer in her mother; Cancer in her mother; High Cholesterol in her father and mother; Migraines in her sister; Other in her father and sister;  Ovarian Cancer in her mother; Thyroid Disease in her maternal grandmother.      REVIEW OF SYSTEMS:   Denies f/c, n/v, cp, sob    Physical Examination:  Vitals: Patient Vitals for the past 24 hrs:   BP Temp Temp src Pulse Resp SpO2 Height   06/17/25 1318 -- -- -- -- -- -- 1.626 m (5' 4.02\")   06/17/25 1030 -- 99 °F (37.2 °C) Oral -- -- -- --   06/17/25 0821 (!) 145/92 99.9 °F (37.7 °C) Oral 92 18 92 % --   06/17/25 0815 (!) 145/92 99.9 °F (37.7 °C) Oral 92 18 92 % --   06/16/25 1930 (!) 148/95 98.2 °F (36.8 °C) Oral 95 16 97 % --     Mood: Stable  Const: No distress  ENT: Oral mucosa moist  Eyes: No discharge or injection  CV: extremities well perfused  Resp: no respiratory distress, on room air  GI: Soft, nontender, nondistended.   Neuro: Alert, oriented, appropriate. Delayed processing. Resting tremor head. No cranial nerve deficits appreciated. Sensation intact to light touch. Motor examination reveals normal strength in all four limbs diffusely.   Skin: No lesions or rashes noted.   MSK: No joint abnormalities noted.       Lab Results   Component Value Date    WBC 3.2 (L) 06/14/2025    HGB 12.7 06/14/2025    HCT 38.2 06/14/2025    MCV 95.6 06/14/2025     06/14/2025     Lab Results   Component Value Date    INR 0.98 02/25/2023    INR 0.94 01/09/2012    INR 0.94 05/18/2011    PROTIME 12.9 02/25/2023    PROTIME 10.2 01/09/2012    PROTIME 10.2 05/18/2011     Lab Results   Component Value Date    CREATININE 0.6 06/14/2025    BUN 9 06/14/2025     06/14/2025    K 4.1 06/14/2025     06/14/2025    CO2 26 06/14/2025     Lab Results   Component Value Date    ALT 87 (H) 06/09/2025    AST 99 (H) 06/09/2025    ALKPHOS 131 (H) 06/09/2025    BILITOT 0.4 06/09/2025       Most recent echocardiogram revealed EF 55-60%.    Most recent EKG revealed normal sinus rhythm.     IMAGING    MRV Head W WO contrast 6/10/25  No evidence of dural venous thrombosis.     Assessment:  1. Acute encephalopathy and gait imbalance  due to nitrous oxide intoxication   2. Psychosis with hallucinations  3. B12 deficiency  4. Epilepsy  5. Essential tremor  6. History of cerebral venous thrombosis   7. Coagulopathy  8. Hypothyroidism  9. HLD  10. GERD  11. Class 1 obesity     Recommendations:  Patient with new functional deficits and ongoing medical complexity. Demonstrates ability to tolerate 3 hours therapy/day. Sarah Stock is a good candidate for acute inpatient rehab when medically appropriate.    Precert remains pending for ARU.     Thank you for this consult. Please contact me with any questions or concerns.   Gabriela Fletcher MD 6/17/2025, 5:47 PM

## 2025-06-17 NOTE — CARE COORDINATION
[facility] - Acute Rehab Unit     Request from Bridget Garland to send preauth information to [facility]. Information faxed.  Will notify DCP with further updates. Thank you for the referral.    Tab Estrada MPH, RRT  ARU Admissions Supervisor-[facility]  (P)[phone]  (F)[phone]   Electronically signed by Tab Estrada on 6/17/2025 at 9:08 AM

## 2025-06-17 NOTE — PROGRESS NOTES
Physical Therapy  Facility/Department: [facility] C5 - MED SURG/ORTHO  Daily Treatment Note  NAME: Sarah Stock  : 1970  MRN: [id number]    Date of Service: 2025    Discharge Recommendations:  IP Rehab   PT Equipment Recommendations  Equipment Needed: No  Other: defer to next level of care.    Therapy discharge recommendations are subject to collaboration from the patient’s interdisciplinary healthcare team, including MD and case management recommendations.     Barriers to Home Discharge:   [] Steps to access home entry or bed/bath   [x] Unable to transfer, ambulate, or propel wheelchair household distances without assist   [x] Limited available assist at home upon discharge    [] Patient or family requests d/c to post-acute facility    [x] Poor cognition/safety awareness for d/c to home alone    [] Unable to maintain ordered weight bearing status    [] Patient with salient signs of long-standing immobility   [x] Decreased independence with ADLs   [x] Increased risk for falls   [] Other:    Patient Diagnosis(es): The primary encounter diagnosis was Vision changes. Diagnoses of Thrombosis of superior sagittal sinus and Acute encephalopathy were also pertinent to this visit.    Assessment  Assessment: Pt able to porgress ambulation today needing CGA/Ruchi of 1 for 45'; with less tremors.  Pt partcipated in BLE and toileted during our session. Pt is recommended for con't skilled PT and IPR at D/C.  Activity Tolerance: Patient tolerated treatment well  Equipment Needed: No  Other: defer to next level of care.    Plan  Physical Therapy Plan  General Plan: 3-5 times per week  Current Treatment Recommendations: Strengthening;Balance training;Functional mobility training;Transfer training;Endurance training;Gait training;Stair training;Neuromuscular re-education;Home exercise program;Safety education & training;Equipment evaluation, education, & procurement;Patient/Caregiver education & training;Therapeutic

## 2025-06-17 NOTE — PROGRESS NOTES
Occupational Therapy  Facility/Department: [facility] C5 - MED SURG/ORTHO  Daily Treatment Note  NAME: Sarah Stock  : 1970  MRN: [id number]    Date of Service: 2025    Discharge Recommendations:  IP Rehab       Therapy discharge recommendations are subject to collaboration from the patient’s interdisciplinary healthcare team, including MD and case management recommendations.    Barriers to Home Discharge:   [] Steps to access home entry or bed/bath   [x] Unable to transfer, ambulate, or propel wheelchair household distances without assist   [x] Limited available assist at home upon discharge    [] Patient or family requests d/c to post-acute facility    [x] Poor cognition/safety awareness for d/c to home alone    [] Unable to maintain ordered weight bearing status    [] Patient with salient signs of long-standing immobility   [x] Decreased independence with ADLs   [x] Increased risk for falls   [] Other:    Patient Diagnosis(es): The primary encounter diagnosis was Vision changes. Diagnoses of Thrombosis of superior sagittal sinus and Acute encephalopathy were also pertinent to this visit.     Assessment   Assessment: Pt tolerated session well, demos good progress toward OT goals and increased tolerance for functional mobility to participate in ADL tasks this session. Co-tx collaboration this date with PT staff to safely progress pt toward goals. Pt will have better occupational performance outcomes within a co-treatment than 1:1 session. Pt progressing to grossly CGA with occasional min A with functional tranfers and mobility using RW, noted unsteadiness with no overt LOB, VCs for safe use of RW and hand placement. Pt requiring CGA for toileting and min A to don clean brief with reported fear of falling during LB dressing. Pt functioning below her baseline, demos ability and motivation to participate in 3 hrs therapy per day, continue to recommend IPR to facilitate return to baseline.   Activity

## 2025-06-17 NOTE — CARE COORDINATION
Writer reviewed chart and spoke with RN, and DO waiting on cert for ARU. AD since 6/16.    Caterina Cardoso RN

## 2025-06-17 NOTE — PROGRESS NOTES
[facility] Medicine Progress Note  V 5.17      Date of Admission: 6/9/2025    Hospital Day: 8      Chief Admission Complaint: Vision changes    Subjective: Patient is in hospital bed awake and alert.  No new issues or complaints today.  Patient states he feels \"not good \".  No new pain.  Patient states she feels confused, however at her baseline confusion.  No shortness of breath.  No nausea or vomiting.  Still hallucinating.  Eating okay.  Going bathroom okay.  Alert and oriented x 3.    Presenting Admission History:       \"55 y.o. female who presented to [facility] with acute onset blurry vision w/out associated focal neuro deficits after she took an edible and 'left to get a six pack of white claws' .   She reported antecedent mild R facial trauma after contact with the night stand several days prior.      The patient denies any fever/chills or other signs/sxs of systemic illness or identifiable aggravating/alleviating factors\".    Assessment/Plan:      Post-concussive syndrome - patient Aox3 and describes location of her prior stroke territory perfectly using neuroanatomical terms, she complains of general brain fog, indeed she sustained radiographically negative head trauma recently, well within the window for post-concussive syndrome     Visual Disturbance - of unclear etiology.  Initially suspected possible dural venous thrombosis - MRI/MRV negative and ruled out.      Coagulopathy - known Proteins S deficiency, w/ hx DVT/PE and other thrombotic processes.  Anticoagulated at baseline on Xarelto - resumed.      Psychosis - w/ hallucinations.  Of unclear etiology.  Psychiatry consult ORDERED and pending.      HyperLipidemia - normally controlled on home Statin. Continued.  Follow up w/ PCP outpatient for medication initiation and/or adjustment as needed.       HypoThyroid - clinically euthyroid on oral replacement therapy. Continue, w/ outpatient monitoring as previously arranged.        GERD - without active signs of GI Bleeding and/or symptoms of dysphagia and/or odynophagia. No evidence of active Peptic Ulcer Disease without history of GI Bleed. Controlled on home PPI - continued.       Class I Obesity - 30.0 to 34.9 kg/m2  with Body mass index is 34.31 kg/m². Complicating assessment and treatment. Placing patient at risk for multiple co-morbidities as well as early death and contributing to the patient's presentation.      Discharge planning - PT/OT rec IPR. PM&R consulted     Ongoing threat to life and/or bodily function without ongoing treatment due to:  post-concussive symptoms     Consults:      IP CONSULT TO HOSPITALIST  IP CONSULT TO NEUROLOGY  IP CONSULT TO PSYCHIATRY  IP CONSULT TO PHYSICAL MEDICINE REHAB        --------------------------------------------------      Medications:        Infusion Medications    sodium chloride      sodium chloride       Scheduled Medications    primidone  250 mg Oral BID    cyanocobalamin  1,000 mcg IntraMUSCular Q7 Days    DULoxetine  60 mg Oral BID    sodium chloride flush  5-40 mL IntraVENous 2 times per day    rivaroxaban  20 mg Oral Daily with breakfast    risperiDONE  0.5 mg Oral Daily    atorvastatin  40 mg Oral Daily    carBAMazepine  400 mg Oral TID    folic acid  1 mg Oral Daily    levothyroxine  150 mcg Oral QAM AC    pantoprazole  40 mg Oral BID AC    sodium chloride flush  5-40 mL IntraVENous 2 times per day    clonazePAM  1 mg Oral BID     PRN Meds: sodium chloride flush, sodium chloride, sodium chloride flush, sodium chloride, polyethylene glycol, acetaminophen **OR** acetaminophen, prochlorperazine, LORazepam      Physical Exam Performed:      General appearance:  No apparent distress  Respiratory:  Normal respiratory effort without tachypnea.   Cardiovascular:  Regular Rate w/ Regular rhythm.  Abdomen:  Soft, non-tender, non-distended. Bowel sounds normal  Musculoskeletal:  No edema  Neurologic:  Neurovascularly intact without focal

## 2025-06-18 PROCEDURE — 6370000000 HC RX 637 (ALT 250 FOR IP): Performed by: REGISTERED NURSE

## 2025-06-18 PROCEDURE — 6370000000 HC RX 637 (ALT 250 FOR IP): Performed by: NURSE PRACTITIONER

## 2025-06-18 PROCEDURE — 6370000000 HC RX 637 (ALT 250 FOR IP): Performed by: STUDENT IN AN ORGANIZED HEALTH CARE EDUCATION/TRAINING PROGRAM

## 2025-06-18 PROCEDURE — 97530 THERAPEUTIC ACTIVITIES: CPT

## 2025-06-18 PROCEDURE — 97110 THERAPEUTIC EXERCISES: CPT

## 2025-06-18 PROCEDURE — 2500000003 HC RX 250 WO HCPCS: Performed by: NURSE PRACTITIONER

## 2025-06-18 PROCEDURE — 1200000000 HC SEMI PRIVATE

## 2025-06-18 PROCEDURE — 6370000000 HC RX 637 (ALT 250 FOR IP): Performed by: SURGERY

## 2025-06-18 PROCEDURE — 6370000000 HC RX 637 (ALT 250 FOR IP): Performed by: INTERNAL MEDICINE

## 2025-06-18 PROCEDURE — 2500000003 HC RX 250 WO HCPCS: Performed by: STUDENT IN AN ORGANIZED HEALTH CARE EDUCATION/TRAINING PROGRAM

## 2025-06-18 RX ORDER — MECLIZINE HCL 12.5 MG 12.5 MG/1
12.5 TABLET ORAL 3 TIMES DAILY PRN
Status: DISCONTINUED | OUTPATIENT
Start: 2025-06-18 | End: 2025-06-19 | Stop reason: HOSPADM

## 2025-06-18 RX ADMIN — PANTOPRAZOLE SODIUM 40 MG: 40 TABLET, DELAYED RELEASE ORAL at 05:47

## 2025-06-18 RX ADMIN — DULOXETINE 60 MG: 60 CAPSULE, DELAYED RELEASE ORAL at 09:31

## 2025-06-18 RX ADMIN — ATORVASTATIN CALCIUM 40 MG: 40 TABLET, FILM COATED ORAL at 09:31

## 2025-06-18 RX ADMIN — CARBAMAZEPINE 400 MG: 200 TABLET, EXTENDED RELEASE ORAL at 15:58

## 2025-06-18 RX ADMIN — SODIUM CHLORIDE, PRESERVATIVE FREE 10 ML: 5 INJECTION INTRAVENOUS at 20:30

## 2025-06-18 RX ADMIN — PRIMIDONE 250 MG: 250 TABLET ORAL at 20:30

## 2025-06-18 RX ADMIN — LEVOTHYROXINE SODIUM 150 MCG: 0.15 TABLET ORAL at 05:48

## 2025-06-18 RX ADMIN — GUAIFENESIN AND DEXTROMETHORPHAN 5 ML: 100; 10 SYRUP ORAL at 09:32

## 2025-06-18 RX ADMIN — GUAIFENESIN AND DEXTROMETHORPHAN 5 ML: 100; 10 SYRUP ORAL at 15:58

## 2025-06-18 RX ADMIN — CLONAZEPAM 1 MG: 1 TABLET ORAL at 09:31

## 2025-06-18 RX ADMIN — GUAIFENESIN AND DEXTROMETHORPHAN 5 ML: 100; 10 SYRUP ORAL at 05:47

## 2025-06-18 RX ADMIN — CARBAMAZEPINE 400 MG: 200 TABLET, EXTENDED RELEASE ORAL at 20:30

## 2025-06-18 RX ADMIN — PRIMIDONE 250 MG: 250 TABLET ORAL at 09:31

## 2025-06-18 RX ADMIN — RISPERIDONE 0.5 MG: 0.25 TABLET, FILM COATED ORAL at 09:31

## 2025-06-18 RX ADMIN — TRAMADOL HYDROCHLORIDE 50 MG: 50 TABLET, COATED ORAL at 17:58

## 2025-06-18 RX ADMIN — CLONAZEPAM 1 MG: 1 TABLET ORAL at 20:30

## 2025-06-18 RX ADMIN — CARBAMAZEPINE 400 MG: 200 TABLET, EXTENDED RELEASE ORAL at 09:31

## 2025-06-18 RX ADMIN — RIVAROXABAN 20 MG: 20 TABLET, FILM COATED ORAL at 09:31

## 2025-06-18 RX ADMIN — GUAIFENESIN AND DEXTROMETHORPHAN 5 ML: 100; 10 SYRUP ORAL at 20:30

## 2025-06-18 RX ADMIN — SODIUM CHLORIDE, PRESERVATIVE FREE 10 ML: 5 INJECTION INTRAVENOUS at 09:31

## 2025-06-18 RX ADMIN — DULOXETINE 60 MG: 60 CAPSULE, DELAYED RELEASE ORAL at 20:30

## 2025-06-18 RX ADMIN — PANTOPRAZOLE SODIUM 40 MG: 40 TABLET, DELAYED RELEASE ORAL at 15:58

## 2025-06-18 RX ADMIN — FOLIC ACID 1 MG: 1 TABLET ORAL at 09:31

## 2025-06-18 ASSESSMENT — PAIN DESCRIPTION - LOCATION
LOCATION: EYE
LOCATION: HEAD;HIP;BACK

## 2025-06-18 ASSESSMENT — PAIN DESCRIPTION - DESCRIPTORS
DESCRIPTORS: STABBING;SHARP
DESCRIPTORS: ACHING;DISCOMFORT

## 2025-06-18 ASSESSMENT — PAIN DESCRIPTION - ORIENTATION: ORIENTATION: RIGHT;LEFT

## 2025-06-18 ASSESSMENT — PAIN DESCRIPTION - ONSET: ONSET: ON-GOING

## 2025-06-18 ASSESSMENT — PAIN DESCRIPTION - PAIN TYPE: TYPE: ACUTE PAIN

## 2025-06-18 ASSESSMENT — PAIN SCALES - GENERAL
PAINLEVEL_OUTOF10: 8
PAINLEVEL_OUTOF10: 7

## 2025-06-18 ASSESSMENT — PAIN DESCRIPTION - FREQUENCY: FREQUENCY: CONTINUOUS

## 2025-06-18 ASSESSMENT — PAIN - FUNCTIONAL ASSESSMENT: PAIN_FUNCTIONAL_ASSESSMENT: PREVENTS OR INTERFERES SOME ACTIVE ACTIVITIES AND ADLS

## 2025-06-18 NOTE — PLAN OF CARE
Problem: Chronic Conditions and Co-morbidities  Goal: Patient's chronic conditions and co-morbidity symptoms are monitored and maintained or improved  Outcome: Progressing     Problem: Discharge Planning  Goal: Discharge to home or other facility with appropriate resources  Outcome: Progressing     Problem: Pain  Goal: Verbalizes/displays adequate comfort level or baseline comfort level  Outcome: Progressing     Problem: Safety - Adult  Goal: Free from fall injury  Outcome: Progressing     Problem: ABCDS Injury Assessment  Goal: Absence of physical injury  Outcome: Progressing     Problem: Risk for Elopement  Goal: Patient will not exit the unit/facility without proper excort  Outcome: Progressing     Problem: Seizure Precautions  Goal: Remains free of injury related to seizures activity  Outcome: Progressing  Flowsheets  Taken 6/17/2025 1846 by Abbey Richmond RN  Remains free of injury related to seizure activity:   Seizure pads on all 4 side rails   Monitor patient for seizure activity, document and report duration and description of seizure to Licensed Independent Practitioner  Taken 6/17/2025 1602 by Abbey Richmond RN  Remains free of injury related to seizure activity: Seizure pads on all 4 side rails  Taken 6/17/2025 1409 by Abbey Richmond RN  Remains free of injury related to seizure activity: Seizure pads on all 4 side rails  Taken 6/17/2025 1210 by Abbey Richmond RN  Remains free of injury related to seizure activity: Seizure pads on all 4 side rails     Problem: Skin/Tissue Integrity  Goal: Skin integrity remains intact  Description: 1.  Monitor for areas of redness and/or skin breakdown2.  Assess vascular access sites hourly3.  Every 4-6 hours minimum:  Change oxygen saturation probe site4.  Every 4-6 hours:  If on nasal continuous positive airway pressure, respiratory therapy assess nares and determine need for appliance change or resting period  Outcome: Progressing

## 2025-06-18 NOTE — CARE COORDINATION
[facility] - Acute Rehab Unit     Patient being denied IPR by [facility]. P2P offered which can be completed within 5 buisness days. The provider can call directly to [facility] [phone].  Dr. Fletcher off through the 19th and covering PMR MD unable to complete P2P. Will need assistance from primary team if able.   Reason for denial includes:    Does not need care provided by Rehab Nurses  Does not need medical oversight by Physician at least 3 days a week  Needs could be met at SNF level of care    Will notify DCP with further updates. Thank you for the referral.      Tab Estrada MPH, RRT  ARU Admissions Supervisor-[facility]  (P)[phone]  (F)[phone]   Electronically signed by Tab Estrada on 6/18/2025 at 8:48 AM

## 2025-06-18 NOTE — PROGRESS NOTES
Physical Therapy  Facility/Department: [facility] C5 - MED SURG/ORTHO  Daily Treatment Note  NAME: Sarah Stock  : 1970  MRN: [id number]    Date of Service: 2025    Discharge Recommendations:  IP Rehab   PT Equipment Recommendations  Equipment Needed: No  Other: defer to next level of care.    Patient Diagnosis(es): The primary encounter diagnosis was Vision changes. Diagnoses of Thrombosis of superior sagittal sinus and Acute encephalopathy were also pertinent to this visit.    Barriers to Home Discharge:   [] Steps to access home entry or bed/bath   [x] Unable to transfer, ambulate, or propel wheelchair household distances without assist   [x] Limited available assist at home upon discharge    [] Patient or family requests d/c to post-acute facility    [x] Poor cognition/safety awareness for d/c to home alone    [] Unable to maintain ordered weight bearing status    [] Patient with salient signs of long-standing immobility   [x] Decreased independence with ADLs   [x] Increased risk for falls   [] Other:    Assessment  Assessment: Pt with fair participation in therapy this date, limited by orthostatic hypotension (see vitals section). Pt completes bed mobility with SBA and 2 sti<>stands with CGA and cues for hand placement. Pt unable to ambulate due to dizziness. Pt demos good participation in seatd BLE exercises at EOB in attempts to raise BP. Pt will continue to benefit from skilled PT services to address current deficits. Continue to rec IPR at DC when medically appropriate.  Equipment Needed: No  Other: defer to next level of care.    Plan  Physical Therapy Plan  General Plan: 3-5 times per week  Current Treatment Recommendations: Strengthening;Balance training;Functional mobility training;Transfer training;Endurance training;Gait training;Stair training;Neuromuscular re-education;Home exercise program;Safety education & training;Equipment evaluation, education, & procurement;Patient/Caregiver  education & training;Therapeutic activities;Co-Treatment    Restrictions  Restrictions/Precautions  Restrictions/Precautions: General Precautions, Fall Risk, Seizure  Position Activity Restriction  Other Position/Activity Restrictions: [employer]     Subjective   Subjective  Subjective: Pt in bed at start of session, agreeable to PT  Pain: Pt reports 7/10 pain in head    Objective  Vitals  Pulse: 83  Heart Rate Source: Monitor  BP: 136/89  BP Location: Right upper arm  BP Method: Automatic  Patient Position: Semi fowlers  MAP (Calculated): 105  SpO2: 92 %  O2 Device: None (Room air)  Comment: Pt reports feeling dizzy sitting EOB, /78; Standing /74 with continued dizziness. Sat back at the EOB and pt performed BLE exercises and BP improved to 116/77. /68 on 2nd stand with continued dizziness. Pt returned to bed in semi-fowlers position and /88  Bed Mobility Training  Bed Mobility Training: Yes  Supine to Sit: Stand by assistance (To the R with HOB elevated and use of bed rails)  Sit to Supine: Stand by assistance     PT Exercises  Exercise Treatment: seated BLE exercises 15x each: AP, LAQ, marches, hip abduction, glut sets in attempts to raise BP.             Goals  Short Term Goals  Time Frame for Short Term Goals: 7 days (6/22) unless otherwise noted. -- 6/18 continue all goals  Short Term Goal 1: Pt will demonstrate SBA with bed mobility. -- MET 6/18; progress to mod I  Short Term Goal 2: Pt will demonstrate CGA with functional transfers with LRAD. -- MET 6/18; progress to supervision  Short Term Goal 3: Pt will demonstrate CGA for ambulation up to 50' with LRAD.  Short Term Goal 4: Pt will participate in 4 different BLE exercises of 12-15 reps each to improve strength and endurance by 6/20 --  MET 6/18  Patient Goals   Patient Goals : pt does not state goal explicitly, cannot state goal when asked secondary to decreased cognition.    Education  Patient Education  Education Given To:  Patient  Education Provided Comments: educated pt on safety in hospital, use of call light, performance and purpose of HEP, answered all questions.  Education Method: Verbal  Barriers to Learning: None  Education Outcome: Verbalized understanding;Continued education needed    AM-PAC - Mobility    AM-PAC Basic Mobility - Inpatient   How much help is needed turning from your back to your side while in a flat bed without using bedrails?: None  How much help is needed moving from lying on your back to sitting on the side of a flat bed without using bedrails?: None  How much help is needed moving to and from a bed to a chair?: A Little  How much help is needed standing up from a chair using your arms?: A Little  How much help is needed walking in hospital room?: A Lot  How much help is needed climbing 3-5 steps with a railing?: A Lot  AM-PAC Inpatient Mobility Raw Score : 18  AM-PAC Inpatient T-Scale Score : 43.63  Mobility Inpatient CMS 0-100% Score: 46.58  Mobility Inpatient CMS G-Code Modifier : CK         Therapy Time   Individual Concurrent Group Co-treatment   Time In 1130         Time Out 1154         Minutes 24         Timed Code Treatment Minutes: 24 Minutes       Gladys Crystal PT, DPT    If pt is unable to be seen after this session, please let this note serve as discharge summary.  Please see case management note for discharge disposition.  Thank you.

## 2025-06-18 NOTE — CARE COORDINATION
LVM for Shweta @ [facility] to verify received referral.     Caterina Cardoso RN

## 2025-06-18 NOTE — CARE COORDINATION
Writer spoke with patient at bedside about ARU denial, she would like a referral sent to [facility], if they can't accept she states she needs to go home. Writer explained to patient it is taking two people to ambulate her, and she states \"I Know\".    Caterina Cardoso RN

## 2025-06-19 VITALS
WEIGHT: 199.96 LBS | HEART RATE: 100 BPM | OXYGEN SATURATION: 97 % | RESPIRATION RATE: 20 BRPM | HEIGHT: 64 IN | BODY MASS INDEX: 34.14 KG/M2 | SYSTOLIC BLOOD PRESSURE: 124 MMHG | DIASTOLIC BLOOD PRESSURE: 83 MMHG | TEMPERATURE: 98.6 F

## 2025-06-19 LAB — MISCELLANEOUS LAB TEST ORDER: NORMAL

## 2025-06-19 PROCEDURE — 6370000000 HC RX 637 (ALT 250 FOR IP): Performed by: INTERNAL MEDICINE

## 2025-06-19 PROCEDURE — 6370000000 HC RX 637 (ALT 250 FOR IP): Performed by: STUDENT IN AN ORGANIZED HEALTH CARE EDUCATION/TRAINING PROGRAM

## 2025-06-19 PROCEDURE — 6370000000 HC RX 637 (ALT 250 FOR IP): Performed by: NURSE PRACTITIONER

## 2025-06-19 PROCEDURE — 6370000000 HC RX 637 (ALT 250 FOR IP): Performed by: SURGERY

## 2025-06-19 PROCEDURE — 6370000000 HC RX 637 (ALT 250 FOR IP): Performed by: REGISTERED NURSE

## 2025-06-19 PROCEDURE — 2500000003 HC RX 250 WO HCPCS: Performed by: STUDENT IN AN ORGANIZED HEALTH CARE EDUCATION/TRAINING PROGRAM

## 2025-06-19 RX ADMIN — POLYETHYLENE GLYCOL 3350 17 G: 17 POWDER, FOR SOLUTION ORAL at 12:16

## 2025-06-19 RX ADMIN — TRAMADOL HYDROCHLORIDE 50 MG: 50 TABLET, COATED ORAL at 08:04

## 2025-06-19 RX ADMIN — RIVAROXABAN 20 MG: 20 TABLET, FILM COATED ORAL at 08:04

## 2025-06-19 RX ADMIN — GUAIFENESIN AND DEXTROMETHORPHAN 5 ML: 100; 10 SYRUP ORAL at 12:16

## 2025-06-19 RX ADMIN — LEVOTHYROXINE SODIUM 150 MCG: 0.15 TABLET ORAL at 04:51

## 2025-06-19 RX ADMIN — GUAIFENESIN AND DEXTROMETHORPHAN 5 ML: 100; 10 SYRUP ORAL at 08:04

## 2025-06-19 RX ADMIN — SODIUM CHLORIDE, PRESERVATIVE FREE 10 ML: 5 INJECTION INTRAVENOUS at 08:06

## 2025-06-19 RX ADMIN — LORAZEPAM 0.5 MG: 0.5 TABLET ORAL at 08:04

## 2025-06-19 RX ADMIN — RISPERIDONE 0.5 MG: 0.25 TABLET, FILM COATED ORAL at 08:04

## 2025-06-19 RX ADMIN — GUAIFENESIN AND DEXTROMETHORPHAN 5 ML: 100; 10 SYRUP ORAL at 03:10

## 2025-06-19 RX ADMIN — PANTOPRAZOLE SODIUM 40 MG: 40 TABLET, DELAYED RELEASE ORAL at 16:50

## 2025-06-19 RX ADMIN — CLONAZEPAM 1 MG: 1 TABLET ORAL at 08:04

## 2025-06-19 RX ADMIN — CARBAMAZEPINE 400 MG: 200 TABLET, EXTENDED RELEASE ORAL at 08:05

## 2025-06-19 RX ADMIN — PANTOPRAZOLE SODIUM 40 MG: 40 TABLET, DELAYED RELEASE ORAL at 04:51

## 2025-06-19 RX ADMIN — CARBAMAZEPINE 400 MG: 200 TABLET, EXTENDED RELEASE ORAL at 14:37

## 2025-06-19 RX ADMIN — ATORVASTATIN CALCIUM 40 MG: 40 TABLET, FILM COATED ORAL at 08:04

## 2025-06-19 RX ADMIN — DULOXETINE 60 MG: 60 CAPSULE, DELAYED RELEASE ORAL at 08:04

## 2025-06-19 RX ADMIN — PRIMIDONE 250 MG: 250 TABLET ORAL at 08:05

## 2025-06-19 RX ADMIN — FOLIC ACID 1 MG: 1 TABLET ORAL at 08:04

## 2025-06-19 ASSESSMENT — PAIN DESCRIPTION - LOCATION: LOCATION: HEAD;GENERALIZED

## 2025-06-19 ASSESSMENT — PAIN DESCRIPTION - ORIENTATION: ORIENTATION: MID

## 2025-06-19 ASSESSMENT — PAIN DESCRIPTION - DESCRIPTORS: DESCRIPTORS: ACHING

## 2025-06-19 ASSESSMENT — PAIN - FUNCTIONAL ASSESSMENT: PAIN_FUNCTIONAL_ASSESSMENT: ACTIVITIES ARE NOT PREVENTED

## 2025-06-19 ASSESSMENT — PAIN SCALES - GENERAL: PAINLEVEL_OUTOF10: 8

## 2025-06-19 NOTE — PLAN OF CARE
Problem: Chronic Conditions and Co-morbidities  Goal: Patient's chronic conditions and co-morbidity symptoms are monitored and maintained or improved  6/18/2025 2232 by Anne Snowden RN  Outcome: Progressing  6/18/2025 1731 by Vicky Guzman RN  Outcome: Progressing     Problem: Discharge Planning  Goal: Discharge to home or other facility with appropriate resources  6/18/2025 2232 by Anne Snowden RN  Outcome: Progressing  6/18/2025 1731 by Vicky Guzman RN  Outcome: Progressing     Problem: Pain  Goal: Verbalizes/displays adequate comfort level or baseline comfort level  6/18/2025 2232 by Anne Snowden RN  Outcome: Progressing  6/18/2025 1731 by Vicky Guzman RN  Outcome: Progressing     Problem: Safety - Adult  Goal: Free from fall injury  Outcome: Progressing     Problem: ABCDS Injury Assessment  Goal: Absence of physical injury  Outcome: Progressing     Problem: Risk for Elopement  Goal: Patient will not exit the unit/facility without proper excort  Outcome: Progressing     Problem: Seizure Precautions  Goal: Remains free of injury related to seizures activity  Outcome: Progressing     Problem: Skin/Tissue Integrity  Goal: Skin integrity remains intact  Description: 1.  Monitor for areas of redness and/or skin breakdown2.  Assess vascular access sites hourly3.  Every 4-6 hours minimum:  Change oxygen saturation probe site4.  Every 4-6 hours:  If on nasal continuous positive airway pressure, respiratory therapy assess nares and determine need for appliance change or resting period  Outcome: Progressing

## 2025-06-19 NOTE — PLAN OF CARE
Problem: Chronic Conditions and Co-morbidities  Goal: Patient's chronic conditions and co-morbidity symptoms are monitored and maintained or improved  6/19/2025 1622 by Aliyah Ivory RN  Outcome: Adequate for Discharge  6/19/2025 1500 by Aliyah Ivory RN  Outcome: Progressing  Flowsheets (Taken 6/19/2025 1500)  Care Plan - Patient's Chronic Conditions and Co-Morbidity Symptoms are Monitored and Maintained or Improved:   Monitor and assess patient's chronic conditions and comorbid symptoms for stability, deterioration, or improvement   Collaborate with multidisciplinary team to address chronic and comorbid conditions and prevent exacerbation or deterioration   Update acute care plan with appropriate goals if chronic or comorbid symptoms are exacerbated and prevent overall improvement and discharge     Problem: Discharge Planning  Goal: Discharge to home or other facility with appropriate resources  6/19/2025 1622 by Aliyah Ivory RN  Outcome: Adequate for Discharge  6/19/2025 1500 by Aliyah Ivory RN  Outcome: Progressing  Flowsheets (Taken 6/19/2025 1500)  Discharge to home or other facility with appropriate resources:   Identify barriers to discharge with patient and caregiver   Arrange for needed discharge resources and transportation as appropriate   Identify discharge learning needs (meds, wound care, etc)     Problem: Pain  Goal: Verbalizes/displays adequate comfort level or baseline comfort level  Outcome: Adequate for Discharge     Problem: Safety - Adult  Goal: Free from fall injury  Outcome: Adequate for Discharge     Problem: ABCDS Injury Assessment  Goal: Absence of physical injury  Outcome: Adequate for Discharge     Problem: Risk for Elopement  Goal: Patient will not exit the unit/facility without proper excort  Outcome: Adequate for Discharge     Problem: Seizure Precautions  Goal: Remains free of injury related to seizures activity  Outcome: Adequate for Discharge     Problem: Skin/Tissue

## 2025-06-19 NOTE — CARE COORDINATION
CASE MANAGEMENT DISCHARGE SUMMARY      Discharge to: [facility]    Precertification completed: Yes  Hospital Exemption Notification (HENS) completed: Yes    IMM given: (date) N/A    New Durable Medical Equipment ordered/agency: Deferred    Transportation: Squad     Medical Transport explained to pt/family. Pt/family voice no agency preference.    Agency used: [facility]   time: 1800   Ambulance form completed: Yes    Confirmed discharge plan with:     Patient: yes     Family: no  per patient     Facility/Agency, name: [facility]    Phone number for report to facility: [phone]     RN, name: Aliyah    Note: Discharging nurse to complete LEIGHA, reconcile AVS, and place final copy with patient's discharge packet. RN to ensure that written prescriptions for  Level II medications are sent with patient to the facility as per protocol.      [facility] Cardoso RN

## 2025-06-19 NOTE — PLAN OF CARE
Problem: Chronic Conditions and Co-morbidities  Goal: Patient's chronic conditions and co-morbidity symptoms are monitored and maintained or improved  Outcome: Progressing  Flowsheets (Taken 6/19/2025 1500)  Care Plan - Patient's Chronic Conditions and Co-Morbidity Symptoms are Monitored and Maintained or Improved:   Monitor and assess patient's chronic conditions and comorbid symptoms for stability, deterioration, or improvement   Collaborate with multidisciplinary team to address chronic and comorbid conditions and prevent exacerbation or deterioration   Update acute care plan with appropriate goals if chronic or comorbid symptoms are exacerbated and prevent overall improvement and discharge     Problem: Discharge Planning  Goal: Discharge to home or other facility with appropriate resources  Outcome: Progressing  Flowsheets (Taken 6/19/2025 1500)  Discharge to home or other facility with appropriate resources:   Identify barriers to discharge with patient and caregiver   Arrange for needed discharge resources and transportation as appropriate   Identify discharge learning needs (meds, wound care, etc)

## 2025-06-19 NOTE — DISCHARGE SUMMARY
Hospital Medicine Discharge Summary    Patient: Sarah Stock   : 1970     Hospital:  [facility]  Admit Date: 2025   Discharge Date:   25  Disposition:  [facility]   Code status:  Full  Condition at Discharge: Stable  Primary Care Provider: Reji Pinto DO    Admitting Provider: Birgit Khan MD  Discharge Provider: Tuan Carrera DO     Discharge Diagnoses:      Active Hospital Problems    Diagnosis     Nitrous oxide user [F18.90]     Toxic metabolic encephalopathy [G92.8]     Psychosis (HCC) [F29]     Cerebral venous thrombosis of sigmoid sinus [G08]     Transient neurological symptoms [R29.818]     History of cerebral venous sinus thrombosis [Z86.718]        Presenting Admission History:      \"55 y.o. female who presented to [facility] with acute onset blurry vision w/out associated focal neuro deficits after she took an edible and 'left to get a six pack of white claws' .   She reported antecedent mild R facial trauma after contact with the night stand several days prior.      The patient denies any fever/chills or other signs/sxs of systemic illness or identifiable aggravating/alleviating factors\".     Assessment/Plan:      Post-concussive syndrome - patient Aox3 and describes location of her prior stroke territory perfectly using neuroanatomical terms, she complains of general brain fog, indeed she sustained radiographically negative head trauma recently, well within the window for post-concussive syndrome     Visual Disturbance - of unclear etiology.  Initially suspected possible dural venous thrombosis - MRI/MRV negative and ruled out.      Coagulopathy - known Proteins S deficiency, w/ hx DVT/PE and other thrombotic processes.  Anticoagulated at baseline on Xarelto - resumed.      Psychosis - w/ hallucinations.  Of unclear etiology.  Psychiatry consult ORDERED and pending.      HyperLipidemia - normally controlled on home Statin.  Medication List        CONTINUE these medications which have CHANGED    Details   primidone (MYSOLINE) 250 MG tablet Take 1 tablet by mouth in the morning and at bedtime  Qty: 270 tablet, Refills: 1           Current Discharge Medication List        CONTINUE these medications which have NOT CHANGED    Details   !! DULoxetine (CYMBALTA) 60 MG extended release capsule TAKE 1 CAPSULE BY MOUTH DAILY (ALONG WITH 30 MG CAPSULE)  Qty: 90 capsule, Refills: 1      folic acid (FOLVITE) 1 MG tablet TAKE 1 TABLET BY MOUTH DAILY  Qty: 90 tablet, Refills: 0      atorvastatin (LIPITOR) 40 MG tablet TAKE 1 TABLET BY MOUTH DAILY  Qty: 90 tablet, Refills: 1    Associated Diagnoses: Hx of stroke associated with blood clotting tendency      !! DULoxetine (CYMBALTA) 30 MG extended release capsule TAKE 1 CAPSULE BY MOUTH DAILY WITH ONE 60MG CAPSULE FOR A TOTAL OF 90MG DAILY  Qty: 90 capsule, Refills: 3    Associated Diagnoses: Severe episode of recurrent major depressive disorder, without psychotic features (HCC)      clonazePAM (KLONOPIN) 1 MG tablet Take 1 tablet by mouth every morning (before breakfast) AND 1.5 tablets at bedtime. Do all this for 90 days. TAKE 1 TABLET BY MOUTH EVERY MORNING AND TAKE 1.5 TABLET BY MOUTH ONCE NIGHTLY. Max Daily Amount: 2.5 mg.  Qty: 75 tablet, Refills: 2    Associated Diagnoses: Anxiety      XARELTO 20 MG TABS tablet Take 1 tablet by mouth daily TAKE ONE TABLET BY MOUTH DAILY  Qty: 90 tablet, Refills: 3    Associated Diagnoses: Protein S deficiency; Hypercoagulable state; History of stroke      Vitamin D (CHOLECALCIFEROL) 25 MCG (1000 UT) TABS tablet Take 1 tablet by mouth daily  Qty: 90 tablet, Refills: 3    Comments: Labeling may look different.  25 mcg=1000 Units. Please double check dosages.  Associated Diagnoses: Vitamin D deficiency      dicyclomine (BENTYL) 10 MG capsule TAKE 1 CAPSULE BY MOUTH 4 TIMES A DAY  Qty: 120 capsule, Refills: 3    Associated Diagnoses: Irritable bowel syndrome with  plane of imaging. Sagittal sinus is patent. Right transverse sinus and internal jugular vein are dominant. Internal cerebral veins and straight sinus patent. Dural sinuses visualized are patent.     No evidence of dural venous thrombosis. Electronically signed by Samia Morales MD    MRI BRAIN W WO CONTRAST  Result Date: 6/10/2025  MRI OF THE HEAD WITHOUT AND WITH CONTRAST: HISTORY: Blurred vision. Evaluate for venous sinus thrombosis. TECHNIQUE: Routine multiplanar, multisequence imaging protocol was performed. IV administration of gadolinium contrast given. COMPARISON:  CT/CTA studies of the head performed 6/10/2025. FINDINGS: The sella and pituitary gland are normal. Orbits and IACs appear normal and are symmetrical bilaterally. No intraconal or extraconal orbital lesions are seen. Optic nerves appear normal and are symmetrical bilaterally. The ventricular system is normal and midline in position. No extra-axial collections, mass-effect or midline shift is seen. There are no significant abnormal areas of brain signal demonstrated, and no posterior fossa, pontine or brainstem signal abnormalities are seen. Major intracranial vessels demonstrate normal flow signal and enhancement on all sequences. No abnormal parenchymal enhancement demonstrated on postcontrast images. There is no diffusion restriction signal abnormality seen. Paranasal sinuses and mastoid air cell complexes are well-aerated bilaterally.     Negative MRI study of the brain. Electronically signed by Samia Morales MD    CTA HEAD NECK W CONTRAST  Result Date: 6/10/2025  EXAM: CTA HEAD NECK W CONTRAST INDICATION: temporary vision changes with h/o of protein S deficiency and prior venous thrombosis COMPARISON: June 20, 2024 TECHNIQUE: Low dose, multichannel computerized tomography angiography of the intracranial and extracranial vessels was performed with and without IV contrast.  Multiplanar reformats, 3D, and MIP images were reviewed. Up-to-date CT

## 2025-06-19 NOTE — PROGRESS NOTES
Hospital Medicine Progress Note  V 5.17      Date of Admission: 6/9/2025    Hospital Day: 10      Chief Admission Complaint: Vision changes    Subjective: Patient is in hospital bed awake and alert.  No new issues or complaints today.  Patient states he feels \"okay \".  Continues to have chronic hip pain and coccyx pain in addition to back pain.  No shortness of breath.  No nausea or vomiting.    Presenting Admission History:       \"55 y.o. female who presented to [facility] with acute onset blurry vision w/out associated focal neuro deficits after she took an edible and 'left to get a six pack of white claws' .   She reported antecedent mild R facial trauma after contact with the night stand several days prior.      The patient denies any fever/chills or other signs/sxs of systemic illness or identifiable aggravating/alleviating factors\".    Assessment/Plan:      Post-concussive syndrome - patient Aox3 and describes location of her prior stroke territory perfectly using neuroanatomical terms, she complains of general brain fog, indeed she sustained radiographically negative head trauma recently, well within the window for post-concussive syndrome     Visual Disturbance - of unclear etiology.  Initially suspected possible dural venous thrombosis - MRI/MRV negative and ruled out.      Coagulopathy - known Proteins S deficiency, w/ hx DVT/PE and other thrombotic processes.  Anticoagulated at baseline on Xarelto - resumed.      Psychosis - w/ hallucinations.  Of unclear etiology.  Psychiatry consult ORDERED and pending.      HyperLipidemia - normally controlled on home Statin. Continued.  Follow up w/ PCP outpatient for medication initiation and/or adjustment as needed.       HypoThyroid - clinically euthyroid on oral replacement therapy. Continue, w/ outpatient monitoring as previously arranged.       GERD - without active signs of GI Bleeding and/or symptoms of dysphagia and/or odynophagia. No  deficits.  Psychiatric:  Alert and oriented    /81   Pulse 91   Temp 98.8 °F (37.1 °C) (Oral)   Resp 16   Ht 1.626 m (5' 4.02\")   Wt 90.7 kg (199 lb 15.3 oz)   LMP 06/30/2015   SpO2 92%   BMI 34.30 kg/m²     Telemetry:      Personally reviewed and interpreted telemetry (Rhythm Strip) on 6/18/2025.  Patient is currently NOT ON tele.    Diet: ADULT DIET; Regular    DVT Prophylaxis: Xarelto    Code status: Full Code    PT/OT Eval Status: Seen w/ recs for Acute Rehab    Multi-Disciplinary Rounds with Case Management completed on 6/18/2025 with the following recs:     Anticipated Discharge Location: Acute Rehab     Anticipated Discharge Day/Date:  tomorrow    Barriers to Discharge: Placement decision    Likely rate limiting factor: placement    --------------------------------------------------    [facility] (any 2 required for High level billing)    Moderate       Labs:  Personally reviewed on 6/18/2025 and interpreted for clinical significance as documented above.     No results for input(s): \"WBC\", \"HGB\", \"HCT\", \"PLT\" in the last 72 hours.    No results for input(s): \"NA\", \"K\", \"CL\", \"CO2\", \"BUN\", \"CREATININE\", \"CALCIUM\", \"MG\", \"PHOS\" in the last 72 hours.    No results for input(s): \"PROBNP\", \"TROPHS\" in the last 72 hours.  No results for input(s): \"LABA1C\" in the last 72 hours.  No results for input(s): \"AST\", \"ALT\", \"BILIDIR\", \"BILITOT\", \"ALKPHOS\" in the last 72 hours.  No results for input(s): \"INR\", \"LACTA\", \"TSH\" in the last 72 hours.    Urine Cultures:   Lab Results   Component Value Date/Time    LABURIN  07/09/2023 11:25 AM     <10,000 CFU/ml mixed skin/urogenital sunday. No further workup     Blood Cultures: No results found for: \"BC\"  No results found for: \"BLOODCULT2\"  Organism:   Lab Results   Component Value Date/Time    ORG Staphylococcus coagulase-negative 07/17/2019 02:39 PM         Tuan Carrera DO

## 2025-06-19 NOTE — DISCHARGE INSTR - COC
Continuity of Care Form    Patient Name: Sarah Stock   :  1970  MRN:  [id number]    Admit date:  2025  Discharge date:  25    Code Status Order: Full Code   Advance Directives:     Admitting Physician:  Birgit Khan MD  PCP: Reji Pinto DO    Discharging Nurse: Aliyah Ivory RN  Discharging Hospital Unit/Room#: 0538/0538-01  Discharging Unit Phone Number: [phone]    Emergency Contact:   Extended Emergency Contact Information  Primary Emergency Contact: no,emergency contact           [place] [address] [facility]  Home Phone: [phone]  Relation: Other    Past Surgical History:  Past Surgical History:   Procedure Laterality Date    ARTHROGRAPHY Right 2023    RIGHT HIP ARTHROGRAM WITH CORTISONE INJECTION performed by Tam Phipps MD at [facility] OR    BREAST BIOPSY      BREAST LUMPECTOMY      x 3    COLONOSCOPY N/A 2023    COLONOSCOPY performed by Bobo Gonzales MD at [facility] ENDOSCOPY    CYSTOSCOPY  2012    w/ left ureteroscopy, holmium laser. stone manipulation and left stent insertion    FINGER TRIGGER RELEASE Left 10/31/2019    LEFT THUMB TRIGGER DIGIT RELEASE performed by Mio Hearn MD at [facility] OR    FOOT FRACTURE SURGERY Left     pins later removed    FOOT SURGERY Left 07/10/2017    LARYNX SURGERY      vocal cord nodule    LITHOTRIPSY      REFRACTIVE SURGERY      THYROIDECTOMY  2011         TOTAL HIP ARTHROPLASTY Right 2023    RIGHT TOTAL HIP ARTHROPLASTY MINIMALLY INVASIVE DIRECT ANTERIOR     LAKSHMI BIOMET performed by Tam Phipps MD at [facility] OR    UPPER GASTROINTESTINAL ENDOSCOPY N/A 2023    EGD BIOPSY performed by Belkis Caceres MD at [facility] ENDOSCOPY    UPPER GASTROINTESTINAL ENDOSCOPY N/A 2023    EGD BIOPSY performed by Bobo Gonzales MD at [facility] ENDOSCOPY       Immunization History:   Immunization History   Administered Date(s) Administered    Influenza, FLUARIX,

## 2025-06-19 NOTE — CARE COORDINATION
[facility] accepted patient they are starting cert this day, and will do a bedside with patient today.     Caterina Cardoso RN

## 2025-06-20 ENCOUNTER — TELEPHONE (OUTPATIENT)
Dept: FAMILY MEDICINE CLINIC | Age: 55
End: 2025-06-20

## 2025-06-20 ENCOUNTER — TELEPHONE (OUTPATIENT)
Dept: OTHER | Age: 55
End: 2025-06-20

## 2025-06-20 NOTE — TELEPHONE ENCOUNTER
Care Transitions Initial Follow Up Call    Outreach made within 2 business days of discharge: Yes    Patient: Sarah Stock Patient : 1970   MRN: [id number]  Reason for Admission: Cerebrl venous thrombosis of sigmoid sinus  Discharge Date: 25       Spoke with: Patient admitted to [facility], once the patient is discharged I will schedule for a Hospital follow up appointment.    Discharge department/facility: [facility]        Scheduled appointment with PCP within 7-14 days    Follow Up  Future Appointments   Date Time Provider Department Center   2025  1:30 PM Reji Pinot DO [facility] DE[facility]       Rosa M Hughes LPN

## 2025-06-20 NOTE — TELEPHONE ENCOUNTER
[facility]--CHW    CHW called to reschedule our missed appointment. No answer. No voicemail. CHW will attempt to call patient later.

## 2025-06-20 NOTE — ADT AUTH CERT
Physician Progress Notes  Notes from 06/17/25 through 06/20/25  Progress Notes by Tuan Carrera DO at 6/18/2025  8:32 PM    Author: Tuan Carrera DO Service: Internal Medicine Author Type: Physician   Filed: 6/19/2025  9:55 AM Date of Service: 6/18/2025  8:32 PM Status: Signed   : Tuan Carrera DO (Physician)   Expand All Collapse All          Hospital Medicine Progress Note  V 5.17                            Date of Admission: 6/9/2025    Hospital Day: 10       Chief Admission Complaint: Vision changes     Subjective: Patient is in hospital bed awake and alert.  No new issues or complaints today.  Patient states he feels \"okay \".  Continues to have chronic hip pain and coccyx pain in addition to back pain.  No shortness of breath.  No nausea or vomiting.     Presenting Admission History:        \"55 y.o. female who presented to [facility] with acute onset blurry vision w/out associated focal neuro deficits after she took an edible and 'left to get a six pack of white claws' .   She reported antecedent mild R facial trauma after contact with the night stand several days prior.      The patient denies any fever/chills or other signs/sxs of systemic illness or identifiable aggravating/alleviating factors\".     Assessment/Plan:       Post-concussive syndrome - patient Aox3 and describes location of her prior stroke territory perfectly using neuroanatomical terms, she complains of general brain fog, indeed she sustained radiographically negative head trauma recently, well within the window for post-concussive syndrome     Visual Disturbance - of unclear etiology.  Initially suspected possible dural venous thrombosis - MRI/MRV negative and ruled out.      Coagulopathy - known Proteins S deficiency, w/ hx DVT/PE and other thrombotic processes.  Anticoagulated at baseline on Xarelto - resumed.      Psychosis - w/ hallucinations.  Of unclear etiology.  Psychiatry consult ORDERED and

## 2025-06-23 ENCOUNTER — TELEPHONE (OUTPATIENT)
Dept: FAMILY MEDICINE CLINIC | Age: 55
End: 2025-06-23

## 2025-06-23 DIAGNOSIS — F33.2 SEVERE EPISODE OF RECURRENT MAJOR DEPRESSIVE DISORDER, WITHOUT PSYCHOTIC FEATURES (HCC): ICD-10-CM

## 2025-06-23 DIAGNOSIS — Z86.73 HX OF STROKE ASSOCIATED WITH BLOOD CLOTTING TENDENCY: ICD-10-CM

## 2025-06-23 DIAGNOSIS — F41.9 ANXIETY: Primary | ICD-10-CM

## 2025-06-23 NOTE — TELEPHONE ENCOUNTER
Tried to call patient.  No answer and phone rang continuously with no answer/voicemail.    Call placed to [facility], patient is not currently a patient there.    Per Rosa M's note, patient is at [facility].

## 2025-06-23 NOTE — TELEPHONE ENCOUNTER
Patient called the office stating that she is currently in [facility] and is not doing well overall.   She has not had a bath since June 9th between the hospital and [facility].   [facility] is not following her gluten free diet.   Per the patient she has been denied snacks and told family needs to bring her something, she has no family locally.  C/o productive cough with chest pain. No provider on site.     Thanks.

## 2025-06-23 NOTE — TELEPHONE ENCOUNTER
Called [facility] and was transferred to patients room.  Spoke with patient.  Patient reports weight loss from not being able to eat due to her gluten allergy and are also not offering her snacks.  Reports she has has for clergy several times but no one has come to see her.  Reports they are not letting her ambulate by herself but are also not helping her.  Reports she has not showered since before her hospital admission.  Reports they are also not giving her water when she asks for it stating both the nurses and aids tell her it is too far away.  Reports she has had a deep, productive cough since her hospital admission and is now having chest congestion and SOB.  Patient states she has had 2 negative covid tests.  Advised patient that I was going to call the facility and ask for the DON to see if we can discuss her care.  Patient stated that is fine and gave me her room phone: [phone] to call while she is inpatient there.    Left message for DON.

## 2025-06-23 NOTE — TELEPHONE ENCOUNTER
Nurse placed a second call to the SNF and left a second message for the STEVE Bridges to return the call to the office.  Nurse then called the SNF back and asked to speak with the nursing supervisor Jennie.  Supervisor did not answer, left a message for a return call to the office.  Nurse called the SNF back and asked to speak to the patient's nurse Gin.  No answer from the nurse, message left for the nurse to return the call to the office.

## 2025-06-24 NOTE — TELEPHONE ENCOUNTER
Spoke with Gin, nurse at [facility].  Gin stated she will have Jennie her supervisor talk with the kitchen regarding patient's diet and that she needs a gluten free diet.  Reports the in house provider ordered a cxr for patients symptoms and it was positive for pneumonia, they are currently waiting on the antibiotic order from the provider.  Reports patient did have a shower this morning.  Reports patient just needs to ask for the water and one of the nurses or aids should be getting it for her.  Advised her what patient was told and Gin stated patient should not have been told that.  I asked about Clergy services and she said that was through  and I would have to talk with them.  Spoke with Marie in  who stated she actually spoke with the patient about an hour ago and they are going to get someone in to talk and pray with patient.    Called and spoke with patient.  Notified her of the above.  Patient reports the DON or nursing supervisor was in to talk with her, reports the DON was going to go get her snacks that she can eat since she doesn't have anyone that can bring her anything.   Patient stated she was told she does have pneumonia and waiting to see what they order to treat it.  Patient was thankful for rachelle and adam's attempts at talking with the DON and nursing supervisor.    IRVIN.

## 2025-06-24 NOTE — TELEPHONE ENCOUNTER
We can put in a referral for telechaplaincy as well.   They can reach out to her virtual visit while she is in the SNF.  Let me know if you need help putting in the referral.  Prema

## 2025-06-30 ENCOUNTER — TELEPHONE (OUTPATIENT)
Dept: FAMILY MEDICINE CLINIC | Age: 55
End: 2025-06-30

## 2025-06-30 NOTE — TELEPHONE ENCOUNTER
Care Transitions Initial Follow Up Call    Outreach made within 2 business days of discharge: Yes    Patient: Sarah Stock Patient : 1970   MRN: [id number]  Reason for Admission: vision changes, thrombosis of superior sagittal sinus, acute encephalopathy; psychosis  Discharge Date: 25       Spoke with: patient    Discharge department/facility: [facility] Interactive Patient Contact:  Was patient able to fill all prescriptions: Yes  Was patient instructed to bring all medications to the follow-up visit: Yes  Is patient taking all medications as directed in the discharge summary? Yes  Does patient understand their discharge instructions: Yes  Does patient have questions or concerns that need addressed prior to 7-14 day follow up office visit: no    Additional needs identified to be addressed with provider  No needs identified             Scheduled appointment with PCP within 7-14 days    Follow Up  Future Appointments   Date Time Provider Department Center   7/3/2025  1:00 PM Tab Farmer APRN - CNP [facility] DEP   2025  1:30 PM Reji Pinto DO [facility] SIDNEY CLAUDIO LPN

## 2025-07-03 ENCOUNTER — OFFICE VISIT (OUTPATIENT)
Dept: FAMILY MEDICINE CLINIC | Age: 55
End: 2025-07-03

## 2025-07-03 VITALS
BODY MASS INDEX: 33.97 KG/M2 | DIASTOLIC BLOOD PRESSURE: 74 MMHG | SYSTOLIC BLOOD PRESSURE: 128 MMHG | TEMPERATURE: 98.2 F | RESPIRATION RATE: 18 BRPM | HEIGHT: 64 IN | WEIGHT: 199 LBS | HEART RATE: 72 BPM | OXYGEN SATURATION: 98 %

## 2025-07-03 DIAGNOSIS — Z09 HOSPITAL DISCHARGE FOLLOW-UP: Primary | ICD-10-CM

## 2025-07-03 DIAGNOSIS — Z78.9 NEED FOR COMMUNITY RESOURCE: ICD-10-CM

## 2025-07-03 DIAGNOSIS — M62.81 DECREASED MUSCLE STRENGTH: ICD-10-CM

## 2025-07-03 NOTE — PROGRESS NOTES
Post-Discharge Transitional Care  Follow Up      aSrah Stock   YOB: 1970    Date of Office Visit:  7/3/2025  Date of Hospital Admission: 6/9/25  Date of Hospital Discharge: 6/28/25  Risk of hospital readmission (high >=14%. Medium >=10%) :Readmission Risk Score: 10.3      Care management risk score Rising risk (score 2-5) and Complex Care (Scores >=6): No Risk Score On File     Non face to face  following discharge, date last encounter closed (first attempt may have been earlier): 06/30/2025    Call initiated 2 business days of discharge: Yes    ASSESSMENT/PLAN:   Hospital discharge follow-up  -     [place] DISCHARGE MEDS RECONCILED W/ CURRENT OUTPATIENT MED LIST  Decreased muscle strength  -     Non BS - External Referral To Home Health  Need for community resource  -     [facility]    Medical Decision Making: moderate complexity  Return if symptoms worsen or fail to improve.           Subjective:   HPI:  Follow up of Hospital problems/diagnosis(es): transient neurological symptoms; toxic metabolic encephalopathy     Pt was admitted to [facility] from 6/9-6/19/25 for transient neurological symptoms.    Reports blurry vision and confusion after she took an edible, which led her to seek care at the hospital initially.    Visual Disturbance - of unclear etiology.  Initially suspected possible dural venous thrombosis from CTA.  MRI/MRV negative and ruled out thrombosis for her symptoms.      Reported that she hit the right side of her face on the night stand several days prior to 6/9/25.    CT MAXILLOFACIAL WO CONTRAST  Result Date: 6/10/2025  1.  No acute fracture or dislocation. 2.  Degenerative changes are present in the visualized spine.       In the hospital, her blurry vision was thought to be related to side effect of multiple medications that could affect cerebellar function and vision including primidone, clonazepam,
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
## 2025-07-07 ENCOUNTER — TELEPHONE (OUTPATIENT)
Age: 55
End: 2025-07-07

## 2025-07-08 ENCOUNTER — TELEPHONE (OUTPATIENT)
Dept: FAMILY MEDICINE CLINIC | Age: 55
End: 2025-07-08

## 2025-07-08 NOTE — TELEPHONE ENCOUNTER
Patient called into the office stating that she is upset because the hospital and nursing home changed her medications and they are not the way they were before when Dr. Pinto had them prescibed.  Patient states that she is concerned and would like to have her medication changed back to what Dr. Pinto had her taking prior to her hospitalization.  The medications that are not on her list or that have changed are: Ativan PRN last ordered on 04/23/25 Dr. Pinto ordered 10 she still has 9 pills, patient takes as needed for anxiety, Tramadol 50 mg Q 6  hours as needed for pain, Primadone 250 mg three times daily, Bentyl should be as needed not scheduled, and the patient is titrating off the cymbalta, and melatonin 10 mg at bedtime.  Patient is scheduled to come in and see Dr. Colunga on 07/17/25.

## 2025-07-09 ENCOUNTER — TELEPHONE (OUTPATIENT)
Dept: FAMILY MEDICINE CLINIC | Age: 55
End: 2025-07-09

## 2025-07-09 DIAGNOSIS — E03.9 HYPOTHYROIDISM, UNSPECIFIED TYPE: ICD-10-CM

## 2025-07-09 DIAGNOSIS — G89.4 CHRONIC PAIN DISORDER: ICD-10-CM

## 2025-07-09 RX ORDER — LEVOTHYROXINE SODIUM 150 UG/1
150 TABLET ORAL DAILY
Qty: 90 TABLET | Refills: 1 | Status: SHIPPED | OUTPATIENT
Start: 2025-07-09

## 2025-07-09 RX ORDER — TRAMADOL HYDROCHLORIDE 50 MG/1
50 TABLET ORAL EVERY 6 HOURS PRN
Qty: 30 TABLET | Refills: 0 | Status: SHIPPED | OUTPATIENT
Start: 2025-07-09 | End: 2025-08-08

## 2025-07-09 NOTE — TELEPHONE ENCOUNTER
Patient called into the office and asked if we could change her 2nd hospital follow up appointment due to transportation.  Nurse changed the appointment to a virtual visit.  Patient also is asking for a fill on her pain medication to hold her over until her next appointment.  Patient is asking for a referral for a spine/back specialist, patient states that she prefers a mercy specialist if available.

## 2025-07-09 NOTE — TELEPHONE ENCOUNTER
Refill Request     CONFIRM preferred pharmacy with the patient.    If Mail Order Rx - Pend for 90 day refill.      Last Seen: Last Seen Department: 7/3/2025  Last Seen by PCP: 1/29/2025    Last Written: 10/08/2024 90 tab 1 refills     If no future appointment scheduled:  Review the last OV with PCP and review information for follow-up visit,  Route STAFF MESSAGE with patient name to the  Pool for scheduling with the following information:            -  Timing of next visit           -  Visit type ie Physical, OV, etc           -  Diagnoses/Reason ie. COPD, HTN - Do not use MEDICATION, Follow-up or CHECK UP - Give reason for visit      Next Appointment:   Future Appointments   Date Time Provider Department Center   7/17/2025 10:00 AM Caro Colunga MD [facility] DEP   9/29/2025  1:30 PM Reji Pinto DO [facility] DEP       Message sent to  to schedule appt with patient?  NO      Requested Prescriptions     Pending Prescriptions Disp Refills    levothyroxine (SYNTHROID) 150 MCG tablet 90 tablet 1     Sig: Take 1 tablet by mouth daily

## 2025-07-09 NOTE — TELEPHONE ENCOUNTER
Patient is asking for a script for her tramadol to be filled for short term to hold her over until her 2nd hospital follow up appointment.   Patient is a patient of Dr. Pinto's and has been taking tramadol for long time under his care.  Hospital stopped the tramadol and patient is having a lot of pain.  Patient coming in on 07/17/25 to go over her medications with Dr. Colunga.IRVIN, please advise.

## 2025-07-09 NOTE — TELEPHONE ENCOUNTER
.Refill Request - Controlled Substance    CONFIRM preferred pharmacy with the patient.    If Mail Order Rx - Pend for 90 day refill.        Last Seen Department: 7/3/2025  Last Seen by PCP: 1/29/2025    Last Written: 4-14-25 30 with 0     Last UDS: 6-10-25    Med Agreement Signed On: 2-26-19    If no future appointment scheduled:  Review the last OV with PCP and review information for follow-up visit,  Route STAFF MESSAGE with patient name to the  Pool for scheduling with the following information:            -  Timing of next visit           -  Visit type ie Physical, OV, etc           -  Diagnoses/Reason ie. COPD, HTN - Do not use MEDICATION, Follow-up or CHECK UP - Give reason for visit        Next Appointment:   Future Appointments   Date Time Provider Department Center   7/17/2025 10:00 AM [facility]   9/29/2025  1:30 PM Reji Pinto DO [facility] DEP       Message sent to  to schedule appt with patient?  NO      Requested Prescriptions     Pending Prescriptions Disp Refills    traMADol (ULTRAM) 50 MG tablet [Pharmacy Med Name: traMADol HCL 50MG TABLET] 30 tablet      Sig: TAKE 1 TABLET BY MOUTH EVERY 6 HOURS AS NEEDED FOR PAIN FOR UP TO 30 DAYS. MAX OF 4 TABLETS DAILY. REDUCE DOSES TAKEN AS PAIN BECOMES MANAGEABLE.

## 2025-07-10 ENCOUNTER — TELEPHONE (OUTPATIENT)
Dept: OTHER | Age: 55
End: 2025-07-10

## 2025-07-10 NOTE — TELEPHONE ENCOUNTER
PP--CHW    CHW attempted to reach patient. After several rings, voicemail came on. Message was left for patient to return the phone call.

## 2025-07-14 ENCOUNTER — TELEPHONE (OUTPATIENT)
Dept: FAMILY MEDICINE CLINIC | Age: 55
End: 2025-07-14

## 2025-07-14 NOTE — TELEPHONE ENCOUNTER
Bridget Garland fax received requesting the patient be changed from xarelto 20mg tablet once daily, to dabigatra (pradaxa generic) 75 mg or 150mg capsules     Please advise.

## 2025-07-15 NOTE — TELEPHONE ENCOUNTER
PA needed for xarelto 20 mg.   PA needed if patient has exceeded what they will cover.   Spoke to Ayde at [facility].     I am asking for the form that was faxed her from my co worker to review it.

## 2025-07-15 NOTE — TELEPHONE ENCOUNTER
Patient called into the office and stated that she does not want her blood thinner medication changed.  Patient is very upset and states that she does not want the medication changed.  Patient states that she is doing good on that medication and wants to keep that way.  Patient states that she has tried coumadin and pradaxa in the past and did not have good results with them.  Patient is currently trying to fix the changes that the nursing home and hospital made to her current medications.  Patient is strongly advising that she does not want her blood thinner xarelto discontinued.

## 2025-07-16 NOTE — TELEPHONE ENCOUNTER
Patient advised. She stated that she never requested this refill that it is the rehab center that requested these.

## 2025-07-16 NOTE — TELEPHONE ENCOUNTER
Submitted PA for Xarelto 20MG tablets   Via CMM Key: [id number]  STATUS: Prior Authorization Not Required     Please notify patient. Thank you.

## 2025-07-16 NOTE — TELEPHONE ENCOUNTER
LVM asking for patient to return call.    I Spoke to pharmacy & since It is too soon to fill they get a rejection message, recommended calling 7/24 to check status as that is the earliest fill date.

## 2025-07-17 ENCOUNTER — COMMUNITY OUTREACH (OUTPATIENT)
Dept: OTHER | Age: 55
End: 2025-07-17

## 2025-07-17 ENCOUNTER — TELEMEDICINE (OUTPATIENT)
Dept: FAMILY MEDICINE CLINIC | Age: 55
End: 2025-07-17
Payer: COMMERCIAL

## 2025-07-17 DIAGNOSIS — D68.59 PROTEIN S DEFICIENCY: ICD-10-CM

## 2025-07-17 DIAGNOSIS — F33.2 SEVERE EPISODE OF RECURRENT MAJOR DEPRESSIVE DISORDER, WITHOUT PSYCHOTIC FEATURES (HCC): ICD-10-CM

## 2025-07-17 DIAGNOSIS — G25.0 ESSENTIAL TREMOR: Primary | ICD-10-CM

## 2025-07-17 DIAGNOSIS — F41.9 ANXIETY: ICD-10-CM

## 2025-07-17 DIAGNOSIS — Z86.59 HISTORY OF PSYCHOSIS: ICD-10-CM

## 2025-07-17 DIAGNOSIS — F29 PSYCHOSIS, UNSPECIFIED PSYCHOSIS TYPE (HCC): ICD-10-CM

## 2025-07-17 PROCEDURE — 99215 OFFICE O/P EST HI 40 MIN: CPT | Performed by: FAMILY MEDICINE

## 2025-07-17 PROCEDURE — 99417 PROLNG OP E/M EACH 15 MIN: CPT | Performed by: FAMILY MEDICINE

## 2025-07-17 RX ORDER — PROPRANOLOL HYDROCHLORIDE 60 MG/1
60 CAPSULE, EXTENDED RELEASE ORAL DAILY
Qty: 30 CAPSULE | Refills: 0 | Status: SHIPPED | OUTPATIENT
Start: 2025-07-17

## 2025-07-17 RX ORDER — LORAZEPAM 1 MG/1
1 TABLET ORAL EVERY 8 HOURS PRN
COMMUNITY

## 2025-07-17 RX ORDER — PRIMIDONE 250 MG/1
250 TABLET ORAL 3 TIMES DAILY
Qty: 270 TABLET | Refills: 1 | Status: CANCELLED | OUTPATIENT
Start: 2025-07-17

## 2025-07-17 RX ORDER — ONDANSETRON 4 MG/1
4 TABLET, FILM COATED ORAL EVERY 8 HOURS PRN
COMMUNITY

## 2025-07-17 RX ORDER — CETIRIZINE HYDROCHLORIDE 10 MG/1
10 TABLET ORAL DAILY
COMMUNITY
Start: 2025-07-07

## 2025-07-17 SDOH — ECONOMIC STABILITY: FOOD INSECURITY: WITHIN THE PAST 12 MONTHS, YOU WORRIED THAT YOUR FOOD WOULD RUN OUT BEFORE YOU GOT MONEY TO BUY MORE.: OFTEN TRUE

## 2025-07-17 SDOH — ECONOMIC STABILITY: FOOD INSECURITY: WITHIN THE PAST 12 MONTHS, THE FOOD YOU BOUGHT JUST DIDN'T LAST AND YOU DIDN'T HAVE MONEY TO GET MORE.: OFTEN TRUE

## 2025-07-17 ASSESSMENT — PATIENT HEALTH QUESTIONNAIRE - PHQ9
6. FEELING BAD ABOUT YOURSELF - OR THAT YOU ARE A FAILURE OR HAVE LET YOURSELF OR YOUR FAMILY DOWN: NEARLY EVERY DAY
8. MOVING OR SPEAKING SO SLOWLY THAT OTHER PEOPLE COULD HAVE NOTICED. OR THE OPPOSITE, BEING SO FIGETY OR RESTLESS THAT YOU HAVE BEEN MOVING AROUND A LOT MORE THAN USUAL: NOT AT ALL
9. THOUGHTS THAT YOU WOULD BE BETTER OFF DEAD, OR OF HURTING YOURSELF: NOT AT ALL
3. TROUBLE FALLING OR STAYING ASLEEP: MORE THAN HALF THE DAYS
1. LITTLE INTEREST OR PLEASURE IN DOING THINGS: NEARLY EVERY DAY
7. TROUBLE CONCENTRATING ON THINGS, SUCH AS READING THE NEWSPAPER OR WATCHING TELEVISION: NOT AT ALL
SUM OF ALL RESPONSES TO PHQ QUESTIONS 1-9: 17
5. POOR APPETITE OR OVEREATING: NEARLY EVERY DAY
4. FEELING TIRED OR HAVING LITTLE ENERGY: NEARLY EVERY DAY
SUM OF ALL RESPONSES TO PHQ QUESTIONS 1-9: 17
SUM OF ALL RESPONSES TO PHQ QUESTIONS 1-9: 17
2. FEELING DOWN, DEPRESSED OR HOPELESS: NEARLY EVERY DAY
10. IF YOU CHECKED OFF ANY PROBLEMS, HOW DIFFICULT HAVE THESE PROBLEMS MADE IT FOR YOU TO DO YOUR WORK, TAKE CARE OF THINGS AT HOME, OR GET ALONG WITH OTHER PEOPLE: VERY DIFFICULT
SUM OF ALL RESPONSES TO PHQ QUESTIONS 1-9: 17

## 2025-07-17 NOTE — PROGRESS NOTES
0.6     Est, Glom Filt Rate 06/11/2025 >90     Calcium 06/11/2025 8.7     WBC 06/11/2025 3.7 (L)     RBC 06/11/2025 3.64 (L)     Hemoglobin 06/11/2025 11.8 (L)     Hematocrit 06/11/2025 33.2 (L)     MCV 06/11/2025 91.3     MCH 06/11/2025 32.4     MCHC 06/11/2025 35.5     RDW 06/11/2025 15.0     Platelets 06/11/2025 156     MPV 06/11/2025 8.1     Neutrophils % 06/11/2025 44.9     Lymphocytes % 06/11/2025 42.8     Monocytes % 06/11/2025 8.2     Eosinophils % 06/11/2025 3.8     Basophils % 06/11/2025 0.3     Neutrophils Absolute 06/11/2025 1.7     Lymphocytes Absolute 06/11/2025 1.6     Monocytes Absolute 06/11/2025 0.3     Eosinophils Absolute 06/11/2025 0.1     Basophils Absolute 06/11/2025 0.0     WBC 06/12/2025 3.0 (L)     RBC 06/12/2025 3.60 (L)     Hemoglobin 06/12/2025 11.8 (L)     Hematocrit 06/12/2025 33.4 (L)     MCV 06/12/2025 92.7     MCH 06/12/2025 32.6     MCHC 06/12/2025 35.2     RDW 06/12/2025 14.9     Platelets 06/12/2025 136     MPV 06/12/2025 7.9     Sodium 06/12/2025 139     Potassium 06/12/2025 3.5     Chloride 06/12/2025 103     CO2 06/12/2025 23     Anion Gap 06/12/2025 13     Glucose 06/12/2025 86     BUN 06/12/2025 6 (L)     Creatinine 06/12/2025 0.7     Est, Glom Filt Rate 06/12/2025 >90     Calcium 06/12/2025 8.5     Phosphorus 06/12/2025 3.5     Albumin 06/12/2025 3.9     Sed Rate, Automated 06/12/2025 <0     CRP 06/12/2025 16.6 (H)     RODRICK 06/12/2025 Negative     Thyroid Peroxidase (TPO)* 06/12/2025 <9     Anti-Thyroglob Abs 06/12/2025 <15     Misc Test Order 06/12/2025 SEE NOTE     WBC 06/13/2025 3.1 (L)     RBC 06/13/2025 3.38 (L)     Hemoglobin 06/13/2025 11.0 (L)     Hematocrit 06/13/2025 31.2 (L)     MCV 06/13/2025 92.3     MCH 06/13/2025 32.4     MCHC 06/13/2025 35.1     RDW 06/13/2025 14.9     Platelets 06/13/2025 161     MPV 06/13/2025 7.7     Sodium 06/13/2025 141     Potassium 06/13/2025 4.9     Chloride 06/13/2025 105     CO2 06/13/2025 28     Anion Gap 06/13/2025 8     Glucose

## 2025-07-17 NOTE — PROGRESS NOTES
[facility]--CHW    CHW called patient back. Patient was crying once she answered the phone. She apologized for missing our appointment, last home visit. She states she was in a rehab facility where she was on medications that made her hallucinate, not be able to remember certain things. Patient continued to cry on the phone as she spoke about her physician being on paternity leave and the Director seeing her in his place. Patient is very concerned about her medications and whether or not they agree with her. Patient stated that she does not have much douglas in anything anymore. She went on to say she does not even know if CHW can assist her because [facility] told patient that she does not qualify for Medicaid because she makes too much money. CHW offered words of encouragement and patient thanked CHW for trying to uplift her spirits.    CHW set up our next home visit for July 25th, Friday, at 1 pm.

## 2025-07-17 NOTE — PATIENT INSTRUCTIONS
[facility] Utility - Financial Resources*  (Call [employer]/211 if need more resources.)          The  Helpline is your gateway to essential community services--available 24 hours a day, 7 days a week. Our trained team listens, asks questions, and connects you to local resources like food, housing, transportation, utilities, childcare, and more. For the most complete and up-to-date information, call 211 or search our resource database online.  www.[place] [address].org for resources in [place], [facility], [place], [place], [place], [place], and [facility] in [place]; [place], [place], [place], and [facility] in [place].  www.[employer].org/resources for resources in [place], [place], [place], [place], [place], [place], [facility], [place], and [facility] in [place].  Residents outside these regions can visit [employer] to find their local service.      Utility and Rent Assistance:    [facility]  What they offer:  Food, rent and utility assistance     Phone Number: 252.706.3838  Address: [address] [address]   Website: https://[facility]/    [facility] Community [employer] Agency    What they offer: Utility, food, diaper assistance   Phone: 742.288.5263   Website: www.[employer].org    [employer]   What they offer:  Rent and utility assistance      Phone Number: 163.979.4778  Address: [address], [address]   Website: https://www."[employer] Hospital[facility]q.org     [employer] ([employer])   What they offer: ?Utility assistance with heating bills        Phone Number: 506.137.8013   Address: [address] [address]    Website: https://www.[employer].org/what-we-do/community-services/utility-assistance-programs/wcp.html     [facility]  What they offer:  Rent, utility assistance and financial assistance   Phone Number: 698.870.3618  Address: [address] [place] [address]

## 2025-07-22 ENCOUNTER — TELEPHONE (OUTPATIENT)
Dept: FAMILY MEDICINE CLINIC | Age: 55
End: 2025-07-22

## 2025-07-22 NOTE — TELEPHONE ENCOUNTER
----- Message from Dr. Caro Colunga MD sent at 7/22/2025  6:39 AM EDT -----  Please let patient know I spoke to Dr. Pinto. He would like her to see psychiatry - I placed a referral for her. He will talk about her medications with her upon her return, but we will continue as is for now. He would like to see her as soon as he is back in the office.

## 2025-07-22 NOTE — TELEPHONE ENCOUNTER
Patient returned call to the office stating that she had gotten the wrong number from the previous person that was helping her.  Patient stated that she is so upset and believes she may have wrote the number down wrong.  Nurse asked the previous staff member from earlier and obtained the number and gave the number to the patient for her to call.  Patient was very upset and tearful.  Patient concerned about her medications.  Nurse able to  calm patient and advised the patient to call back anytime she needs to speak to someone, or has questions or concerns.  Patient verbalized understanding.

## 2025-07-22 NOTE — TELEPHONE ENCOUNTER
Talked with the patient and reassured that things will work out.  Patient stated that she did follow up and spoke with someone at [facility].  Patient has a lot of concerns about her insurance and is trying to apply for Medicaid.  Patient has an appointment with  Dhara on 07/28/25.

## 2025-07-22 NOTE — TELEPHONE ENCOUNTER
Patient called stating she has not felt right since she saw Dr. Colunga. She states she was told things she did not realize or understand and she has been sick all weekend. She states she has been beating herself up for what she was told. She states she was told that she had violated her contract for her controlled medications. She states she has beat herself up. She states she signed the contract with Dr. Weaver way back when and she did not know that medical marijuana was a violation of the contract. She would like to do a new contract with Dr. Pinto as she was not aware of this.

## 2025-07-22 NOTE — TELEPHONE ENCOUNTER
Patient aware. She states she had considered going to [facility] before but there were issues with her insurance. I advised the patient could see which ever Psychiatry she prefers but the referral we put in was for [facility] in [facility]. Advised if there is someone else she wants to see we can always place another referral. Patient asked if she could know what Dr. Pinto was told and what was discussed to make him agree or go with this plan. I advised that I do not have that information as Dr. Colunga spoke with Dr. Pinto and that I could ask her for information. She stated that changes like this really worry and scare her. I advised that no one wanted to scare her or make her uncomfortable and that the care team only wants to make sure she gets the care she needs.

## 2025-07-23 ENCOUNTER — TELEPHONE (OUTPATIENT)
Dept: FAMILY MEDICINE CLINIC | Age: 55
End: 2025-07-23

## 2025-07-23 DIAGNOSIS — Z86.59 HISTORY OF PSYCHOSIS: Primary | ICD-10-CM

## 2025-07-23 DIAGNOSIS — F33.2 SEVERE EPISODE OF RECURRENT MAJOR DEPRESSIVE DISORDER, WITHOUT PSYCHOTIC FEATURES (HCC): ICD-10-CM

## 2025-07-23 DIAGNOSIS — F41.9 ANXIETY: ICD-10-CM

## 2025-07-23 NOTE — TELEPHONE ENCOUNTER
Patient called into the office requesting if we had any psychiatric doctors with [facility] that will accept her [facility] financial aid.  Patient referred to [facility] and is not sure if she is going to be able to afford the appointments.  Nurse advised the patient to contact her insurance and ask to confirm what her co-pay would be for [facility].  Patient was told by [facility] two different amounts for co-pay and is confused about the actual cost.  Patient is also concerned because the MD at [facility] is not accepting new patients and she is scheduled to see a NP.  Patient would rather see an MD rather than a NP.  Nurse advised patient to give the NP a try and if she did not feel comfortable to be honest upfront and let them know.  Patient verbalized understanding and agreed.  Patient is schedule for 08/27/25 at 1 pm with [facility] with telehealth.  Patient wants to know if any of the doctors in our office can give a referral or suggest a pyschiatrist within [facility]. Patient also asking if someone in our office can get her scheduled with the MD at [facility] even though he is not accepting new patients.  FYI, please advise.

## 2025-07-25 ENCOUNTER — TELEPHONE (OUTPATIENT)
Dept: OTHER | Age: 55
End: 2025-07-25

## 2025-07-25 NOTE — TELEPHONE ENCOUNTER
[facility]--CHW    Patient left CHW a voicemail on her phone saying that she needed to cancel our appointment for today. CHW returned patients call. She did not answer, unable to leave voicemail. CHW will reschedule this appointment once she speaks with patient.

## 2025-08-02 ENCOUNTER — APPOINTMENT (OUTPATIENT)
Dept: CT IMAGING | Age: 55
End: 2025-08-02
Payer: COMMERCIAL

## 2025-08-02 ENCOUNTER — HOSPITAL ENCOUNTER (INPATIENT)
Age: 55
LOS: 13 days | Discharge: HOME HEALTH | End: 2025-08-15
Attending: STUDENT IN AN ORGANIZED HEALTH CARE EDUCATION/TRAINING PROGRAM | Admitting: STUDENT IN AN ORGANIZED HEALTH CARE EDUCATION/TRAINING PROGRAM
Payer: COMMERCIAL

## 2025-08-02 ENCOUNTER — APPOINTMENT (OUTPATIENT)
Dept: GENERAL RADIOLOGY | Age: 55
End: 2025-08-02
Payer: COMMERCIAL

## 2025-08-02 DIAGNOSIS — Z79.01 ON CONTINUOUS ORAL ANTICOAGULATION: ICD-10-CM

## 2025-08-02 DIAGNOSIS — F19.10 DRUG ABUSE (HCC): ICD-10-CM

## 2025-08-02 DIAGNOSIS — I82.403 DEEP VEIN THROMBOSIS (DVT) OF BOTH LOWER EXTREMITIES, UNSPECIFIED CHRONICITY, UNSPECIFIED VEIN (HCC): ICD-10-CM

## 2025-08-02 DIAGNOSIS — J96.01 ACUTE RESPIRATORY FAILURE WITH HYPOXIA (HCC): Primary | ICD-10-CM

## 2025-08-02 DIAGNOSIS — I82.402 DEEP VEIN THROMBOSIS (DVT) OF LEFT LOWER EXTREMITY, UNSPECIFIED CHRONICITY, UNSPECIFIED VEIN (HCC): ICD-10-CM

## 2025-08-02 DIAGNOSIS — F41.9 ANXIETY: ICD-10-CM

## 2025-08-02 DIAGNOSIS — R60.0 EDEMA OF LEFT LOWER EXTREMITY: ICD-10-CM

## 2025-08-02 DIAGNOSIS — G89.4 CHRONIC PAIN DISORDER: ICD-10-CM

## 2025-08-02 DIAGNOSIS — R29.6 FREQUENT FALLS: ICD-10-CM

## 2025-08-02 LAB
ALBUMIN SERPL-MCNC: 4.8 G/DL (ref 3.4–5)
ALBUMIN/GLOB SERPL: 2 {RATIO} (ref 1.1–2.2)
ALP SERPL-CCNC: 111 U/L (ref 40–129)
ALT SERPL-CCNC: 28 U/L (ref 10–40)
ANION GAP SERPL CALCULATED.3IONS-SCNC: 15 MMOL/L (ref 3–16)
ANTI-XA UNFRAC HEPARIN: >1.1 IU/ML (ref 0.3–0.7)
APTT BLD: >180 SEC (ref 22.8–35.8)
AST SERPL-CCNC: 30 U/L (ref 15–37)
BASE EXCESS BLDV CALC-SCNC: -1.5 MMOL/L (ref -3–3)
BASOPHILS # BLD: 0 K/UL (ref 0–0.2)
BASOPHILS NFR BLD: 0.5 %
BILIRUB SERPL-MCNC: 0.6 MG/DL (ref 0–1)
BUN SERPL-MCNC: 15 MG/DL (ref 7–20)
CALCIUM SERPL-MCNC: 9.2 MG/DL (ref 8.3–10.6)
CARBAMAZEPINE DOSE: ABNORMAL MG
CARBAMAZEPINE SERPL-MCNC: 2.5 UG/ML (ref 4–12)
CHLORIDE SERPL-SCNC: 100 MMOL/L (ref 99–110)
CO2 BLDV-SCNC: 24 MMOL/L
CO2 SERPL-SCNC: 22 MMOL/L (ref 21–32)
COHGB MFR BLDV: 2.9 % (ref 0–1.5)
CREAT SERPL-MCNC: 0.7 MG/DL (ref 0.6–1.1)
D-DIMER QUANTITATIVE: >20 UG/ML FEU (ref 0–0.6)
DEPRECATED RDW RBC AUTO: 14.4 % (ref 12.4–15.4)
DEPRECATED RDW RBC AUTO: 14.6 % (ref 12.4–15.4)
EKG ATRIAL RATE: 84 BPM
EKG DIAGNOSIS: NORMAL
EKG P AXIS: 51 DEGREES
EKG P-R INTERVAL: 148 MS
EKG Q-T INTERVAL: 374 MS
EKG QRS DURATION: 74 MS
EKG QTC CALCULATION (BAZETT): 441 MS
EKG R AXIS: -3 DEGREES
EKG T AXIS: 25 DEGREES
EKG VENTRICULAR RATE: 84 BPM
EOSINOPHIL # BLD: 0.1 K/UL (ref 0–0.6)
EOSINOPHIL NFR BLD: 2.8 %
FLUAV RNA RESP QL NAA+PROBE: NOT DETECTED
FLUBV RNA RESP QL NAA+PROBE: NOT DETECTED
GFR SERPLBLD CREATININE-BSD FMLA CKD-EPI: >90 ML/MIN/{1.73_M2}
GLUCOSE SERPL-MCNC: 89 MG/DL (ref 70–99)
HCO3 BLDV-SCNC: 22.6 MMOL/L (ref 23–29)
HCT VFR BLD AUTO: 34.6 % (ref 36–48)
HCT VFR BLD AUTO: 36 % (ref 36–48)
HGB BLD-MCNC: 12.1 G/DL (ref 12–16)
HGB BLD-MCNC: 12.3 G/DL (ref 12–16)
INR PPP: 1.26 (ref 0.86–1.14)
INR PPP: 1.26 (ref 0.86–1.14)
LYMPHOCYTES # BLD: 1.3 K/UL (ref 1–5.1)
LYMPHOCYTES NFR BLD: 24.2 %
MCH RBC QN AUTO: 32 PG (ref 26–34)
MCH RBC QN AUTO: 32.7 PG (ref 26–34)
MCHC RBC AUTO-ENTMCNC: 34.3 G/DL (ref 31–36)
MCHC RBC AUTO-ENTMCNC: 34.9 G/DL (ref 31–36)
MCV RBC AUTO: 93.4 FL (ref 80–100)
MCV RBC AUTO: 93.7 FL (ref 80–100)
METHGB MFR BLDV: 0.1 %
MONOCYTES # BLD: 0.4 K/UL (ref 0–1.3)
MONOCYTES NFR BLD: 6.9 %
NEUTROPHILS # BLD: 3.4 K/UL (ref 1.7–7.7)
NEUTROPHILS NFR BLD: 65.6 %
NT-PROBNP SERPL-MCNC: 279 PG/ML (ref 0–124)
O2 THERAPY: ABNORMAL
PCO2 BLDV: 36.3 MMHG (ref 40–50)
PH BLDV: 7.41 [PH] (ref 7.35–7.45)
PLATELET # BLD AUTO: 138 K/UL (ref 135–450)
PLATELET # BLD AUTO: 99 K/UL (ref 135–450)
PMV BLD AUTO: 9.1 FL (ref 5–10.5)
PMV BLD AUTO: 9.3 FL (ref 5–10.5)
PO2 BLDV: 31.6 MMHG (ref 25–40)
POTASSIUM SERPL-SCNC: 4.1 MMOL/L (ref 3.5–5.1)
PROT SERPL-MCNC: 7.2 G/DL (ref 6.4–8.2)
PROTHROMBIN TIME: 16 SEC (ref 12.1–14.9)
PROTHROMBIN TIME: 16 SEC (ref 12.1–14.9)
RBC # BLD AUTO: 3.69 M/UL (ref 4–5.2)
RBC # BLD AUTO: 3.85 M/UL (ref 4–5.2)
SAO2 % BLDV: 59 %
SARS-COV-2 RNA RESP QL NAA+PROBE: NOT DETECTED
SODIUM SERPL-SCNC: 137 MMOL/L (ref 136–145)
TROPONIN, HIGH SENSITIVITY: 11 NG/L (ref 0–14)
TROPONIN, HIGH SENSITIVITY: 13 NG/L (ref 0–14)
WBC # BLD AUTO: 4.9 K/UL (ref 4–11)
WBC # BLD AUTO: 5.2 K/UL (ref 4–11)

## 2025-08-02 PROCEDURE — 96374 THER/PROPH/DIAG INJ IV PUSH: CPT

## 2025-08-02 PROCEDURE — 82746 ASSAY OF FOLIC ACID SERUM: CPT

## 2025-08-02 PROCEDURE — 6360000002 HC RX W HCPCS: Performed by: STUDENT IN AN ORGANIZED HEALTH CARE EDUCATION/TRAINING PROGRAM

## 2025-08-02 PROCEDURE — 36415 COLL VENOUS BLD VENIPUNCTURE: CPT

## 2025-08-02 PROCEDURE — 6370000000 HC RX 637 (ALT 250 FOR IP): Performed by: INTERNAL MEDICINE

## 2025-08-02 PROCEDURE — 82607 VITAMIN B-12: CPT

## 2025-08-02 PROCEDURE — 6360000002 HC RX W HCPCS: Performed by: INTERNAL MEDICINE

## 2025-08-02 PROCEDURE — 85730 THROMBOPLASTIN TIME PARTIAL: CPT

## 2025-08-02 PROCEDURE — 80053 COMPREHEN METABOLIC PANEL: CPT

## 2025-08-02 PROCEDURE — 85379 FIBRIN DEGRADATION QUANT: CPT

## 2025-08-02 PROCEDURE — 2700000000 HC OXYGEN THERAPY PER DAY

## 2025-08-02 PROCEDURE — 99285 EMERGENCY DEPT VISIT HI MDM: CPT

## 2025-08-02 PROCEDURE — APPSS45 APP SPLIT SHARED TIME 31-45 MINUTES

## 2025-08-02 PROCEDURE — 80156 ASSAY CARBAMAZEPINE TOTAL: CPT

## 2025-08-02 PROCEDURE — 93010 ELECTROCARDIOGRAM REPORT: CPT | Performed by: INTERNAL MEDICINE

## 2025-08-02 PROCEDURE — 2060000000 HC ICU INTERMEDIATE R&B

## 2025-08-02 PROCEDURE — 93005 ELECTROCARDIOGRAM TRACING: CPT | Performed by: STUDENT IN AN ORGANIZED HEALTH CARE EDUCATION/TRAINING PROGRAM

## 2025-08-02 PROCEDURE — 82803 BLOOD GASES ANY COMBINATION: CPT

## 2025-08-02 PROCEDURE — 85520 HEPARIN ASSAY: CPT

## 2025-08-02 PROCEDURE — 84484 ASSAY OF TROPONIN QUANT: CPT

## 2025-08-02 PROCEDURE — 85610 PROTHROMBIN TIME: CPT

## 2025-08-02 PROCEDURE — 73502 X-RAY EXAM HIP UNI 2-3 VIEWS: CPT

## 2025-08-02 PROCEDURE — 6370000000 HC RX 637 (ALT 250 FOR IP): Performed by: STUDENT IN AN ORGANIZED HEALTH CARE EDUCATION/TRAINING PROGRAM

## 2025-08-02 PROCEDURE — 6370000000 HC RX 637 (ALT 250 FOR IP)

## 2025-08-02 PROCEDURE — 83880 ASSAY OF NATRIURETIC PEPTIDE: CPT

## 2025-08-02 PROCEDURE — 71045 X-RAY EXAM CHEST 1 VIEW: CPT

## 2025-08-02 PROCEDURE — 70450 CT HEAD/BRAIN W/O DYE: CPT

## 2025-08-02 PROCEDURE — 85027 COMPLETE CBC AUTOMATED: CPT

## 2025-08-02 PROCEDURE — 71260 CT THORAX DX C+: CPT

## 2025-08-02 PROCEDURE — 85025 COMPLETE CBC W/AUTO DIFF WBC: CPT

## 2025-08-02 PROCEDURE — 94761 N-INVAS EAR/PLS OXIMETRY MLT: CPT

## 2025-08-02 PROCEDURE — 87636 SARSCOV2 & INF A&B AMP PRB: CPT

## 2025-08-02 PROCEDURE — 6360000004 HC RX CONTRAST MEDICATION: Performed by: STUDENT IN AN ORGANIZED HEALTH CARE EDUCATION/TRAINING PROGRAM

## 2025-08-02 RX ORDER — ONDANSETRON 4 MG/1
4 TABLET, ORALLY DISINTEGRATING ORAL EVERY 8 HOURS PRN
Status: DISCONTINUED | OUTPATIENT
Start: 2025-08-02 | End: 2025-08-15 | Stop reason: HOSPADM

## 2025-08-02 RX ORDER — PROPRANOLOL HYDROCHLORIDE 60 MG/1
60 CAPSULE, EXTENDED RELEASE ORAL DAILY
Status: DISCONTINUED | OUTPATIENT
Start: 2025-08-02 | End: 2025-08-15 | Stop reason: HOSPADM

## 2025-08-02 RX ORDER — ATORVASTATIN CALCIUM 40 MG/1
40 TABLET, FILM COATED ORAL DAILY
Status: DISCONTINUED | OUTPATIENT
Start: 2025-08-02 | End: 2025-08-15 | Stop reason: HOSPADM

## 2025-08-02 RX ORDER — HEPARIN SODIUM 1000 [USP'U]/ML
80 INJECTION, SOLUTION INTRAVENOUS; SUBCUTANEOUS ONCE
Status: DISCONTINUED | OUTPATIENT
Start: 2025-08-02 | End: 2025-08-02

## 2025-08-02 RX ORDER — SODIUM CHLORIDE 0.9 % (FLUSH) 0.9 %
5-40 SYRINGE (ML) INJECTION EVERY 12 HOURS SCHEDULED
Status: DISCONTINUED | OUTPATIENT
Start: 2025-08-02 | End: 2025-08-14 | Stop reason: SDUPTHER

## 2025-08-02 RX ORDER — POLYETHYLENE GLYCOL 3350 17 G/17G
17 POWDER, FOR SOLUTION ORAL DAILY PRN
Status: DISCONTINUED | OUTPATIENT
Start: 2025-08-02 | End: 2025-08-15 | Stop reason: HOSPADM

## 2025-08-02 RX ORDER — LEVOTHYROXINE SODIUM 150 UG/1
150 TABLET ORAL DAILY
Status: DISCONTINUED | OUTPATIENT
Start: 2025-08-02 | End: 2025-08-15 | Stop reason: HOSPADM

## 2025-08-02 RX ORDER — HEPARIN SODIUM 1000 [USP'U]/ML
80 INJECTION, SOLUTION INTRAVENOUS; SUBCUTANEOUS PRN
Status: DISCONTINUED | OUTPATIENT
Start: 2025-08-02 | End: 2025-08-05

## 2025-08-02 RX ORDER — DULOXETIN HYDROCHLORIDE 60 MG/1
60 CAPSULE, DELAYED RELEASE ORAL DAILY
Status: DISCONTINUED | OUTPATIENT
Start: 2025-08-02 | End: 2025-08-02

## 2025-08-02 RX ORDER — HEPARIN SODIUM 1000 [USP'U]/ML
80 INJECTION, SOLUTION INTRAVENOUS; SUBCUTANEOUS ONCE
Status: COMPLETED | OUTPATIENT
Start: 2025-08-02 | End: 2025-08-02

## 2025-08-02 RX ORDER — HEPARIN SODIUM 1000 [USP'U]/ML
40 INJECTION, SOLUTION INTRAVENOUS; SUBCUTANEOUS PRN
Status: DISCONTINUED | OUTPATIENT
Start: 2025-08-02 | End: 2025-08-02

## 2025-08-02 RX ORDER — HEPARIN SODIUM 10000 [USP'U]/100ML
5-30 INJECTION, SOLUTION INTRAVENOUS CONTINUOUS
Status: DISCONTINUED | OUTPATIENT
Start: 2025-08-02 | End: 2025-08-05

## 2025-08-02 RX ORDER — POTASSIUM CHLORIDE 1500 MG/1
40 TABLET, EXTENDED RELEASE ORAL PRN
Status: DISCONTINUED | OUTPATIENT
Start: 2025-08-02 | End: 2025-08-04

## 2025-08-02 RX ORDER — CYANOCOBALAMIN 1000 UG/ML
1000 INJECTION, SOLUTION INTRAMUSCULAR; SUBCUTANEOUS WEEKLY
Status: DISCONTINUED | OUTPATIENT
Start: 2025-08-09 | End: 2025-08-15 | Stop reason: HOSPADM

## 2025-08-02 RX ORDER — MAGNESIUM SULFATE IN WATER 40 MG/ML
2000 INJECTION, SOLUTION INTRAVENOUS PRN
Status: DISCONTINUED | OUTPATIENT
Start: 2025-08-02 | End: 2025-08-04

## 2025-08-02 RX ORDER — ACETAMINOPHEN 500 MG
1000 TABLET ORAL ONCE
Status: COMPLETED | OUTPATIENT
Start: 2025-08-02 | End: 2025-08-02

## 2025-08-02 RX ORDER — HEPARIN SODIUM 1000 [USP'U]/ML
80 INJECTION, SOLUTION INTRAVENOUS; SUBCUTANEOUS PRN
Status: DISCONTINUED | OUTPATIENT
Start: 2025-08-02 | End: 2025-08-02

## 2025-08-02 RX ORDER — IOPAMIDOL 755 MG/ML
75 INJECTION, SOLUTION INTRAVASCULAR
Status: COMPLETED | OUTPATIENT
Start: 2025-08-02 | End: 2025-08-02

## 2025-08-02 RX ORDER — CARBAMAZEPINE 200 MG/1
400 TABLET, EXTENDED RELEASE ORAL 2 TIMES DAILY
Status: DISCONTINUED | OUTPATIENT
Start: 2025-08-02 | End: 2025-08-15 | Stop reason: HOSPADM

## 2025-08-02 RX ORDER — POTASSIUM CHLORIDE 7.45 MG/ML
10 INJECTION INTRAVENOUS PRN
Status: DISCONTINUED | OUTPATIENT
Start: 2025-08-02 | End: 2025-08-04

## 2025-08-02 RX ORDER — HEPARIN SODIUM 1000 [USP'U]/ML
40 INJECTION, SOLUTION INTRAVENOUS; SUBCUTANEOUS PRN
Status: DISCONTINUED | OUTPATIENT
Start: 2025-08-02 | End: 2025-08-05

## 2025-08-02 RX ORDER — ACETAMINOPHEN 650 MG/1
650 SUPPOSITORY RECTAL EVERY 6 HOURS PRN
Status: DISCONTINUED | OUTPATIENT
Start: 2025-08-02 | End: 2025-08-15 | Stop reason: HOSPADM

## 2025-08-02 RX ORDER — SODIUM CHLORIDE 9 MG/ML
INJECTION, SOLUTION INTRAVENOUS PRN
Status: DISCONTINUED | OUTPATIENT
Start: 2025-08-02 | End: 2025-08-11 | Stop reason: SDUPTHER

## 2025-08-02 RX ORDER — DULOXETIN HYDROCHLORIDE 30 MG/1
30 CAPSULE, DELAYED RELEASE ORAL DAILY
Status: DISCONTINUED | OUTPATIENT
Start: 2025-08-03 | End: 2025-08-15 | Stop reason: HOSPADM

## 2025-08-02 RX ORDER — HEPARIN SODIUM 10000 [USP'U]/100ML
5-30 INJECTION, SOLUTION INTRAVENOUS CONTINUOUS
Status: DISCONTINUED | OUTPATIENT
Start: 2025-08-02 | End: 2025-08-02

## 2025-08-02 RX ORDER — SODIUM CHLORIDE 0.9 % (FLUSH) 0.9 %
5-40 SYRINGE (ML) INJECTION PRN
Status: DISCONTINUED | OUTPATIENT
Start: 2025-08-02 | End: 2025-08-14 | Stop reason: SDUPTHER

## 2025-08-02 RX ORDER — ONDANSETRON 2 MG/ML
4 INJECTION INTRAMUSCULAR; INTRAVENOUS EVERY 6 HOURS PRN
Status: DISCONTINUED | OUTPATIENT
Start: 2025-08-02 | End: 2025-08-15 | Stop reason: HOSPADM

## 2025-08-02 RX ORDER — ACETAMINOPHEN 325 MG/1
650 TABLET ORAL EVERY 6 HOURS PRN
Status: DISCONTINUED | OUTPATIENT
Start: 2025-08-02 | End: 2025-08-15 | Stop reason: HOSPADM

## 2025-08-02 RX ORDER — CLONAZEPAM 1 MG/1
1 TABLET ORAL 2 TIMES DAILY
Status: DISCONTINUED | OUTPATIENT
Start: 2025-08-02 | End: 2025-08-15 | Stop reason: HOSPADM

## 2025-08-02 RX ORDER — ONDANSETRON 2 MG/ML
4 INJECTION INTRAMUSCULAR; INTRAVENOUS ONCE
Status: COMPLETED | OUTPATIENT
Start: 2025-08-02 | End: 2025-08-02

## 2025-08-02 RX ORDER — CYANOCOBALAMIN 1000 UG/ML
1000 INJECTION, SOLUTION INTRAMUSCULAR; SUBCUTANEOUS ONCE
Status: COMPLETED | OUTPATIENT
Start: 2025-08-02 | End: 2025-08-02

## 2025-08-02 RX ORDER — PANTOPRAZOLE SODIUM 40 MG/1
40 TABLET, DELAYED RELEASE ORAL
Status: DISCONTINUED | OUTPATIENT
Start: 2025-08-02 | End: 2025-08-15 | Stop reason: HOSPADM

## 2025-08-02 RX ADMIN — ONDANSETRON 4 MG: 2 INJECTION, SOLUTION INTRAMUSCULAR; INTRAVENOUS at 05:55

## 2025-08-02 RX ADMIN — PANTOPRAZOLE SODIUM 40 MG: 40 TABLET, DELAYED RELEASE ORAL at 16:28

## 2025-08-02 RX ADMIN — HEPARIN SODIUM 7300 UNITS: 1000 INJECTION, SOLUTION INTRAVENOUS; SUBCUTANEOUS at 10:39

## 2025-08-02 RX ADMIN — LEVOTHYROXINE SODIUM 150 MCG: 0.15 TABLET ORAL at 14:26

## 2025-08-02 RX ADMIN — CYANOCOBALAMIN 1000 MCG: 1000 INJECTION, SOLUTION INTRAMUSCULAR; SUBCUTANEOUS at 14:26

## 2025-08-02 RX ADMIN — CLONAZEPAM 1 MG: 1 TABLET ORAL at 14:26

## 2025-08-02 RX ADMIN — CARBAMAZEPINE 400 MG: 200 TABLET, EXTENDED RELEASE ORAL at 20:38

## 2025-08-02 RX ADMIN — ATORVASTATIN CALCIUM 40 MG: 40 TABLET, FILM COATED ORAL at 14:26

## 2025-08-02 RX ADMIN — IOPAMIDOL 75 ML: 755 INJECTION, SOLUTION INTRAVENOUS at 08:54

## 2025-08-02 RX ADMIN — HEPARIN SODIUM AND DEXTROSE 18 UNITS/KG/HR: 10000; 5 INJECTION INTRAVENOUS at 10:41

## 2025-08-02 RX ADMIN — ACETAMINOPHEN 1000 MG: 500 TABLET ORAL at 05:54

## 2025-08-02 RX ADMIN — CARBAMAZEPINE 400 MG: 200 TABLET, EXTENDED RELEASE ORAL at 14:26

## 2025-08-02 RX ADMIN — ACETAMINOPHEN 650 MG: 325 TABLET ORAL at 20:45

## 2025-08-02 RX ADMIN — PROPRANOLOL HYDROCHLORIDE 60 MG: 60 CAPSULE, EXTENDED RELEASE ORAL at 14:27

## 2025-08-02 RX ADMIN — CLONAZEPAM 1 MG: 1 TABLET ORAL at 20:38

## 2025-08-02 ASSESSMENT — PAIN DESCRIPTION - LOCATION
LOCATION: HIP;HEAD
LOCATION: HIP;HEAD
LOCATION: HEAD

## 2025-08-02 ASSESSMENT — PAIN SCALES - GENERAL
PAINLEVEL_OUTOF10: 0
PAINLEVEL_OUTOF10: 9
PAINLEVEL_OUTOF10: 8
PAINLEVEL_OUTOF10: 5
PAINLEVEL_OUTOF10: 5

## 2025-08-02 ASSESSMENT — PAIN DESCRIPTION - ORIENTATION
ORIENTATION: RIGHT;LEFT
ORIENTATION: LEFT

## 2025-08-02 ASSESSMENT — PAIN DESCRIPTION - PAIN TYPE
TYPE: ACUTE PAIN
TYPE: ACUTE PAIN

## 2025-08-02 ASSESSMENT — PAIN - FUNCTIONAL ASSESSMENT: PAIN_FUNCTIONAL_ASSESSMENT: PREVENTS OR INTERFERES SOME ACTIVE ACTIVITIES AND ADLS

## 2025-08-02 ASSESSMENT — PAIN DESCRIPTION - DESCRIPTORS: DESCRIPTORS: ACHING

## 2025-08-02 ASSESSMENT — PAIN DESCRIPTION - ONSET: ONSET: ON-GOING

## 2025-08-02 ASSESSMENT — PAIN DESCRIPTION - FREQUENCY: FREQUENCY: CONTINUOUS

## 2025-08-03 LAB
ALBUMIN SERPL-MCNC: 4 G/DL (ref 3.4–5)
ALBUMIN/GLOB SERPL: 1.8 {RATIO} (ref 1.1–2.2)
ALP SERPL-CCNC: 96 U/L (ref 40–129)
ALT SERPL-CCNC: 29 U/L (ref 10–40)
AMPHETAMINES UR QL SCN>1000 NG/ML: ABNORMAL
ANION GAP SERPL CALCULATED.3IONS-SCNC: 12 MMOL/L (ref 3–16)
APTT BLD: 53.1 SEC (ref 22.8–35.8)
APTT BLD: >180 SEC (ref 22.8–35.8)
APTT BLD: >180 SEC (ref 22.8–35.8)
AST SERPL-CCNC: 35 U/L (ref 15–37)
BARBITURATES UR QL SCN>200 NG/ML: POSITIVE
BASOPHILS # BLD: 0 K/UL (ref 0–0.2)
BASOPHILS NFR BLD: 0.4 %
BENZODIAZ UR QL SCN>200 NG/ML: POSITIVE
BILIRUB SERPL-MCNC: 0.4 MG/DL (ref 0–1)
BILIRUB UR QL STRIP.AUTO: NEGATIVE
BUN SERPL-MCNC: 14 MG/DL (ref 7–20)
CALCIUM SERPL-MCNC: 8.7 MG/DL (ref 8.3–10.6)
CANNABINOIDS UR QL SCN>50 NG/ML: POSITIVE
CHLORIDE SERPL-SCNC: 100 MMOL/L (ref 99–110)
CLARITY UR: CLEAR
CO2 SERPL-SCNC: 22 MMOL/L (ref 21–32)
COCAINE UR QL SCN: ABNORMAL
COLOR UR: YELLOW
CREAT SERPL-MCNC: 0.6 MG/DL (ref 0.6–1.1)
DEPRECATED RDW RBC AUTO: 14.7 % (ref 12.4–15.4)
DRUG SCREEN COMMENT UR-IMP: ABNORMAL
EOSINOPHIL # BLD: 0.1 K/UL (ref 0–0.6)
EOSINOPHIL NFR BLD: 3.7 %
FENTANYL SCREEN, URINE: ABNORMAL
FOLATE SERPL-MCNC: 35.7 NG/ML (ref 4.78–24.2)
GFR SERPLBLD CREATININE-BSD FMLA CKD-EPI: >90 ML/MIN/{1.73_M2}
GLUCOSE SERPL-MCNC: 100 MG/DL (ref 70–99)
GLUCOSE UR STRIP.AUTO-MCNC: NEGATIVE MG/DL
HCT VFR BLD AUTO: 30.1 % (ref 36–48)
HGB BLD-MCNC: 10.5 G/DL (ref 12–16)
HGB UR QL STRIP.AUTO: NEGATIVE
KETONES UR STRIP.AUTO-MCNC: NEGATIVE MG/DL
LEUKOCYTE ESTERASE UR QL STRIP.AUTO: NEGATIVE
LYMPHOCYTES # BLD: 1.6 K/UL (ref 1–5.1)
LYMPHOCYTES NFR BLD: 40.3 %
MCH RBC QN AUTO: 32.7 PG (ref 26–34)
MCHC RBC AUTO-ENTMCNC: 34.9 G/DL (ref 31–36)
MCV RBC AUTO: 93.7 FL (ref 80–100)
METHADONE UR QL SCN>300 NG/ML: ABNORMAL
MONOCYTES # BLD: 0.2 K/UL (ref 0–1.3)
MONOCYTES NFR BLD: 5.9 %
NEUTROPHILS # BLD: 2 K/UL (ref 1.7–7.7)
NEUTROPHILS NFR BLD: 49.7 %
NITRITE UR QL STRIP.AUTO: NEGATIVE
OPIATES UR QL SCN>300 NG/ML: ABNORMAL
OXYCODONE UR QL SCN: ABNORMAL
PCP UR QL SCN>25 NG/ML: ABNORMAL
PH UR STRIP.AUTO: 5.5 [PH] (ref 5–8)
PH UR STRIP: 5 [PH]
PLATELET # BLD AUTO: 128 K/UL (ref 135–450)
PMV BLD AUTO: 9 FL (ref 5–10.5)
POTASSIUM SERPL-SCNC: 3.7 MMOL/L (ref 3.5–5.1)
PROT SERPL-MCNC: 6.2 G/DL (ref 6.4–8.2)
PROT UR STRIP.AUTO-MCNC: ABNORMAL MG/DL
RBC # BLD AUTO: 3.22 M/UL (ref 4–5.2)
RBC #/AREA URNS HPF: NORMAL /HPF (ref 0–4)
SODIUM SERPL-SCNC: 134 MMOL/L (ref 136–145)
SP GR UR STRIP.AUTO: 1.02 (ref 1–1.03)
UA COMPLETE W REFLEX CULTURE PNL UR: ABNORMAL
UA DIPSTICK W REFLEX MICRO PNL UR: YES
URN SPEC COLLECT METH UR: ABNORMAL
UROBILINOGEN UR STRIP-ACNC: 0.2 E.U./DL
VIT B12 SERPL-MCNC: >4000 PG/ML (ref 211–911)
WBC # BLD AUTO: 4 K/UL (ref 4–11)
WBC #/AREA URNS HPF: NORMAL /HPF (ref 0–5)

## 2025-08-03 PROCEDURE — 6360000002 HC RX W HCPCS: Performed by: NURSE PRACTITIONER

## 2025-08-03 PROCEDURE — 6370000000 HC RX 637 (ALT 250 FOR IP): Performed by: INTERNAL MEDICINE

## 2025-08-03 PROCEDURE — 2060000000 HC ICU INTERMEDIATE R&B

## 2025-08-03 PROCEDURE — 2500000003 HC RX 250 WO HCPCS

## 2025-08-03 PROCEDURE — 80053 COMPREHEN METABOLIC PANEL: CPT

## 2025-08-03 PROCEDURE — 6360000002 HC RX W HCPCS: Performed by: INTERNAL MEDICINE

## 2025-08-03 PROCEDURE — 80307 DRUG TEST PRSMV CHEM ANLYZR: CPT

## 2025-08-03 PROCEDURE — 6370000000 HC RX 637 (ALT 250 FOR IP)

## 2025-08-03 PROCEDURE — 36415 COLL VENOUS BLD VENIPUNCTURE: CPT

## 2025-08-03 PROCEDURE — 2580000003 HC RX 258

## 2025-08-03 PROCEDURE — 85025 COMPLETE CBC W/AUTO DIFF WBC: CPT

## 2025-08-03 PROCEDURE — 81001 URINALYSIS AUTO W/SCOPE: CPT

## 2025-08-03 PROCEDURE — 85730 THROMBOPLASTIN TIME PARTIAL: CPT

## 2025-08-03 RX ORDER — 0.9 % SODIUM CHLORIDE 0.9 %
500 INTRAVENOUS SOLUTION INTRAVENOUS ONCE
Status: COMPLETED | OUTPATIENT
Start: 2025-08-03 | End: 2025-08-03

## 2025-08-03 RX ORDER — DIAZEPAM 10 MG/2ML
5 INJECTION, SOLUTION INTRAMUSCULAR; INTRAVENOUS ONCE
Status: COMPLETED | OUTPATIENT
Start: 2025-08-03 | End: 2025-08-03

## 2025-08-03 RX ORDER — TRAMADOL HYDROCHLORIDE 50 MG/1
50 TABLET ORAL EVERY 6 HOURS PRN
Status: DISCONTINUED | OUTPATIENT
Start: 2025-08-03 | End: 2025-08-15 | Stop reason: HOSPADM

## 2025-08-03 RX ORDER — LORAZEPAM 0.5 MG/1
0.5 TABLET ORAL EVERY 6 HOURS PRN
Status: DISCONTINUED | OUTPATIENT
Start: 2025-08-03 | End: 2025-08-15 | Stop reason: HOSPADM

## 2025-08-03 RX ADMIN — CARBAMAZEPINE 400 MG: 200 TABLET, EXTENDED RELEASE ORAL at 09:18

## 2025-08-03 RX ADMIN — LEVOTHYROXINE SODIUM 150 MCG: 0.15 TABLET ORAL at 08:59

## 2025-08-03 RX ADMIN — HEPARIN SODIUM AND DEXTROSE 15 UNITS/KG/HR: 10000; 5 INJECTION INTRAVENOUS at 02:07

## 2025-08-03 RX ADMIN — ACETAMINOPHEN 650 MG: 325 TABLET ORAL at 03:28

## 2025-08-03 RX ADMIN — LORAZEPAM 0.5 MG: 0.5 TABLET ORAL at 15:27

## 2025-08-03 RX ADMIN — ATORVASTATIN CALCIUM 40 MG: 40 TABLET, FILM COATED ORAL at 08:59

## 2025-08-03 RX ADMIN — PANTOPRAZOLE SODIUM 40 MG: 40 TABLET, DELAYED RELEASE ORAL at 15:27

## 2025-08-03 RX ADMIN — DULOXETINE 30 MG: 30 CAPSULE, DELAYED RELEASE ORAL at 08:59

## 2025-08-03 RX ADMIN — CLONAZEPAM 1 MG: 1 TABLET ORAL at 08:59

## 2025-08-03 RX ADMIN — CARBAMAZEPINE 400 MG: 200 TABLET, EXTENDED RELEASE ORAL at 20:10

## 2025-08-03 RX ADMIN — CLONAZEPAM 1 MG: 1 TABLET ORAL at 20:10

## 2025-08-03 RX ADMIN — DIAZEPAM 5 MG: 5 INJECTION, SOLUTION INTRAMUSCULAR; INTRAVENOUS at 21:13

## 2025-08-03 RX ADMIN — SODIUM CHLORIDE 500 ML: 0.9 INJECTION, SOLUTION INTRAVENOUS at 14:29

## 2025-08-03 RX ADMIN — SODIUM CHLORIDE, PRESERVATIVE FREE 10 ML: 5 INJECTION INTRAVENOUS at 20:10

## 2025-08-03 RX ADMIN — HEPARIN SODIUM 7300 UNITS: 1000 INJECTION INTRAVENOUS; SUBCUTANEOUS at 12:40

## 2025-08-03 RX ADMIN — SODIUM CHLORIDE, PRESERVATIVE FREE 10 ML: 5 INJECTION INTRAVENOUS at 08:59

## 2025-08-03 RX ADMIN — PANTOPRAZOLE SODIUM 40 MG: 40 TABLET, DELAYED RELEASE ORAL at 05:18

## 2025-08-03 RX ADMIN — HEPARIN SODIUM AND DEXTROSE 16 UNITS/KG/HR: 10000; 5 INJECTION INTRAVENOUS at 23:04

## 2025-08-03 RX ADMIN — LORAZEPAM 0.5 MG: 0.5 TABLET ORAL at 23:07

## 2025-08-03 ASSESSMENT — PAIN DESCRIPTION - DESCRIPTORS
DESCRIPTORS: ACHING
DESCRIPTORS: ACHING

## 2025-08-03 ASSESSMENT — PAIN SCALES - GENERAL
PAINLEVEL_OUTOF10: 10
PAINLEVEL_OUTOF10: 0
PAINLEVEL_OUTOF10: 7
PAINLEVEL_OUTOF10: 0

## 2025-08-03 ASSESSMENT — PAIN DESCRIPTION - ONSET: ONSET: ON-GOING

## 2025-08-03 ASSESSMENT — PAIN DESCRIPTION - LOCATION
LOCATION: HIP
LOCATION: GENERALIZED

## 2025-08-03 ASSESSMENT — PAIN DESCRIPTION - ORIENTATION: ORIENTATION: LEFT

## 2025-08-03 ASSESSMENT — PAIN DESCRIPTION - PAIN TYPE
TYPE: CHRONIC PAIN
TYPE: ACUTE PAIN

## 2025-08-03 ASSESSMENT — PAIN DESCRIPTION - FREQUENCY: FREQUENCY: CONTINUOUS

## 2025-08-04 LAB
ALBUMIN SERPL-MCNC: 4.1 G/DL (ref 3.4–5)
ALBUMIN/GLOB SERPL: 2.1 {RATIO} (ref 1.1–2.2)
ALP SERPL-CCNC: 97 U/L (ref 40–129)
ALT SERPL-CCNC: 24 U/L (ref 10–40)
ANION GAP SERPL CALCULATED.3IONS-SCNC: 11 MMOL/L (ref 3–16)
APTT BLD: 41.1 SEC (ref 22.8–35.8)
APTT BLD: 52 SEC (ref 22.8–35.8)
APTT BLD: 64.4 SEC (ref 22.8–35.8)
AST SERPL-CCNC: 30 U/L (ref 15–37)
BASOPHILS # BLD: 0 K/UL (ref 0–0.2)
BASOPHILS NFR BLD: 0.4 %
BILIRUB SERPL-MCNC: 0.3 MG/DL (ref 0–1)
BUN SERPL-MCNC: 11 MG/DL (ref 7–20)
CALCIUM SERPL-MCNC: 8.7 MG/DL (ref 8.3–10.6)
CHLORIDE SERPL-SCNC: 97 MMOL/L (ref 99–110)
CO2 SERPL-SCNC: 22 MMOL/L (ref 21–32)
CREAT SERPL-MCNC: 0.7 MG/DL (ref 0.6–1.1)
DEPRECATED RDW RBC AUTO: 14.5 % (ref 12.4–15.4)
EOSINOPHIL # BLD: 0.1 K/UL (ref 0–0.6)
EOSINOPHIL NFR BLD: 2.3 %
GFR SERPLBLD CREATININE-BSD FMLA CKD-EPI: >90 ML/MIN/{1.73_M2}
GLUCOSE SERPL-MCNC: 89 MG/DL (ref 70–99)
HCT VFR BLD AUTO: 30.3 % (ref 36–48)
HGB BLD-MCNC: 10.3 G/DL (ref 12–16)
LYMPHOCYTES # BLD: 1.5 K/UL (ref 1–5.1)
LYMPHOCYTES NFR BLD: 36.1 %
MCH RBC QN AUTO: 31.8 PG (ref 26–34)
MCHC RBC AUTO-ENTMCNC: 33.9 G/DL (ref 31–36)
MCV RBC AUTO: 94 FL (ref 80–100)
MONOCYTES # BLD: 0.4 K/UL (ref 0–1.3)
MONOCYTES NFR BLD: 8.4 %
NEUTROPHILS # BLD: 2.2 K/UL (ref 1.7–7.7)
NEUTROPHILS NFR BLD: 52.8 %
PLATELET # BLD AUTO: 102 K/UL (ref 135–450)
PMV BLD AUTO: 9.5 FL (ref 5–10.5)
POTASSIUM SERPL-SCNC: 4.3 MMOL/L (ref 3.5–5.1)
PROT SERPL-MCNC: 6.1 G/DL (ref 6.4–8.2)
RBC # BLD AUTO: 3.22 M/UL (ref 4–5.2)
SODIUM SERPL-SCNC: 130 MMOL/L (ref 136–145)
WBC # BLD AUTO: 4.2 K/UL (ref 4–11)

## 2025-08-04 PROCEDURE — 97116 GAIT TRAINING THERAPY: CPT

## 2025-08-04 PROCEDURE — 6360000002 HC RX W HCPCS: Performed by: INTERNAL MEDICINE

## 2025-08-04 PROCEDURE — 6370000000 HC RX 637 (ALT 250 FOR IP): Performed by: INTERNAL MEDICINE

## 2025-08-04 PROCEDURE — 97530 THERAPEUTIC ACTIVITIES: CPT

## 2025-08-04 PROCEDURE — 80061 LIPID PANEL: CPT

## 2025-08-04 PROCEDURE — 96125 COGNITIVE TEST BY HC PRO: CPT

## 2025-08-04 PROCEDURE — 2500000003 HC RX 250 WO HCPCS

## 2025-08-04 PROCEDURE — 97162 PT EVAL MOD COMPLEX 30 MIN: CPT

## 2025-08-04 PROCEDURE — 85730 THROMBOPLASTIN TIME PARTIAL: CPT

## 2025-08-04 PROCEDURE — 36415 COLL VENOUS BLD VENIPUNCTURE: CPT

## 2025-08-04 PROCEDURE — 97166 OT EVAL MOD COMPLEX 45 MIN: CPT

## 2025-08-04 PROCEDURE — 85025 COMPLETE CBC W/AUTO DIFF WBC: CPT

## 2025-08-04 PROCEDURE — 6370000000 HC RX 637 (ALT 250 FOR IP): Performed by: NURSE PRACTITIONER

## 2025-08-04 PROCEDURE — 80053 COMPREHEN METABOLIC PANEL: CPT

## 2025-08-04 PROCEDURE — 2060000000 HC ICU INTERMEDIATE R&B

## 2025-08-04 PROCEDURE — 97535 SELF CARE MNGMENT TRAINING: CPT

## 2025-08-04 RX ADMIN — PANTOPRAZOLE SODIUM 40 MG: 40 TABLET, DELAYED RELEASE ORAL at 16:29

## 2025-08-04 RX ADMIN — TRAMADOL HYDROCHLORIDE 50 MG: 50 TABLET, COATED ORAL at 00:54

## 2025-08-04 RX ADMIN — SODIUM CHLORIDE, PRESERVATIVE FREE 10 ML: 5 INJECTION INTRAVENOUS at 20:48

## 2025-08-04 RX ADMIN — LORAZEPAM 0.5 MG: 0.5 TABLET ORAL at 19:56

## 2025-08-04 RX ADMIN — HEPARIN SODIUM 7300 UNITS: 1000 INJECTION INTRAVENOUS; SUBCUTANEOUS at 20:49

## 2025-08-04 RX ADMIN — CARBAMAZEPINE 400 MG: 200 TABLET, EXTENDED RELEASE ORAL at 08:29

## 2025-08-04 RX ADMIN — LEVOTHYROXINE SODIUM 150 MCG: 0.15 TABLET ORAL at 08:28

## 2025-08-04 RX ADMIN — TRAMADOL HYDROCHLORIDE 50 MG: 50 TABLET, COATED ORAL at 13:30

## 2025-08-04 RX ADMIN — DULOXETINE 30 MG: 30 CAPSULE, DELAYED RELEASE ORAL at 08:28

## 2025-08-04 RX ADMIN — SODIUM CHLORIDE, PRESERVATIVE FREE 10 ML: 5 INJECTION INTRAVENOUS at 08:29

## 2025-08-04 RX ADMIN — CLONAZEPAM 1 MG: 1 TABLET ORAL at 19:56

## 2025-08-04 RX ADMIN — ATORVASTATIN CALCIUM 40 MG: 40 TABLET, FILM COATED ORAL at 08:28

## 2025-08-04 RX ADMIN — HEPARIN SODIUM 7300 UNITS: 1000 INJECTION INTRAVENOUS; SUBCUTANEOUS at 13:59

## 2025-08-04 RX ADMIN — LORAZEPAM 0.5 MG: 0.5 TABLET ORAL at 05:41

## 2025-08-04 RX ADMIN — CLONAZEPAM 1 MG: 1 TABLET ORAL at 08:28

## 2025-08-04 RX ADMIN — HEPARIN SODIUM 3600 UNITS: 1000 INJECTION INTRAVENOUS; SUBCUTANEOUS at 06:51

## 2025-08-04 RX ADMIN — CARBAMAZEPINE 400 MG: 200 TABLET, EXTENDED RELEASE ORAL at 20:47

## 2025-08-04 RX ADMIN — PANTOPRAZOLE SODIUM 40 MG: 40 TABLET, DELAYED RELEASE ORAL at 05:41

## 2025-08-04 RX ADMIN — HEPARIN SODIUM AND DEXTROSE 19 UNITS/KG/HR: 10000; 5 INJECTION INTRAVENOUS at 17:52

## 2025-08-04 ASSESSMENT — PAIN SCALES - WONG BAKER: WONGBAKER_NUMERICALRESPONSE: HURTS A LITTLE BIT

## 2025-08-04 ASSESSMENT — PAIN SCALES - GENERAL
PAINLEVEL_OUTOF10: 0
PAINLEVEL_OUTOF10: 9
PAINLEVEL_OUTOF10: 10

## 2025-08-05 LAB
ALBUMIN SERPL-MCNC: 4.2 G/DL (ref 3.4–5)
ALBUMIN/GLOB SERPL: 1.8 {RATIO} (ref 1.1–2.2)
ALP SERPL-CCNC: 103 U/L (ref 40–129)
ALT SERPL-CCNC: 24 U/L (ref 10–40)
ANION GAP SERPL CALCULATED.3IONS-SCNC: 10 MMOL/L (ref 3–16)
APTT BLD: 157.2 SEC (ref 22.8–35.8)
APTT BLD: >180 SEC (ref 22.8–35.8)
AST SERPL-CCNC: 25 U/L (ref 15–37)
BILIRUB SERPL-MCNC: <0.2 MG/DL (ref 0–1)
BUN SERPL-MCNC: 16 MG/DL (ref 7–20)
CALCIUM SERPL-MCNC: 8.7 MG/DL (ref 8.3–10.6)
CHLORIDE SERPL-SCNC: 100 MMOL/L (ref 99–110)
CHOLEST SERPL-MCNC: 184 MG/DL (ref 0–199)
CO2 SERPL-SCNC: 24 MMOL/L (ref 21–32)
CREAT SERPL-MCNC: 0.7 MG/DL (ref 0.6–1.1)
DEPRECATED RDW RBC AUTO: 14.4 % (ref 12.4–15.4)
GFR SERPLBLD CREATININE-BSD FMLA CKD-EPI: >90 ML/MIN/{1.73_M2}
GLUCOSE SERPL-MCNC: 113 MG/DL (ref 70–99)
HCT VFR BLD AUTO: 28.6 % (ref 36–48)
HDLC SERPL-MCNC: 64 MG/DL (ref 40–60)
HGB BLD-MCNC: 10.2 G/DL (ref 12–16)
LDLC SERPL CALC-MCNC: 68 MG/DL
MCH RBC QN AUTO: 32.8 PG (ref 26–34)
MCHC RBC AUTO-ENTMCNC: 35.8 G/DL (ref 31–36)
MCV RBC AUTO: 91.7 FL (ref 80–100)
PLATELET # BLD AUTO: 181 K/UL (ref 135–450)
PMV BLD AUTO: 8.3 FL (ref 5–10.5)
POTASSIUM SERPL-SCNC: 4.3 MMOL/L (ref 3.5–5.1)
PROT SERPL-MCNC: 6.5 G/DL (ref 6.4–8.2)
RBC # BLD AUTO: 3.12 M/UL (ref 4–5.2)
SODIUM SERPL-SCNC: 134 MMOL/L (ref 136–145)
TRIGL SERPL-MCNC: 261 MG/DL (ref 0–150)
TSH SERPL DL<=0.005 MIU/L-ACNC: 3.48 UIU/ML (ref 0.27–4.2)
VLDLC SERPL CALC-MCNC: 52 MG/DL
WBC # BLD AUTO: 5.4 K/UL (ref 4–11)

## 2025-08-05 PROCEDURE — 85027 COMPLETE CBC AUTOMATED: CPT

## 2025-08-05 PROCEDURE — 6370000000 HC RX 637 (ALT 250 FOR IP): Performed by: INTERNAL MEDICINE

## 2025-08-05 PROCEDURE — 85730 THROMBOPLASTIN TIME PARTIAL: CPT

## 2025-08-05 PROCEDURE — 6370000000 HC RX 637 (ALT 250 FOR IP): Performed by: NURSE PRACTITIONER

## 2025-08-05 PROCEDURE — 36415 COLL VENOUS BLD VENIPUNCTURE: CPT

## 2025-08-05 PROCEDURE — 80053 COMPREHEN METABOLIC PANEL: CPT

## 2025-08-05 PROCEDURE — 84443 ASSAY THYROID STIM HORMONE: CPT

## 2025-08-05 PROCEDURE — 6370000000 HC RX 637 (ALT 250 FOR IP)

## 2025-08-05 PROCEDURE — 2060000000 HC ICU INTERMEDIATE R&B

## 2025-08-05 PROCEDURE — 2500000003 HC RX 250 WO HCPCS

## 2025-08-05 RX ORDER — ASPIRIN 81 MG/1
81 TABLET, CHEWABLE ORAL DAILY
Status: DISCONTINUED | OUTPATIENT
Start: 2025-08-05 | End: 2025-08-14 | Stop reason: SDUPTHER

## 2025-08-05 RX ORDER — PHENOBARBITAL 15 MG/1
16.2 TABLET ORAL 2 TIMES DAILY
Status: COMPLETED | OUTPATIENT
Start: 2025-08-07 | End: 2025-08-08

## 2025-08-05 RX ORDER — HYDROXYZINE PAMOATE 25 MG/1
50 CAPSULE ORAL EVERY 8 HOURS PRN
Status: DISCONTINUED | OUTPATIENT
Start: 2025-08-05 | End: 2025-08-15 | Stop reason: HOSPADM

## 2025-08-05 RX ORDER — PHENOBARBITAL 15 MG/1
16.2 TABLET ORAL EVERY 6 HOURS PRN
Status: ACTIVE | OUTPATIENT
Start: 2025-08-07 | End: 2025-08-08

## 2025-08-05 RX ORDER — PHENOBARBITAL 64.8 MG/1
32.4 TABLET ORAL EVERY 6 HOURS PRN
Status: DISPENSED | OUTPATIENT
Start: 2025-08-05 | End: 2025-08-07

## 2025-08-05 RX ORDER — PHENOBARBITAL 64.8 MG/1
32.4 TABLET ORAL 4 TIMES DAILY
Status: COMPLETED | OUTPATIENT
Start: 2025-08-05 | End: 2025-08-06

## 2025-08-05 RX ORDER — CLONIDINE HYDROCHLORIDE 0.1 MG/1
0.1 TABLET ORAL EVERY 6 HOURS PRN
Status: DISCONTINUED | OUTPATIENT
Start: 2025-08-05 | End: 2025-08-15 | Stop reason: HOSPADM

## 2025-08-05 RX ORDER — METHOCARBAMOL 500 MG/1
750 TABLET, FILM COATED ORAL EVERY 6 HOURS PRN
Status: DISCONTINUED | OUTPATIENT
Start: 2025-08-05 | End: 2025-08-15 | Stop reason: HOSPADM

## 2025-08-05 RX ORDER — LANOLIN ALCOHOL/MO/W.PET/CERES
100 CREAM (GRAM) TOPICAL DAILY
Status: DISCONTINUED | OUTPATIENT
Start: 2025-08-05 | End: 2025-08-15 | Stop reason: HOSPADM

## 2025-08-05 RX ORDER — DABIGATRAN ETEXILATE 75 MG/1
150 CAPSULE ORAL 2 TIMES DAILY
Status: DISCONTINUED | OUTPATIENT
Start: 2025-08-05 | End: 2025-08-15

## 2025-08-05 RX ORDER — PHENOBARBITAL 64.8 MG/1
32.4 TABLET ORAL 2 TIMES DAILY
Status: COMPLETED | OUTPATIENT
Start: 2025-08-06 | End: 2025-08-07

## 2025-08-05 RX ADMIN — CLONAZEPAM 1 MG: 1 TABLET ORAL at 08:55

## 2025-08-05 RX ADMIN — SODIUM CHLORIDE, PRESERVATIVE FREE 10 ML: 5 INJECTION INTRAVENOUS at 08:53

## 2025-08-05 RX ADMIN — PANTOPRAZOLE SODIUM 40 MG: 40 TABLET, DELAYED RELEASE ORAL at 15:12

## 2025-08-05 RX ADMIN — PANTOPRAZOLE SODIUM 40 MG: 40 TABLET, DELAYED RELEASE ORAL at 05:10

## 2025-08-05 RX ADMIN — PHENOBARBITAL 32.4 MG: 64.8 TABLET ORAL at 20:38

## 2025-08-05 RX ADMIN — CARBAMAZEPINE 400 MG: 200 TABLET, EXTENDED RELEASE ORAL at 20:38

## 2025-08-05 RX ADMIN — LEVOTHYROXINE SODIUM 150 MCG: 0.15 TABLET ORAL at 08:55

## 2025-08-05 RX ADMIN — SODIUM CHLORIDE, PRESERVATIVE FREE 5 ML: 5 INJECTION INTRAVENOUS at 20:28

## 2025-08-05 RX ADMIN — DULOXETINE 30 MG: 30 CAPSULE, DELAYED RELEASE ORAL at 08:55

## 2025-08-05 RX ADMIN — ATORVASTATIN CALCIUM 40 MG: 40 TABLET, FILM COATED ORAL at 08:55

## 2025-08-05 RX ADMIN — PHENOBARBITAL 32.4 MG: 64.8 TABLET ORAL at 12:20

## 2025-08-05 RX ADMIN — DABIGATRAN ETEXILATE MESYLATE 150 MG: 75 CAPSULE ORAL at 10:39

## 2025-08-05 RX ADMIN — HYDROXYZINE PAMOATE 50 MG: 25 CAPSULE ORAL at 15:12

## 2025-08-05 RX ADMIN — METHOCARBAMOL 750 MG: 500 TABLET ORAL at 12:20

## 2025-08-05 RX ADMIN — CLONAZEPAM 1 MG: 1 TABLET ORAL at 20:38

## 2025-08-05 RX ADMIN — Medication 100 MG: at 12:20

## 2025-08-05 RX ADMIN — LORAZEPAM 0.5 MG: 0.5 TABLET ORAL at 10:39

## 2025-08-05 RX ADMIN — ASPIRIN 81 MG: 81 TABLET, CHEWABLE ORAL at 09:17

## 2025-08-05 RX ADMIN — PROPRANOLOL HYDROCHLORIDE 60 MG: 60 CAPSULE, EXTENDED RELEASE ORAL at 09:17

## 2025-08-05 RX ADMIN — PHENOBARBITAL 32.4 MG: 64.8 TABLET ORAL at 16:40

## 2025-08-05 RX ADMIN — TRAMADOL HYDROCHLORIDE 50 MG: 50 TABLET, COATED ORAL at 03:22

## 2025-08-05 RX ADMIN — TRAMADOL HYDROCHLORIDE 50 MG: 50 TABLET, COATED ORAL at 09:17

## 2025-08-05 RX ADMIN — TRAMADOL HYDROCHLORIDE 50 MG: 50 TABLET, COATED ORAL at 20:38

## 2025-08-05 RX ADMIN — CARBAMAZEPINE 400 MG: 200 TABLET, EXTENDED RELEASE ORAL at 09:17

## 2025-08-05 RX ADMIN — LORAZEPAM 0.5 MG: 0.5 TABLET ORAL at 03:21

## 2025-08-05 ASSESSMENT — PAIN SCALES - GENERAL
PAINLEVEL_OUTOF10: 5
PAINLEVEL_OUTOF10: 0
PAINLEVEL_OUTOF10: 9
PAINLEVEL_OUTOF10: 3
PAINLEVEL_OUTOF10: 9

## 2025-08-05 ASSESSMENT — PAIN DESCRIPTION - ORIENTATION: ORIENTATION: MID

## 2025-08-05 ASSESSMENT — PAIN DESCRIPTION - LOCATION
LOCATION: GENERALIZED
LOCATION: GENERALIZED
LOCATION: HEAD;BACK

## 2025-08-05 ASSESSMENT — PAIN DESCRIPTION - DESCRIPTORS
DESCRIPTORS: ACHING
DESCRIPTORS: ACHING;DISCOMFORT

## 2025-08-06 LAB
ALBUMIN SERPL-MCNC: 4.2 G/DL (ref 3.4–5)
ALBUMIN/GLOB SERPL: 1.8 {RATIO} (ref 1.1–2.2)
ALP SERPL-CCNC: 102 U/L (ref 40–129)
ALT SERPL-CCNC: 25 U/L (ref 10–40)
ANION GAP SERPL CALCULATED.3IONS-SCNC: 9 MMOL/L (ref 3–16)
APTT BLD: 23.9 SEC (ref 22.8–35.8)
AST SERPL-CCNC: 29 U/L (ref 15–37)
BILIRUB SERPL-MCNC: 0.3 MG/DL (ref 0–1)
BUN SERPL-MCNC: 13 MG/DL (ref 7–20)
CALCIUM SERPL-MCNC: 9.1 MG/DL (ref 8.3–10.6)
CHLORIDE SERPL-SCNC: 102 MMOL/L (ref 99–110)
CO2 SERPL-SCNC: 24 MMOL/L (ref 21–32)
CREAT SERPL-MCNC: 0.6 MG/DL (ref 0.6–1.1)
DEPRECATED RDW RBC AUTO: 14.8 % (ref 12.4–15.4)
GFR SERPLBLD CREATININE-BSD FMLA CKD-EPI: >90 ML/MIN/{1.73_M2}
GLUCOSE SERPL-MCNC: 88 MG/DL (ref 70–99)
HCT VFR BLD AUTO: 29.5 % (ref 36–48)
HGB BLD-MCNC: 10.1 G/DL (ref 12–16)
MCH RBC QN AUTO: 32.1 PG (ref 26–34)
MCHC RBC AUTO-ENTMCNC: 34.4 G/DL (ref 31–36)
MCV RBC AUTO: 93.5 FL (ref 80–100)
PLATELET # BLD AUTO: 184 K/UL (ref 135–450)
PMV BLD AUTO: 8.1 FL (ref 5–10.5)
POTASSIUM SERPL-SCNC: 4.6 MMOL/L (ref 3.5–5.1)
PROT SERPL-MCNC: 6.5 G/DL (ref 6.4–8.2)
RBC # BLD AUTO: 3.15 M/UL (ref 4–5.2)
SODIUM SERPL-SCNC: 135 MMOL/L (ref 136–145)
WBC # BLD AUTO: 5 K/UL (ref 4–11)

## 2025-08-06 PROCEDURE — 85730 THROMBOPLASTIN TIME PARTIAL: CPT

## 2025-08-06 PROCEDURE — 97535 SELF CARE MNGMENT TRAINING: CPT

## 2025-08-06 PROCEDURE — 6370000000 HC RX 637 (ALT 250 FOR IP)

## 2025-08-06 PROCEDURE — 94761 N-INVAS EAR/PLS OXIMETRY MLT: CPT

## 2025-08-06 PROCEDURE — 97129 THER IVNTJ 1ST 15 MIN: CPT

## 2025-08-06 PROCEDURE — 2060000000 HC ICU INTERMEDIATE R&B

## 2025-08-06 PROCEDURE — 6370000000 HC RX 637 (ALT 250 FOR IP): Performed by: NURSE PRACTITIONER

## 2025-08-06 PROCEDURE — 97530 THERAPEUTIC ACTIVITIES: CPT

## 2025-08-06 PROCEDURE — 6370000000 HC RX 637 (ALT 250 FOR IP): Performed by: INTERNAL MEDICINE

## 2025-08-06 PROCEDURE — 2500000003 HC RX 250 WO HCPCS

## 2025-08-06 PROCEDURE — 85027 COMPLETE CBC AUTOMATED: CPT

## 2025-08-06 PROCEDURE — 97130 THER IVNTJ EA ADDL 15 MIN: CPT

## 2025-08-06 PROCEDURE — 36415 COLL VENOUS BLD VENIPUNCTURE: CPT

## 2025-08-06 PROCEDURE — 80053 COMPREHEN METABOLIC PANEL: CPT

## 2025-08-06 PROCEDURE — 2700000000 HC OXYGEN THERAPY PER DAY

## 2025-08-06 RX ORDER — SODIUM CHLORIDE 0.9 % (FLUSH) 0.9 %
5-40 SYRINGE (ML) INJECTION EVERY 12 HOURS SCHEDULED
Status: DISCONTINUED | OUTPATIENT
Start: 2025-08-06 | End: 2025-08-11 | Stop reason: SDUPTHER

## 2025-08-06 RX ORDER — SODIUM CHLORIDE 9 MG/ML
INJECTION, SOLUTION INTRAVENOUS PRN
Status: DISCONTINUED | OUTPATIENT
Start: 2025-08-06 | End: 2025-08-11 | Stop reason: SDUPTHER

## 2025-08-06 RX ORDER — SODIUM CHLORIDE 0.9 % (FLUSH) 0.9 %
5-40 SYRINGE (ML) INJECTION PRN
Status: DISCONTINUED | OUTPATIENT
Start: 2025-08-06 | End: 2025-08-11 | Stop reason: SDUPTHER

## 2025-08-06 RX ORDER — LIDOCAINE HYDROCHLORIDE 10 MG/ML
50 INJECTION, SOLUTION INFILTRATION; PERINEURAL ONCE
Status: DISCONTINUED | OUTPATIENT
Start: 2025-08-06 | End: 2025-08-10

## 2025-08-06 RX ADMIN — TRAMADOL HYDROCHLORIDE 50 MG: 50 TABLET, COATED ORAL at 18:44

## 2025-08-06 RX ADMIN — ATORVASTATIN CALCIUM 40 MG: 40 TABLET, FILM COATED ORAL at 09:39

## 2025-08-06 RX ADMIN — LORAZEPAM 0.5 MG: 0.5 TABLET ORAL at 15:31

## 2025-08-06 RX ADMIN — PHENOBARBITAL 32.4 MG: 64.8 TABLET ORAL at 09:42

## 2025-08-06 RX ADMIN — CARBAMAZEPINE 400 MG: 200 TABLET, EXTENDED RELEASE ORAL at 20:45

## 2025-08-06 RX ADMIN — LORAZEPAM 0.5 MG: 0.5 TABLET ORAL at 23:45

## 2025-08-06 RX ADMIN — PANTOPRAZOLE SODIUM 40 MG: 40 TABLET, DELAYED RELEASE ORAL at 05:18

## 2025-08-06 RX ADMIN — CLONAZEPAM 1 MG: 1 TABLET ORAL at 09:39

## 2025-08-06 RX ADMIN — ONDANSETRON 4 MG: 4 TABLET, ORALLY DISINTEGRATING ORAL at 12:17

## 2025-08-06 RX ADMIN — TRAMADOL HYDROCHLORIDE 50 MG: 50 TABLET, COATED ORAL at 10:39

## 2025-08-06 RX ADMIN — ASPIRIN 81 MG: 81 TABLET, CHEWABLE ORAL at 09:39

## 2025-08-06 RX ADMIN — Medication 100 MG: at 09:39

## 2025-08-06 RX ADMIN — DABIGATRAN ETEXILATE MESYLATE 150 MG: 75 CAPSULE ORAL at 20:46

## 2025-08-06 RX ADMIN — LEVOTHYROXINE SODIUM 150 MCG: 0.15 TABLET ORAL at 09:39

## 2025-08-06 RX ADMIN — DULOXETINE 30 MG: 30 CAPSULE, DELAYED RELEASE ORAL at 09:39

## 2025-08-06 RX ADMIN — PROPRANOLOL HYDROCHLORIDE 60 MG: 60 CAPSULE, EXTENDED RELEASE ORAL at 09:39

## 2025-08-06 RX ADMIN — CLONAZEPAM 1 MG: 1 TABLET ORAL at 20:45

## 2025-08-06 RX ADMIN — PHENOBARBITAL 32.4 MG: 64.8 TABLET ORAL at 12:17

## 2025-08-06 RX ADMIN — PHENOBARBITAL 32.4 MG: 64.8 TABLET ORAL at 20:47

## 2025-08-06 RX ADMIN — HYDROXYZINE PAMOATE 50 MG: 25 CAPSULE ORAL at 10:39

## 2025-08-06 RX ADMIN — PHENOBARBITAL 32.4 MG: 64.8 TABLET ORAL at 18:44

## 2025-08-06 RX ADMIN — PANTOPRAZOLE SODIUM 40 MG: 40 TABLET, DELAYED RELEASE ORAL at 15:31

## 2025-08-06 RX ADMIN — METHOCARBAMOL 750 MG: 500 TABLET ORAL at 00:09

## 2025-08-06 RX ADMIN — CARBAMAZEPINE 400 MG: 200 TABLET, EXTENDED RELEASE ORAL at 09:39

## 2025-08-06 RX ADMIN — DABIGATRAN ETEXILATE MESYLATE 150 MG: 75 CAPSULE ORAL at 10:30

## 2025-08-06 RX ADMIN — SODIUM CHLORIDE, PRESERVATIVE FREE 10 ML: 5 INJECTION INTRAVENOUS at 09:50

## 2025-08-06 RX ADMIN — ACETAMINOPHEN 650 MG: 325 TABLET ORAL at 00:09

## 2025-08-06 RX ADMIN — LORAZEPAM 0.5 MG: 0.5 TABLET ORAL at 01:00

## 2025-08-06 RX ADMIN — PHENOBARBITAL 32.4 MG: 64.8 TABLET ORAL at 05:39

## 2025-08-06 ASSESSMENT — PAIN SCALES - GENERAL
PAINLEVEL_OUTOF10: 8
PAINLEVEL_OUTOF10: 6
PAINLEVEL_OUTOF10: 6
PAINLEVEL_OUTOF10: 7
PAINLEVEL_OUTOF10: 7
PAINLEVEL_OUTOF10: 0
PAINLEVEL_OUTOF10: 8

## 2025-08-06 ASSESSMENT — PAIN DESCRIPTION - LOCATION
LOCATION: GENERALIZED

## 2025-08-06 ASSESSMENT — PAIN DESCRIPTION - DESCRIPTORS
DESCRIPTORS: SHARP;STABBING
DESCRIPTORS: ACHING
DESCRIPTORS: SHARP;STABBING
DESCRIPTORS: SHARP;STABBING

## 2025-08-06 ASSESSMENT — PAIN DESCRIPTION - ORIENTATION: ORIENTATION: RIGHT

## 2025-08-06 ASSESSMENT — PAIN SCALES - WONG BAKER: WONGBAKER_NUMERICALRESPONSE: NO HURT

## 2025-08-07 LAB
ALBUMIN SERPL-MCNC: 4.2 G/DL (ref 3.4–5)
ALBUMIN/GLOB SERPL: 1.6 {RATIO} (ref 1.1–2.2)
ALP SERPL-CCNC: 114 U/L (ref 40–129)
ALT SERPL-CCNC: 28 U/L (ref 10–40)
ANION GAP SERPL CALCULATED.3IONS-SCNC: 11 MMOL/L (ref 3–16)
APTT BLD: 30 SEC (ref 22.8–35.8)
AST SERPL-CCNC: 34 U/L (ref 15–37)
BILIRUB SERPL-MCNC: 0.3 MG/DL (ref 0–1)
BUN SERPL-MCNC: 8 MG/DL (ref 7–20)
CALCIUM SERPL-MCNC: 9.2 MG/DL (ref 8.3–10.6)
CHLORIDE SERPL-SCNC: 98 MMOL/L (ref 99–110)
CO2 SERPL-SCNC: 25 MMOL/L (ref 21–32)
CREAT SERPL-MCNC: 0.6 MG/DL (ref 0.6–1.1)
DEPRECATED RDW RBC AUTO: 15.8 % (ref 12.4–15.4)
GFR SERPLBLD CREATININE-BSD FMLA CKD-EPI: >90 ML/MIN/{1.73_M2}
GLUCOSE SERPL-MCNC: 105 MG/DL (ref 70–99)
HCT VFR BLD AUTO: 30.3 % (ref 36–48)
HGB BLD-MCNC: 10.3 G/DL (ref 12–16)
MCH RBC QN AUTO: 32 PG (ref 26–34)
MCHC RBC AUTO-ENTMCNC: 34.1 G/DL (ref 31–36)
MCV RBC AUTO: 93.8 FL (ref 80–100)
PLATELET # BLD AUTO: 167 K/UL (ref 135–450)
PMV BLD AUTO: 8.4 FL (ref 5–10.5)
POTASSIUM SERPL-SCNC: 4.4 MMOL/L (ref 3.5–5.1)
PROT SERPL-MCNC: 6.8 G/DL (ref 6.4–8.2)
RBC # BLD AUTO: 3.23 M/UL (ref 4–5.2)
SODIUM SERPL-SCNC: 134 MMOL/L (ref 136–145)
WBC # BLD AUTO: 4.9 K/UL (ref 4–11)

## 2025-08-07 PROCEDURE — 85730 THROMBOPLASTIN TIME PARTIAL: CPT

## 2025-08-07 PROCEDURE — 6370000000 HC RX 637 (ALT 250 FOR IP): Performed by: NURSE PRACTITIONER

## 2025-08-07 PROCEDURE — 6370000000 HC RX 637 (ALT 250 FOR IP): Performed by: INTERNAL MEDICINE

## 2025-08-07 PROCEDURE — 97130 THER IVNTJ EA ADDL 15 MIN: CPT

## 2025-08-07 PROCEDURE — 85027 COMPLETE CBC AUTOMATED: CPT

## 2025-08-07 PROCEDURE — 6370000000 HC RX 637 (ALT 250 FOR IP)

## 2025-08-07 PROCEDURE — 36415 COLL VENOUS BLD VENIPUNCTURE: CPT

## 2025-08-07 PROCEDURE — 97129 THER IVNTJ 1ST 15 MIN: CPT

## 2025-08-07 PROCEDURE — 80053 COMPREHEN METABOLIC PANEL: CPT

## 2025-08-07 PROCEDURE — 97530 THERAPEUTIC ACTIVITIES: CPT

## 2025-08-07 PROCEDURE — 94761 N-INVAS EAR/PLS OXIMETRY MLT: CPT

## 2025-08-07 PROCEDURE — 2700000000 HC OXYGEN THERAPY PER DAY

## 2025-08-07 PROCEDURE — 2060000000 HC ICU INTERMEDIATE R&B

## 2025-08-07 RX ADMIN — DABIGATRAN ETEXILATE MESYLATE 150 MG: 75 CAPSULE ORAL at 21:36

## 2025-08-07 RX ADMIN — DULOXETINE 30 MG: 30 CAPSULE, DELAYED RELEASE ORAL at 09:48

## 2025-08-07 RX ADMIN — PROPRANOLOL HYDROCHLORIDE 60 MG: 60 CAPSULE, EXTENDED RELEASE ORAL at 09:49

## 2025-08-07 RX ADMIN — CARBAMAZEPINE 400 MG: 200 TABLET, EXTENDED RELEASE ORAL at 09:49

## 2025-08-07 RX ADMIN — LEVOTHYROXINE SODIUM 150 MCG: 0.15 TABLET ORAL at 09:48

## 2025-08-07 RX ADMIN — Medication 100 MG: at 09:48

## 2025-08-07 RX ADMIN — DABIGATRAN ETEXILATE MESYLATE 150 MG: 75 CAPSULE ORAL at 09:49

## 2025-08-07 RX ADMIN — LORAZEPAM 0.5 MG: 0.5 TABLET ORAL at 06:23

## 2025-08-07 RX ADMIN — ASPIRIN 81 MG: 81 TABLET, CHEWABLE ORAL at 09:48

## 2025-08-07 RX ADMIN — PHENOBARBITAL 32.4 MG: 64.8 TABLET ORAL at 03:23

## 2025-08-07 RX ADMIN — ATORVASTATIN CALCIUM 40 MG: 40 TABLET, FILM COATED ORAL at 09:48

## 2025-08-07 RX ADMIN — CLONAZEPAM 1 MG: 1 TABLET ORAL at 21:36

## 2025-08-07 RX ADMIN — PHENOBARBITAL 15 MG: 15 TABLET ORAL at 21:35

## 2025-08-07 RX ADMIN — ACETAMINOPHEN 650 MG: 325 TABLET ORAL at 21:35

## 2025-08-07 RX ADMIN — PANTOPRAZOLE SODIUM 40 MG: 40 TABLET, DELAYED RELEASE ORAL at 06:23

## 2025-08-07 RX ADMIN — PANTOPRAZOLE SODIUM 40 MG: 40 TABLET, DELAYED RELEASE ORAL at 15:09

## 2025-08-07 RX ADMIN — HYDROXYZINE PAMOATE 50 MG: 25 CAPSULE ORAL at 01:33

## 2025-08-07 RX ADMIN — ACETAMINOPHEN 650 MG: 325 TABLET ORAL at 03:29

## 2025-08-07 RX ADMIN — LORAZEPAM 0.5 MG: 0.5 TABLET ORAL at 15:09

## 2025-08-07 RX ADMIN — TRAMADOL HYDROCHLORIDE 50 MG: 50 TABLET, COATED ORAL at 15:09

## 2025-08-07 RX ADMIN — CLONAZEPAM 1 MG: 1 TABLET ORAL at 09:48

## 2025-08-07 RX ADMIN — PHENOBARBITAL 32.4 MG: 64.8 TABLET ORAL at 09:48

## 2025-08-07 RX ADMIN — CARBAMAZEPINE 400 MG: 200 TABLET, EXTENDED RELEASE ORAL at 21:36

## 2025-08-07 ASSESSMENT — PAIN SCALES - WONG BAKER: WONGBAKER_NUMERICALRESPONSE: NO HURT

## 2025-08-07 ASSESSMENT — PAIN DESCRIPTION - FREQUENCY: FREQUENCY: CONTINUOUS

## 2025-08-07 ASSESSMENT — PAIN DESCRIPTION - DESCRIPTORS: DESCRIPTORS: SHARP;STABBING

## 2025-08-07 ASSESSMENT — PAIN SCALES - GENERAL
PAINLEVEL_OUTOF10: 0
PAINLEVEL_OUTOF10: 7

## 2025-08-08 LAB
ALBUMIN SERPL-MCNC: 4 G/DL (ref 3.4–5)
ALBUMIN/GLOB SERPL: 1.7 {RATIO} (ref 1.1–2.2)
ALP SERPL-CCNC: 109 U/L (ref 40–129)
ALT SERPL-CCNC: 30 U/L (ref 10–40)
ANION GAP SERPL CALCULATED.3IONS-SCNC: 11 MMOL/L (ref 3–16)
ANISOCYTOSIS BLD QL SMEAR: ABNORMAL
AST SERPL-CCNC: 32 U/L (ref 15–37)
BASOPHILS # BLD: 0.1 K/UL (ref 0–0.2)
BASOPHILS NFR BLD: 1 %
BILIRUB SERPL-MCNC: 0.4 MG/DL (ref 0–1)
BUN SERPL-MCNC: 9 MG/DL (ref 7–20)
CALCIUM SERPL-MCNC: 9.1 MG/DL (ref 8.3–10.6)
CHLORIDE SERPL-SCNC: 99 MMOL/L (ref 99–110)
CO2 SERPL-SCNC: 28 MMOL/L (ref 21–32)
CREAT SERPL-MCNC: 0.5 MG/DL (ref 0.6–1.1)
DACRYOCYTES BLD QL SMEAR: ABNORMAL
DEPRECATED RDW RBC AUTO: 15.6 % (ref 12.4–15.4)
EOSINOPHIL # BLD: 0.1 K/UL (ref 0–0.6)
EOSINOPHIL NFR BLD: 1 %
GFR SERPLBLD CREATININE-BSD FMLA CKD-EPI: >90 ML/MIN/{1.73_M2}
GLUCOSE SERPL-MCNC: 96 MG/DL (ref 70–99)
HCT VFR BLD AUTO: 28.8 % (ref 36–48)
HGB BLD-MCNC: 9.8 G/DL (ref 12–16)
LYMPHOCYTES # BLD: 1.4 K/UL (ref 1–5.1)
LYMPHOCYTES NFR BLD: 22 %
MACROCYTES BLD QL SMEAR: ABNORMAL
MCH RBC QN AUTO: 32 PG (ref 26–34)
MCHC RBC AUTO-ENTMCNC: 34.1 G/DL (ref 31–36)
MCV RBC AUTO: 93.9 FL (ref 80–100)
METAMYELOCYTES NFR BLD MANUAL: 2 %
MICROCYTES BLD QL SMEAR: ABNORMAL
MONOCYTES # BLD: 0.6 K/UL (ref 0–1.3)
MONOCYTES NFR BLD: 9 %
MYELOCYTES NFR BLD MANUAL: 1 %
NEUTROPHILS # BLD: 4.4 K/UL (ref 1.7–7.7)
NEUTROPHILS NFR BLD: 63 %
NEUTS BAND NFR BLD MANUAL: 1 % (ref 0–7)
OVALOCYTES BLD QL SMEAR: ABNORMAL
PLATELET # BLD AUTO: 145 K/UL (ref 135–450)
PLATELET BLD QL SMEAR: ADEQUATE
PMV BLD AUTO: 8.3 FL (ref 5–10.5)
POIKILOCYTOSIS BLD QL SMEAR: ABNORMAL
POLYCHROMASIA BLD QL SMEAR: ABNORMAL
POTASSIUM SERPL-SCNC: 4.4 MMOL/L (ref 3.5–5.1)
PROT SERPL-MCNC: 6.3 G/DL (ref 6.4–8.2)
RBC # BLD AUTO: 3.06 M/UL (ref 4–5.2)
REASON FOR REJECTION: NORMAL
REJECTED TEST: NORMAL
SLIDE REVIEW: ABNORMAL
SODIUM SERPL-SCNC: 138 MMOL/L (ref 136–145)
WBC # BLD AUTO: 6.5 K/UL (ref 4–11)

## 2025-08-08 PROCEDURE — 85025 COMPLETE CBC W/AUTO DIFF WBC: CPT

## 2025-08-08 PROCEDURE — 94761 N-INVAS EAR/PLS OXIMETRY MLT: CPT

## 2025-08-08 PROCEDURE — 36415 COLL VENOUS BLD VENIPUNCTURE: CPT

## 2025-08-08 PROCEDURE — 6370000000 HC RX 637 (ALT 250 FOR IP)

## 2025-08-08 PROCEDURE — 6370000000 HC RX 637 (ALT 250 FOR IP): Performed by: INTERNAL MEDICINE

## 2025-08-08 PROCEDURE — 99233 SBSQ HOSP IP/OBS HIGH 50: CPT | Performed by: REGISTERED NURSE

## 2025-08-08 PROCEDURE — 80053 COMPREHEN METABOLIC PANEL: CPT

## 2025-08-08 PROCEDURE — 2700000000 HC OXYGEN THERAPY PER DAY

## 2025-08-08 PROCEDURE — 2060000000 HC ICU INTERMEDIATE R&B

## 2025-08-08 PROCEDURE — 6370000000 HC RX 637 (ALT 250 FOR IP): Performed by: NURSE PRACTITIONER

## 2025-08-08 RX ADMIN — DABIGATRAN ETEXILATE MESYLATE 150 MG: 75 CAPSULE ORAL at 20:34

## 2025-08-08 RX ADMIN — Medication 100 MG: at 09:04

## 2025-08-08 RX ADMIN — ASPIRIN 81 MG: 81 TABLET, CHEWABLE ORAL at 09:04

## 2025-08-08 RX ADMIN — DABIGATRAN ETEXILATE MESYLATE 150 MG: 75 CAPSULE ORAL at 09:13

## 2025-08-08 RX ADMIN — CARBAMAZEPINE 400 MG: 200 TABLET, EXTENDED RELEASE ORAL at 20:34

## 2025-08-08 RX ADMIN — CLONAZEPAM 1 MG: 1 TABLET ORAL at 20:33

## 2025-08-08 RX ADMIN — TRAMADOL HYDROCHLORIDE 50 MG: 50 TABLET, COATED ORAL at 18:00

## 2025-08-08 RX ADMIN — TRAMADOL HYDROCHLORIDE 50 MG: 50 TABLET, COATED ORAL at 03:14

## 2025-08-08 RX ADMIN — ATORVASTATIN CALCIUM 40 MG: 40 TABLET, FILM COATED ORAL at 09:05

## 2025-08-08 RX ADMIN — CARBAMAZEPINE 400 MG: 200 TABLET, EXTENDED RELEASE ORAL at 09:14

## 2025-08-08 RX ADMIN — PROPRANOLOL HYDROCHLORIDE 60 MG: 60 CAPSULE, EXTENDED RELEASE ORAL at 09:14

## 2025-08-08 RX ADMIN — LEVOTHYROXINE SODIUM 150 MCG: 0.15 TABLET ORAL at 09:05

## 2025-08-08 RX ADMIN — PANTOPRAZOLE SODIUM 40 MG: 40 TABLET, DELAYED RELEASE ORAL at 18:00

## 2025-08-08 RX ADMIN — PHENOBARBITAL 15 MG: 15 TABLET ORAL at 09:13

## 2025-08-08 RX ADMIN — CLONAZEPAM 1 MG: 1 TABLET ORAL at 09:05

## 2025-08-08 RX ADMIN — LORAZEPAM 0.5 MG: 0.5 TABLET ORAL at 18:00

## 2025-08-08 RX ADMIN — LORAZEPAM 0.5 MG: 0.5 TABLET ORAL at 03:14

## 2025-08-08 RX ADMIN — DULOXETINE 30 MG: 30 CAPSULE, DELAYED RELEASE ORAL at 09:05

## 2025-08-08 RX ADMIN — PANTOPRAZOLE SODIUM 40 MG: 40 TABLET, DELAYED RELEASE ORAL at 09:13

## 2025-08-08 ASSESSMENT — PAIN SCALES - GENERAL
PAINLEVEL_OUTOF10: 0
PAINLEVEL_OUTOF10: 7
PAINLEVEL_OUTOF10: 6
PAINLEVEL_OUTOF10: 6
PAINLEVEL_OUTOF10: 7

## 2025-08-08 ASSESSMENT — PAIN DESCRIPTION - LOCATION
LOCATION: BACK;HEAD;GENERALIZED
LOCATION: GENERALIZED;HEAD;BACK
LOCATION: GENERALIZED

## 2025-08-08 ASSESSMENT — PAIN DESCRIPTION - ORIENTATION: ORIENTATION: LOWER

## 2025-08-08 ASSESSMENT — PAIN DESCRIPTION - DESCRIPTORS
DESCRIPTORS: SHOOTING;STABBING
DESCRIPTORS: SHOOTING;STABBING

## 2025-08-08 ASSESSMENT — PAIN DESCRIPTION - PAIN TYPE: TYPE: ACUTE PAIN

## 2025-08-09 LAB
ANION GAP SERPL CALCULATED.3IONS-SCNC: 12 MMOL/L (ref 3–16)
ANISOCYTOSIS BLD QL SMEAR: ABNORMAL
BASOPHILS # BLD: 0 K/UL (ref 0–0.2)
BASOPHILS NFR BLD: 0 %
BUN SERPL-MCNC: 10 MG/DL (ref 7–20)
CALCIUM SERPL-MCNC: 9 MG/DL (ref 8.3–10.6)
CHLORIDE SERPL-SCNC: 99 MMOL/L (ref 99–110)
CO2 SERPL-SCNC: 26 MMOL/L (ref 21–32)
CREAT SERPL-MCNC: 0.7 MG/DL (ref 0.6–1.1)
DEPRECATED RDW RBC AUTO: 16.1 % (ref 12.4–15.4)
EOSINOPHIL # BLD: 0 K/UL (ref 0–0.6)
EOSINOPHIL NFR BLD: 0 %
GFR SERPLBLD CREATININE-BSD FMLA CKD-EPI: >90 ML/MIN/{1.73_M2}
GLUCOSE SERPL-MCNC: 111 MG/DL (ref 70–99)
HCT VFR BLD AUTO: 30.2 % (ref 36–48)
HGB BLD-MCNC: 10.3 G/DL (ref 12–16)
LYMPHOCYTES # BLD: 1.7 K/UL (ref 1–5.1)
LYMPHOCYTES NFR BLD: 18 %
MCH RBC QN AUTO: 33 PG (ref 26–34)
MCHC RBC AUTO-ENTMCNC: 34.2 G/DL (ref 31–36)
MCV RBC AUTO: 96.4 FL (ref 80–100)
MONOCYTES # BLD: 0.3 K/UL (ref 0–1.3)
MONOCYTES NFR BLD: 3 %
NEUTROPHILS # BLD: 7.3 K/UL (ref 1.7–7.7)
NEUTROPHILS NFR BLD: 79 %
PLATELET # BLD AUTO: 161 K/UL (ref 135–450)
PLATELET BLD QL SMEAR: ADEQUATE
PMV BLD AUTO: 8.8 FL (ref 5–10.5)
POTASSIUM SERPL-SCNC: 4.2 MMOL/L (ref 3.5–5.1)
RBC # BLD AUTO: 3.13 M/UL (ref 4–5.2)
SLIDE REVIEW: ABNORMAL
SODIUM SERPL-SCNC: 137 MMOL/L (ref 136–145)
WBC # BLD AUTO: 9.2 K/UL (ref 4–11)

## 2025-08-09 PROCEDURE — 6370000000 HC RX 637 (ALT 250 FOR IP)

## 2025-08-09 PROCEDURE — 36415 COLL VENOUS BLD VENIPUNCTURE: CPT

## 2025-08-09 PROCEDURE — 6360000002 HC RX W HCPCS

## 2025-08-09 PROCEDURE — 85025 COMPLETE CBC W/AUTO DIFF WBC: CPT

## 2025-08-09 PROCEDURE — 6370000000 HC RX 637 (ALT 250 FOR IP): Performed by: NURSE PRACTITIONER

## 2025-08-09 PROCEDURE — 2060000000 HC ICU INTERMEDIATE R&B

## 2025-08-09 PROCEDURE — 6370000000 HC RX 637 (ALT 250 FOR IP): Performed by: INTERNAL MEDICINE

## 2025-08-09 PROCEDURE — 80048 BASIC METABOLIC PNL TOTAL CA: CPT

## 2025-08-09 RX ORDER — LIDOCAINE 4 G/G
1 PATCH TOPICAL DAILY
Status: DISCONTINUED | OUTPATIENT
Start: 2025-08-09 | End: 2025-08-15 | Stop reason: HOSPADM

## 2025-08-09 RX ADMIN — LORAZEPAM 0.5 MG: 0.5 TABLET ORAL at 17:11

## 2025-08-09 RX ADMIN — DULOXETINE 30 MG: 30 CAPSULE, DELAYED RELEASE ORAL at 08:38

## 2025-08-09 RX ADMIN — METHOCARBAMOL 750 MG: 500 TABLET ORAL at 17:09

## 2025-08-09 RX ADMIN — TRAMADOL HYDROCHLORIDE 50 MG: 50 TABLET, COATED ORAL at 21:33

## 2025-08-09 RX ADMIN — PANTOPRAZOLE SODIUM 40 MG: 40 TABLET, DELAYED RELEASE ORAL at 07:01

## 2025-08-09 RX ADMIN — CLONAZEPAM 1 MG: 1 TABLET ORAL at 20:33

## 2025-08-09 RX ADMIN — CARBAMAZEPINE 400 MG: 200 TABLET, EXTENDED RELEASE ORAL at 08:39

## 2025-08-09 RX ADMIN — PANTOPRAZOLE SODIUM 40 MG: 40 TABLET, DELAYED RELEASE ORAL at 16:13

## 2025-08-09 RX ADMIN — DABIGATRAN ETEXILATE MESYLATE 150 MG: 75 CAPSULE ORAL at 08:39

## 2025-08-09 RX ADMIN — CARBAMAZEPINE 400 MG: 200 TABLET, EXTENDED RELEASE ORAL at 20:33

## 2025-08-09 RX ADMIN — LEVOTHYROXINE SODIUM 150 MCG: 0.15 TABLET ORAL at 08:38

## 2025-08-09 RX ADMIN — Medication 100 MG: at 08:38

## 2025-08-09 RX ADMIN — ATORVASTATIN CALCIUM 40 MG: 40 TABLET, FILM COATED ORAL at 08:38

## 2025-08-09 RX ADMIN — PROPRANOLOL HYDROCHLORIDE 60 MG: 60 CAPSULE, EXTENDED RELEASE ORAL at 08:40

## 2025-08-09 RX ADMIN — TRAMADOL HYDROCHLORIDE 50 MG: 50 TABLET, COATED ORAL at 11:46

## 2025-08-09 RX ADMIN — METHOCARBAMOL 750 MG: 500 TABLET ORAL at 04:19

## 2025-08-09 RX ADMIN — TRAMADOL HYDROCHLORIDE 50 MG: 50 TABLET, COATED ORAL at 00:59

## 2025-08-09 RX ADMIN — DABIGATRAN ETEXILATE MESYLATE 150 MG: 75 CAPSULE ORAL at 20:33

## 2025-08-09 RX ADMIN — ACETAMINOPHEN 650 MG: 325 TABLET ORAL at 11:46

## 2025-08-09 RX ADMIN — CLONAZEPAM 1 MG: 1 TABLET ORAL at 08:38

## 2025-08-09 RX ADMIN — LORAZEPAM 0.5 MG: 0.5 TABLET ORAL at 00:59

## 2025-08-09 RX ADMIN — CYANOCOBALAMIN 1000 MCG: 1000 INJECTION, SOLUTION INTRAMUSCULAR; SUBCUTANEOUS at 08:39

## 2025-08-09 RX ADMIN — ASPIRIN 81 MG: 81 TABLET, CHEWABLE ORAL at 08:38

## 2025-08-09 ASSESSMENT — PAIN DESCRIPTION - ORIENTATION
ORIENTATION: LOWER
ORIENTATION: LOWER;UPPER
ORIENTATION: LOWER

## 2025-08-09 ASSESSMENT — PAIN DESCRIPTION - LOCATION
LOCATION: BACK
LOCATION: GENERALIZED
LOCATION: BACK
LOCATION: GENERALIZED;BACK
LOCATION: BACK;GENERALIZED;HEAD
LOCATION: BACK;GENERALIZED
LOCATION: GENERALIZED

## 2025-08-09 ASSESSMENT — PAIN SCALES - GENERAL
PAINLEVEL_OUTOF10: 6
PAINLEVEL_OUTOF10: 8
PAINLEVEL_OUTOF10: 9
PAINLEVEL_OUTOF10: 10
PAINLEVEL_OUTOF10: 7
PAINLEVEL_OUTOF10: 7
PAINLEVEL_OUTOF10: 10

## 2025-08-09 ASSESSMENT — PAIN - FUNCTIONAL ASSESSMENT
PAIN_FUNCTIONAL_ASSESSMENT: 0-10

## 2025-08-09 ASSESSMENT — PAIN DESCRIPTION - PAIN TYPE: TYPE: ACUTE PAIN

## 2025-08-09 ASSESSMENT — PAIN DESCRIPTION - DESCRIPTORS
DESCRIPTORS: ACHING;SHOOTING;STABBING
DESCRIPTORS: ACHING

## 2025-08-10 LAB
ANION GAP SERPL CALCULATED.3IONS-SCNC: 11 MMOL/L (ref 3–16)
BASOPHILS # BLD: 0 K/UL (ref 0–0.2)
BASOPHILS NFR BLD: 0.2 %
BUN SERPL-MCNC: 10 MG/DL (ref 7–20)
CALCIUM SERPL-MCNC: 8.8 MG/DL (ref 8.3–10.6)
CHLORIDE SERPL-SCNC: 97 MMOL/L (ref 99–110)
CO2 SERPL-SCNC: 26 MMOL/L (ref 21–32)
CREAT SERPL-MCNC: 0.6 MG/DL (ref 0.6–1.1)
DEPRECATED RDW RBC AUTO: 15.8 % (ref 12.4–15.4)
EOSINOPHIL # BLD: 0.1 K/UL (ref 0–0.6)
EOSINOPHIL NFR BLD: 0.8 %
GFR SERPLBLD CREATININE-BSD FMLA CKD-EPI: >90 ML/MIN/{1.73_M2}
GLUCOSE SERPL-MCNC: 104 MG/DL (ref 70–99)
HCT VFR BLD AUTO: 26.6 % (ref 36–48)
HGB BLD-MCNC: 9.4 G/DL (ref 12–16)
LYMPHOCYTES # BLD: 1 K/UL (ref 1–5.1)
LYMPHOCYTES NFR BLD: 11.4 %
MCH RBC QN AUTO: 32.8 PG (ref 26–34)
MCHC RBC AUTO-ENTMCNC: 35.3 G/DL (ref 31–36)
MCV RBC AUTO: 92.8 FL (ref 80–100)
MONOCYTES # BLD: 1.1 K/UL (ref 0–1.3)
MONOCYTES NFR BLD: 11.9 %
NEUTROPHILS # BLD: 6.9 K/UL (ref 1.7–7.7)
NEUTROPHILS NFR BLD: 75.7 %
PLATELET # BLD AUTO: 178 K/UL (ref 135–450)
PMV BLD AUTO: 8.7 FL (ref 5–10.5)
POTASSIUM SERPL-SCNC: 4.2 MMOL/L (ref 3.5–5.1)
RBC # BLD AUTO: 2.87 M/UL (ref 4–5.2)
SODIUM SERPL-SCNC: 134 MMOL/L (ref 136–145)
WBC # BLD AUTO: 9.1 K/UL (ref 4–11)

## 2025-08-10 PROCEDURE — 2060000000 HC ICU INTERMEDIATE R&B

## 2025-08-10 PROCEDURE — 97129 THER IVNTJ 1ST 15 MIN: CPT

## 2025-08-10 PROCEDURE — 6370000000 HC RX 637 (ALT 250 FOR IP): Performed by: INTERNAL MEDICINE

## 2025-08-10 PROCEDURE — 85025 COMPLETE CBC W/AUTO DIFF WBC: CPT

## 2025-08-10 PROCEDURE — 6370000000 HC RX 637 (ALT 250 FOR IP): Performed by: NURSE PRACTITIONER

## 2025-08-10 PROCEDURE — 6360000002 HC RX W HCPCS

## 2025-08-10 PROCEDURE — 80048 BASIC METABOLIC PNL TOTAL CA: CPT

## 2025-08-10 PROCEDURE — 6370000000 HC RX 637 (ALT 250 FOR IP)

## 2025-08-10 PROCEDURE — 36569 INSJ PICC 5 YR+ W/O IMAGING: CPT

## 2025-08-10 PROCEDURE — C1751 CATH, INF, PER/CENT/MIDLINE: HCPCS

## 2025-08-10 PROCEDURE — 2500000003 HC RX 250 WO HCPCS

## 2025-08-10 PROCEDURE — 02HV33Z INSERTION OF INFUSION DEVICE INTO SUPERIOR VENA CAVA, PERCUTANEOUS APPROACH: ICD-10-PCS

## 2025-08-10 RX ORDER — SODIUM CHLORIDE 0.9 % (FLUSH) 0.9 %
5-40 SYRINGE (ML) INJECTION PRN
Status: DISCONTINUED | OUTPATIENT
Start: 2025-08-10 | End: 2025-08-11 | Stop reason: SDUPTHER

## 2025-08-10 RX ORDER — HEPARIN SODIUM 10000 [USP'U]/100ML
5-30 INJECTION, SOLUTION INTRAVENOUS CONTINUOUS
Status: DISCONTINUED | OUTPATIENT
Start: 2025-08-10 | End: 2025-08-10

## 2025-08-10 RX ORDER — LIDOCAINE HYDROCHLORIDE 10 MG/ML
50 INJECTION, SOLUTION INFILTRATION; PERINEURAL ONCE
Status: DISCONTINUED | OUTPATIENT
Start: 2025-08-10 | End: 2025-08-10

## 2025-08-10 RX ORDER — SODIUM CHLORIDE 0.9 % (FLUSH) 0.9 %
5-40 SYRINGE (ML) INJECTION EVERY 12 HOURS SCHEDULED
Status: DISCONTINUED | OUTPATIENT
Start: 2025-08-10 | End: 2025-08-11 | Stop reason: SDUPTHER

## 2025-08-10 RX ORDER — HEPARIN SODIUM 1000 [USP'U]/ML
40 INJECTION, SOLUTION INTRAVENOUS; SUBCUTANEOUS PRN
Status: DISCONTINUED | OUTPATIENT
Start: 2025-08-10 | End: 2025-08-10

## 2025-08-10 RX ORDER — HEPARIN SODIUM 1000 [USP'U]/ML
80 INJECTION, SOLUTION INTRAVENOUS; SUBCUTANEOUS ONCE
Status: DISCONTINUED | OUTPATIENT
Start: 2025-08-10 | End: 2025-08-10

## 2025-08-10 RX ORDER — HEPARIN SODIUM 1000 [USP'U]/ML
80 INJECTION, SOLUTION INTRAVENOUS; SUBCUTANEOUS PRN
Status: DISCONTINUED | OUTPATIENT
Start: 2025-08-10 | End: 2025-08-10

## 2025-08-10 RX ORDER — LIDOCAINE HYDROCHLORIDE 10 MG/ML
50 INJECTION, SOLUTION INFILTRATION; PERINEURAL ONCE
Status: DISCONTINUED | OUTPATIENT
Start: 2025-08-10 | End: 2025-08-13

## 2025-08-10 RX ORDER — SODIUM CHLORIDE 9 MG/ML
INJECTION, SOLUTION INTRAVENOUS PRN
Status: DISCONTINUED | OUTPATIENT
Start: 2025-08-10 | End: 2025-08-11 | Stop reason: SDUPTHER

## 2025-08-10 RX ORDER — SODIUM CHLORIDE 9 MG/ML
INJECTION, SOLUTION INTRAVENOUS PRN
Status: DISCONTINUED | OUTPATIENT
Start: 2025-08-10 | End: 2025-08-14 | Stop reason: SDUPTHER

## 2025-08-10 RX ORDER — ENOXAPARIN SODIUM 100 MG/ML
1 INJECTION SUBCUTANEOUS 2 TIMES DAILY
Status: DISCONTINUED | OUTPATIENT
Start: 2025-08-10 | End: 2025-08-11 | Stop reason: ALTCHOICE

## 2025-08-10 RX ADMIN — PROPRANOLOL HYDROCHLORIDE 60 MG: 60 CAPSULE, EXTENDED RELEASE ORAL at 08:30

## 2025-08-10 RX ADMIN — Medication 10 ML: at 21:00

## 2025-08-10 RX ADMIN — TRAMADOL HYDROCHLORIDE 50 MG: 50 TABLET, COATED ORAL at 03:49

## 2025-08-10 RX ADMIN — TRAMADOL HYDROCHLORIDE 50 MG: 50 TABLET, COATED ORAL at 13:29

## 2025-08-10 RX ADMIN — HYDROXYZINE PAMOATE 50 MG: 25 CAPSULE ORAL at 06:35

## 2025-08-10 RX ADMIN — TRAMADOL HYDROCHLORIDE 50 MG: 50 TABLET, COATED ORAL at 21:00

## 2025-08-10 RX ADMIN — ATORVASTATIN CALCIUM 40 MG: 40 TABLET, FILM COATED ORAL at 08:29

## 2025-08-10 RX ADMIN — PANTOPRAZOLE SODIUM 40 MG: 40 TABLET, DELAYED RELEASE ORAL at 17:00

## 2025-08-10 RX ADMIN — CARBAMAZEPINE 400 MG: 200 TABLET, EXTENDED RELEASE ORAL at 08:30

## 2025-08-10 RX ADMIN — LORAZEPAM 0.5 MG: 0.5 TABLET ORAL at 17:00

## 2025-08-10 RX ADMIN — CLONAZEPAM 1 MG: 1 TABLET ORAL at 21:00

## 2025-08-10 RX ADMIN — PANTOPRAZOLE SODIUM 40 MG: 40 TABLET, DELAYED RELEASE ORAL at 05:19

## 2025-08-10 RX ADMIN — CARBAMAZEPINE 400 MG: 200 TABLET, EXTENDED RELEASE ORAL at 20:59

## 2025-08-10 RX ADMIN — ENOXAPARIN SODIUM 90 MG: 100 INJECTION SUBCUTANEOUS at 21:00

## 2025-08-10 RX ADMIN — Medication 100 MG: at 08:29

## 2025-08-10 RX ADMIN — ASPIRIN 81 MG: 81 TABLET, CHEWABLE ORAL at 08:29

## 2025-08-10 RX ADMIN — DABIGATRAN ETEXILATE MESYLATE 150 MG: 75 CAPSULE ORAL at 08:29

## 2025-08-10 RX ADMIN — LORAZEPAM 0.5 MG: 0.5 TABLET ORAL at 23:28

## 2025-08-10 RX ADMIN — DULOXETINE 30 MG: 30 CAPSULE, DELAYED RELEASE ORAL at 08:29

## 2025-08-10 RX ADMIN — LEVOTHYROXINE SODIUM 150 MCG: 0.15 TABLET ORAL at 08:29

## 2025-08-10 RX ADMIN — ENOXAPARIN SODIUM 90 MG: 100 INJECTION SUBCUTANEOUS at 17:00

## 2025-08-10 RX ADMIN — CLONAZEPAM 1 MG: 1 TABLET ORAL at 08:29

## 2025-08-10 ASSESSMENT — PAIN DESCRIPTION - LOCATION
LOCATION: BACK;BUTTOCKS;GENERALIZED
LOCATION: CHEST;THROAT
LOCATION: BUTTOCKS;BACK;GENERALIZED
LOCATION: BUTTOCKS;BACK;GENERALIZED
LOCATION: BACK;BUTTOCKS;GENERALIZED
LOCATION: BACK;BUTTOCKS;GENERALIZED

## 2025-08-10 ASSESSMENT — PAIN SCALES - GENERAL
PAINLEVEL_OUTOF10: 7
PAINLEVEL_OUTOF10: 5
PAINLEVEL_OUTOF10: 7
PAINLEVEL_OUTOF10: 5
PAINLEVEL_OUTOF10: 4

## 2025-08-10 ASSESSMENT — PAIN - FUNCTIONAL ASSESSMENT
PAIN_FUNCTIONAL_ASSESSMENT: 0-10

## 2025-08-10 ASSESSMENT — PAIN DESCRIPTION - ORIENTATION: ORIENTATION: LOWER;POSTERIOR;UPPER

## 2025-08-11 ENCOUNTER — APPOINTMENT (OUTPATIENT)
Dept: CT IMAGING | Age: 55
End: 2025-08-11
Payer: COMMERCIAL

## 2025-08-11 ENCOUNTER — HOSPITAL ENCOUNTER (INPATIENT)
Dept: VASCULAR LAB | Age: 55
Discharge: HOME | End: 2025-08-13
Payer: COMMERCIAL

## 2025-08-11 PROBLEM — J96.01 ACUTE RESPIRATORY FAILURE WITH HYPOXIA (HCC): Status: ACTIVE | Noted: 2025-08-11

## 2025-08-11 LAB
ANION GAP SERPL CALCULATED.3IONS-SCNC: 10 MMOL/L (ref 3–16)
APTT BLD: 43.1 SEC (ref 22.8–35.8)
BASOPHILS # BLD: 0 K/UL (ref 0–0.2)
BASOPHILS NFR BLD: 0.2 %
BUN SERPL-MCNC: 10 MG/DL (ref 7–20)
CALCIUM SERPL-MCNC: 9.2 MG/DL (ref 8.3–10.6)
CHLORIDE SERPL-SCNC: 99 MMOL/L (ref 99–110)
CO2 SERPL-SCNC: 28 MMOL/L (ref 21–32)
CREAT SERPL-MCNC: 0.6 MG/DL (ref 0.6–1.1)
DEPRECATED RDW RBC AUTO: 15.6 % (ref 12.4–15.4)
EOSINOPHIL # BLD: 0.1 K/UL (ref 0–0.6)
EOSINOPHIL NFR BLD: 1.6 %
GFR SERPLBLD CREATININE-BSD FMLA CKD-EPI: >90 ML/MIN/{1.73_M2}
GLUCOSE SERPL-MCNC: 99 MG/DL (ref 70–99)
HCT VFR BLD AUTO: 27.7 % (ref 36–48)
HGB BLD-MCNC: 9.5 G/DL (ref 12–16)
LYMPHOCYTES # BLD: 1.1 K/UL (ref 1–5.1)
LYMPHOCYTES NFR BLD: 12.1 %
MCH RBC QN AUTO: 31.5 PG (ref 26–34)
MCHC RBC AUTO-ENTMCNC: 34.1 G/DL (ref 31–36)
MCV RBC AUTO: 92.2 FL (ref 80–100)
MONOCYTES # BLD: 0.9 K/UL (ref 0–1.3)
MONOCYTES NFR BLD: 9.8 %
NEUTROPHILS # BLD: 6.8 K/UL (ref 1.7–7.7)
NEUTROPHILS NFR BLD: 76.3 %
PLATELET # BLD AUTO: 206 K/UL (ref 135–450)
PMV BLD AUTO: 8.7 FL (ref 5–10.5)
POTASSIUM SERPL-SCNC: 3.9 MMOL/L (ref 3.5–5.1)
RBC # BLD AUTO: 3.01 M/UL (ref 4–5.2)
SODIUM SERPL-SCNC: 137 MMOL/L (ref 136–145)
WBC # BLD AUTO: 8.8 K/UL (ref 4–11)

## 2025-08-11 PROCEDURE — 2060000000 HC ICU INTERMEDIATE R&B

## 2025-08-11 PROCEDURE — 99222 1ST HOSP IP/OBS MODERATE 55: CPT | Performed by: CLINICAL NURSE SPECIALIST

## 2025-08-11 PROCEDURE — 85025 COMPLETE CBC W/AUTO DIFF WBC: CPT

## 2025-08-11 PROCEDURE — 51701 INSERT BLADDER CATHETER: CPT

## 2025-08-11 PROCEDURE — 2500000003 HC RX 250 WO HCPCS

## 2025-08-11 PROCEDURE — 51798 US URINE CAPACITY MEASURE: CPT

## 2025-08-11 PROCEDURE — 6360000002 HC RX W HCPCS: Performed by: STUDENT IN AN ORGANIZED HEALTH CARE EDUCATION/TRAINING PROGRAM

## 2025-08-11 PROCEDURE — 6370000000 HC RX 637 (ALT 250 FOR IP)

## 2025-08-11 PROCEDURE — 6360000004 HC RX CONTRAST MEDICATION: Performed by: CLINICAL NURSE SPECIALIST

## 2025-08-11 PROCEDURE — 6370000000 HC RX 637 (ALT 250 FOR IP): Performed by: NURSE PRACTITIONER

## 2025-08-11 PROCEDURE — 74174 CTA ABD&PLVS W/CONTRAST: CPT

## 2025-08-11 PROCEDURE — 80048 BASIC METABOLIC PNL TOTAL CA: CPT

## 2025-08-11 PROCEDURE — 93970 EXTREMITY STUDY: CPT

## 2025-08-11 PROCEDURE — 36415 COLL VENOUS BLD VENIPUNCTURE: CPT

## 2025-08-11 PROCEDURE — 85730 THROMBOPLASTIN TIME PARTIAL: CPT

## 2025-08-11 PROCEDURE — 6360000002 HC RX W HCPCS

## 2025-08-11 PROCEDURE — 6370000000 HC RX 637 (ALT 250 FOR IP): Performed by: INTERNAL MEDICINE

## 2025-08-11 RX ORDER — HEPARIN SODIUM 1000 [USP'U]/ML
40 INJECTION, SOLUTION INTRAVENOUS; SUBCUTANEOUS PRN
Status: DISCONTINUED | OUTPATIENT
Start: 2025-08-11 | End: 2025-08-15

## 2025-08-11 RX ORDER — HEPARIN SODIUM 10000 [USP'U]/100ML
5-30 INJECTION, SOLUTION INTRAVENOUS CONTINUOUS
Status: CANCELLED | OUTPATIENT
Start: 2025-08-11

## 2025-08-11 RX ORDER — HEPARIN SODIUM 1000 [USP'U]/ML
40 INJECTION, SOLUTION INTRAVENOUS; SUBCUTANEOUS PRN
Status: CANCELLED | OUTPATIENT
Start: 2025-08-11

## 2025-08-11 RX ORDER — HEPARIN SODIUM 1000 [USP'U]/ML
80 INJECTION, SOLUTION INTRAVENOUS; SUBCUTANEOUS PRN
Status: DISCONTINUED | OUTPATIENT
Start: 2025-08-11 | End: 2025-08-15

## 2025-08-11 RX ORDER — HEPARIN SODIUM 1000 [USP'U]/ML
80 INJECTION, SOLUTION INTRAVENOUS; SUBCUTANEOUS PRN
Status: CANCELLED | OUTPATIENT
Start: 2025-08-11

## 2025-08-11 RX ORDER — HEPARIN SODIUM 1000 [USP'U]/ML
80 INJECTION, SOLUTION INTRAVENOUS; SUBCUTANEOUS ONCE
Status: CANCELLED | OUTPATIENT
Start: 2025-08-11 | End: 2025-08-11

## 2025-08-11 RX ORDER — HEPARIN SODIUM 1000 [USP'U]/ML
80 INJECTION, SOLUTION INTRAVENOUS; SUBCUTANEOUS ONCE
Status: COMPLETED | OUTPATIENT
Start: 2025-08-11 | End: 2025-08-11

## 2025-08-11 RX ORDER — IOPAMIDOL 755 MG/ML
75 INJECTION, SOLUTION INTRAVASCULAR
Status: COMPLETED | OUTPATIENT
Start: 2025-08-11 | End: 2025-08-11

## 2025-08-11 RX ORDER — HEPARIN SODIUM 10000 [USP'U]/100ML
5-30 INJECTION, SOLUTION INTRAVENOUS CONTINUOUS
Status: DISCONTINUED | OUTPATIENT
Start: 2025-08-11 | End: 2025-08-15

## 2025-08-11 RX ADMIN — PANTOPRAZOLE SODIUM 40 MG: 40 TABLET, DELAYED RELEASE ORAL at 05:26

## 2025-08-11 RX ADMIN — HEPARIN SODIUM 7600 UNITS: 1000 INJECTION, SOLUTION INTRAVENOUS; SUBCUTANEOUS at 23:01

## 2025-08-11 RX ADMIN — ACETAMINOPHEN 650 MG: 325 TABLET ORAL at 12:10

## 2025-08-11 RX ADMIN — PROPRANOLOL HYDROCHLORIDE 60 MG: 60 CAPSULE, EXTENDED RELEASE ORAL at 08:42

## 2025-08-11 RX ADMIN — PANTOPRAZOLE SODIUM 40 MG: 40 TABLET, DELAYED RELEASE ORAL at 18:51

## 2025-08-11 RX ADMIN — DULOXETINE 30 MG: 30 CAPSULE, DELAYED RELEASE ORAL at 08:42

## 2025-08-11 RX ADMIN — TRAMADOL HYDROCHLORIDE 50 MG: 50 TABLET, COATED ORAL at 04:28

## 2025-08-11 RX ADMIN — ONDANSETRON 4 MG: 4 TABLET, ORALLY DISINTEGRATING ORAL at 08:40

## 2025-08-11 RX ADMIN — LORAZEPAM 0.5 MG: 0.5 TABLET ORAL at 06:16

## 2025-08-11 RX ADMIN — LORAZEPAM 0.5 MG: 0.5 TABLET ORAL at 20:28

## 2025-08-11 RX ADMIN — SODIUM CHLORIDE, PRESERVATIVE FREE 10 ML: 5 INJECTION INTRAVENOUS at 20:29

## 2025-08-11 RX ADMIN — CARBAMAZEPINE 400 MG: 200 TABLET, EXTENDED RELEASE ORAL at 08:43

## 2025-08-11 RX ADMIN — IOPAMIDOL 75 ML: 755 INJECTION, SOLUTION INTRAVENOUS at 15:14

## 2025-08-11 RX ADMIN — HYDROXYZINE PAMOATE 50 MG: 25 CAPSULE ORAL at 02:25

## 2025-08-11 RX ADMIN — ALTEPLASE 1 MG: 2.2 INJECTION, POWDER, LYOPHILIZED, FOR SOLUTION INTRAVENOUS at 17:17

## 2025-08-11 RX ADMIN — CLONAZEPAM 1 MG: 1 TABLET ORAL at 20:28

## 2025-08-11 RX ADMIN — LEVOTHYROXINE SODIUM 150 MCG: 0.15 TABLET ORAL at 08:42

## 2025-08-11 RX ADMIN — ENOXAPARIN SODIUM 90 MG: 100 INJECTION SUBCUTANEOUS at 11:15

## 2025-08-11 RX ADMIN — Medication 100 MG: at 08:42

## 2025-08-11 RX ADMIN — HEPARIN SODIUM AND DEXTROSE 18 UNITS/KG/HR: 10000; 5 INJECTION INTRAVENOUS at 23:04

## 2025-08-11 RX ADMIN — ATORVASTATIN CALCIUM 40 MG: 40 TABLET, FILM COATED ORAL at 08:42

## 2025-08-11 RX ADMIN — CARBAMAZEPINE 400 MG: 200 TABLET, EXTENDED RELEASE ORAL at 20:28

## 2025-08-11 RX ADMIN — SODIUM CHLORIDE, PRESERVATIVE FREE 10 ML: 5 INJECTION INTRAVENOUS at 09:42

## 2025-08-11 RX ADMIN — TRAMADOL HYDROCHLORIDE 50 MG: 50 TABLET, COATED ORAL at 12:09

## 2025-08-11 RX ADMIN — CLONAZEPAM 1 MG: 1 TABLET ORAL at 08:42

## 2025-08-11 ASSESSMENT — PAIN - FUNCTIONAL ASSESSMENT
PAIN_FUNCTIONAL_ASSESSMENT: 0-10

## 2025-08-11 ASSESSMENT — PAIN SCALES - GENERAL
PAINLEVEL_OUTOF10: 5
PAINLEVEL_OUTOF10: 5
PAINLEVEL_OUTOF10: 7
PAINLEVEL_OUTOF10: 5
PAINLEVEL_OUTOF10: 6
PAINLEVEL_OUTOF10: 7
PAINLEVEL_OUTOF10: 5

## 2025-08-11 ASSESSMENT — PAIN DESCRIPTION - LOCATION
LOCATION: LEG
LOCATION: BACK;BUTTOCKS;GENERALIZED
LOCATION: HEAD;BACK;HIP
LOCATION: BACK;BUTTOCKS;GENERALIZED

## 2025-08-11 ASSESSMENT — PAIN DESCRIPTION - ORIENTATION
ORIENTATION: LOWER;UPPER
ORIENTATION: LEFT;RIGHT

## 2025-08-11 ASSESSMENT — PAIN DESCRIPTION - DESCRIPTORS
DESCRIPTORS: ACHING
DESCRIPTORS: ACHING;DISCOMFORT

## 2025-08-12 PROBLEM — I82.422: Status: ACTIVE | Noted: 2025-08-12

## 2025-08-12 PROBLEM — I82.403 DEEP VEIN THROMBOSIS (DVT) OF BOTH LOWER EXTREMITIES (HCC): Status: ACTIVE | Noted: 2025-08-12

## 2025-08-12 LAB
ANION GAP SERPL CALCULATED.3IONS-SCNC: 11 MMOL/L (ref 3–16)
ANTI-XA UNFRAC HEPARIN: >1.1 IU/ML (ref 0.3–0.7)
APTT BLD: 81.9 SEC (ref 22.8–35.8)
APTT BLD: >180 SEC (ref 22.8–35.8)
APTT BLD: >180 SEC (ref 22.8–35.8)
BASOPHILS # BLD: 0.1 K/UL (ref 0–0.2)
BASOPHILS NFR BLD: 0.7 %
BUN SERPL-MCNC: 7 MG/DL (ref 7–20)
CALCIUM SERPL-MCNC: 8.8 MG/DL (ref 8.3–10.6)
CHLORIDE SERPL-SCNC: 99 MMOL/L (ref 99–110)
CO2 SERPL-SCNC: 27 MMOL/L (ref 21–32)
CREAT SERPL-MCNC: 0.5 MG/DL (ref 0.6–1.1)
DEPRECATED RDW RBC AUTO: 15.6 % (ref 12.4–15.4)
ECHO BSA: 2.02 M2
EOSINOPHIL # BLD: 0.3 K/UL (ref 0–0.6)
EOSINOPHIL NFR BLD: 4 %
GFR SERPLBLD CREATININE-BSD FMLA CKD-EPI: >90 ML/MIN/{1.73_M2}
GLUCOSE SERPL-MCNC: 86 MG/DL (ref 70–99)
HCT VFR BLD AUTO: 25.5 % (ref 36–48)
HGB BLD-MCNC: 8.9 G/DL (ref 12–16)
LYMPHOCYTES # BLD: 1.3 K/UL (ref 1–5.1)
LYMPHOCYTES NFR BLD: 17 %
MCH RBC QN AUTO: 32 PG (ref 26–34)
MCHC RBC AUTO-ENTMCNC: 34.8 G/DL (ref 31–36)
MCV RBC AUTO: 92 FL (ref 80–100)
MONOCYTES # BLD: 0.8 K/UL (ref 0–1.3)
MONOCYTES NFR BLD: 10.7 %
NEUTROPHILS # BLD: 5 K/UL (ref 1.7–7.7)
NEUTROPHILS NFR BLD: 67.6 %
PLATELET # BLD AUTO: 233 K/UL (ref 135–450)
PMV BLD AUTO: 8.5 FL (ref 5–10.5)
POTASSIUM SERPL-SCNC: 3.8 MMOL/L (ref 3.5–5.1)
RBC # BLD AUTO: 2.78 M/UL (ref 4–5.2)
SODIUM SERPL-SCNC: 137 MMOL/L (ref 136–145)
WBC # BLD AUTO: 7.4 K/UL (ref 4–11)

## 2025-08-12 PROCEDURE — 6370000000 HC RX 637 (ALT 250 FOR IP)

## 2025-08-12 PROCEDURE — 99233 SBSQ HOSP IP/OBS HIGH 50: CPT | Performed by: SURGERY

## 2025-08-12 PROCEDURE — 85730 THROMBOPLASTIN TIME PARTIAL: CPT

## 2025-08-12 PROCEDURE — 93970 EXTREMITY STUDY: CPT | Performed by: SURGERY

## 2025-08-12 PROCEDURE — 85520 HEPARIN ASSAY: CPT

## 2025-08-12 PROCEDURE — 80048 BASIC METABOLIC PNL TOTAL CA: CPT

## 2025-08-12 PROCEDURE — 6370000000 HC RX 637 (ALT 250 FOR IP): Performed by: NURSE PRACTITIONER

## 2025-08-12 PROCEDURE — 2060000000 HC ICU INTERMEDIATE R&B

## 2025-08-12 PROCEDURE — 6370000000 HC RX 637 (ALT 250 FOR IP): Performed by: INTERNAL MEDICINE

## 2025-08-12 PROCEDURE — 2500000003 HC RX 250 WO HCPCS

## 2025-08-12 PROCEDURE — 85025 COMPLETE CBC W/AUTO DIFF WBC: CPT

## 2025-08-12 PROCEDURE — 51798 US URINE CAPACITY MEASURE: CPT

## 2025-08-12 PROCEDURE — 6360000002 HC RX W HCPCS: Performed by: STUDENT IN AN ORGANIZED HEALTH CARE EDUCATION/TRAINING PROGRAM

## 2025-08-12 RX ADMIN — CLONAZEPAM 1 MG: 1 TABLET ORAL at 20:51

## 2025-08-12 RX ADMIN — POLYETHYLENE GLYCOL 3350 17 G: 17 POWDER, FOR SOLUTION ORAL at 09:36

## 2025-08-12 RX ADMIN — LEVOTHYROXINE SODIUM 150 MCG: 0.15 TABLET ORAL at 09:37

## 2025-08-12 RX ADMIN — ACETAMINOPHEN 650 MG: 325 TABLET ORAL at 18:46

## 2025-08-12 RX ADMIN — SODIUM CHLORIDE, PRESERVATIVE FREE 10 ML: 5 INJECTION INTRAVENOUS at 07:45

## 2025-08-12 RX ADMIN — PANTOPRAZOLE SODIUM 40 MG: 40 TABLET, DELAYED RELEASE ORAL at 05:09

## 2025-08-12 RX ADMIN — CLONAZEPAM 1 MG: 1 TABLET ORAL at 09:37

## 2025-08-12 RX ADMIN — Medication 100 MG: at 09:37

## 2025-08-12 RX ADMIN — TRAMADOL HYDROCHLORIDE 50 MG: 50 TABLET, COATED ORAL at 02:21

## 2025-08-12 RX ADMIN — PROPRANOLOL HYDROCHLORIDE 60 MG: 60 CAPSULE, EXTENDED RELEASE ORAL at 09:39

## 2025-08-12 RX ADMIN — TRAMADOL HYDROCHLORIDE 50 MG: 50 TABLET, COATED ORAL at 20:51

## 2025-08-12 RX ADMIN — SODIUM CHLORIDE, PRESERVATIVE FREE 10 ML: 5 INJECTION INTRAVENOUS at 20:52

## 2025-08-12 RX ADMIN — PANTOPRAZOLE SODIUM 40 MG: 40 TABLET, DELAYED RELEASE ORAL at 18:46

## 2025-08-12 RX ADMIN — CARBAMAZEPINE 400 MG: 200 TABLET, EXTENDED RELEASE ORAL at 09:39

## 2025-08-12 RX ADMIN — HEPARIN SODIUM AND DEXTROSE 12 UNITS/KG/HR: 10000; 5 INJECTION INTRAVENOUS at 16:58

## 2025-08-12 RX ADMIN — ATORVASTATIN CALCIUM 40 MG: 40 TABLET, FILM COATED ORAL at 09:37

## 2025-08-12 RX ADMIN — DULOXETINE 30 MG: 30 CAPSULE, DELAYED RELEASE ORAL at 09:37

## 2025-08-12 RX ADMIN — LORAZEPAM 0.5 MG: 0.5 TABLET ORAL at 20:51

## 2025-08-12 RX ADMIN — CARBAMAZEPINE 400 MG: 200 TABLET, EXTENDED RELEASE ORAL at 20:54

## 2025-08-12 ASSESSMENT — PAIN DESCRIPTION - ORIENTATION: ORIENTATION: LEFT;RIGHT

## 2025-08-12 ASSESSMENT — PAIN DESCRIPTION - DESCRIPTORS: DESCRIPTORS: ACHING;DISCOMFORT;CRAMPING

## 2025-08-12 ASSESSMENT — PAIN SCALES - GENERAL
PAINLEVEL_OUTOF10: 4
PAINLEVEL_OUTOF10: 0
PAINLEVEL_OUTOF10: 10
PAINLEVEL_OUTOF10: 7
PAINLEVEL_OUTOF10: 0
PAINLEVEL_OUTOF10: 0
PAINLEVEL_OUTOF10: 2
PAINLEVEL_OUTOF10: 0

## 2025-08-12 ASSESSMENT — PAIN DESCRIPTION - LOCATION
LOCATION: FOOT
LOCATION: LEG

## 2025-08-13 LAB
ANION GAP SERPL CALCULATED.3IONS-SCNC: 9 MMOL/L (ref 3–16)
ANISOCYTOSIS BLD QL SMEAR: ABNORMAL
APTT BLD: 110.8 SEC (ref 22.8–35.8)
APTT BLD: 56.1 SEC (ref 22.8–35.8)
APTT BLD: 73 SEC (ref 22.8–35.8)
BASOPHILS # BLD: 0 K/UL (ref 0–0.2)
BASOPHILS NFR BLD: 0 %
BUN SERPL-MCNC: 6 MG/DL (ref 7–20)
CALCIUM SERPL-MCNC: 8.7 MG/DL (ref 8.3–10.6)
CHLORIDE SERPL-SCNC: 102 MMOL/L (ref 99–110)
CO2 SERPL-SCNC: 28 MMOL/L (ref 21–32)
CREAT SERPL-MCNC: 0.5 MG/DL (ref 0.6–1.1)
DACRYOCYTES BLD QL SMEAR: ABNORMAL
DEPRECATED RDW RBC AUTO: 15.6 % (ref 12.4–15.4)
EOSINOPHIL # BLD: 0.5 K/UL (ref 0–0.6)
EOSINOPHIL NFR BLD: 7 %
GFR SERPLBLD CREATININE-BSD FMLA CKD-EPI: >90 ML/MIN/{1.73_M2}
GLUCOSE SERPL-MCNC: 101 MG/DL (ref 70–99)
HCT VFR BLD AUTO: 27.1 % (ref 36–48)
HGB BLD-MCNC: 9.2 G/DL (ref 12–16)
LYMPHOCYTES # BLD: 1.3 K/UL (ref 1–5.1)
LYMPHOCYTES NFR BLD: 16 %
MACROCYTES BLD QL SMEAR: ABNORMAL
MCH RBC QN AUTO: 31.2 PG (ref 26–34)
MCHC RBC AUTO-ENTMCNC: 34.1 G/DL (ref 31–36)
MCV RBC AUTO: 91.4 FL (ref 80–100)
METAMYELOCYTES NFR BLD MANUAL: 3 %
MICROCYTES BLD QL SMEAR: ABNORMAL
MONOCYTES # BLD: 0.3 K/UL (ref 0–1.3)
MONOCYTES NFR BLD: 5 %
NEUTROPHILS # BLD: 4.7 K/UL (ref 1.7–7.7)
NEUTROPHILS NFR BLD: 61 %
NEUTS BAND NFR BLD MANUAL: 5 % (ref 0–7)
PLATELET # BLD AUTO: 245 K/UL (ref 135–450)
PLATELET BLD QL SMEAR: ADEQUATE
PMV BLD AUTO: 8.5 FL (ref 5–10.5)
POIKILOCYTOSIS BLD QL SMEAR: ABNORMAL
POLYCHROMASIA BLD QL SMEAR: ABNORMAL
POTASSIUM SERPL-SCNC: 4 MMOL/L (ref 3.5–5.1)
RBC # BLD AUTO: 2.96 M/UL (ref 4–5.2)
SCHISTOCYTES BLD QL SMEAR: ABNORMAL
SLIDE REVIEW: ABNORMAL
SODIUM SERPL-SCNC: 139 MMOL/L (ref 136–145)
VARIANT LYMPHS NFR BLD MANUAL: 3 % (ref 0–6)
WBC # BLD AUTO: 6.8 K/UL (ref 4–11)

## 2025-08-13 PROCEDURE — 6370000000 HC RX 637 (ALT 250 FOR IP): Performed by: SURGERY

## 2025-08-13 PROCEDURE — 36005 INJECTION EXT VENOGRAPHY: CPT | Performed by: SURGERY

## 2025-08-13 PROCEDURE — 80048 BASIC METABOLIC PNL TOTAL CA: CPT

## 2025-08-13 PROCEDURE — 6370000000 HC RX 637 (ALT 250 FOR IP): Performed by: INTERNAL MEDICINE

## 2025-08-13 PROCEDURE — 76937 US GUIDE VASCULAR ACCESS: CPT | Performed by: SURGERY

## 2025-08-13 PROCEDURE — 37212 THROMBOLYTIC VENOUS THERAPY: CPT | Performed by: SURGERY

## 2025-08-13 PROCEDURE — 85520 HEPARIN ASSAY: CPT

## 2025-08-13 PROCEDURE — 2709999900 HC NON-CHARGEABLE SUPPLY: Performed by: SURGERY

## 2025-08-13 PROCEDURE — 51798 US URINE CAPACITY MEASURE: CPT

## 2025-08-13 PROCEDURE — B51C1ZZ FLUOROSCOPY OF LEFT LOWER EXTREMITY VEINS USING LOW OSMOLAR CONTRAST: ICD-10-PCS | Performed by: SURGERY

## 2025-08-13 PROCEDURE — 2060000000 HC ICU INTERMEDIATE R&B

## 2025-08-13 PROCEDURE — 75820 VEIN X-RAY ARM/LEG: CPT | Performed by: SURGERY

## 2025-08-13 PROCEDURE — 2500000003 HC RX 250 WO HCPCS: Performed by: SURGERY

## 2025-08-13 PROCEDURE — 6370000000 HC RX 637 (ALT 250 FOR IP)

## 2025-08-13 PROCEDURE — 2500000003 HC RX 250 WO HCPCS

## 2025-08-13 PROCEDURE — 99153 MOD SED SAME PHYS/QHP EA: CPT | Performed by: SURGERY

## 2025-08-13 PROCEDURE — 85730 THROMBOPLASTIN TIME PARTIAL: CPT

## 2025-08-13 PROCEDURE — 85025 COMPLETE CBC W/AUTO DIFF WBC: CPT

## 2025-08-13 PROCEDURE — 6360000002 HC RX W HCPCS: Performed by: SURGERY

## 2025-08-13 PROCEDURE — 6360000004 HC RX CONTRAST MEDICATION: Performed by: SURGERY

## 2025-08-13 PROCEDURE — 99152 MOD SED SAME PHYS/QHP 5/>YRS: CPT | Performed by: SURGERY

## 2025-08-13 PROCEDURE — C1887 CATHETER, GUIDING: HCPCS | Performed by: SURGERY

## 2025-08-13 PROCEDURE — C1769 GUIDE WIRE: HCPCS | Performed by: SURGERY

## 2025-08-13 PROCEDURE — C1894 INTRO/SHEATH, NON-LASER: HCPCS | Performed by: SURGERY

## 2025-08-13 RX ORDER — FENTANYL CITRATE 50 UG/ML
INJECTION, SOLUTION INTRAMUSCULAR; INTRAVENOUS PRN
Status: DISCONTINUED | OUTPATIENT
Start: 2025-08-13 | End: 2025-08-13 | Stop reason: HOSPADM

## 2025-08-13 RX ORDER — SODIUM CHLORIDE 0.9 % (FLUSH) 0.9 %
5-40 SYRINGE (ML) INJECTION EVERY 12 HOURS SCHEDULED
Status: DISCONTINUED | OUTPATIENT
Start: 2025-08-13 | End: 2025-08-15 | Stop reason: HOSPADM

## 2025-08-13 RX ORDER — IOPAMIDOL 612 MG/ML
INJECTION, SOLUTION INTRAVASCULAR PRN
Status: DISCONTINUED | OUTPATIENT
Start: 2025-08-13 | End: 2025-08-13 | Stop reason: HOSPADM

## 2025-08-13 RX ORDER — ASPIRIN 81 MG/1
81 TABLET ORAL DAILY
Status: DISCONTINUED | OUTPATIENT
Start: 2025-08-14 | End: 2025-08-15 | Stop reason: HOSPADM

## 2025-08-13 RX ORDER — SODIUM CHLORIDE 9 MG/ML
INJECTION, SOLUTION INTRAVENOUS PRN
Status: DISCONTINUED | OUTPATIENT
Start: 2025-08-13 | End: 2025-08-15 | Stop reason: HOSPADM

## 2025-08-13 RX ORDER — WATER 10 ML/10ML
INJECTION INTRAMUSCULAR; INTRAVENOUS; SUBCUTANEOUS
Status: DISCONTINUED
Start: 2025-08-13 | End: 2025-08-13

## 2025-08-13 RX ORDER — ASPIRIN 81 MG/1
81 TABLET ORAL ONCE
Status: DISCONTINUED | OUTPATIENT
Start: 2025-08-13 | End: 2025-08-14 | Stop reason: SDUPTHER

## 2025-08-13 RX ORDER — MIDAZOLAM HYDROCHLORIDE 1 MG/ML
INJECTION, SOLUTION INTRAMUSCULAR; INTRAVENOUS PRN
Status: DISCONTINUED | OUTPATIENT
Start: 2025-08-13 | End: 2025-08-13 | Stop reason: HOSPADM

## 2025-08-13 RX ORDER — SODIUM CHLORIDE 0.9 % (FLUSH) 0.9 %
5-40 SYRINGE (ML) INJECTION PRN
Status: DISCONTINUED | OUTPATIENT
Start: 2025-08-13 | End: 2025-08-15 | Stop reason: HOSPADM

## 2025-08-13 RX ORDER — ASPIRIN 81 MG/1
81 TABLET ORAL DAILY
Status: DISCONTINUED | OUTPATIENT
Start: 2025-08-13 | End: 2025-08-13

## 2025-08-13 RX ADMIN — LEVOTHYROXINE SODIUM 150 MCG: 0.15 TABLET ORAL at 08:37

## 2025-08-13 RX ADMIN — SODIUM CHLORIDE, PRESERVATIVE FREE 10 ML: 5 INJECTION INTRAVENOUS at 16:27

## 2025-08-13 RX ADMIN — CARBAMAZEPINE 400 MG: 200 TABLET, EXTENDED RELEASE ORAL at 20:28

## 2025-08-13 RX ADMIN — ATORVASTATIN CALCIUM 40 MG: 40 TABLET, FILM COATED ORAL at 11:12

## 2025-08-13 RX ADMIN — DULOXETINE 30 MG: 30 CAPSULE, DELAYED RELEASE ORAL at 11:12

## 2025-08-13 RX ADMIN — METHOCARBAMOL 750 MG: 500 TABLET ORAL at 20:28

## 2025-08-13 RX ADMIN — SODIUM CHLORIDE, PRESERVATIVE FREE 10 ML: 5 INJECTION INTRAVENOUS at 20:29

## 2025-08-13 RX ADMIN — SODIUM CHLORIDE, PRESERVATIVE FREE 10 ML: 5 INJECTION INTRAVENOUS at 09:05

## 2025-08-13 RX ADMIN — HEPARIN SODIUM AND DEXTROSE 10 UNITS/KG/HR: 10000; 5 INJECTION INTRAVENOUS at 12:32

## 2025-08-13 RX ADMIN — CLONAZEPAM 1 MG: 1 TABLET ORAL at 20:28

## 2025-08-13 RX ADMIN — PANTOPRAZOLE SODIUM 40 MG: 40 TABLET, DELAYED RELEASE ORAL at 05:27

## 2025-08-13 RX ADMIN — POLYETHYLENE GLYCOL 3350 17 G: 17 POWDER, FOR SOLUTION ORAL at 11:22

## 2025-08-13 RX ADMIN — CARBAMAZEPINE 400 MG: 200 TABLET, EXTENDED RELEASE ORAL at 11:22

## 2025-08-13 RX ADMIN — CLONAZEPAM 1 MG: 1 TABLET ORAL at 08:37

## 2025-08-13 RX ADMIN — ASPIRIN 81 MG: 81 TABLET, CHEWABLE ORAL at 09:01

## 2025-08-13 RX ADMIN — PANTOPRAZOLE SODIUM 40 MG: 40 TABLET, DELAYED RELEASE ORAL at 16:26

## 2025-08-13 RX ADMIN — PROPRANOLOL HYDROCHLORIDE 60 MG: 60 CAPSULE, EXTENDED RELEASE ORAL at 08:37

## 2025-08-13 RX ADMIN — Medication 100 MG: at 11:11

## 2025-08-13 RX ADMIN — TRAMADOL HYDROCHLORIDE 50 MG: 50 TABLET, COATED ORAL at 20:28

## 2025-08-13 RX ADMIN — LORAZEPAM 0.5 MG: 0.5 TABLET ORAL at 21:42

## 2025-08-13 RX ADMIN — HEPARIN SODIUM 7600 UNITS: 1000 INJECTION, SOLUTION INTRAVENOUS; SUBCUTANEOUS at 18:28

## 2025-08-13 RX ADMIN — LORAZEPAM 0.5 MG: 0.5 TABLET ORAL at 15:43

## 2025-08-13 ASSESSMENT — PAIN SCALES - GENERAL
PAINLEVEL_OUTOF10: 8
PAINLEVEL_OUTOF10: 2
PAINLEVEL_OUTOF10: 10
PAINLEVEL_OUTOF10: 5
PAINLEVEL_OUTOF10: 0
PAINLEVEL_OUTOF10: 0

## 2025-08-13 ASSESSMENT — PAIN DESCRIPTION - PAIN TYPE: TYPE: ACUTE PAIN

## 2025-08-13 ASSESSMENT — PAIN - FUNCTIONAL ASSESSMENT: PAIN_FUNCTIONAL_ASSESSMENT: 0-10

## 2025-08-13 ASSESSMENT — PAIN DESCRIPTION - DESCRIPTORS
DESCRIPTORS: ACHING
DESCRIPTORS: SORE;ACHING

## 2025-08-13 ASSESSMENT — PAIN DESCRIPTION - LOCATION
LOCATION: GENERALIZED
LOCATION: GENERALIZED
LOCATION: FOOT

## 2025-08-13 ASSESSMENT — PAIN DESCRIPTION - ORIENTATION: ORIENTATION: LEFT;RIGHT

## 2025-08-14 ENCOUNTER — TELEPHONE (OUTPATIENT)
Dept: OTHER | Age: 55
End: 2025-08-14

## 2025-08-14 LAB
ANION GAP SERPL CALCULATED.3IONS-SCNC: 9 MMOL/L (ref 3–16)
ANISOCYTOSIS BLD QL SMEAR: ABNORMAL
ANTI-XA UNFRAC HEPARIN: >1.1 IU/ML (ref 0.3–0.7)
APTT BLD: 139.2 SEC (ref 22.8–35.8)
APTT BLD: 48.6 SEC (ref 22.8–35.8)
APTT BLD: >180 SEC (ref 22.8–35.8)
APTT BLD: >180 SEC (ref 22.8–35.8)
BASOPHILS # BLD: 0 K/UL (ref 0–0.2)
BASOPHILS NFR BLD: 0 %
BUN SERPL-MCNC: 6 MG/DL (ref 7–20)
CALCIUM SERPL-MCNC: 8.7 MG/DL (ref 8.3–10.6)
CHLORIDE SERPL-SCNC: 104 MMOL/L (ref 99–110)
CO2 SERPL-SCNC: 27 MMOL/L (ref 21–32)
CREAT SERPL-MCNC: 0.5 MG/DL (ref 0.6–1.1)
DACRYOCYTES BLD QL SMEAR: ABNORMAL
DEPRECATED RDW RBC AUTO: 16.2 % (ref 12.4–15.4)
ECHO BSA: 2.02 M2
EOSINOPHIL # BLD: 0.4 K/UL (ref 0–0.6)
EOSINOPHIL NFR BLD: 6 %
GFR SERPLBLD CREATININE-BSD FMLA CKD-EPI: >90 ML/MIN/{1.73_M2}
GLUCOSE SERPL-MCNC: 88 MG/DL (ref 70–99)
HCT VFR BLD AUTO: 26.3 % (ref 36–48)
HGB BLD-MCNC: 8.8 G/DL (ref 12–16)
LYMPHOCYTES # BLD: 1.1 K/UL (ref 1–5.1)
LYMPHOCYTES NFR BLD: 15 %
MACROCYTES BLD QL SMEAR: ABNORMAL
MCH RBC QN AUTO: 31.1 PG (ref 26–34)
MCHC RBC AUTO-ENTMCNC: 33.6 G/DL (ref 31–36)
MCV RBC AUTO: 92.6 FL (ref 80–100)
METAMYELOCYTES NFR BLD MANUAL: 3 %
MICROCYTES BLD QL SMEAR: ABNORMAL
MONOCYTES # BLD: 0.6 K/UL (ref 0–1.3)
MONOCYTES NFR BLD: 8 %
MYELOCYTES NFR BLD MANUAL: 2 %
NEUTROPHILS # BLD: 5.1 K/UL (ref 1.7–7.7)
NEUTROPHILS NFR BLD: 60 %
NEUTS BAND NFR BLD MANUAL: 6 % (ref 0–7)
OVALOCYTES BLD QL SMEAR: ABNORMAL
PLATELET # BLD AUTO: 265 K/UL (ref 135–450)
PLATELET BLD QL SMEAR: ADEQUATE
PMV BLD AUTO: 8.3 FL (ref 5–10.5)
POIKILOCYTOSIS BLD QL SMEAR: ABNORMAL
POLYCHROMASIA BLD QL SMEAR: ABNORMAL
POTASSIUM SERPL-SCNC: 4.1 MMOL/L (ref 3.5–5.1)
RBC # BLD AUTO: 2.84 M/UL (ref 4–5.2)
REASON FOR REJECTION: NORMAL
REJECTED TEST: NORMAL
SLIDE REVIEW: ABNORMAL
SODIUM SERPL-SCNC: 140 MMOL/L (ref 136–145)
WBC # BLD AUTO: 7.2 K/UL (ref 4–11)

## 2025-08-14 PROCEDURE — 6370000000 HC RX 637 (ALT 250 FOR IP): Performed by: SURGERY

## 2025-08-14 PROCEDURE — 85730 THROMBOPLASTIN TIME PARTIAL: CPT

## 2025-08-14 PROCEDURE — 2060000000 HC ICU INTERMEDIATE R&B

## 2025-08-14 PROCEDURE — 6360000002 HC RX W HCPCS: Performed by: SURGERY

## 2025-08-14 PROCEDURE — 6370000000 HC RX 637 (ALT 250 FOR IP): Performed by: STUDENT IN AN ORGANIZED HEALTH CARE EDUCATION/TRAINING PROGRAM

## 2025-08-14 PROCEDURE — 2500000003 HC RX 250 WO HCPCS: Performed by: SURGERY

## 2025-08-14 PROCEDURE — 85025 COMPLETE CBC W/AUTO DIFF WBC: CPT

## 2025-08-14 PROCEDURE — 80048 BASIC METABOLIC PNL TOTAL CA: CPT

## 2025-08-14 PROCEDURE — 99024 POSTOP FOLLOW-UP VISIT: CPT | Performed by: CLINICAL NURSE SPECIALIST

## 2025-08-14 RX ADMIN — ASPIRIN 81 MG: 81 TABLET, COATED ORAL at 09:06

## 2025-08-14 RX ADMIN — LORAZEPAM 0.5 MG: 0.5 TABLET ORAL at 03:43

## 2025-08-14 RX ADMIN — PROPRANOLOL HYDROCHLORIDE 60 MG: 60 CAPSULE, EXTENDED RELEASE ORAL at 09:13

## 2025-08-14 RX ADMIN — CLONAZEPAM 1 MG: 1 TABLET ORAL at 09:09

## 2025-08-14 RX ADMIN — HYDROXYZINE PAMOATE 50 MG: 25 CAPSULE ORAL at 00:00

## 2025-08-14 RX ADMIN — CARBAMAZEPINE 400 MG: 200 TABLET, EXTENDED RELEASE ORAL at 09:11

## 2025-08-14 RX ADMIN — TRAMADOL HYDROCHLORIDE 50 MG: 50 TABLET, COATED ORAL at 02:28

## 2025-08-14 RX ADMIN — DULOXETINE 30 MG: 30 CAPSULE, DELAYED RELEASE ORAL at 09:06

## 2025-08-14 RX ADMIN — HEPARIN SODIUM 7600 UNITS: 1000 INJECTION, SOLUTION INTRAVENOUS; SUBCUTANEOUS at 10:45

## 2025-08-14 RX ADMIN — CLONIDINE HYDROCHLORIDE 0.1 MG: 0.1 TABLET ORAL at 00:00

## 2025-08-14 RX ADMIN — SODIUM CHLORIDE, PRESERVATIVE FREE 10 ML: 5 INJECTION INTRAVENOUS at 20:15

## 2025-08-14 RX ADMIN — CARBAMAZEPINE 400 MG: 200 TABLET, EXTENDED RELEASE ORAL at 20:15

## 2025-08-14 RX ADMIN — SODIUM CHLORIDE, PRESERVATIVE FREE 10 ML: 5 INJECTION INTRAVENOUS at 09:06

## 2025-08-14 RX ADMIN — HYDROXYZINE PAMOATE 50 MG: 25 CAPSULE ORAL at 13:48

## 2025-08-14 RX ADMIN — Medication 100 MG: at 09:06

## 2025-08-14 RX ADMIN — ATORVASTATIN CALCIUM 40 MG: 40 TABLET, FILM COATED ORAL at 09:06

## 2025-08-14 RX ADMIN — CLONAZEPAM 1 MG: 1 TABLET ORAL at 20:15

## 2025-08-14 RX ADMIN — PANTOPRAZOLE SODIUM 40 MG: 40 TABLET, DELAYED RELEASE ORAL at 05:42

## 2025-08-14 RX ADMIN — PANTOPRAZOLE SODIUM 40 MG: 40 TABLET, DELAYED RELEASE ORAL at 17:44

## 2025-08-14 RX ADMIN — HEPARIN SODIUM AND DEXTROSE 15 UNITS/KG/HR: 10000; 5 INJECTION INTRAVENOUS at 10:57

## 2025-08-14 RX ADMIN — SODIUM CHLORIDE, PRESERVATIVE FREE 10 ML: 5 INJECTION INTRAVENOUS at 09:07

## 2025-08-14 RX ADMIN — LEVOTHYROXINE SODIUM 150 MCG: 0.15 TABLET ORAL at 09:06

## 2025-08-14 ASSESSMENT — PAIN SCALES - GENERAL
PAINLEVEL_OUTOF10: 0
PAINLEVEL_OUTOF10: 0
PAINLEVEL_OUTOF10: 5
PAINLEVEL_OUTOF10: 0

## 2025-08-14 ASSESSMENT — PAIN DESCRIPTION - LOCATION: LOCATION: BUTTOCKS

## 2025-08-14 ASSESSMENT — PAIN - FUNCTIONAL ASSESSMENT: PAIN_FUNCTIONAL_ASSESSMENT: 0-10

## 2025-08-15 VITALS
TEMPERATURE: 97.6 F | DIASTOLIC BLOOD PRESSURE: 77 MMHG | HEIGHT: 64 IN | WEIGHT: 203.2 LBS | RESPIRATION RATE: 18 BRPM | HEART RATE: 72 BPM | BODY MASS INDEX: 34.69 KG/M2 | OXYGEN SATURATION: 98 % | SYSTOLIC BLOOD PRESSURE: 121 MMHG

## 2025-08-15 LAB
ANISOCYTOSIS BLD QL SMEAR: ABNORMAL
APTT BLD: 55.7 SEC (ref 22.8–35.8)
BASOPHILS # BLD: 0 K/UL (ref 0–0.2)
BASOPHILS NFR BLD: 0 %
DEPRECATED RDW RBC AUTO: 16 % (ref 12.4–15.4)
EOSINOPHIL # BLD: 0.5 K/UL (ref 0–0.6)
EOSINOPHIL NFR BLD: 6 %
HCT VFR BLD AUTO: 28.9 % (ref 36–48)
HGB BLD-MCNC: 9.7 G/DL (ref 12–16)
LYMPHOCYTES # BLD: 1.4 K/UL (ref 1–5.1)
LYMPHOCYTES NFR BLD: 17 %
MACROCYTES BLD QL SMEAR: ABNORMAL
MCH RBC QN AUTO: 31 PG (ref 26–34)
MCHC RBC AUTO-ENTMCNC: 33.4 G/DL (ref 31–36)
MCV RBC AUTO: 92.8 FL (ref 80–100)
MICROCYTES BLD QL SMEAR: ABNORMAL
MONOCYTES # BLD: 0.6 K/UL (ref 0–1.3)
MONOCYTES NFR BLD: 8 %
MYELOCYTES NFR BLD MANUAL: 1 %
NEUTROPHILS # BLD: 5.6 K/UL (ref 1.7–7.7)
NEUTROPHILS NFR BLD: 68 %
OVALOCYTES BLD QL SMEAR: ABNORMAL
PLATELET # BLD AUTO: 291 K/UL (ref 135–450)
PLATELET BLD QL SMEAR: ADEQUATE
PMV BLD AUTO: 7.8 FL (ref 5–10.5)
POIKILOCYTOSIS BLD QL SMEAR: ABNORMAL
RBC # BLD AUTO: 3.11 M/UL (ref 4–5.2)
SLIDE REVIEW: ABNORMAL
TARGETS BLD QL SMEAR: ABNORMAL
WBC # BLD AUTO: 8.1 K/UL (ref 4–11)

## 2025-08-15 PROCEDURE — 97168 OT RE-EVAL EST PLAN CARE: CPT

## 2025-08-15 PROCEDURE — 85730 THROMBOPLASTIN TIME PARTIAL: CPT

## 2025-08-15 PROCEDURE — 97535 SELF CARE MNGMENT TRAINING: CPT

## 2025-08-15 PROCEDURE — 6370000000 HC RX 637 (ALT 250 FOR IP): Performed by: SURGERY

## 2025-08-15 PROCEDURE — 97116 GAIT TRAINING THERAPY: CPT

## 2025-08-15 PROCEDURE — 2500000003 HC RX 250 WO HCPCS: Performed by: SURGERY

## 2025-08-15 PROCEDURE — 97164 PT RE-EVAL EST PLAN CARE: CPT

## 2025-08-15 PROCEDURE — 85025 COMPLETE CBC W/AUTO DIFF WBC: CPT

## 2025-08-15 PROCEDURE — 99024 POSTOP FOLLOW-UP VISIT: CPT | Performed by: CLINICAL NURSE SPECIALIST

## 2025-08-15 PROCEDURE — 6360000002 HC RX W HCPCS: Performed by: SURGERY

## 2025-08-15 PROCEDURE — 97530 THERAPEUTIC ACTIVITIES: CPT

## 2025-08-15 PROCEDURE — 6370000000 HC RX 637 (ALT 250 FOR IP): Performed by: STUDENT IN AN ORGANIZED HEALTH CARE EDUCATION/TRAINING PROGRAM

## 2025-08-15 RX ORDER — LIDOCAINE 4 G/G
1 PATCH TOPICAL DAILY
Qty: 10 EACH | Refills: 1 | Status: SHIPPED | OUTPATIENT
Start: 2025-08-15

## 2025-08-15 RX ORDER — TRAMADOL HYDROCHLORIDE 50 MG/1
50 TABLET ORAL EVERY 6 HOURS PRN
Qty: 30 TABLET | Refills: 0 | Status: CANCELLED | OUTPATIENT
Start: 2025-08-15 | End: 2025-09-14

## 2025-08-15 RX ORDER — LORAZEPAM 0.5 MG/1
0.5 TABLET ORAL EVERY 6 HOURS PRN
Qty: 30 TABLET | Refills: 2 | Status: SHIPPED | OUTPATIENT
Start: 2025-08-15 | End: 2025-09-14

## 2025-08-15 RX ORDER — CYANOCOBALAMIN 1000 UG/ML
1000 INJECTION, SOLUTION INTRAMUSCULAR; SUBCUTANEOUS WEEKLY
Qty: 10 EACH | Refills: 1 | Status: SHIPPED | OUTPATIENT
Start: 2025-08-16

## 2025-08-15 RX ORDER — PANTOPRAZOLE SODIUM 40 MG/1
40 TABLET, DELAYED RELEASE ORAL
Qty: 30 TABLET | Refills: 3 | Status: SHIPPED | OUTPATIENT
Start: 2025-08-15

## 2025-08-15 RX ORDER — DABIGATRAN ETEXILATE 75 MG/1
150 CAPSULE ORAL 2 TIMES DAILY
Status: DISCONTINUED | OUTPATIENT
Start: 2025-08-15 | End: 2025-08-15 | Stop reason: HOSPADM

## 2025-08-15 RX ORDER — DABIGATRAN ETEXILATE 150 MG/1
150 CAPSULE ORAL 2 TIMES DAILY
Qty: 60 CAPSULE | Refills: 2 | Status: CANCELLED | OUTPATIENT
Start: 2025-08-15

## 2025-08-15 RX ORDER — CLONAZEPAM 1 MG/1
1 TABLET ORAL 2 TIMES DAILY
Qty: 60 TABLET | Refills: 0 | Status: SHIPPED | OUTPATIENT
Start: 2025-08-15 | End: 2025-09-14

## 2025-08-15 RX ORDER — DULOXETIN HYDROCHLORIDE 30 MG/1
30 CAPSULE, DELAYED RELEASE ORAL DAILY
Qty: 30 CAPSULE | Refills: 3 | Status: SHIPPED | OUTPATIENT
Start: 2025-08-15

## 2025-08-15 RX ORDER — CARBAMAZEPINE 400 MG/1
400 TABLET, EXTENDED RELEASE ORAL 2 TIMES DAILY
Qty: 60 TABLET | Refills: 3 | Status: SHIPPED | OUTPATIENT
Start: 2025-08-15

## 2025-08-15 RX ORDER — HYDROXYZINE PAMOATE 50 MG/1
50 CAPSULE ORAL EVERY 8 HOURS PRN
Qty: 30 CAPSULE | Refills: 2 | Status: SHIPPED | OUTPATIENT
Start: 2025-08-15 | End: 2025-09-14

## 2025-08-15 RX ORDER — CLONIDINE HYDROCHLORIDE 0.1 MG/1
0.1 TABLET ORAL EVERY 6 HOURS PRN
Qty: 60 TABLET | Refills: 3 | Status: SHIPPED | OUTPATIENT
Start: 2025-08-15

## 2025-08-15 RX ORDER — METHOCARBAMOL 750 MG/1
750 TABLET, FILM COATED ORAL EVERY 6 HOURS PRN
Qty: 30 TABLET | Refills: 3 | Status: SHIPPED | OUTPATIENT
Start: 2025-08-15 | End: 2025-09-14

## 2025-08-15 RX ORDER — PROPRANOLOL HYDROCHLORIDE 60 MG/1
60 CAPSULE, EXTENDED RELEASE ORAL DAILY
Qty: 30 CAPSULE | Refills: 3 | Status: SHIPPED | OUTPATIENT
Start: 2025-08-15

## 2025-08-15 RX ORDER — LEVOTHYROXINE SODIUM 150 UG/1
150 TABLET ORAL DAILY
Qty: 30 TABLET | Refills: 3 | Status: SHIPPED | OUTPATIENT
Start: 2025-08-15

## 2025-08-15 RX ORDER — LANOLIN ALCOHOL/MO/W.PET/CERES
100 CREAM (GRAM) TOPICAL DAILY
Qty: 30 TABLET | Refills: 3 | Status: SHIPPED | OUTPATIENT
Start: 2025-08-15

## 2025-08-15 RX ORDER — ASPIRIN 81 MG/1
81 TABLET ORAL DAILY
Qty: 30 TABLET | Refills: 3 | Status: SHIPPED | OUTPATIENT
Start: 2025-08-15

## 2025-08-15 RX ORDER — DABIGATRAN ETEXILATE 150 MG/1
150 CAPSULE ORAL 2 TIMES DAILY
Qty: 60 CAPSULE | Refills: 3 | Status: SHIPPED | OUTPATIENT
Start: 2025-08-15

## 2025-08-15 RX ADMIN — ACETAMINOPHEN 650 MG: 325 TABLET ORAL at 04:24

## 2025-08-15 RX ADMIN — DULOXETINE 30 MG: 30 CAPSULE, DELAYED RELEASE ORAL at 10:04

## 2025-08-15 RX ADMIN — CARBAMAZEPINE 400 MG: 200 TABLET, EXTENDED RELEASE ORAL at 10:04

## 2025-08-15 RX ADMIN — DABIGATRAN ETEXILATE MESYLATE 150 MG: 75 CAPSULE ORAL at 12:14

## 2025-08-15 RX ADMIN — LEVOTHYROXINE SODIUM 150 MCG: 0.15 TABLET ORAL at 10:04

## 2025-08-15 RX ADMIN — PROPRANOLOL HYDROCHLORIDE 60 MG: 60 CAPSULE, EXTENDED RELEASE ORAL at 10:04

## 2025-08-15 RX ADMIN — Medication 100 MG: at 10:04

## 2025-08-15 RX ADMIN — ATORVASTATIN CALCIUM 40 MG: 40 TABLET, FILM COATED ORAL at 10:04

## 2025-08-15 RX ADMIN — CLONAZEPAM 1 MG: 1 TABLET ORAL at 10:04

## 2025-08-15 RX ADMIN — HEPARIN SODIUM AND DEXTROSE 12 UNITS/KG/HR: 10000; 5 INJECTION INTRAVENOUS at 03:06

## 2025-08-15 RX ADMIN — SODIUM CHLORIDE, PRESERVATIVE FREE 10 ML: 5 INJECTION INTRAVENOUS at 10:05

## 2025-08-15 RX ADMIN — HEPARIN SODIUM 7600 UNITS: 1000 INJECTION, SOLUTION INTRAVENOUS; SUBCUTANEOUS at 06:25

## 2025-08-15 RX ADMIN — ASPIRIN 81 MG: 81 TABLET, COATED ORAL at 10:04

## 2025-08-15 RX ADMIN — PANTOPRAZOLE SODIUM 40 MG: 40 TABLET, DELAYED RELEASE ORAL at 06:28

## 2025-08-15 ASSESSMENT — PAIN DESCRIPTION - LOCATION: LOCATION: BACK;LEG

## 2025-08-15 ASSESSMENT — PAIN SCALES - GENERAL
PAINLEVEL_OUTOF10: 6
PAINLEVEL_OUTOF10: 6
PAINLEVEL_OUTOF10: 8

## 2025-08-15 ASSESSMENT — PAIN - FUNCTIONAL ASSESSMENT
PAIN_FUNCTIONAL_ASSESSMENT: 0-10
PAIN_FUNCTIONAL_ASSESSMENT: 0-10

## 2025-08-15 ASSESSMENT — PAIN DESCRIPTION - DESCRIPTORS: DESCRIPTORS: SHARP;STABBING;THROBBING

## 2025-08-15 ASSESSMENT — PAIN DESCRIPTION - FREQUENCY: FREQUENCY: CONTINUOUS

## 2025-08-18 ENCOUNTER — TELEPHONE (OUTPATIENT)
Dept: FAMILY MEDICINE CLINIC | Age: 55
End: 2025-08-18

## 2025-08-20 ENCOUNTER — TELEMEDICINE (OUTPATIENT)
Dept: FAMILY MEDICINE CLINIC | Age: 55
End: 2025-08-20

## 2025-08-20 ENCOUNTER — TELEPHONE (OUTPATIENT)
Dept: FAMILY MEDICINE CLINIC | Age: 55
End: 2025-08-20

## 2025-08-20 ENCOUNTER — TELEPHONE (OUTPATIENT)
Dept: VASCULAR SURGERY | Age: 55
End: 2025-08-20

## 2025-08-20 DIAGNOSIS — D68.59: ICD-10-CM

## 2025-08-20 DIAGNOSIS — K58.0 IRRITABLE BOWEL SYNDROME WITH DIARRHEA: ICD-10-CM

## 2025-08-20 DIAGNOSIS — E89.0 POST-SURGICAL HYPOTHYROIDISM: ICD-10-CM

## 2025-08-20 DIAGNOSIS — F33.2 SEVERE EPISODE OF RECURRENT MAJOR DEPRESSIVE DISORDER, WITHOUT PSYCHOTIC FEATURES (HCC): ICD-10-CM

## 2025-08-20 DIAGNOSIS — Z09 HOSPITAL DISCHARGE FOLLOW-UP: ICD-10-CM

## 2025-08-20 DIAGNOSIS — G25.0 ESSENTIAL TREMOR: Primary | ICD-10-CM

## 2025-08-20 DIAGNOSIS — G40.909 NONINTRACTABLE EPILEPSY WITHOUT STATUS EPILEPTICUS, UNSPECIFIED EPILEPSY TYPE (HCC): ICD-10-CM

## 2025-08-20 RX ORDER — PRIMIDONE 250 MG/1
TABLET ORAL
Qty: 111 TABLET | Refills: 0
Start: 2025-08-20 | End: 2025-12-16

## 2025-08-20 RX ORDER — DICYCLOMINE HYDROCHLORIDE 10 MG/1
10 CAPSULE ORAL 4 TIMES DAILY PRN
Qty: 120 CAPSULE | Refills: 3
Start: 2025-08-20

## 2025-08-22 ENCOUNTER — TELEPHONE (OUTPATIENT)
Dept: FAMILY MEDICINE CLINIC | Age: 55
End: 2025-08-22

## 2025-09-05 ENCOUNTER — PATIENT MESSAGE (OUTPATIENT)
Dept: FAMILY MEDICINE CLINIC | Age: 55
End: 2025-09-05

## 2025-09-05 DIAGNOSIS — F41.9 ANXIETY: ICD-10-CM

## (undated) DEVICE — COTTON UNDERCAST PADDING,CRIMPED FINISH: Brand: WEBRIL

## (undated) DEVICE — NEEDLE HYPO 18GA L1.5IN THN WALL PIVOTING SHLD BVL ORIENTED

## (undated) DEVICE — CONMED SCOPE SAVER BITE BLOCK, 20X27 MM: Brand: SCOPE SAVER

## (undated) DEVICE — PENCIL SMK EVAC ALL IN 1 DSGN ENH VISIBILITY IMPROVED AIR

## (undated) DEVICE — FORCEPS BX L240CM JAW DIA2.8MM L CAP W/ NDL MIC MESH TOOTH

## (undated) DEVICE — NEEDLE SPNL 20GA L3.5IN YEL HUB S STL REG WALL FIT STYL W/

## (undated) DEVICE — ELECTRODE,ECG,STRESS,FOAM,3PK: Brand: MEDLINE

## (undated) DEVICE — Device

## (undated) DEVICE — COVER LT HNDL PLAS RIG 2 PER PK

## (undated) DEVICE — SUTURE PERMAHAND SZ 2-0 L30IN NONABSORBABLE BLK SILK W/O A305H

## (undated) DEVICE — SYRINGE MED 10ML LUERLOCK TIP W/O SFTY DISP

## (undated) DEVICE — OPTIFOAM GENTLE SA, POSTOP, 4X8: Brand: MEDLINE

## (undated) DEVICE — STANDARD HYPODERMIC NEEDLE,POLYPROPYLENE HUB: Brand: MONOJECT

## (undated) DEVICE — SUTURE ETHBND EXCEL SZ 2 L30IN NONABSORBABLE GRN L40MM V-37 MX69G

## (undated) DEVICE — MARKER,SKIN,WI/RULER AND LABELS: Brand: MEDLINE

## (undated) DEVICE — GLOVE SURG SZ 7 L12IN FNGR THK94MIL STD WHT ISOLEX LTX FREE

## (undated) DEVICE — SOLUTION IRRIG 2000ML 0.9% SOD CHL USP UROMATIC PLAS CONT

## (undated) DEVICE — AIRLIFE™ NASAL OXYGEN CANNULA CURVED, FLARED TIP, WITH 7 FEET (2.1 M) CRUSH RESISTANT TUBING, OVER-THE-EAR STYLE: Brand: AIRLIFE™

## (undated) DEVICE — BIT DRL L30MM DIA3.2MM DISP FOR G7 2 MOBILITY CONSTRUCT

## (undated) DEVICE — HOOD: Brand: FLYTE

## (undated) DEVICE — DRAPE C ARM W46XL120IN XLN

## (undated) DEVICE — 3M™ STERI-DRAPE™ INCISE DRAPE 1050 (60CM X 45CM): Brand: STERI-DRAPE™

## (undated) DEVICE — CANNULA NSL AD TBNG L7FT PVC STR NONFLARED PRNG O2 DEL W STD

## (undated) DEVICE — SUTURE STRATAFIX SPRL SZ 3-0 L12IN ABSRB UD FS-1 L30X30CM SXMP2B410

## (undated) DEVICE — BANDAGE,ADHESIVE,PLASTIC,1"X3",ST,LF: Brand: MEDLINE

## (undated) DEVICE — INTENDED FOR TISSUE SEPARATION, AND OTHER PROCEDURES THAT REQUIRE A SHARP SURGICAL BLADE TO PUNCTURE OR CUT.: Brand: BARD-PARKER ® STAINLESS STEEL BLADES

## (undated) DEVICE — ENDOSCOPIC KIT 2 12 FT OP4 DE2 GWN SYR

## (undated) DEVICE — STERILE LATEX POWDER-FREE SURGICAL GLOVESWITH NITRILE COATING: Brand: PROTEXIS

## (undated) DEVICE — APPLICATOR MEDICATED 10.5 CC SOLUTION HI LT ORNG CHLORAPREP

## (undated) DEVICE — BIPOLAR SEALER 23-112-1 AQM 6.0: Brand: AQUAMANTYS ®

## (undated) DEVICE — NEEDLE SPNL 22GA L3.5IN BLK HUB S STL REG WALL FIT STYL W/

## (undated) DEVICE — BAG BLD TRNSF 1 CPLR IV ST 600ML TERUFLEX

## (undated) DEVICE — SHEATH INTRO 6FR L10CM MINI GWIRE L45CM 0.035IN COR KINK

## (undated) DEVICE — TOWEL,OR,DSP,ST,BLUE,STD,4/PK,20PK/CS: Brand: MEDLINE

## (undated) DEVICE — ADHESIVE SKIN CLOSURE WND 8.661X1.5 IN 22 CM LIQUIBAND SECUR

## (undated) DEVICE — Z CONVERTED USE 2273163 BANDAGE COMPR W3INXL4.5YD LTWT E EC SGL LAYERED CLP CLSR

## (undated) DEVICE — BLADE OPHTH 180DEG CUT SURF BLU STR SHRP DBL BVL GRINDLESS

## (undated) DEVICE — NEEDLE HYPO 22GA L1 1/2IN PIVOTING SHLD FOR LUERLOCK SYR

## (undated) DEVICE — SUTURE ETHLN SZ 4-0 L18IN NONABSORBABLE BLK L19MM PS-2 3/8 1667H

## (undated) DEVICE — SUTURE VCRL SZ 2-0 L18IN ABSRB UD CT-1 L36MM 1/2 CIR J839D

## (undated) DEVICE — SYRINGE MED 5ML CANN 17GA BLU PLAS BLNT TIP LUERLOCK CONN

## (undated) DEVICE — Z DUPLICATE USE 2272113 DRAPE SURG UTIL 26X15 IN W/ TAPE N INVASIVE MULT LAYR DISP

## (undated) DEVICE — GUIDEWIRE VASC ANGLED 3 CM 0.035 INX260 CM STD NIT GLIDEWIRE

## (undated) DEVICE — GLOVE SURG SZ 75 L12IN FNGR THK94MIL STD WHT LTX FREE

## (undated) DEVICE — SUTURE STRATAFIX SPRL SZ 1 L14IN ABSRB VLT L48CM CTX 1/2 SXPD2B405

## (undated) DEVICE — BLOOD TRANSFUSION FILTER: Brand: HAEMONETICS

## (undated) DEVICE — SOLUTION,SALINE,IRRGATION,500ML,STRL: Brand: MEDLINE

## (undated) DEVICE — ELECTRODE ECG MONITR FOAM TEAR DROP ADLT RED

## (undated) DEVICE — 1010 S-DRAPE TOWEL DRAPE 10/BX: Brand: STERI-DRAPE™

## (undated) DEVICE — SUTURE STRATAFIX SPRL SZ 2 0 L14IN ABSRB UD MH L36MM 1 2 CIR SXMD2B401